# Patient Record
Sex: FEMALE | Race: ASIAN | NOT HISPANIC OR LATINO | Employment: UNEMPLOYED | URBAN - METROPOLITAN AREA
[De-identification: names, ages, dates, MRNs, and addresses within clinical notes are randomized per-mention and may not be internally consistent; named-entity substitution may affect disease eponyms.]

---

## 2017-01-26 DIAGNOSIS — L29.9 ITCH OF SKIN: Primary | ICD-10-CM

## 2017-01-26 RX ORDER — CETIRIZINE HYDROCHLORIDE 10 MG/1
TABLET ORAL
Qty: 60 TABLET | Refills: 3 | Status: ON HOLD | OUTPATIENT
Start: 2017-01-26 | End: 2017-06-20

## 2017-01-26 NOTE — TELEPHONE ENCOUNTER
Prednisone 10mg      Last Written Prescription Date:    Last Fill Quantity: ,   # refills:   [place] [facility] [place] [facility] or [facility] prescribing provider: 1/21/16  Future Office visit:       Routing refill request to provider for review/approval because:  Drug not active on patient's medication list    cetirizine      Last Written Prescription Date: 1/21/16  Last Fill Quantity: 60,  # refills: 3   Last [place] Visit [place] [facility] [place] [facility] or [facility] prescribing provider: 1/21/16

## 2017-02-21 ENCOUNTER — TELEPHONE (OUTPATIENT)
Dept: OBGYN | Facility: CLINIC | Age: 32
End: 2017-02-21

## 2017-02-21 NOTE — TELEPHONE ENCOUNTER
Pt uses several different creams, she is pregnant and wants to double check with OB that their okay to use.  Pt will be seeing Dr. Mendez 4/7/17.  Pt stated 5 weeks along now.    For rectal itching  1.  Proctozone HCL 2.5% cream  This works the best  2.  Triamcinolone 0.1% cream  Works okay  3.  Clobetasol 0.05%  Has not used yet.    For Under her Breast  1.  Fluocinonide 0.005%      Are these okay to use with pregnacy?  Please advise  Timbo HASKINS RN

## 2017-03-06 ENCOUNTER — TELEPHONE (OUTPATIENT)
Dept: OBGYN | Facility: CLINIC | Age: 32
End: 2017-03-06

## 2017-03-06 DIAGNOSIS — Z32.01 PREGNANCY TEST POSITIVE: Primary | ICD-10-CM

## 2017-03-06 NOTE — TELEPHONE ENCOUNTER
Pt calling because she fell this am and landed on her bottom and her tummy. LMP 1/15/17. Pt is approx 7 weeks pregnant.   No bleeding noted.   Pt has been having abd cramping since she fell.     Order placed for pt to have an US. She will have it tomorrow. I did advise the pt to go to the ED if the pain gets worse or she experiences any vag bleeding.       SHAUNA Appiah RN

## 2017-03-07 ENCOUNTER — RADIANT APPOINTMENT (OUTPATIENT)
Dept: ULTRASOUND IMAGING | Facility: CLINIC | Age: 32
End: 2017-03-07
Attending: OBSTETRICS & GYNECOLOGY
Payer: COMMERCIAL

## 2017-03-07 ENCOUNTER — TELEPHONE (OUTPATIENT)
Dept: OBGYN | Facility: CLINIC | Age: 32
End: 2017-03-07

## 2017-03-07 DIAGNOSIS — Z32.01 PREGNANCY TEST POSITIVE: ICD-10-CM

## 2017-03-07 DIAGNOSIS — Z32.01 PREGNANCY TEST POSITIVE: Primary | ICD-10-CM

## 2017-03-07 PROCEDURE — 76801 OB US < 14 WKS SINGLE FETUS: CPT | Performed by: OBSTETRICS & GYNECOLOGY

## 2017-03-07 NOTE — TELEPHONE ENCOUNTER
Pt had an US today and found out that she is pregnant and SHYLA is Oct 22, 2017.  Pt made appts today for a Nurse only visit and her first MD appt.    Pt fell yesterday and that's why US today.  Pt states she had cramping in abd until early this am.  She had not had any vag bleeding or discharge.    Pt needed a letter stating that she is pregnant and SHYLA as above.  Letter written, printed, signed and given to pt.    Pt also had Gestational Diabetes with her last pregnancy and wondering if she can have the Glucose testing ASAP.      Pt recently saw a Derm office at [phone] and they did a bunch skin culture as she was having so much itching at her rectum. They called her a month later to say that she had e-coli on on of the skin culture. But not treatment orders.  She continues with the itch.  Had pt sign a LENARD so we can get records from this office.    Please advise  Timbo HASKINS RN

## 2017-03-07 NOTE — LETTER
2017        RE:  Unique Multani   1985  [address]  [facility] [address]              To whom it may concern,    Ms Multani is a patient of Dr. Michael Mendez an OB /GYN Specialist.  Patient is currently pregnant with a estimated due date of Oct 22, 2017.    Sincerely,    Timbo HASKINS RN  Your [facility] Care Team

## 2017-03-08 NOTE — TELEPHONE ENCOUNTER
Pt calls back, relay MD message. Pt reports she recently picked up augmentin x10 day rx from pharm, but plans not to take until Dr. Mendez makes recommendations. States dermatologist office also instructed her to do a bleach bath.    Pt is wondering if she should take antibiotic or if another sample may need to be tested as the results are from a test taken 1 month ago.    Pt aware waiting on records from dermatology.

## 2017-03-08 NOTE — TELEPHONE ENCOUNTER
Pt calls, left vm stating she's returning a call.     See 03/07 US result note.    Called pt, vm left for pt to call clinic back.

## 2017-03-08 NOTE — TELEPHONE ENCOUNTER
"\"Michael Mendez MD   to [facility] Triage Ob [facility]        10:32 AM    Please advise      Congratulations re: pregnancy with reassuring ultrasound        -recommend report any bleeding      Please advise and facilitate one hour glucose; suggest about 12 weeks     Will treat perirectal symptoms upon review of records (dermatology\"  "

## 2017-03-09 NOTE — TELEPHONE ENCOUNTER
Please advise      -recommendations by dermatology safe in pregnancy      -if persistent symptoms, would try recommended           -therapy        -if does not resolve; would recommend re-evaluation

## 2017-03-16 ENCOUNTER — PRENATAL OFFICE VISIT (OUTPATIENT)
Dept: NURSING | Facility: CLINIC | Age: 32
End: 2017-03-16
Payer: COMMERCIAL

## 2017-03-16 DIAGNOSIS — O34.219 PREVIOUS CESAREAN DELIVERY, ANTEPARTUM CONDITION OR COMPLICATION: ICD-10-CM

## 2017-03-16 DIAGNOSIS — Z32.01 PREGNANCY TEST POSITIVE: ICD-10-CM

## 2017-03-16 DIAGNOSIS — Z34.80 SUPERVISION OF OTHER NORMAL PREGNANCY, ANTEPARTUM: Primary | ICD-10-CM

## 2017-03-16 LAB
ABO + RH BLD: NORMAL
ABO + RH BLD: NORMAL
BLD GP AB SCN SERPL QL: NORMAL
BLOOD BANK CMNT PATIENT-IMP: NORMAL
ERYTHROCYTE [DISTWIDTH] IN BLOOD BY AUTOMATED COUNT: 13.2 % (ref 10–15)
HCT VFR BLD AUTO: 39.6 % (ref 35–47)
HGB BLD-MCNC: 13 G/DL (ref 11.7–15.7)
MCH RBC QN AUTO: 29.3 PG (ref 26.5–33)
MCHC RBC AUTO-ENTMCNC: 32.8 G/DL (ref 31.5–36.5)
MCV RBC AUTO: 89 FL (ref 78–100)
PLATELET # BLD AUTO: 196 10E9/L (ref 150–450)
RBC # BLD AUTO: 4.43 10E12/L (ref 3.8–5.2)
SPECIMEN EXP DATE BLD: NORMAL
WBC # BLD AUTO: 8.7 10E9/L (ref 4–11)

## 2017-03-16 PROCEDURE — 86900 BLOOD TYPING SEROLOGIC ABO: CPT | Performed by: OBSTETRICS & GYNECOLOGY

## 2017-03-16 PROCEDURE — 87389 HIV-1 AG W/HIV-1&-2 AB AG IA: CPT | Performed by: OBSTETRICS & GYNECOLOGY

## 2017-03-16 PROCEDURE — 36415 COLL VENOUS BLD VENIPUNCTURE: CPT | Performed by: OBSTETRICS & GYNECOLOGY

## 2017-03-16 PROCEDURE — 85027 COMPLETE CBC AUTOMATED: CPT | Performed by: OBSTETRICS & GYNECOLOGY

## 2017-03-16 PROCEDURE — 99207 ZZC NO CHARGE NURSE ONLY: CPT

## 2017-03-16 PROCEDURE — 87086 URINE CULTURE/COLONY COUNT: CPT | Performed by: OBSTETRICS & GYNECOLOGY

## 2017-03-16 PROCEDURE — 87340 HEPATITIS B SURFACE AG IA: CPT | Performed by: OBSTETRICS & GYNECOLOGY

## 2017-03-16 PROCEDURE — 86762 RUBELLA ANTIBODY: CPT | Performed by: OBSTETRICS & GYNECOLOGY

## 2017-03-16 PROCEDURE — 86780 TREPONEMA PALLIDUM: CPT | Performed by: OBSTETRICS & GYNECOLOGY

## 2017-03-16 PROCEDURE — 86901 BLOOD TYPING SEROLOGIC RH(D): CPT | Performed by: OBSTETRICS & GYNECOLOGY

## 2017-03-16 PROCEDURE — 86850 RBC ANTIBODY SCREEN: CPT | Performed by: OBSTETRICS & GYNECOLOGY

## 2017-03-16 NOTE — NURSING NOTE
NPN nurse visit. 1st dr visit scheduled for 4/7/17 with Michael Mendez M.D. Pap not due. Last pap 9/2014.  Pt had an US done on 3/7/17.  8w4d.   SHAUNA Appiah RN

## 2017-03-16 NOTE — MR AVS SNAPSHOT
After Visit Summary   3/16/2017    Unique Multani    MRN: [id number]           Patient Information     Date Of Birth          1985        Visit Information        Provider Department      3/16/2017 9:00 AM RI PRENATAL NURSE [facility]        Today's Diagnoses     Supervision of other normal pregnancy, antepartum    -  1    Previous  delivery, antepartum condition or complication        Pregnancy test positive           Follow-ups after your visit        Your next 10 appointments already scheduled     2017  9:45 AM CDT   New Prenatal with Michael Mendez MD   [facility] ([facility])    [address]  [facility] [id number] [phone]            2017  9:00 AM CDT   Nurse Only with RI OB NURSE   [facility] ([facility])    [address]  [facility] [id number] [phone]              Who to contact     If you have questions or need follow up information about today's clinic visit or your schedule please contact [facility] directly at [phone].  Normal or non-critical lab and imaging results will be communicated to you by Wallmobhart, letter or phone within 4 business days after the clinic has received the results. If you do not hear from us within 7 days, please contact the clinic through KVZ Sportst or phone. If you have a critical or abnormal lab result, we will notify you by phone as soon as possible.  Submit refill requests through ClickEquations or call your pharmacy and they will forward the refill request to us. Please allow 3 business days for your refill to be completed.          Additional Information About Your Visit        Wallmobhart Information     ClickEquations gives you secure access to your electronic health record. If you see a primary care provider, you can also send messages to your care team and make appointments. If you have questions,  please call your primary care clinic.  If you do not have a primary care provider, please call [phone] and they will assist you.        Care EveryWhere ID     This is your Care EveryWhere ID. This could be used by other organizations to access your [place] medical records  [id number]        Your Vitals Were     Last Period                   01/15/2017 (Exact Date)            Blood Pressure from Last 3 Encounters:   07/19/16 107/68   02/16/16 96/62   01/21/16 104/72    Weight from Last 3 Encounters:   02/16/16 146 lb 1.6 oz (66.3 kg)   01/21/16 140 lb 6.4 oz (63.7 kg)   01/07/16 141 lb (64 kg)              We Performed the Following     ABO/Rh type and screen     Anti Treponema     CBC with platelets     Hepatitis B surface antigen     HIV Antigen Antibody Combo     Rubella Antibody IgG Quantitative     Urine Culture Aerobic Bacterial        Primary Care Provider Office Phone # Fax #    Michael Mendez [phone]       [facility] [address] [place] [address] [address] [place] [address]        Thank you!     Thank you for choosing [facility]  for your care. Our goal is always to provide you with excellent care. Hearing back from our patients is one way we can continue to improve our services. Please take a few minutes to complete the written survey that you may receive in the mail after your visit with us. Thank you!             Your Updated Medication List - Protect others around you: Learn how to safely use, store and throw away your medicines at www.disposemymeds.org.          This list is accurate as of: 3/16/17 10:07 AM.  Always use your most recent med list.                   Brand Name Dispense Instructions for use    acetaminophen 325 MG tablet    TYLENOL    100 tablet    Take 2 tablets (650 mg) by mouth every 4 hours as needed for mild pain or fever (greater than or equal to 38? C /100.4? F (oral) or 38.5? C/ 101.4? F (core).)       cetirizine 10 MG tablet     zyrTEC    60 tablet    Take 1 tab twice daily until at least 2 weeks after itch of skin is gone and then daily as needed       clobetasol 0.05 % ointment    TEMOVATE    60 g    Apply topically 2 times daily

## 2017-03-17 LAB
BACTERIA SPEC CULT: NORMAL
HBV SURFACE AG SERPL QL IA: NONREACTIVE
HIV 1+2 AB+HIV1 P24 AG SERPL QL IA: NORMAL
MICRO REPORT STATUS: NORMAL
RUBV IGG SERPL IA-ACNC: 48 IU/ML
SPECIMEN SOURCE: NORMAL
T PALLIDUM IGG+IGM SER QL: NEGATIVE

## 2017-04-07 ENCOUNTER — PRENATAL OFFICE VISIT (OUTPATIENT)
Dept: OBGYN | Facility: CLINIC | Age: 32
End: 2017-04-07
Payer: COMMERCIAL

## 2017-04-07 VITALS
SYSTOLIC BLOOD PRESSURE: 104 MMHG | DIASTOLIC BLOOD PRESSURE: 58 MMHG | HEIGHT: 62 IN | WEIGHT: 144.8 LBS | BODY MASS INDEX: 26.65 KG/M2

## 2017-04-07 DIAGNOSIS — O34.219 PREVIOUS CESAREAN DELIVERY, ANTEPARTUM CONDITION OR COMPLICATION: ICD-10-CM

## 2017-04-07 DIAGNOSIS — O09.621 HIGH-RISK PREGNANCY, YOUNG MULTIGRAVIDA, FIRST TRIMESTER: Primary | ICD-10-CM

## 2017-04-07 DIAGNOSIS — K21.9 GASTROESOPHAGEAL REFLUX DISEASE WITHOUT ESOPHAGITIS: ICD-10-CM

## 2017-04-07 DIAGNOSIS — Z86.32 HISTORY OF GESTATIONAL DIABETES: ICD-10-CM

## 2017-04-07 PROBLEM — Z34.80 SUPERVISION OF OTHER NORMAL PREGNANCY, ANTEPARTUM: Status: RESOLVED | Noted: 2017-03-16 | Resolved: 2017-04-07

## 2017-04-07 PROBLEM — O09.629 HIGH-RISK PREGNANCY, YOUNG MULTIGRAVIDA: Status: ACTIVE | Noted: 2017-04-07

## 2017-04-07 PROCEDURE — 87491 CHLMYD TRACH DNA AMP PROBE: CPT | Performed by: OBSTETRICS & GYNECOLOGY

## 2017-04-07 PROCEDURE — 99214 OFFICE O/P EST MOD 30 MIN: CPT | Performed by: OBSTETRICS & GYNECOLOGY

## 2017-04-07 PROCEDURE — 87624 HPV HI-RISK TYP POOLED RSLT: CPT | Performed by: OBSTETRICS & GYNECOLOGY

## 2017-04-07 PROCEDURE — 87591 N.GONORRHOEAE DNA AMP PROB: CPT | Performed by: OBSTETRICS & GYNECOLOGY

## 2017-04-07 PROCEDURE — G0145 SCR C/V CYTO,THINLAYER,RESCR: HCPCS | Performed by: OBSTETRICS & GYNECOLOGY

## 2017-04-07 NOTE — MR AVS SNAPSHOT
After Visit Summary   2017    Unique Multani    MRN: [id number]           Patient Information     Date Of Birth          1985        Visit Information        Provider Department      2017 9:45 AM Michael Mendez MD [facility]        Today's Diagnoses     High-risk pregnancy, young multigravida, first trimester    -  1    History of gestational diabetes        Previous  delivery, antepartum condition or complication        Gastroesophageal reflux disease without esophagitis           Follow-ups after your visit        Follow-up notes from your care team     Return in about 4 weeks (around 2017).      Your next 10 appointments already scheduled     2017  9:00 AM CDT   Nurse Only with RI OB NURSE   [facility]    [address]  [facility] [address] [phone]              Who to contact     If you have questions or need follow up information about today's clinic visit or your schedule please contact [facility] directly at [phone].  Normal or non-critical lab and imaging results will be communicated to you by Frontbackhart, letter or phone within 4 business days after the clinic has received the results. If you do not hear from us within 7 days, please contact the clinic through [email or web address] or phone. If you have a critical or abnormal lab result, we will notify you by phone as soon as possible.  Submit refill requests through [employer] Rev[employer] or call your pharmacy and they will forward the refill request to us. Please allow 3 business days for your refill to be completed.          Additional Information About Your Visit        Frontbackhart Information     [employer] gives you secure access to your electronic health record. If you see a primary care provider, you can also send messages to your care team and make appointments. If you have questions, please call your primary care clinic.  If you  "do not have a primary care provider, please call [phone] and they will assist you.        Care EveryWhere ID     This is your Care EveryWhere ID. This could be used by other organizations to access your [place] medical records  [id number]        Your Vitals Were     Height Last Period Breastfeeding? BMI (Body Mass Index)          5' 1.5\" (1.562 m) 01/15/2017 (Exact Date) No 26.92 kg/m2         Blood Pressure from Last 3 Encounters:   04/07/17 104/58   07/19/16 107/68   02/16/16 96/62    Weight from Last 3 Encounters:   04/07/17 144 lb 12.8 oz (65.7 kg)   02/16/16 146 lb 1.6 oz (66.3 kg)   01/21/16 140 lb 6.4 oz (63.7 kg)              We Performed the Following     CHLAMYDIA TRACHOMATIS PCR     HPV High Risk Types DNA Cervical     NEISSERIA GONORRHOEA PCR     Pap imaged thin layer screen with HPV - recommended age 30 - 65 years (select HPV order below)          Today's Medication Changes          These changes are accurate as of: 4/7/17 11:45 AM.  If you have any questions, ask your nurse or doctor.               Start taking these medicines.        Dose/Directions    ranitidine 150 MG tablet   Commonly known as:  ZANTAC   Used for:  Gastroesophageal reflux disease without esophagitis   Started by:  Michael Mendez MD        Dose:  150 mg   Take 1 tablet (150 mg) by mouth 2 times daily   Quantity:  60 tablet   Refills:  1            Where to get your medicines      Some of these will need a paper prescription and others can be bought over the counter.  Ask your nurse if you have questions.     Bring a paper prescription for each of these medications     ranitidine 150 MG tablet                Primary Care Provider Office Phone # Fax #    Michael Mendez [phone]       [address] [place] [address]        Thank you!     Thank you for choosing [facility]  for your care. Our goal is always to provide you with excellent care. Hearing " back from our patients is one way we can continue to improve our services. Please take a few minutes to complete the written survey that you may receive in the mail after your visit with us. Thank you!             Your Updated Medication List - Protect others around you: Learn how to safely use, store and throw away your medicines at www.disposemymeds.org.          This list is accurate as of: 4/7/17 11:45 AM.  Always use your most recent med list.                   Brand Name Dispense Instructions for use    acetaminophen 325 MG tablet    TYLENOL    100 tablet    Take 2 tablets (650 mg) by mouth every 4 hours as needed for mild pain or fever (greater than or equal to 38? C /100.4? F (oral) or 38.5? C/ 101.4? F (core).)       cetirizine 10 MG tablet    zyrTEC    60 tablet    Take 1 tab twice daily until at least 2 weeks after itch of skin is gone and then daily as needed       clobetasol 0.05 % ointment    TEMOVATE    60 g    Apply topically 2 times daily       PRENATAL VIT-FE BISG-FA-DHA PO          ranitidine 150 MG tablet    ZANTAC    60 tablet    Take 1 tablet (150 mg) by mouth 2 times daily

## 2017-04-07 NOTE — LETTER
April 18, 2017    Unique Multani  [address] [place] [address]    Dear Unique,  We are happy to inform you that your PAP smear result from 04/07/2017 is normal.  We are now able to do a follow up test on PAP smears. The DNA test is for HPV (Human Papilloma Virus). Cervical cancer is closely linked with certain types of HPV. Your result showed no evidence of HPV.  Therefore we recommend you return in 3 years for your next pap smear.  You will still need to return to the clinic every year for an annual exam and other preventive tests.  Please contact the clinic at [phone] with any questions.  Sincerely,    Michael Mendez MD

## 2017-04-07 NOTE — PROGRESS NOTES
"  SUBJECTIVE: Unique Multani is an 31 year old female here for initial OB visit.    Past Medical History of Father of Baby: No significant medical history    Review of Systems:   CONSTITUTIONAL:NEGATIVE for fever, chills, change in weight  INTEGUMENTARY/SKIN: NEGATIVE for worrisome rashes, moles or lesions  EYES: NEGATIVE for vision changes or irritation  ENT/MOUTH: NEGATIVE for ear, mouth and throat problems  RESP:NEGATIVE for significant cough or SOB  BREAST: NEGATIVE for masses, tenderness or discharge  CV: NEGATIVE for chest pain, palpitations or peripheral edema  GI: NEGATIVE for nausea, abdominal pain, heartburn, or change in bowel habits  MUSCULOSKELETAL: NEGATIVE for significant arthralgias or myalgia  NEURO: NEGATIVE for weakness, dizziness or paresthesias  ENDOCRINE: NEGATIVE for temperature intolerance, skin/hair changes  HEME/ALLERGY/IMMUNE: NEGATIVE for bleeding problems  PSYCHIATRIC: NEGATIVE for changes in mood or affect     History Since Last Menstrual Period: No Problems    EXAM: Blood pressure 104/58, height 5' 1.5\" (1.562 m), weight 144 lb 12.8 oz (65.7 kg), last menstrual period 01/15/2017, not currently breastfeeding.  GENERAL APPEARANCE: healthy, alert and no distress  EYES: EOMI,  PERRL  HENT: ear canals and TM's normal and nose and mouth without ulcers or lesions  RESP: lungs clear to auscultation - no rales, rhonchi or wheezes  CV: regular rates and rhythm, normal S1 S2, no S3 or S4 and no murmur, click or rub -  ABDOMEN:  soft, nontender, no HSM or masses and bowel sounds normal    Pelvix exam:  Perineum: Intact;   Vulva: Normal;  Vagina: Normal mucosa  Cervix: Nulliparous, closed, mobile,  no discharge;  Uterus: 12 weeks  Adnexa: Normal;  Rectum: Normal without lesion or mass;   Bony Pelvis: Gynecoid.     ASSESSMENT/ PLAN:  IUP at 11 5/7 weeks      -previous C/S x 2; recommend repeat      -history of gestational DM; recommend early screening      -offered First Trimester Screen/CF " testing  Follow up in 4 weeks.

## 2017-04-07 NOTE — NURSING NOTE
"Chief Complaint   Patient presents with     Prenatal Care     NPN MD visit   11w5d  Discuss   C/o nausea / sick feeling whenever eating  Hx GDM    initial /58  Ht 5' 1.5\" (1.562 m)  Wt 144 lb 12.8 oz (65.7 kg)  LMP 01/15/2017 (Exact Date)  Breastfeeding? No  BMI 26.92 kg/m2 Estimated body mass index is 26.92 kg/(m^2) as calculated from the following:    Height as of this encounter: 5' 1.5\" (1.562 m).    Weight as of this encounter: 144 lb 12.8 oz (65.7 kg).  BP completed using cuff size regular.  Mattie Cuellar CMA    "

## 2017-04-09 LAB
C TRACH DNA SPEC QL NAA+PROBE: NORMAL
N GONORRHOEA DNA SPEC QL NAA+PROBE: NORMAL
SPECIMEN SOURCE: NORMAL
SPECIMEN SOURCE: NORMAL

## 2017-04-11 LAB
COPATH REPORT: NORMAL
PAP: NORMAL

## 2017-04-13 LAB
FINAL DIAGNOSIS: NORMAL
HPV HR 12 DNA CVX QL NAA+PROBE: NEGATIVE
HPV16 DNA SPEC QL NAA+PROBE: NEGATIVE
HPV18 DNA SPEC QL NAA+PROBE: NEGATIVE
SPECIMEN DESCRIPTION: NORMAL

## 2017-04-17 NOTE — PROGRESS NOTES
Pap done on 4/7/17. Please send nl pap and Neg HPV letter, ([id number]) for pap in 3 years and annual physical in 1 year.   Chart reviewed,  alexa.     SHAUNA Appiah RN

## 2017-05-08 ENCOUNTER — PRENATAL OFFICE VISIT (OUTPATIENT)
Dept: OBGYN | Facility: CLINIC | Age: 32
End: 2017-05-08
Payer: MEDICAID

## 2017-05-08 VITALS
SYSTOLIC BLOOD PRESSURE: 100 MMHG | TEMPERATURE: 98.6 F | BODY MASS INDEX: 27.53 KG/M2 | WEIGHT: 148.1 LBS | DIASTOLIC BLOOD PRESSURE: 60 MMHG

## 2017-05-08 DIAGNOSIS — O09.622 HIGH-RISK PREGNANCY, YOUNG MULTIGRAVIDA, SECOND TRIMESTER: Primary | ICD-10-CM

## 2017-05-08 PROCEDURE — 99212 OFFICE O/P EST SF 10 MIN: CPT | Performed by: OBSTETRICS & GYNECOLOGY

## 2017-05-08 PROCEDURE — 36415 COLL VENOUS BLD VENIPUNCTURE: CPT | Performed by: OBSTETRICS & GYNECOLOGY

## 2017-05-08 PROCEDURE — 99000 SPECIMEN HANDLING OFFICE-LAB: CPT | Performed by: OBSTETRICS & GYNECOLOGY

## 2017-05-08 PROCEDURE — 81511 FTL CGEN ABNOR FOUR ANAL: CPT | Mod: 90 | Performed by: OBSTETRICS & GYNECOLOGY

## 2017-05-08 NOTE — NURSING NOTE
"Chief Complaint   Patient presents with     Prenatal Care       Initial /60 (BP Location: Left arm, Patient Position: Chair, Cuff Size: Adult Regular)  Temp 98.6  F (37  C) (Oral)  Wt 148 lb 1.6 oz (67.2 kg)  LMP 01/15/2017 (Exact Date)  BMI 27.53 kg/m2 Estimated body mass index is 27.53 kg/(m^2) as calculated from the following:    Height as of 4/7/17: 5' 1.5\" (1.562 m).    Weight as of this encounter: 148 lb 1.6 oz (67.2 kg).  Medication Reconciliation: complete  16w1d    "

## 2017-05-08 NOTE — MR AVS SNAPSHOT
After Visit Summary   5/8/2017    Unique Multani    MRN: [id number]           Patient Information     Date Of Birth          1985        Visit Information        Provider Department      5/8/2017 9:15 AM Michael Mendez MD [facility]        Today's Diagnoses     High-risk pregnancy, young multigravida, second trimester    -  1       Follow-ups after your visit        Follow-up notes from your care team     Return in about 4 weeks (around 6/5/2017).      Future tests that were ordered for you today     Open Future Orders        Priority Expected Expires Ordered    US OB > 14 Weeks Complete Single Routine  5/8/2018 5/8/2017            Who to contact     If you have questions or need follow up information about today's clinic visit or your schedule please contact [facility] directly at [phone].  Normal or non-critical lab and imaging results will be communicated to you by MyChart, letter or phone within 4 business days after the clinic has received the results. If you do not hear from us within 7 days, please contact the clinic through Nugg Solutionshart or phone. If you have a critical or abnormal lab result, we will notify you by phone as soon as possible.  Submit refill requests through Heartbeat or call your pharmacy and they will forward the refill request to us. Please allow 3 business days for your refill to be completed.          Additional Information About Your Visit        MyChart Information     Heartbeat gives you secure access to your electronic health record. If you see a primary care provider, you can also send messages to your care team and make appointments. If you have questions, please call your primary care clinic.  If you do not have a primary care provider, please call [phone] and they will assist you.        Care EveryWhere ID     This is your Care EveryWhere ID. This could be used by other organizations to access your [facility]  records  [id number]        Your Vitals Were     Temperature Last Period BMI (Body Mass Index)             98.6  F (37  C) (Oral) 01/15/2017 (Exact Date) 27.53 kg/m2          Blood Pressure from Last 3 Encounters:   05/08/17 100/60   04/07/17 104/58   07/19/16 107/68    Weight from Last 3 Encounters:   05/08/17 148 lb 1.6 oz (67.2 kg)   04/07/17 144 lb 12.8 oz (65.7 kg)   02/16/16 146 lb 1.6 oz (66.3 kg)              We Performed the Following     Maternal quad screen        Primary Care Provider Office Phone # Fax #    Michael Mendez [phone]       [address] [place] [address]        Thank you!     Thank you for choosing [facility]  for your care. Our goal is always to provide you with excellent care. Hearing back from our patients is one way we can continue to improve our services. Please take a few minutes to complete the written survey that you may receive in the mail after your visit with us. Thank you!             Your Updated Medication List - Protect others around you: Learn how to safely use, store and throw away your medicines at www.disposemymeds.org.          This list is accurate as of: 5/8/17  9:32 AM.  Always use your most recent med list.                   Brand Name Dispense Instructions for use    acetaminophen 325 MG tablet    TYLENOL    100 tablet    Take 2 tablets (650 mg) by mouth every 4 hours as needed for mild pain or fever (greater than or equal to 38? C /100.4? F (oral) or 38.5? C/ 101.4? F (core).)       cetirizine 10 MG tablet    zyrTEC    60 tablet    Take 1 tab twice daily until at least 2 weeks after itch of skin is gone and then daily as needed       clobetasol 0.05 % ointment    TEMOVATE    60 g    Apply topically 2 times daily       PRENATAL VIT-FE BISG-FA-DHA PO          ranitidine 150 MG tablet    ZANTAC    60 tablet    Take 1 tablet (150 mg) by mouth 2 times daily

## 2017-05-10 LAB
# FETUSES US: NORMAL
AFP ADJ MOM AMN: 2
AFP SERPL-MCNC: 68 NG/ML
AGE - REPORTED: 34.4
DATING METHOD: NORMAL
DIABETIC AT CONCEPTION: NO
FAMILY MEMBER DISEASES HX: NO
FAMILY MEMBER DISEASES HX: NO
GA METHOD: NORMAL
GA: 16.14 WK
HCG MOM SERPL: 1.97
HCG SERPL-ACNC: NORMAL M[IU]/ML
HX OF HEREDITARY DISORDERS: NO
IDDM PATIENT QL: NO
INHIBIN A MOM SERPL: 0.99
INHIBIN A SERPL-MCNC: 167
INTEGRATED SCN PATIENT-IMP: NORMAL
LMP START DATE: NORMAL
PATHOLOGY STUDY: NORMAL
PREV HX CHROMOSOME ABNORMALITY: NO
SPECIMEN DRAWN SERPL: NORMAL
TWINS: NORMAL
U ESTRIOL MOM SERPL: 0.95
U ESTRIOL SERPL-MCNC: 0.87 NG/ML

## 2017-06-05 ENCOUNTER — PRENATAL OFFICE VISIT (OUTPATIENT)
Dept: OBGYN | Facility: CLINIC | Age: 32
End: 2017-06-05
Payer: COMMERCIAL

## 2017-06-05 ENCOUNTER — RADIANT APPOINTMENT (OUTPATIENT)
Dept: ULTRASOUND IMAGING | Facility: CLINIC | Age: 32
End: 2017-06-05
Attending: OBSTETRICS & GYNECOLOGY
Payer: COMMERCIAL

## 2017-06-05 VITALS
BODY MASS INDEX: 28.33 KG/M2 | SYSTOLIC BLOOD PRESSURE: 114 MMHG | WEIGHT: 152.4 LBS | HEART RATE: 76 BPM | DIASTOLIC BLOOD PRESSURE: 66 MMHG

## 2017-06-05 DIAGNOSIS — Z86.32 HISTORY OF GESTATIONAL DIABETES: ICD-10-CM

## 2017-06-05 DIAGNOSIS — O09.621 HIGH-RISK PREGNANCY, YOUNG MULTIGRAVIDA, FIRST TRIMESTER: ICD-10-CM

## 2017-06-05 DIAGNOSIS — O09.622 HIGH-RISK PREGNANCY, YOUNG MULTIGRAVIDA, SECOND TRIMESTER: Primary | ICD-10-CM

## 2017-06-05 DIAGNOSIS — O09.622 HIGH-RISK PREGNANCY, YOUNG MULTIGRAVIDA, SECOND TRIMESTER: ICD-10-CM

## 2017-06-05 PROCEDURE — 76815 OB US LIMITED FETUS(S): CPT | Performed by: FAMILY MEDICINE

## 2017-06-05 PROCEDURE — 36415 COLL VENOUS BLD VENIPUNCTURE: CPT | Performed by: OBSTETRICS & GYNECOLOGY

## 2017-06-05 PROCEDURE — 82950 GLUCOSE TEST: CPT | Performed by: OBSTETRICS & GYNECOLOGY

## 2017-06-05 PROCEDURE — 99207 ZZC PRENATAL VISIT: CPT | Performed by: OBSTETRICS & GYNECOLOGY

## 2017-06-05 NOTE — MR AVS SNAPSHOT
After Visit Summary   6/5/2017    Unique Mlutani    MRN: [id number]           Patient Information     Date Of Birth          1985        Visit Information        Provider Department      6/5/2017 10:15 AM Michael Mendez MD [facility]        Today's Diagnoses     High-risk pregnancy, young multigravida, second trimester    -  1    History of gestational diabetes        High-risk pregnancy, young multigravida, first trimester           Follow-ups after your visit        Follow-up notes from your care team     Return in about 4 weeks (around 7/3/2017).      Your next 10 appointments already scheduled     Jun 30, 2017  9:00 AM CDT   ESTABLISHED PRENATAL with Michael Mendez MD   [facility] ([facility])    [address]  [facility] [id number] [phone]            Jul 28, 2017  9:00 AM CDT   ESTABLISHED PRENATAL with Michael Mendez MD   [facility] ([facility])    [address]  [facility] [address] [phone]            Aug 25, 2017  9:00 AM CDT   ESTABLISHED PRENATAL with Michael Mendez MD   [facility] ([facility])    [address]  [facility] [id number] [phone]              Who to contact     If you have questions or need follow up information about today's clinic visit or your schedule please contact [facility] directly at [phone].  Normal or non-critical lab and imaging results will be communicated to you by MyChart, letter or phone within 4 business days after the clinic has received the results. If you do not hear from us within 7 days, please contact the clinic through [employer] or phone. If you have a critical or abnormal lab result, we will notify you by phone as soon as possible.  Submit refill requests through [email or web address] or call your pharmacy and they will forward the refill request to  us. Please allow 3 business days for your refill to be completed.          Additional Information About Your Visit        MyChart Information     [employer] gives you secure access to your electronic health record. If you see a primary care provider, you can also send messages to your care team and make appointments. If you have questions, please call your primary care clinic.  If you do not have a primary care provider, please call [phone] and they will assist you.        Care EveryWhere ID     This is your Care EveryWhere ID. This could be used by other organizations to access your [place] medical records  [id number]        Your Vitals Were     Pulse Last Period BMI (Body Mass Index)             76 01/15/2017 (Exact Date) 29.54 kg/m2          Blood Pressure from Last 3 Encounters:   06/05/17 114/66   05/08/17 100/60   04/07/17 104/58    Weight from Last 3 Encounters:   06/05/17 158 lb 14.4 oz (72.1 kg)   05/08/17 148 lb 1.6 oz (67.2 kg)   04/07/17 144 lb 12.8 oz (65.7 kg)              We Performed the Following     Glucose tolerance gest screen 1 hour        Primary Care Provider Office Phone # Fax #    Michael Mendez [phone]       [facility] [address] [place] [address] [address] [place] [address]        Thank you!     Thank you for choosing [facility]  for your care. Our goal is always to provide you with excellent care. Hearing back from our patients is one way we can continue to improve our services. Please take a few minutes to complete the written survey that you may receive in the mail after your visit with us. Thank you!             Your Updated Medication List - Protect others around you: Learn how to safely use, store and throw away your medicines at www.disposemymeds.org.          This list is accurate as of: 6/5/17 10:38 AM.  Always use your most recent med list.                   Brand Name Dispense Instructions for use    acetaminophen 325 MG tablet     TYLENOL    100 tablet    Take 2 tablets (650 mg) by mouth every 4 hours as needed for mild pain or fever (greater than or equal to 38? C /100.4? F (oral) or 38.5? C/ 101.4? F (core).)       cetirizine 10 MG tablet    zyrTEC    60 tablet    Take 1 tab twice daily until at least 2 weeks after itch of skin is gone and then daily as needed       clobetasol 0.05 % ointment    TEMOVATE    60 g    Apply topically 2 times daily       PRENATAL VIT-FE [facility]-FA-DHA PO          ranitidine 150 MG tablet    ZANTAC    60 tablet    Take 1 tablet (150 mg) by mouth 2 times daily

## 2017-06-05 NOTE — NURSING NOTE
"Chief Complaint   Patient presents with     Prenatal Care   20w1d  Early GCT today    Initial /66 (BP Location: Right arm, Patient Position: Chair, Cuff Size: Adult Regular)  Pulse 76  Wt 152 lb 6.4 oz (69.1 kg)  LMP 01/15/2017 (Exact Date)  BMI 28.33 kg/m2 Estimated body mass index is 28.33 kg/(m^2) as calculated from the following:    Height as of 4/7/17: 5' 1.5\" (1.562 m).    Weight as of this encounter: 152 lb 6.4 oz (69.1 kg).  Medication Reconciliation: complete    "

## 2017-06-06 ENCOUNTER — TELEPHONE (OUTPATIENT)
Dept: OBGYN | Facility: CLINIC | Age: 32
End: 2017-06-06

## 2017-06-06 DIAGNOSIS — O24.419 GESTATIONAL DIABETES: Primary | ICD-10-CM

## 2017-06-06 PROBLEM — O24.410 DIET CONTROLLED GESTATIONAL DIABETES MELLITUS (GDM), ANTEPARTUM: Status: ACTIVE | Noted: 2017-06-06

## 2017-06-06 PROBLEM — Z86.32 HISTORY OF GESTATIONAL DIABETES: Status: RESOLVED | Noted: 2017-04-07 | Resolved: 2017-06-06

## 2017-06-06 LAB — GLUCOSE 1H P 50 G GLC PO SERPL-MCNC: 153 MG/DL (ref 60–129)

## 2017-06-06 NOTE — TELEPHONE ENCOUNTER
Order added for diabetic educators per Dr Mendez.  Spoke to pt and gave info and phone number to diab ed.  Christiane Mascorro R.N.  [facility]

## 2017-06-19 ENCOUNTER — ALLIED HEALTH/NURSE VISIT (OUTPATIENT)
Dept: EDUCATION SERVICES | Facility: CLINIC | Age: 32
End: 2017-06-19
Payer: COMMERCIAL

## 2017-06-19 DIAGNOSIS — O24.410 DIET CONTROLLED GESTATIONAL DIABETES MELLITUS (GDM), ANTEPARTUM: Primary | ICD-10-CM

## 2017-06-19 PROCEDURE — G0109 DIAB MANAGE TRN IND/GROUP: HCPCS

## 2017-06-19 NOTE — MR AVS SNAPSHOT
After Visit Summary   6/19/2017    Unique Multani    MRN: [id number]           Patient Information     Date Of Birth          1985        Visit Information        Provider Department      6/19/2017 8:30 AM RI GDM [facility]        Today's Diagnoses     Diet controlled gestational diabetes mellitus (GDM), antepartum    -  1       Follow-ups after your visit        Your next 10 appointments already scheduled     Jun 29, 2017  1:00 PM CDT   Diabetic Education with RI DIABETIC ED RESOURCE   [facility] ([facility])    [address]  [facility] [address] [phone]            Jun 30, 2017  9:00 AM CDT   ESTABLISHED PRENATAL with Michael Mendez MD   [facility] ([facility])    [address]  [facility] [id number] [phone]            Jul 28, 2017  9:00 AM CDT   ESTABLISHED PRENATAL with Michael Mendez MD   [facility] ([facility])    [address]  [facility] [id number] [phone]            Aug 25, 2017  9:00 AM CDT   ESTABLISHED PRENATAL with Michael Mendez MD   [facility] ([facility])    [address]  [facility] [id number] [phone]              Who to contact     If you have questions or need follow up information about today's clinic visit or your schedule please contact [facility] directly at [phone].  Normal or non-critical lab and imaging results will be communicated to you by MyChart, letter or phone within 4 business days after the clinic has received the results. If you do not hear from us within 7 days, please contact the clinic through MyChart or phone. If you have a critical or abnormal lab result, we will notify you by phone as soon as possible.  Submit refill requests through [employer] or call your pharmacy and they will forward the  refill request to us. Please allow 3 business days for your refill to be completed.          Additional Information About Your Visit        Simph[employer]art Information     [employer] gives you secure access to your electronic health record. If you see a primary care provider, you can also send messages to your care team and make appointments. If you have questions, please call your primary care clinic.  If you do not have a primary care provider, please call [phone] and they will assist you.        Care EveryWhere ID     This is your Care EveryWhere ID. This could be used by other organizations to access your [place] medical records  [id number]        Your Vitals Were     Last Period                   01/15/2017 (Exact Date)            Blood Pressure from Last 3 Encounters:   06/05/17 114/66   05/08/17 100/60   04/07/17 104/58    Weight from Last 3 Encounters:   06/05/17 69.1 kg (152 lb 6.4 oz)   05/08/17 67.2 kg (148 lb 1.6 oz)   04/07/17 65.7 kg (144 lb 12.8 oz)              We Performed the Following     DIABETES EDUCATION - Group []           Today's Medication Changes          These changes are accurate as of: 6/19/17 11:07 AM.  If you have any questions, ask your nurse or doctor.               Start taking these medicines.        Dose/Directions    acetone (Urine) test Strp   Used for:  Diet controlled gestational diabetes mellitus (GDM), antepartum        Test once daily x1 week, then reduce to once weekly if consistently negative   Quantity:  25 each   Refills:  1       blood glucose monitoring lancets   Used for:  Diet controlled gestational diabetes mellitus (GDM), antepartum        Use to test blood sugar 4 times daily or as directed.  Ok to substitute alternative if insurance prefers.   Quantity:  100 each   Refills:  1       blood glucose monitoring test strip   Commonly known as:  ONE TOUCH VERIO IQ   Used for:  Diet controlled gestational diabetes mellitus (GDM), antepartum        Use to test blood  sugar 4 times daily or as directed.  Ok to substitute alternative if insurance prefers.   Quantity:  100 strip   Refills:  3            Where to get your medicines      These medications were sent to [facility] [address] [place], [place] - [address] [facility] [address] [facility] [address]  [address], [facility] [address]     Phone:  [phone]     acetone (Urine) test Strp    blood glucose monitoring lancets    blood glucose monitoring test strip                Primary Care Provider Office Phone # Fax #    Michael Mendez [phone]       [facility] [address] [place] [address]        Thank you!     Thank you for choosing [facility]  for your care. Our goal is always to provide you with excellent care. Hearing back from our patients is one way we can continue to improve our services. Please take a few minutes to complete the written survey that you may receive in the mail after your visit with us. Thank you!             Your Updated Medication List - Protect others around you: Learn how to safely use, store and throw away your medicines at www.disposemymeds.org.          This list is accurate as of: 6/19/17 11:07 AM.  Always use your most recent med list.                   Brand Name Dispense Instructions for use    acetaminophen 325 MG tablet    TYLENOL    100 tablet    Take 2 tablets (650 mg) by mouth every 4 hours as needed for mild pain or fever (greater than or equal to 38? C /100.4? F (oral) or 38.5? C/ 101.4? F (core).)       acetone (Urine) test Strp     25 each    Test once daily x1 week, then reduce to once weekly if consistently negative       blood glucose monitoring lancets     100 each    Use to test blood sugar 4 times daily or as directed.  Ok to substitute alternative if insurance prefers.       blood glucose monitoring test strip    ONE TOUCH VERIO IQ    100 strip    Use to test blood sugar 4 times daily or as  directed.  Ok to substitute alternative if insurance prefers.       cetirizine 10 MG tablet    zyrTEC    60 tablet    Take 1 tab twice daily until at least 2 weeks after itch of skin is gone and then daily as needed       clobetasol 0.05 % ointment    TEMOVATE    60 g    Apply topically 2 times daily       PRENATAL VIT-FE BISG-FA-DHA PO          ranitidine 150 MG tablet    ZANTAC    60 tablet    Take 1 tablet (150 mg) by mouth 2 times daily

## 2017-06-19 NOTE — PROGRESS NOTES
Diabetes Self-Management Training - Gestational Diabetes    SUBJECTIVE/OBJECTIVE:  Unique Multani presents today for education related to gestational diabetes.  She is accompanied by self    Patient's gestational diabetes management related comments/concerns: none    Patient's emotional response to diabetes: expresses readiness to learn and concern for health and well-being    LMP 01/15/2017 (Exact Date)    Pre pregnancy weight: 142#    Weight gain 10 lbs at 23 weeks gestation.    Estimated Date of Delivery: Oct 22, 2017    Lab Results   Component Value Date    GLC 86 2016       1 hour OGTT:  on 153    History   Smoking Status     Former Smoker     Years: 7.00     Types: Cigarettes   Smokeless Tobacco     Never Used     Comment: only if she drinks alcohol       Lifestyle and Health Behaviors:  Physical Activity: walk a milk 3 days a week    Nutrition:  Patient eats 3 meals and 0-1 snacks per day.    Breakfast:  2 toast with jelly OR 1 toast, 2 eggs  Snack:  no  Lunch:  Brats and diet coke OR roast duck noodle soup  Snack:  no  Dinner:  Yazmin alyse chicken and 1 cup white rice OR 2 tacos  Bedtime Snack:  no    Other time(s) food is eaten? no     Beverages: water and as above    Cultural/Restorationism diet restrictions: No     Biggest challenge to healthy eating is:  knowing what to eat    Pre- Vitamin: Yes    Supplements: No   Experiencing nausea?  No     Socio/Economic considerations:  Support System: spouse/significant other    Health Beliefs and Attitudes:   Stage of Change: PREPARATION (Decided to change - considering how)    ASSESSMENT:  Needs assistance with meal planning and BG testing    INTERVENTION:  Patient was instructed on One Touch Verio Flex meter and was able to provide an accurate return demonstration. Patient's blood glucose reading today was 170 mg/dL.    Educational topics covered today:  GDM diagnosis, pathophysiology, Risks and Complications of GDM, Means of controlling GDM, Using a  Blood Glucose Monitor, Blood Glucose Goals, Logging and Interpreting Glucose Results, Ketone Testing, When to Call a Diabetes Educator or OB Provider, Healthy Eating During Pregnancy, Counting Carbohydrates, Meal Planning for GDM, and Physical Activity    Educational materials provided today:   Elin Understanding Gestational Diabetes  GDM Log Book  Sharps Disposal  Care After Delivery  One Touch Verio Flex meter kit    Pt verbalized understanding of concepts discussed and recommendations provided today.     PLAN:  Check glucose 4 times daily, before breakfast and 1 hour after each meal.     Check Ketones daily for one week, if negative, reduce testing to once a week.     Physical activity recommended: as able.    Meal plan: 2-3 carbs at breakfast, 3-4 carbs at lunch, 3-4 carbs at supper, 1-2 carbs at 3 snacks a day.  Follow consistent CHO meal plan, eat CHO and protein/fat at all meals/snacks.    Call/e-mail/MyChart message diabetes educator if 3 or more blood sugars are above the goal in 1 week or if ketones are positive.     Call/e-mail/MyChart message with questions/concerns.    FOLLOW-UP:  Call or e-mail educator if 3 or more blood sugars are above goal in 1 week.  Call or e-mail with questions or concerns.  Appointment scheduled on 6/29.     TATIANA Charles CDE  Time Spent: 120 minutes  Encounter type: Group class

## 2017-06-20 ENCOUNTER — HOSPITAL ENCOUNTER (OUTPATIENT)
Facility: CLINIC | Age: 32
Discharge: HOME | End: 2017-06-20
Attending: OBSTETRICS & GYNECOLOGY | Admitting: OBSTETRICS & GYNECOLOGY
Payer: COMMERCIAL

## 2017-06-20 ENCOUNTER — TELEPHONE (OUTPATIENT)
Dept: OBGYN | Facility: CLINIC | Age: 32
End: 2017-06-20

## 2017-06-20 VITALS
SYSTOLIC BLOOD PRESSURE: 88 MMHG | WEIGHT: 152 LBS | BODY MASS INDEX: 28.26 KG/M2 | TEMPERATURE: 98.3 F | DIASTOLIC BLOOD PRESSURE: 57 MMHG | RESPIRATION RATE: 16 BRPM

## 2017-06-20 PROCEDURE — 99213 OFFICE O/P EST LOW 20 MIN: CPT

## 2017-06-20 NOTE — PLAN OF CARE
22w2d here for assessment of DFM. Patient states she has not felt her baby move all day and has used her doppler at home, unable to find FHT. Patient denies LOF, vaginal bleeding, cramping, abdominal pain, backache.    Doppler applied for FHT, heart tones 138. MD updated regarding patient arrival and assessment findings. Ok to discharge patient to home.

## 2017-06-20 NOTE — TELEPHONE ENCOUNTER
I spoke with. She tried laying down and fell asleep. She will continue to lie on her left side for another 1/2 hour.     Pt will call back regardless if she feels fetal movement, just to update us and so we can send her to L&D if no fetal movement.  SHAUNA Appiah, RN

## 2017-06-20 NOTE — IP AVS SNAPSHOT
MRN:[id number]                      After Visit Summary   6/20/2017    Unique Multani    MRN: [id number]           Thank you!     Thank you for choosing [facility] for your care. Our goal is always to provide you with excellent care. Hearing back from our patients is one way we can continue to improve our services. Please take a few minutes to complete the written survey that you may receive in the mail after you visit. If you would like to speak to someone directly about your visit please contact Patient Relations at [phone]. Thank you!          Patient Information     Date Of Birth          1985        About your hospital stay     You were admitted on:  June 20, 2017 You last received care in the:  [facility] Labor and Delivery    You were discharged on:  June 20, 2017       Who to Call     For medical emergencies, please call 911.  For non-urgent questions about your medical care, please call your primary care provider or clinic, [phone]          Attending Provider     Provider Specialty    Carrie Hartman MD OB/Gyn       Primary Care Provider Office Phone # Fax #    Michale Mendez [phone]      Your next 10 appointments already scheduled     Jun 29, 2017  1:00 PM CDT   Diabetic Education with RI DIABETIC ED RESOURCE   [facility] ([facility])    [address]  [facility] [address] [phone]            Jun 30, 2017  9:00 AM CDT   ESTABLISHED PRENATAL with Michael Mendez MD   [facility])    [address]  [facility] [address] [phone]            Jul 28, 2017  9:00 AM CDT   ESTABLISHED PRENATAL with Michael Mendez MD   [facility] ([facility])    [address]  [facility] [address] [phone]            Aug 25, 2017  9:00 AM CDT   ESTABLISHED PRENATAL with  Michael Mendez MD   [facility] ([facility])    [address]  [facility] [id number] [phone]              Further instructions from your care team       Discharge Instruction for Undelivered Patients      You were seen for: Fetal Assessment  We Consulted: Dr. Mendez  You had (Test or Medicine):Doppler for heart tones     Diet:   Drink 8 to 12 glasses of liquids (milk, juice, water) every day.  You may eat meals and snacks.  To manager your diabetes, follow the guidelines for eating and drinking given to you by your Clinic Provider or Diabetes Educator.       Activity:  Count fetal kicks everyday (see handout)  Call your doctor or nurse midwife if your baby is moving less than usual.     Call your provider if you notice:  Swelling in your face or increased swelling in your hands or legs.  Headaches that are not relieved by Tylenol (acetaminophen).  Changes in your vision (blurring: seeing spots or stars.)  Nausea (sick to your stomach) and vomiting (throwing up).   Weight gain of 5 pounds or more per week.  Heartburn that doesn't go away.  Signs of bladder infection: pain when you urinate (use the toilet), need to go more often and more urgently.  The bag of tanner (rupture of membranes) breaks, or you notice leaking in your underwear.  Bright red blood in your underwear.  Abdominal (lower belly) or stomach pain.  Second (plus) baby: Contractions (tightening) less than 10 minutes apart and getting stronger.  *If less than 34 weeks: Contractions (tightenings) more than 6 times in one hour.  Increase or change in vaginal discharge (note the color and amount)      Follow-up:  As scheduled in the clinic          Pending Results     No orders found from 6/18/2017 to 6/21/2017.            Admission Information     Date & Time Provider Department Dept. Phone    6/20/2017 Carrie Hartman MD [facility] Labor and Delivery [phone]      Your Vitals Were      Blood Pressure Temperature Respirations Weight Last Period BMI (Body Mass Index)    88/57 98.3  F (36.8  C) (Oral) 16 68.9 kg (152 lb) 01/15/2017 (Exact Date) 28.26 kg/m2      [employer] Information     [employer] gives you secure access to your electronic health record. If you see a primary care provider, you can also send messages to your care team and make appointments. If you have questions, please call your primary care clinic.  If you do not have a primary care provider, please call [phone] and they will assist you.        Care EveryWhere ID     This is your Care EveryWhere ID. This could be used by other organizations to access your [place] medical records  [id number]           Review of your medicines      UNREVIEWED medicines. Ask your doctor about these medicines        Dose / Directions    PRENATAL VIT-FE BISG-FA-DHA PO        Refills:  0                Protect others around you: Learn how to safely use, store and throw away your medicines at www.disposemymeds.org.             Medication List: This is a list of all your medications and when to take them. Check marks below indicate your daily home schedule. Keep this list as a reference.      Medications           Morning Afternoon Evening Bedtime As Needed    PRENATAL VIT-FE BISG-FA-DHA PO                                          More Information        Kick Counts  It s normal to worry about your baby s health. One way you can know your baby s doing well is to record the baby s movements once a day. This is called a kick count. You will usually feel your baby move by the 20th week of pregnancy. Remember to take your kick count records to all your appointments with your healthcare provider.       How to Count Kicks    Choose a time when the baby is active, such as after a meal.     Sit comfortably or lie on your side.     The first time the baby moves, write down the time.     Count each movement until the baby has moved 10 times. This can take from 20  minutes to 2 hours.     If the baby hasn t moved 4 times in 1 hour, pat your stomach to wake the baby up.    Write down the time you feel the baby s 10th movement.    Try to do it at the same time each day.  When to Call Your Healthcare Provider  Call your healthcare provider right away if you notice any of the following:    Your baby moves fewer than 10 times in 2 hours while you re doing kick counts.    Your baby moves much less often than on the days before.    You have not felt your baby move all day.    1669-3555 [facility], [address] [place] [address], [place] [address]. All rights reserved. This information is not intended as a substitute for professional medical care. Always follow your healthcare professional's instructions.

## 2017-06-20 NOTE — TELEPHONE ENCOUNTER
Please advise     -while early in pregnancy to feel consistent fetal movement, if no movement        -within hour, recommend evaluation L/D

## 2017-06-20 NOTE — TELEPHONE ENCOUNTER
22w2d  Estimated Date of Delivery: Oct 22, 2017      Pt calls and states she has not felt her baby move today.  She usually has movement by this time of day.  Advised to drink cold juice and eat snack.  Lie on left side and see if any movement in one hour.  Advised we usually we do this at a little further along but it is a way to try and get some movement.      Has home dop tone and unable to find FHT today.    No vaginal bleeding or watery leakage.  No cramping.    Dr Mendez do you want her to come in if no movement for nurse only dop tone or anything?    Christiane Mascorro R.N.  [facility]

## 2017-06-20 NOTE — IP AVS SNAPSHOT
[facility]    [address]    [facility] [address]    Phone:  [phone]    Fax:  [phone]                                       After Visit Summary   6/20/2017    Unique Multani    MRN: [id number]           After Visit Summary Signature Page     I have received my discharge instructions, and my questions have been answered. I have discussed any challenges I see with this plan with the nurse or doctor.    ..........................................................................................................................................  Patient/Patient Representative Signature      ..........................................................................................................................................  Patient Representative Print Name and Relationship to Patient    ..................................................               ................................................  Date                                            Time    ..........................................................................................................................................  Reviewed by Signature/Title    ...................................................              ..............................................  Date                                                            Time

## 2017-06-20 NOTE — TELEPHONE ENCOUNTER
Pt called back. She has not felt any fetal movement.   She will got to L&D.     [facility] L&D notified.     Dr. Hartman also notified as she is on call.    SHAUNA Appiah RN

## 2017-06-20 NOTE — DISCHARGE INSTRUCTIONS
Discharge Instruction for Undelivered Patients      You were seen for: Fetal Assessment  We Consulted: Dr. Mendez  You had (Test or Medicine):Doppler for heart tones     Diet:   Drink 8 to 12 glasses of liquids (milk, juice, water) every day.  You may eat meals and snacks.  To manager your diabetes, follow the guidelines for eating and drinking given to you by your Clinic Provider or Diabetes Educator.       Activity:  Count fetal kicks everyday (see handout)  Call your doctor or nurse midwife if your baby is moving less than usual.     Call your provider if you notice:  Swelling in your face or increased swelling in your hands or legs.  Headaches that are not relieved by Tylenol (acetaminophen).  Changes in your vision (blurring: seeing spots or stars.)  Nausea (sick to your stomach) and vomiting (throwing up).   Weight gain of 5 pounds or more per week.  Heartburn that doesn't go away.  Signs of bladder infection: pain when you urinate (use the toilet), need to go more often and more urgently.  The bag of tanner (rupture of membranes) breaks, or you notice leaking in your underwear.  Bright red blood in your underwear.  Abdominal (lower belly) or stomach pain.  Second (plus) baby: Contractions (tightening) less than 10 minutes apart and getting stronger.  *If less than 34 weeks: Contractions (tightenings) more than 6 times in one hour.  Increase or change in vaginal discharge (note the color and amount)      Follow-up:  As scheduled in the clinic

## 2017-06-26 ENCOUNTER — TELEPHONE (OUTPATIENT)
Dept: EDUCATION SERVICES | Facility: CLINIC | Age: 32
End: 2017-06-26

## 2017-06-26 DIAGNOSIS — O24.410 DIET CONTROLLED GESTATIONAL DIABETES MELLITUS (GDM), ANTEPARTUM: ICD-10-CM

## 2017-06-26 DIAGNOSIS — O09.622 HIGH-RISK PREGNANCY, YOUNG MULTIGRAVIDA, SECOND TRIMESTER: Primary | ICD-10-CM

## 2017-06-26 DIAGNOSIS — O34.219 PREVIOUS CESAREAN DELIVERY, ANTEPARTUM CONDITION OR COMPLICATION: ICD-10-CM

## 2017-06-26 PROBLEM — O24.414 INSULIN CONTROLLED GESTATIONAL DIABETES MELLITUS (GDM) DURING PREGNANCY: Status: ACTIVE | Noted: 2017-06-26

## 2017-06-26 NOTE — TELEPHONE ENCOUNTER
Pt called regarding high blood sugars. Shared that FBG are all above target though other blood sugars are in target.    FBG-  6/21: 96  6/22: 97  6/23: 105  6/24: 115  6/25: 107  6/26: 105

## 2017-06-26 NOTE — TELEPHONE ENCOUNTER
Diabetes Follow-up    Subjective/Objective:    Unique Multani sent in blood glucose log for review. Last date of communication was: 6/19/2017.  Taking diabetes medications? no    BG/Food Log:   See below     Assessment:  Fasting blood glucose: 0% in target.    Plan/Response:  Patient has an appointment with CDE on 6/29. She would benefit from starting NPH at bedtime as soon as possible. Would recommend starting dose of 13 units (which is 0.2 units/kg.) Uncertain if patient has been taught insulin. Pt should have called when BG were elevated 3 days in a row, as may have been able to start her on mealtime insulin sooner.   Will route NPH insulin recommendations to referring provider as well as Ilene Herzog RD, SREEKANTH as patient is seeing her on 6/29.  Will discuss dietary intervention in the meantime.    Called out to patient-  She has been on insulin in the past and feels comfortable starting prior to her CDE appointment on Thursday. She does not yet have a voucher for 1 free box but CDE will leave for her to  in OBGYN dept in [place] today. Reviewed briefly how to use, pt verbalized understanding, denied further questions. Pended prescription.    Any diabetes medication dose changes were made via the CDE Protocol and Collaborative Practice Agreement with the patient's referring provider. A copy of this encounter was shared with the provider.

## 2017-06-27 ENCOUNTER — TELEPHONE (OUTPATIENT)
Dept: EDUCATION SERVICES | Facility: CLINIC | Age: 32
End: 2017-06-27

## 2017-06-27 NOTE — TELEPHONE ENCOUNTER
Diabetes Follow up;     Patient called in to update that her fasting blood sugar was at 108mg/dL this morning.  She just started NPH insulin last evening.  It may take a couple days to start lowering blood sugars.  No changes to plan at this time.  Patient has an in office visit with diabetes education on 6/29 in two more days and then can adjust insulin after being on it 3 days.       Talia Fischer RD, LD   Diabetes Education

## 2017-06-29 ENCOUNTER — ALLIED HEALTH/NURSE VISIT (OUTPATIENT)
Dept: EDUCATION SERVICES | Facility: CLINIC | Age: 32
End: 2017-06-29
Payer: COMMERCIAL

## 2017-06-29 DIAGNOSIS — O24.414 INSULIN CONTROLLED GESTATIONAL DIABETES MELLITUS (GDM) DURING PREGNANCY: Primary | ICD-10-CM

## 2017-06-29 PROCEDURE — G0108 DIAB MANAGE TRN  PER INDIV: HCPCS

## 2017-06-29 NOTE — PROGRESS NOTES
Gestational Diabetes Follow-up Visit    SUBJECTIVE/OBJECTIVE:  Unique Multani presents today for education and evaluation of glucose control related to gestational diabetes    She is accompanied by self    Patient's gestational diabetes management related comments/concerns: high fasting BGs    LMP 01/15/2017 (Exact Date)      Blood Glucose/Ketone Log:    Date Ketones Fasting Post Breakfast Post Lunch Post Supper   6/29 neg 91 126 99    6/28 neg 89 116 104 110   6/27 neg 108 101 106 118   6/26  *started insulin neg 105  118    6/25 neg 107 135 141 118   6/24 neg 115 125 115 106   6/23 trace 105 117 147  *1.5 cups rice 112     Current gestational diabetes management:    Taking medications for gestational diabetes? Yes    Physical Activity: minimal exercise - walking    Nutrition:  Patient eats 3 meals and 2-3 snacks per day (misses am snack) and is following recommended meal plan.  Breakfast tends to be lower in carb, usually 1-2 carbs.  Rice and pasta portions are sometimes large - will need to monitor this.    ASSESSMENT:  66% fasting BG within goal since starting NPH on 6/26  100% post breakfast BG within goal  85% post lunch BG within goal  100% post supper BG within goal    Health Beliefs and Attitudes:   Stage of Change: ACTION (Actively working towards change)    INTERVENTION:  Educational topics covered today:  What to expect after delivery, Future testing for Type 2 diabetes (2 hour OGTT at 6 week post-partum check-up and annual fasting blood glucose level), Risk of GDM and planning ahead for future pregnancies, Recommended lifestyle interventions for reducing the risk of Type 2 Diabetes, When to Call a Diabetes Educator or OB Provider    Educational Materials provided today:  [facility] Preventing Diabetes    PLAN:  Check glucose 4 times daily.  Check ketones once a week when readings are consistently negative.  Continue with recommended physical activity.  Continue to follow recommended meal plan: 2-3  carbs at breakfast, 3-4 carbs at lunch, 3-4 carbs at supper, 1-2 carbs at snacks.  Follow consistent CHO meal plan, eat CHO and protein/fat at all meals/snacks.    Call/e-mail/MyChart message diabetes educator if 3 or more blood sugars are above the goal in 1 week or if ketones are positive.     FOLLOW-UP:  Follow up with diabetes educator in 1 week.  Call or e-mail educator if 3 or more blood sugars are above goal in 1 week.  Call or e-mail with questions or concerns.    Call/e-mail/MyChart message diabetes educator if 3 or more blood sugars are above the goal in 1 week or if ketones are positive.     TATIANA Kyle CDE    Time spent was 30 minutes  Encounter type: Individual    Any diabetes medication dose changes were made via the CDE Protocol and Collaborative Practice Agreement with the patient's referring provider. A copy of this encounter was shared with the provider.

## 2017-06-29 NOTE — PATIENT INSTRUCTIONS
1. Check blood sugar 4 times a day, before breakfast and 1 hour after the start of each meal.     2. Check urine ketones when you wake up every morning for 7 days. If negative everyday, reduce testing to once a week.    3. Follow the recommended meal plan: eat something every 2-3 hours, include protein/fat and carbohydrate at every meal and snack, have 2-3 carbs at breakfast, 3-4 carbs at lunch, 3-4 carbs at supper, 1-2 carbs at 3 snacks per day.     4. Add activity to every day, try walking or being active after each meal to help control blood sugar levels.    5.Call or e-mail educator if 3 or more blood sugars are above goal in 1 week. Call or e-mail with questions or concerns.    6. Continue 13 units NPH insulin at bedtime.    7. Call or email next Monday/Tuesday to report your numbers.          [place] [facility] Nutrition Services for the [facility]:  For Your Diabetes Education or Nutrition Appointments Call:  [phone]   For Diabetes Education and Nutrition Related Questions:   Phone: [phone]  E-mail: [email or web address]  Fax: [phone]   If you need a medication refill please contact your pharmacy. Please allow 3 business days for your refills to be completed.       Here are some ideas for breakfast or bedtime snack. Pick a carbohydrate from the right and a protein from the left. If you have carbohydrates 30 grams of total carbohydrate and 3-5 grams of fiber that is even better.   Fruits are not listed as they can cause the blood sugar to raise blood sugar quickly and be used up early in the night. Fruits are better at meals, or morning or afternoon snack.     Protein/Fat list (about 14 grams of protein or 2 fat servings) Carbohydrate list (about 30 grams of carbohydrate)   2 slices of cheese English Muffin   2 TBS Peanut butter/[place] butter/Sun butter 10 crackers     cup Cottage cheese 2% 2 pieces of toast   2 oz any cooked meat - less than deck of cards size 1 hamburger  or hot dog bun   1.5 oz of soy nuts 1 whole wheat santy   Avocado or guacamole 6 amos cracker squares     cup tuna - can add mayonnaise 1 cup full fat ice cream - no candy or sauce   2 eggs - boiled, poached, fried, scrambled, omelet 30 chips   1 veggie gordo (might have carbohydrates also) Greek yogurt - 30 grams of carbohydrate   2 TBS Coconut 2 - 6 inch tortillas corn or flour   12 shrimp - not breaded 2 toaster waffles - no syrup   2-1oz meatballs   bagel   2 Tbs cream cheese - plain, veggie, salmon - no fruit or honey. 6 cups popcorn - unsweetened     cup of nuts Granola bar of 3o grams of carbohydrate   10 olives 1 cup of unsweetened lentils or beans.    Tofu or Temph 4-5oz 1 cup potato salad     You can always add vegetables with dip, salad dressing or salsa also.

## 2017-06-29 NOTE — MR AVS SNAPSHOT
After Visit Summary   6/29/2017    Unique Multani    MRN: [id number]           Patient Information     Date Of Birth          1985        Visit Information        Provider Department      6/29/2017 1:00 PM [place] DIABETIC ED RESOURCE [facility] Instructions    1. Check blood sugar 4 times a day, before breakfast and 1 hour after the start of each meal.     2. Check urine ketones when you wake up every morning for 7 days. If negative everyday, reduce testing to once a week.    3. Follow the recommended meal plan: eat something every 2-3 hours, include protein/fat and carbohydrate at every meal and snack, have 2-3 carbs at breakfast, 3-4 carbs at lunch, 3-4 carbs at supper, 1-2 carbs at 3 snacks per day.     4. Add activity to every day, try walking or being active after each meal to help control blood sugar levels.    5.Call or e-mail educator if 3 or more blood sugars are above goal in 1 week. Call or e-mail with questions or concerns.    6. Continue 13 units NPH insulin at bedtime.    7. Call or email next Monday/Tuesday to report your numbers.          [place] Diabetes Education and Nutrition Services for the [facility]:  For Your Diabetes Education or Nutrition Appointments Call:  [phone]   For Diabetes Education and Nutrition Related Questions:   Phone: [phone]  E-mail: [email or web address]  Fax: [phone]   If you need a medication refill please contact your pharmacy. Please allow 3 business days for your refills to be completed.       Here are some ideas for breakfast or bedtime snack. Pick a carbohydrate from the right and a protein from the left. If you have carbohydrates 30 grams of total carbohydrate and 3-5 grams of fiber that is even better.   Fruits are not listed as they can cause the blood sugar to raise blood sugar quickly and be used up early in the night. Fruits are better at meals, or morning or afternoon snack.      Protein/Fat list (about 14 grams of protein or 2 fat servings) Carbohydrate list (about 30 grams of carbohydrate)   2 slices of cheese English Muffin   2 TBS Peanut butter/[place] butter/Sun butter 10 crackers     cup Cottage cheese 2% 2 pieces of toast   2 oz any cooked meat - less than deck of cards size 1 hamburger or hot dog bun   1.5 oz of soy nuts 1 whole wheat santy   Avocado or guacamole 6 amos cracker squares     cup tuna - can add mayonnaise 1 cup full fat ice cream - no candy or sauce   2 eggs - boiled, poached, fried, scrambled, omelet 30 chips   1 veggie gordo (might have carbohydrates also) Greek yogurt - 30 grams of carbohydrate   2 TBS Coconut 2 - 6 inch tortillas corn or flour   12 shrimp - not breaded 2 toaster waffles - no syrup   2-1oz meatballs   bagel   2 Tbs cream cheese - plain, veggie, salmon - no fruit or honey. 6 cups popcorn - unsweetened     cup of nuts Granola bar of 3o grams of carbohydrate   10 olives 1 cup of unsweetened lentils or beans.    Tofu or Temph 4-5oz 1 cup potato salad     You can always add vegetables with dip, salad dressing or salsa also.           Follow-ups after your visit        Your next 10 appointments already scheduled     Jun 30, 2017  9:00 AM CDT   ESTABLISHED PRENATAL with Michael Mendez MD   [facility]    [address]  [facility] [id number] [phone]            Jul 28, 2017  9:00 AM CDT   ESTABLISHED PRENATAL with Michael Mendez MD   [facility]    [address]  [facility] [id number] [phone]            Aug 25, 2017  9:00 AM CDT   ESTABLISHED PRENATAL with Michael Mendez MD   [facility] ([facility]    [address]  [facility] [address]   [phone]              Who to contact     If you have questions or need follow up information about today's clinic visit or your  schedule please contact [facility] directly at [phone].  Normal or non-critical lab and imaging results will be communicated to you by MyChart, letter or phone within 4 business days after the clinic has received the results. If you do not hear from us within 7 days, please contact the clinic through MyChart or phone. If you have a critical or abnormal lab result, we will notify you by phone as soon as possible.  Submit refill requests through [employer] or call your pharmacy and they will forward the refill request to us. Please allow 3 business days for your refill to be completed.          Additional Information About Your Visit        [employer] Information     [employer] gives you secure access to your electronic health record. If you see a primary care provider, you can also send messages to your care team and make appointments. If you have questions, please call your primary care clinic.  If you do not have a primary care provider, please call [phone] and they will assist you.        Care EveryWhere ID     This is your Care EveryWhere ID. This could be used by other organizations to access your [place] medical records  [id number]        Your Vitals Were     Last Period                   01/15/2017 (Exact Date)            Blood Pressure from Last 3 Encounters:   06/20/17 (!) 88/57   06/05/17 114/66   05/08/17 100/60    Weight from Last 3 Encounters:   06/20/17 68.9 kg (152 lb)   06/05/17 69.1 kg (152 lb 6.4 oz)   05/08/17 67.2 kg (148 lb 1.6 oz)              Today, you had the following     No orders found for display       Primary Care Provider Office Phone # Fax #    Michael Mendez [phone]       [facility] [address] [place] [address]        Equal Access to Services     NIGEL BELTRAN : Chelle Reed, saima mullen, nicolás estrada. So [facility] [phone].    ATENCIÓN:  Si habla veronica, tiene a humphries disposición servicios gratuitos de asistencia lingüística. Kamila lewis [phone].    We comply with applicable federal civil rights laws and [place] laws. We do not discriminate on the basis of race, color, national origin, age, disability sex, sexual orientation or gender identity.            Thank you!     Thank you for choosing [facility]  for your care. Our goal is always to provide you with excellent care. Hearing back from our patients is one way we can continue to improve our services. Please take a few minutes to complete the written survey that you may receive in the mail after your visit with us. Thank you!             Your Updated Medication List - Protect others around you: Learn how to safely use, store and throw away your medicines at www.disposemymeds.org.          This list is accurate as of: 17  1:30 PM.  Always use your most recent med list.                   Brand Name Dispense Instructions for use Diagnosis    insulin isophane human 100 UNIT/ML injection    HumuLIN N PEN    15 mL    13 units before bed (to be titrated by MD/CDE)    Diet controlled gestational diabetes mellitus (GDM), antepartum, High-risk pregnancy, young multigravida, second trimester, Previous  delivery, antepartum condition or complication       insulin pen needle 32G X 4 MM    BD DINA U/F    100 each    Use 1 daily or as directed.    High-risk pregnancy, young multigravida, second trimester       PRENATAL VIT-FE BISG-FA-DHA PO

## 2017-06-30 ENCOUNTER — PRENATAL OFFICE VISIT (OUTPATIENT)
Dept: OBGYN | Facility: CLINIC | Age: 32
End: 2017-06-30
Payer: COMMERCIAL

## 2017-06-30 VITALS
HEIGHT: 62 IN | BODY MASS INDEX: 29.28 KG/M2 | DIASTOLIC BLOOD PRESSURE: 60 MMHG | WEIGHT: 159.1 LBS | TEMPERATURE: 98.5 F | SYSTOLIC BLOOD PRESSURE: 92 MMHG

## 2017-06-30 DIAGNOSIS — O09.622 HIGH-RISK PREGNANCY, YOUNG MULTIGRAVIDA, SECOND TRIMESTER: Primary | ICD-10-CM

## 2017-06-30 PROCEDURE — 99207 ZZC COMPLICATED OB VISIT: CPT | Performed by: OBSTETRICS & GYNECOLOGY

## 2017-06-30 NOTE — PROGRESS NOTES
IUP at 23 5/7 weeks;      Insulin (now) dependent gestational DM       -glucose coming under control     Declines follow up ultrasound MFM

## 2017-06-30 NOTE — MR AVS SNAPSHOT
After Visit Summary   6/30/2017    Unique Multani    MRN: [id number]           Patient Information     Date Of Birth          1985        Visit Information        Provider Department      6/30/2017 9:00 AM Michael Mendez MD [facility]        Today's Diagnoses     High-risk pregnancy, young multigravida, second trimester    -  1       Follow-ups after your visit        Follow-up notes from your care team     Return in about 4 weeks (around 7/28/2017).      Your next 10 appointments already scheduled     Jul 28, 2017 10:00 AM CDT   ESTABLISHED PRENATAL with Michael Mendez MD   [facility]    [address]  [facility] [id number] [phone]            Aug 25, 2017  9:00 AM CDT   ESTABLISHED PRENATAL with Michael Mendez MD   [facility]    [address] [place]  [facility] [id number] [phone]              Who to contact     If you have questions or need follow up information about today's clinic visit or your schedule please contact [facility] directly at [phone].  Normal or non-critical lab and imaging results will be communicated to you by MyChart, letter or phone within 4 business days after the clinic has received the results. If you do not hear from us within 7 days, please contact the clinic through [employer] or phone. If you have a critical or abnormal lab result, we will notify you by phone as soon as possible.  Submit refill requests through [employer] or call your pharmacy and they will forward the refill request to us. Please allow 3 business days for your refill to be completed.          Additional Information About Your Visit        MyChart Information     [employer] gives you secure access to your electronic health record. If you see a primary care provider, you can also send messages to your care team and make appointments.  "If you have questions, please call your primary care clinic.  If you do not have a primary care provider, please call [phone] and they will assist you.        Care EveryWhere ID     This is your Care EveryWhere ID. This could be used by other organizations to access your [place] medical records  [id number]        Your Vitals Were     Temperature Height Last Period Breastfeeding? BMI (Body Mass Index)       98.5  F (36.9  C) (Oral) 5' 1.5\" (1.562 m) 01/15/2017 (Exact Date) No 29.57 kg/m2        Blood Pressure from Last 3 Encounters:   06/30/17 92/60   06/20/17 (!) 88/57   06/05/17 114/66    Weight from Last 3 Encounters:   06/30/17 159 lb 1.6 oz (72.2 kg)   06/20/17 152 lb (68.9 kg)   06/05/17 152 lb 6.4 oz (69.1 kg)              Today, you had the following     No orders found for display       Primary Care Provider Office Phone # Fax #    Michael Mendez [phone]       [facility] [address] [place] [address]        Equal Access to Services     [facility]AM[facility] AH: Hadii jose daniel pinzono Soidris, waaxda luqadaha, qaybta kaalmada adeegyada, nicolás hester. So [facility] [phone].    ATENCIÓN: Si habla español, tiene a humphries disposición servicios gratuitos de asistencia lingüística. Llame al [phone].    We comply with applicable federal civil rights laws and [place] laws. We do not discriminate on the basis of race, color, national origin, age, disability sex, sexual orientation or gender identity.            Thank you!     Thank you for choosing [facility]  for your care. Our goal is always to provide you with excellent care. Hearing back from our patients is one way we can continue to improve our services. Please take a few minutes to complete the written survey that you may receive in the mail after your visit with us. Thank you!             Your Updated Medication List - Protect others around you: Learn how to safely " use, store and throw away your medicines at www.disposemymeds.org.          This list is accurate as of: 17  9:20 AM.  Always use your most recent med list.                   Brand Name Dispense Instructions for use Diagnosis    insulin isophane human 100 UNIT/ML injection    HumuLIN N PEN    15 mL    13 units before bed (to be titrated by MD/CDE)    Diet controlled gestational diabetes mellitus (GDM), antepartum, High-risk pregnancy, young multigravida, second trimester, Previous  delivery, antepartum condition or complication       insulin pen needle 32G X 4 MM    BD DINA U/F    100 each    Use 1 daily or as directed.    High-risk pregnancy, young multigravida, second trimester       PRENATAL VIT-FE BISG-FA-DHA PO

## 2017-06-30 NOTE — NURSING NOTE
"Chief Complaint   Patient presents with     Prenatal Care     23weeks 5days       Initial BP 92/60 (BP Location: Left arm, Patient Position: Chair, Cuff Size: Adult Regular)  Temp 98.5  F (36.9  C) (Oral)  Wt 159 lb 1.6 oz (72.2 kg)  LMP 01/15/2017 (Exact Date)  Breastfeeding? No  BMI 29.57 kg/m2 Estimated body mass index is 29.57 kg/(m^2) as calculated from the following:    Height as of 4/7/17: 5' 1.5\" (1.562 m).    Weight as of this encounter: 159 lb 1.6 oz (72.2 kg).  Medication Reconciliation: complete   Roma Cervantes MA    "

## 2017-07-05 ENCOUNTER — TELEPHONE (OUTPATIENT)
Dept: EDUCATION SERVICES | Facility: OTHER | Age: 32
End: 2017-07-05

## 2017-07-05 NOTE — TELEPHONE ENCOUNTER
Gestational Diabetes Follow-up    Subjective/Objective:    Unique EMILEE MeloMultani sent in blood glucose log for review. Last date of communication was: 6/29/17.    Gestational diabetes is being managed with diet and Humulin/Novolin NPH Insulin 13 units at bedtime    Estimated Date of Delivery: Oct 22, 2017    BG/Food Log:       Assessment:    Ketones:n/a.   Fasting blood glucoses: 67% in target.  After breakfast: 50% in target.  After lunch: 100% in target.  After dinner: 100% in target.    Plan/Response:  Geoffrey Calhoun!    I m a diabetes educator that is helping with the messages today.  Thanks for update us with your blood sugars.    It appears that your blood sugars are most challenging to control in the morning.  The past 2 out of 3 fasting blood sugars are above goal.  This may indicate that you need more bedtime insulin (due to the increase in pregnancy hormones).  I recommend you continue your current insulin dose and update us with your blood sugars on Friday, before 12 noon, so we can see if the high fasting blood sugar trend continues.      In addition, your after breakfast blood sugars are above goal about 50% of the time.  Has there been a change in the types of breakfasts you are eating?  Are you keeping food records?  If not, I might recommend you record your breakfast foods in the comment sections of your blood glucose logbook so we can see if there are any diet changes you can make to help out after breakfast blood sugars when you update us with your blood sugars on Friday.  Activity helps lower blood sugars and so being active after breakfast (if you are able to) may also improve the after breakfast blood sugars.    So continue your current insulin dose.  Be sure to consume 2 carb choices at breakfast (preferably in the form of whole grain starch and/or milk).  Be sure to have some protein (egg, cottage cheese, peanut butter, etc) at breakfast as well.  Update us in 2 days and we will put a plan  together for you for the following week.    Thanks,    BANDAR Frankel RD, CDE  [facility]     BANDAR Nair RD, CDE      Any diabetes medication dose changes were made via the CDE Protocol and Collaborative Practice Agreement with the patient's OB/GYN provider. A copy of this encounter was shared with the provider.

## 2017-07-07 ENCOUNTER — VIRTUAL VISIT (OUTPATIENT)
Dept: EDUCATION SERVICES | Facility: CLINIC | Age: 32
End: 2017-07-07

## 2017-07-07 ENCOUNTER — TELEPHONE (OUTPATIENT)
Dept: EDUCATION SERVICES | Facility: CLINIC | Age: 32
End: 2017-07-07

## 2017-07-07 DIAGNOSIS — O24.414 INSULIN CONTROLLED GESTATIONAL DIABETES MELLITUS (GDM) IN THIRD TRIMESTER: Primary | ICD-10-CM

## 2017-07-07 NOTE — TELEPHONE ENCOUNTER
E-mail from patient:   Unique Multani [birth year]          Quick question.. Are you supposed to take the blood sugar 1 hour after you finish your meal or 1 hour after from the start of your meal? I just want to make sure I haven't been doing it wrong  Thank you    E-mail from educator:   Hi Unique,   Thanks for reaching out.  It is 1 hour after the start of the meal.      Thanks!    Talia Fischer RD, LD   Diabetes Education

## 2017-07-07 NOTE — TELEPHONE ENCOUNTER
Diabetes Follow-up    Subjective/Objective:    Unique Multani sent in blood glucose log for review. Last date of communication was: 07/05/2017.    Diabetes is being managed with Lifestyle (diet/activity)  Taking diabetes medications?   yes:     Diabetes Medication(s)     Insulin Sig    insulin isophane human (HUMULIN N PEN) 100 UNIT/ML injection 13 units before bed (to be titrated by MD/CDE)        BG/Food Log:       Assessment:  Fasting blood glucose: 66% in target (8/12)   After breakfast glucose: 54% in target (6/11)  After lunch glucose: 100% in target (11/11)  After dinner glucose: 100% in target (7/7)     Fasting blood sugars improved over the last few days and do not recommend increasing Humulin N at this time.  After breakfast blood sugars are showing a trend higher and may need meal time insulin if this pattern continues.  Recommend Novolog or Humalog at  2 units (0.03units/kg) starting dose if after meal blood sugars reach  50% above target.         Plan/Response:  Josh Calhoun,     Thanks for sending in your blood sugars again and writing down meals.   It looks like your fasting blood sugars have been lower again the past two days so we won't make any changes to the Humulin N today.   For breakfast it looks like the numbers are starting to be elevated after the meal even when you have stuck to 30 grams of carb.  Morning time is often when we see the highest blood sugars due to hormones.   Over the weekend you could try different breakfasts to see if this helps (Try different fats/meats and carbohydrates).  Have you been able to do short walks after meals at all?    If there is nothing with the food we can change or walking we may need to look at adding a short acting meal time insulin that would help those numbers stay in control.  Sometimes it just comes down to hormones and food/exercise changes don't help it as much. You are doing a great job!!    Please keep writing down what you are having for  breakfast over the weekend and update again on Monday after breakfast.      Have a great weekend!   Thanks       Talia Fischer RD, LD   Diabetes Education    Any diabetes medication dose changes were made via the CDE Protocol and Collaborative Practice Agreement with the patient's primary care provider. A copy of this encounter was shared with the provider.

## 2017-07-07 NOTE — PROGRESS NOTES
Please see telephone encounter dated 07/07/2017 for details.    Talia Fischer RD, LD   Diabetes Education

## 2017-07-07 NOTE — MR AVS SNAPSHOT
After Visit Summary   7/7/2017    Unique Multani    MRN: [id number]           Patient Information     Date Of Birth          1985        Visit Information        Provider Department      7/7/2017 9:30 AM [place] DIABET[facility] ED RESOURCE [facility]        Today's Diagnoses     Insulin controlled gestational diabetes mellitus (GDM) in third trimester    -  1       Follow-ups after your visit        Your next 10 appointments already scheduled     Jul 28, 2017 10:00 AM CDT   ESTABLISHED PRENATAL with Michael Mendez MD   [facility]    [address]  [facility] [id number] [phone]            Aug 25, 2017  9:00 AM CDT   ESTABLISHED PRENATAL with Michael Mendez MD   [facility] ([facility]    [address]  [facility] [id number] [phone]              Who to contact     If you have questions or need follow up information about today's clinic visit or your schedule please contact [facility] directly at [phone].  Normal or non-critical lab and imaging results will be communicated to you by Driverdohart, letter or phone within 4 business days after the clinic has received the results. If you do not hear from us within 7 days, please contact the clinic through [email or web address] or phone. If you have a critical or abnormal lab result, we will notify you by phone as soon as possible.  Submit refill requests through Preggers or call your pharmacy and they will forward the refill request to us. Please allow 3 business days for your refill to be completed.          Additional Information About Your Visit        Driverdohart Information     Preggers gives you secure access to your electronic health record. If you see a primary care provider, you can also send messages to your care team and make appointments. If you have questions, please call your primary care clinic.  If you do not have  a primary care provider, please call [phone] and they will assist you.        Care EveryWhere ID     This is your Care EveryWhere ID. This could be used by other organizations to access your [facility] medical records  [id number]        Your Vitals Were     Last Period                   01/15/2017 (Exact Date)            Blood Pressure from Last 3 Encounters:   06/30/17 92/60   06/20/17 (!) 88/57   06/05/17 114/66    Weight from Last 3 Encounters:   06/30/17 72.2 kg (159 lb 1.6 oz)   06/20/17 68.9 kg (152 lb)   06/05/17 69.1 kg (152 lb 6.4 oz)              Today, you had the following     No orders found for display       Primary Care Provider Office Phone # Fax #    Michael Mendez [phone]       [facility] [address] [place] [address] [address] [place] [address]        Equal Access to Services     JAZZ BELTRAN : Hadii aad ku hadasho Soomaali, waaxda luqadaha, qaybta kaalmada adeegyada, nicolás randolphin marquita torre . So [facility] [phone].    ATENCIÓN: Si habla español, tiene a humphries disposición servicios gratuitos de asistencia lingüística. Llame al [phone].    We comply with applicable federal civil rights laws and [place] laws. We do not discriminate on the basis of race, color, national origin, age, disability sex, sexual orientation or gender identity.            Thank you!     Thank you for choosing [facility]  for your care. Our goal is always to provide you with excellent care. Hearing back from our patients is one way we can continue to improve our services. Please take a few minutes to complete the written survey that you may receive in the mail after your visit with us. Thank you!             Your Updated Medication List - Protect others around you: Learn how to safely use, store and throw away your medicines at www.disposemymeds.org.          This list is accurate as of: 7/7/17  1:27 PM.  Always use your most recent med list.                    Brand Name Dispense Instructions for use Diagnosis    insulin isophane human 100 UNIT/ML injection    HumuLIN N PEN    15 mL    13 units before bed (to be titrated by MD/CDE)    Diet controlled gestational diabetes mellitus (GDM), antepartum, High-risk pregnancy, young multigravida, second trimester, Previous  delivery, antepartum condition or complication       insulin pen needle 32G X 4 MM    BD DINA U/F    100 each    Use 1 daily or as directed.    High-risk pregnancy, young multigravida, second trimester       PRENATAL VIT-FE BISG-FA-DHA PO

## 2017-07-07 NOTE — TELEPHONE ENCOUNTER
Unique Dumont Dr.'s after breakfast blood sugars are starting to increase (see below).  FBG actually were better the past two days.   She is going to try different foods at breakfast over the weekend.   She is to update on Monday after breakfast.       Please note if you approve of this plan or indicate an alternate plan.   If 50% of after breakfast blood sugars are above target next week begin Humalog/Novolog at 2 units before breakfast (0.03units/kg)    Thanks!  Katherine Fischer RD, LD   Diabetes Education

## 2017-07-17 ENCOUNTER — TELEPHONE (OUTPATIENT)
Dept: EDUCATION SERVICES | Facility: CLINIC | Age: 32
End: 2017-07-17

## 2017-07-17 NOTE — TELEPHONE ENCOUNTER
Gestational Diabetes Follow-up    Subjective/Objective:    Unique Multani sent in blood glucose log for review. Last date of communication was: 7/7/2017.    Gestational diabetes is being managed with diet and activity, and 13 units of NPH insulin at bedtime.     Estimated Date of Delivery: Oct 22, 2017    BG/Food Log:                Assessment:    Ketones: not available.   Fasting blood glucoses: 90% in target.  After breakfast: 88% in target.  After lunch: 88% in target.  After dinner: 78% in target.    Plan/Response:  Continue NPH at 13 units.  Follow up in 3-4 days.     Good morning Unique,    Thank you for sending in your numbers. Looks like you are doing very well, just some occasional higher numbers here and there. The post breakfast numbers seem to have improved- great job!    Let's hear from you again in 3-4 days.  Let s keep you insulin dose the same for right now.      Have a great day!  Marifer    Any diabetes medication dose changes were made via the CDE Protocol and Collaborative Practice Agreement with the patient's primary care provider. A copy of this encounter was shared with the provider.

## 2017-07-20 ENCOUNTER — TELEPHONE (OUTPATIENT)
Dept: EDUCATION SERVICES | Facility: CLINIC | Age: 32
End: 2017-07-20

## 2017-07-20 ENCOUNTER — VIRTUAL VISIT (OUTPATIENT)
Dept: EDUCATION SERVICES | Facility: CLINIC | Age: 32
End: 2017-07-20

## 2017-07-20 DIAGNOSIS — O24.414 INSULIN CONTROLLED GESTATIONAL DIABETES MELLITUS (GDM) IN THIRD TRIMESTER: Primary | ICD-10-CM

## 2017-07-20 DIAGNOSIS — O24.410 DIET CONTROLLED GESTATIONAL DIABETES MELLITUS (GDM), ANTEPARTUM: ICD-10-CM

## 2017-07-20 DIAGNOSIS — O34.219 PREVIOUS CESAREAN DELIVERY, ANTEPARTUM CONDITION OR COMPLICATION: ICD-10-CM

## 2017-07-20 DIAGNOSIS — O09.622 HIGH-RISK PREGNANCY, YOUNG MULTIGRAVIDA, SECOND TRIMESTER: ICD-10-CM

## 2017-07-20 NOTE — TELEPHONE ENCOUNTER
Gestational Diabetes Follow-up    Subjective/Objective:    Unique Multani sent in blood glucose log for review. Last date of communication was: 7/17/17.    Gestational diabetes is being managed with diet, activity and Humulin/Novolin NPH Insulin 13 units at bedtime    Estimated Date of Delivery: Oct 22, 2017    BG/Food Log:       Assessment:    Ketones: n/a.   Fasting blood glucoses: 25% in target.  After breakfast: 50% in target.  After lunch: 100% in target.  After dinner: 66% in target.    Plan/Response:  Meal Plan Recommendation: 2-3 carbs at breakfast, 3-4 carbs at lunch, 3-4 carbs at supper, 1-2 carbs at each of 3 snacks between meals  Exercise / activity plan: activity as able  Continue to check BG 4 times daily (fasting and one hour(s) after each meal).  Recommend increase to insulin - increase bedtime NPH to 15 units (from 13).  Follow-up in 3-4 days.    CDE response to patient:  Josh Calhoun!    This is Hoa helping out with emails today.  Thanks for sending your blood sugar log.  Your morning and after-breakfast numbers are creeping up, so I m recommending you increase your bedtime NPH dose to 15 units.      Your body didn t handle the pbj toast very well today and yesterday.  One suggestion would be to skip the jelly, or reduce the amount you are using to ~1 tsp.      Keep testing 4x/day and email your numbers again on Monday.    Thanks, Unique!  Let me know if you have any questions!    TATIANA Kyle CDE    TATIANA Kyle CDE      Any diabetes medication dose changes were made via the CDE Protocol and Collaborative Practice Agreement with the patient's referring provider. A copy of this encounter was shared with the provider.

## 2017-07-20 NOTE — MR AVS SNAPSHOT
After Visit Summary   7/20/2017    Unique Multani    MRN: [id number]           Patient Information     Date Of Birth          1985        Visit Information        Provider Department      7/20/2017 10:30 AM [place] DIABETIC ED RESOURCE [facility]        Today's Diagnoses     Insulin controlled gestational diabetes mellitus (GDM) in third trimester    -  1       Follow-ups after your visit        Your next 10 appointments already scheduled     Jul 28, 2017 10:00 AM CDT   EST[facility]AT[facility]    [address]  [facility] [id number] [phone]            Aug 25, 2017  9:00 AM CDT   ESTABLISHED PRENATAL with Michael Mendez MD   [facility]    [address]  [facility] [id number] [phone]              Who to contact     If you have questions or need follow up information about today's clinic visit or your schedule please contact [facility] directly at [phone].  Normal or non-critical lab and imaging results will be communicated to you by MyChart, letter or phone within 4 business days after the clinic has received the results. If you do not hear from us within 7 days, please contact the clinic through Deck [email or web address].cohart or phone. If you have a critical or abnormal lab result, we will notify you by phone as soon as possible.  Submit refill requests through Hiberna or call your pharmacy and they will forward the refill request to us. Please allow 3 business days for your refill to be completed.          Additional Information About Your Visit        Deck Works.cohart Information     Hiberna gives you secure access to your electronic health record. If you see a primary care provider, you can also send messages to your care team and make appointments. If you have questions, please call your primary care clinic.  If you do  not have a primary care provider, please call [phone] and they will assist you.        Care EveryWhere ID     This is your Care EveryWhere ID. This could be used by other organizations to access your [facility] medical records  [id number]        Your Vitals Were     Last Period                   01/15/2017 (Exact Date)            Blood Pressure from Last 3 Encounters:   17 92/60   17 (!) 88/57   17 114/66    Weight from Last 3 Encounters:   17 72.2 kg (159 lb 1.6 oz)   17 68.9 kg (152 lb)   17 69.1 kg (152 lb 6.4 oz)              Today, you had the following     No orders found for display         Today's Medication Changes          These changes are accurate as of: 17 11:13 AM.  If you have any questions, ask your nurse or doctor.               These medicines have changed or have updated prescriptions.        Dose/Directions    insulin isophane human 100 UNIT/ML injection   Commonly known as:  HumuLIN N PEN   This may have changed:  additional instructions   Used for:  Diet controlled gestational diabetes mellitus (GDM), antepartum, High-risk pregnancy, young multigravida, second trimester, Previous  delivery, antepartum condition or complication   Changed by:  Hoa Multani, RD        15 units before bed (to be titrated by MD/CDE)   Quantity:  15 mL   Refills:  0            Where to get your medicines      Some of these will need a paper prescription and others can be bought over the counter.  Ask your nurse if you have questions.     You don't need a prescription for these medications     insulin isophane human 100 UNIT/ML injection                Primary Care Provider Office Phone # Fax #    Michael Mendez [phone]       [facility] [address] [facility] [address] [place] [address]        Equal Access to Services     JAZZ BELTRAN AH: Chelle Reed, saima mullen, nicolás estrada  penelope bhatiasusieryley lopez'aan ah. So [facility] [phone].    ATENCIÓN: Si habla veronica, tiene a humphries disposición servicios gratuitos de asistencia lingüística. Kamila al [phone].    We comply with applicable federal civil rights laws and [place] laws. We do not discriminate on the basis of race, color, national origin, age, disability sex, sexual orientation or gender identity.            Thank you!     Thank you for choosing [facility]  for your care. Our goal is always to provide you with excellent care. Hearing back from our patients is one way we can continue to improve our services. Please take a few minutes to complete the written survey that you may receive in the mail after your visit with us. Thank you!             Your Updated Medication List - Protect others around you: Learn how to safely use, store and throw away your medicines at www.disposemymeds.org.          This list is accurate as of: 17 11:13 AM.  Always use your most recent med list.                   Brand Name Dispense Instructions for use Diagnosis    insulin isophane human 100 UNIT/ML injection    HumuLIN N PEN    15 mL    15 units before bed (to be titrated by MD/CDE)    Diet controlled gestational diabetes mellitus (GDM), antepartum, High-risk pregnancy, young multigravida, second trimester, Previous  delivery, antepartum condition or complication       insulin pen needle 32G X 4 MM    BD DINA U/F    100 each    Use 1 daily or as directed.    High-risk pregnancy, young multigravida, second trimester       PRENATAL VIT-FE BISG-FA-DHA PO

## 2017-07-26 ENCOUNTER — TELEPHONE (OUTPATIENT)
Dept: EDUCATION SERVICES | Facility: CLINIC | Age: 32
End: 2017-07-26

## 2017-07-26 ENCOUNTER — VIRTUAL VISIT (OUTPATIENT)
Dept: NUTRITION | Facility: CLINIC | Age: 32
End: 2017-07-26
Payer: COMMERCIAL

## 2017-07-26 DIAGNOSIS — O24.410 DIET CONTROLLED GESTATIONAL DIABETES MELLITUS (GDM), ANTEPARTUM: ICD-10-CM

## 2017-07-26 DIAGNOSIS — O09.622 HIGH-RISK PREGNANCY, YOUNG MULTIGRAVIDA, SECOND TRIMESTER: ICD-10-CM

## 2017-07-26 DIAGNOSIS — O24.414 INSULIN CONTROLLED GESTATIONAL DIABETES MELLITUS (GDM) IN THIRD TRIMESTER: Primary | ICD-10-CM

## 2017-07-26 DIAGNOSIS — O34.219 PREVIOUS CESAREAN DELIVERY, ANTEPARTUM CONDITION OR COMPLICATION: ICD-10-CM

## 2017-07-26 PROCEDURE — 99207 ZZC NO BILLABLE SERVICE THIS VISIT: CPT

## 2017-07-26 NOTE — MR AVS SNAPSHOT
After Visit Summary   7/26/2017    Unique Multani    MRN: [id number]           Patient Information     Date Of Birth          1985        Visit Information        Provider Department      7/26/2017 8:00 AM  NUTRITION RESOURCE [facility]        Today's Diagnoses     Insulin controlled gestational diabetes mellitus (GDM) in third trimester    -  1       Follow-ups after your visit        Your next 10 appointments already scheduled     Jul 31, 2017  2:45 PM CDT   ESTABLISHED PRENATAL with Michael Mendez MD   [facility]    [address]  [facility] [id number] [phone]            Aug 25, 2017 11:15 AM CDT   ESTABLISHED PRENATAL with Michael Mendez MD   [facility] ([facility])    [address]  [facility] [id number] [phone]              Who to contact     If you have questions or need follow up information about today's clinic visit or your schedule please contact [facility] directly at [phone].  Normal or non-critical lab and imaging results will be communicated to you by Art Sumohart, letter or phone within 4 business days after the clinic has received the results. If you do not hear from us within 7 days, please contact the clinic through Guang Lian Shi Dait or phone. If you have a critical or abnormal lab result, we will notify you by phone as soon as possible.  Submit refill requests through Identia or call your pharmacy and they will forward the refill request to us. Please allow 3 business days for your refill to be completed.          Additional Information About Your Visit        Art Sumohart Information     Identia gives you secure access to your electronic health record. If you see a primary care provider, you can also send messages to your care team and make appointments. If you have questions, please call your primary care clinic.  If you do not have  a primary care provider, please call [phone] and they will assist you.        Care EveryWhere ID     This is your Care EveryWhere ID. This could be used by other organizations to access your [facility] medical records  [id number]        Your Vitals Were     Last Period                   01/15/2017 (Exact Date)            Blood Pressure from Last 3 Encounters:   17 92/60   17 (!) 88/57   17 114/66    Weight from Last 3 Encounters:   17 159 lb 1.6 oz (72.2 kg)   17 152 lb (68.9 kg)   17 152 lb 6.4 oz (69.1 kg)              We Performed the Following     NO CHARGE LOS          Today's Medication Changes          These changes are accurate as of: 17  8:21 AM.  If you have any questions, ask your nurse or doctor.               These medicines have changed or have updated prescriptions.        Dose/Directions    HumuLIN N  UNIT/ML injection   This may have changed:  additional instructions   Used for:  Diet controlled gestational diabetes mellitus (GDM), antepartum, High-risk pregnancy, young multigravida, second trimester, Previous  delivery, antepartum condition or complication   Generic drug:  insulin isophane human   Changed by:  Joslyn Shelton        17 units before bed (to be titrated by MD/CDE)   Quantity:  15 mL   Refills:  0                Primary Care Provider Office Phone # Fax #    Michael Mendez [phone]       [facility] [address] [place] [address]        Equal Access to Services     NIGEL BELTRAN AH: Hadii aad ku hadasho Soomaali, waaxda luqadaha, qaybta kaalmada adeegyada, nicolás hester. So [facility] [phone].    ATENCIÓN: Si habla español, tiene a humphries disposición servicios gratuitos de asistencia lingüística. Llame al [phone].    We comply with applicable federal civil rights laws and [place] laws. We do not discriminate on the basis of race, color, national  origin, age, disability sex, sexual orientation or gender identity.            Thank you!     Thank you for choosing [facility]  for your care. Our goal is always to provide you with excellent care. Hearing back from our patients is one way we can continue to improve our services. Please take a few minutes to complete the written survey that you may receive in the mail after your visit with us. Thank you!             Your Updated Medication List - Protect others around you: Learn how to safely use, store and throw away your medicines at www.disposemymeds.org.          This list is accurate as of: 17  8:21 AM.  Always use your most recent med list.                   Brand Name Dispense Instructions for use Diagnosis    HumuLIN N  UNIT/ML injection   Generic drug:  insulin isophane human     15 mL    17 units before bed (to be titrated by MD/CDE)    Diet controlled gestational diabetes mellitus (GDM), antepartum, High-risk pregnancy, young multigravida, second trimester, Previous  delivery, antepartum condition or complication       insulin pen needle 32G X 4 MM    BD DINA U/F    100 each    Use 1 daily or as directed.    High-risk pregnancy, young multigravida, second trimester       PRENATAL VIT-FE BISG-FA-DHA PO

## 2017-07-26 NOTE — TELEPHONE ENCOUNTER
Will route to referring provider as FYI as post prandial #'s trending above target    Joslyn Shelton RD, LD, CDE

## 2017-07-26 NOTE — TELEPHONE ENCOUNTER
Gestational Diabetes Follow-up    Subjective/Objective:    Unique Johnson sent in blood glucose log for review. Last date of communication was: 7/20/17.    Gestational diabetes is being managed with Humulin/Novolin NPH Insulin 15 units at bedtime    Estimated Date of Delivery: Oct 22, 2017    BG/Food Log:   UNIQUE JOHNSON [birth year]          Unique Johnson [birth year]        I just took my after morning breakfast and it's 154. I sent my numbers this morning before I took my breakfast number    Assessment:  Ketones: not available.   Fasting blood glucoses: 29% in target.  After breakfast: 56% in target.  After lunch: 88% in target.  After dinner: 57% in target.    Plan/Response:  Increase NPH to 17 units starting tonight.  Follow-up in 1 week.  Will inquire about food choices since after meal values are quite variable. May need to consider mealtime insulin     E-mail reply to patient-  Hi Unique,    Thanks for your emails. Great job at keeping food records! I reviewed your numbers and think you would benefit from increasing your NPH insulin dose to 17 units tonight to help bring more of your fasting blood sugars into target. Are you able to provide or keep some food records with what you are having for your meals? I noticed that your after breakfast and dinner values are variable, sometimes well in target and sometimes above target. Do you think the variation is related to what you are eating? Doing some light activity after meals may also help improve your after meal blood sugars. If after meal blood sugars trend above target we may need to consider some additional support in the form of mealtime insulin, this is something we will monitor.    Please send us your numbers again in 1 week or sooner if you have 3 or more blood sugars above target, or if you have positive ketones. Have you been checking your ketones? We recommend checking once/week or more consistently if they are positive.    Have a great  day!  Joslyn Shelton RD, LD, CDE      Any diabetes medication dose changes were made via the CDE Protocol and Collaborative Practice Agreement with the patient's referring provider. A copy of this encounter was shared with the provider.

## 2017-07-26 NOTE — TELEPHONE ENCOUNTER
E-mail from patient-  Im not sure if its the meal im eating. Sometimes i find when i took the jelly out for breakfast and just have toast and peanut butter my number still creeps up sometimes.  I haven't been taking key tones. Oops I will. Now if my number is going up and down like this does this mean in between taking the test my number is high? How bad are my numbers? ---     Educator reply-  Josh Calhoun,    Do you want to give me some examples of what you are eating? Are you aiming between 45-60 grams of carbohydrates at meals and 15 grams at snacks? If you find you are still elevated after meals with following recommended diet and exercise it could just be that your body needs some additional support. Anytime that 50% or more of your blood sugars are above target (above 95 mg/dL in the morning and above 140 mg/dL one hour after eating) it is a good sign your body may need some more help.   Right now more than 50% of your after meal #'s are in target so that is good. Just something we will want to monitor.     Increasing your NPH to 17 units tonight will help bring more of your fasting blood sugars in target. Let me know if you have any further questions!    Take care,  Joslyn Shelton RD, LD, CDE

## 2017-07-31 ENCOUNTER — PRENATAL OFFICE VISIT (OUTPATIENT)
Dept: OBGYN | Facility: CLINIC | Age: 32
End: 2017-07-31
Payer: COMMERCIAL

## 2017-07-31 ENCOUNTER — TELEPHONE (OUTPATIENT)
Dept: EDUCATION SERVICES | Facility: CLINIC | Age: 32
End: 2017-07-31

## 2017-07-31 ENCOUNTER — VIRTUAL VISIT (OUTPATIENT)
Dept: EDUCATION SERVICES | Facility: CLINIC | Age: 32
End: 2017-07-31
Payer: COMMERCIAL

## 2017-07-31 VITALS
BODY MASS INDEX: 30.67 KG/M2 | WEIGHT: 165 LBS | DIASTOLIC BLOOD PRESSURE: 66 MMHG | SYSTOLIC BLOOD PRESSURE: 92 MMHG | HEART RATE: 80 BPM

## 2017-07-31 DIAGNOSIS — O24.414 INSULIN CONTROLLED GESTATIONAL DIABETES MELLITUS (GDM) IN THIRD TRIMESTER: ICD-10-CM

## 2017-07-31 DIAGNOSIS — O09.622 HIGH-RISK PREGNANCY, YOUNG MULTIGRAVIDA, SECOND TRIMESTER: Primary | ICD-10-CM

## 2017-07-31 DIAGNOSIS — O09.623 HIGH-RISK PREGNANCY, YOUNG MULTIGRAVIDA, THIRD TRIMESTER: Primary | ICD-10-CM

## 2017-07-31 PROCEDURE — 99207 ZZC NO BILLABLE SERVICE THIS VISIT: CPT

## 2017-07-31 PROCEDURE — 99207 ZZC PRENATAL VISIT: CPT | Performed by: OBSTETRICS & GYNECOLOGY

## 2017-07-31 NOTE — MR AVS SNAPSHOT
After Visit Summary   7/31/2017    Unique Multani    MRN: [id number]           Patient Information     Date Of Birth          1985        Visit Information        Provider Department      7/31/2017 2:45 PM Michael Mendez MD [facility]        Today's Diagnoses     High-risk pregnancy, young multigravida, third trimester    -  1       Follow-ups after your visit        Follow-up notes from your care team     Return in about 2 weeks (around 8/14/2017).      Your next 10 appointments already scheduled     Aug 03, 2017  8:00 AM CDT   Diabetic Education with RI DIABETIC ED RESOURCE   [facility] ([facility])    [address] [facility] [address]  [facility] [id number] [phone]            Aug 18, 2017  8:15 AM CDT   US OB > 14 WEEKS COMPLETE SINGLE with RIUS1   [facility] ([facility])    [address] [place]  [address]  [facility] [id number] [phone]           Please bring a list of your medicines (including vitamins, minerals and over-the-counter drugs). Also, tell your doctor about any allergies you may have. Wear comfortable clothes and leave your valuables at home.  If you re less than 20 weeks drink four 8-ounce glasses of fluid an hour before your exam. If you need to empty your bladder before your exam, try to release only a little urine. Then, drink another glass of fluid.  You may have up to two family members in the exam room. If you bring a small child, an adult must be there to care for him or her.  Please call the Imaging Department at your exam site with any questions.            Aug 18, 2017  9:15 AM CDT   ESTABLISHED PRENATAL with Michael Mendez MD   [facility] ([facility])    [address] [facility] [id number] [phone]            Aug 25, 2017 11:15 AM CDT   ESTABLISHED PRENATAL with Michael Mendez MD   [facility]  [place] ([facility])    [address]  [facility] [address]   [phone]            Sep 08, 2017 11:00 AM CDT   ESTABLISHED PRENATAL with Michael Mendez MD   [facility] ([facility])    [address]  [facility] [address]   [phone]            Sep 22, 2017  9:00 AM CDT   ESTABLISHED PRENATAL with Michael Mendez MD   [facility] ([facility])    [address]  [facility] [address]   [phone]              Future tests that were ordered for you today     Open Future Orders        Priority Expected Expires Ordered    US OB Ltd One Or More Fetus FU/Repeat Routine  7/31/2018 7/31/2017            Who to contact     If you have questions or need follow up information about today's clinic visit or your schedule please contact [facility] directly at [phone].  Normal or non-critical lab and imaging results will be communicated to you by [employer], letter or phone within 4 business days after the clinic has received the results. If you do not hear from us within 7 days, please contact the clinic through [employer] or phone. If you have a critical or abnormal lab result, we will notify you by phone as soon as possible.  Submit refill requests through Se[employer]Sher[employer] or call your pharmacy and they will forward the refill request to us. Please allow 3 business days for your refill to be completed.          Additional Information About Your Visit        Se[employer] Information     [employer] gives you secure access to your electronic health record. If you see a primary care provider, you can also send messages to your care team and make appointments. If you have questions, please call your primary care clinic.  If you do not have a primary care provider, please call [phone] and they will assist you.        Care EveryWhere ID     This is your Care EveryWhere ID. This could be used by  other organizations to access your [place] medical records  [id number]        Your Vitals Were     Pulse Last Period BMI (Body Mass Index)             80 01/15/2017 (Exact Date) 30.67 kg/m2          Blood Pressure from Last 3 Encounters:   07/31/17 92/66   06/30/17 92/60   06/20/17 (!) 88/57    Weight from Last 3 Encounters:   07/31/17 165 lb (74.8 kg)   06/30/17 159 lb 1.6 oz (72.2 kg)   06/20/17 152 lb (68.9 kg)               Primary Care Provider Office Phone # Fax #    Michael Mendez [phone]       [facility] [address] [place] [address] [address] [place] [address]        Equal Access to Services     JAZZ BELTRAN : Hadii jose daniel pinzono Soidris, waaxda luqadaha, qaybta kaalmada adeegyada, nicolás torre . So [facility] [phone].    ATENCIÓN: Si habla español, tiene a humphries disposición servicios gratuitos de asistencia lingüística. Llame al [phone].    We comply with applicable federal civil rights laws and [place] laws. We do not discriminate on the basis of race, color, national origin, age, disability sex, sexual orientation or gender identity.            Thank you!     Thank you for choosing [facility]  for your care. Our goal is always to provide you with excellent care. Hearing back from our patients is one way we can continue to improve our services. Please take a few minutes to complete the written survey that you may receive in the mail after your visit with us. Thank you!             Your Updated Medication List - Protect others around you: Learn how to safely use, store and throw away your medicines at www.disposemymeds.org.          This list is accurate as of: 7/31/17  3:47 PM.  Always use your most recent med list.                   Brand Name Dispense Instructions for use Diagnosis    HumuLIN N  UNIT/ML injection   Generic drug:  insulin isophane human     15 mL    17 units before bed (to be titrated by MD/CDE)    Diet  controlled gestational diabetes mellitus (GDM), antepartum, High-risk pregnancy, young multigravida, second trimester, Previous  delivery, antepartum condition or complication       insulin pen needle 32G X 4 MM    BD DINA U/F    100 each    Use 1 daily or as directed.    High-risk pregnancy, young multigravida, second trimester       PRENATAL VIT-FE BISG-FA-DHA PO

## 2017-07-31 NOTE — TELEPHONE ENCOUNTER
Gestational Diabetes Follow-up    Subjective/Objective:    Unique Multani sent in blood glucose log for review. Last date of communication was: 7/26/17.    Gestational diabetes is being managed with Humulin/Novolin NPH Insulin 17 units at bedtime    Estimated Date of Delivery: Oct 22, 2017    BG/Food Log:         Assessment:  Ketones: negative.   Fasting blood glucoses: 63% in target.  After breakfast: 63% in target.  After lunch: 86% in target.  After dinner: 71% in target.    Plan/Response:  No changes in the patient's current treatment plan.  Follow-up in 3-4 days.  Patient may benefit from CGM as blood sugars, while 50% or more are in target at given time of day, could be better controlled.  Will route request for referral to MD.   Will discuss diet and ask that patient keep food records to send in 3-4 days with BG values. Will encourage light activity if able.     E-mail reply to patient-  Hi Unique,    Thank you for your email. It looks like > 50% of your blood sugars are in target at the different times you are checking so please continue on the same amount of insulin for now, 17 units of NPH at bedtime.  I am wondering if you would be able to send us some food records along with your blood sugars in 3-4 days? I do see some elevated after meal blood sugars and I m wondering if we can take a look at what you are eating to see if we can make any recommendations? If you feel you are doing all you can with diet it may just be your body needs some additional support with some insulin. We will continue to assess your need for this.  We do have the ability to place a continuous glucose monitor that would read your blood sugars for 2 weeks straight and help us really fine tune your blood sugars. I think this would be highly beneficial! If you are willing to wear this while keeping food records for 2 weeks please call to schedule this at your earliest convenience. The phone # to schedule is [phone]. You need a  referral to get one placed but I will reach out to your doctor and request they place a referral.    Please let me know if you have any questions! If you could send your numbers again on Thursday or Friday or sooner if you have 3 or more blood sugars above target, or if you are seeing positive ketones, that would be great!    Thanks and have a great day,  Joslyn Shelton RD, LD, CDE    Any diabetes medication dose changes were made via the CDE Protocol and Collaborative Practice Agreement with the patient's referring provider. A copy of this encounter was shared with the provider.

## 2017-07-31 NOTE — TELEPHONE ENCOUNTER
E-mail from patient  I just called to make appt to get that placed so if you want to contact my doctor and let him know I will go ahead and wear it for 2 weeks.  They said They don't have any opening till like the end of August so I'm not sure   I do have a doctor appt today at 2:45 pm. Woyld ut be pissible to get it today?      Educator reply-  Josh Calhoun,    What clinic(s) for [facility] do you typically go to? I will reach out to the educators to see if they can help! This appointment doesn't take long, only about 10 minutes or so, so my hope is we can get you in much sooner!  I will have someone reach out to you to help you get in sooner!    Thanks again,  Joslyn

## 2017-07-31 NOTE — MR AVS SNAPSHOT
After Visit Summary   7/31/2017    Unique Multani    MRN: [id number]           Patient Information     Date Of Birth          1985        Visit Information        Provider Department      7/31/2017 11:00 AM FK DIABETIC ED RESOURCE [facility]        Today's Diagnoses     High-risk pregnancy, young multigravida, second trimester    -  1    Insulin controlled gestational diabetes mellitus (GDM) in third trimester           Follow-ups after your visit        Your next 10 appointments already scheduled     Jul 31, 2017  2:45 PM CDT   ESTABLISHED PRENATAL with Michael [facility]endez MD   [facility]    [address] [place]  [facility] [address]   [phone]            Aug 25, 2017 11:15 AM CDT   ESTABLISHED PRENATAL with Michael Mendez MD   [facility]    [address]  [facility] [address]   [phone]              Who to contact     If you have questions or need follow up information about today's clinic visit or your schedule please contact [facility] directly at [phone].  Normal or non-critical lab and imaging results will be communicated to you by MyChart, letter or phone within 4 business days after the clinic has received the results. If you do not hear from us within 7 days, please contact the clinic through [employer] or phone. If you have a critical or abnormal lab result, we will notify you by phone as soon as possible.  Submit refill requests through [employer]Proof or call your pharmacy and they will forward the refill request to us. Please allow 3 business days for your refill to be completed.          Additional Information About Your Visit        MyChart Information     SocialProof gives you secure access to your electronic health record. If you see a primary care provider, you can also send messages to your care team and make appointments. If you have  questions, please call your primary care clinic.  If you do not have a primary care provider, please call [phone] and they will assist you.        Care EveryWhere ID     This is your Care EveryWhere ID. This could be used by other organizations to access your [place] medical records  [id number]        Your Vitals Were     Last Period                   01/15/2017 (Exact Date)            Blood Pressure from Last 3 Encounters:   06/30/17 92/60   06/20/17 (!) 88/57   06/05/17 114/66    Weight from Last 3 Encounters:   06/30/17 159 lb 1.6 oz (72.2 kg)   06/20/17 152 lb (68.9 kg)   06/05/17 152 lb 6.4 oz (69.1 kg)              We Performed the Following     NO CHARGE LOS        Primary Care Provider Office Phone # Fax #    Michael Mendez [phone]       [facility] [address] [facility] [address] [place] [address]        Equal Access to Services     JAZZ BELTRAN : Hadii aad ku hadasho Soomaali, waaxda luqadaha, qaybta kaalmada adeegyada, waxay idiin haydiannn penelope torre . So [facility] [phone].    ATENCIÓN: Si habla español, tiene a humphries disposición servicios gratuitos de asistencia lingüística. Llame al [phone].    We comply with applicable federal civil rights laws and [place] laws. We do not discriminate on the basis of race, color, national origin, age, disability sex, sexual orientation or gender identity.            Thank you!     Thank you for choosing [facility] [place]  for your care. Our goal is always to provide you with excellent care. Hearing back from our patients is one way we can continue to improve our services. Please take a few minutes to complete the written survey that you may receive in the mail after your visit with us. Thank you!             Your Updated Medication List - Protect others around you: Learn how to safely use, store and throw away your medicines at www.disposemymeds.org.          This list is accurate as of: 7/31/17 11:30 AM.   Always use your most recent med list.                   Brand Name Dispense Instructions for use Diagnosis    HumuLIN N  UNIT/ML injection   Generic drug:  insulin isophane human     15 mL    17 units before bed (to be titrated by MD/CDE)    Diet controlled gestational diabetes mellitus (GDM), antepartum, High-risk pregnancy, young multigravida, second trimester, Previous  delivery, antepartum condition or complication       insulin pen needle 32G X 4 MM    BD DINA U/F    100 each    Use 1 daily or as directed.    High-risk pregnancy, young multigravida, second trimester       PRENATAL VIT-FE BISG-FA-DHA PO

## 2017-07-31 NOTE — NURSING NOTE
"Chief Complaint   Patient presents with     Prenatal Care     glucose monitor? - discuss MFM for GDM     28w1d    Initial BP 92/66 (BP Location: Left arm, Patient Position: Chair, Cuff Size: Adult Regular)  Pulse 80  Wt 165 lb (74.8 kg)  LMP 01/15/2017 (Exact Date)  BMI 30.67 kg/m2 Estimated body mass index is 30.67 kg/(m^2) as calculated from the following:    Height as of 6/30/17: 5' 1.5\" (1.562 m).    Weight as of this encounter: 165 lb (74.8 kg).  Medication Reconciliation: complete     Nurse assisted visit.  Aracely Louis MA.      "

## 2017-08-01 NOTE — TELEPHONE ENCOUNTER
E-mail from patient:    Yes I go to [facility]     Reply from BANDAR Durand RD, SREEKANTH to patient:  Does 8 am work for you either tomorrow, Wednesday or Thursday? It looks like there are openings in [place] those days.    Reply from patient:  Thursday would work!    Response from Joslyn Shelton RD, BANDAR, SREEKANTH Calhoun,    Sounds good! I have you down for 8 am in [place]. If you see your doctor today ask them if they can send a referral. I've asked as well :)    Have a great day!  Joslyn    Response from patient:  I saw the message too late I was already done with my appt. I didn't get a chance to ask him to send a referral    Mariela Gonzalez, MPH TATIANA PORTILLO CDE

## 2017-08-03 ENCOUNTER — ALLIED HEALTH/NURSE VISIT (OUTPATIENT)
Dept: EDUCATION SERVICES | Facility: CLINIC | Age: 32
End: 2017-08-03
Payer: COMMERCIAL

## 2017-08-03 DIAGNOSIS — O24.414 INSULIN CONTROLLED GESTATIONAL DIABETES MELLITUS (GDM) IN THIRD TRIMESTER: Primary | ICD-10-CM

## 2017-08-03 PROCEDURE — 95250 CONT GLUC MNTR PHYS/QHP EQP: CPT

## 2017-08-03 NOTE — MR AVS SNAPSHOT
After Visit Summary   8/3/2017    Unique Multani    MRN: [id number]           Patient Information     Date Of Birth          1985        Visit Information        Provider Department      8/3/2017 8:00 AM RI DIABETIC ED RESOURCE [facility] Instructions    1. Plan to wear the LibrePro sensor for 14 days. It is okay to shower, bathe, and swim (up to 3 feet deep for 30 minutes)    2. Continue with your usual diabetes care plan - check blood sugars and take medicines, as prescribed.    3. Keep a log of what you eat and drink, when you take your medications and how much you take, and exercise you do while you are wearing the sensor.    3. Do not cover the sensor with extra adhesive (the small hole in the center of the sensor must remain uncovered)    4. Use a little extra care, especially when getting dressed or exercising, to avoid accidentally loosening or removing the sensor.     5. Remove the sensor if you need to have an MRI or CT scan.     Return the sensor to the [facility] on 8/7.    Follow-up appointment: 8/7    [place] [facility] for the [facility]:  For Your Diabetes Education and Nutrition Appointments Call:  [phone]   For Diabetes Education or Nutrition Related Questions:   Phone: [phone]  E-mail: [email or web address]  Fax: [phone]   If you need a medication refill please contact your pharmacy. Please allow 3 business days for your refills to be completed.    Instructions for emailing the Diabetes Educators    If you need to communicate a non-urgent message to a Diabetes Educator via email, please send to [email or web address].    Please follow the following email guidelines:    Subject line: Secure: your clinic name (example: Secure: [facility])  In the email please include: First name, middle initial, last name and date of birth.    We will be in touch with you within one (1) business  day.            Follow-ups after your visit        Your next 10 appointments already scheduled     Aug 07, 2017 10:30 AM CDT   Diabetic Education with RI DIABETIC ED RESOURCE   [facility]    [address]  [facility] [address] [phone]            Aug 18, 2017  8:15 AM CDT   US OB > 14 WEEKS COMPLETE SINGLE with RIUS1   [facility]    [address]  [address]  [facility] [address] [phone]           Please bring a list of your medicines (including vitamins, minerals and over-the-counter drugs). Also, tell your doctor about any allergies you may have. Wear comfortable clothes and leave your valuables at home.  If you re less than 20 weeks drink four 8-ounce glasses of fluid an hour before your exam. If you need to empty your bladder before your exam, try to release only a little urine. Then, drink another glass of fluid.  You may have up to two family members in the exam room. If you bring a small child, an adult must be there to care for him or her.  Please call the Imaging Department at your exam site with any questions.            Aug 18, 2017  9:15 AM CDT   ESTABLISHED PRENATAL with Michael Mendez MD   [facility]    [address]  [facility] [id number] [phone]            Aug 25, 2017 11:15 AM CDT   ESTABLISHED PRENATAL with Michael Mendez MD   [facility]    [address]  [facility] [address]   [phone]            Sep 08, 2017 11:00 AM CDT   ESTABLISHED PRENATAL with Michael Mendez MD   [facility]    [address]  [facility] [address]   [phone]            Sep 25, 2017 11:00 AM CDT   ESTABLISHED PRENATAL with Michael Mendez MD   [facility] ([facility])    [address]  [facility] [address] [phone]              Who to contact     If you have questions or need follow up information about today's clinic visit or your schedule please contact [facility] directly at [phone].  Normal or non-critical lab and imaging results will be communicated to you by MyChart, letter or phone within 4 business days after the clinic has received the results. If you do not hear from us within 7 days, please contact the clinic through MyChart or phone. If you have a critical or abnormal lab result, we will notify you by phone as soon as possible.  Submit refill requests through Whyd or call your pharmacy and they will forward the refill request to us. Please allow 3 business days for your refill to be completed.          Additional Information About Your Visit        MyChart Information     Whyd gives you secure access to your electronic health record. If you see a primary care provider, you can also send messages to your care team and make appointments. If you have questions, please call your primary care clinic.  If you do not have a primary care provider, please call [phone] and they will assist you.        Care EveryWhere ID     This is your Care EveryWhere ID. This could be used by other organizations to access your [facility] medical records  [id number]        Your Vitals Were     Last Period                   01/15/2017 (Exact Date)            Blood Pressure from Last 3 Encounters:   07/31/17 92/66   06/30/17 92/60   06/20/17 (!) 88/57    Weight from Last 3 Encounters:   07/31/17 74.8 kg (165 lb)   06/30/17 72.2 kg (159 lb 1.6 oz)   06/20/17 68.9 kg (152 lb)              Today, you had the following     No orders found for display       Primary Care Provider Office Phone # Fax #    Michael Mendez [phone]       [facility] [address] [place] [address]         Equal Access to Services     [facility] : Hadii aad ku hadnathaliazeeshan Reed, wayaseminda luqadaha, qaybta karosaloretta mishra, nicolás hester. So [facility] [phone].    ATENCIÓN: Si habla español, tiene a humphries disposición servicios gratuitos de asistencia lingüística. Taraame al [phone].    We comply with applicable federal civil rights laws and [place] laws. We do not discriminate on the basis of race, color, national origin, age, disability sex, sexual orientation or gender identity.            Thank you!     Thank you for choosing [facility]  for your care. Our goal is always to provide you with excellent care. Hearing back from our patients is one way we can continue to improve our services. Please take a few minutes to complete the written survey that you may receive in the mail after your visit with us. Thank you!             Your Updated Medication List - Protect others around you: Learn how to safely use, store and throw away your medicines at www.disposemymeds.org.          This list is accurate as of: 8/3/17  8:21 AM.  Always use your most recent med list.                   Brand Name Dispense Instructions for use Diagnosis    HumuLIN N  UNIT/ML injection   Generic drug:  insulin isophane human     15 mL    17 units before bed (to be titrated by MD/CDE)    Diet controlled gestational diabetes mellitus (GDM), antepartum, High-risk pregnancy, young multigravida, second trimester, Previous  delivery, antepartum condition or complication       insulin pen needle 32G X 4 MM    BD DINA U/F    100 each    Use 1 daily or as directed.    High-risk pregnancy, young multigravida, second trimester       PRENATAL VIT-FE BISG-FA-DHA PO

## 2017-08-03 NOTE — PROGRESS NOTES
Diabetes Self Management Training: Professional Continuous Glucose Monitor Insertion    SUBJECTIVE:   Unique Multani is accompanied by self    Patient concerns: none    OBJECTIVE:    Lab Results:  No results found for: A1C   Lab Results   Component Value Date    GLC 86 01/21/2016     No results found for: HDL    Vitals:  LMP 01/15/2017 (Exact Date)    ASSESSMENT:    LibrePro sensor started today. (Lot # [id number], Serial # [id number], Expiration date 1/31/18) Sensor was inserted with no resistance or bleeding at insertion site.    Pt will plan to wear the sensor through  8/17.    WRITTEN AND VERBAL INFORMATION GIVEN TO SUPPORT UNDERSTANDING OF:  LibrePro CGM: Sensor insertion, intention of monitoring for 14 days. Keep records of BG, food intake, exercise, and medication dosing during wear.       Patient verbalizes understanding of how to remove sensor and all instructions provided.     Educational and other materials:  Food/exercise/medication log sheets  Contact information  Return Check List    PLAN:  Pt was given instructions for tracking BG, medications, food intake and activity.    See Patient Instructions, AVS printed and provided to patient today.    Follow-up:    Patient to return all items associated with professional Continuous Glucose Monitor System to the [facility] by 8/7.     Hoa Multani RD LD CDE    Time Spent: 30 minutes  Encounter Type: Individual

## 2017-08-08 ENCOUNTER — ALLIED HEALTH/NURSE VISIT (OUTPATIENT)
Dept: EDUCATION SERVICES | Facility: CLINIC | Age: 32
End: 2017-08-08
Payer: COMMERCIAL

## 2017-08-08 DIAGNOSIS — O24.414 INSULIN CONTROLLED GESTATIONAL DIABETES MELLITUS (GDM) IN THIRD TRIMESTER: Primary | ICD-10-CM

## 2017-08-08 PROCEDURE — G0108 DIAB MANAGE TRN  PER INDIV: HCPCS

## 2017-08-08 NOTE — MR AVS SNAPSHOT
After Visit Summary   8/8/2017    Unique Multani    MRN: [id number]           Patient Information     Date Of Birth          1985        Visit Information        Provider Department      8/8/2017 8:00 AM RI DIABETIC ED RESOURCE [facility]        Today's Diagnoses     Insulin controlled gestational diabetes mellitus (GDM) in third trimester    -  1       Follow-ups after your visit        Your next 10 appointments already scheduled     Aug 17, 2017  8:00 AM CDT   Diabetic Education with RI DIABETIC ED RESOURCE   [facility] ([facility])    [address]  [facility] [address] [phone]            Aug 18, 2017  8:15 AM CDT   US OB > 14 WEEKS COMPLETE SINGLE with RIUS1   [facility] ([facility])    [address]  [address]  [facility] [address] [phone]           Please bring a list of your medicines (including vitamins, minerals and over-the-counter drugs). Also, tell your doctor about any allergies you may have. Wear comfortable clothes and leave your valuables at home.  If you re less than 20 weeks drink four 8-ounce glasses of fluid an hour before your exam. If you need to empty your bladder before your exam, try to release only a little urine. Then, drink another glass of fluid.  You may have up to two family members in the exam room. If you bring a small child, an adult must be there to care for him or her.  Please call the Imaging Department at your exam site with any questions.            Aug 18, 2017  9:15 AM CDT   ESTABLISHED PRENATAL with Michael Mendez MD   [facility] ([facility])    [address]  [facility] [id number] [phone]            Aug 25, 2017 11:15 AM CDT   ESTABLISHED PRENATAL with Michael Mendez MD   [facility] ([facility])    [address]  [facility] [address]   [phone]            Sep 08, 2017 11:00 AM CDT   ESTABLISHED PRENATAL with Michael Mendez MD   [facility] ([facility])    [address]  [facility] [address] [phone]            Sep 25, 2017 11:00 AM CDT   ESTABLISHED PRENATAL with Michael Mendez MD   [facility] ([facility])    [address]  [facility] [address] [phone]              Who to contact     If you have questions or need follow up information about today's clinic visit or your schedule please contact [facility] directly at [phone].  Normal or non-critical lab and imaging results will be communicated to you by MyChart, letter or phone within 4 business days after the clinic has received the results. If you do not hear from us within 7 days, please contact the clinic through MyChart or phone. If you have a critical or abnormal lab result, we will notify you by phone as soon as possible.  Submit refill requests through We[employer]. or call your pharmacy and they will forward the refill request to us. Please allow 3 business days for your refill to be completed.          Additional Information About Your Visit        [employer] [facility] [employer] Information     Webydo. gives you secure access to your electronic health record. If you see a primary care provider, you can also send messages to your care team and make appointments. If you have questions, please call your primary care clinic.  If you do not have a primary care provider, please call [phone] and they will assist you.        Care EveryWhere ID     This is your Care EveryWhere ID. This could be used by other organizations to access your [facility] medical records  [id number]        Your Vitals Were     Last Period                   01/15/2017 (Exact Date)            Blood Pressure from Last 3 Encounters:   07/31/17 92/66   06/30/17 92/60    17 (!) 88/57    Weight from Last 3 Encounters:   17 74.8 kg (165 lb)   17 72.2 kg (159 lb 1.6 oz)   17 68.9 kg (152 lb)              We Performed the Following     DIABETES EDUCATION - Individual  []        Primary Care Provider Office Phone # Fax #    Michael Mendez [phone]       [facility] [address] [place] [address]        Equal Access to Services     [facility]: Hadii aad ku hadasho Soomaali, waaxda luqadaha, qaybta kaalmada adeegyada, waxay idiin hayaan ademalina torre . So [facility] [phone].    ATENCIÓN: Si habla español, tiene a humphries disposición servicios gratuitos de asistencia lingüística. Llame al [phone].    We comply with applicable federal civil rights laws and [place] laws. We do not discriminate on the basis of race, color, national origin, age, disability sex, sexual orientation or gender identity.            Thank you!     Thank you for choosing [facility]  for your care. Our goal is always to provide you with excellent care. Hearing back from our patients is one way we can continue to improve our services. Please take a few minutes to complete the written survey that you may receive in the mail after your visit with us. Thank you!             Your Updated Medication List - Protect others around you: Learn how to safely use, store and throw away your medicines at www.disposemymeds.org.          This list is accurate as of: 17 12:59 PM.  Always use your most recent med list.                   Brand Name Dispense Instructions for use Diagnosis    HumuLIN N  UNIT/ML injection   Generic drug:  insulin isophane human     15 mL    17 units before bed (to be titrated by MD/CDE)    Diet controlled gestational diabetes mellitus (GDM), antepartum, High-risk pregnancy, young multigravida, second trimester, Previous  delivery, antepartum condition or complication       insulin  pen needle 32G X 4 MM    BD DINA U/F    100 each    Use 1 daily or as directed.    High-risk pregnancy, young multigravida, second trimester       PRENATAL VIT-FE BISG-FA-DHA PO

## 2017-08-08 NOTE — PROGRESS NOTES
Gestational Diabetes Follow-up Visit    SUBJECTIVE/OBJECTIVE:  Unique Multani presents today for review of continuous glucose monitoring related to gestational diabetes    She is accompanied by self    Patient's gestational diabetes management related comments/concerns: I don't get my blood sugars    LMP 01/15/2017 (Exact Date)      Blood Glucose/Ketone Log:    Date Ketones Fasting Post Breakfast Post Lunch Post Supper   8/3 na 98 120 120 132   8/4 na 91 115 130    8/5 na 93 130 138 135   8/6  No data      8/7  No data                        Current gestational diabetes management:    Taking medications for gestational diabetes? Yes - 17 units NPH at bedtime    Physical Activity: no regular exercise program    Nutrition:  Patient admits it was difficult to keep food records, some limited information    Breakfast - 1 slice toast   Snack - fruit  Lunch - orange chicken, rice, blayne alyse chicken OR   Snack - skips sometimes or leftover lunch  Dinner - jambalaya  Snack - 1/2 bagel OR leftover dinner    ASSESSMENT:  Is usually following meal plan, though has been skipping some snacks, and at times knows that she was too high in carbs at lunches/dinners. Overall, post meal blood sugars seem improved from last week on the days she did do a fingerstick.     Health Beliefs and Attitudes:   Stage of Change: ACTION (Actively working towards change)    INTERVENTION:  Fingerstick blood sugars have actually improved since wearing the device (last week more numbers were out of target). She doesn't know why her numbers are better this week. There was one night of low blood sugars noted overnight, though patient states that she didn't feel it, and does not think there is any way that she dosed her insulin incorrectly the previous night. Since then, no other low blood sugars noticed overnight.     Patient does seem to have some days in which she is well managed with just GDM diet and current insulin dose (8/3 and 8/4), though other  days where she seems to have persistent elevated blood sugars during the afternoon (8/5, 8/6, and 8/7). She had limited fingersticks on these last few days due to leaving her meter at work, and her food records do show some increased carbohydrate intakes (fast food - Yancy Chins, etc), so hard to tease out currently if patient truly needs additional insulin during the day, or if her food intakes were what caused these elevations.    For now, no changes to insulin doses today, though patient will continue to wear the device for the next 8 days while continuing her blood sugar checks and trying to follow the meal plan a little more closely. At that point we can see which pattern is emerging, if any, in terms of her glycemic control during the day. Based on last 3 day's data, would consider meal time insulin vs daytime NPH, though want more information before making this decision.       Educational topics covered today:  What to expect after delivery, Future testing for Type 2 diabetes (2 hour OGTT at 6 week post-partum check-up and annual fasting blood glucose level), Risk of GDM and planning ahead for future pregnancies, Recommended lifestyle interventions for reducing the risk of Type 2 Diabetes, When to Call a Diabetes Educator or OB Provider    Educational Materials provided today:  [place] Preventing Diabetes    PLAN:  Check glucose 4 times daily.  Check ketones once a week when readings are consistently negative.  Continue with recommended physical activity.  Continue to follow recommended meal plan: 2-3 carbs at breakfast, 3-4 carbs at lunch, 3-4 carbs at supper, 1-2 carbs at snacks.  Follow consistent CHO meal plan, eat CHO and protein/fat at all meals/snacks.    Call/e-mail/[employer]Quest message diabetes educator if 3 or more blood sugars are above the goal in 1 week or if ketones are positive.     FOLLOW-UP:  Call or e-mail educator if 3 or more blood sugars are above goal in 1 week.  Call or e-mail with  questions or concerns.  Appointment scheduled on 8/17.     Call/e-mail/MyChart message diabetes educator if 3 or more blood sugars are above the goal in 1 week or if ketones are positive.     TATIANA Charles CDE  Time spent was 30 minutes  Encounter type: Individual    Any diabetes medication dose changes were made via the CDE Protocol and Collaborative Practice Agreement with the patient's OB/GYN provider. A copy of this encounter was shared with the provider.

## 2017-08-11 ENCOUNTER — TELEPHONE (OUTPATIENT)
Dept: EDUCATION SERVICES | Facility: CLINIC | Age: 32
End: 2017-08-11

## 2017-08-11 ENCOUNTER — ALLIED HEALTH/NURSE VISIT (OUTPATIENT)
Dept: EDUCATION SERVICES | Facility: CLINIC | Age: 32
End: 2017-08-11
Payer: COMMERCIAL

## 2017-08-11 DIAGNOSIS — O24.414 INSULIN CONTROLLED GESTATIONAL DIABETES MELLITUS (GDM) IN THIRD TRIMESTER: Primary | ICD-10-CM

## 2017-08-11 PROCEDURE — 99207 ZZC NO BILLABLE SERVICE THIS VISIT: CPT

## 2017-08-11 NOTE — TELEPHONE ENCOUNTER
E-mail from patient-  No I don't have any of those symtoms at night when im sleeping so i will increase the bedtime unit as recommended and see    Educator reply-  Thanks Unique! If you could send us your numbers again on Monday or Tuesday that would be great! Please do reach out if you do have symptoms of low over the weekend. If so, feel free to decrease your NPH back to 17.    Have a great weekend!  Joslyn Shelton RD, LD, CDE

## 2017-08-11 NOTE — MR AVS SNAPSHOT
After Visit Summary   8/11/2017    Unique Multani    MRN: [id number]           Patient Information     Date Of Birth          1985        Visit Information        Provider Department      8/11/2017 12:30 PM BE DIABETIC ED RESOURCE [facility]        Today's Diagnoses     Insulin controlled gestational diabetes mellitus (GDM) in third trimester    -  1       Follow-ups after your visit        Your next 10 appointments already scheduled     Aug 17, 2017  8:00 AM CDT   Diabetic Education with RI DIABETIC ED RESOURCE   [facility]    [address]  [facility] [address] [phone]            Aug 18, 2017  8:15 AM CDT   US OB > 14 WEEKS COMPLETE SINGLE with RIUS1   [facility]    [address]  [address]  [facility] [address] [phone]           Please bring a list of your medicines (including vitamins, minerals and over-the-counter drugs). Also, tell your doctor about any allergies you may have. Wear comfortable clothes and leave your valuables at home.  If you re less than 20 weeks drink four 8-ounce glasses of fluid an hour before your exam. If you need to empty your bladder before your exam, try to release only a little urine. Then, drink another glass of fluid.  You may have up to two family members in the exam room. If you bring a small child, an adult must be there to care for him or her.  Please call the Imaging Department at your exam site with any questions.            Aug 18, 2017  9:15 AM CDT   ESTABLISHED PRENATAL with Michael Mendez MD   [facility]    [address]  [facility] [id number] [phone]            Aug 25, 2017 11:15 AM CDT   ESTABLISHED PRENATAL with Michael Mendez MD   [facility]    [address]  [place]  [facility] [address]   [phone]            Sep 08, 2017 11:00 AM CDT   ESTABLISHED PRENATAL with [facility]    [address]  [facility] [address]   [phone]            Sep 25, 2017 11:00 AM CDT   ESTABLISHED PRENATAL with [facility]endez MD   [facility]    [address]  [facility] [id number] [phone]              Who to contact     If you have questions or need follow up information about today's clinic visit or your schedule please contact [facility] directly at [phone].  Normal or non-critical lab and imaging results will be communicated to you by MyChart, letter or phone within 4 business days after the clinic has received the results. If you do not hear from us within 7 days, please contact the clinic through MyChart or phone. If you have a critical or abnormal lab result, we will notify you by phone as soon as possible.  Submit refill requests through [employer] or call your pharmacy and they will forward the refill request to us. Please allow 3 business days for your refill to be completed.          Additional Information About Your Visit        [facility] Information     [employer] gives you secure access to your electronic health record. If you see a primary care provider, you can also send messages to your care team and make appointments. If you have questions, please call your primary care clinic.  If you do not have a primary care provider, please call [phone] and they will assist you.        Care EveryWhere ID     This is your Care EveryWhere ID. This could be used by other organizations to access your [facility] medical records  [id number]        Your Vitals Were     Last Period                   01/15/2017 (Exact Date)            Blood Pressure from Last 3 Encounters:   07/31/17 92/66   06/30/17 92/60   06/20/17  (!) 88/57    Weight from Last 3 Encounters:   17 165 lb (74.8 kg)   17 159 lb 1.6 oz (72.2 kg)   17 152 lb (68.9 kg)              We Performed the Following     NO CHARGE LOS        Primary Care Provider Office Phone # Fax #    Michael Mendez [phone] [address] [place] [address]        Equal Access to Services     [facility]: Hadii aad ku hadasho Soomaali, waaxda luqadaha, qaybta kaalmada adeegyada, waxay idiin hayaan adeeg kharash la'aan . So [facility] [phone].    ATENCIÓN: Si habla español, tiene a humphries disposición servicios gratuitos de asistencia lingüística. [facility] [phone].    We comply with applicable federal civil rights laws and [place] laws. We do not discriminate on the basis of race, color, national origin, age, disability sex, sexual orientation or gender identity.            Thank you!     Thank you for choosing [facility]  for your care. Our goal is always to provide you with excellent care. Hearing back from our patients is one way we can continue to improve our services. Please take a few minutes to complete the written survey that you may receive in the mail after your visit with us. Thank you!             Your Updated Medication List - Protect others around you: Learn how to safely use, store and throw away your medicines at www.disposemymeds.org.          This list is accurate as of: 17 12:59 PM.  Always use your most recent med list.                   Brand Name Dispense Instructions for use Diagnosis    HumuLIN N  UNIT/ML injection   Generic drug:  insulin isophane human     15 mL    17 units before bed (to be titrated by MD/CDE)    Diet controlled gestational diabetes mellitus (GDM), antepartum, High-risk pregnancy, young multigravida, second trimester, Previous  delivery, antepartum condition or complication       insulin pen needle 32G X 4 MM    BD DINA U/F    100 each    Use 1 daily  or as directed.    High-risk pregnancy, young multigravida, second trimester       PRENATAL VIT-FE BISG-FA-DHA PO

## 2017-08-11 NOTE — TELEPHONE ENCOUNTER
Gestational Diabetes Follow-up    Subjective/Objective:    Unique Multani sent in blood glucose log for review. Last date of communication was: 8/8/17.    Taking diabetes medications?   yes:     Diabetes Medication(s)     Insulin Sig    insulin isophane human (HUMULIN N PEN) 100 UNIT/ML injection 17 units before bed (to be titrated by MD/CDE)          Estimated Date of Delivery: Oct 22, 2017    BG/Food Log:           Assessment:  Assessment from 8/6 on as previous BG data has already been assessed   Ketones:negative.   Fasting blood glucoses: 20% in target.  After breakfast: 50% in target.  After lunch: 80% in target.  After dinner: 60% in target.    Plan/Response:  Pt would benefit from 1 unit increase to NPH (increase to 18 units) to help improve FBG. However, in future may consider AM NPH and mealtime insulin (pending CGM results)  Hesitant to increase NPH much further overnight as she has some overnight hypoglycemia.   If feeling any overnight hypoglycemia, would advise to remain on current NPH dose and follow-up with dose changes after CGM downloaded     E-mail reply to patient-  Josh Calhoun,    Thanks for sending in your numbers. It looks like some of your fasting blood sugars have started trending higher than 95 mg/dL again. Are you feeling any symptoms of low blood sugar overnight? (Shaky, sweaty, dizzy?) If so please remain on 17 units of NPH insulin before bed and do not increase further. If you aren t having any symptoms of low blood sugar overnight (feeling fine, not waking sweating or weak in the middle of the night) please increase your NPH insulin to 18 units before bed, starting tonight. If you do start to feel symptoms of low blood sugar, please be sure to consume 15 grams of carbohydrate to bring your blood sugar up and reduce your NPH insulin dose back to 17 units. I see you had lower blood sugars overnight one night with your sensor on but this isn t a trend so that is good. Be sure to keep a  sugar source handy just in case!    We may need to consider some mealtime insulin in the future but will get more data from your sensor when it is download. Keep doing your best with the meal plan and light activity after meals if able.     It looks like you are coming to get your sensor downloaded next Thursday. We can make a further plan at that time but please don t hesitate to reach out in the meantime if you have questions or concerns!    Have a great weekend,  Joslyn Shelton RD, BANDAR, CDE  [phone]  [email or web address]  Joslyn Shelton RD, BANDAR, CDE    Any diabetes medication dose changes were made via the CDE Protocol and Collaborative Practice Agreement with the patient's referring provider. A copy of this encounter was shared with the provider.

## 2017-08-15 ENCOUNTER — TELEPHONE (OUTPATIENT)
Dept: EDUCATION SERVICES | Facility: CLINIC | Age: 32
End: 2017-08-15

## 2017-08-15 DIAGNOSIS — O09.622 HIGH-RISK PREGNANCY, YOUNG MULTIGRAVIDA, SECOND TRIMESTER: ICD-10-CM

## 2017-08-15 DIAGNOSIS — O34.219 PREVIOUS CESAREAN DELIVERY, ANTEPARTUM CONDITION OR COMPLICATION: ICD-10-CM

## 2017-08-15 DIAGNOSIS — O24.410 DIET CONTROLLED GESTATIONAL DIABETES MELLITUS (GDM), ANTEPARTUM: ICD-10-CM

## 2017-08-15 NOTE — TELEPHONE ENCOUNTER
Josh Calhoun,    That s ok! Just bring it in with you on Thursday morning and we can still download the data up until it fell off - see you Thursday! TOVA Herzog RD LD CDE  Diabetes   [facility]  Phone: [phone]  E-mail: [email or web address]    From: [email or web address]   Sent: Tuesday, August 15, 2017 1:44 PM  To: DEPT-CLINICS-DIABETIC-EDUCATION  Subject: [facility]       Unique Multani [birth year]        My glucose meter fell off my arm 20 min ago while I was switching my laundry. I have an appt with an educator on Thursday @ 8:00 am. What should I do? I had the meter on almost 3 weeks now

## 2017-08-16 ENCOUNTER — HOSPITAL ENCOUNTER (OUTPATIENT)
Facility: CLINIC | Age: 32
Discharge: HOME | End: 2017-08-17
Attending: FAMILY MEDICINE | Admitting: FAMILY MEDICINE
Payer: COMMERCIAL

## 2017-08-16 ENCOUNTER — NURSE TRIAGE (OUTPATIENT)
Dept: NURSING | Facility: CLINIC | Age: 32
End: 2017-08-16

## 2017-08-16 VITALS
RESPIRATION RATE: 16 BRPM | SYSTOLIC BLOOD PRESSURE: 99 MMHG | TEMPERATURE: 98.6 F | DIASTOLIC BLOOD PRESSURE: 54 MMHG | HEART RATE: 96 BPM

## 2017-08-16 PROBLEM — Z36.89 ENCOUNTER FOR TRIAGE IN PREGNANT PATIENT: Status: ACTIVE | Noted: 2017-08-16

## 2017-08-16 LAB
ALBUMIN UR-MCNC: NEGATIVE MG/DL
APPEARANCE UR: CLEAR
BACTERIA #/AREA URNS HPF: ABNORMAL /HPF
BILIRUB UR QL STRIP: NEGATIVE
COLOR UR AUTO: ABNORMAL
GLUCOSE UR STRIP-MCNC: NEGATIVE MG/DL
HGB UR QL STRIP: NEGATIVE
KETONES UR STRIP-MCNC: NEGATIVE MG/DL
LEUKOCYTE ESTERASE UR QL STRIP: NEGATIVE
MUCOUS THREADS #/AREA URNS LPF: PRESENT /LPF
NITRATE UR QL: NEGATIVE
PH UR STRIP: 7 PH (ref 5–7)
RBC #/AREA URNS AUTO: <1 /HPF (ref 0–2)
SOURCE: ABNORMAL
SP GR UR STRIP: 1 (ref 1–1.03)
SQUAMOUS #/AREA URNS AUTO: 5 /HPF (ref 0–1)
UROBILINOGEN UR STRIP-MCNC: 0 MG/DL (ref 0–2)
WBC #/AREA URNS AUTO: 1 /HPF (ref 0–2)

## 2017-08-16 PROCEDURE — 25000128 H RX IP 250 OP 636: Performed by: FAMILY MEDICINE

## 2017-08-16 PROCEDURE — 25000132 ZZH RX MED GY IP 250 OP 250 PS 637: Performed by: FAMILY MEDICINE

## 2017-08-16 PROCEDURE — 81001 URINALYSIS AUTO W/SCOPE: CPT | Performed by: FAMILY MEDICINE

## 2017-08-16 PROCEDURE — 99214 OFFICE O/P EST MOD 30 MIN: CPT | Mod: 25

## 2017-08-16 RX ORDER — ONDANSETRON 2 MG/ML
4 INJECTION INTRAMUSCULAR; INTRAVENOUS EVERY 6 HOURS PRN
Status: DISCONTINUED | OUTPATIENT
Start: 2017-08-16 | End: 2017-08-17 | Stop reason: HOSPADM

## 2017-08-16 RX ORDER — ACETAMINOPHEN 325 MG/1
650 TABLET ORAL EVERY 6 HOURS PRN
Status: DISCONTINUED | OUTPATIENT
Start: 2017-08-16 | End: 2017-08-17 | Stop reason: HOSPADM

## 2017-08-16 RX ORDER — SODIUM CHLORIDE, SODIUM LACTATE, POTASSIUM CHLORIDE, CALCIUM CHLORIDE 600; 310; 30; 20 MG/100ML; MG/100ML; MG/100ML; MG/100ML
INJECTION, SOLUTION INTRAVENOUS CONTINUOUS
Status: DISCONTINUED | OUTPATIENT
Start: 2017-08-16 | End: 2017-08-17 | Stop reason: HOSPADM

## 2017-08-16 RX ORDER — ACETAMINOPHEN 325 MG/1
650 TABLET ORAL EVERY 4 HOURS PRN
Status: DISCONTINUED | OUTPATIENT
Start: 2017-08-16 | End: 2017-08-17 | Stop reason: HOSPADM

## 2017-08-16 RX ADMIN — SODIUM CHLORIDE, POTASSIUM CHLORIDE, SODIUM LACTATE AND CALCIUM CHLORIDE 1000 ML: 600; 310; 30; 20 INJECTION, SOLUTION INTRAVENOUS at 21:10

## 2017-08-16 RX ADMIN — ACETAMINOPHEN 650 MG: 325 TABLET, FILM COATED ORAL at 21:53

## 2017-08-16 NOTE — IP AVS SNAPSHOT
[facility]    [address]    [facility] [address]    Phone:  [phone]    Fax:  [phone]                                       After Visit Summary   8/16/2017    Unique Multani    MRN: [id number]           After Visit Summary Signature Page     I have received my discharge instructions, and my questions have been answered. I have discussed any challenges I see with this plan with the nurse or doctor.    ..........................................................................................................................................  Patient/Patient Representative Signature      ..........................................................................................................................................  Patient Representative Print Name and Relationship to Patient    ..................................................               ................................................  Date                                            Time    ..........................................................................................................................................  Reviewed by Signature/Title    ...................................................              ..............................................  Date                                                            Time

## 2017-08-16 NOTE — IP AVS SNAPSHOT
MRN:[id number]                      After Visit Summary   8/16/2017    Unique Multani    MRN: [id number]           Thank you!     Thank you for choosing [facility] for your care. Our goal is always to provide you with excellent care. Hearing back from our patients is one way we can continue to improve our services. Please take a few minutes to complete the written survey that you may receive in the mail after you visit. If you would like to speak to someone directly about your visit please contact Patient Relations at [phone]. Thank you!          Patient Information     Date Of Birth          1985        About your hospital stay     You were admitted on:  August 16, 2017 You last received care in the:  [facility]    You were discharged on:  August 16, 2017       Who to Call     For medical emergencies, please call 911.  For non-urgent questions about your medical care, please call your primary care provider or clinic, [phone]          Attending Provider     Provider Specialty    Ness Kelsey DO OB/Gyn       Primary Care Provider Office Phone # Fax #    Michael Mendez [phone]      Your next 10 appointments already scheduled     Aug 17, 2017  8:00 AM CDT   Diabetic Education with RI DIABETIC ED RESOURCE   [facility]    [address] [facility] [address]  [facility] [address] [phone]            Aug 18, 2017  8:15 AM CDT   US OB > 14 WEEKS COMPLETE SINGLE with RIUS1   [facility]    [address]  [address]  [facility] [address] [phone]           Please bring a list of your medicines (including vitamins, minerals and over-the-counter drugs). Also, tell your doctor about any allergies you may have. Wear comfortable clothes and leave your valuables at home.  If you re less than 20 weeks drink  four 8-ounce glasses of fluid an hour before your exam. If you need to empty your bladder before your exam, try to release only a little urine. Then, drink another glass of fluid.  You may have up to two family members in the exam room. If you bring a small child, an adult must be there to care for him or her.  Please call the Imaging Department at your exam site with any questions.            Aug 18, 2017  9:15 AM CDT   ESTABLISHED PRENATAL with Michael Mendez MD   [facility]    [address]  [facility] [address]   [phone]            Aug 25, 2017 11:15 AM CDT   ESTABLISHED PRENATAL with Michael Mendez MD   [facility]    [address]  [facility] [address]   [phone]            Sep 08, 2017 11:00 AM CDT   ESTABLISHED PRENATAL with Michael Mendez MD   [facility]    [address]  [facility] [address]   [phone]            Sep 25, 2017 11:00 AM CDT   ESTABLISHED PRENATAL with Michael Mendez MD   [facility]    [address]  [facility] [address]   [phone]              Further instructions from your care team       Discharge Instruction for Undelivered Patients      You were seen for: Nausea/Vomiting  We Consulted: Dr. Kelsey  You had (Test or Medicine): Urinalysis, IV Fluids, Cervical check (closed)     Diet:   Drink 8 to 12 glasses of liquids (milk, juice, water) every day.  You may eat meals and snacks.     Activity:  Call your doctor or nurse midwife if your baby is moving less than usual.     Call your provider if you notice:  Swelling in your face or increased swelling in your hands or legs.  Headaches that are not relieved by Tylenol (acetaminophen).  Changes in your vision (blurring: seeing spots or stars.)  Nausea (sick to your stomach)  and vomiting (throwing up).   Weight gain of 5 pounds or more per week.  Heartburn that doesn't go away.  Signs of bladder infection: pain when you urinate (use the toilet), need to go more often and more urgently.  The bag of tanner (rupture of membranes) breaks, or you notice leaking in your underwear.  Bright red blood in your underwear.  Abdominal (lower belly) or stomach pain.  Second (plus) baby: Contractions (tightening) less than 10 minutes apart and getting stronger.  *If less than 34 weeks: Contractions (tightenings) more than 6 times in one hour.  Increase or change in vaginal discharge (note the color and amount)  Other: Drink plenty of fluids.  Call clinic if symptoms return, contractions become closer together or stronger, or any other concerning symptoms present.    Follow-up:  As scheduled in the clinic          Pending Results     No orders found from 8/14/2017 to 8/17/2017.            Admission Information     Date & Time Provider Department Dept. Phone    8/16/2017 Ness Kelsey, [facility] Delivery [phone]      Your Vitals Were     Blood Pressure Pulse Temperature Respirations Last Period       99/54 96 98.6  F (37  C) (Oral) 16 01/15/2017 (Exact Date)       MyChart Information     [employer] gives you secure access to your electronic health record. If you see a primary care provider, you can also send messages to your care team and make appointments. If you have questions, please call your primary care clinic.  If you do not have a primary care provider, please call [phone] and they will assist you.        Care EveryWhere ID     This is your Care EveryWhere ID. This could be used by other organizations to access your [facility] medical records  [id number]        Equal Access to Services     [facility] : Chelle Reed, saima mullen, nicolás estrada. So [facility] [phone].    ATENCIÓN: Dm simental  español, tiene a humphries disposición servicios gratuitos de asistencia lingüística. Kamila lewis [phone].    We comply with applicable federal civil rights laws and [place] laws. We do not discriminate on the basis of race, color, national origin, age, disability sex, sexual orientation or gender identity.               Review of your medicines      UNREVIEWED medicines. Ask your doctor about these medicines        Dose / Directions    insulin isophane human 100 UNIT/ML injection   Commonly known as:  HumuLIN N PEN   Used for:  Diet controlled gestational diabetes mellitus (GDM), antepartum, High-risk pregnancy, young multigravida, second trimester, Previous  delivery, antepartum condition or complication        17 units before bed (to be titrated by MD/CDE)   Quantity:  15 mL   Refills:  1       PRENATAL VIT-FE BISG-FA-DHA PO        Refills:  0         CONTINUE these medicines which have NOT CHANGED        Dose / Directions    insulin pen needle 32G X 4 MM   Commonly known as:  BD DINA U/F   Used for:  High-risk pregnancy, young multigravida, second trimester        Use 1 daily or as directed.   Quantity:  100 each   Refills:  1                Protect others around you: Learn how to safely use, store and throw away your medicines at www.disposemymeds.org.             Medication List: This is a list of all your medications and when to take them. Check marks below indicate your daily home schedule. Keep this list as a reference.      Medications           Morning Afternoon Evening Bedtime As Needed    insulin isophane human 100 UNIT/ML injection   Commonly known as:  HumuLIN N PEN   17 units before bed (to be titrated by MD/CDE)                                insulin pen needle 32G X 4 MM   Commonly known as:  BD DINA U/F   Use 1 daily or as directed.                                PRENATAL VIT-FE BISG-FA-DHA PO

## 2017-08-17 ENCOUNTER — ALLIED HEALTH/NURSE VISIT (OUTPATIENT)
Dept: EDUCATION SERVICES | Facility: CLINIC | Age: 32
End: 2017-08-17
Payer: COMMERCIAL

## 2017-08-17 ENCOUNTER — TELEPHONE (OUTPATIENT)
Dept: EDUCATION SERVICES | Facility: CLINIC | Age: 32
End: 2017-08-17

## 2017-08-17 DIAGNOSIS — O24.414 INSULIN CONTROLLED GESTATIONAL DIABETES MELLITUS (GDM) IN THIRD TRIMESTER: Primary | ICD-10-CM

## 2017-08-17 DIAGNOSIS — O09.622 HIGH-RISK PREGNANCY, YOUNG MULTIGRAVIDA, SECOND TRIMESTER: ICD-10-CM

## 2017-08-17 PROCEDURE — G0108 DIAB MANAGE TRN  PER INDIV: HCPCS

## 2017-08-17 PROCEDURE — 96360 HYDRATION IV INFUSION INIT: CPT

## 2017-08-17 PROCEDURE — 99214 OFFICE O/P EST MOD 30 MIN: CPT | Mod: 25

## 2017-08-17 PROCEDURE — 96361 HYDRATE IV INFUSION ADD-ON: CPT

## 2017-08-17 NOTE — TELEPHONE ENCOUNTER
Patient's post meal blood sugars have been elevated (see below, as well as continuous glucose monitor interpretation in notes from date 8/17), would recommend 2 units Humalog/Novolog taken before breakfast, and 3 units taken before dinner (no lunch time insulin at this time).  Please order if you are in agreement and send to [facility] in [place].     Thanks!      Ketones:neg.   Fasting blood glucoses: 25% in target.  After breakfast: 62% in target.  After lunch: 87% in target.  After dinner: 25% in target.       TATINAA Charles CDE    Any diabetes medication dose changes were made via the CDE Protocol and Collaborative Practice Agreement with the patient's OB/GYN provider. A copy of this encounter was shared with the provider.

## 2017-08-17 NOTE — MR AVS SNAPSHOT
After Visit Summary   8/17/2017    Unique Multani    MRN: [id number]           Patient Information     Date Of Birth          1985        Visit Information        Provider Department      8/17/2017 8:00 AM [facility] DIABETIC ED [facility]        Today's Diagnoses     Insulin controlled gestational diabetes mellitus (GDM) in third trimester    -  1       Follow-ups after your visit        Your next 10 appointments already scheduled     Aug 18, 2017  8:15 AM CDT   US OB > 14 WEEKS COMPLETE SINGLE with RIUS[facility]   [facility]    [address]  [facility] [address]   [phone]           Please bring a list of your medicines (including vitamins, minerals and over-the-counter drugs). Also, tell your doctor about any allergies you may have. Wear comfortable clothes and leave your valuables at home.  If you re less than 20 weeks drink four 8-ounce glasses of fluid an hour before your exam. If you need to empty your bladder before your exam, try to release only a little urine. Then, drink another glass of fluid.  You may have up to two family members in the exam room. If you bring a small child, an adult must be there to care for him or her.  Please call the Imaging Department at your exam site with any questions.            Aug 18, 2017  9:15 AM CDT   ESTABL[facility] PRENATAL with [facility] Mendez MD   [facility]    [facility] [address] [facility] [address]   [phone]            Aug 25, 2017 11:15 AM CDT   ESTABLISHED PRENATAL with [facility] Mendez MD   [facility]    [address]  [facility] [address]   [phone]            Sep 08, 2017 11:00 AM CDT   ESTABLISHED PRENATAL with Michael Mendez MD   [facility]    [address]  [place]  [facility] [id number] [phone]            Sep 25, 2017 11:00 AM CDT   ESTABLISHED PRENATAL with Michael Mendez MD   [facility]    [address]  [facility] [id number] [phone]              Who to contact     If you have questions or need follow up information about today's clinic visit or your schedule please contact [facility] directly at [phone].  Normal or non-critical lab and imaging results will be communicated to you by [employer], letter or phone within 4 business days after the clinic has received the results. If you do not hear from us within 7 days, please contact the clinic through [employer] or phone. If you have a critical or abnormal lab result, we will notify you by phone as soon as possible.  Submit refill requests through [employer] or call your pharmacy and they will forward the refill request to us. Please allow 3 business days for your refill to be completed.          Additional Information About Your Visit        [employer] at [employer] Information     [employer] gives you secure access to your electronic health record. If you see a primary care provider, you can also send messages to your care team and make appointments. If you have questions, please call your primary care clinic.  If you do not have a primary care provider, please call [phone] and they will assist you.        Care EveryWhere ID     This is your Care EveryWhere ID. This could be used by other organizations to access your [place] medical records  [id number]        Your Vitals Were     Last Period                   01/15/2017 (Exact Date)            Blood Pressure from Last 3 Encounters:   08/16/17 99/54   07/31/17 92/66   06/30/17 92/60    Weight from Last 3 Encounters:   07/31/17 74.8 kg (165 lb)   06/30/17 72.2 kg (159 lb 1.6 oz)   06/20/17 68.9 kg (152 lb)              We Performed the Following     DIABETES EDUCATION - Individual   []        Primary Care Provider Office Phone # Fax #    Michael Mendez [phone] [address] [place] [address]        Equal Access to Services     JAZZ BELTRAN : Hadii jose daniel ku albertao Soomaali, waaxda luqadaha, qaybta kaalmada adekelvin, nicolás poncedonta kacie. So [facility] [phone].    ATENCIÓN: Si habla español, tiene a humphries disposición servicios gratuitos de asistencia lingüística. Llame al [phone].    We comply with applicable federal civil rights laws and [place] laws. We do not discriminate on the basis of race, color, national origin, age, disability sex, sexual orientation or gender identity.            Thank you!     Thank you for choosing [facility]  for your care. Our goal is always to provide you with excellent care. Hearing back from our patients is one way we can continue to improve our services. Please take a few minutes to complete the written survey that you may receive in the mail after your visit with us. Thank you!             Your Updated Medication List - Protect others around you: Learn how to safely use, store and throw away your medicines at www.disposemymeds.org.          This list is accurate as of: 17 10:45 AM.  Always use your most recent med list.                   Brand Name Dispense Instructions for use Diagnosis    insulin isophane human 100 UNIT/ML injection    HumuLIN N PEN    15 mL    17 units before bed (to be titrated by MD/CDE)    Diet controlled gestational diabetes mellitus (GDM), antepartum, High-risk pregnancy, young multigravida, second trimester, Previous  delivery, antepartum condition or complication       insulin pen needle 32G X 4 MM    BD DINA U/F    100 each    Use 1 daily or as directed.    High-risk pregnancy, young multigravida, second trimester       PRENATAL VIT-FE BISG-FA-DHA PO

## 2017-08-17 NOTE — Clinical Note
Please see scanned continuous glucose monitoring (CGM) reports, interpretation and recommendations. As a provider, you can bill for a non face-to-face interpretation of the sensor report. If you feel it is appropriate, please create a 'Documentation Only' encounter noting the interpretation and your recommended plan and bill for this glucose sensor interpretation using code 83191. Let me know if you have any questions on this. TATIANA Charles CDE

## 2017-08-17 NOTE — PROVIDER NOTIFICATION
"   17   Provider Notification   Provider Name/Title    Method of Notification Electronic Page;Phone   Request Evaluate - Remote   Notification Reason Patient Arrived;Uterine Activity;Status Update    presents through the ER c/o upper abdominal pain, feeling \"icky\" through the day, nausea with five episodes of diarrhea.   Reviewed pt's history and vitals.   Orders received for IV bolus, zofran, and UA. HOLD insulin tonight start tomorrow if eating. Monitor blood sugars. If pt feels better ok to discharge home with routine follow up.  "

## 2017-08-17 NOTE — PROVIDER NOTIFICATION
08/16/17 9300   Provider Notification   Provider Name/Title Dr. Kelsey   Method of Notification Electronic Page;Phone   Request Evaluate - Remote   Dr. Kelsey called re: tejinder every 1-5 mins, palpate mild, pt states feeling tightening but no pain with contractions.  Pt states nausea better after fluid bolus.  Orders received for SVE; if closed can sent pt home.

## 2017-08-17 NOTE — TELEPHONE ENCOUNTER
"  Reason for Disposition    Lightheadedness or dizziness (e.g., feels like passing out)    Additional Information    Negative: Passed out (i.e., lost consciousness, collapsed and was not responding)    Negative: Shock suspected (e.g., cold/pale/clammy skin, too weak to stand, low BP, rapid pulse)    Negative: Difficult to awaken or acting confused  (e.g., disoriented, slurred speech)    Negative: Sounds like a life-threatening emergency to the triager    Negative: Followed an abdomen (stomach) injury    Negative: [1] Abdominal pain AND [2] pregnant > 20 weeks    Negative: MODERATE-SEVERE abdominal pain (e.g., interferes with normal activities, awakens from sleep)    Negative: [1] SEVERE vaginal bleeding (i.e., soaking 2 pads / hour, large blood clots) AND [2] present 2 or more hours    Negative: [1] MODERATE vaginal bleeding (i.e., soaking 1 pad / hour; clots) AND [2] present > 6 hours    Negative: [1] MODERATE vaginal bleeding (i.e., soaking 1 pad / hour; clots) AND [2] pregnant > 12 weeks    Negative: Passed tissue (e.g., gray-white)    Negative: [1] Vomiting AND [2] contains red blood or black (\"coffee ground\") material  (Exception: few red streaks in vomit that only happened once)    Negative: Shoulder pain    Protocols used: PREGNANCY - ABDOMINAL PAIN LESS THAN 20 WEEKS [facility]-    Kimmy Brantley RN, BSN  [facility]    "

## 2017-08-17 NOTE — PROGRESS NOTES
Diabetes Self Management Training: Follow-up Visit and Continuous Glucose Monitor Download    Unique Multani presents today for evaluation of glucose control related to Gestational diabetes.    She is accompanied by self    Patient's diabetes management related comments/concerns: My dinner numbers have been really high      Current Diabetes Management per Patient:  Taking diabetes medications? 18 units NPH now  yes:     Diabetes Medication(s)     Insulin Sig    insulin isophane human (HUMULIN N PEN) 100 UNIT/ML injection 17 units before bed (to be titrated by MD/CDE)          *Abbreviated insulin dose documentation key: Insulin Trade Name (wokysaqfs-ujver-rxpnpa-bedtime) - i.e. Humalog 5-5-5-0 (Humalog 5 units at breakfast, 5 units at lunch, and 5 units at dinner).    LibrePro Continuous Glucose Monitor Interpretation   Sensor fell off on Tuesday, however results do show post meal blood sugar elevations    Most Recent A1c Result:  No results found for: A1C  Indication/s for LibrePro study: Unexplained fluctuations in glucose values.    Statistics:   1. Sensor worn for 12 days.  2. Glucose excursions:   Percent in target is: 94%  Percent above target is: 5%  Percent below target is: 1%  3. Estimated A1c: 5.5%                        Data evaluation:   1. Sensor modal day evaluation shows the followin. Consistent day-to-day patterns noted: pattern of daytime hyperglycemia.  2. Average glucose: 110 mg/dL.  3. Low glucose events: 1 events with and average duration of 105 minutes       Additional comments: Unsure what caused this low blood sugar, however it has not happened any other night    Patient's Logbook shows the following:   Carbohydrate counting is: accurate  Medication and/or insulin dosing is: accurate              Ketones:neg.   Fasting blood glucoses: 25% in target.  After breakfast: 62% in target.  After lunch: 87% in target.  After dinner: 25% in target.     Assessment:  Patient does appear to  return to normal blood sugars pre-meal, however is spiking >160 post meal. Likely needs pre-meal insulin. Patient does remember being on pre meal insulin with her last pregnancy as well - instructed her on how to use short acting insulin before meals, and to start checking 2 hours post meal once on her new insulin.    INTERVENTION:    Education provided today on:  AADE Self-Care Behaviors:  Taking Medication: action of prescribed medication, side effects of prescribed medications and when to take medications    Opportunities for ongoing education and support in diabetes-self management were discussed.    Pt verbalized understanding of concepts discussed and recommendations provided today.       Education Materials Provided:  No new materials provided today    PLAN:  Will send telephone encounter to MD requesting 2 units Humalog/Novolog taken before breakfast, and 3 units taken before dinner (no lunch time insulin at this time).      Increase to 20 units of NPH at bedtime.    FOLLOW-UP:  Follow-up planned for BG review by phone/e-mail/[employer].   Chart routed to referring provider.    Ongoing plan for education and support: Follow-up visit with diabetes educator in 4-5 days    TATIANA Charles CDE  Time Spent: 30 minutes  Encounter Type: Individual    Any diabetes medication dose changes were made via the CDE Protocol and Collaborative Practice Agreement with the patient's OB/GYN provider. A copy of this encounter was shared with the provider.

## 2017-08-17 NOTE — PLAN OF CARE
SVE - closed.  Pt states contractions are not getting any stronger - palpate very mild and pt states they are not painful. Okay with plan to go home, pt and  are aware to call clinic if contractions get any stronger or closer together, if N/V symptoms return and pt starts to feel worse, or any other concerning symptoms present.  Pt understands plan to hold insulin until she starts eating.

## 2017-08-17 NOTE — DISCHARGE INSTRUCTIONS
Discharge Instruction for Undelivered Patients      You were seen for: Nausea/Vomiting  We Consulted: Dr. Kelsey  You had (Test or Medicine): Urinalysis, IV Fluids, Cervical check (closed)     Diet:   Drink 8 to 12 glasses of liquids (milk, juice, water) every day.  You may eat meals and snacks.     Activity:  Call your doctor or nurse midwife if your baby is moving less than usual.     Call your provider if you notice:  Swelling in your face or increased swelling in your hands or legs.  Headaches that are not relieved by Tylenol (acetaminophen).  Changes in your vision (blurring: seeing spots or stars.)  Nausea (sick to your stomach) and vomiting (throwing up).   Weight gain of 5 pounds or more per week.  Heartburn that doesn't go away.  Signs of bladder infection: pain when you urinate (use the toilet), need to go more often and more urgently.  The bag of tanner (rupture of membranes) breaks, or you notice leaking in your underwear.  Bright red blood in your underwear.  Abdominal (lower belly) or stomach pain.  Second (plus) baby: Contractions (tightening) less than 10 minutes apart and getting stronger.  *If less than 34 weeks: Contractions (tightenings) more than 6 times in one hour.  Increase or change in vaginal discharge (note the color and amount)  Other: Drink plenty of fluids.  Call clinic if symptoms return, contractions become closer together or stronger, or any other concerning symptoms present.    Follow-up:  As scheduled in the clinic

## 2017-08-17 NOTE — PLAN OF CARE
Data: Patient [facility] at 2250.   Reason for maternal/fetal assessment per patient is Abdominal Cramping and Diarrhea  . Patient is a . Prenatal record reviewed.      Obstetric History       T2      L2     SAB0   TAB0   Ectopic1   Multiple0   Live Births2       # Outcome Date GA Lbr Mane/2nd Weight Sex Delivery Anes PTL Lv   4 Current            3 Term 04/04/15 38w2d  3.941 kg (8 lb 11 oz) M CS-LTranv Spinal N RADHA      Name: [employer]       Apgar1:  9                Apgar5: 9   2 Term 01   3.118 kg (6 lb 14 oz) F CS-LTranv EPI N RADHA      Name: Vicente      Complications: Failure to Progress in Second Stage   1 Ectopic                  Medical History:   Past Medical History:   Diagnosis Date     Diabetes (H)     GDM insulin     GERD (gastroesophageal reflux disease)     w/u otherwise neg      History of colposcopy with cervical biopsy 3/23/07    WNL     Papanicolaou smear of cervix with low grade squamous intraepithelial lesion (LGSIL) 07   . Gestational Age 30w3d. VSS. Cervix: closed.  Fetal movement present. Patient denies cramping, backache, vaginal discharge, pelvic pressure, UTI symptoms, GI problems, bloody show, vaginal bleeding, edema, headache, visual disturbances, epigastric or URQ pain, abdominal pain, rupture of membranes. Support persons  present.  Action: Verbal consent for EFM. Triage assessment completed. EFM applied for fetal surveillence. Uterine assessment completed. Fetal assessment: Presumed adequate fetal oxygenation documented (see flow record). Patient instructed to report change in fetal movement, vaginal leaking of fluid or bleeding, abdominal pain, or any concerns related to the pregnancy to her nurse/physician.   Response: Dr. Kelsey informed, see note. Plan per provider is to discharge to home. Patient verbalized understanding of education and verbalized agreement with plan. Discharged ambulatory at 0005.    IV hydration  for 2.5 hours  IV start time: 2110  IV stop time: 8893

## 2017-08-18 ENCOUNTER — PRENATAL OFFICE VISIT (OUTPATIENT)
Dept: OBGYN | Facility: CLINIC | Age: 32
End: 2017-08-18
Payer: COMMERCIAL

## 2017-08-18 ENCOUNTER — RADIANT APPOINTMENT (OUTPATIENT)
Dept: ULTRASOUND IMAGING | Facility: CLINIC | Age: 32
End: 2017-08-18
Payer: COMMERCIAL

## 2017-08-18 VITALS
HEART RATE: 84 BPM | DIASTOLIC BLOOD PRESSURE: 68 MMHG | BODY MASS INDEX: 31.23 KG/M2 | WEIGHT: 168 LBS | SYSTOLIC BLOOD PRESSURE: 100 MMHG

## 2017-08-18 DIAGNOSIS — O09.623 HIGH-RISK PREGNANCY, YOUNG MULTIGRAVIDA, THIRD TRIMESTER: ICD-10-CM

## 2017-08-18 DIAGNOSIS — O09.623 HIGH-RISK PREGNANCY, YOUNG MULTIGRAVIDA, THIRD TRIMESTER: Primary | ICD-10-CM

## 2017-08-18 DIAGNOSIS — O24.414 INSULIN CONTROLLED GESTATIONAL DIABETES MELLITUS (GDM) IN THIRD TRIMESTER: ICD-10-CM

## 2017-08-18 PROBLEM — Z36.89 ENCOUNTER FOR TRIAGE IN PREGNANT PATIENT: Status: RESOLVED | Noted: 2017-08-16 | Resolved: 2017-08-18

## 2017-08-18 PROCEDURE — 99207 ZZC COMPLICATED OB VISIT: CPT | Performed by: OBSTETRICS & GYNECOLOGY

## 2017-08-18 PROCEDURE — 76815 OB US LIMITED FETUS(S): CPT | Performed by: OBSTETRICS & GYNECOLOGY

## 2017-08-18 NOTE — MR AVS SNAPSHOT
After Visit Summary   8/18/2017    Unique Multani    MRN: [id number]           Patient Information     Date Of Birth          1985        Visit Information        Provider Department      8/18/2017 9:15 AM Michael Mendez MD [facility]        Today's Diagnoses     High-risk pregnancy, young multigravida, third trimester    -  1    Insulin controlled gestational diabetes mellitus (GDM) in third trimester           Follow-ups after your visit        Additional Services     MAT FETAL MED CTR REFERRAL-PREGNANCY       >> Patient may proceed with recommendations for further testing as directed by the Maternal Fetal Medicine Specialist >>    >> If requesting Fetal Echo: MFM will determine appropriate location for exam due to indication.    >> If requesting Lung Maturity Amnio:  If results indicate fetal lung maturity, induction or C/S is recommended within 36 hours.  Please schedule accordingly.     Dear Patient:   Please be aware that coverage of these services is subject to the terms and limitations of your health insurance plan.  Call member services at your health plan with any benefit or coverage questions.      Please bring the following to your appointment:    >>  Any x-rays, CTs or MRIs which have been performed.  Contact the facility where they were done to arrange for  prior to your scheduled appointment.  Any new CT, MRI or other procedures ordered by your specialist must be performed at a [place] facility or coordinated by your clinic's referral office.  >>  List of current medications   >>  This referral request   >>  Any documents/labs given to you for this referral                  Follow-up notes from your care team     Return in about 2 weeks (around 9/1/2017).      Your next 10 appointments already scheduled     Aug 25, 2017 11:15 AM CDT   [facility]    [address]  [facility] [address]   [phone]            Sep 08, 2017 11:00 AM CDT   ESTABLISHED PRENATAL with Michael Mendez MD   [facility]    [address]  [facility] [address]   [phone]            Sep 25, 2017 11:00 AM CDT   ESTABLISHED PRENATAL with Michael Mendez MD   [facility]    [address]  [facility] [id number] [phone]              Who to contact     If you have questions or need follow up information about today's clinic visit or your schedule please contact [facility] directly at [phone].  Normal or non-critical lab and imaging results will be communicated to you by MyChart, letter or phone within 4 business days after the clinic has received the results. If you do not hear from us within 7 days, please contact the clinic through [employer] or phone. If you have a critical or abnormal lab result, we will notify you by phone as soon as possible.  Submit refill requests through [employer] or call your pharmacy and they will forward the refill request to us. Please allow 3 business days for your refill to be completed.          Additional Information About Your Visit        [employer] Information     [employer] gives you secure access to your electronic health record. If you see a primary care provider, you can also send messages to your care team and make appointments. If you have questions, please call your primary care clinic.  If you do not have a primary care provider, please call [phone] and they will assist you.        Care EveryWhere ID     This is your Care EveryWhere ID. This could be used by other organizations to access your [place] medical records  [id number]        Your Vitals Were     Pulse Last Period BMI (Body Mass Index)             84 01/15/2017 (Exact Date) 31.23 kg/m2          Blood Pressure from Last 3 Encounters:    17 100/68   17 99/54   17 92/66    Weight from Last 3 Encounters:   17 168 lb (76.2 kg)   17 165 lb (74.8 kg)   17 159 lb 1.6 oz (72.2 kg)              We Performed the Following     MAT FETAL MED CTR REFERRAL-PREGNANCY        Primary Care Provider Office Phone # Fax #    Michael Mendez [phone] [address] [place] [address]        Equal Access to Services     [facility]: Hadii aad ku hadasho Soomaali, waaxda luqadaha, qaybta kaalmada adeegyaloretta, waxreyes torre . So [place] [phone].    ATENCIÓN: Si habla español, tiene a humphries disposición servicios gratuitos de asistencia lingüística. [facility] [phone].    We comply with applicable federal civil rights laws and [place] laws. We do not discriminate on the basis of race, color, national origin, age, disability sex, sexual orientation or gender identity.            Thank you!     Thank you for choosing [facility]  for your care. Our goal is always to provide you with excellent care. Hearing back from our patients is one way we can continue to improve our services. Please take a few minutes to complete the written survey that you may receive in the mail after your visit with us. Thank you!             Your Updated Medication List - Protect others around you: Learn how to safely use, store and throw away your medicines at www.disposemymeds.org.          This list is accurate as of: 17  1:48 PM.  Always use your most recent med list.                   Brand Name Dispense Instructions for use Diagnosis    insulin isophane human 100 UNIT/ML injection    HumuLIN N PEN    15 mL    17 units before bed (to be titrated by MD/CDE)    Diet controlled gestational diabetes mellitus (GDM), antepartum, High-risk pregnancy, young multigravida, second trimester, Previous  delivery, antepartum condition or complication       insulin pen needle  32G X 4 MM    BD DINA U/F    100 each    Use 1 daily or as directed.    High-risk pregnancy, young multigravida, second trimester       PRENATAL VIT-FE BISG-FA-DHA PO

## 2017-08-18 NOTE — NURSING NOTE
"Chief Complaint   Patient presents with     Prenatal Care     ER 08/16/17 - some abdominal tightening and upper abdominal pain - US this am - baby active     30w5d    Initial /68 (BP Location: Left arm, Patient Position: Chair, Cuff Size: Adult Regular)  Pulse 84  Wt 168 lb (76.2 kg)  LMP 01/15/2017 (Exact Date)  BMI 31.23 kg/m2 Estimated body mass index is 31.23 kg/(m^2) as calculated from the following:    Height as of 6/30/17: 5' 1.5\" (1.562 m).    Weight as of this encounter: 168 lb (76.2 kg).  Medication Reconciliation: complete     Nurse assisted visit.  Aracely Louis MA.      "

## 2017-08-18 NOTE — TELEPHONE ENCOUNTER
"I\Mealtime insulin has been ordered by provider.   Email contact with patient:   I had My doctor appointment today he told me that i was going to start meal time insulin im just wondering if thats happening today or Monday? I havent heard anything from you guys so just checking    Hi Unique, We are just waiting for the doctor to sign the prescription, so hopefully today!  I'll let you know :)  Teresa    Oh ok when I talked to him earlier it sounded like he got the message and he said I was starting but I guess he probably didn't get the prescription yet. Ok I'll just wait to  my other insulin at the same time. Thank you !    Josh Calhoun,     I can see the the prescription was sent to the pharmacy, so it should be ready for you to  :)   You might want to call the pharmacy first just to make sure they have it all ready.     You will start taking 2 units of Novolog insulin before breakfast, and 3 units of Novolog before dinner.   This means you will need to change to checking your blood sugar 2 hours after breakfast and dinner (instead of 1 hour). Please put a little * next to these \"2 hours checks\".    But, for now you will keep checking 1 hour after lunch since you are not taking lunch time insulin.     You can start tonight with dinner, or tomorrow with breakfast, then please send in your numbers again on Tuesday and we'll see how things go over the weekend :)  Thanks for your patience!    Have a nice weekend,   Teresa Mcdonnell RD, LD, CDE  "

## 2017-08-22 ENCOUNTER — TELEPHONE (OUTPATIENT)
Dept: EDUCATION SERVICES | Facility: CLINIC | Age: 32
End: 2017-08-22

## 2017-08-22 DIAGNOSIS — O24.414 INSULIN CONTROLLED GESTATIONAL DIABETES MELLITUS (GDM) IN THIRD TRIMESTER: ICD-10-CM

## 2017-08-22 NOTE — TELEPHONE ENCOUNTER
Reviewed pt's log book and plan to increase meal time insulin.  Agree with below plan to increase by 1 u at breakfast and dinner meals r/t elevated post meal blood sugars.     With after meal readings remaining elevated recommend increasing breakfast & dinner Novolog by 1 unit each.  From 2 to 3 units at breakfast and from 3 to 4 units at dinner.    Pt to follow up in 3 days with blood sugar readings.     Minerva Mercer, TATIANA LD CDE

## 2017-08-22 NOTE — TELEPHONE ENCOUNTER
Diabetes Follow-up    Subjective/Objective:    Unique Multani sent in blood glucose log for review. Last date of communication was: 08/18/2017.    Diabetes is being managed with Lifestyle (diet/activity)  Taking diabetes medications?   yes:     Diabetes Medication(s)     Insulin Sig    insulin aspart (NOVOLOG FLEXPEN) 100 UNIT/ML injection Take 2 units before breakfast and 3 units before dinner daily.    insulin isophane human (HUMULIN N PEN) 100 UNIT/ML injection 17 units before bed (to be titrated by MD/CDE)      Novolog 2-0-3-0  Humulin N 0-0-0-18  (although noted patient was supposed to increase to 20 units of NPH on the 8/17 per educator note and unsure if she did as log says 18).     BG/Food Log:       Assessment:  Fasting blood glucose: 60% in target.  After breakfast glucose: 40% in target.  After lunch glucose: 100% in target.  After dinner glucose: 25% in target.    Plan/Response:  Continue with 18 units of NPH at night if this is what patient is taking as 60% of FBG are in target.   With after meal readings remaining elevated recommend increasing breakfast & dinner Novolog by 1 unit each.  From 2 to 3 units at breakfast and from 3 to 4 units at dinner.  Will route to CDE for approval or alternate plan.       E-mail to patient:      Josh Calhoun,     My name is Talia and I am helping answer e-mails today.  Thank you for sending in your blood sugars.  It looks like starting the meal time insulin was helpful in bringing the blood sugars down.   To help bring them down a little bit more please increase your Novolog by 1 unit before breakfast and dinner.  So now you will take 3 units of Novolog before breakfast and 4 units of Novolog before dinner.    Just to clarify you are still taking 18 units of Humulin N at night, correct?      Please update again on Friday.   Keep up the great work!     Thanks and have a great week!     Talia Fischer RD, LD   Diabetes Education      Any diabetes medication dose  changes were made via the CDE Protocol and Collaborative Practice Agreement with the patient's referring provider. A copy of this encounter was shared with the provider.

## 2017-08-23 ENCOUNTER — PRE VISIT (OUTPATIENT)
Dept: MATERNAL FETAL MEDICINE | Facility: CLINIC | Age: 32
End: 2017-08-23

## 2017-08-23 NOTE — TELEPHONE ENCOUNTER
Reply and confirmation from patient:     Yes I'm still taking the 18 unit before bed time and I will increase the breakfast and dinner time by 1

## 2017-08-25 ENCOUNTER — PRENATAL OFFICE VISIT (OUTPATIENT)
Dept: OBGYN | Facility: CLINIC | Age: 32
End: 2017-08-25
Payer: COMMERCIAL

## 2017-08-25 ENCOUNTER — VIRTUAL VISIT (OUTPATIENT)
Dept: EDUCATION SERVICES | Facility: CLINIC | Age: 32
End: 2017-08-25

## 2017-08-25 ENCOUNTER — TELEPHONE (OUTPATIENT)
Dept: EDUCATION SERVICES | Facility: CLINIC | Age: 32
End: 2017-08-25

## 2017-08-25 VITALS
WEIGHT: 169 LBS | BODY MASS INDEX: 31.42 KG/M2 | DIASTOLIC BLOOD PRESSURE: 64 MMHG | HEART RATE: 76 BPM | SYSTOLIC BLOOD PRESSURE: 100 MMHG

## 2017-08-25 DIAGNOSIS — O24.414 INSULIN CONTROLLED GESTATIONAL DIABETES MELLITUS (GDM) DURING PREGNANCY: Primary | ICD-10-CM

## 2017-08-25 DIAGNOSIS — O09.623 HIGH-RISK PREGNANCY, YOUNG MULTIGRAVIDA, THIRD TRIMESTER: Primary | ICD-10-CM

## 2017-08-25 PROCEDURE — 99207 ZZC COMPLICATED OB VISIT: CPT | Performed by: OBSTETRICS & GYNECOLOGY

## 2017-08-25 NOTE — PROGRESS NOTES
IUP at 31 5/7 weeks     Insulin dependent gestational DM; continues to need increasing insulin     Will start  testing

## 2017-08-25 NOTE — TELEPHONE ENCOUNTER
Gestational Diabetes Follow-up    Subjective/Objective:    Unique Jiménez sent in blood glucose log for review. Last date of communication was: 8/22/17.    Gestational diabetes is being managed with Humulin/Novolin NPH Insulin 18 units at bedtime and NovoLog Insulin 3-0-4-0 units    Estimated Date of Delivery: Oct 22, 2017    BG/Food Log:   Unique jiménez [birth year]      Assessment:    Ketones:not available.   Fasting blood glucoses: 100% in target.  After breakfast: 50% in target.  After lunch: 33-67% in target.  Patient didn't patricia with * but on 8/23 by lunch says after 2 hours - will try to clarify.   After dinner: 67% in target.    Plan/Response:  Increase Novolog to 4 units at breakfast if blood glucose high tomorrow.  If high tonight after dinner, increase dinner to 5 units otherwise, stay at 4 units.   Will check if patient checking 2 hours after lunch due to note by it.  May need insulin soon before lunch if starting to rise above target.  No change to NPH - continue 18 units at bedtime.  Follow-up on Monday.    Email to patient:    Josh Calhoun,    Thanks for emailing your blood sugars to us today!    I wanted to check on lunch: yesterday you have a note that says  after 2 hours .  Was it just at that lunch or are you checking 2 hours after each lunch as well?  I just want to make sure I am reading everything correctly.    If your after-dinner blood sugar is high tonight (above 120 mg/dL after 2 hours), please increase Novolog to 5 units (1 unit increase) before dinner tomorrow.    If your after-breakfast blood sugar is high tomorrow, please increase Novolog to 4 units (1 unit increase) before breakfast on Sunday.    No change to NPH- continue 18 units at bedtime.    If you have another high after-lunch blood sugar, please follow up on Monday (8/28), otherwise, Tuesday would be great (8/29)!    Thanks,    Any diabetes medication dose changes were made via the CDE Protocol and Collaborative Practice Agreement  with the patient's referring provider. A copy of this encounter was shared with the provider.

## 2017-08-25 NOTE — PROGRESS NOTES
Please see telephone encounter from 8/25/17 for recommendations.    Rhianna De Jesus RD, LD, CDE '

## 2017-08-25 NOTE — NURSING NOTE
"Chief Complaint   Patient presents with     Prenatal Care     baby active and moving     31w5d    Initial /64 (BP Location: Left arm, Patient Position: Chair, Cuff Size: Adult Regular)  Pulse 76  Wt 169 lb (76.7 kg)  LMP 01/15/2017 (Exact Date)  BMI 31.42 kg/m2 Estimated body mass index is 31.42 kg/(m^2) as calculated from the following:    Height as of 6/30/17: 5' 1.5\" (1.562 m).    Weight as of this encounter: 169 lb (76.7 kg).  Medication Reconciliation: complete     Nurse assisted visit.  Aracely Louis MA.      "

## 2017-08-25 NOTE — MR AVS SNAPSHOT
After Visit Summary   8/25/2017    Unique Multani    MRN: [id number]           Patient Information     Date Of Birth          1985        Visit Information        Provider Department      8/25/2017 1:00 PM NE DIABETIC ED RESOURCE [facility]        Today's Diagnoses     Insulin controlled gestational diabetes mellitus (GDM) during pregnancy    -  1       Follow-ups after your visit        Your next 10 appointments already scheduled     Aug 29, 2017 10:15 AM CDT   MFM BPP SINGLE with [id number]   [facility] Maternal Fetal Medicine Ultrasound - [facility])    [address]  [facility] [address]   [phone]            Aug 29, 2017 10:45 AM CDT   Radiology MD with RH GUZMAN LY   [facility] Maternal Fetal Medicine - [facility])    [address] [facility] [address]  [facility] [address]   [phone]           Please arrive at the time given for your first appointment. This visit is used internally to schedule the physician's time during your ultrasound.            Sep 08, 2017 11:00 AM CDT   ESTABLISHED PRENATAL with [facility] Mendez MD   [facility]    [address] [facility] [id number] [phone]            Sep 25, 2017 11:00 AM CDT   ESTABLISHED PRENATAL with Michael Mendez MD   [facility])    [address] [facility] [address]   [phone]              Who to contact     If you have questions or need follow up information about today's clinic visit or your schedule please contact [facility] directly at [phone].  Normal or non-critical lab and imaging results will be communicated to you by MyChart, letter or phone within 4 business days after the clinic has received the results. If you do not hear from us within 7 days, please contact the clinic through wavecatcht or  phone. If you have a critical or abnormal lab result, we will notify you by phone as soon as possible.  Submit refill requests through [employer] or call your pharmacy and they will forward the refill request to us. Please allow 3 business days for your refill to be completed.          Additional Information About Your Visit        ReplySendhart Information     [employer] gives you secure access to your electronic health record. If you see a primary care provider, you can also send messages to your care team and make appointments. If you have questions, please call your primary care clinic.  If you do not have a primary care provider, please call [phone] and they will assist you.        Care EveryWhere ID     This is your Care EveryWhere ID. This could be used by other organizations to access your [place] medical records  [id number]        Your Vitals Were     Last Period                   01/15/2017 (Exact Date)            Blood Pressure from Last 3 Encounters:   08/25/17 100/64   08/18/17 100/68   08/16/17 99/54    Weight from Last 3 Encounters:   08/25/17 76.7 kg (169 lb)   08/18/17 76.2 kg (168 lb)   07/31/17 74.8 kg (165 lb)              Today, you had the following     No orders found for display       Primary Care Provider Office Phone # Fax #    Michael Mendez [phone] [address] [place] [address]        Equal Access to Services     [facility]: Hadii jose daniel ku hadasho Soomaali, waaxda luqadaha, qaybta kaalmada tad, nicolás torre . So [facility] [phone].    ATENCIÓN: Si habla español, tiene a humphries disposición servicios gratuitos de asistencia lingüística. Kamila al [phone].    We comply with applicable federal civil rights laws and [place] laws. We do not discriminate on the basis of race, color, national origin, age, disability sex, sexual orientation or gender identity.            Thank you!     Thank you for choosing [employer]  [facility]  for your care. Our goal is always to provide you with excellent care. Hearing back from our patients is one way we can continue to improve our services. Please take a few minutes to complete the written survey that you may receive in the mail after your visit with us. Thank you!             Your Updated Medication List - Protect others around you: Learn how to safely use, store and throw away your medicines at www.disposemymeds.org.          This list is accurate as of: 17  3:34 PM.  Always use your most recent med list.                   Brand Name Dispense Instructions for use Diagnosis    insulin aspart 100 UNIT/ML injection    NovoLOG FLEXPEN    1500 mL    Take 3 units before breakfast and 4 units before dinner daily.    Insulin controlled gestational diabetes mellitus (GDM) in third trimester       insulin isophane human 100 UNIT/ML injection    HumuLIN N PEN    15 mL    17 units before bed (to be titrated by MD/CDE)    Diet controlled gestational diabetes mellitus (GDM), antepartum, High-risk pregnancy, young multigravida, second trimester, Previous  delivery, antepartum condition or complication       insulin pen needle 32G X 4 MM    BD DINA U/F    100 each    Use 3 daily or as directed.    High-risk pregnancy, young multigravida, second trimester       PRENATAL VIT-FE BISG-FA-DHA PO

## 2017-08-25 NOTE — MR AVS SNAPSHOT
After Visit Summary   8/25/2017    Unique Multani    MRN: [id number]           Patient Information     Date Of Birth          1985        Visit Information        Provider Department      8/25/2017 11:15 AM Michael Mendez MD [facility]        Today's Diagnoses     High-risk pregnancy, young multigravida, third trimester    -  1       Follow-ups after your visit        Follow-up notes from your care team     Return in about 1 week (around 9/1/2017).      Your next 10 appointments already scheduled     Aug 29, 2017 10:15 AM CDT   MFPRASANNA LOZANOP SINGLE with [id number]   [facility] Maternal Fetal Medicine Ultrasound - [facility])    [address]  [facility] [address] [phone]            Aug 29, 2017 10:45 AM CDT   Radiology MD with  GUZMAN LY   [facility] Maternal Fetal Medicine [facility])    [address]  [facility] [address] [phone]           Please arrive at the time given for your first appointment. This visit is used internally to schedule the physician's time during your ultrasound.            Sep 08, 2017 11:00 AM CDT   ESTABLISHED PRENATAL with Michael Mendez MD   [facility] ([facility])    [address]  [facility] [address] [phone]            Sep 25, 2017 11:00 AM CDT   ESTABLISHED PRENATAL with Michael Mendez MD   [facility] ([facility]    [address]  [facility] [id number] [phone]              Who to contact     If you have questions or need follow up information about today's clinic visit or your schedule please contact [facility] directly at [phone].  Normal or non-critical lab and imaging results will be communicated to you by MyChart, letter or phone within 4 business days after the clinic has received the results. If you do not hear  from us within 7 days, please contact the clinic through [employer] or phone. If you have a critical or abnormal lab result, we will notify you by phone as soon as possible.  Submit refill requests through [employer] or call your pharmacy and they will forward the refill request to us. Please allow 3 business days for your refill to be completed.          Additional Information About Your Visit        AdStackharDNsolution Information     [employer] gives you secure access to your electronic health record. If you see a primary care provider, you can also send messages to your care team and make appointments. If you have questions, please call your primary care clinic.  If you do not have a primary care provider, please call [phone] and they will assist you.        Care EveryWhere ID     This is your Care EveryWhere ID. This could be used by other organizations to access your [place] medical records  [id number]        Your Vitals Were     Pulse Last Period BMI (Body Mass Index)             76 01/15/2017 (Exact Date) 31.42 kg/m2          Blood Pressure from Last 3 Encounters:   08/25/17 100/64   08/18/17 100/68   08/16/17 99/54    Weight from Last 3 Encounters:   08/25/17 169 lb (76.7 kg)   08/18/17 168 lb (76.2 kg)   07/31/17 165 lb (74.8 kg)              Today, you had the following     No orders found for display       Primary Care Provider Office Phone # Fax #    Michael Mendez [phone] [address] [place] [address]        Equal Access to Services     [facility]: Hadii aad ku hadasho Soomaali, waaxda luqadaha, qaybta kaalmada adeegyada, waxay silvino torre . So [facility] [phone].    ATENCIÓN: Si habla español, tiene a humphries disposición servicios gratuitos de asistencia lingüística. Llame al [phone].    We comply with applicable federal civil rights laws and [place] laws. We do not discriminate on the basis of race, color, national origin, age, disability  sex, sexual orientation or gender identity.            Thank you!     Thank you for choosing [facility]  for your care. Our goal is always to provide you with excellent care. Hearing back from our patients is one way we can continue to improve our services. Please take a few minutes to complete the written survey that you may receive in the mail after your visit with us. Thank you!             Your Updated Medication List - Protect others around you: Learn how to safely use, store and throw away your medicines at www.disposemymeds.org.          This list is accurate as of: 17  3:38 PM.  Always use your most recent med list.                   Brand Name Dispense Instructions for use Diagnosis    insulin aspart 100 UNIT/ML injection    NovoLOG FLEXPEN    1500 mL    Take 3 units before breakfast and 4 units before dinner daily.    Insulin controlled gestational diabetes mellitus (GDM) in third trimester       insulin isophane human 100 UNIT/ML injection    HumuLIN N PEN    15 mL    17 units before bed (to be titrated by MD/CDE)    Diet controlled gestational diabetes mellitus (GDM), antepartum, High-risk pregnancy, young multigravida, second trimester, Previous  delivery, antepartum condition or complication       insulin pen needle 32G X 4 MM    BD DINA U/F    100 each    Use 3 daily or as directed.    High-risk pregnancy, young multigravida, second trimester       PRENATAL VIT-FE BISG-FA-DHA PO

## 2017-08-28 ENCOUNTER — TELEPHONE (OUTPATIENT)
Dept: EDUCATION SERVICES | Facility: CLINIC | Age: 32
End: 2017-08-28

## 2017-08-28 NOTE — TELEPHONE ENCOUNTER
Reply e-mail from patient:     I was checking before meals and randomly. But I just had breakfast after i checked my fasting number it was 97 thats fasting number this morning its been in the hundreds last 2 days but today it came down to 97. I will test before lunch and after lunch today. Maybe i've been missing snacks thats why its high in between ?---     Reply to patient:  Josh Calhoun,    Thanks for your response - my name is Mariela and I m helping with emails this afternoon.     Thank you for checking before lunch and after lunch today. Please also check before and 2 hours after dinner tonight.     Missing snacks can cause blood sugars to be higher, as your liver will send out the sugar it stores if you aren't getting enough to eat. Please be sure to follow the recommended meal plan with 3 meals and 3 snacks - a snack between each meal and at bedtime.     We'll look forward to seeing your numbers tomorrow and can make insulin adjustments based on the results you've had to help get your numbers into goal.    Thanks!    Mariela Gonzalez, MPH, RD, LD, CDE

## 2017-08-28 NOTE — TELEPHONE ENCOUNTER
Patient replied to an earlier email from diabetes education that was sent 8/25. Reply was:      I forgot to check after an hour so i did at 2 hours and it was over 120 so i made a note of it ---         Patient sent an additional email on 8/27:    From: [email or web address] [[email or web address]   Sent: Sunday, August 27, 2017 8:58 PM  To: DEPT-CLINICS-DIABETIC-EDUCATION  Subject: Secure [place] [facility]            [birth year] Unique Multani           So I've been testing my blood sugar before I eat and just randomly and my number seems to be at 120 -135 before I eat. I'm just wondering shouldn't it be 95 before I eat? And when the morning insulin wore off it pat make my lunch number spike. Does that make sense ? I will send blood sugar on Tuesday. Thanks     Reply from educators:    CORDELL Calhoun,    Good question! Now when we had the continuous glucose monitor on you, it did seem that you were returning to ~95 or less before your meals at that time, which is why we started meal time insulin instead of daytime NPH. Are you checking right before you eat, or randomly in the day? Depending on your last snack, your blood sugars will vary throughout the day, but yes, before meals they should be 95 or less.     Since you are sending in numbers on Tuesday, please check (if you can) before lunch and before dinner blood sugars in addition to your other blood sugar testing today. Keep in mind that your between meal snacks should be evenly spaced throughout the day, so if you have a snack within an hour of your next meal, your blood sugars may potentially be higher from that snack still being digested.     We ll see what those numbers look like tomorrow!    Ilene Herzog, TATIANA LD CDE

## 2017-08-29 ENCOUNTER — OFFICE VISIT (OUTPATIENT)
Dept: MATERNAL FETAL MEDICINE | Facility: CLINIC | Age: 32
End: 2017-08-29
Attending: OBSTETRICS & GYNECOLOGY
Payer: COMMERCIAL

## 2017-08-29 ENCOUNTER — HOSPITAL ENCOUNTER (OUTPATIENT)
Dept: ULTRASOUND IMAGING | Facility: CLINIC | Age: 32
Discharge: HOME | End: 2017-08-29
Attending: OBSTETRICS & GYNECOLOGY | Admitting: OBSTETRICS & GYNECOLOGY
Payer: COMMERCIAL

## 2017-08-29 ENCOUNTER — VIRTUAL VISIT (OUTPATIENT)
Dept: EDUCATION SERVICES | Facility: CLINIC | Age: 32
End: 2017-08-29
Payer: COMMERCIAL

## 2017-08-29 DIAGNOSIS — O24.414 INSULIN CONTROLLED GESTATIONAL DIABETES MELLITUS (GDM) IN THIRD TRIMESTER: Primary | ICD-10-CM

## 2017-08-29 DIAGNOSIS — O24.414 INSULIN CONTROLLED GESTATIONAL DIABETES MELLITUS (GDM) IN THIRD TRIMESTER: ICD-10-CM

## 2017-08-29 DIAGNOSIS — O26.90 PREGNANCY RELATED CONDITION, UNSPECIFIED TRIMESTER: ICD-10-CM

## 2017-08-29 PROCEDURE — 99207 ZZC NO CHARGE LOS: CPT

## 2017-08-29 PROCEDURE — 76819 FETAL BIOPHYS PROFIL W/O NST: CPT

## 2017-08-29 NOTE — MR AVS SNAPSHOT
After Visit Summary   8/29/2017    Unique Multani    MRN: [id number]           Patient Information     Date Of Birth          1985        Visit Information        Provider Department      8/29/2017 10:45 AM Evin Simpson MD [facility] Maternal Fetal Medicine [facility]        Today's Diagnoses     Insulin controlled gestational diabetes mellitus (GDM) in third trimester    -  1       Follow-ups after your visit        Your next 10 appointments already scheduled     Sep 08, 2017 11:00 AM CDT   ESTABLISHED PRENATAL with Michael Mendez MD   [facility] ([facility])    [address] [place]  [facility] [address]   [phone]            Sep 25, 2017 11:00 AM CDT   ESTABLISHED PRENATAL with Michael Mendez MD   [facility] ([facility])    [address] [place]  [facility] [address]   [phone]              Future tests that were ordered for you today     Open Future Orders        Priority Expected Expires Ordered    [facility] US Comprehensive Single F/U Routine 9/15/2017 8/29/2018 8/29/2017    MFM BPP Single Routine 9/1/2017 6/29/2018 8/29/2017    MFM BPP Single Routine 9/12/2017 6/29/2018 8/29/2017    MFM BPP Single Routine 9/8/2017 6/29/2018 8/29/2017    MFM BPP Single Routine 9/5/2017 6/29/2018 8/29/2017            Who to contact     If you have questions or need follow up information about today's clinic visit or your schedule please contact [facility] FETAL MEDIC[facility] directly at [phone].  Normal or non-critical lab and imaging results will be communicated to you by MyChart, letter or phone within 4 business days after the clinic has received the results. If you do not hear from us within 7 days, please contact the clinic through MyChart or phone. If you have a critical or abnormal lab result, we will notify you by phone as soon as possible.  Submit refill requests through [employer] or call your  pharmacy and they will forward the refill request to us. Please allow 3 business days for your refill to be completed.          Additional Information About Your Visit        SundaySkyhart Information     Sunday[employer] gives you secure access to your electronic health record. If you see a primary care provider, you can also send messages to your care team and make appointments. If you have questions, please call your primary care clinic.  If you do not have a primary care provider, please call [phone] and they will assist you.        Care EveryWhere ID     This is your Care EveryWhere ID. This could be used by other organizations to access your [place] medical records  [id number]        Your Vitals Were     Last Period                   01/15/2017 (Exact Date)            Blood Pressure from Last 3 Encounters:   08/25/17 100/64   08/18/17 100/68   08/16/17 99/54    Weight from Last 3 Encounters:   08/25/17 76.7 kg (169 lb)   08/18/17 76.2 kg (168 lb)   07/31/17 74.8 kg (165 lb)               Primary Care Provider Office Phone # Fax #    Michael Mendez [phone] [address] [place] [address]        Equal Access to Services     [facility]: Hadii aad milton hadasho Somonikaali, waaxda luqadaha, qaybta kaalmada adeegyada, nicolás hester. So [facility] [phone].    ATENCIÓN: Si habla español, tiene a humphries disposición servicios gratuitos de asistencia lingüística. [facility] [phone].    We comply with applicable federal civil rights laws and [place] laws. We do not discriminate on the basis of race, color, national origin, age, disability sex, sexual orientation or gender identity.            Thank you!     Thank you for choosing [facility]  for your care. Our goal is always to provide you with excellent care. Hearing back from our patients is one way we can continue to improve our services. Please take a few minutes to complete  the written survey that you may receive in the mail after your visit with us. Thank you!             Your Updated Medication List - Protect others around you: Learn how to safely use, store and throw away your medicines at www.disposemymeds.org.          This list is accurate as of: 17 11:01 AM.  Always use your most recent med list.                   Brand Name Dispense Instructions for use Diagnosis    insulin aspart 100 UNIT/ML injection    NovoLOG FLEXPEN    1500 mL    Take 3 units before breakfast and 4 units before dinner daily.    Insulin controlled gestational diabetes mellitus (GDM) in third trimester       insulin isophane human 100 UNIT/ML injection    HumuLIN N PEN    15 mL    17 units before bed (to be titrated by MD/CDE)    Diet controlled gestational diabetes mellitus (GDM), antepartum, High-risk pregnancy, young multigravida, second trimester, Previous  delivery, antepartum condition or complication       insulin pen needle 32G X 4 MM    BD DINA U/F    100 each    Use 3 daily or as directed.    High-risk pregnancy, young multigravida, second trimester       PRENATAL VIT-FE BISG-FA-DHA PO

## 2017-08-29 NOTE — MR AVS SNAPSHOT
After Visit Summary   8/29/2017    Unique Multani    MRN: [id number]           Patient Information     Date Of Birth          1985        Visit Information        Provider Department      8/29/2017 9:45 AM [facility]ETIC ED RESOURCE [facility]        Today's Diagnoses     Insulin controlled gestational diabetes mellitus (GDM) in third trimester           Follow-ups after your visit        Your next 10 appointments already scheduled     Sep 01, 2017  3:30 PM CDT   MFM BPP SINGLE with [id number]   [facility])    [address]  [facility] [address]   [phone]            Sep 01, 2017  4:00 PM CDT   Radiology MD with RAE HINDS MD   [facility] Maternal Fetal Medicine [facility])    [address]  [facility] [address]   [phone]           Please arrive at the time given for your first appointment. This visit is used internally to schedule the physician's time during your ultrasound.            Sep 05, 2017 10:15 AM CDT   MFM BPP SINGLE with [id number]   [facility] ([facility])    [address]  [facility] [address]   [phone]            Sep 05, 2017 10:45 AM CDT   Radiology MD with RAE HINDS MD   [facility] Maternal Fetal Medicine - [facility])    [address]  [facility] [address]   [phone]           Please arrive at the time given for your first appointment. This visit is used internally to schedule the physician's time during your ultrasound.            Sep 08, 2017 10:15 AM CDT   MFM BPP SINGLE with [id number]   [facility] ([facility])    [address]  [facility] [address]   [phone]            Sep 08, 2017 10:45 AM CDT   Radiology MD with RAE HINDS MD   [facility]  Maternal Fetal Medicine - [facility] ([facility])    [address]  [facility] [address]   [phone]           Please arrive at the time given for your first appointment. This visit is used internally to schedule the physician's time during your ultrasound.            Sep 08, 2017 11:00 AM CDT   ESTABLISHED PRENATAL with Michael Mendez MD   [facility] ([facility])    [address] [place]  [facility] [address]   [phone]            Sep 12, 2017  3:30 PM CDT   MFM BPP SINGLE with [id number]   [facility] Maternal Fetal Medicine Ultrasound [facility])    [address]  [facility] [address]   [phone]            Sep 12, 2017  4:00 PM CDT   Radiology MD with RH MFM MD   [facility] Maternal Fetal Medicine [facility])    [address]  [facility] [address]   [phone]           Please arrive at the time given for your first appointment. This visit is used internally to schedule the physician's time during your ultrasound.            Sep 15, 2017  9:30 AM CDT   MFM US COMPRE SINGLE F/U with [id number]   [facility] Maternal Fetal Medicine Ultrasound [facility])    [address]  [facility] [address]   [phone]           Wear comfortable clothes and leave your valuables at home.              Future tests that were ordered for you today     Open Future Orders        Priority Expected Expires Ordered    MFM US Comprehensive Single F/U Routine 9/15/2017 8/29/2018 8/29/2017    MFM BPP Single Routine 9/1/2017 6/29/2018 8/29/2017    MFM BPP Single Routine 9/12/2017 6/29/2018 8/29/2017    MFM BPP Single Routine 9/8/2017 6/29/2018 8/29/2017    MFM BPP Single Routine 9/5/2017 6/29/2018 8/29/2017            Who to contact     If you have questions or need follow up information about today's clinic visit or your schedule  please contact [facility] directly at [phone].  Normal or non-critical lab and imaging results will be communicated to you by MyChart, letter or phone within 4 business days after the clinic has received the results. If you do not hear from us within 7 days, please contact the clinic through SmartSky Networkshart or phone. If you have a critical or abnormal lab result, we will notify you by phone as soon as possible.  Submit refill requests through [employer] or call your pharmacy and they will forward the refill request to us. Please allow 3 business days for your refill to be completed.          Additional Information About Your Visit        SmartSky NetworksharManomasa Information     [employer] gives you secure access to your electronic health record. If you see a primary care provider, you can also send messages to your care team and make appointments. If you have questions, please call your primary care clinic.  If you do not have a primary care provider, please call [phone] and they will assist you.        Care EveryWhere ID     This is your Care EveryWhere ID. This could be used by other organizations to access your [place] medical records  [id number]        Your Vitals Were     Last Period                   01/15/2017 (Exact Date)            Blood Pressure from Last 3 Encounters:   08/25/17 100/64   08/18/17 100/68   08/16/17 99/54    Weight from Last 3 Encounters:   08/25/17 169 lb (76.7 kg)   08/18/17 168 lb (76.2 kg)   07/31/17 165 lb (74.8 kg)              Today, you had the following     No orders found for display         Today's Medication Changes          These changes are accurate as of: 8/29/17 11:56 AM.  If you have any questions, ask your nurse or doctor.               These medicines have changed or have updated prescriptions.        Dose/Directions    insulin aspart 100 UNIT/ML injection   Commonly known as:  NovoLOG FLEXPEN   This may have changed:  additional instructions   Used for:  Insulin controlled  gestational diabetes mellitus (GDM) in third trimester        Take 4 units before breakfast, 2 units before lunch, and 6 units before dinner daily.   Quantity:  1500 mL   Refills:  3            Where to get your medicines      Some of these will need a paper prescription and others can be bought over the counter.  Ask your nurse if you have questions.     You don't need a prescription for these medications     insulin aspart 100 UNIT/ML injection                Primary Care Provider Office Phone # Fax #    Michael Mendez [phone] [address] [place] [address]        Equal Access to Services     [facility]: Hadii aad ku hadasho Soomaali, waaxda luqadaha, qaybta kaalmada adeegyada, waxreyes torre . So [facility] [phone].    ATENCIÓN: Si habla español, tiene a humphries disposición servicios gratuitos de asistencia lingüística. [facility] [phone].    We comply with applicable federal civil rights laws and [place] laws. We do not discriminate on the basis of race, color, national origin, age, disability sex, sexual orientation or gender identity.            Thank you!     [facility]. Our goal is always to provide you with excellent care. Hearing back from our patients is one way we can continue to improve our services. Please take a few minutes to complete the written survey that you may receive in the mail after your visit with us. Thank you!             Your Updated Medication List - Protect others around you: Learn how to safely use, store and throw away your medicines at www.disposemymeds.org.          This list is accurate as of: 8/29/17 11:56 AM.  Always use your most recent med list.                   Brand Name Dispense Instructions for use Diagnosis    insulin aspart 100 UNIT/ML injection    NovoLOG FLEXPEN    1500 mL    Take 4 units before breakfast, 2 units before lunch, and 6 units before  dinner daily.    Insulin controlled gestational diabetes mellitus (GDM) in third trimester       insulin isophane human 100 UNIT/ML injection    HumuLIN N PEN    15 mL    17 units before bed (to be titrated by MD/CDE)    Diet controlled gestational diabetes mellitus (GDM), antepartum, High-risk pregnancy, young multigravida, second trimester, Previous  delivery, antepartum condition or complication       insulin pen needle 32G X 4 MM    BD DINA U/F    100 each    Use 3 daily or as directed.    High-risk pregnancy, young multigravida, second trimester       PRENATAL VIT-FE BISG-FA-DHA PO

## 2017-08-29 NOTE — PROGRESS NOTES
Gestational Diabetes Follow-up    Subjective/Objective:    Unique Multani sent in blood glucose log for review. Last date of communication was: 8/29/17, last insulin adjustment 8/25/17.    Gestational diabetes is being managed with: NPH 0-0-0-18, Novolog 4-0-5-0      Estimated Date of Delivery: Oct 22, 2017    BG/Food Log:       Assessment:  Starting 8/25/17    Ketones: checks weekly.   Fasting blood glucoses: 40% in target.  After breakfast: 75% in target.  After lunch: 25% in target.  After dinner: 0% in target.    Plan/Response:  Recommend add 2 units novolog with lunch, and increase dinner novolog to 6 units.  Follow-up in 3-4 days.    Email to patient:  Josh Calhoun,    Thank you for sending in your records with the additional notes regarding times you checked your blood sugars.  It was also helpful to have the 2 pre meal checks for lunch and dinner.  Please continue checking before lunch and dinner as well as 2 hours after.    Your after lunch and dinner readings are above goal.  I would like you to start taking 2 units of novolog with lunch and increase your dinner novolog to 6 units.    Please send in your readings again on Friday so we can see how the additional meal time insulin is working.       Thank you!    Violette العلي MS, RD, LD, CDE      Violette العلي MS, RD, LD, CDE      Any diabetes medication dose changes were made via the CDE Protocol and Collaborative Practice Agreement with the patient's referring provider. A copy of this encounter was shared with the provider.

## 2017-08-29 NOTE — PROGRESS NOTES
"Please see \"Imaging\" tab under \"Chart Review\" for details of today's US at the [facility].    Evin Simpson MD  Maternal-Fetal Medicine    "

## 2017-09-01 ENCOUNTER — HOSPITAL ENCOUNTER (OUTPATIENT)
Dept: ULTRASOUND IMAGING | Facility: CLINIC | Age: 32
Discharge: HOME | End: 2017-09-01
Attending: OBSTETRICS & GYNECOLOGY | Admitting: OBSTETRICS & GYNECOLOGY
Payer: COMMERCIAL

## 2017-09-01 ENCOUNTER — OFFICE VISIT (OUTPATIENT)
Dept: MATERNAL FETAL MEDICINE | Facility: CLINIC | Age: 32
End: 2017-09-01
Attending: OBSTETRICS & GYNECOLOGY
Payer: COMMERCIAL

## 2017-09-01 DIAGNOSIS — O24.414 INSULIN CONTROLLED GESTATIONAL DIABETES MELLITUS (GDM) IN THIRD TRIMESTER: Primary | ICD-10-CM

## 2017-09-01 DIAGNOSIS — O24.414 INSULIN CONTROLLED GESTATIONAL DIABETES MELLITUS (GDM) IN THIRD TRIMESTER: ICD-10-CM

## 2017-09-01 PROCEDURE — 76819 FETAL BIOPHYS PROFIL W/O NST: CPT

## 2017-09-01 NOTE — PROGRESS NOTES
"Please see \"Imaging\" tab under \"Chart Review\" for details of today's US.      Alyssa De La Cruz, DO  Maternal-Fetal Medicine        "

## 2017-09-01 NOTE — MR AVS SNAPSHOT
After Visit Summary   9/1/2017    Unique Multani    MRN: [id number]           Patient Information     Date Of Birth          1985        Visit Information        Provider Department      9/1/2017 4:00 PM Alyssa De La Cruz,  [facility] Maternal Fetal Medicine [facility]        Today's Diagnoses     Insulin controlled gestational diabetes mellitus (GDM) in third trimester    -  1       Follow-ups after your visit        Your next 10 appointments already scheduled     Sep 05, 2017 10:15 AM CDT   MFM BPP SINGLE with [id number]   [facility])    [address]  [facility] [address]   [phone]            Sep 05, 2017 10:45 AM CDT   Radiology MD with RH GUZMAN LY   [facility] Maternal Fetal Medicine [facility] ([facility])    [address]  [facility] [address]   [phone]           Please arrive at the time given for your first appointment. This visit is used internally to schedule the physician's time during your ultrasound.            Sep 08, 2017 10:15 AM CDT   MFM BPP SINGLE with [id number]   [facility] Maternal Fetal Medicine Ultrasound - [facility] ([facility])    [address]  [facility] [address]   [phone]            Sep 08, 2017 10:45 AM CDT   Radiology MD with RH GUZMAN LY   [facility] Maternal Fetal Medicine [facility] ([facility])    [address]  [facility] [address]   [phone]           Please arrive at the time given for your first appointment. This visit is used internally to schedule the physician's time during your ultrasound.            Sep 08, 2017 11:00 AM CDT   ESTABLISHED PRENATAL with Michael Mendez MD   [facility]    [address]  [facility] [address]   [phone]            Sep 12, 2017  3:30 PM CDT   MFM BPP SINGLE with [id number]    [facility] Maternal Fetal Medicine Ultrasound [facility] ([facility])    [address] [facility] [address]  [facility] [address]   [phone]            Sep 12, 2017  4:00 PM CDT   Radiology MD with  GUZMAN LY   [facility] Maternal Fetal [facility] ([facility])    [address] [facility] [address]  [facility] [address]   [phone]           Please arrive at the time given for your first appointment. This visit is used internally to schedule the physician's time during your ultrasound.            Sep 15, 2017  9:30 AM CDT   MFM US COMPRE SINGLE F/U with [id number]   [facility] Maternal Fetal Medicine Ultrasound [facility] ([facility])    [address]  [facility] [address]   [phone]           Wear comfortable clothes and leave your valuables at home.            Sep 15, 2017 10:00 AM CDT   Radiology MD with  GUZMAN LY   [facility] ([facility])    [address]  [facility] [address] [phone]           Please arrive at the time given for your first appointment. This visit is used internally to schedule the physician's time during your ultrasound.            Sep 25, 2017 11:00 AM CDT   ESTABLISHED PRENATAL with Michael Mendez MD   [facility])    [address] [facility] [address]   [phone]              Who to contact     If you have questions or need follow up information about today's clinic visit or your schedule please contact [facility]ERNAL FETAL [facility] directly at [phone].  Normal or non-critical lab and imaging results will be communicated to you by MyChart, letter or phone within 4 business days after the clinic has received the results. If you do not hear from us within 7 days, please contact the clinic through MyChart or phone. If you have a critical or abnormal lab result, we will notify  you by phone as soon as possible.  Submit refill requests through [employer] or call your pharmacy and they will forward the refill request to us. Please allow 3 business days for your refill to be completed.          Additional Information About Your Visit        Affaredelgiornohart Information     [employer] gives you secure access to your electronic health record. If you see a primary care provider, you can also send messages to your care team and make appointments. If you have questions, please call your primary care clinic.  If you do not have a primary care provider, please call [phone] and they will assist you.        Care EveryWhere ID     This is your Care EveryWhere ID. This could be used by other organizations to access your [place] medical records  [id number]        Your Vitals Were     Last Period                   01/15/2017 (Exact Date)            Blood Pressure from Last 3 Encounters:   08/25/17 100/64   08/18/17 100/68   08/16/17 99/54    Weight from Last 3 Encounters:   08/25/17 76.7 kg (169 lb)   08/18/17 76.2 kg (168 lb)   07/31/17 74.8 kg (165 lb)              Today, you had the following     No orders found for display       Primary Care Provider Office Phone # Fax #    Michael Mendez [phone] [address] [place] [address]        Equal Access to Services     [facility]: Hadii aad ku hadasho Soomaali, waaxda luqadaha, qaybta kaalmada adeegyada, nicolás dubois hayhernandez torre . So [facility] [phone].    ATENCIÓN: Si habla español, tiene a humphries disposición servicios gratuitos de asistencia lingüística. Llame al [phone].    We comply with applicable federal civil rights laws and [place] laws. We do not discriminate on the basis of race, color, national origin, age, disability sex, sexual orientation or gender identity.            Thank you!     Thank you for choosing [facility]  for your care. Our goal is always to  provide you with excellent care. Hearing back from our patients is one way we can continue to improve our services. Please take a few minutes to complete the written survey that you may receive in the mail after your visit with us. Thank you!             Your Updated Medication List - Protect others around you: Learn how to safely use, store and throw away your medicines at www.disposemymeds.org.          This list is accurate as of: 17  4:13 PM.  Always use your most recent med list.                   Brand Name Dispense Instructions for use Diagnosis    insulin aspart 100 UNIT/ML injection    NovoLOG FLEXPEN    1500 mL    Take 4 units before breakfast, 2 units before lunch, and 6 units before dinner daily.    Insulin controlled gestational diabetes mellitus (GDM) in third trimester       insulin isophane human 100 UNIT/ML injection    HumuLIN N PEN    15 mL    17 units before bed (to be titrated by MD/CDE)    Diet controlled gestational diabetes mellitus (GDM), antepartum, High-risk pregnancy, young multigravida, second trimester, Previous  delivery, antepartum condition or complication       insulin pen needle 32G X 4 MM    BD DINA U/F    100 each    Use 3 daily or as directed.    High-risk pregnancy, young multigravida, second trimester       PRENATAL VIT-FE BISG-FA-DHA PO

## 2017-09-05 ENCOUNTER — VIRTUAL VISIT (OUTPATIENT)
Dept: EDUCATION SERVICES | Facility: CLINIC | Age: 32
End: 2017-09-05

## 2017-09-05 ENCOUNTER — HOSPITAL ENCOUNTER (OUTPATIENT)
Dept: ULTRASOUND IMAGING | Facility: CLINIC | Age: 32
Discharge: HOME | End: 2017-09-05
Attending: OBSTETRICS & GYNECOLOGY | Admitting: OBSTETRICS & GYNECOLOGY
Payer: COMMERCIAL

## 2017-09-05 ENCOUNTER — TELEPHONE (OUTPATIENT)
Dept: EDUCATION SERVICES | Facility: CLINIC | Age: 32
End: 2017-09-05

## 2017-09-05 ENCOUNTER — OFFICE VISIT (OUTPATIENT)
Dept: MATERNAL FETAL MEDICINE | Facility: CLINIC | Age: 32
End: 2017-09-05
Attending: OBSTETRICS & GYNECOLOGY
Payer: COMMERCIAL

## 2017-09-05 DIAGNOSIS — O24.414 INSULIN CONTROLLED GESTATIONAL DIABETES MELLITUS (GDM) IN THIRD TRIMESTER: Primary | ICD-10-CM

## 2017-09-05 DIAGNOSIS — O24.414 INSULIN CONTROLLED GESTATIONAL DIABETES MELLITUS (GDM) IN THIRD TRIMESTER: ICD-10-CM

## 2017-09-05 PROCEDURE — 76819 FETAL BIOPHYS PROFIL W/O NST: CPT

## 2017-09-05 NOTE — MR AVS SNAPSHOT
After Visit Summary   9/5/2017    Unique Multani    MRN: [id number]           Patient Information     Date Of Birth          1985        Visit Information        Provider Department      9/5/2017 10:45 AM Puma Ibarra MD [facility] Maternal Fetal Medicine [facility]        Today's Diagnoses     Insulin controlled gestational diabetes mellitus (GDM) in third trimester    -  1       Follow-ups after your visit        Your next 10 appointments already scheduled     Sep 05, 2017 10:45 AM CDT   Radiology MD with Puma Ibarra MD   [facility] Fetal [facility])    [address] [place] [address]   [phone]           Please arrive at the time given for your first appointment. This visit is used internally to schedule the physician's time during your ultrasound.            Sep 08, 2017 10:15 AM CDT   MFM BPP SINGLE with [id number]   [facility] Maternal Fetal Medicine Ultrasound - [facility])    [address] [place] [address]   [phone]            Sep 08, 2017 10:45 AM CDT   Radiology MD with  MFPRASANNA LY   [facility] Maternal Fetal Medicine [facility] ([facility])    [address] [place] [address]   [phone]           Please arrive at the time given for your first appointment. This visit is used internally to schedule the physician's time during your ultrasound.            Sep 08, 2017 11:00 AM CDT   ESTABLISHED PRENATAL with Michael Mendez MD   [facility]    [address] [place]  [facility] [address]   [phone]            Sep 12, 2017  3:30 PM CDT   MFM BPP SINGLE with [id number]   [facility] Maternal Fetal Medicine Ultrasound - [facility])    [address] [place] [address]   [phone]            Sep 12, 2017  4:00 PM CDT   Radiology MD with  RAE HINDS MD   [facility] Maternal Fetal Medicine [facility] ([facility])    [address]  [facility] [id number] [phone]           Please arrive at the time given for your first appointment. This visit is used internally to schedule the physician's time during your ultrasound.            Sep 15, 2017  9:30 AM CDT   MFM US COMPRE SINGLE F/U with [id number]   [facility] Fetal [facility] Ultrasound - [facility] ([facility])    [address]  [facility] [id number] [phone]           Wear comfortable clothes and leave your valuables at home.            Sep 15, 2017 10:00 AM CDT   Radiology MD with  GUZMAN LY   [facility] ([facility])    [address]  [facility] [id number] [phone]           Please arrive at the time given for your first appointment. This visit is used internally to schedule the physician's time during your ultrasound.            Sep 25, 2017 11:00 AM CDT   ESTABLISHED PRENATAL with [facility] Mendez MD   [facility]    [address] [facility] [address] [phone]              Who to contact     If you have questions or need follow up information about today's clinic visit or your schedule please contact [facility]ERNAL [facility]ETAL [facility] directly at [phone].  Normal or non-critical lab and imaging results will be communicated to you by MyChart, letter or phone within 4 business days after the clinic has received the results. If you do not hear from us within 7 days, please contact the clinic through MyChart or phone. If you have a critical or abnormal lab result, we will notify you by phone as soon as possible.  Submit refill requests through [employer] or call your pharmacy and they will forward the refill request to us. Please allow 3 business days for your refill to be completed.           Additional Information About Your Visit        [employer] Information     [employer] gives you secure access to your electronic health record. If you see a primary care provider, you can also send messages to your care team and make appointments. If you have questions, please call your primary care clinic.  If you do not have a primary care provider, please call [phone] and they will assist you.        Care EveryWhere ID     This is your Care EveryWhere ID. This could be used by other organizations to access your [place] medical records  [id number]        Your Vitals Were     Last Period                   01/15/2017 (Exact Date)            Blood Pressure from Last 3 Encounters:   08/25/17 100/64   08/18/17 100/68   08/16/17 99/54    Weight from Last 3 Encounters:   08/25/17 76.7 kg (169 lb)   08/18/17 76.2 kg (168 lb)   07/31/17 74.8 kg (165 lb)              Today, you had the following     No orders found for display         Today's Medication Changes          These changes are accurate as of: 9/5/17 10:35 AM.  If you have any questions, ask your nurse or doctor.               These medicines have changed or have updated prescriptions.        Dose/Directions    insulin aspart 100 UNIT/ML injection   Commonly known as:  NovoLOG FLEXPEN   This may have changed:  additional instructions   Used for:  Insulin controlled gestational diabetes mellitus (GDM) in third trimester   Changed by:  Violette العلي RD        Take 4 units before breakfast, 3 units before lunch, and 8 units before dinner daily.   Quantity:  1500 mL   Refills:  3            Where to get your medicines      Some of these will need a paper prescription and others can be bought over the counter.  Ask your nurse if you have questions.     You don't need a prescription for these medications     insulin aspart 100 UNIT/ML injection                Primary Care Provider Office Phone # Fax #    Michael Mendez [phone] [place] [address]        Equal Access to Services     NIGEL BELTRAN : Hadii aad ku hadnathaliazeeshan Reed, waaxda sebas, qaybta jaren mishra, nicolás hester. So [facility] [phone].    ATENCIÓN: Si habla español, tiene a humphries disposición servicios gratuitos de asistencia lingüística. Llame al [phone].    We comply with applicable federal civil rights laws and [place] laws. We do not discriminate on the basis of race, color, national origin, age, disability sex, sexual orientation or gender identity.            Thank you!     Thank you for choosing [facility]  for your care. Our goal is always to provide you with excellent care. Hearing back from our patients is one way we can continue to improve our services. Please take a few minutes to complete the written survey that you may receive in the mail after your visit with us. Thank you!             Your Updated Medication List - Protect others around you: Learn how to safely use, store and throw away your medicines at www.disposemymeds.org.          This list is accurate as of: 17 10:35 AM.  Always use your most recent med list.                   Brand Name Dispense Instructions for use Diagnosis    insulin aspart 100 UNIT/ML injection    NovoLOG FLEXPEN    1500 mL    Take 4 units before breakfast, 3 units before lunch, and 8 units before dinner daily.    Insulin controlled gestational diabetes mellitus (GDM) in third trimester       insulin isophane human 100 UNIT/ML injection    HumuLIN N PEN    15 mL    17 units before bed (to be titrated by MD/CDE)    Diet controlled gestational diabetes mellitus (GDM), antepartum, High-risk pregnancy, young multigravida, second trimester, Previous  delivery, antepartum condition or complication       insulin pen needle 32G X 4 MM    BD DINA U/F    100 each    Use 3 daily or as directed.    High-risk pregnancy, young multigravida, second  trimester       PRENATAL VIT-FE BISG-FA-DHA PO

## 2017-09-05 NOTE — TELEPHONE ENCOUNTER
Gestational Diabetes Follow-up    Subjective/Objective:    Unique Multani sent in blood glucose log for review. Last date of communication was: 8/29/17.    Gestational diabetes is being managed with Humulin/Novolin NPH Insulin 0-0-0-18 units and NovoLog Insulin 4-2-6-0 units.    Estimated Date of Delivery: Oct 22, 2017    BG/Food Log:       Assessment:  Patient checked 2 premeal readings, both were within target    Ketones:not checking.   Fasting blood glucoses: 100% in target.  After breakfast: 50% in target.  After lunch: 32% in target.  After dinner: 16% in target.    Plan/Response:  Recommend increase to insulin - Novolog 4-2-6-0--> 4-3-8-0.    Check pre lunch and dinner readings.  Send in 3 days of readings by 9/8/17.    Josh Calhoun,    Thank you for sending in your readings.  It looks like adding the lunchtime novolog helped!  You are still high after your lunch and dinner meals, so I would like you to increase your Novolog insulin at lunch to 3 units and dinner to 8 units.  Please send in your readings Thursday night/Friday morning so we have 3 full days of readings.  Also would you be able to start checking before your lunch and dinner meals too?  I want to make sure you are coming down to 95 or less before meals too.    You also made some great notes about your days with food and checking times, but unfortunately the picture is cut off.  When you send in the pictures could you try to get the notes section in the picture?  You can send 2 pictures if it is easier.    Thanks and hope you had a great holiday weekend!    Violette العلي MS, RD, LD, CDE    Violette العلي MS, RD, LD, CDE    Any diabetes medication dose changes were made via the CDE Protocol and Collaborative Practice Agreement with the patient's referring provider. A copy of this encounter was shared with the provider.

## 2017-09-05 NOTE — PROGRESS NOTES
Please refer to ultrasound report under 'Imaging' Studies of 'Chart Review' tabs.    Puma Ibarra M.D.

## 2017-09-05 NOTE — MR AVS SNAPSHOT
After Visit Summary   9/5/2017    Unique Multani    MRN: [id number]           Patient Information     Date Of Birth          1985        Visit Information        Provider Department      9/5/2017 11:00 AM [facility] DIABETIC ED RESOURCE [facility]        Today's Diagnoses     Insulin controlled gestational diabetes mellitus (GDM) in third trimester    -  1       Follow-ups after your visit        Your next 10 appointments already scheduled     Sep 08, 2017 10:15 AM CDT   MFM BPP SINGLE with [id number]   [facility] Maternal Fetal Medicine Ultrasound - [facility])    [address]  [facility] [address]   [phone]            Sep 08, 2017 10:45 AM CDT   Radiology MD with RAE HINDS MD   [facility] Maternal Fetal Medicine [facility])    [address]  [facility] [address]   [phone]           Please arrive at the time given for your first appointment. This visit is used internally to schedule the physician's time during your ultrasound.            Sep 08, 2017 11:00 AM CDT   ESTABLISHED PRENATAL with Michael Mendez MD   [facility]    [address] [place]  [facility] [address]   [phone]            Sep 12, 2017  3:30 PM CDT   MFM BPP SINGLE with [id number]   [facility] Maternal Fetal Medicine Ultrasound - [facility])    [address]  [facility] [address]   [phone]            Sep 12, 2017  4:00 PM CDT   Radiology MD with RAE HINDS MD   [facility] Maternal Fetal Medicine [facility] ([facility])    [address]  [facility] [address]   [phone]           Please arrive at the time given for your first appointment. This visit is used internally to schedule the physician's time during your ultrasound.            Sep 15, 2017  9:30 AM CDT   MFM US COMPRE SINGLE F/U with  RHMFMUSR3   [facility] Maternal Fetal Medicine Ultrasound - [facility] ([facility])    [address]  [facility] [address] [phone]           Wear comfortable clothes and leave your valuables at home.            Sep 15, 2017 10:00 AM CDT   Radiology MD with  GUZMAN LY   [facility] Maternal Fetal Medicine [facility])    [address]  [facility] [address] [phone]           Please arrive at the time given for your first appointment. This visit is used internally to schedule the physician's time during your ultrasound.            Sep 25, 2017 11:00 AM CDT   ESTABLISHED PRENATAL with Michael Mendez MD   [facility] ([facility])    [address]  [facility] [id number] [phone]              Who to contact     If you have questions or need follow up information about today's clinic visit or your schedule please contact [facility] directly at [phone].  Normal or non-critical lab and imaging results will be communicated to you by [employer], letter or phone within 4 business days after the clinic has received the results. If you do not hear from us within 7 days, please contact the clinic through [employer] or phone. If you have a critical or abnormal lab result, we will notify you by phone as soon as possible.  Submit refill requests through [employer] or call your pharmacy and they will forward the refill request to us. Please allow 3 business days for your refill to be completed.          Additional Information About Your Visit        [employer] Information     [employer] gives you secure access to your electronic health record. If you see a primary care provider, you can also send messages to your care team and make appointments. If you have questions, please call your primary care clinic.  If you do not have a primary care provider, please call [phone] and they will  assist you.        Care EveryWhere ID     This is your Care EveryWhere ID. This could be used by other organizations to access your [place] medical records  [id number]        Your Vitals Were     Last Period                   01/15/2017 (Exact Date)            Blood Pressure from Last 3 Encounters:   08/25/17 100/64   08/18/17 100/68   08/16/17 99/54    Weight from Last 3 Encounters:   08/25/17 169 lb (76.7 kg)   08/18/17 168 lb (76.2 kg)   07/31/17 165 lb (74.8 kg)              Today, you had the following     No orders found for display         Today's Medication Changes          These changes are accurate as of: 9/5/17  3:18 PM.  If you have any questions, ask your nurse or doctor.               These medicines have changed or have updated prescriptions.        Dose/Directions    insulin aspart 100 UNIT/ML injection   Commonly known as:  NovoLOG FLEXPEN   This may have changed:  additional instructions   Used for:  Insulin controlled gestational diabetes mellitus (GDM) in third trimester   Changed by:  Violette العلي RD        Take 4 units before breakfast, 3 units before lunch, and 8 units before dinner daily.   Quantity:  1500 mL   Refills:  3            Where to get your medicines      Some of these will need a paper prescription and others can be bought over the counter.  Ask your nurse if you have questions.     You don't need a prescription for these medications     insulin aspart 100 UNIT/ML injection                Primary Care Provider Office Phone # Fax #    Michael Mendez [phone]       [facility] [place] [address]        Equal Access to Services     [facility] AH: Hadii jose daniel pinzono Soidris, waaxda luqadaha, qaybta kaalmada tad, nicolás hester. So [facility] [phone].    ATENCIÓN: Si habla español, tiene a humphries disposición servicios gratuitos de asistencia lingüística. Llame al [phone].    We comply with applicable  federal civil rights laws and [place] laws. We do not discriminate on the basis of race, color, national origin, age, disability sex, sexual orientation or gender identity.            Thank you!     Thank you for choosing [facility]  for your care. Our goal is always to provide you with excellent care. Hearing back from our patients is one way we can continue to improve our services. Please take a few minutes to complete the written survey that you may receive in the mail after your visit with us. Thank you!             Your Updated Medication List - Protect others around you: Learn how to safely use, store and throw away your medicines at www.disposemymeds.org.          This list is accurate as of: 17  3:18 PM.  Always use your most recent med list.                   Brand Name Dispense Instructions for use Diagnosis    insulin aspart 100 UNIT/ML injection    NovoLOG FLEXPEN    1500 mL    Take 4 units before breakfast, 3 units before lunch, and 8 units before dinner daily.    Insulin controlled gestational diabetes mellitus (GDM) in third trimester       insulin isophane human 100 UNIT/ML injection    HumuLIN N PEN    15 mL    17 units before bed (to be titrated by MD/CDE)    Diet controlled gestational diabetes mellitus (GDM), antepartum, High-risk pregnancy, young multigravida, second trimester, Previous  delivery, antepartum condition or complication       insulin pen needle 32G X 4 MM    BD DINA U/F    100 each    Use 3 daily or as directed.    High-risk pregnancy, young multigravida, second trimester       PRENATAL VIT-FE BISG-FA-DHA PO

## 2017-09-06 ENCOUNTER — TELEPHONE (OUTPATIENT)
Dept: EDUCATION SERVICES | Facility: CLINIC | Age: 32
End: 2017-09-06

## 2017-09-06 NOTE — TELEPHONE ENCOUNTER
Unique has emailed a question, last CDE contact was 9/5 for insulin adjustment.     Her mealtime insulin is currently Novolog 4-3-8-0 & NPH 0-0-0-18      Incoming email:   Unique Multani [birth year]      My numbers was high all day yesterday and today after breakfast. I was also told to increase lunch and dinner from the numbers from days before but after increasing yesterday it was all night 140s after testing 2 hours. The only time my number would go low is when I didn't habe any snacks in between which was before lunch. Should I wait till Friday to see and just take the same dose for right now and tomorrow ? My number yesterday was 101 for breakfast. 92 before-146 after lunch and 112 before-154 after dinner. This morning fasting number was 98 then 145 after breakfast. All these numbers are testing 2 hours after eating    Reply:   Josh Calhoun,     Thank you so much for letting us know what is going on. I m sorry to hear numbers are not coming down yet as hoped!   I know it can be frustrating. Especially when you are doing so well with taking insulin, meal plan, & sending in numbers!     Since the insulin increase was just made yesterday, we really need to give it a little longer to just see how your body adjusts at each meal.   It normally takes 3 days to fully see what difference it makes. I know it s hard to wait.   Please keep taking the same dose for now.   Just to confirm, I think you are taking Novolog 4 at breakfast, 3 at lunch, 8 at dinner and NPH 18 units at bedtime.   If that s not right, please let me know.    If you can send in your log book picture again Friday morning after breakfast, I will take a look and make any changes you need for the weekend!   That will show part of Tuesday, all day Wednesday and Thursday so we can see best what your body is asking for.     You are doing a great job! I will be in touch Friday when I see your log book come in.     Thank you, Unique!  Teresa

## 2017-09-08 ENCOUNTER — TELEPHONE (OUTPATIENT)
Dept: OBGYN | Facility: CLINIC | Age: 32
End: 2017-09-08

## 2017-09-08 ENCOUNTER — OFFICE VISIT (OUTPATIENT)
Dept: MATERNAL FETAL MEDICINE | Facility: CLINIC | Age: 32
End: 2017-09-08
Attending: OBSTETRICS & GYNECOLOGY
Payer: COMMERCIAL

## 2017-09-08 ENCOUNTER — PRENATAL OFFICE VISIT (OUTPATIENT)
Dept: OBGYN | Facility: CLINIC | Age: 32
End: 2017-09-08
Payer: COMMERCIAL

## 2017-09-08 ENCOUNTER — HOSPITAL ENCOUNTER (OUTPATIENT)
Dept: ULTRASOUND IMAGING | Facility: CLINIC | Age: 32
Discharge: HOME | End: 2017-09-08
Attending: OBSTETRICS & GYNECOLOGY | Admitting: OBSTETRICS & GYNECOLOGY
Payer: COMMERCIAL

## 2017-09-08 VITALS
SYSTOLIC BLOOD PRESSURE: 100 MMHG | WEIGHT: 170 LBS | BODY MASS INDEX: 31.6 KG/M2 | DIASTOLIC BLOOD PRESSURE: 66 MMHG | HEART RATE: 76 BPM

## 2017-09-08 DIAGNOSIS — O34.219 PREVIOUS CESAREAN DELIVERY, ANTEPARTUM CONDITION OR COMPLICATION: ICD-10-CM

## 2017-09-08 DIAGNOSIS — O24.414 INSULIN CONTROLLED GESTATIONAL DIABETES MELLITUS (GDM) IN THIRD TRIMESTER: ICD-10-CM

## 2017-09-08 DIAGNOSIS — O09.623 HIGH-RISK PREGNANCY, YOUNG MULTIGRAVIDA, THIRD TRIMESTER: Primary | ICD-10-CM

## 2017-09-08 DIAGNOSIS — O24.414 INSULIN CONTROLLED GESTATIONAL DIABETES MELLITUS (GDM) IN THIRD TRIMESTER: Primary | ICD-10-CM

## 2017-09-08 PROCEDURE — 99207 ZZC COMPLICATED OB VISIT: CPT | Performed by: OBSTETRICS & GYNECOLOGY

## 2017-09-08 PROCEDURE — 76819 FETAL BIOPHYS PROFIL W/O NST: CPT

## 2017-09-08 NOTE — PROGRESS NOTES
"Please see \"Imaging\" tab under \"Chart Review\" for details of today's US at the [facility].    Evin Simpson MD  Maternal-Fetal Medicine    "

## 2017-09-08 NOTE — MR AVS SNAPSHOT
After Visit Summary   2017    Unique Multani    MRN: [id number]           Patient Information     Date Of Birth          1985        Visit Information        Provider Department      2017 11:00 AM Michael Mendez MD [facility]        Today's Diagnoses     High-risk pregnancy, young multigravida, third trimester    -  1    Previous  delivery, antepartum condition or complication        Insulin controlled gestational diabetes mellitus (GDM) in third trimester           Follow-ups after your visit        Follow-up notes from your care team     Return in about 2 weeks (around 2017).      Your next 10 appointments already scheduled     Sep 12, 2017  3:30 PM CDT   M BPP SINGLE with RHMFMUSR2   [facility] - [facility] ([facility])    [address] [place] [id number] [phone]            Sep 12, 2017  4:00 PM CDT   Radiology MD with  MFPRASANNA LY   [facility] Maternal Fetal Medicine [facility])    [address] [place] [address] [phone]           Please arrive at the time given for your first appointment. This visit is used internally to schedule the physician's time during your ultrasound.            Sep 15, 2017  9:30 AM CDT   [facility] US COMPRE SINGLE F/U with RHMFMUSR3   [facility] - [facility] ([facility])    [address]  [facility] [id number] [phone]           Wear comfortable clothes and leave your valuables at home.            Sep 15, 2017 10:00 AM CDT   Radiology MD with [facility] MD   [facility] Maternal Fetal Medicine [facility] ([facility])    [address]  [facility] [address] [phone]           Please arrive at the time given for your first appointment. This visit is used internally to schedule the physician's time during your  ultrasound.            Sep 25, 2017 11:00 AM CDT   ESTABLISHED PRENATAL with Michael Mendez MD   [facility] ([facility])    [address]  [facility] [id number] [phone]            Oct 17, 2017   Procedure with Michael Mendez MD   [facility] Labor and Delivery (--)    [address]  [facility] [id number] [phone]              Who to contact     If you have questions or need follow up information about today's clinic visit or your schedule please contact [facility] directly at [phone].  Normal or non-critical lab and imaging results will be communicated to you by MyChart, letter or phone within 4 business days after the clinic has received the results. If you do not hear from us within 7 days, please contact the clinic through Billowbyhart or phone. If you have a critical or abnormal lab result, we will notify you by phone as soon as possible.  Submit refill requests through Actions or call your pharmacy and they will forward the refill request to us. Please allow 3 business days for your refill to be completed.          Additional Information About Your Visit        Billowbyhart Information     Actions gives you secure access to your electronic health record. If you see a primary care provider, you can also send messages to your care team and make appointments. If you have questions, please call your primary care clinic.  If you do not have a primary care provider, please call [phone] and they will assist you.        Care EveryWhere ID     This is your Care EveryWhere ID. This could be used by other organizations to access your [facility] medical records  [id number]        Your Vitals Were     Pulse Last Period BMI (Body Mass Index)             76 01/15/2017 (Exact Date) 31.6 kg/m2          Blood Pressure from Last 3 Encounters:   09/08/17 100/66   08/25/17 100/64   08/18/17 100/68    Weight from Last 3 Encounters:    09/08/17 170 lb (77.1 kg)   08/25/17 169 lb (76.7 kg)   08/18/17 168 lb (76.2 kg)              Today, you had the following     No orders found for display       Primary Care Provider Office Phone # Fax #    Michael Mendez [phone] [address] [place] [address]        Equal Access to Services     [facility]: Hadii aad ku hadasho Soomaali, waaxda luqadaha, qaybta kaalmada adeegyada, waxay idiin hayaan adeeg kharash la'aan ah. So [facility] [phone].    ATENCIÓN: Si habla veronica, tiene a humphries disposición servicios gratuitos de asistencia lingüística. Llame al [phone].    We comply with applicable federal civil rights laws and [place] laws. We do not discriminate on the basis of race, color, national origin, age, disability sex, sexual orientation or gender identity.            Thank you!     Thank you for choosing [facility]  for your care. Our goal is always to provide you with excellent care. Hearing back from our patients is one way we can continue to improve our services. Please take a few minutes to complete the written survey that you may receive in the mail after your visit with us. Thank you!             Your Updated Medication List - Protect others around you: Learn how to safely use, store and throw away your medicines at www.disposemymeds.org.          This list is accurate as of: 9/8/17 11:34 AM.  Always use your most recent med list.                   Brand Name Dispense Instructions for use Diagnosis    insulin aspart 100 UNIT/ML injection    NovoLOG FLEXPEN    1500 mL    Take 4 units before breakfast, 3 units before lunch, and 8 units before dinner daily.    Insulin controlled gestational diabetes mellitus (GDM) in third trimester       insulin isophane human 100 UNIT/ML injection    HumuLIN N PEN    15 mL    17 units before bed (to be titrated by MD/CDE)    Diet controlled gestational diabetes mellitus (GDM), antepartum, High-risk  pregnancy, young multigravida, second trimester, Previous  delivery, antepartum condition or complication       insulin pen needle 32G X 4 MM    BD DINA U/F    100 each    Use 3 daily or as directed.    High-risk pregnancy, young multigravida, second trimester       PRENATAL VIT-FE BISG-FA-DHA PO

## 2017-09-08 NOTE — TELEPHONE ENCOUNTER
Surgery:  REPEAT  SECTION AND TUBAL LIGATION  Date:  10/17/17  Time:  7:00 AM  Hospital:  [facility]    Patient advised of the following:  The hospital will contact you 24-48 hours prior to surgery to discuss any pre op instructions.  Contact your insurance company to see if a pre authorization is needed.  A pre op physical will be done by your OB/GYN physician at one of your prenatal appointments.  Make arrangements to have someone drive you home from the hospital.    Surgery specific and hospital information given to patient.    Information was placed on the surgery calendar in [place].

## 2017-09-08 NOTE — NURSING NOTE
"Chief Complaint   Patient presents with     Prenatal Care     baby active and moving - MFM this am - back pain disucss  date and tubal     33w5d    Initial /66 (BP Location: Left arm, Patient Position: Chair, Cuff Size: Adult Regular)  Pulse 76  Wt 170 lb (77.1 kg)  LMP 01/15/2017 (Exact Date)  BMI 31.6 kg/m2 Estimated body mass index is 31.6 kg/(m^2) as calculated from the following:    Height as of 17: 5' 1.5\" (1.562 m).    Weight as of this encounter: 170 lb (77.1 kg).  Medication Reconciliation: complete     Nurse assisted visit.  Aracely Louis MA.      "

## 2017-09-08 NOTE — MR AVS SNAPSHOT
After Visit Summary   9/8/2017    Unique Multani    MRN: [id number]           Patient Information     Date Of Birth          1985        Visit Information        Provider Department      9/8/2017 10:45 AM Evin Simpson MD [facility] Maternal Fetal Medicine [facility]        Today's Diagnoses     Insulin controlled gestational diabetes mellitus (GDM) in third trimester    -  1       Follow-ups after your visit        Your next 10 appointments already scheduled     Sep 08, 2017 10:45 AM CDT   Radiology MD with Evin Simpson MD   [facility] Fetal [facility])    [address]  [facility] [address]   [phone]           Please arrive at the time given for your first appointment. This visit is used internally to schedule the physician's time during your ultrasound.            Sep 08, 2017 11:00 AM CDT   ESTABLISHED PRENATAL with Michael Mendez MD   [facility] ([facility])    [address]  [facility] [address]   [phone]            Sep 12, 2017  3:30 PM CDT   MFM BPP SINGLE with [id number]   [facility] - [facility])    [address]  [facility] [address]   [phone]            Sep 12, 2017  4:00 PM CDT   Radiology MD with  MFPRASANNA LY   [facility]etal [facility] ([facility])    [address]  [facility] [address]   [phone]           Please arrive at the time given for your first appointment. This visit is used internally to schedule the physician's time during your ultrasound.            Sep 15, 2017  9:30 AM CDT   MFM US COMPRE SINGLE F/U with [id number]   [facility] Ultrasound [facility])    [address] [facility] [address]  [facility] [address]   [phone]           Wear comfortable clothes and leave  your valuables at home.            Sep 15, 2017 10:00 AM CDT   Radiology MD with  GUZMAN LY   [facility] Maternal [facility] ([facility])    [address]  [facility] [address] [phone]           Please arrive at the time given for your first appointment. This visit is used internally to schedule the physician's time during your ultrasound.            Sep 25, 2017 11:00 AM CDT   ESTABLISHED PRENATAL with Michael Mendez MD   [facility]    [address] [place]  [facility] [id number] [phone]              Who to contact     If you have questions or need follow up information about today's clinic visit or your schedule please contact [facility]ERNAL FETAL [facility] directly at [phone].  Normal or non-critical lab and imaging results will be communicated to you by MyChart, letter or phone within 4 business days after the clinic has received the results. If you do not hear from us within 7 days, please contact the clinic through VersionEyehart or phone. If you have a critical or abnormal lab result, we will notify you by phone as soon as possible.  Submit refill requests through NewsBreak or call your pharmacy and they will forward the refill request to us. Please allow 3 business days for your refill to be completed.          Additional Information About Your Visit        MyChart Information     NewsBreak gives you secure access to your electronic health record. If you see a primary care provider, you can also send messages to your care team and make appointments. If you have questions, please call your primary care clinic.  If you do not have a primary care provider, please call [phone] and they will assist you.        Care EveryWhere ID     This is your Care EveryWhere ID. This could be used by other organizations to access your [facility] medical records  [id number]        Your Vitals Were     Last  Period                   01/15/2017 (Exact Date)            Blood Pressure from Last 3 Encounters:   08/25/17 100/64   08/18/17 100/68   08/16/17 99/54    Weight from Last 3 Encounters:   08/25/17 76.7 kg (169 lb)   08/18/17 76.2 kg (168 lb)   07/31/17 74.8 kg (165 lb)              Today, you had the following     No orders found for display       Primary Care Provider Office Phone # Fax #    Michael Mendez [phone] [address] [place] [address]        Equal Access to Services     [facility]: Hadii aad ku hadasho Soomaali, waaxda luqadaha, qaybta kaalmada ademlainayada, nicolás torre . So [facility] [phone].    ATENCIÓN: Si habla español, tiene a humphries disposición servicios gratuitos de asistencia lingüística. Llame al [phone].    We comply with applicable federal civil rights laws and [place] laws. We do not discriminate on the basis of race, color, national origin, age, disability sex, sexual orientation or gender identity.            Thank you!     Thank you for choosing [facility]  for your care. Our goal is always to provide you with excellent care. Hearing back from our patients is one way we can continue to improve our services. Please take a few minutes to complete the written survey that you may receive in the mail after your visit with us. Thank you!             Your Updated Medication List - Protect others around you: Learn how to safely use, store and throw away your medicines at www.disposemymeds.org.          This list is accurate as of: 9/8/17 10:44 AM.  Always use your most recent med list.                   Brand Name Dispense Instructions for use Diagnosis    insulin aspart 100 UNIT/ML injection    NovoLOG FLEXPEN    1500 mL    Take 4 units before breakfast, 3 units before lunch, and 8 units before dinner daily.    Insulin controlled gestational diabetes mellitus (GDM) in third trimester        insulin isophane human 100 UNIT/ML injection    HumuLIN N PEN    15 mL    17 units before bed (to be titrated by MD/CDE)    Diet controlled gestational diabetes mellitus (GDM), antepartum, High-risk pregnancy, young multigravida, second trimester, Previous  delivery, antepartum condition or complication       insulin pen needle 32G X 4 MM    BD DINA U/F    100 each    Use 3 daily or as directed.    High-risk pregnancy, young multigravida, second trimester       PRENATAL VIT-FE BISG-FA-DHA PO

## 2017-09-08 NOTE — PROGRESS NOTES
IUP at 33 5/7 weeks;      Twice weekly BPP at MFM     Glucose; fasting and PP elevated, insulin being adjusted     Planned repeat C/S with salpingectomy Oct 17  MA formed signed today

## 2017-09-11 ENCOUNTER — TELEPHONE (OUTPATIENT)
Dept: EDUCATION SERVICES | Facility: CLINIC | Age: 32
End: 2017-09-11

## 2017-09-11 ENCOUNTER — VIRTUAL VISIT (OUTPATIENT)
Dept: EDUCATION SERVICES | Facility: CLINIC | Age: 32
End: 2017-09-11

## 2017-09-11 DIAGNOSIS — O34.219 PREVIOUS CESAREAN DELIVERY, ANTEPARTUM CONDITION OR COMPLICATION: ICD-10-CM

## 2017-09-11 DIAGNOSIS — O24.410 DIET CONTROLLED GESTATIONAL DIABETES MELLITUS (GDM), ANTEPARTUM: ICD-10-CM

## 2017-09-11 DIAGNOSIS — O09.622 HIGH-RISK PREGNANCY, YOUNG MULTIGRAVIDA, SECOND TRIMESTER: ICD-10-CM

## 2017-09-11 DIAGNOSIS — O24.414 INSULIN CONTROLLED GESTATIONAL DIABETES MELLITUS (GDM) IN THIRD TRIMESTER: ICD-10-CM

## 2017-09-11 NOTE — Clinical Note
SRINII added NPH insulin to breakfast to better cover during day, and increased mealtime insulin.  Violette العلي MS, RD, LD, CDE

## 2017-09-11 NOTE — MR AVS SNAPSHOT
After Visit Summary   2017    Unique Multani    MRN: [id number]           Patient Information     Date Of Birth          1985        Visit Information        Provider Department      2017 9:15 AM  DIABETIC ED RESOURCE [facility]        Today's Diagnoses     Insulin controlled gestational diabetes mellitus (GDM) in third trimester        Diet controlled gestational diabetes mellitus (GDM), antepartum        High-risk pregnancy, young multigravida, second trimester        Previous  delivery, antepartum condition or complication           Follow-ups after your visit        Your next 10 appointments already scheduled     Sep 12, 2017  3:30 PM CDT   MFM BPP SINGLE with [id number]   [facility] - [facility])    [address]  [facility] [address] [phone]            Sep 12, 2017  4:00 PM CDT   Radiology MD with  GUZMAN LY   [facility])    [address]  [facility] [address] [phone]           Please arrive at the time given for your first appointment. This visit is used internally to schedule the physician's time during your ultrasound.            Sep 15, 2017  9:30 AM CDT   [facility] US COMPRE SINGLE F/U with [id number]   [facility] ([facility])    [address]  [facility] [address] [phone]           Wear comfortable clothes and leave your valuables at home.            Sep 15, 2017 10:00 AM CDT   [facility]iology [facility]   e[facility] M[facility] Fetal Medicine [facility] ([facility])    [address]  [facility] [address]   [phone]           Please arrive at the time given for your first appointment. This visit is used internally to schedule the physician's time during your ultrasound.             Sep 25, 2017 11:00 AM CDT   ESTABLISHED PRENATAL with Michael Mendez MD   [facility] ([facility])    [address]  [facility] [id number] [phone]            Oct 17, 2017   Procedure with Michael Mendez MD   [facility] Labor and Delivery (--)    [address]  [facility] [id number] [phone]              Who to contact     If you have questions or need follow up information about today's clinic visit or your schedule please contact [facility] directly at [phone].  Normal or non-critical lab and imaging results will be communicated to you by MyChart, letter or phone within 4 business days after the clinic has received the results. If you do not hear from us within 7 days, please contact the clinic through LookIthart or phone. If you have a critical or abnormal lab result, we will notify you by phone as soon as possible.  Submit refill requests through Espressi or call your pharmacy and they will forward the refill request to us. Please allow 3 business days for your refill to be completed.          Additional Information About Your Visit        LookIthart Information     Espressi gives you secure access to your electronic health record. If you see a primary care provider, you can also send messages to your care team and make appointments. If you have questions, please call your primary care clinic.  If you do not have a primary care provider, please call [phone] and they will assist you.        Care EveryWhere ID     This is your Care EveryWhere ID. This could be used by other organizations to access your [place] medical records  [id number]        Your Vitals Were     Last Period                   01/15/2017 (Exact Date)            Blood Pressure from Last 3 Encounters:   09/08/17 100/66   08/25/17 100/64   08/18/17 100/68    Weight from Last 3 Encounters:   09/08/17 170 lb (77.1 kg)   08/25/17 169 lb (76.7 kg)    17 168 lb (76.2 kg)              Today, you had the following     No orders found for display         Today's Medication Changes          These changes are accurate as of: 17 10:49 AM.  If you have any questions, ask your nurse or doctor.               These medicines have changed or have updated prescriptions.        Dose/Directions    insulin aspart 100 UNIT/ML injection   Commonly known as:  NovoLOG FLEXPEN   This may have changed:  additional instructions   Used for:  Insulin controlled gestational diabetes mellitus (GDM) in third trimester        Take 6 units before breakfast, 4 units before lunch, and 8 units before dinner daily.   Quantity:  1500 mL   Refills:  3       insulin isophane human 100 UNIT/ML injection   Commonly known as:  HumuLIN N PEN   This may have changed:  additional instructions   Used for:  Diet controlled gestational diabetes mellitus (GDM), antepartum, High-risk pregnancy, young multigravida, second trimester, Previous  delivery, antepartum condition or complication        4 units with breakfast, 18 units before bed (to be titrated by MD/CDE)   Quantity:  15 mL   Refills:  1            Where to get your medicines      These medications were sent to [facility] [address] [place] [address] 42  AT [address] [facility] [address]     Phone:  [phone]     insulin aspart 100 UNIT/ML injection    insulin isophane human 100 UNIT/ML injection                Primary Care Provider Office Phone # Fax #    Michael Mendez [phone] [address] [place] [address]        Equal Access to Services     [facility] : Hadii aad ku hadasho Somonikaali, waaxda luqadaha, qaybta kaalmada tad, nicolás hester. So [facility] [phone].    ATENCIÓN: Si habla español, tiene a humphries disposición servicios gratuitos de asistencia lingüística. Llame al  [phone].    We comply with applicable federal civil rights laws and [place] laws. We do not discriminate on the basis of race, color, national origin, age, disability sex, sexual orientation or gender identity.            Thank you!     Thank you for choosing [facility]  for your care. Our goal is always to provide you with excellent care. Hearing back from our patients is one way we can continue to improve our services. Please take a few minutes to complete the written survey that you may receive in the mail after your visit with us. Thank you!             Your Updated Medication List - Protect others around you: Learn how to safely use, store and throw away your medicines at www.disposemymeds.org.          This list is accurate as of: 17 10:49 AM.  Always use your most recent med list.                   Brand Name Dispense Instructions for use Diagnosis    insulin aspart 100 UNIT/ML injection    NovoLOG FLEXPEN    1500 mL    Take 6 units before breakfast, 4 units before lunch, and 8 units before dinner daily.    Insulin controlled gestational diabetes mellitus (GDM) in third trimester       insulin isophane human 100 UNIT/ML injection    HumuLIN N PEN    15 mL    4 units with breakfast, 18 units before bed (to be titrated by MD/CDE)    Diet controlled gestational diabetes mellitus (GDM), antepartum, High-risk pregnancy, young multigravida, second trimester, Previous  delivery, antepartum condition or complication       insulin pen needle 32G X 4 MM    BD DINA U/F    100 each    Use 3 daily or as directed.    High-risk pregnancy, young multigravida, second trimester       PRENATAL VIT-FE BISG-FA-DHA PO

## 2017-09-11 NOTE — TELEPHONE ENCOUNTER
Email from patient:   So I'm suppose to take 6 Novolog and 4 NPH FOR BREAKFAST? Just trying to make  sure I read that right?           CDE Reply:  Josh Calhoun,    Yes that's correct.  You will take 2 kinds of insulin with breakfast.  The Novolog 6 units will work right away to cover your breakfast meal, and the NPH 4 units will start working later in the day (about 4 hours after you take it) to help with your lunch meal.      Please let us know sooner than Thursday 9/14/17 if you have any feelings of low blood sugar with this insulin plan.      Thanks!      Violette العلي MS, RD, LD, CDE  [facility]  Diabetes & Nutrition Care  [email or web address]  Ph: [phone]

## 2017-09-11 NOTE — PROGRESS NOTES
Gestational Diabetes Follow-up    Subjective/Objective:    Unique HEBERT Multani sent in blood glucose log for review. Last date of communication was: 17.    Gestational diabetes is being managed with Humulin/Novolin NPH Insulin 0-0-0-18 units at bedtime and NovoLog Insulin 4-3-8-0 units at breakfast, lunch and supper    Estimated Date of Delivery: Oct 22, 2017    BG/Food Log:           Assessment:    Ketones:Not checking.   Fasting blood glucoses: 50% in target.  After breakfast: 0% in target.  Before lunch: 25% in target.  After lunch: 25% in target.  Before dinner: 0% in target.  After dinner: 66% in target. (if checking 1 hour after eating)    Plan/Response:  Recommend that patient add NPH insulin with breakfast.  Recommend increase to insulin - NPH 0-0-0-18 --> 4-0-0-8, Novolog 4-3-8-0 --> 6-4-8-0.  Follow-up in 3-4 days.    Email to patient:    Josh Calhoun,    Thank you for sending in your readings, and sorry for the delayed response!  It looks like you are still running high so I d like to make more of an insulin adjustment to better cover you during the day.  Please send us your readings again either Thursday or Friday morning.    Novolo units at breakfast  4 units with lunch  8 units with dinner (no change)    NPH:  4 units with breakfast  18 units at bedtime    Violette العلي MS, RD, LD, CDE    Any diabetes medication dose changes were made via the CDE Protocol and Collaborative Practice Agreement with the patient's referring provider. A copy of this encounter was shared with the provider.

## 2017-09-12 ENCOUNTER — HOSPITAL ENCOUNTER (OUTPATIENT)
Dept: ULTRASOUND IMAGING | Facility: CLINIC | Age: 32
Discharge: HOME | End: 2017-09-12
Attending: OBSTETRICS & GYNECOLOGY | Admitting: OBSTETRICS & GYNECOLOGY
Payer: COMMERCIAL

## 2017-09-12 ENCOUNTER — OFFICE VISIT (OUTPATIENT)
Dept: MATERNAL FETAL MEDICINE | Facility: CLINIC | Age: 32
End: 2017-09-12
Attending: OBSTETRICS & GYNECOLOGY
Payer: COMMERCIAL

## 2017-09-12 DIAGNOSIS — O24.414 INSULIN CONTROLLED GESTATIONAL DIABETES MELLITUS (GDM) DURING PREGNANCY, ANTEPARTUM: Primary | ICD-10-CM

## 2017-09-12 DIAGNOSIS — O24.414 INSULIN CONTROLLED GESTATIONAL DIABETES MELLITUS (GDM) IN THIRD TRIMESTER: ICD-10-CM

## 2017-09-12 PROCEDURE — 76819 FETAL BIOPHYS PROFIL W/O NST: CPT

## 2017-09-12 NOTE — PROGRESS NOTES
Please see full imaging report from ViewPoint program under imaging tab.        Cathy Mckeon MD  Maternal Fetal Medicine

## 2017-09-12 NOTE — MR AVS SNAPSHOT
After Visit Summary   9/12/2017    Unique Multani    MRN: [id number]           Patient Information     Date Of Birth          1985        Visit Information        Provider Department      9/12/2017 4:00 PM Cathy Mckeon MD [facility] Maternal Fetal Medicine [facility]        Today's Diagnoses     Insulin controlled gestational diabetes mellitus (GDM) during pregnancy, antepartum    -  1       Follow-ups after your visit        Your next 10 appointments already scheduled     Sep 15, 2017  9:30 AM CDT   MFM US COMPRE SINGLE F/U with [id number]   [facility] Maternal Fetal Medicine Ultrasound - [facility] ([facility])    [address] [facility] [address]  [facility] [address]   [phone]           Wear comfortable clothes and leave your valuables at home.            Sep 15, 2017 10:00 AM CDT   Radiology [facility]   [facility] Maternal Fetal Medicine [facility] ([facility])    [address] [facility] [address]  [facility] [address]   [phone]           Please arrive at the time given for your first appointment. This visit is used internally to schedule the physician's time during your ultrasound.            Sep 25, 2017 11:00 AM CDT   ESTABLISHED PRENATAL with [facility]endez MD   [facility]    [address]  [facility] [address]   [phone]            Oct 17, 2017   Procedure with Michael Mendez MD   [facility] Labor and Delivery (--)    [address]  [facility] [address]   [phone]              Future tests that were ordered for you today     Open Future Orders        Priority Expected Expires Ordered    MFM BPP Single Routine  7/12/2018 9/12/2017    MFM BPP Single Routine  7/12/2018 9/12/2017    MFM BPP Single Routine  7/12/2018 9/12/2017    MFM BPP Single Routine  7/12/2018 9/12/2017            Who to contact     If you have questions or need follow up information about  today's clinic visit or your schedule please contact [facility] MATERNAL FETAL MEDICINE [facility] directly at [phone].  Normal or non-critical lab and imaging results will be communicated to you by MyChart, letter or phone within 4 business days after the clinic has received the results. If you do not hear from us within 7 days, please contact the clinic through [employer] or phone. If you have a critical or abnormal lab result, we will notify you by phone as soon as possible.  Submit refill requests through [employer] or call your pharmacy and they will forward the refill request to us. Please allow 3 business days for your refill to be completed.          Additional Information About Your Visit        [employer] Information     [employer] gives you secure access to your electronic health record. If you see a primary care provider, you can also send messages to your care team and make appointments. If you have questions, please call your primary care clinic.  If you do not have a primary care provider, please call [phone] and they will assist you.        Care EveryWhere ID     This is your Care EveryWhere ID. This could be used by other organizations to access your [place] medical records  [id number]        Your Vitals Were     Last Period                   01/15/2017 (Exact Date)            Blood Pressure from Last 3 Encounters:   09/08/17 100/66   08/25/17 100/64   08/18/17 100/68    Weight from Last 3 Encounters:   09/08/17 77.1 kg (170 lb)   08/25/17 76.7 kg (169 lb)   08/18/17 76.2 kg (168 lb)               Primary Care Provider Office Phone # Fax #    Michael Mendez [phone] [address] [place] [address]        Equal Access to Services     [facility]: Hadii jose daniel Reed, waaxda luqadaha, qaybta kaalmada nicolás mishra. So [facility] [phone].    ATENCIÓN: Si habla español, tiene a humphries disposición servicios gratuitos de  asistencia lingüística. Kamila al [phone].    We comply with applicable federal civil rights laws and [place] laws. We do not discriminate on the basis of race, color, national origin, age, disability sex, sexual orientation or gender identity.            Thank you!     Thank you for choosing [facility]  for your care. Our goal is always to provide you with excellent care. Hearing back from our patients is one way we can continue to improve our services. Please take a few minutes to complete the written survey that you may receive in the mail after your visit with us. Thank you!             Your Updated Medication List - Protect others around you: Learn how to safely use, store and throw away your medicines at www.disposemymeds.org.          This list is accurate as of: 17  4:35 PM.  Always use your most recent med list.                   Brand Name Dispense Instructions for use Diagnosis    insulin aspart 100 UNIT/ML injection    NovoLOG FLEXPEN    1500 mL    Take 6 units before breakfast, 4 units before lunch, and 8 units before dinner daily.    Insulin controlled gestational diabetes mellitus (GDM) in third trimester       insulin isophane human 100 UNIT/ML injection    HumuLIN N PEN    15 mL    4 units with breakfast, 18 units before bed (to be titrated by MD/CDE)    Diet controlled gestational diabetes mellitus (GDM), antepartum, High-risk pregnancy, young multigravida, second trimester, Previous  delivery, antepartum condition or complication       insulin pen needle 32G X 4 MM    BD DINA U/F    100 each    Use 3 daily or as directed.    High-risk pregnancy, young multigravida, second trimester       PRENATAL VIT-FE BISG-FA-DHA PO

## 2017-09-15 ENCOUNTER — VIRTUAL VISIT (OUTPATIENT)
Dept: EDUCATION SERVICES | Facility: CLINIC | Age: 32
End: 2017-09-15

## 2017-09-15 ENCOUNTER — HOSPITAL ENCOUNTER (OUTPATIENT)
Dept: ULTRASOUND IMAGING | Facility: CLINIC | Age: 32
Discharge: HOME | End: 2017-09-15
Attending: OBSTETRICS & GYNECOLOGY | Admitting: OBSTETRICS & GYNECOLOGY
Payer: COMMERCIAL

## 2017-09-15 ENCOUNTER — OFFICE VISIT (OUTPATIENT)
Dept: MATERNAL FETAL MEDICINE | Facility: CLINIC | Age: 32
End: 2017-09-15
Attending: OBSTETRICS & GYNECOLOGY
Payer: COMMERCIAL

## 2017-09-15 DIAGNOSIS — O24.414 INSULIN CONTROLLED GESTATIONAL DIABETES MELLITUS (GDM) IN THIRD TRIMESTER: Primary | ICD-10-CM

## 2017-09-15 DIAGNOSIS — O24.414 INSULIN CONTROLLED GESTATIONAL DIABETES MELLITUS (GDM) IN THIRD TRIMESTER: ICD-10-CM

## 2017-09-15 PROCEDURE — 76816 OB US FOLLOW-UP PER FETUS: CPT

## 2017-09-15 PROCEDURE — 76819 FETAL BIOPHYS PROFIL W/O NST: CPT | Performed by: OBSTETRICS & GYNECOLOGY

## 2017-09-15 NOTE — MR AVS SNAPSHOT
After Visit Summary   9/15/2017    Unique Multani    MRN: [id number]           Patient Information     Date Of Birth          1985        Visit Information        Provider Department      9/15/2017 10:00 AM Alyssa De La Cruz,  [facility] M[facility]etal [facility]        Today's Diagnoses     Insulin controlled gestational diabetes mellitus (GDM) in third trimester    -  1       Follow-ups after your visit        Your next 10 appointments already scheduled     Sep 19, 2017  3:30 PM CDT   MFM BPP SINGLE with [id number]   [facility] - [facility])    [address] [facility] [address]  [facility] [address]   [phone]            Sep 19, 2017  4:00 PM CDT   Radiology MD with  GUZMAN LY   [facility] Maternal Fetal Medicine [facility])    [address] [facility] [address]  [facility] [address]   [phone]           Please arrive at the time given for your first appointment. This visit is used internally to schedule the physician's time during your ultrasound.            Sep 21, 2017 11:00 AM CDT   MFM BPP SINGLE with [id number]   [facility] - [facility] ([facility])    [address]  [facility] [address]   [phone]            Sep 21, 2017 11:30 AM CDT   Radiology MD with  GUZMAN LY   [facility] Maternal Fetal Medicine [facility] ([facility])    [address]  [facility] [address]   [phone]           Please arrive at the time given for your first appointment. This visit is used internally to schedule the physician's time during your ultrasound.            Sep 25, 2017 11:00 AM CDT   ESTABLISHED PRENATAL with Michael Mendez MD   [facility]    [address] [facility] [address]   [phone]            Sep 26, 2017  3:30 PM CDT   MFM BPP SINGLE  with [id number]   [facility] Maternal Fetal Medicine Ultrasound - [facility] ([facility])    [address]  [facility] [address]   [phone]            Sep 26, 2017  4:00 PM CDT   Radiology MD with [facility]   [facility] Maternal Fetal [facility] ([facility])    [address]  [facility] [address]   [phone]           Please arrive at the time given for your first appointment. This visit is used internally to schedule the physician's time during your ultrasound.            Sep 29, 2017 10:15 AM CDT   MFM BPP SINGLE with [id number]   [facility] Maternal Fetal Medicine Ultrasound - [facility])    [address]  [facility] [address]   [phone]            Sep 29, 2017 10:45 AM CDT   Radiology MD with [facility]   [facility] Maternal [facility])    [address] [facility] [address]  [facility] [address] [phone]           Please arrive at the time given for your first appointment. This visit is used internally to schedule the physician's time during your ultrasound.            Oct 17, 2017   Procedure with Michael Mendez MD   [facility] Labor and Delivery (--)    [address] [facility] [address]   [phone]              Who to contact     If you have questions or need follow up information about today's clinic visit or your schedule please contact [facility] MATERNAL FETAL [facility] directly at [phone].  Normal or non-critical lab and imaging results will be communicated to you by MyChart, letter or phone within 4 business days after the clinic has received the results. If you do not hear from us within 7 days, please contact the clinic through MyChart or phone. If you have a critical or abnormal lab result, we will notify you by phone as soon as possible.  Submit refill requests through [employer] or call your pharmacy and they will  forward the refill request to us. Please allow 3 business days for your refill to be completed.          Additional Information About Your Visit        [employer] Information     [employer] gives you secure access to your electronic health record. If you see a primary care provider, you can also send messages to your care team and make appointments. If you have questions, please call your primary care clinic.  If you do not have a primary care provider, please call [phone] and they will assist you.        Care EveryWhere ID     This is your Care EveryWhere ID. This could be used by other organizations to access your [place] medical records  [id number]        Your Vitals Were     Last Period                   01/15/2017 (Exact Date)            Blood Pressure from Last 3 Encounters:   09/08/17 100/66   08/25/17 100/64   08/18/17 100/68    Weight from Last 3 Encounters:   09/08/17 77.1 kg (170 lb)   08/25/17 76.7 kg (169 lb)   08/18/17 76.2 kg (168 lb)              Today, you had the following     No orders found for display       Primary Care Provider Office Phone # Fax #    Michael Mendez [phone] [address] [place] [address]        Equal Access to Services     NIGEL BELTRAN : Hadii jose daniel pinzono Soidris, waaxda luqadaha, qaybta kaalmada ademalinayada, nicolás hester. So [facility] [phone].    ATENCIÓN: Si habla español, tiene a humphries disposición servicios gratuitos de asistencia lingüística. Llame al [phone].    We comply with applicable federal civil rights laws and [place] laws. We do not discriminate on the basis of race, color, national origin, age, disability sex, sexual orientation or gender identity.            Thank you!     Thank you for choosing [facility]  for your care. Our goal is always to provide you with excellent care. Hearing back from our patients is one way we can continue to improve our  services. Please take a few minutes to complete the written survey that you may receive in the mail after your visit with us. Thank you!             Your Updated Medication List - Protect others around you: Learn how to safely use, store and throw away your medicines at www.disposemymeds.org.          This list is accurate as of: 9/15/17 10:13 AM.  Always use your most recent med list.                   Brand Name Dispense Instructions for use Diagnosis    insulin aspart 100 UNIT/ML injection    NovoLOG FLEXPEN    1500 mL    Take 6 units before breakfast, 4 units before lunch, and 8 units before dinner daily.    Insulin controlled gestational diabetes mellitus (GDM) in third trimester       insulin isophane human 100 UNIT/ML injection    HumuLIN N PEN    15 mL    4 units with breakfast, 18 units before bed (to be titrated by MD/CDE)    Diet controlled gestational diabetes mellitus (GDM), antepartum, High-risk pregnancy, young multigravida, second trimester, Previous  delivery, antepartum condition or complication       insulin pen needle 32G X 4 MM    BD DINA U/F    100 each    Use 3 daily or as directed.    High-risk pregnancy, young multigravida, second trimester       PRENATAL VIT-FE BISG-FA-DHA PO

## 2017-09-15 NOTE — MR AVS SNAPSHOT
After Visit Summary   9/15/2017    Unique Multani    MRN: [id number]           Patient Information     Date Of Birth          1985        Visit Information        Provider Department      9/15/2017 2:30 PM  DIABETIC ED RESOURCE [facility]        Today's Diagnoses     Insulin controlled gestational diabetes mellitus (GDM) in third trimester    -  1       Follow-ups after your visit        Your next 10 appointments already scheduled     Sep 19, 2017  3:30 PM CDT   MFM BPP SINGLE with [id number]   [facility] - [facility])    [address] [facility] [address]  [facility] [address]   [phone]            Sep 19, 2017  4:00 PM CDT   Radiology MD with  GUZMAN LY   [facility] Maternal Fetal Medicine - [facility])    [address] [facility] [address]  [facility] [address]   [phone]           Please arrive at the time given for your first appointment. This visit is used internally to schedule the physician's time during your ultrasound.            Sep 21, 2017 11:00 AM CDT   MFM BPP SINGLE with [id number]   [facility] Maternal Fetal Medicine Ultrasound - [facility] ([facility])    [address] [place] [address] [address]  [facility] [address]   [phone]            Sep 21, 2017 11:30 AM CDT   Radiology MD with  GUZMAN LY   [facility] Maternal Fetal Medicine [facility] ([facility])    [address]  [facility] [address] [phone]           Please arrive at the time given for your first appointment. This visit is used internally to schedule the physician's time during your ultrasound.            Sep 25, 2017 11:00 AM CDT   ESTABLISHED PRENATAL with Michael Mendez MD   [facility])    [address] [place]  [facility] [address]   [phone]            Sep 26, 2017  3:30 PM CDT   MFM BPP SINGLE with  [id number]   [facility] Maternal Fetal Medicine Ultrasound - [facility] ([facility])    [address]  [facility] [address]   [phone]            Sep 26, 2017  4:00 PM CDT   Radiology MD with RH GUZMAN LY   [facility] Maternal Fetal Medicine [facility])    [address]  [facility] [address]   [phone]           Please arrive at the time given for your first appointment. This visit is used internally to schedule the physician's time during your ultrasound.            Sep 29, 2017 10:15 AM CDT   MFM BPP SINGLE with [id number]   [facility] Maternal Fetal Medicine Ultrasound - [facility] ([facility])    [address]  [facility] [address]   [phone]            Sep 29, 2017 10:45 AM CDT   Radiology MD with  GUZMAN LY   [facility] Maternal Fetal [facility])    [address]  [facility] [address]   [phone]           Please arrive at the time given for your first appointment. This visit is used internally to schedule the physician's time during your ultrasound.            Oct 17, 2017   Procedure with Michael Mendez MD   [facility] Labor and Delivery (--)    [address] [facility] [address]   [phone]              Who to contact     If you have questions or need follow up information about today's clinic visit or your schedule please contact [facility] directly at [phone].  Normal or non-critical lab and imaging results will be communicated to you by MyChart, letter or phone within 4 business days after the clinic has received the results. If you do not hear from us within 7 days, please contact the clinic through MyChart or phone. If you have a critical or abnormal lab result, we will notify you by phone as soon as possible.  Submit refill requests through [employer] or call your pharmacy and they will forward  the refill request to us. Please allow 3 business days for your refill to be completed.          Additional Information About Your Visit        [facility] Information     [employer] gives you secure access to your electronic health record. If you see a primary care provider, you can also send messages to your care team and make appointments. If you have questions, please call your primary care clinic.  If you do not have a primary care provider, please call [phone] and they will assist you.        Care EveryWhere ID     This is your Care EveryWhere ID. This could be used by other organizations to access your [place] medical records  [id number]        Your Vitals Were     Last Period                   01/15/2017 (Exact Date)            Blood Pressure from Last 3 Encounters:   09/08/17 100/66   08/25/17 100/64   08/18/17 100/68    Weight from Last 3 Encounters:   09/08/17 77.1 kg (170 lb)   08/25/17 76.7 kg (169 lb)   08/18/17 76.2 kg (168 lb)              Today, you had the following     No orders found for display       Primary Care Provider Office Phone # Fax #    Michael Mendez [phone] [address] [place] [address]        Equal Access to Services     NIGEL BELTRAN : Hadii jose daniel ku hadasho Soomaali, waaxda luqadaha, qaybta kaalmada adeegyada, nicolás hester. So [facility] [phone].    ATENCIÓN: Si habla español, tiene a humphries disposición servicios gratuitos de asistencia lingüística. Llame al [phone].    We comply with applicable federal civil rights laws and [place] laws. We do not discriminate on the basis of race, color, national origin, age, disability sex, sexual orientation or gender identity.            Thank you!     Thank you for choosing [facility]  for your care. Our goal is always to provide you with excellent care. Hearing back from our patients is one way we can continue to improve our services. Please  take a few minutes to complete the written survey that you may receive in the mail after your visit with us. Thank you!             Your Updated Medication List - Protect others around you: Learn how to safely use, store and throw away your medicines at www.disposemymeds.org.          This list is accurate as of: 9/15/17  3:21 PM.  Always use your most recent med list.                   Brand Name Dispense Instructions for use Diagnosis    insulin aspart 100 UNIT/ML injection    NovoLOG FLEXPEN    1500 mL    Take 6 units before breakfast, 4 units before lunch, and 8 units before dinner daily.    Insulin controlled gestational diabetes mellitus (GDM) in third trimester       insulin isophane human 100 UNIT/ML injection    HumuLIN N PEN    15 mL    4 units with breakfast, 18 units before bed (to be titrated by MD/CDE)    Diet controlled gestational diabetes mellitus (GDM), antepartum, High-risk pregnancy, young multigravida, second trimester, Previous  delivery, antepartum condition or complication       insulin pen needle 32G X 4 MM    BD DINA U/F    100 each    Use 3 daily or as directed.    High-risk pregnancy, young multigravida, second trimester       PRENATAL VIT-FE BISG-FA-DHA PO

## 2017-09-15 NOTE — PROGRESS NOTES
Gestational Diabetes Follow-up    Subjective/Objective:    Unique HEBERT Multani sent in blood glucose log for review. Last date of communication was: 9/11/17.    Gestational diabetes is being managed with Humulin/Novolin NPH Insulin 4-0-0-18 and NovoLog Insulin 6-4-8-0    Estimated Date of Delivery: Oct 22, 2017    BG/Food Log:       Assessment:    Ketones:not available.   Fasting blood glucoses: 20% in target.  After breakfast: 60% in target.  Before lunch: 66% in target.  After lunch: 25% in target.  Before dinner: 25% in target.  After dinner: 50% in target.    Plan/Response:  Recommend increase to insulin: NPH - increase morning dose to 5 units, increase bedtime dose to 22 units; NovoLog continue at 6 units breakfast, increase to 5 units lunch, increase to 9 units at dinner.    Josh Calhoun,    Thanks for sending in your numbers! Still doing a great job of checking numbers and taking your insulin - keep up the great work!     It looks like you ve still got some that are above goal, so let s do a bit more adjustment to your insulin doses to help get them down.    For the NPH, please increase the morning dose to 5 units and increase bedtime dose to 22 units    For the NovoLog, continue at 6 units breakfast, increase to 5 units at lunch, and increase to 9 units at dinner.    It looks like you are in need of a new log book, you re on the last yellow page! I ll put one in the mail for you.    Please send in numbers again on Tuesday for us to review.    Thanks and take care!    Any diabetes medication dose changes were made via the CDE Protocol and Collaborative Practice Agreement with the patient's OB/GYN provider. A copy of this encounter was shared with the provider.

## 2017-09-15 NOTE — Clinical Note
Hi Unique Patel sent in blood glucose results for review today. Recommended further insulin increases to help improve glucose control. See note for details.  Thanks! Mariela Gonzalez, MPH, RD, LD, CDE  [facility]

## 2017-09-15 NOTE — PROGRESS NOTES
"Please see \"Imaging\" tab under \"Chart Review\" for details of today's US.      Aylssa De La Cruz, DO  Maternal-Fetal Medicine        "

## 2017-09-19 ENCOUNTER — OFFICE VISIT (OUTPATIENT)
Dept: MATERNAL FETAL MEDICINE | Facility: CLINIC | Age: 32
End: 2017-09-19
Attending: OBSTETRICS & GYNECOLOGY
Payer: COMMERCIAL

## 2017-09-19 ENCOUNTER — HOSPITAL ENCOUNTER (OUTPATIENT)
Dept: ULTRASOUND IMAGING | Facility: CLINIC | Age: 32
Discharge: HOME | End: 2017-09-19
Attending: OBSTETRICS & GYNECOLOGY | Admitting: OBSTETRICS & GYNECOLOGY
Payer: COMMERCIAL

## 2017-09-19 DIAGNOSIS — O24.414 INSULIN CONTROLLED GESTATIONAL DIABETES MELLITUS (GDM) DURING PREGNANCY, ANTEPARTUM: ICD-10-CM

## 2017-09-19 DIAGNOSIS — O24.414 INSULIN CONTROLLED GESTATIONAL DIABETES MELLITUS (GDM) IN THIRD TRIMESTER: Primary | ICD-10-CM

## 2017-09-19 PROCEDURE — 76819 FETAL BIOPHYS PROFIL W/O NST: CPT

## 2017-09-19 NOTE — PROGRESS NOTES
"Please see \"Imaging\" tab under \"Chart Review\" for details of today's visit.    Reena Larson    "

## 2017-09-19 NOTE — MR AVS SNAPSHOT
After Visit Summary   9/19/2017    Unique Multani    MRN: [id number]           Patient Information     Date Of Birth          1985        Visit Information        Provider Department      9/19/2017 4:00 PM Reena Larson MD [facility] Maternal Fetal [facility]        Today's Diagnoses     Insulin controlled gestational diabetes mellitus (GDM) in third trimester    -  1       Follow-ups after your visit        Your next 10 appointments already scheduled     Sep 21, 2017 11:00 AM CDT   MFM BPP SINGLE with [id number]   [facility]aternal [facility]etal [facility] Ultrasound - [facility] ([facility])    [address]  [facility] [address]   [phone]            Sep 21, 2017 11:30 AM CDT   Radiology MD with  GUZMAN LY   [facility] Maternal Fetal [facility])    [address] [place] [address] [address]  [facility] [address]   [phone]           Please arrive at the time given for your first appointment. This visit is used internally to schedule the physician's time during your ultrasound.            Sep 25, 2017 11:00 AM CDT   ESTABLISHED PRENATAL with Michael Mendez MD   [facility] ([facility])    [address] [place]  [facility] [address]   [phone]            Sep 26, 2017  3:30 PM CDT   MFM BPP SINGLE with [id number]   [facility]aternal [facility]etal Medicine Ultrasound - [facility])    [address]  [facility] [address]   [phone]            Sep 26, 2017  4:00 PM CDT   Radiology MD with  GUZMAN LY   [facility] ([facility])    [address]  [facility] [address]   [phone]           Please arrive at the time given for your first appointment. This visit is used internally to schedule the physician's time during your ultrasound.            Sep 29, 2017 10:15 AM CDT   MFM BPP SINGLE  with [id number]   [facility] Maternal Fetal Medicine Ultrasound - [facility] ([facility])    [address]  [facility] [address] [phone]            Sep 29, 2017 10:45 AM CDT   Radiology MD with  MFPRASANNA LY   [facility] Maternal Fetal Medicine [facility] ([facility])    [address]  [facility] [address] [phone]           Please arrive at the time given for your first appointment. This visit is used internally to schedule the physician's time during your ultrasound.            Oct 17, 2017   Procedure with Michael Mendez MD   [facility] Labor and Delivery (--)    [address]  [facility] [address] [phone]              Who to contact     If you have questions or need follow up information about today's clinic visit or your schedule please contact [facility] MATERNAL FETAL MEDICINE [facility] directly at [phone].  Normal or non-critical lab and imaging results will be communicated to you by [employer] Medical[employer], letter or phone within 4 business days after the clinic has received the results. If you do not hear from us within 7 days, please contact the clinic through FXT[employer] or phone. If you have a critical or abnormal lab result, we will notify you by phone as soon as possible.  Submit refill requests through [employer] or call your pharmacy and they will forward the refill request to us. Please allow 3 business days for your refill to be completed.          Additional Information About Your Visit        [employer]Exos Information     [employer] gives you secure access to your electronic health record. If you see a primary care provider, you can also send messages to your care team and make appointments. If you have questions, please call your primary care clinic.  If you do not have a primary care provider, please call [phone] and they will assist you.        Care EveryWhere ID     This is your Care EveryWhere ID. This could be used by other  organizations to access your [place] medical records  [id number]        Your Vitals Were     Last Period                   01/15/2017 (Exact Date)            Blood Pressure from Last 3 Encounters:   09/08/17 100/66   08/25/17 100/64   08/18/17 100/68    Weight from Last 3 Encounters:   09/08/17 77.1 kg (170 lb)   08/25/17 76.7 kg (169 lb)   08/18/17 76.2 kg (168 lb)              Today, you had the following     No orders found for display       Primary Care Provider Office Phone # Fax #    Michael Mendez [phone] [address] [place] [address]        Equal Access to Services     [facility]: Hadii jose daniel Reed, waaxda sebas, qaybta kaalmada tad, nicolás torre . So [facility] [phone].    ATENCIÓN: Si habla español, tiene a humphries disposición servicios gratuitos de asistencia lingüística. Llame al [phone].    We comply with applicable federal civil rights laws and [place] laws. We do not discriminate on the basis of race, color, national origin, age, disability sex, sexual orientation or gender identity.            Thank you!     Thank you for choosing [facility]  for your care. Our goal is always to provide you with excellent care. Hearing back from our patients is one way we can continue to improve our services. Please take a few minutes to complete the written survey that you may receive in the mail after your visit with us. Thank you!             Your Updated Medication List - Protect others around you: Learn how to safely use, store and throw away your medicines at www.disposemymeds.org.          This list is accurate as of: 9/19/17  4:21 PM.  Always use your most recent med list.                   Brand Name Dispense Instructions for use Diagnosis    insulin aspart 100 UNIT/ML injection    NovoLOG FLEXPEN    1500 mL    Take 6 units before breakfast, 4 units before lunch, and 8 units  before dinner daily.    Insulin controlled gestational diabetes mellitus (GDM) in third trimester       insulin isophane human 100 UNIT/ML injection    HumuLIN N PEN    15 mL    4 units with breakfast, 18 units before bed (to be titrated by MD/CDE)    Diet controlled gestational diabetes mellitus (GDM), antepartum, High-risk pregnancy, young multigravida, second trimester, Previous  delivery, antepartum condition or complication       insulin pen needle 32G X 4 MM    BD DINA U/F    100 each    Use 3 daily or as directed.    High-risk pregnancy, young multigravida, second trimester       PRENATAL VIT-FE BISG-FA-DHA PO

## 2017-09-20 ENCOUNTER — VIRTUAL VISIT (OUTPATIENT)
Dept: EDUCATION SERVICES | Facility: CLINIC | Age: 32
End: 2017-09-20

## 2017-09-20 DIAGNOSIS — O24.414 INSULIN CONTROLLED GESTATIONAL DIABETES MELLITUS (GDM) IN THIRD TRIMESTER: Primary | ICD-10-CM

## 2017-09-20 DIAGNOSIS — O24.410 DIET CONTROLLED GESTATIONAL DIABETES MELLITUS (GDM), ANTEPARTUM: ICD-10-CM

## 2017-09-20 NOTE — TELEPHONE ENCOUNTER
Medication is being filled for 1 time refill only due to:  Patient needs to be seen because it has been more than one year since last visit.     SHAUNA Appiah, RN

## 2017-09-20 NOTE — MR AVS SNAPSHOT
After Visit Summary   9/20/2017    Unique Multani    MRN: [id number]           Patient Information     Date Of Birth          1985        Visit Information        Provider Department      9/20/2017 10:30 AM  DIABETIC ED RESOURCE [facility]        Today's Diagnoses     Insulin controlled gestational diabetes mellitus (GDM) in third trimester    -  1       Follow-ups after your visit        Your next 10 appointments already scheduled     Sep 21, 2017 11:00 AM CDT   MFM BPP SINGLE with [id number]   [facility] - [facility])    [address]  [facility] [address]   [phone]            Sep 21, 2017 11:30 AM CDT   Radiology MD with MATHEUS HINDS MD   [facility])    [address]  [facility] [address]   [phone]           Please arrive at the time given for your first appointment. This visit is used internally to schedule the physician's time during your ultrasound.            Sep 25, 2017 11:00 AM CDT   ESTABLISHED PRENATAL with Michael Mendez MD   [facility] ([facility])    [address] [place]  [facility] [address]   [phone]            Sep 26, 2017  3:30 PM CDT   MFM BPP SINGLE with [id number]   [facility] - [facility])    [address]  [facility] [address]   [phone]            Sep 26, 2017  4:00 PM CDT   Radiology MD with RAE HINDS MD   [facility] - [facility] ([facility])    [address]  [facility] [address]   [phone]           Please arrive at the time given for your first appointment. This visit is used internally to schedule the physician's time during your ultrasound.            Sep 29, 2017 10:15 AM CDT   MFM BPP SINGLE  with [id number]   [facility] Maternal Fetal Medicine Ultrasound - [facility] ([facility])    [address]  [facility] [address] [phone]            Sep 29, 2017 10:45 AM CDT   Radiology MD with  MFPRASANNA LY   [facility] Maternal Fetal Medicine - [facility])    [address]  [facility] [address] [phone]           Please arrive at the time given for your first appointment. This visit is used internally to schedule the physician's time during your ultrasound.            Oct 17, 2017   Procedure with Michael Mendez MD   [facility] Labor and Delivery (--)    [address]  [facility] [address] [phone]              Who to contact     If you have questions or need follow up information about today's clinic visit or your schedule please contact [facility] directly at [phone].  Normal or non-critical lab and imaging results will be communicated to you by Vidmindhart, letter or phone within 4 business days after the clinic has received the results. If you do not hear from us within 7 days, please contact the clinic through [employer] or phone. If you have a critical or abnormal lab result, we will notify you by phone as soon as possible.  Submit refill requests through [employer] or call your pharmacy and they will forward the refill request to us. Please allow 3 business days for your refill to be completed.          Additional Information About Your Visit        [employer] Information     [employer] gives you secure access to your electronic health record. If you see a primary care provider, you can also send messages to your care team and make appointments. If you have questions, please call your primary care clinic.  If you do not have a primary care provider, please call [phone] and they will assist you.        Care EveryWhere ID     This is your Care EveryWhere ID. This could be used by other  organizations to access your [place] medical records  [id number]        Your Vitals Were     Last Period                   01/15/2017 (Exact Date)            Blood Pressure from Last 3 Encounters:   09/08/17 100/66   08/25/17 100/64   08/18/17 100/68    Weight from Last 3 Encounters:   09/08/17 77.1 kg (170 lb)   08/25/17 76.7 kg (169 lb)   08/18/17 76.2 kg (168 lb)              Today, you had the following     No orders found for display       Primary Care Provider Office Phone # Fax #    Michael Mendez [phone] [address] [place] [address]        Equal Access to Services     [facility]: Hadii jose daniel pinzono Soidris, waaxda luqadaha, qaybta kaalmada adeegyada, nicolás torre . So [facility] [phone].    ATENCIÓN: Si habla español, tiene a humphries disposición servicios gratuitos de asistencia lingüística. Llame al [phone].    We comply with applicable federal civil rights laws and [place] laws. We do not discriminate on the basis of race, color, national origin, age, disability sex, sexual orientation or gender identity.            Thank you!     Thank you for choosing [facility]  for your care. Our goal is always to provide you with excellent care. Hearing back from our patients is one way we can continue to improve our services. Please take a few minutes to complete the written survey that you may receive in the mail after your visit with us. Thank you!             Your Updated Medication List - Protect others around you: Learn how to safely use, store and throw away your medicines at www.disposemymeds.org.          This list is accurate as of: 9/20/17 10:56 AM.  Always use your most recent med list.                   Brand Name Dispense Instructions for use Diagnosis    insulin aspart 100 UNIT/ML injection    NovoLOG FLEXPEN    1500 mL    Take 6 units before breakfast, 4 units before lunch, and 8 units before  dinner daily.    Insulin controlled gestational diabetes mellitus (GDM) in third trimester       insulin isophane human 100 UNIT/ML injection    HumuLIN N PEN    15 mL    4 units with breakfast, 18 units before bed (to be titrated by MD/CDE)    Diet controlled gestational diabetes mellitus (GDM), antepartum, High-risk pregnancy, young multigravida, second trimester, Previous  delivery, antepartum condition or complication       insulin pen needle 32G X 4 MM    BD DINA U/F    100 each    Use 3 daily or as directed.    High-risk pregnancy, young multigravida, second trimester       PRENATAL VIT-FE BISG-FA-DHA PO

## 2017-09-20 NOTE — PROGRESS NOTES
Gestational Diabetes Follow-up    Subjective/Objective:    Unique Multani sent in blood glucose log for review. Last date of communication was: 9/15.    Gestational diabetes is being managed with Humulin/Novolin NPH Insulin 5-0-0-22 units and NovoLog Insulin 6-5-9-0 units    Estimated Date of Delivery: Oct 22, 2017    BG/Food Log:       Assessment since changes on 9/15:    Ketones:not checked.   Fasting blood glucoses: 66 % in target.  After breakfast: 100% in target.  Before lunch: 50% in target (with 2 readings)  After lunch: 0% in target.  Before dinner: 0% in target  After dinner: 0% in target.    Plan/Response:  Recommend increase to insulin - increase NPH in AM to 7 units and increase lunchtime Novolog to 7 units. <10% TDD increase.  With both pre- and post lunch elevations, attempt to get lunch to goal to improve dinner as well.     Email:    Josh Calhoun,   You are doing so well with your insulin and record keeping! Thank you for sending info in again J    It looks like the changes from the 15th are helping, but not quite to goal at lunch and dinner yet   It is normal to need to increase the insulin as the baby is growing.  Just about one month until baby arrives- so exciting!    Let s make a couple of changes today, if you are able to start the new dose with lunch today, that would be great!  If you can send in numbers again after noon on Friday, I can take a look at see if you need more fine tuning before the weekend.     Let s have you change to this:  NPH insulin that you take in the morning, please increase to 7 units (from 5).  Novolog that you take at lunchtime, please increase to 7 units (from 5).    If we can help keep numbers at goal before and after lunch by giving you more support there, it should help the dinner numbers get closer to goal too.  Thanks Unique!    Again, if you can send Friday after your after-lunch check, I can make any changes needed before the weekend.     Rosamaria Mcdonnell RD,  SREEKANTH PORTILLO      Any diabetes medication dose changes were made via the CDE Protocol and Collaborative Practice Agreement with the patient's OB/GYN provider. A copy of this encounter was shared with the provider.

## 2017-09-21 ENCOUNTER — HOSPITAL ENCOUNTER (OUTPATIENT)
Dept: ULTRASOUND IMAGING | Facility: CLINIC | Age: 32
Discharge: HOME | End: 2017-09-21
Attending: OBSTETRICS & GYNECOLOGY | Admitting: OBSTETRICS & GYNECOLOGY
Payer: COMMERCIAL

## 2017-09-21 ENCOUNTER — OFFICE VISIT (OUTPATIENT)
Dept: MATERNAL FETAL MEDICINE | Facility: CLINIC | Age: 32
End: 2017-09-21
Attending: OBSTETRICS & GYNECOLOGY
Payer: COMMERCIAL

## 2017-09-21 DIAGNOSIS — O24.414 INSULIN CONTROLLED GESTATIONAL DIABETES MELLITUS (GDM) DURING PREGNANCY, ANTEPARTUM: ICD-10-CM

## 2017-09-21 DIAGNOSIS — O24.414 INSULIN CONTROLLED GESTATIONAL DIABETES MELLITUS (GDM) IN THIRD TRIMESTER: Primary | ICD-10-CM

## 2017-09-21 PROCEDURE — 76819 FETAL BIOPHYS PROFIL W/O NST: CPT

## 2017-09-21 NOTE — PROGRESS NOTES
"Please see \"Imaging\" tab under \"Chart Review\" for details of today's US.    Jannie Avila, DO    "

## 2017-09-21 NOTE — MR AVS SNAPSHOT
After Visit Summary   9/21/2017    Unique Multani    MRN: [id number]           Patient Information     Date Of Birth          1985        Visit Information        Provider Department      9/21/2017 11:30 AM Jannie Avila DO [facility] Maternal Fetal Medicine [facility]        Today's Diagnoses     Insulin controlled gestational diabetes mellitus (GDM) in third trimester    -  1       Follow-ups after your visit        Your next 10 appointments already scheduled     Sep 25, 2017 11:00 AM CDT   ESTABLISHED PRENATAL with Michael Mendez MD   [facility] ([facility])    [address] [place]  [facility] [address]   [phone]            Sep 26, 2017  3:30 PM CDT   MFM BPP SINGLE with [id number]   [facility] Maternal Fetal Medicine Ultrasound - [facility])    [address]  [facility] [address]   [phone]            Sep 26, 2017  4:00 PM CDT   Radiology MD with  GUZMAN LY   [facility] Maternal Fetal Medicine [facility])    [address]  [facility] [address]   [phone]           Please arrive at the time given for your first appointment. This visit is used internally to schedule the physician's time during your ultrasound.            Sep 29, 2017 10:15 AM CDT   MFM BPP SINGLE with [id number]   [facility] Maternal Fetal Medicine Ultrasound - [facility])    [address]  [facility] [address]   [phone]            Sep 29, 2017 10:45 AM CDT   Radiology MD with RH MFPRASANNA LY   [facility] Maternal Fetal Medicine [facility] ([facility])    [address]  [facility] [address]   [phone]           Please arrive at the time given for your first appointment. This visit is used internally to schedule the physician's time during your ultrasound.            Oct 17, 2017   Procedure with Michael BRUNO  MD Andrea   [facility] Labor and Delivery (--)    [address]  [facility] [id number] [phone]              Who to contact     If you have questions or need follow up information about today's clinic visit or your schedule please contact [facility] MATERNAL FETAL MEDICINE - [place] directly at [phone].  Normal or non-critical lab and imaging results will be communicated to you by S B Ehart, letter or phone within 4 business days after the clinic has received the results. If you do not hear from us within 7 days, please contact the clinic through S B Ehart or phone. If you have a critical or abnormal lab result, we will notify you by phone as soon as possible.  Submit refill requests through Comsenz or call your pharmacy and they will forward the refill request to us. Please allow 3 business days for your refill to be completed.          Additional Information About Your Visit        S B EharYour Office Agent Information     Comsenz gives you secure access to your electronic health record. If you see a primary care provider, you can also send messages to your care team and make appointments. If you have questions, please call your primary care clinic.  If you do not have a primary care provider, please call [phone] and they will assist you.        Care EveryWhere ID     This is your Care EveryWhere ID. This could be used by other organizations to access your [place] medical records  [id number]        Your Vitals Were     Last Period                   01/15/2017 (Exact Date)            Blood Pressure from Last 3 Encounters:   09/08/17 100/66   08/25/17 100/64   08/18/17 100/68    Weight from Last 3 Encounters:   09/08/17 77.1 kg (170 lb)   08/25/17 76.7 kg (169 lb)   08/18/17 76.2 kg (168 lb)              Today, you had the following     No orders found for display       Primary Care Provider Office Phone # Fax #    Michael Mendez [phone] [address] [facility]  [place] [address]        Equal Access to Services     [facility]: Hadii jose daniel rose albertazeeshan Isabelleali, wayaseminda joyestefaniha, qaulivaleria grimesrosaloretta mishra, nicolás hester. So [facility] [phone].    ATENCIÓN: Si habla español, tiene a humphries disposición servicios gratuitos de asistencia lingüística. Llame al [phone].    We comply with applicable federal civil rights laws and [place] laws. We do not discriminate on the basis of race, color, national origin, age, disability sex, sexual orientation or gender identity.            Thank you!     Thank you for choosing [facility]  for your care. Our goal is always to provide you with excellent care. Hearing back from our patients is one way we can continue to improve our services. Please take a few minutes to complete the written survey that you may receive in the mail after your visit with us. Thank you!             Your Updated Medication List - Protect others around you: Learn how to safely use, store and throw away your medicines at www.disposemymeds.org.          This list is accurate as of: 9/21/17 12:25 PM.  Always use your most recent med list.                   Brand Name Dispense Instructions for use Diagnosis    blood glucose monitoring test strip    ONE TOUCH VERIO IQ    100 strip    Use to test blood sugar 4 times daily or as directed.  Ok to substitute alternative if insurance prefers.    Diet controlled gestational diabetes mellitus (GDM), antepartum       insulin aspart 100 UNIT/ML injection    NovoLOG FLEXPEN    1500 mL    Take 6 units before breakfast, 4 units before lunch, and 8 units before dinner daily.    Insulin controlled gestational diabetes mellitus (GDM) in third trimester       insulin isophane human 100 UNIT/ML injection    HumuLIN N PEN    15 mL    4 units with breakfast, 18 units before bed (to be titrated by MD/CDE)    Diet controlled gestational diabetes mellitus (GDM), antepartum,  High-risk pregnancy, young multigravida, second trimester, Previous  delivery, antepartum condition or complication       insulin pen needle 32G X 4 MM    BD DINA U/F    100 each    Use 3 daily or as directed.    High-risk pregnancy, young multigravida, second trimester       PRENATAL VIT-FE BISG-FA-DHA PO

## 2017-09-22 ENCOUNTER — VIRTUAL VISIT (OUTPATIENT)
Dept: EDUCATION SERVICES | Facility: CLINIC | Age: 32
End: 2017-09-22

## 2017-09-22 DIAGNOSIS — O09.622 HIGH-RISK PREGNANCY, YOUNG MULTIGRAVIDA, SECOND TRIMESTER: ICD-10-CM

## 2017-09-22 DIAGNOSIS — O24.410 DIET CONTROLLED GESTATIONAL DIABETES MELLITUS (GDM), ANTEPARTUM: ICD-10-CM

## 2017-09-22 DIAGNOSIS — O34.219 PREVIOUS CESAREAN DELIVERY, ANTEPARTUM CONDITION OR COMPLICATION: ICD-10-CM

## 2017-09-22 DIAGNOSIS — O24.414 INSULIN CONTROLLED GESTATIONAL DIABETES MELLITUS (GDM) IN THIRD TRIMESTER: Primary | ICD-10-CM

## 2017-09-22 NOTE — MR AVS SNAPSHOT
After Visit Summary   9/22/2017    Unique Multani    MRN: [id number]           Patient Information     Date Of Birth          1985        Visit Information        Provider Department      9/22/2017 8:30 AM  [place] [place] [facility] [place]        Today's Diagnoses     Insulin controlled gestational diabetes mellitus (GDM) in third trimester    -  1       Follow-ups after your visit        Your next 10 appointments already scheduled     Sep 25, 2017 11:00 AM CDT   ESTABLISHED PRENATAL [facility]    [address]  [facility] [address]   [phone]            Sep 26, 2017  3:30 PM CDT   MFM BPP SINGLE with [id number]   [facility] - [facility] [place])    [address]  [facility] [address]   [phone]            Sep 26, 2017  4:00 PM CDT   Radiology MD with RAE HINDS MD   [facility] Maternal Fetal Medicine [facility])    [address]  [facility] [address]   [phone]           Please arrive at the time given for your first appointment. This visit is used internally to schedule the physician's time during your ultrasound.            Sep 29, 2017 10:15 AM CDT   MFM BPP SINGLE with [id number]   [facility] - [facility] [place])    [address]  [facility] [address]   [phone]            Sep 29, 2017 10:45 AM CDT   Radiology MD with RAE HINDS MD   [facility] Maternal Fetal Medicine [facility])    [address]  [facility] [address]   [phone]           Please arrive at the time given for your first appointment. This visit is used internally to schedule the physician's time during your ultrasound.            Oct 17, 2017   Procedure with Michael Mendez MD    [facility] Labor and Delivery (--)    [address] [facility] [id number] [phone]              Who to contact     If you have questions or need follow up information about today's clinic visit or your schedule please contact [facility] directly at [phone].  Normal or non-critical lab and imaging results will be communicated to you by MyChart, letter or phone within 4 business days after the clinic has received the results. If you do not hear from us within 7 days, please contact the clinic through WatchFroghart or phone. If you have a critical or abnormal lab result, we will notify you by phone as soon as possible.  Submit refill requests through [employer] or call your pharmacy and they will forward the refill request to us. Please allow 3 business days for your refill to be completed.          Additional Information About Your Visit        WatchFrogharCadec Global Information     [employer] gives you secure access to your electronic health record. If you see a primary care provider, you can also send messages to your care team and make appointments. If you have questions, please call your primary care clinic.  If you do not have a primary care provider, please call [phone] and they will assist you.        Care EveryWhere ID     This is your Care EveryWhere ID. This could be used by other organizations to access your [place] medical records  [id number]        Your Vitals Were     Last Period                   01/15/2017 (Exact Date)            Blood Pressure from Last 3 Encounters:   09/08/17 100/66   08/25/17 100/64   08/18/17 100/68    Weight from Last 3 Encounters:   09/08/17 77.1 kg (170 lb)   08/25/17 76.7 kg (169 lb)   08/18/17 76.2 kg (168 lb)              Today, you had the following     No orders found for display       Primary Care Provider Office Phone # Fax #    Michael Mendez [phone] [address] [facility] [address] [place] [address]         Equal Access to Services     [facility] DEVIN : Hadii aad ku hadafua Waltonali, wayaseminda luqadaha, qaybta corneliorosaloretta mishra, nicolás hester. So [facility] [phone].    ATENCIÓN: Si habla español, tiene a humphries disposición servicios gratuitos de asistencia lingüística. Llame al [phone].    We comply with applicable federal civil rights laws and [place] laws. We do not discriminate on the basis of race, color, national origin, age, disability sex, sexual orientation or gender identity.            Thank you!     Thank you for choosing [facility]  for your care. Our goal is always to provide you with excellent care. Hearing back from our patients is one way we can continue to improve our services. Please take a few minutes to complete the written survey that you may receive in the mail after your visit with us. Thank you!             Your Updated Medication List - Protect others around you: Learn how to safely use, store and throw away your medicines at www.disposemymeds.org.          This list is accurate as of: 9/22/17 10:11 AM.  Always use your most recent med list.                   Brand Name Dispense Instructions for use Diagnosis    blood glucose monitoring test strip    ONE TOUCH VERIO IQ    100 strip    Use to test blood sugar 4 times daily or as directed.  Ok to substitute alternative if insurance prefers.    Diet controlled gestational diabetes mellitus (GDM), antepartum       insulin aspart 100 UNIT/ML injection    NovoLOG FLEXPEN    1500 mL    Take 6 units before breakfast, 4 units before lunch, and 8 units before dinner daily.    Insulin controlled gestational diabetes mellitus (GDM) in third trimester       insulin isophane human 100 UNIT/ML injection    HumuLIN N PEN    15 mL    4 units with breakfast, 18 units before bed (to be titrated by MD/CDE)    Diet controlled gestational diabetes mellitus (GDM), antepartum, High-risk pregnancy, young multigravida,  second trimester, Previous  delivery, antepartum condition or complication       insulin pen needle 32G X 4 MM    BD DINA U/F    100 each    Use 3 daily or as directed.    High-risk pregnancy, young multigravida, second trimester       PRENATAL VIT-FE BISG-FA-DHA PO

## 2017-09-22 NOTE — PROGRESS NOTES
Gestational Diabetes Follow-up    Subjective/Objective:    Unique Trinity Nerissa sent in blood glucose log for review. Last date of communication was: 9/20.    Gestational diabetes is being managed with Humulin/Novolin NPH Insulin 7-0-0-22 units and NovoLog Insulin 6-7-9-0 units     Estimated Date of Delivery: Oct 22, 2017    BG/Food Log:         Assessment:    Ketones: not noted   Fasting blood glucoses: 75% in target.  After breakfast: 66% in target.  Before lunch: 66% in target  After lunch: 33 % in target.  Before dinner: 66% in target.  After dinner: 33% in target.    Plan/Response:  Recommend increase to insulin before lunch and dinner with high postmeal readings.  1) Continue current NPH at 7-0-0-22  2) Change mealtime Novolog to 6-8-11-0    Reply to Unique:  Jazmine Calhoun!    Wow, lots of readings are in goal! It s great to see as you coast in to the final weeks before delivery J    Please keep the NPH insulin the same with 7 units before breakfast and 22 units at bedtime.     Please change the Novolog insulin so you are taking 6 at breakfast, 8 at lunch, 11 at dinner.  Let s get those after-meal numbers down.     If you are able to start these changes with taking 8units  at  lunch today and 11 units at dinner tonight,  that would be super! Then you can see on Friday, Saturday, & Sunday how much it helps.     If you can send in numbers again Monday, Violette can take a look at continue with fine tuning if needed.  You are doing just great!    Have a good weekend!  [place],   Teresa Mcdonnell RD, LD, CDE      Any diabetes medication dose changes were made via the CDE Protocol and Collaborative Practice Agreement with the patient's OB/GYN provider. A copy of this encounter was shared with the provider.

## 2017-09-25 ENCOUNTER — OFFICE VISIT (OUTPATIENT)
Dept: MATERNAL FETAL MEDICINE | Facility: CLINIC | Age: 32
End: 2017-09-25
Attending: OBSTETRICS & GYNECOLOGY
Payer: COMMERCIAL

## 2017-09-25 ENCOUNTER — HOSPITAL ENCOUNTER (OUTPATIENT)
Dept: ULTRASOUND IMAGING | Facility: CLINIC | Age: 32
Discharge: HOME | End: 2017-09-25
Attending: OBSTETRICS & GYNECOLOGY | Admitting: OBSTETRICS & GYNECOLOGY
Payer: COMMERCIAL

## 2017-09-25 ENCOUNTER — PRENATAL OFFICE VISIT (OUTPATIENT)
Dept: OBGYN | Facility: CLINIC | Age: 32
End: 2017-09-25
Payer: COMMERCIAL

## 2017-09-25 VITALS
WEIGHT: 172 LBS | BODY MASS INDEX: 31.97 KG/M2 | DIASTOLIC BLOOD PRESSURE: 64 MMHG | SYSTOLIC BLOOD PRESSURE: 100 MMHG | HEART RATE: 72 BPM

## 2017-09-25 DIAGNOSIS — O24.414 INSULIN CONTROLLED GESTATIONAL DIABETES MELLITUS (GDM) DURING PREGNANCY, ANTEPARTUM: ICD-10-CM

## 2017-09-25 DIAGNOSIS — Z23 NEED FOR PROPHYLACTIC VACCINATION AND INOCULATION AGAINST INFLUENZA: ICD-10-CM

## 2017-09-25 DIAGNOSIS — O09.623 HIGH-RISK PREGNANCY, YOUNG MULTIGRAVIDA, THIRD TRIMESTER: Primary | ICD-10-CM

## 2017-09-25 DIAGNOSIS — O24.414 INSULIN CONTROLLED GESTATIONAL DIABETES MELLITUS (GDM) IN THIRD TRIMESTER: Primary | ICD-10-CM

## 2017-09-25 PROCEDURE — 90686 IIV4 VACC NO PRSV 0.5 ML IM: CPT | Performed by: OBSTETRICS & GYNECOLOGY

## 2017-09-25 PROCEDURE — 90471 IMMUNIZATION ADMIN: CPT | Performed by: OBSTETRICS & GYNECOLOGY

## 2017-09-25 PROCEDURE — 87653 STREP B DNA AMP PROBE: CPT | Performed by: OBSTETRICS & GYNECOLOGY

## 2017-09-25 PROCEDURE — 99207 ZZC COMPLICATED OB VISIT: CPT | Performed by: OBSTETRICS & GYNECOLOGY

## 2017-09-25 PROCEDURE — 76819 FETAL BIOPHYS PROFIL W/O NST: CPT

## 2017-09-25 NOTE — PROGRESS NOTES
Injectable Influenza Immunization Documentation    1.  Is the person to be vaccinated sick today?   No    2. Does the person to be vaccinated have an allergy to a component   of the vaccine?   No    3. Has the person to be vaccinated ever had a serious reaction   to influenza vaccine in the past?   No    4. Has the person to be vaccinated ever had Guillain-Barré syndrome?   No    Form completed by Aracely Louis ma

## 2017-09-25 NOTE — NURSING NOTE
"Chief Complaint   Patient presents with     Prenatal Care     baby active and moving - more contractions but not painful - GBS     Flu Shot       Initial /64 (BP Location: Left arm, Patient Position: Chair, Cuff Size: Adult Regular)  Pulse 72  Wt 172 lb (78 kg)  LMP 01/15/2017 (Exact Date)  BMI 31.97 kg/m2 Estimated body mass index is 31.97 kg/(m^2) as calculated from the following:    Height as of 6/30/17: 5' 1.5\" (1.562 m).    Weight as of this encounter: 172 lb (78 kg).  Medication Reconciliation: complete    Nurse assisted visit.  Aracely Louis MA.    "

## 2017-09-25 NOTE — PROGRESS NOTES
IUP at 36 1/7 weeks;      -fetal heart tones at 140     -continues twice weekly BPP at [facility]  Glucose; fasting elevated, but improving

## 2017-09-25 NOTE — MR AVS SNAPSHOT
After Visit Summary   9/25/2017    Unique Multani    MRN: [id number]           Patient Information     Date Of Birth          1985        Visit Information        Provider Department      9/25/2017 3:30 PM Lyudmila Samano MD [facility] Maternal Fetal Medicine  [place]        Today's Diagnoses     Insulin controlled gestational diabetes mellitus (GDM) in third trimester    -  1       Follow-ups after your visit        Your next 10 appointments already scheduled     Sep 29, 2017 10:15 AM CDT   MFM BPP SINGLE with [id number]   [facility] Maternal Fetal Medicine Ultrasound - [facility])    [address]  [facility] [address]   [phone]            Sep 29, 2017 10:45 AM CDT   Radiology MD with  GUZMAN LY   [facility] Maternal Fetal Medicine [facility])    [address]  [facility] [address] [phone]           Please arrive at the time given for your first appointment. This visit is used internally to schedule the physician's time during your ultrasound.            Oct 02, 2017  5:30 PM CDT   ESTABL[facility] PRENATAL [facility] MD   [facility]    [address] [facility] [address]   [phone]            Oct 09, 2017  6:45 PM CDT   ESTABLISHED PRENATAL with Tremaine Gaona MD   [facility]    [address] [facility] [address]   [phone]            Oct 17, 2017   Procedure with Michael Mendez MD   [facility] Labor and Delivery (--)    [address] [facility] [address]   [phone]              Who to contact     If you have questions or need follow up information about today's clinic visit or your schedule please contact [facility] MATERNAL FETAL MEDICINE Michelle FLANAGAN directly at [phone].  Normal or non-critical lab and imaging results will be  communicated to you by Procurahart, letter or phone within 4 business days after the clinic has received the results. If you do not hear from us within 7 days, please contact the clinic through EduKoala or phone. If you have a critical or abnormal lab result, we will notify you by phone as soon as possible.  Submit refill requests through EduKoala or call your pharmacy and they will forward the refill request to us. Please allow 3 business days for your refill to be completed.          Additional Information About Your Visit        ProcuraharReactX Information     EduKoala gives you secure access to your electronic health record. If you see a primary care provider, you can also send messages to your care team and make appointments. If you have questions, please call your primary care clinic.  If you do not have a primary care provider, please call [phone] and they will assist you.        Care EveryWhere ID     This is your Care EveryWhere ID. This could be used by other organizations to access your [place] medical records  [id number]        Your Vitals Were     Last Period                   01/15/2017 (Exact Date)            Blood Pressure from Last 3 Encounters:   09/25/17 100/64   09/08/17 100/66   08/25/17 100/64    Weight from Last 3 Encounters:   09/25/17 78 kg (172 lb)   09/08/17 77.1 kg (170 lb)   08/25/17 76.7 kg (169 lb)              Today, you had the following     No orders found for display       Primary Care Provider Office Phone # Fax #    Michael Mendez [phone] [address] [facility] [address] [place] [address]        Equal Access to Services     [facility]: Hadii aad ku hadasho Soomaali, waaxda luqadaha, qaybta kaalmada adeegyada, nicolás torre . So [facility] [phone].    ATENCIÓN: Si habla español, tiene a humphries disposición servicios gratuitos de asistencia lingüística. Llame al [phone].    We comply with applicable federal civil rights laws and  [place] laws. We do not discriminate on the basis of race, color, national origin, age, disability sex, sexual orientation or gender identity.            Thank you!     Thank you for choosing [facility]  for your care. Our goal is always to provide you with excellent care. Hearing back from our patients is one way we can continue to improve our services. Please take a few minutes to complete the written survey that you may receive in the mail after your visit with us. Thank you!             Your Updated Medication List - Protect others around you: Learn how to safely use, store and throw away your medicines at www.disposemymeds.org.          This list is accurate as of: 17  3:39 PM.  Always use your most recent med list.                   Brand Name Dispense Instructions for use Diagnosis    blood glucose monitoring test strip    ONE TOUCH VERIO IQ    100 strip    Use to test blood sugar 4 times daily or as directed.  Ok to substitute alternative if insurance prefers.    Diet controlled gestational diabetes mellitus (GDM), antepartum       insulin aspart 100 UNIT/ML injection    NovoLOG FLEXPEN    1500 mL    Take 6 units before breakfast, 8 units before lunch, and 11 units before dinner daily.    Insulin controlled gestational diabetes mellitus (GDM) in third trimester       insulin isophane human 100 UNIT/ML injection    HumuLIN N PEN    15 mL    7 units with breakfast, 22 units before bed (to be titrated by MD/CDE)    Diet controlled gestational diabetes mellitus (GDM), antepartum, High-risk pregnancy, young multigravida, second trimester, Previous  delivery, antepartum condition or complication       insulin pen needle 32G X 4 MM    BD DINA U/F    100 each    Use 3 daily or as directed.    High-risk pregnancy, young multigravida, second trimester       PRENATAL VIT-FE BISG-FA-DHA PO

## 2017-09-25 NOTE — PROGRESS NOTES
Please see ultrasound report under imaging tab for details on ultrasound performed today.    Lyudmila Samano MD  , OB/GYN  Maternal-Fetal Medicine  [email or web address]  [phone] (Academic office)  [phone] (Pager)

## 2017-09-25 NOTE — NURSING NOTE
"Chief Complaint   Patient presents with     Prenatal Care     baby active and moving - more contractions but not painful - GBS     Flu Shot     36w1d    Initial /64 (BP Location: Left arm, Patient Position: Chair, Cuff Size: Adult Regular)  Pulse 72  Wt 172 lb (78 kg)  LMP 01/15/2017 (Exact Date)  BMI 31.97 kg/m2 Estimated body mass index is 31.97 kg/(m^2) as calculated from the following:    Height as of 6/30/17: 5' 1.5\" (1.562 m).    Weight as of this encounter: 172 lb (78 kg).  Medication Reconciliation: complete     Nurse assisted visit.  Aracely Louis MA.      "

## 2017-09-25 NOTE — MR AVS SNAPSHOT
After Visit Summary   9/25/2017    Unique Multani    MRN: [id number]           Patient Information     Date Of Birth          1985        Visit Information        Provider Department      9/25/2017 11:00 AM Michael Mendez MD [facility]        Today's Diagnoses     High-risk pregnancy, young multigravida, third trimester    -  1    Need for prophylactic vaccination and inoculation against influenza           Follow-ups after your visit        Follow-up notes from your care team     Return in about 1 week (around 10/2/2017).      Your next 10 appointments already scheduled     Sep 29, 2017 10:15 AM CDT   GUZMAN LOZANOP SINGLE with [id number]   [facility] Maternal Fetal Medicine Ultrasound - [facility])    [address]  [facility] [id number] [phone]            Sep 29, 2017 10:45 AM CDT   Radiology MD with  GUZMAN LY   [facility] Maternal Fetal Medicine - [facility])    [address]  [facility] [address] [phone]           Please arrive at the time given for your first appointment. This visit is used internally to schedule the physician's time during your ultrasound.            Oct 02, 2017  5:30 PM CDT   ESTABLISHED PRENATAL with Michael Mendez MD   [facility] ([facility])    [address]  [facility] [address]   [phone]            Oct 09, 2017  6:45 PM CDT   ESTABLISHED PRENATAL with Tremaine Gaona MD   [facility] ([facility])    [address]  [facility] [address]   [phone]            Oct 17, 2017   Procedure with Michael Mendez MD   [facility] Delivery (--)    [address]  [facility] [address]   [phone]              Who to contact     If you have questions or need follow up information about today's clinic visit or your schedule please  contact [facility] directly at [phone].  Normal or non-critical lab and imaging results will be communicated to you by MyChart, letter or phone within 4 business days after the clinic has received the results. If you do not hear from us within 7 days, please contact the clinic through MyChart or phone. If you have a critical or abnormal lab result, we will notify you by phone as soon as possible.  Submit refill requests through [employer] or call your pharmacy and they will forward the refill request to us. Please allow 3 business days for your refill to be completed.          Additional Information About Your Visit        [employer]ell[employer]g Information     [employer] gives you secure access to your electronic health record. If you see a primary care provider, you can also send messages to your care team and make appointments. If you have questions, please call your primary care clinic.  If you do not have a primary care provider, please call [phone] and they will assist you.        Care EveryWhere ID     This is your Care EveryWhere ID. This could be used by other organizations to access your [place] medical records  [id number]        Your Vitals Were     Pulse Last Period BMI (Body Mass Index)             72 01/15/2017 (Exact Date) 31.97 kg/m2          Blood Pressure from Last 3 Encounters:   09/25/17 100/64   09/08/17 100/66   08/25/17 100/64    Weight from Last 3 Encounters:   09/25/17 172 lb (78 kg)   09/08/17 170 lb (77.1 kg)   08/25/17 169 lb (76.7 kg)              We Performed the Following     FLU VAC, SPLIT VIRUS IM > 3 YO (QUADRIVALENT) [41837]     Strep, Group B by PCR     Vaccine Administration, Initial [05855]        Primary Care Provider Office Phone # Fax #    Michael Mendez [phone] [address] [place] [address]        Equal Access to Services     [facility] DVEIN : Chelle Reed, saima mullen, nicolás estrada  silvino bhatiasusieryley lopez'aan ah. So [facility] [phone].    ATENCIÓN: Si habla veronica, tiene a humphries disposición servicios gratuitos de asistencia lingüística. Kamila al [phone].    We comply with applicable federal civil rights laws and [place] laws. We do not discriminate on the basis of race, color, national origin, age, disability sex, sexual orientation or gender identity.            Thank you!     Thank you for choosing [facility]  for your care. Our goal is always to provide you with excellent care. Hearing back from our patients is one way we can continue to improve our services. Please take a few minutes to complete the written survey that you may receive in the mail after your visit with us. Thank you!             Your Updated Medication List - Protect others around you: Learn how to safely use, store and throw away your medicines at www.disposemymeds.org.          This list is accurate as of: 9/25/17 11:59 PM.  Always use your most recent med list.                   Brand Name Dispense Instructions for use Diagnosis    blood glucose monitoring test strip    ONE TOUCH VERIO IQ    100 strip    Use to test blood sugar 4 times daily or as directed.  Ok to substitute alternative if insurance prefers.    Diet controlled gestational diabetes mellitus (GDM), antepartum       insulin aspart 100 UNIT/ML injection    NovoLOG FLEXPEN    1500 mL    Take 6 units before breakfast, 8 units before lunch, and 11 units before dinner daily.    Insulin controlled gestational diabetes mellitus (GDM) in third trimester       insulin pen needle 32G X 4 MM    BD DINA U/F    100 each    Use 3 daily or as directed.    High-risk pregnancy, young multigravida, second trimester       PRENATAL VIT-FE BISG-FA-DHA PO

## 2017-09-26 ENCOUNTER — VIRTUAL VISIT (OUTPATIENT)
Dept: EDUCATION SERVICES | Facility: CLINIC | Age: 32
End: 2017-09-26

## 2017-09-26 DIAGNOSIS — O24.410 DIET CONTROLLED GESTATIONAL DIABETES MELLITUS (GDM), ANTEPARTUM: ICD-10-CM

## 2017-09-26 DIAGNOSIS — O34.219 PREVIOUS CESAREAN DELIVERY, ANTEPARTUM CONDITION OR COMPLICATION: ICD-10-CM

## 2017-09-26 DIAGNOSIS — O09.622 HIGH-RISK PREGNANCY, YOUNG MULTIGRAVIDA, SECOND TRIMESTER: ICD-10-CM

## 2017-09-26 LAB
GP B STREP DNA SPEC QL NAA+PROBE: NEGATIVE
SPECIMEN SOURCE: NORMAL

## 2017-09-26 NOTE — PROGRESS NOTES
Gestational Diabetes Follow-up    Subjective/Objective:    Unique Multani sent in blood glucose log for review. Last date of communication was: 9/22/17.    Gestational diabetes is being managed with NPH 7-0-0-22, Novolog 6-7-9-0    Estimated Date of Delivery: Oct 22, 2017    BG/Food Log:       Assessment:  Post meal readings have improved since 9/22/17, but pre-lunch and dinner readings are above goal.    Ketones:not noted.   Fasting blood glucoses: 88% in target.  After breakfast: 100% in target.  Before lunch: 38%  After lunch: 88% in target.  Before dinner: 38% in target.  After dinner: 75% in target.    Plan/Response:  Recommend increase to insulin - NPH 7-0-0-22 --> 10-0-0-22.  No change to Novolog insulin.  Follow up in 3 days.    Email response to patient:  Josh Calhoun,    Thank you for sending in your logbook.  The additional mealtime insulin really helped bring down your after meal readings.  Now the before lunch and dinner are where we are seeing the highest numbers.    Please increase your morning NPH insulin to 10 units, continue with 22 units at bedtime.    No changes to your Novolog.    Could you send in your readings again Friday afternoon to see how this adjustment worked?    Thanks!      Violette العلي MS, RD, BANDAR, FABIANAE  [facility]  Diabetes & Nutrition Care  [email or web address]  Ph: [phone]      Violette العلي MS, RD, BANDAR, CDE    Any diabetes medication dose changes were made via the CDE Protocol and Collaborative Practice Agreement with the patient's referring provider.

## 2017-09-26 NOTE — MR AVS SNAPSHOT
After Visit Summary   2017    Unique Multani    MRN: [id number]           Patient Information     Date Of Birth          1985        Visit Information        Provider Department      2017 10:45 AM [facility] DIABETIC ED RESOURCE [facility]        Today's Diagnoses     Diet controlled gestational diabetes mellitus (GDM), antepartum        High-risk pregnancy, young multigravida, second trimester        Previous  delivery, antepartum condition or complication           Follow-ups after your visit        Your next 10 appointments already scheduled     Sep 29, 2017 10:15 AM CDT   GUZMAN LOZANOP SINGLE with [id number]   [facility] Maternal Fetal Medicine Ultrasound - [facility])    [address]  [facility] [id number] [phone]            Sep 29, 2017 10:45 AM CDT   Radiology MD with  GUZMAN LY   [facility] Maternal Fetal Medicine - [facility])    [address]  [facility] [address] [phone]           Please arrive at the time given for your first appointment. This visit is used internally to schedule the physician's time during your ultrasound.            Oct 02, 2017  5:30 PM CDT   ESTABLISHED PRENATAL with Michael Mendez MD   [facility] ([facility])    [address]  [facility] [address] [phone]            Oct 09, 2017  6:45 PM CDT   ESTABLISHED PRENATAL with Tremaine Gaona MD   [facility] ([facility])    [address]  [facility] [address]   [phone]            Oct 17, 2017   Procedure with Michael Mendez MD   [facility] Labor and Delivery (--)    [address] [address]   [phone]              Who to contact     If you have questions or need follow up information about today's clinic visit or your schedule please contact [facility]  SHASHANK FINK directly at [phone].  Normal or non-critical lab and imaging results will be communicated to you by LiveRamphart, letter or phone within 4 business days after the clinic has received the results. If you do not hear from us within 7 days, please contact the clinic through LiveRamphart or phone. If you have a critical or abnormal lab result, we will notify you by phone as soon as possible.  Submit refill requests through M[employer]Left or call your pharmacy and they will forward the refill request to us. Please allow 3 business days for your refill to be completed.          Additional Information About Your Visit        [employer] Information     M[employer]Left gives you secure access to your electronic health record. If you see a primary care provider, you can also send messages to your care team and make appointments. If you have questions, please call your primary care clinic.  If you do not have a primary care provider, please call [phone] and they will assist you.        Care EveryWhere ID     This is your Care EveryWhere ID. This could be used by other organizations to access your [place] medical records  [id number]        Your Vitals Were     Last Period                   01/15/2017 (Exact Date)            Blood Pressure from Last 3 Encounters:   09/25/17 100/64   09/08/17 100/66   08/25/17 100/64    Weight from Last 3 Encounters:   09/25/17 172 lb (78 kg)   09/08/17 170 lb (77.1 kg)   08/25/17 169 lb (76.7 kg)              Today, you had the following     No orders found for display         Today's Medication Changes          These changes are accurate as of: 9/26/17 11:35 AM.  If you have any questions, ask your nurse or doctor.               These medicines have changed or have updated prescriptions.        Dose/Directions    insulin isophane human 100 UNIT/ML injection   Commonly known as:  HumuLIN N PEN   This may have changed:  additional instructions   Used for:  Diet controlled gestational diabetes  mellitus (GDM), antepartum, High-risk pregnancy, young multigravida, second trimester, Previous  delivery, antepartum condition or complication        10 units with breakfast, 22 units before bed (to be titrated by MD/CDE)   Quantity:  15 mL   Refills:  1            Where to get your medicines      Some of these will need a paper prescription and others can be bought over the counter.  Ask your nurse if you have questions.     You don't need a prescription for these medications     insulin isophane human 100 UNIT/ML injection                Primary Care Provider Office Phone # Fax #    Michael Mendez [phone] [address] [place] [address]        Equal Access to Services     [facility]: Hadii aad milton hadasho Somonikaali, waaxda luqadaha, qaybta kaalmada ademalinayada, nicolás torre . So [facility] [phone].    ATENCIÓN: Si habla español, tiene a humphries disposición servicios gratuitos de asistencia lingüística. Llame al [phone].    We comply with applicable federal civil rights laws and [place] laws. We do not discriminate on the basis of race, color, national origin, age, disability sex, sexual orientation or gender identity.            Thank you!     Thank you for choosing [facility]  for your care. Our goal is always to provide you with excellent care. Hearing back from our patients is one way we can continue to improve our services. Please take a few minutes to complete the written survey that you may receive in the mail after your visit with us. Thank you!             Your Updated Medication List - Protect others around you: Learn how to safely use, store and throw away your medicines at www.disposemymeds.org.          This list is accurate as of: 17 11:35 AM.  Always use your most recent med list.                   Brand Name Dispense Instructions for use Diagnosis    blood glucose monitoring test strip    ONE  TOUCH VERIO IQ    100 strip    Use to test blood sugar 4 times daily or as directed.  Ok to substitute alternative if insurance prefers.    Diet controlled gestational diabetes mellitus (GDM), antepartum       insulin aspart 100 UNIT/ML injection    NovoLOG FLEXPEN    1500 mL    Take 6 units before breakfast, 8 units before lunch, and 11 units before dinner daily.    Insulin controlled gestational diabetes mellitus (GDM) in third trimester       insulin isophane human 100 UNIT/ML injection    HumuLIN N PEN    15 mL    10 units with breakfast, 22 units before bed (to be titrated by MD/CDE)    Diet controlled gestational diabetes mellitus (GDM), antepartum, High-risk pregnancy, young multigravida, second trimester, Previous  delivery, antepartum condition or complication       insulin pen needle 32G X 4 MM    BD DINA U/F    100 each    Use 3 daily or as directed.    High-risk pregnancy, young multigravida, second trimester       PRENATAL VIT-FE BISG-FA-DHA PO

## 2017-09-29 ENCOUNTER — VIRTUAL VISIT (OUTPATIENT)
Dept: EDUCATION SERVICES | Facility: CLINIC | Age: 32
End: 2017-09-29

## 2017-09-29 ENCOUNTER — HOSPITAL ENCOUNTER (OUTPATIENT)
Dept: ULTRASOUND IMAGING | Facility: CLINIC | Age: 32
Discharge: HOME | End: 2017-09-29
Attending: OBSTETRICS & GYNECOLOGY | Admitting: OBSTETRICS & GYNECOLOGY
Payer: COMMERCIAL

## 2017-09-29 ENCOUNTER — OFFICE VISIT (OUTPATIENT)
Dept: MATERNAL FETAL MEDICINE | Facility: CLINIC | Age: 32
End: 2017-09-29
Attending: OBSTETRICS & GYNECOLOGY
Payer: COMMERCIAL

## 2017-09-29 DIAGNOSIS — O24.414 INSULIN CONTROLLED GESTATIONAL DIABETES MELLITUS (GDM) DURING PREGNANCY, ANTEPARTUM: Primary | ICD-10-CM

## 2017-09-29 DIAGNOSIS — O24.414 INSULIN CONTROLLED GESTATIONAL DIABETES MELLITUS (GDM) DURING PREGNANCY, ANTEPARTUM: ICD-10-CM

## 2017-09-29 DIAGNOSIS — O24.414 INSULIN CONTROLLED GESTATIONAL DIABETES MELLITUS (GDM) IN THIRD TRIMESTER: Primary | ICD-10-CM

## 2017-09-29 PROCEDURE — 76819 FETAL BIOPHYS PROFIL W/O NST: CPT

## 2017-09-29 NOTE — MR AVS SNAPSHOT
After Visit Summary   9/29/2017    Unique Multani    MRN: [id number]           Patient Information     Date Of Birth          1985        Visit Information        Provider Department      9/29/2017 10:45 AM Reena Larson MD [facility] Maternal Fetal Medicine [facility]        Today's Diagnoses     Insulin controlled gestational diabetes mellitus (GDM) during pregnancy, antepartum    -  1       Follow-ups after your visit        Your next 10 appointments already scheduled     Oct 02, 2017  5:30 PM CDT   ESTABLISHED PRENATAL with Michael Mendez MD   [facility] ([facility])    [address]  [facility] [address]   [phone]            Oct 03, 2017  8:45 AM CDT   [facility] BPP SINGLE with [id number]   [facility] Maternal Fetal Medicine Ultrasound [facility])    [address]  [facility] [address]   [phone]            Oct 03, 2017  9:15 AM CDT   Radiology MD with  GUZMAN LY   [facility] Fetal Medicine [facility] ([facility])    [address]  [facility] [address]   [phone]           Please arrive at the time given for your first appointment. This visit is used internally to schedule the physician's time during your ultrasound.            Oct 06, 2017  3:30 PM CDT   M BPP SINGLE with [id number]   [facility] Maternal Fetal Medicine Ultrasound - [facility])    [address]  [facility] [address]   [phone]            Oct 06, 2017  4:00 PM CDT   Radiology MD with  GUZMAN LY   [facility] Maternal Fetal Medicine [facility] ([facility])    [address]  [facility] [address]   [phone]           Please arrive at the time given for your first appointment. This visit is used internally to schedule the physician's time during your ultrasound.            Oct 09, 2017  6:45 PM CDT   ESTABLISHED  PRENATAL with Tremaine Gaona MD   [facility] ([facility])    [address]  [facility] [address]   [phone]            Oct 10, 2017  3:30 PM CDT   MFM BPP SINGLE with [id number]   [facility] Maternal Fetal Medicine Ultrasound [facility] ([facility])    [address]  [facility] [address]   [phone]            Oct 10, 2017  4:00 PM CDT   Radiology MD with RH MFPRASANNA LY   [facility] Maternal Fetal Medicine [facility] ([facility])    [address]  [facility] [address]   [phone]           Please arrive at the time given for your first appointment. This visit is used internally to schedule the physician's time during your ultrasound.            Oct 13, 2017  9:30 AM CDT   MFM US COMPRE SINGLE F/U with [id number]   [facility] Fetal Medicine Ultrasound [facility] ([facility])    [address]  [facility] [address]   [phone]           Wear comfortable clothes and leave your valuables at home.            Oct 13, 2017 10:00 AM CDT   Radiology MD with  GUZMAN LY   [facility] Maternal Fetal Medicine [facility] ([facility])    [address]  [facility] [address]   [phone]           Please arrive at the time given for your first appointment. This visit is used internally to schedule the physician's time during your ultrasound.              Future tests that were ordered for you today     Open Future Orders        Priority Expected Expires Ordered    MFM US Comprehensive Single F/U Routine 10/13/2017 9/29/2018 9/29/2017    MFM BPP Single Routine  7/29/2018 9/29/2017    MFM BPP Single Routine  7/29/2018 9/29/2017    MFM BPP Single Routine  7/29/2018 9/29/2017            Who to contact     If you have questions or need follow up information about today's clinic visit or your schedule please contact [facility] FETAL [facility]  directly at [phone].  Normal or non-critical lab and imaging results will be communicated to you by [employer]hart, letter or phone within 4 business days after the clinic has received the results. If you do not hear from us within 7 days, please contact the clinic through [employer]hart or phone. If you have a critical or abnormal lab result, we will notify you by phone as soon as possible.  Submit refill requests through i[employer] Reminder or call your pharmacy and they will forward the refill request to us. Please allow 3 business days for your refill to be completed.          Additional Information About Your Visit        [employer] Information     i[employer] Reminder gives you secure access to your electronic health record. If you see a primary care provider, you can also send messages to your care team and make appointments. If you have questions, please call your primary care clinic.  If you do not have a primary care provider, please call [phone] and they will assist you.        Care EveryWhere ID     This is your Care EveryWhere ID. This could be used by other organizations to access your [place] medical records  [id number]        Your Vitals Were     Last Period                   01/15/2017 (Exact Date)            Blood Pressure from Last 3 Encounters:   09/25/17 100/64   09/08/17 100/66   08/25/17 100/64    Weight from Last 3 Encounters:   09/25/17 78 kg (172 lb)   09/08/17 77.1 kg (170 lb)   08/25/17 76.7 kg (169 lb)                 Today's Medication Changes          These changes are accurate as of: 9/29/17  1:39 PM.  If you have any questions, ask your nurse or doctor.               These medicines have changed or have updated prescriptions.        Dose/Directions    insulin aspart 100 UNIT/ML injection   Commonly known as:  NovoLOG FLEXPEN   This may have changed:  additional instructions   Used for:  Insulin controlled gestational diabetes mellitus (GDM) in third trimester        Take 6 units before breakfast, 8 units before  lunch, and 14 units before dinner daily.   Quantity:  1500 mL   Refills:  3            Where to get your medicines      These medications were sent to [facility] [address] [place], [place] - [address] [place] [address]  [address], [facility] [address]     Phone:  [phone]     insulin aspart 100 UNIT/ML injection                Primary Care Provider Office Phone # Fax #    Michael Mendez [phone] [address] [place] [address]        Equal Access to Services     [facility]: Hadii aad ku hadasho Soomaali, waaxda luqadaha, qaybta kaalmada adeegyada, waxay idiin hayaan ademalina torre . So [facility] [phone].    ATENCIÓN: Si habla español, tiene a humphries disposición servicios gratuitos de asistencia lingüística. [facility] [phone].    We comply with applicable federal civil rights laws and [place] laws. We do not discriminate on the basis of race, color, national origin, age, disability, sex, sexual orientation, or gender identity.            Thank you!     Thank you for choosing [facility]  for your care. Our goal is always to provide you with excellent care. Hearing back from our patients is one way we can continue to improve our services. Please take a few minutes to complete the written survey that you may receive in the mail after your visit with us. Thank you!             Your Updated Medication List - Protect others around you: Learn how to safely use, store and throw away your medicines at www.disposemymeds.org.          This list is accurate as of: 9/29/17  1:39 PM.  Always use your most recent med list.                   Brand Name Dispense Instructions for use Diagnosis    blood glucose monitoring test strip    ONE TOUCH VERIO IQ    100 strip    Use to test blood sugar 4 times daily or as directed.  Ok to substitute alternative if insurance prefers.    Diet controlled gestational  diabetes mellitus (GDM), antepartum       insulin aspart 100 UNIT/ML injection    NovoLOG FLEXPEN    1500 mL    Take 6 units before breakfast, 8 units before lunch, and 14 units before dinner daily.    Insulin controlled gestational diabetes mellitus (GDM) in third trimester       insulin isophane human 100 UNIT/ML injection    HumuLIN N PEN    15 mL    10 units with breakfast, 22 units before bed (to be titrated by MD/CDE)    Diet controlled gestational diabetes mellitus (GDM), antepartum, High-risk pregnancy, young multigravida, second trimester, Previous  delivery, antepartum condition or complication       insulin pen needle 32G X 4 MM    BD DINA U/F    100 each    Use 3 daily or as directed.    High-risk pregnancy, young multigravida, second trimester       PRENATAL VIT-FE BISG-FA-DHA PO

## 2017-09-29 NOTE — PROGRESS NOTES
Diabetes Follow-up    Subjective/Objective:    Due date has changed to 10/17/17 (scheduled )    Unique Multani sent in blood glucose log for review. Last date of communication was: .    Diabetes is being managed with Injectable Medications: Humulin/Novolin NPH 10-0-0-22 and NovoLog Insulin 6-7-9-0    BG/Food Log:         Assessment:    Fasting blood glucose: 100% in target.  After breakfast glucose: 100% in target.  After lunch glucose: 66% in target.  After dinner glucose: 0% in target.    Plan/Response:  Recommend increase to insulin - increase dinnertime Novolog to 12 units.    Reply:   Janetyusuf Calhoun!    Wow, numbers are looking so healthy! Way to go J  Just a few more weeks until baby is here and you will be right in range going into delivery.     As baby is growing, it looks like your body needs a little more help with dinner.   Please increase dinner time insulin to 12 units (up from 9 units).    If the after dinner numbers are under 120, and others are in range, you could send in your log again next Friday, the .  If you see dinner, or other numbers, are still elevated, please send in on Tuesday.    Have a great weekend!    Rosamaria Mcdonnell RD, LD, CDE      Any diabetes medication dose changes were made via the CDE Protocol and Collaborative Practice Agreement with the patient's OB/GYN provider. A copy of this encounter was shared with the provider.

## 2017-09-29 NOTE — MR AVS SNAPSHOT
After Visit Summary   9/29/2017    Unique Multani    MRN: [id number]           Patient Information     Date Of Birth          1985        Visit Information        Provider Department      9/29/2017 12:30 PM OX DIABETIC ED RESOURCE [facility]        Today's Diagnoses     Insulin controlled gestational diabetes mellitus (GDM) in third trimester    -  1       Follow-ups after your visit        Your next 10 appointments already scheduled     Sep 29, 2017 12:30 PM CDT   Telephone Visit with  DIABETIC ED RESOURCE   [facility] ([facility])    [address] [id number] [phone]           Note: this is not an onsite visit; there is no need to come to the facility.            Oct 02, 2017  5:30 PM CDT   ESTABLISHED PRENATAL with Michael Mendez MD   [facility] ([facility])    [address]  [facility] [address]   [phone]            Oct 03, 2017  8:45 AM CDT   MFPRASANNA BPP SINGLE with [id number]   [facility] Maternal Fetal Medicine Ultrasound - [facility])    [address]  [facility] [address]   [phone]            Oct 03, 2017  9:15 AM CDT   Radiology MD with RAE HINDS MD   [facility] Maternal Fetal Medicine [facility])    [address]  [facility] [address]   [phone]           Please arrive at the time given for your first appointment. This visit is used internally to schedule the physician's time during your ultrasound.            Oct 06, 2017  3:30 PM CDT   GUZMAN BPP SINGLE with [email or web address]   [facility]etal [facility] Ultrasound - [facility])    [address]  [facility] [address]   [phone]            Oct 06, 2017  4:00 PM CDT   Radiology MD with RAE HINDS MD   [facility] Maternal Fetal Medicine [facility]  [facility])    [address]  [facility] [address]   [phone]           Please arrive at the time given for your first appointment. This visit is used internally to schedule the physician's time during your ultrasound.            Oct 09, 2017  6:45 PM CDT   ESTABLISHED PRENATAL with Tremaine Gaona MD   [facility])    [address]  [facility] [address]   [phone]            Oct 10, 2017  3:30 PM CDT   MFM BPP SINGLE with [id number]   [facility] Maternal Fetal Medicine Ultrasound - [facility] ([facility])    [address]  [facility] [address]   [phone]            Oct 10, 2017  4:00 PM CDT   Radiology MD with RH MFM MD   [facility] Maternal Fetal Medicine [facility] ([facility])    [address]  [facility] [address]   [phone]           Please arrive at the time given for your first appointment. This visit is used internally to schedule the physician's time during your ultrasound.            Oct 13, 2017  9:30 AM CDT   MFM US COMPRE SINGLE F/U with [email or web address]   [facility] Maternal Fetal Medicine Ultrasound [facility] ([facility])    [address] [facility] [facility] [address]  [facility] [address]   [phone]           Wear comfortable clothes and leave your valuables at home.              Future tests that were ordered for you today     Open Future Orders        Priority Expected Expires Ordered    MFM US Comprehensive Single F/U Routine 10/13/2017 9/29/2018 9/29/2017    MFM BPP Single Routine  7/29/2018 9/29/2017    MFM BPP Single Routine  7/29/2018 9/29/2017    MFM BPP Single Routine  7/29/2018 9/29/2017            Who to contact     If you have questions or need follow up information about today's clinic visit or your schedule please contact [facility] directly at [phone].  Normal or non-critical lab and imaging results  will be communicated to you by MyChart, letter or phone within 4 business days after the clinic has received the results. If you do not hear from us within 7 days, please contact the clinic through [employer] or phone. If you have a critical or abnormal lab result, we will notify you by phone as soon as possible.  Submit refill requests through [employer] or call your pharmacy and they will forward the refill request to us. Please allow 3 business days for your refill to be completed.          Additional Information About Your Visit        Vizibility Information     [employer] gives you secure access to your electronic health record. If you see a primary care provider, you can also send messages to your care team and make appointments. If you have questions, please call your primary care clinic.  If you do not have a primary care provider, please call [phone] and they will assist you.        Care EveryWhere ID     This is your Care EveryWhere ID. This could be used by other organizations to access your [place] medical records  [id number]        Your Vitals Were     Last Period                   01/15/2017 (Exact Date)            Blood Pressure from Last 3 Encounters:   09/25/17 100/64   09/08/17 100/66   08/25/17 100/64    Weight from Last 3 Encounters:   09/25/17 78 kg (172 lb)   09/08/17 77.1 kg (170 lb)   08/25/17 76.7 kg (169 lb)              Today, you had the following     No orders found for display       Primary Care Provider Office Phone # Fax #    Michael Mendez [phone] [address] [place] [address]        Equal Access to Services     [facility]: Hadii jose daniel rose hadnathaliao Soidris, waaxda luqadaha, qaybta kaalmada tad, nicolás hester. So [facility] [phone].    ATENCIÓN: Si habla español, tiene a humphries disposición servicios gratuitos de asistencia lingüística. Llame al [phone].    We comply with applicable federal civil rights laws  and [place] laws. We do not discriminate on the basis of race, color, national origin, age, disability sex, sexual orientation or gender identity.            Thank you!     Thank you for choosing [facility]  for your care. Our goal is always to provide you with excellent care. Hearing back from our patients is one way we can continue to improve our services. Please take a few minutes to complete the written survey that you may receive in the mail after your visit with us. Thank you!             Your Updated Medication List - Protect others around you: Learn how to safely use, store and throw away your medicines at www.disposemymeds.org.          This list is accurate as of: 17 11:04 AM.  Always use your most recent med list.                   Brand Name Dispense Instructions for use Diagnosis    blood glucose monitoring test strip    ONE TOUCH VERIO IQ    100 strip    Use to test blood sugar 4 times daily or as directed.  Ok to substitute alternative if insurance prefers.    Diet controlled gestational diabetes mellitus (GDM), antepartum       insulin aspart 100 UNIT/ML injection    NovoLOG FLEXPEN    1500 mL    Take 6 units before breakfast, 8 units before lunch, and 11 units before dinner daily.    Insulin controlled gestational diabetes mellitus (GDM) in third trimester       insulin isophane human 100 UNIT/ML injection    HumuLIN N PEN    15 mL    10 units with breakfast, 22 units before bed (to be titrated by MD/CDE)    Diet controlled gestational diabetes mellitus (GDM), antepartum, High-risk pregnancy, young multigravida, second trimester, Previous  delivery, antepartum condition or complication       insulin pen needle 32G X 4 MM    BD DINA U/F    100 each    Use 3 daily or as directed.    High-risk pregnancy, young multigravida, second trimester       PRENATAL VIT-FE BISG-FA-DHA PO

## 2017-09-29 NOTE — PROGRESS NOTES
Unique send in a correction;     I'm actually at 11 unit at dinner not 9. Do you want me to increase by 1 unit or 3 and go up to 14 not 12?     Reply:    Oh! Thanks for the clarification- I saw 2 different numbers, I will correct that!     Yes, please go up 3 units to 14 before dinner.     * correct Novolog dose before today = 6-8-11-0    * Now 6-8-14-0    Rosamaria Mcdonnell RD, LD, CDE

## 2017-10-02 ENCOUNTER — PRENATAL OFFICE VISIT (OUTPATIENT)
Dept: OBGYN | Facility: CLINIC | Age: 32
End: 2017-10-02
Payer: COMMERCIAL

## 2017-10-02 VITALS
SYSTOLIC BLOOD PRESSURE: 110 MMHG | BODY MASS INDEX: 32.72 KG/M2 | DIASTOLIC BLOOD PRESSURE: 68 MMHG | HEART RATE: 84 BPM | WEIGHT: 176 LBS

## 2017-10-02 DIAGNOSIS — O09.623 HIGH-RISK PREGNANCY, YOUNG MULTIGRAVIDA IN THIRD TRIMESTER: Primary | ICD-10-CM

## 2017-10-02 DIAGNOSIS — Z01.818 PREOP GENERAL PHYSICAL EXAM: ICD-10-CM

## 2017-10-02 PROCEDURE — 99207 ZZC COMPLICATED OB VISIT: CPT | Performed by: OBSTETRICS & GYNECOLOGY

## 2017-10-02 NOTE — PROGRESS NOTES
[place] [address]  [facility] [address]  [phone]  Dept: [phone]    PRE-OP EVALUATION:  Today's date: 10/2/2017    Unique Multani (: 1985) presents for pre-operative evaluation assessment as requested by Dr. Mendez.  She requires evaluation and anesthesia risk assessment prior to undergoing surgery/procedure for treatment of repeat   .  Proposed procedure: /bilateral salpingectomy    Date of Surgery/ Procedure: 10/17/17  Time of Surgery/ Procedure: 7:00am  Hospital/Surgical Facility: [facility]    Primary Physician: Mcihael Mendez  Type of Anesthesia Anticipated: Spinal    Patient has a Health Care Directive or Living Will:  NO    1. NO - Do you have a history of heart attack, stroke, stent, bypass or surgery on an artery in the head, neck, heart or legs?  2. NO - Do you ever have any pain or discomfort in your chest?  3. NO - Do you have a history of  Heart Failure?  4. NO - Are you troubled by shortness of breath when: walking on the level, up a slight hill or at night?  5. NO - Do you currently have a cold, bronchitis or other respiratory infection?  6. NO - Do you have a cough, shortness of breath or wheezing?  7. NO - Do you sometimes get pains in the calves of your legs when you walk?  8. NO - Do you or anyone in your family have previous history of blood clots?  9. NO - Do you or does anyone in your family have a serious bleeding problem such as prolonged bleeding following surgeries or cuts?  10. NO - Have you ever had problems with anemia or been told to take iron pills?  11. NO - Have you had any abnormal blood loss such as black, tarry or bloody stools, or abnormal vaginal bleeding?  12. NO - Have you ever had a blood transfusion?  13. NO - Have you or any of your relatives ever had problems with anesthesia?  14. NO - Do you have sleep apnea, excessive snoring or daytime drowsiness?  15. NO - Do you have any  prosthetic heart valves?  16. NO - Do you have prosthetic joints?  17. YES - IS THERE ANY CHANCE THAT YOU MAY BE PREGNANT? Pt is pregnant  Aracely Louis ma          HPI:                                                      Brief HPI related to upcoming procedure: repeat C/S with surgical contraception      See problem list for active medical problems.  Problems all longstanding and stable, except as noted/documented.  See ROS for pertinent symptoms related to these conditions.                                                                                                  .    MEDICAL HISTORY:                                                    Patient Active Problem List    Diagnosis Date Noted     Contraception 2017     Priority: Medium     With repeat C/S       Insulin controlled gestational diabetes mellitus (GDM) during pregnancy 2017     Priority: Medium     High-risk pregnancy, young multigravida 2017     Priority: Medium     Previous  delivery, antepartum condition or complication 2017     Priority: Medium     Previous C/S x 2       Irritable bowel syndrome 07/15/2015     Priority: Medium     CARDIOVASCULAR SCREENING; LDL GOAL LESS THAN 160 10/31/2010     Priority: Medium     Esophageal reflux 2006     Priority: Medium      Past Medical History:   Diagnosis Date     Diabetes (H)     GDM insulin     GERD (gastroesophageal reflux disease)     w/u otherwise neg      History of colposcopy with cervical biopsy 3/23/07    WNL     Papanicolaou smear of cervix with low grade squamous intraepithelial lesion (LGSIL) 07     Past Surgical History:   Procedure Laterality Date     BREAST SURGERY      augmentation      C NONSPECIFIC PROCEDURE  01    Primary LTCS      SECTION N/A 2015    Procedure:  SECTION;  Surgeon: Tremaine Gaona MD;  Location: [facility] OR     COLONOSCOPY N/A 2016    Procedure: COLONOSCOPY;  Surgeon: Lyudmila Pastor MD;   Location:  GI     DILATION AND CURETTAGE SUCTION  2010    elective termination     ESOPHAGOSCOPY, GASTROSCOPY, DUODENOSCOPY (EGD), COMBINED  4/8/2014    Procedure: COMBINED ESOPHAGOSCOPY, GASTROSCOPY, DUODENOSCOPY (EGD);   ESOPHAGOSCOPY, GASTROSCOPY, DUODENOSCOPY (EGD) ;  Surgeon: Vishnu Lanier MD;  Location:  GI     GYN SURGERY  2001     c section      Current Outpatient Prescriptions   Medication Sig Dispense Refill     insulin aspart (NOVOLOG FLEXPEN) 100 UNIT/ML injection Take 6 units before breakfast, 8 units before lunch, and 14 units before dinner daily. 1500 mL 3     insulin isophane human (HUMULIN N PEN) 100 UNIT/ML injection 10 units with breakfast, 22 units before bed (to be titrated by MD/CDE) 15 mL 1     PRENATAL VIT-FE BISG-FA-DHA PO        blood glucose monitoring (ONE TOUCH VERIO IQ) test strip Use to test blood sugar 4 times daily or as directed.  Ok to substitute alternative if insurance prefers. 100 strip 3     insulin pen needle (BD DINA U/F) 32G X 4 MM Use 3 daily or as directed. 100 each 1     OTC products: None, except as noted above    Allergies   Allergen Reactions     No Known Drug Allergies       Latex Allergy: NO    Social History   Substance Use Topics     Smoking status: Former Smoker     Years: 7.00     Types: Cigarettes     Smokeless tobacco: Never Used      Comment: only if she drinks alcohol     Alcohol use No      Comment: occasional     History   Drug Use No       REVIEW OF SYSTEMS:                                                    Constitutional, neuro, ENT, endocrine, pulmonary, cardiac, gastrointestinal, genitourinary, musculoskeletal, integument and psychiatric systems are negative, except as otherwise noted.      EXAM:                                                    /68 (BP Location: Right arm, Patient Position: Chair, Cuff Size: Adult Regular)  Pulse 84  Wt 176 lb (79.8 kg)  LMP 01/15/2017 (Exact Date)  BMI 32.72 kg/m2    GENERAL APPEARANCE: healthy,  alert and no distress     EYES: EOMI, PERRL     HENT: ear canals and TM's normal and nose and mouth without ulcers or lesions     NECK: no adenopathy, no asymmetry, masses, or scars and thyroid normal to palpation     RESP: lungs clear to auscultation - no rales, rhonchi or wheezes     CV: regular rates and rhythm, normal S1 S2, no S3 or S4 and no murmur, click or rub     ABDOMEN:  Gravid uterus at term     MS: extremities normal- no gross deformities noted, no evidence of inflammation in joints, FROM in all extremities.     SKIN: no suspicious lesions or rashes     NEURO: Normal strength and tone, sensory exam grossly normal, mentation intact and speech normal     PSYCH: mentation appears normal. and affect normal/bright     LYMPHATICS: No axillary, cervical, or supraclavicular nodes    DIAGNOSTICS:                                                        Recent Labs   Lab Test  03/16/17   0955  01/21/16   1152   12/22/14   1039   HGB  13.0  14.1   < >   --    PLT  196  209   --    --    NA   --   136   --   138   POTASSIUM   --   4.2   --   3.7   CR   --   0.60   --   0.48*    < > = values in this interval not displayed.        IMPRESSION:                                                    IUP at 39 weeks (at time of scheduled C/S)     Previous C/S; desires repeat     Insulin dependent gestational DM  Desires surgical contraception          ICD-10-CM    1. High-risk pregnancy, young multigravida in third trimester O09.623        RECOMMENDATIONS:                                                    Repeat LTCS with bilateral salpingectomy      -Risks, benefits, alternatives discussed; including but not limited to risk of infection, bleeding, damage to bowel/bladder and any other intra-abdominal viscera.  Risks also include risks of anesthesia and blood transfusion. Patient understands and agrees to planned procedure.

## 2017-10-02 NOTE — NURSING NOTE
"Chief Complaint   Patient presents with     Prenatal Care     Pre-Op Exam     37w1d    Initial /68 (BP Location: Right arm, Patient Position: Chair, Cuff Size: Adult Regular)  Pulse 84  Wt 176 lb (79.8 kg)  LMP 01/15/2017 (Exact Date)  BMI 32.72 kg/m2 Estimated body mass index is 32.72 kg/(m^2) as calculated from the following:    Height as of 6/30/17: 5' 1.5\" (1.562 m).    Weight as of this encounter: 176 lb (79.8 kg).  Medication Reconciliation: complete     Nurse assisted visit.  Aracely Louis MA.      "

## 2017-10-02 NOTE — MR AVS SNAPSHOT
After Visit Summary   10/2/2017    Unique Multani    MRN: [id number]           Patient Information     Date Of Birth          1985        Visit Information        Provider Department      10/2/2017 5:30 PM Michael Mendez MD [facility]        Today's Diagnoses     High-risk pregnancy, young multigravida in third trimester    -  1    Preop general physical exam          Care Instructions      Before Your Surgery      Call your surgeon if there is any change in your health. This includes signs of a cold or flu (such as a sore throat, runny nose, cough, rash or fever).    Do not smoke, drink alcohol or take over the counter medicine (unless your surgeon or primary care doctor tells you to) for the 24 hours before and after surgery.    If you take prescribed drugs: Follow your doctor s orders about which medicines to take and which to stop until after surgery.    Eating and drinking prior to surgery: follow the instructions from your surgeon    Take a shower or bath the night before surgery. Use the soap your surgeon gave you to gently clean your skin. If you do not have soap from your surgeon, use your regular soap. Do not shave or scrub the surgery site.  Wear clean pajamas and have clean sheets on your bed.           Follow-ups after your visit        Follow-up notes from your care team     Return in about 1 week (around 10/9/2017).      Your next 10 appointments already scheduled     Oct 03, 2017  8:45 AM CDT   GUZMAN LOZANOP SINGLE with [id number]   eal Maternal Fetal Medicine Ultrasound - [facility])    [address]  [facility] [address]   [phone]            Oct 03, 2017  9:15 AM CDT   Radiology MD with  GUZMAN LY   ealth Maternal Fetal Medicine - [facility])    [address]  [facility] [address]   [phone]           Please arrive at the time given for your first appointment.  This visit is used internally to schedule the physician's time during your ultrasound.            Oct 06, 2017  3:30 PM CDT   MFM BPP SINGLE with [id number]   [facility] Maternal Fetal Medicine Ultrasound - [facility] ([facility])    [address]  [facility] [address]   [phone]            Oct 06, 2017  4:00 PM CDT   Radiology MD with RH GUZMAN LY   [facility] Maternal Fetal Medicine [facility] ([facility])    [address]  [facility] [address]   [phone]           Please arrive at the time given for your first appointment. This visit is used internally to schedule the physician's time during your ultrasound.            Oct 09, 2017  6:45 PM CDT   ESTABLISHED PRENATAL with Tremaine Gaona MD   [facility] ([facility])    [address]  [facility] [address]   [phone]            Oct 10, 2017  3:30 PM CDT   MFM BPP SINGLE with [id number]   [facility] Maternal Fetal Medicine Ultrasound - [facility])    [address]  [facility] [address]   [phone]            Oct 10, 2017  4:00 PM CDT   Radiology MD with RH GUZMAN LY   [facility] ([facility])    [address]  [facility] [address]   [phone]           Please arrive at the time given for your first appointment. This visit is used internally to schedule the physician's time during your ultrasound.            Oct 13, 2017  9:30 AM CDT   MFM US COMPRE SINGLE F/U with [id number]   [facility] Maternal Fetal Medicine Ultrasound - [facility] ([facility])    [address]  [facility] [address]   [phone]           Wear comfortable clothes and leave your valuables at home.            Oct 13, 2017 10:00 AM CDT   Radiology MD with RH GUZMAN LY   [facility] Fetal Medicine [facility] ([facility])    [address]  [facility] [address]  [facility] [id number] [phone]           Please arrive at the time given for your first appointment. This visit is used internally to schedule the physician's time during your ultrasound.            Oct 17, 2017   Procedure with Michael Mendez MD   [facility] Labor and Delivery (--)    [address] [facility] [id number] [phone]              Who to contact     If you have questions or need follow up information about today's clinic visit or your schedule please contact [facility] directly at [phone].  Normal or non-critical lab and imaging results will be communicated to you by MyChart, letter or phone within 4 business days after the clinic has received the results. If you do not hear from us within 7 days, please contact the clinic through ideelit or phone. If you have a critical or abnormal lab result, we will notify you by phone as soon as possible.  Submit refill requests through [employer] or call your pharmacy and they will forward the refill request to us. Please allow 3 business days for your refill to be completed.          Additional Information About Your Visit        [employer] Information     [employer] gives you secure access to your electronic health record. If you see a primary care provider, you can also send messages to your care team and make appointments. If you have questions, please call your primary care clinic.  If you do not have a primary care provider, please call [phone] and they will assist you.        Care EveryWhere ID     This is your Care EveryWhere ID. This could be used by other organizations to access your [place] medical records  [id number]        Your Vitals Were     Pulse Last Period BMI (Body Mass Index)             84 01/15/2017 (Exact Date) 32.72 kg/m2          Blood Pressure from Last 3 Encounters:   10/02/17 110/68   09/25/17 100/64   09/08/17 100/66    Weight from Last 3 Encounters:   10/02/17 176 lb  (79.8 kg)   09/25/17 172 lb (78 kg)   09/08/17 170 lb (77.1 kg)              Today, you had the following     No orders found for display       Primary Care Provider Office Phone # Fax #    Michael Mendez [phone] [address] [place] [address]        Equal Access to Services     [facility]: Hadii aad ku hadasho Soomaali, waaxda luqadaha, qaybta kaalmada adeegyada, waxay idiin hayaan adeeg kharash la'aan . So [facility] [phone].    ATENCIÓN: Si habla español, tiene a humphries disposición servicios gratuitos de asistencia lingüística. Llame al [phone].    We comply with applicable federal civil rights laws and [place] laws. We do not discriminate on the basis of race, color, national origin, age, disability, sex, sexual orientation, or gender identity.            Thank you!     Thank you for choosing [facility]  for your care. Our goal is always to provide you with excellent care. Hearing back from our patients is one way we can continue to improve our services. Please take a few minutes to complete the written survey that you may receive in the mail after your visit with us. Thank you!             Your Updated Medication List - Protect others around you: Learn how to safely use, store and throw away your medicines at www.disposemymeds.org.          This list is accurate as of: 10/2/17  5:50 PM.  Always use your most recent med list.                   Brand Name Dispense Instructions for use Diagnosis    blood glucose monitoring test strip    ONE TOUCH VERIO IQ    100 strip    Use to test blood sugar 4 times daily or as directed.  Ok to substitute alternative if insurance prefers.    Diet controlled gestational diabetes mellitus (GDM), antepartum       insulin aspart 100 UNIT/ML injection    NovoLOG FLEXPEN    1500 mL    Take 6 units before breakfast, 8 units before lunch, and 14 units before dinner daily.    Insulin controlled gestational diabetes  mellitus (GDM) in third trimester       insulin isophane human 100 UNIT/ML injection    HumuLIN N PEN    15 mL    10 units with breakfast, 22 units before bed (to be titrated by MD/CDE)    Diet controlled gestational diabetes mellitus (GDM), antepartum, High-risk pregnancy, young multigravida, second trimester, Previous  delivery, antepartum condition or complication       insulin pen needle 32G X 4 MM    BD DINA U/F    100 each    Use 3 daily or as directed.    High-risk pregnancy, young multigravida, second trimester       PRENATAL VIT-FE BISG-FA-DHA PO

## 2017-10-03 ENCOUNTER — HOSPITAL ENCOUNTER (OUTPATIENT)
Dept: ULTRASOUND IMAGING | Facility: CLINIC | Age: 32
Discharge: HOME | End: 2017-10-03
Attending: OBSTETRICS & GYNECOLOGY | Admitting: OBSTETRICS & GYNECOLOGY
Payer: COMMERCIAL

## 2017-10-03 ENCOUNTER — VIRTUAL VISIT (OUTPATIENT)
Dept: EDUCATION SERVICES | Facility: CLINIC | Age: 32
End: 2017-10-03

## 2017-10-03 ENCOUNTER — OFFICE VISIT (OUTPATIENT)
Dept: MATERNAL FETAL MEDICINE | Facility: CLINIC | Age: 32
End: 2017-10-03
Attending: OBSTETRICS & GYNECOLOGY
Payer: COMMERCIAL

## 2017-10-03 DIAGNOSIS — O24.414 INSULIN CONTROLLED GESTATIONAL DIABETES MELLITUS (GDM) DURING PREGNANCY, ANTEPARTUM: ICD-10-CM

## 2017-10-03 DIAGNOSIS — O24.410 DIET CONTROLLED GESTATIONAL DIABETES MELLITUS (GDM), ANTEPARTUM: ICD-10-CM

## 2017-10-03 DIAGNOSIS — O24.414 INSULIN CONTROLLED GESTATIONAL DIABETES MELLITUS (GDM) IN THIRD TRIMESTER: ICD-10-CM

## 2017-10-03 DIAGNOSIS — O09.622 HIGH-RISK PREGNANCY, YOUNG MULTIGRAVIDA, SECOND TRIMESTER: ICD-10-CM

## 2017-10-03 DIAGNOSIS — O34.219 PREVIOUS CESAREAN DELIVERY, ANTEPARTUM CONDITION OR COMPLICATION: ICD-10-CM

## 2017-10-03 DIAGNOSIS — O24.414 INSULIN CONTROLLED GESTATIONAL DIABETES MELLITUS (GDM) DURING PREGNANCY, ANTEPARTUM: Primary | ICD-10-CM

## 2017-10-03 PROCEDURE — 76819 FETAL BIOPHYS PROFIL W/O NST: CPT

## 2017-10-03 NOTE — MR AVS SNAPSHOT
After Visit Summary   10/3/2017    Unique Multani    MRN: [id number]           Patient Information     Date Of Birth          1985        Visit Information        Provider Department      10/3/2017 9:15 AM Reena Larson MD [facility] Maternal Fetal Medicine [facility]        Today's Diagnoses     Insulin controlled gestational diabetes mellitus (GDM) during pregnancy, antepartum    -  1       Follow-ups after your visit        Your next 10 appointments already scheduled     Oct 06, 2017  3:30 PM CDT   MFM BPP SINGLE with [id number]   [facility] Maternal Fetal Medicine Ultrasound - [facility] ([facility])    [address]  [facility] [address]   [phone]            Oct 06, 2017  4:00 PM CDT   Radiology MD with RH GUZMAN LY   [facility] Maternal Fetal [facility] ([facility])    [address]  [facility] [address]   [phone]           Please arrive at the time given for your first appointment. This visit is used internally to schedule the physician's time during your ultrasound.            Oct 09, 2017  6:45 PM CDT   ESTABLISHED PRENATAL with Tremaine Gaona MD   [facility])    [address] [place]  [facility] [address]   [phone]            Oct 10, 2017  3:30 PM CDT   M BPP SINGLE with [id number]   [facility] Maternal Fetal Medicine Ultrasound - [facility] ([facility])    [address]  [facility] [address]   [phone]            Oct 10, 2017  4:00 PM CDT   Radiology MD with  GUZMAN LY   [facility] Maternal Fetal Medicine [facility] ([facility])    [address]  [facility] [address]   [phone]           Please arrive at the time given for your first appointment. This visit is used internally to schedule the physician's time during your ultrasound.            Oct 13, 2017  9:30 AM CDT   MFM US COMPRE  SINGLE F/U with [id number]   eal Maternal Fetal Medicine Ultrasound - [facility] ([facility])    [address] [facility] [address]  [facility] [address] [phone]           Wear comfortable clothes and leave your valuables at home.            Oct 13, 2017 10:00 AM CDT   Radiology MD with  GUZMAN LY   [facility] Maternal Fetal Medicine - [facility] ([facility])    [address] [facility] [address]  [facility] [address] [phone]           Please arrive at the time given for your first appointment. This visit is used internally to schedule the physician's time during your ultrasound.            Oct 17, 2017   Procedure with Michael Mendez MD   [facility] Labor and Delivery (--)    [address]  [facility] [address] [phone]              Who to contact     If you have questions or need follow up information about today's clinic visit or your schedule please contact [facility] MATERNAL FETAL MEDICINE [facility] directly at [phone].  Normal or non-critical lab and imaging results will be communicated to you by [employer], letter or phone within 4 business days after the clinic has received the results. If you do not hear from us within 7 days, please contact the clinic through [email or web address] or phone. If you have a critical or abnormal lab result, we will notify you by phone as soon as possible.  Submit refill requests through [employer] or call your pharmacy and they will forward the refill request to us. Please allow 3 business days for your refill to be completed.          Additional Information About Your Visit        [employer] Information     [employer] gives you secure access to your electronic health record. If you see a primary care provider, you can also send messages to your care team and make appointments. If you have questions, please call your primary care clinic.  If you do not have a primary care provider, please call [phone] and they will assist you.        Care  EveryWhere ID     This is your Care EveryWhere ID. This could be used by other organizations to access your [place] medical records  [id number]        Your Vitals Were     Last Period                   01/15/2017 (Exact Date)            Blood Pressure from Last 3 Encounters:   10/02/17 110/68   17 100/64   17 100/66    Weight from Last 3 Encounters:   10/02/17 79.8 kg (176 lb)   17 78 kg (172 lb)   17 77.1 kg (170 lb)              Today, you had the following     No orders found for display         Today's Medication Changes          These changes are accurate as of: 10/3/17 11:00 PM.  If you have any questions, ask your nurse or doctor.               These medicines have changed or have updated prescriptions.        Dose/Directions    insulin aspart 100 UNIT/ML injection   Commonly known as:  NovoLOG FLEXPEN   This may have changed:  additional instructions   Used for:  Insulin controlled gestational diabetes mellitus (GDM) in third trimester        Take 6 units before breakfast, 10 units before lunch, and 16 units before dinner daily.   Quantity:  1500 mL   Refills:  3       insulin isophane human 100 UNIT/ML injection   Commonly known as:  HumuLIN N PEN   This may have changed:  additional instructions   Used for:  Diet controlled gestational diabetes mellitus (GDM), antepartum, High-risk pregnancy, young multigravida, second trimester, Previous  delivery, antepartum condition or complication        12 units with breakfast, 22 units before bed (to be titrated by MD/CDE)   Quantity:  15 mL   Refills:  1            Where to get your medicines      These medications were sent to [facility] [address] [place] [address] [address] [facility] [address]     Phone:  [phone]     insulin aspart 100 UNIT/ML injection    insulin isophane human 100 UNIT/ML injection                Primary Care Provider Office Phone  # Fax #    Michael Mendez [phone] [address] [place] [address]        Equal Access to Services     JAZZ BELTRAN : Hadtiffany jose daniel rose jonathan Waltonali, wayaseminda patyqdallas, qaulita karosada tad, nicolás poncedonta kacie. So [facility] [phone].    ATENCIÓN: Si habla español, tiene a humhpries disposición servicios gratuitos de asistencia lingüística. Llame al [phone].    We comply with applicable federal civil rights laws and [place] laws. We do not discriminate on the basis of race, color, national origin, age, disability, sex, sexual orientation, or gender identity.            Thank you!     Thank you for choosing [facility]AL [facility]  for your care. Our goal is always to provide you with excellent care. Hearing back from our patients is one way we can continue to improve our services. Please take a few minutes to complete the written survey that you may receive in the mail after your visit with us. Thank you!             Your Updated Medication List - Protect others around you: Learn how to safely use, store and throw away your medicines at www.disposemymeds.org.          This list is accurate as of: 10/3/17 11:00 PM.  Always use your most recent med list.                   Brand Name Dispense Instructions for use Diagnosis    blood glucose monitoring test strip    ONE TOUCH VERIO IQ    100 strip    Use to test blood sugar 4 times daily or as directed.  Ok to substitute alternative if insurance prefers.    Diet controlled gestational diabetes mellitus (GDM), antepartum       insulin aspart 100 UNIT/ML injection    NovoLOG FLEXPEN    1500 mL    Take 6 units before breakfast, 10 units before lunch, and 16 units before dinner daily.    Insulin controlled gestational diabetes mellitus (GDM) in third trimester       insulin isophane human 100 UNIT/ML injection    HumuLIN N PEN    15 mL    12 units with breakfast, 22 units before bed (to be  titrated by MD/CDE)    Diet controlled gestational diabetes mellitus (GDM), antepartum, High-risk pregnancy, young multigravida, second trimester, Previous  delivery, antepartum condition or complication       insulin pen needle 32G X 4 MM    BD DINA U/F    100 each    Use 3 daily or as directed.    High-risk pregnancy, young multigravida, second trimester       PRENATAL VIT-FE BISG-FA-DHA PO

## 2017-10-03 NOTE — PROGRESS NOTES
Gestational Diabetes Follow-up    Subjective/Objective:    Unique Multani sent in blood glucose log for review. Last date of communication was: 9/29/17.    Gestational diabetes is being managed with Humulin/Novolin NPH Insulin 10-0-0-22 units, and NovoLog Insulin 6-8-14-0 units.    Estimated Date of Delivery: Oct 22, 2017    BG/Food Log:       Assessment:  Patients blood sugars above goal after lunch and dinner.  NPH=32 units/day Novolog=28  Ketones:none noted.   Fasting blood glucoses: 80% in target.  After breakfast: 100% in target.  After lunch: 50% in target.  Before dinner: 0% in target (only one reading).  After dinner: 50% in target.    Plan/Response:  Recommend increase to insulin (10% TDD increase) - NPH 10-0-0-22 -->12-0-0-22. Novolog 6-8-14 --> 6-10-16-0    Email response to patient:  Josh Calhoun,    Thank you for sending in your readings.  It looks like you are running just a bit high after lunch and dinner.  I d like to increase both your insulins and have you send in your readings again Friday afternoon.    Humulin- Take 12 units with breakfast 22 with dinner  Novolog- Take 6 with breakfast, 10 with lunch, and 16 with dinner.    Thanks!      Violette العلي MS, RD, LD, CDE  [facility]  Diabetes & Nutrition Care  [email or web address]  Ph: [phone]    Violette العلي MS, TATIANA, BANDAR, CDE    Any diabetes medication dose changes were made via the CDE Protocol and Collaborative Practice Agreement with the patient's referring provider.

## 2017-10-03 NOTE — MR AVS SNAPSHOT
After Visit Summary   10/3/2017    Unique Multani    MRN: [id number]           Patient Information     Date Of Birth          1985        Visit Information        Provider Department      10/3/2017 10:30 AM BK DIABETIC ED RESOURCE [facility]        Today's Diagnoses     Insulin controlled gestational diabetes mellitus (GDM) in third trimester        Diet controlled gestational diabetes mellitus (GDM), antepartum        High-risk pregnancy, young multigravida, second trimester        Previous  delivery, antepartum condition or complication           Follow-ups after your visit        Your next 10 appointments already scheduled     Oct 06, 2017  3:30 PM CDT   MFM BPP SINGLE with [id number]   [facility])    [address]  [facility] [address]   [phone]            Oct 06, 2017  4:00 PM CDT   Radiology MD with RH GUZMAN LY   [facility])    [address]  [facility] [address]   [phone]           Please arrive at the time given for your first appointment. This visit is used internally to schedule the physician's time during your ultrasound.            Oct 09, 2017  6:45 PM CDT   ESTABLISHED PRENATAL with Tremaine Gaona MD   [facility]    [address]  [facility] [address]   [phone]            Oct 10, 2017  3:30 PM CDT   MFM BPP SINGLE with [id number]   [facility])    [address]  [facility] [address]   [phone]            Oct 10, 2017  4:00 PM CDT   Radiology MD with RH GUZMAN LY   [facility] Maternal Fetal Medicine [facility])    [address]  [facility] [address]   [phone]           Please arrive  at the time given for your first appointment. This visit is used internally to schedule the physician's time during your ultrasound.            Oct 13, 2017  9:30 AM CDT   MFM US COMPRE SINGLE F/U with [id number]   [facility] Maternal Fetal Medicine Ultrasound - [facility] ([facility])    [address]  [facility] [id number] [phone]           Wear comfortable clothes and leave your valuables at home.            Oct 13, 2017 10:00 AM CDT   Radiology MD with  MFPRASANNA LY   [facility]etal [facility] - [facility] ([facility])    [address]  [facility] [id number] [phone]           Please arrive at the time given for your first appointment. This visit is used internally to schedule the physician's time during your ultrasound.            Oct 17, 2017   Procedure with Michael Mendez MD   [facility] Labor and Delivery (--)    [address] [facility] [address] [phone]              Who to contact     If you have questions or need follow up information about today's clinic visit or your schedule please contact [facility] directly at [phone].  Normal or non-critical lab and imaging results will be communicated to you by Jolancerhart, letter or phone within 4 business days after the clinic has received the results. If you do not hear from us within 7 days, please contact the clinic through Jolancerhart or phone. If you have a critical or abnormal lab result, we will notify you by phone as soon as possible.  Submit refill requests through adaffix or call your pharmacy and they will forward the refill request to us. Please allow 3 business days for your refill to be completed.          Additional Information About Your Visit        adaffix Information     adaffix gives you secure access to your electronic health record. If you see a primary care provider, you can also send messages to your care team and make  appointments. If you have questions, please call your primary care clinic.  If you do not have a primary care provider, please call [phone] and they will assist you.        Care EveryWhere ID     This is your Care EveryWhere ID. This could be used by other organizations to access your [place] medical records  [id number]        Your Vitals Were     Last Period                   01/15/2017 (Exact Date)            Blood Pressure from Last 3 Encounters:   10/02/17 110/68   17 100/64   17 100/66    Weight from Last 3 Encounters:   10/02/17 176 lb (79.8 kg)   17 172 lb (78 kg)   17 170 lb (77.1 kg)              Today, you had the following     No orders found for display         Today's Medication Changes          These changes are accurate as of: 10/3/17  2:10 PM.  If you have any questions, ask your nurse or doctor.               These medicines have changed or have updated prescriptions.        Dose/Directions    insulin aspart 100 UNIT/ML injection   Commonly known as:  NovoLOG FLEXPEN   This may have changed:  additional instructions   Used for:  Insulin controlled gestational diabetes mellitus (GDM) in third trimester        Take 6 units before breakfast, 10 units before lunch, and 16 units before dinner daily.   Quantity:  1500 mL   Refills:  3       insulin isophane human 100 UNIT/ML injection   Commonly known as:  HumuLIN N PEN   This may have changed:  additional instructions   Used for:  Diet controlled gestational diabetes mellitus (GDM), antepartum, High-risk pregnancy, young multigravida, second trimester, Previous  delivery, antepartum condition or complication        12 units with breakfast, 22 units before bed (to be titrated by MD/CDE)   Quantity:  15 mL   Refills:  1            Where to get your medicines      These medications were sent to [facility] [address] [place], [place] - [address] [facility] [address]  [address],  [facility] [address]     Phone:  [phone]     insulin aspart 100 UNIT/ML injection    insulin isophane human 100 UNIT/ML injection                Primary Care Provider Office Phone # Fax #    Michael Mendez [phone]       [facility] [place] [address]        Equal Access to Services     [facility]: Hadii aad ku hadasho Soomaali, waaxda luqadaha, qaybta kaalmada adeegyada, waxay idiin hayaan adeeg kharash la'aan . So [facility] [phone].    ATENCIÓN: Si habla español, tiene a humphries disposición servicios gratuitos de asistencia lingüística. Llame al [phone].    We comply with applicable federal civil rights laws and [place] laws. We do not discriminate on the basis of race, color, national origin, age, disability, sex, sexual orientation, or gender identity.            Thank you!     Thank you for choosing [facility]  for your care. Our goal is always to provide you with excellent care. Hearing back from our patients is one way we can continue to improve our services. Please take a few minutes to complete the written survey that you may receive in the mail after your visit with us. Thank you!             Your Updated Medication List - Protect others around you: Learn how to safely use, store and throw away your medicines at www.disposemymeds.org.          This list is accurate as of: 10/3/17  2:10 PM.  Always use your most recent med list.                   Brand Name Dispense Instructions for use Diagnosis    blood glucose monitoring test strip    ONE TOUCH VERIO IQ    100 strip    Use to test blood sugar 4 times daily or as directed.  Ok to substitute alternative if insurance prefers.    Diet controlled gestational diabetes mellitus (GDM), antepartum       insulin aspart 100 UNIT/ML injection    NovoLOG FLEXPEN    1500 mL    Take 6 units before breakfast, 10 units before lunch, and 16 units before dinner daily.    Insulin controlled gestational  diabetes mellitus (GDM) in third trimester       insulin isophane human 100 UNIT/ML injection    HumuLIN N PEN    15 mL    12 units with breakfast, 22 units before bed (to be titrated by MD/CDE)    Diet controlled gestational diabetes mellitus (GDM), antepartum, High-risk pregnancy, young multigravida, second trimester, Previous  delivery, antepartum condition or complication       insulin pen needle 32G X 4 MM    BD DINA U/F    100 each    Use 3 daily or as directed.    High-risk pregnancy, young multigravida, second trimester       PRENATAL VIT-FE BISG-FA-DHA PO

## 2017-10-06 ENCOUNTER — HOSPITAL ENCOUNTER (OUTPATIENT)
Dept: ULTRASOUND IMAGING | Facility: CLINIC | Age: 32
Discharge: HOME | End: 2017-10-06
Attending: OBSTETRICS & GYNECOLOGY | Admitting: OBSTETRICS & GYNECOLOGY
Payer: COMMERCIAL

## 2017-10-06 ENCOUNTER — VIRTUAL VISIT (OUTPATIENT)
Dept: EDUCATION SERVICES | Facility: CLINIC | Age: 32
End: 2017-10-06

## 2017-10-06 ENCOUNTER — OFFICE VISIT (OUTPATIENT)
Dept: MATERNAL FETAL MEDICINE | Facility: CLINIC | Age: 32
End: 2017-10-06
Attending: OBSTETRICS & GYNECOLOGY
Payer: COMMERCIAL

## 2017-10-06 DIAGNOSIS — O24.414 INSULIN CONTROLLED GESTATIONAL DIABETES MELLITUS (GDM) DURING PREGNANCY, ANTEPARTUM: ICD-10-CM

## 2017-10-06 DIAGNOSIS — O24.414 INSULIN CONTROLLED GESTATIONAL DIABETES MELLITUS (GDM) DURING PREGNANCY: ICD-10-CM

## 2017-10-06 DIAGNOSIS — O09.629: Primary | ICD-10-CM

## 2017-10-06 DIAGNOSIS — O24.414 INSULIN CONTROLLED GESTATIONAL DIABETES MELLITUS (GDM) IN THIRD TRIMESTER: Primary | ICD-10-CM

## 2017-10-06 DIAGNOSIS — O24.414 INSULIN CONTROLLED GESTATIONAL DIABETES MELLITUS (GDM) DURING PREGNANCY, ANTEPARTUM: Primary | ICD-10-CM

## 2017-10-06 PROCEDURE — 76819 FETAL BIOPHYS PROFIL W/O NST: CPT

## 2017-10-06 NOTE — MR AVS SNAPSHOT
After Visit Summary   10/6/2017    Unique Multani    MRN: [id number]           Patient Information     Date Of Birth          1985        Visit Information        Provider Department      10/6/2017 4:00 PM Alyssa De La Cruz DO [facility]        Today's Diagnoses     Insulin controlled gestational diabetes mellitus (GDM) during pregnancy, antepartum    -  1       Follow-ups after your visit        Your next 10 appointments already scheduled     Oct 06, 2017  4:00 PM CDT   Radiology MD with Alyssa De La Cruz DO   [facility] ([facility])    [address]  [facility] [address]   [phone]           Please arrive at the time given for your first appointment. This visit is used internally to schedule the physician's time during your ultrasound.            Oct 09, 2017  6:45 PM CDT   ESTABLISHED PRENATAL with Tremaine Gaona MD   [facility] ([facility])    [address] [place]  [facility] [address]   [phone]            Oct 10, 2017  3:30 PM CDT   MFM BPP SINGLE with [id number]   [facility] - [facility] ([facility])    [address]  [facility] [address]   [phone]            Oct 10, 2017  4:00 PM CDT   Radiology MD with  MFM MD   [facility] ([facility])    [address]  [facility] [address]   [phone]           Please arrive at the time given for your first appointment. This visit is used internally to schedule the physician's time during your ultrasound.            Oct 13, 2017  9:30 AM CDT   MFM US COMPRE SINGLE F/U with [id number]   [facility] Maternal Fetal Medicine Ultrasound - [facility])    [address] [facility] [address]  [facility] [address]   [phone]           Wear comfortable clothes  and leave your valuables at home.            Oct 13, 2017 10:00 AM CDT   Radiology MD with RH GUZMAN LY   [facility] Maternal Fetal Medicine [facility] ([facility])    [address]  [facility] [address] [phone]           Please arrive at the time given for your first appointment. This visit is used internally to schedule the physician's time during your ultrasound.            Oct 17, 2017   Procedure with Michael Mendez MD   [facility] Labor and Delivery (--)    [address]  [facility] [address] [phone]              Who to contact     If you have questions or need follow up information about today's clinic visit or your schedule please contact [facility] directly at [phone].  Normal or non-critical lab and imaging results will be communicated to you by MyChart, letter or phone within 4 business days after the clinic has received the results. If you do not hear from us within 7 days, please contact the clinic through [employer] or phone. If you have a critical or abnormal lab result, we will notify you by phone as soon as possible.  Submit refill requests through [employer]Chapters or call your pharmacy and they will forward the refill request to us. Please allow 3 business days for your refill to be completed.          Additional Information About Your Visit        MyChart Information     SchoolChapters gives you secure access to your electronic health record. If you see a primary care provider, you can also send messages to your care team and make appointments. If you have questions, please call your primary care clinic.  If you do not have a primary care provider, please call [phone] and they will assist you.        Care EveryWhere ID     This is your Care EveryWhere ID. This could be used by other organizations to access your [place] medical records  [id number]        Your Vitals Were     Last Period                   01/15/2017  (Exact Date)            Blood Pressure from Last 3 Encounters:   10/02/17 110/68   09/25/17 100/64   09/08/17 100/66    Weight from Last 3 Encounters:   10/02/17 79.8 kg (176 lb)   09/25/17 78 kg (172 lb)   09/08/17 77.1 kg (170 lb)              Today, you had the following     No orders found for display         Today's Medication Changes          These changes are accurate as of: 10/6/17  3:44 PM.  If you have any questions, ask your nurse or doctor.               Start taking these medicines.        Dose/Directions    acetone (Urine) test Strp   Used for:  High-risk pregnancy, young multigravida, Insulin controlled gestational diabetes mellitus (GDM) during pregnancy   Started by:  Michael Mendez MD        Dose:  1 strip   1 strip by In Vitro route once a week   Quantity:  50 each   Refills:  1            Where to get your medicines      These medications were sent to [facility] [address] [place] [address]  AT [address] [address]     Phone:  [phone]     acetone (Urine) test Strp                Primary Care Provider Office Phone # Fax #    Michael Mendez [phone]       [facility] [address] [place] [address]        Equal Access to Services     [facility]: Hadii aad ku hadasho Soomaali, waaxda luqadaha, qaybta kaalmada adeegyada, waxreyes dubois hayhernandez hester. So [facility] [phone].    ATENCIÓN: Si habla español, tiene a humphries disposición servicios gratuitos de asistencia lingüística. Llame al [phone].    We comply with applicable federal civil rights laws and [place] laws. We do not discriminate on the basis of race, color, national origin, age, disability, sex, sexual orientation, or gender identity.            Thank you!     Thank you for choosing [facility]  for your care. Our goal is always to provide you with excellent care. Hearing back from  our patients is one way we can continue to improve our services. Please take a few minutes to complete the written survey that you may receive in the mail after your visit with us. Thank you!             Your Updated Medication List - Protect others around you: Learn how to safely use, store and throw away your medicines at www.disposemymeds.org.          This list is accurate as of: 10/6/17  3:44 PM.  Always use your most recent med list.                   Brand Name Dispense Instructions for use Diagnosis    acetone (Urine) test Strp     50 each    1 strip by In Vitro route once a week    High-risk pregnancy, young multigravida, Insulin controlled gestational diabetes mellitus (GDM) during pregnancy       blood glucose monitoring test strip    ONE TOUCH VERIO IQ    100 strip    Use to test blood sugar 4 times daily or as directed.  Ok to substitute alternative if insurance prefers.    Diet controlled gestational diabetes mellitus (GDM), antepartum       insulin aspart 100 UNIT/ML injection    NovoLOG FLEXPEN    1500 mL    Take 6 units before breakfast, 10 units before lunch, and 16 units before dinner daily.    Insulin controlled gestational diabetes mellitus (GDM) in third trimester       insulin isophane human 100 UNIT/ML injection    HumuLIN N PEN    15 mL    12 units with breakfast, 22 units before bed (to be titrated by MD/CDE)    Diet controlled gestational diabetes mellitus (GDM), antepartum, High-risk pregnancy, young multigravida, second trimester, Previous  delivery, antepartum condition or complication       insulin pen needle 32G X 4 MM    BD DINA U/F    100 each    Use 3 daily or as directed.    High-risk pregnancy, young multigravida, second trimester       PRENATAL VIT-FE BISG-FA-DHA PO

## 2017-10-06 NOTE — PROGRESS NOTES
Gestational Diabetes Follow-up    Subjective/Objective:    Unique Multain sent in blood glucose log for review. Last date of communication was: 9/29.    Gestational diabetes is being managed with Humulin/Novolin NPH Insulin 12-0-0-22 units and NovoLog Insulin 6-10-16-0 units     Estimated Date of Delivery: Oct 22, 2017    BG/Food Log:         Assessment:    Ketones:not checked.   Fasting blood glucoses: 100% in target.  After breakfast: 100% in target.  After lunch: 33% in target.  After dinner: 66 % in target.    Plan/Response:  Recommend increase to insulin - increase lunch Novolog dose from 7 units to 10 units --> 6-13-16-0  No change to NPH.  Goal is to achieve >10 mg/dl decrease after lunch.    Reply:  Thanks Unique!  As we mentioned on the phone, please go ahead and increase the Novolog to 13 units before lunch.  Everything else stays the same J You will need to  more pens before the baby comes.    If you can send in numbers again next Thursday or Friday, we ll see if there is any more fine tuning to do before baby arrives on the 17th!    Have a great weekend!  Teresa Mcdonnell RD, LD, CDE      Any diabetes medication dose changes were made via the CDE Protocol and Collaborative Practice Agreement with the patient's OB/GYN provider. A copy of this encounter was shared with the provider.

## 2017-10-06 NOTE — MR AVS SNAPSHOT
After Visit Summary   10/6/2017    Unique Multani    MRN: [id number]           Patient Information     Date Of Birth          1985        Visit Information        Provider Department      10/6/2017 9:30 AM  DIABETIC ED RESOURCE [facility]        Today's Diagnoses     Insulin controlled gestational diabetes mellitus (GDM) in third trimester    -  1       Follow-ups after your visit        Your next 10 appointments already scheduled     Oct 06, 2017  3:30 PM CDT   MFM BPP SINGLE with [id number]   [facility] Maternal Fetal Medicine Ultrasound - [facility])    [address]  [facility] [address]   [phone]            Oct 06, 2017  4:00 PM CDT   Radiology MD with RAE HINDS MD   [facility] Maternal Fetal Medicine [facility])    [address]  [facility] [address]   [phone]           Please arrive at the time given for your first appointment. This visit is used internally to schedule the physician's time during your ultrasound.            Oct 09, 2017  6:45 PM CDT   ESTABLISHED PRENATAL with Tremaine Gaona MD   [facility] ([facility])    [address] [place]  [facility] [address]   [phone]            Oct 10, 2017  3:30 PM CDT   MFM BPP SINGLE with [id number]   [facility] Maternal Fetal Medicine Ultrasound - [facility])    [address]  [facility] [address]   [phone]            Oct 10, 2017  4:00 PM CDT   Radiology MD with RAE HINDS MD   [facility] Maternal Fetal Medicine [facility] ([facility])    [address]  [facility] [address]   [phone]           Please arrive at the time given for your first appointment. This visit is used internally to schedule the physician's time during your ultrasound.            Oct 13, 2017  9:30 AM CDT   MFM US COMPRE SINGLE F/U with  RHMFMUSR3   [facility] M[facility]etal Medicine Ultrasound - [facility] ([facility])    [address] [facility] [address]  [facility] [address] [phone]           Wear comfortable clothes and leave your valuables at home.            Oct 13, 2017 10:00 AM CDT   Radiology MD with  GUZMAN LY   [facility] Maternal Fetal Medicine - [facility] ([facility])    [address] [facility] [address]  [facility] [address] [phone]           Please arrive at the time given for your first appointment. This visit is used internally to schedule the physician's time during your ultrasound.            Oct 17, 2017   Procedure with Michael Mendez MD   [facility] Labor and Delivery (--)    [address] [facility] [address] [phone]              Who to contact     If you have questions or need follow up information about today's clinic visit or your schedule please contact [facility] directly at [phone].  Normal or non-critical lab and imaging results will be communicated to you by "[employer]hart, letter or phone within 4 business days after the clinic has received the results. If you do not hear from us within 7 days, please contact the clinic through Bitlyt or phone. If you have a critical or abnormal lab result, we will notify you by phone as soon as possible.  Submit refill requests through ZenHub or call your pharmacy and they will forward the refill request to us. Please allow 3 business days for your refill to be completed.          Additional Information About Your Visit        "[employer]harChalkboard Information     [employer]Hub gives you secure access to your electronic health record. If you see a primary care provider, you can also send messages to your care team and make appointments. If you have questions, please call your primary care clinic.  If you do not have a primary care provider, please call [phone] and they will assist you.        Care EveryWhere ID      This is your Care EveryWhere ID. This could be used by other organizations to access your [facility] medical records  [id number]        Your Vitals Were     Last Period                   01/15/2017 (Exact Date)            Blood Pressure from Last 3 Encounters:   10/02/17 110/68   09/25/17 100/64   09/08/17 100/66    Weight from Last 3 Encounters:   10/02/17 79.8 kg (176 lb)   09/25/17 78 kg (172 lb)   09/08/17 77.1 kg (170 lb)              Today, you had the following     No orders found for display       Primary Care Provider Office Phone # Fax #    Michael Mendez [phone] [address] [place] [address]        Equal Access to Services     [facility] CenterDAMION : Hadii jose daniel rose hadasho Soomaali, waaxda luqadaha, qaybta kaalmada adeegyada, waxreyes torre . So [facility] [phone].    ATENCIÓN: Si habla español, tiene a humphries disposición servicios gratuitos de asistencia lingüística. Ll[facility] [phone].    We comply with applicable federal civil rights laws and [place] laws. We do not discriminate on the basis of race, color, national origin, age, disability, sex, sexual orientation, or gender identity.            Thank you!     Thank you for choosing [facility]  for your care. Our goal is always to provide you with excellent care. Hearing back from our patients is one way we can continue to improve our services. Please take a few minutes to complete the written survey that you may receive in the mail after your visit with us. Thank you!             Your Updated Medication List - Protect others around you: Learn how to safely use, store and throw away your medicines at www.disposemymeds.org.          This list is accurate as of: 10/6/17 11:02 AM.  Always use your most recent med list.                   Brand Name Dispense Instructions for use Diagnosis    blood glucose monitoring test strip    ONE TOUCH VERIO IQ    100 strip    Use to  test blood sugar 4 times daily or as directed.  Ok to substitute alternative if insurance prefers.    Diet controlled gestational diabetes mellitus (GDM), antepartum       insulin aspart 100 UNIT/ML injection    NovoLOG FLEXPEN    1500 mL    Take 6 units before breakfast, 10 units before lunch, and 16 units before dinner daily.    Insulin controlled gestational diabetes mellitus (GDM) in third trimester       insulin isophane human 100 UNIT/ML injection    HumuLIN N PEN    15 mL    12 units with breakfast, 22 units before bed (to be titrated by MD/CDE)    Diet controlled gestational diabetes mellitus (GDM), antepartum, High-risk pregnancy, young multigravida, second trimester, Previous  delivery, antepartum condition or complication       insulin pen needle 32G X 4 MM    BD DINA U/F    100 each    Use 3 daily or as directed.    High-risk pregnancy, young multigravida, second trimester       PRENATAL VIT-FE BISG-FA-DHA PO

## 2017-10-09 ENCOUNTER — PRENATAL OFFICE VISIT (OUTPATIENT)
Dept: OBGYN | Facility: CLINIC | Age: 32
End: 2017-10-09
Payer: COMMERCIAL

## 2017-10-09 VITALS — DIASTOLIC BLOOD PRESSURE: 68 MMHG | WEIGHT: 177.7 LBS | BODY MASS INDEX: 33.03 KG/M2 | SYSTOLIC BLOOD PRESSURE: 100 MMHG

## 2017-10-09 DIAGNOSIS — O41.00X0 OLIGOHYDRAMNIOS, ANTEPARTUM, SINGLE OR UNSPECIFIED FETUS: Primary | ICD-10-CM

## 2017-10-09 PROCEDURE — 59426 ANTEPARTUM CARE ONLY: CPT | Performed by: OBSTETRICS & GYNECOLOGY

## 2017-10-09 PROCEDURE — 99207 ZZC COMPLICATED OB VISIT: CPT | Performed by: OBSTETRICS & GYNECOLOGY

## 2017-10-09 RX ORDER — CITRIC ACID/SODIUM CITRATE 334-500MG
30 SOLUTION, ORAL ORAL
Status: CANCELLED | OUTPATIENT
Start: 2017-10-09

## 2017-10-09 RX ORDER — SODIUM CHLORIDE, SODIUM LACTATE, POTASSIUM CHLORIDE, CALCIUM CHLORIDE 600; 310; 30; 20 MG/100ML; MG/100ML; MG/100ML; MG/100ML
INJECTION, SOLUTION INTRAVENOUS CONTINUOUS
Status: CANCELLED | OUTPATIENT
Start: 2017-10-09

## 2017-10-10 ENCOUNTER — HOSPITAL ENCOUNTER (OUTPATIENT)
Dept: ULTRASOUND IMAGING | Facility: CLINIC | Age: 32
Discharge: HOME | End: 2017-10-10
Attending: OBSTETRICS & GYNECOLOGY | Admitting: OBSTETRICS & GYNECOLOGY
Payer: COMMERCIAL

## 2017-10-10 ENCOUNTER — VIRTUAL VISIT (OUTPATIENT)
Dept: EDUCATION SERVICES | Facility: CLINIC | Age: 32
End: 2017-10-10

## 2017-10-10 ENCOUNTER — OFFICE VISIT (OUTPATIENT)
Dept: MATERNAL FETAL MEDICINE | Facility: CLINIC | Age: 32
End: 2017-10-10
Attending: OBSTETRICS & GYNECOLOGY
Payer: COMMERCIAL

## 2017-10-10 DIAGNOSIS — O24.414 INSULIN CONTROLLED GESTATIONAL DIABETES MELLITUS (GDM) IN THIRD TRIMESTER: Primary | ICD-10-CM

## 2017-10-10 DIAGNOSIS — O24.414 INSULIN CONTROLLED GESTATIONAL DIABETES MELLITUS (GDM) IN THIRD TRIMESTER: ICD-10-CM

## 2017-10-10 DIAGNOSIS — O24.414 INSULIN CONTROLLED GESTATIONAL DIABETES MELLITUS (GDM) DURING PREGNANCY, ANTEPARTUM: ICD-10-CM

## 2017-10-10 PROCEDURE — 76819 FETAL BIOPHYS PROFIL W/O NST: CPT

## 2017-10-10 NOTE — MR AVS SNAPSHOT
After Visit Summary   10/10/2017    Unique Multani    MRN: [id number]           Patient Information     Date Of Birth          1985        Visit Information        Provider Department      10/10/2017 10:00 AM [facility]K DIABETIC ED RESOURCE [facility]        Today's Diagnoses     Insulin controlled gestational diabetes mellitus (GDM) in third trimester           Follow-ups after your visit        Your next 10 appointments already scheduled     Oct 13, 2017  9:30 AM CDT   MFM US COMPRE SINGLE F/U with [id number]   [facility] Maternal Fetal Medicine Ultrasound - [facility])    [address] [facility] [address]  [facility] [address] [phone]           Wear comfortable clothes and leave your valuables at home.            Oct 13, 2017 10:00 AM CDT   Radiology MD with  MFM MD   [facility] Maternal Fetal Medicine - [facility])    [address] [facility] [address]  [facility] [address] [phone]           Please arrive at the time given for your first appointment. This visit is used internally to schedule the physician's time during your ultrasound.            Oct 17, 2017   Procedure with Michael Mendez MD   [facility] Labor and Delivery (--)    [address] [facility] [address] [phone]              Who to contact     If you have questions or need follow up information about today's clinic visit or your schedule please contact [facility] directly at [phone].  Normal or non-critical lab and imaging results will be communicated to you by MyChart, letter or phone within 4 business days after the clinic has received the results. If you do not hear from us within 7 days, please contact the clinic through MyChart or phone. If you have a critical or abnormal lab result, we will notify you by phone as soon as possible.  Submit refill requests through Abazab or call your pharmacy and they  will forward the refill request to us. Please allow 3 business days for your refill to be completed.          Additional Information About Your Visit        GetMyRxhart Information     [employer] gives you secure access to your electronic health record. If you see a primary care provider, you can also send messages to your care team and make appointments. If you have questions, please call your primary care clinic.  If you do not have a primary care provider, please call [phone] and they will assist you.        Care EveryWhere ID     This is your Care EveryWhere ID. This could be used by other organizations to access your [facility] medical records  [id number]        Your Vitals Were     Last Period                   01/15/2017 (Exact Date)            Blood Pressure from Last 3 Encounters:   10/09/17 100/68   10/02/17 110/68   09/25/17 100/64    Weight from Last 3 Encounters:   10/09/17 177 lb 11.2 oz (80.6 kg)   10/02/17 176 lb (79.8 kg)   09/25/17 172 lb (78 kg)              Today, you had the following     No orders found for display         Today's Medication Changes          These changes are accurate as of: 10/10/17  2:19 PM.  If you have any questions, ask your nurse or doctor.               These medicines have changed or have updated prescriptions.        Dose/Directions    insulin aspart 100 UNIT/ML injection   Commonly known as:  NovoLOG FLEXPEN   This may have changed:  additional instructions   Used for:  Insulin controlled gestational diabetes mellitus (GDM) in third trimester        Take 6 units before breakfast, 15 units before lunch, and 16 units before dinner daily.   Quantity:  1500 mL   Refills:  3            Where to get your medicines      These medications were sent to [facility] [address] [place] [address] [address] [facility] [address]     Phone:  [phone]     insulin aspart 100 UNIT/ML injection                 Primary Care Provider Office Phone # Fax #    Michael Mendez [phone] [address] [place] [address]        Equal Access to Services     SARITANIGEL POSTDAMION : Chelle jose daniel rose jonathan Soidris, waaxda luqadaha, qaybta kaalmada tad, nicolás poncedonta kacie. So [facility] [phone].    ATENCIÓN: Si habla español, tiene a humphries disposición servicios gratuitos de asistencia lingüística. Llame al [phone].    We comply with applicable federal civil rights laws and [place] laws. We do not discriminate on the basis of race, color, national origin, age, disability, sex, sexual orientation, or gender identity.            Thank you!     Thank you for choosing [facility]  for your care. Our goal is always to provide you with excellent care. Hearing back from our patients is one way we can continue to improve our services. Please take a few minutes to complete the written survey that you may receive in the mail after your visit with us. Thank you!             Your Updated Medication List - Protect others around you: Learn how to safely use, store and throw away your medicines at www.disposemymeds.org.          This list is accurate as of: 10/10/17  2:19 PM.  Always use your most recent med list.                   Brand Name Dispense Instructions for use Diagnosis    acetone (Urine) test Strp     50 each    1 strip by In Vitro route once a week    High-risk pregnancy, young multigravida, Insulin controlled gestational diabetes mellitus (GDM) during pregnancy       blood glucose monitoring test strip    ONE TOUCH VERIO IQ    100 strip    Use to test blood sugar 4 times daily or as directed.  Ok to substitute alternative if insurance prefers.    Diet controlled gestational diabetes mellitus (GDM), antepartum       insulin aspart 100 UNIT/ML injection    NovoLOG FLEXPEN    1500 mL    Take 6 units before breakfast, 15 units before lunch, and 16 units before  dinner daily.    Insulin controlled gestational diabetes mellitus (GDM) in third trimester       insulin isophane human 100 UNIT/ML injection    HumuLIN N PEN    15 mL    12 units with breakfast, 22 units before bed (to be titrated by MD/CDE)    Diet controlled gestational diabetes mellitus (GDM), antepartum, High-risk pregnancy, young multigravida, second trimester, Previous  delivery, antepartum condition or complication       insulin pen needle 32G X 4 MM    BD DINA U/F    100 each    Use 3 daily or as directed.    High-risk pregnancy, young multigravida, second trimester       PRENATAL VIT-FE BISG-FA-DHA PO

## 2017-10-10 NOTE — MR AVS SNAPSHOT
After Visit Summary   10/10/2017    Unique Multani    MRN: [id number]           Patient Information     Date Of Birth          1985        Visit Information        Provider Department      10/10/2017 8:30 AM Puma Ibarra MD [facility] Maternal Fetal Medicine [facility]        Today's Diagnoses     Insulin controlled gestational diabetes mellitus (GDM) in third trimester    -  1       Follow-ups after your visit        Your next 10 appointments already scheduled     Oct 13, 2017  9:30 AM CDT   MFM US COMPRE SINGLE F/U with [id number]   [facility] Maternal Fetal Medicine Ultrasound - [facility] ([facility])    [address] [facility] [address]  [facility] [address] [phone]           Wear comfortable clothes and leave your valuables at home.            Oct 13, 2017 10:00 AM CDT   Radiology MD with [facility] L[facility]   [facility] Maternal Fetal Medicine [facility] ([facility])    [address]  [facility] [address] [phone]           Please arrive at the time given for your first appointment. This visit is used internally to schedule the physician's time during your ultrasound.            Oct 17, 2017   Procedure with Michael Mendez MD   [facility] Labor and Delivery (--)    [address] [facility] [address] [phone]              Who to contact     If you have questions or need follow up information about today's clinic visit or your schedule please contact [facility] MATERNAL FETAL MEDICINE [facility] directly at [phone].  Normal or non-critical lab and imaging results will be communicated to you by MyChart, letter or phone within 4 business days after the clinic has received the results. If you do not hear from us within 7 days, please contact the clinic through Splashscorehart or phone. If you have a critical or abnormal lab result, we will notify you by phone as soon as possible.  Submit refill requests through TextRecruitt or call your  pharmacy and they will forward the refill request to us. Please allow 3 business days for your refill to be completed.          Additional Information About Your Visit        [employer] Wolinakhart Information     [employer] gives you secure access to your electronic health record. If you see a primary care provider, you can also send messages to your care team and make appointments. If you have questions, please call your primary care clinic.  If you do not have a primary care provider, please call [phone] and they will assist you.        Care EveryWhere ID     This is your Care EveryWhere ID. This could be used by other organizations to access your [facility] medical records  [id number]        Your Vitals Were     Last Period                   01/15/2017 (Exact Date)            Blood Pressure from Last 3 Encounters:   10/09/17 100/68   10/02/17 110/68   09/25/17 100/64    Weight from Last 3 Encounters:   10/09/17 80.6 kg (177 lb 11.2 oz)   10/02/17 79.8 kg (176 lb)   09/25/17 78 kg (172 lb)              Today, you had the following     No orders found for display       Primary Care Provider Office Phone # Fax #    Michael Mendez [phone] [address] [place] [address]        Equal Access to Services     NIGEL BELTRAN AH: Hadii jose daniel ku hadasho Soomaali, waaxda luqadaha, qaybta kaalmada adeegyada, nicolás hester. So [facility] [phone].    ATENCIÓN: Si habla español, tiene a humphries disposición servicios gratuitos de asistencia lingüística. Llame al [phone].    We comply with applicable federal civil rights laws and [place] laws. We do not discriminate on the basis of race, color, national origin, age, disability, sex, sexual orientation, or gender identity.            Thank you!     Thank you for choosing [facility]  for your care. Our goal is always to provide you with excellent care. Hearing back from our patients is one way we  can continue to improve our services. Please take a few minutes to complete the written survey that you may receive in the mail after your visit with us. Thank you!             Your Updated Medication List - Protect others around you: Learn how to safely use, store and throw away your medicines at www.disposemymeds.org.          This list is accurate as of: 10/10/17 10:14 AM.  Always use your most recent med list.                   Brand Name Dispense Instructions for use Diagnosis    acetone (Urine) test Strp     50 each    1 strip by In Vitro route once a week    High-risk pregnancy, young multigravida, Insulin controlled gestational diabetes mellitus (GDM) during pregnancy       blood glucose monitoring test strip    ONE TOUCH VERIO IQ    100 strip    Use to test blood sugar 4 times daily or as directed.  Ok to substitute alternative if insurance prefers.    Diet controlled gestational diabetes mellitus (GDM), antepartum       insulin aspart 100 UNIT/ML injection    NovoLOG FLEXPEN    1500 mL    Take 6 units before breakfast, 10 units before lunch, and 16 units before dinner daily.    Insulin controlled gestational diabetes mellitus (GDM) in third trimester       insulin isophane human 100 UNIT/ML injection    HumuLIN N PEN    15 mL    12 units with breakfast, 22 units before bed (to be titrated by MD/CDE)    Diet controlled gestational diabetes mellitus (GDM), antepartum, High-risk pregnancy, young multigravida, second trimester, Previous  delivery, antepartum condition or complication       insulin pen needle 32G X 4 MM    BD DINA U/F    100 each    Use 3 daily or as directed.    High-risk pregnancy, young multigravida, second trimester       PRENATAL VIT-FE BISG-FA-DHA PO

## 2017-10-10 NOTE — PROGRESS NOTES
Gestational Diabetes Follow-up    Subjective/Objective:    Unique Multani sent in blood glucose log for review. Last date of communication was: 10/6/17.    Gestational diabetes is being managed with NPH 12-0-0-22 and Novolog 6-13-16-0      Estimated Date of Delivery: 10/17/17  at 7am.  BG/Food Log:     Questions from educator:  Josh Calhoun,    Thank you for sending in your readings.  Do you remember if you took the new lunch time dose on 10/6?  Also Im sorry if you already said this, but are you after  meal readings 1 or 2 hours after.    Thanks,    Violette العلي MS, RD, LD, CDE      Email response from patient:   Yes I've been doing the new lunch dose 13 units and it's 2 hours after eating for testing. Im not sure if you got my question about the day before my schedule surgery. The doctor wanted me to ask what i should do with the insulin the night before becuase i would have to stop eating at midnight the night before my .  And won't be eating the day of.  --- Originally sent by [email or web address] book on Oct 10, 2017 12:43 PM ---      Assessment:  Fasting blood glucoses: 75% in target.  After breakfast: 100% in target.  After lunch: 50% in target.  After dinner: 75% in target.    Plan/Response:  Novolog 6-13-16-0--> 6-15-16-0  NPH no change.  Follow up if 3 or more readings above goal.  10/16/17 (night before ) take 22 units NPH (no change from usual dose), day of surgery take 8 units. No Novolog day of surgery.    Josh Calhoun,    Please increase you lunch novolog to 15 units.    Great question on the .  10/16/17 (night before ) take 22 units NPH (no change from usual dose), day of surgery take 8 units in the morning. No Novolog day of surgery.    Send in your readings again Friday after breakfast.    Thanks,    Violette العلي MS, RD, LD, CDE      Any diabetes medication dose changes were made via the CDE Protocol and Collaborative Practice Agreement with the patient's  referring provider. A copy of this encounter was shared with the provider.

## 2017-10-10 NOTE — PROGRESS NOTES
Pre Op Exam: 2017    Unique Multani is an 32 year old year old female  here for preop physical.     /68 (BP Location: Right arm, Patient Position: Chair, Cuff Size: Adult Regular)  Wt 177 lb 11.2 oz (80.6 kg)  LMP 01/15/2017 (Exact Date)  BMI 33.03 kg/m2    Smoker:Non-smoker  Alcohol/wk:  none  Living will: Unknown    PRESENT HISTORY:    Reason for admission:Repeat  section and bilateral tubal ligation  Onset of Illness: Now  Type of Surgery Anticipated: See above  Type of Anesthesia Anticipated:  Spinal      Medications:   Current Outpatient Prescriptions   Medication     acetone, Urine, test STRP     insulin aspart (NOVOLOG FLEXPEN) 100 UNIT/ML injection     insulin isophane human (HUMULIN N PEN) 100 UNIT/ML injection     blood glucose monitoring (ONE TOUCH VERIO IQ) test strip     insulin pen needle (BD DINA U/F) 32G X 4 MM     PRENATAL VIT-FE BISG-FA-DHA PO     No current facility-administered medications for this visit.      Facility-Administered Medications Ordered in Other Visits   Medication     fentaNYL Citrate (PF) (SUBLIMAZE) injection         Any Aspirin within 10 days? No      Family History: Review of patient's family history indicates:    Family History Negative        No family hx of             No family history of malignant hyperthermia.  No family history of bleeding disorder.  No history of Sleep Apnea    Past Medical History:   Diagnosis Date     Diabetes (H)     GDM insulin     GERD (gastroesophageal reflux disease)     w/u otherwise neg      History of colposcopy with cervical biopsy 3/23/07    WNL     Papanicolaou smear of cervix with low grade squamous intraepithelial lesion (LGSIL) 07       Past Surgical History:   Procedure Laterality Date     BREAST SURGERY      augmentation      C NONSPECIFIC PROCEDURE  01    Primary LTCS      SECTION N/A 2015    Procedure:  SECTION;  Surgeon: Tremaine Gaona MD;  Location:  OR      COLONOSCOPY N/A 7/19/2016    Procedure: COLONOSCOPY;  Surgeon: Lyudmila Pastor MD;  Location:  GI     DILATION AND CURETTAGE SUCTION  2010    elective termination     ESOPHAGOSCOPY, GASTROSCOPY, DUODENOSCOPY (EGD), COMBINED  4/8/2014    Procedure: COMBINED ESOPHAGOSCOPY, GASTROSCOPY, DUODENOSCOPY (EGD);   ESOPHAGOSCOPY, GASTROSCOPY, DUODENOSCOPY (EGD) ;  Surgeon: Vishnu Lanier MD;  Location:  GI     GYN SURGERY  2001     c section        Allergies   Allergen Reactions     No Known Drug Allergies        Transfusion reactions: No prior transfusions    Bleeding tendencies:No bleeding problems noted      REVIEW OF SYSTEMS:    Cardiovascular: NORMAL  Respiratory: NORMAL  Gastrointestinal: NORMAL  Genitourinary: NORMAL    Patient's last menstrual period was 01/15/2017 (exact date)..      PHYSICAL EXAM:  General Appearance: healthy,alert,no distress  Head: Normocephalic. No masses, lesions, tenderness or abnormalities  Eyes: normal  Nose: Nares normal  Mouth: Oropharynx normal  Neck: Neck supple. No adenopathy. Thyroid symmetric, normal size,  Chest: Clear to auscultation  Breast: Not done.  Heart:regular rate and rhythm and regular rate and rhythm,no murmurs, clicks, or gallops  Abdomen: Abdomen soft, non-tender. BS normal. No masses. Gravid  Genitals: Deferred  Extremities: Extremities normal. No deformities, edema, or skin discoloration.  Skin: Skin color, texture, turgor normal. No rashes or lesions.  Neurological: Gait normal. Reflexes normal and symmetric. Sensation grossly WNL.    Diabetic Instructions Given for Pre-Op Insulin: Yes.    Comments: Discussed indication, and risks of surgery, such as infection , bleeding, damage to bowel or bladder, uterus , fallopian tubes and ovaries. Pt. desires sterilization. At patient request I discussed in depth, the failure rate, permanency of the procedure, alternative forms of contraception (vasectomy, oral contraceptive pill, injectables, contraceptive  patch, IUD, barrier methods). I also discussed Essure procedure. I also discussed complications of surgery such as infection, bleeding , damage to bowel or bladder, perforation of a major blood vessel.     Impression: Repeat cesrean section and bilateral tubal ligation      MD Signature: ________________________________________________  Tremaine Gaona MD

## 2017-10-11 DIAGNOSIS — Z01.812 PRE-OPERATIVE LABORATORY EXAMINATION: Primary | ICD-10-CM

## 2017-10-13 ENCOUNTER — OFFICE VISIT (OUTPATIENT)
Dept: MATERNAL FETAL MEDICINE | Facility: CLINIC | Age: 32
End: 2017-10-13
Attending: OBSTETRICS & GYNECOLOGY
Payer: COMMERCIAL

## 2017-10-13 ENCOUNTER — ANESTHESIA EVENT (OUTPATIENT)
Dept: SURGERY | Facility: CLINIC | Age: 32
End: 2017-10-13
Payer: COMMERCIAL

## 2017-10-13 ENCOUNTER — HOSPITAL ENCOUNTER (INPATIENT)
Facility: CLINIC | Age: 32
LOS: 2 days | Discharge: HOME HEALTH | End: 2017-10-15
Attending: OBSTETRICS & GYNECOLOGY | Admitting: OBSTETRICS & GYNECOLOGY
Payer: COMMERCIAL

## 2017-10-13 ENCOUNTER — HOSPITAL ENCOUNTER (OUTPATIENT)
Dept: ULTRASOUND IMAGING | Facility: CLINIC | Age: 32
End: 2017-10-13
Attending: OBSTETRICS & GYNECOLOGY
Payer: COMMERCIAL

## 2017-10-13 ENCOUNTER — ANESTHESIA (OUTPATIENT)
Dept: SURGERY | Facility: CLINIC | Age: 32
End: 2017-10-13
Payer: COMMERCIAL

## 2017-10-13 DIAGNOSIS — Z98.891 S/P CESAREAN SECTION: Primary | ICD-10-CM

## 2017-10-13 DIAGNOSIS — O24.414 INSULIN CONTROLLED GESTATIONAL DIABETES MELLITUS (GDM) DURING PREGNANCY, ANTEPARTUM: ICD-10-CM

## 2017-10-13 DIAGNOSIS — O24.414 INSULIN CONTROLLED GESTATIONAL DIABETES MELLITUS (GDM) IN THIRD TRIMESTER: Primary | ICD-10-CM

## 2017-10-13 PROBLEM — O41.00X0 OLIGOHYDRAMNIOS: Status: ACTIVE | Noted: 2017-10-13

## 2017-10-13 LAB
ABO + RH BLD: NORMAL
ABO + RH BLD: NORMAL
BLD GP AB SCN SERPL QL: NORMAL
BLOOD BANK CMNT PATIENT-IMP: NORMAL
GLUCOSE BLDC GLUCOMTR-MCNC: 146 MG/DL (ref 70–99)
GLUCOSE BLDC GLUCOMTR-MCNC: 60 MG/DL (ref 70–99)
GLUCOSE BLDC GLUCOMTR-MCNC: 63 MG/DL (ref 70–99)
GLUCOSE BLDC GLUCOMTR-MCNC: 65 MG/DL (ref 70–99)
GLUCOSE BLDC GLUCOMTR-MCNC: 84 MG/DL (ref 70–99)
GLUCOSE BLDC GLUCOMTR-MCNC: 92 MG/DL (ref 70–99)
HGB BLD-MCNC: 13 G/DL (ref 11.7–15.7)
SPECIMEN EXP DATE BLD: NORMAL

## 2017-10-13 PROCEDURE — 86780 TREPONEMA PALLIDUM: CPT | Performed by: OBSTETRICS & GYNECOLOGY

## 2017-10-13 PROCEDURE — 00000146 ZZHCL STATISTIC GLUCOSE BY METER IP

## 2017-10-13 PROCEDURE — 25000125 ZZHC RX 250: Performed by: NURSE ANESTHETIST, CERTIFIED REGISTERED

## 2017-10-13 PROCEDURE — 37000009 ZZH ANESTHESIA TECHNICAL FEE, EACH ADDTL 15 MIN: Performed by: OBSTETRICS & GYNECOLOGY

## 2017-10-13 PROCEDURE — 25000128 H RX IP 250 OP 636: Performed by: NURSE ANESTHETIST, CERTIFIED REGISTERED

## 2017-10-13 PROCEDURE — 25000128 H RX IP 250 OP 636: Performed by: OBSTETRICS & GYNECOLOGY

## 2017-10-13 PROCEDURE — 27210794 ZZH OR GENERAL SUPPLY STERILE: Performed by: OBSTETRICS & GYNECOLOGY

## 2017-10-13 PROCEDURE — 71000012 ZZH RECOVERY PHASE 1 LEVEL 1 FIRST HR: Performed by: OBSTETRICS & GYNECOLOGY

## 2017-10-13 PROCEDURE — 86901 BLOOD TYPING SEROLOGIC RH(D): CPT | Performed by: OBSTETRICS & GYNECOLOGY

## 2017-10-13 PROCEDURE — 36000058 ZZH SURGERY LEVEL 3 EA 15 ADDTL MIN: Performed by: OBSTETRICS & GYNECOLOGY

## 2017-10-13 PROCEDURE — 59515 CESAREAN DELIVERY: CPT | Performed by: OBSTETRICS & GYNECOLOGY

## 2017-10-13 PROCEDURE — 86900 BLOOD TYPING SEROLOGIC ABO: CPT | Performed by: OBSTETRICS & GYNECOLOGY

## 2017-10-13 PROCEDURE — 37000008 ZZH ANESTHESIA TECHNICAL FEE, 1ST 30 MIN: Performed by: OBSTETRICS & GYNECOLOGY

## 2017-10-13 PROCEDURE — 86850 RBC ANTIBODY SCREEN: CPT | Performed by: OBSTETRICS & GYNECOLOGY

## 2017-10-13 PROCEDURE — 58611 LIGATE OVIDUCT(S) ADD-ON: CPT | Performed by: OBSTETRICS & GYNECOLOGY

## 2017-10-13 PROCEDURE — 76816 OB US FOLLOW-UP PER FETUS: CPT

## 2017-10-13 PROCEDURE — 88302 TISSUE EXAM BY PATHOLOGIST: CPT | Performed by: OBSTETRICS & GYNECOLOGY

## 2017-10-13 PROCEDURE — 76819 FETAL BIOPHYS PROFIL W/O NST: CPT | Performed by: OBSTETRICS & GYNECOLOGY

## 2017-10-13 PROCEDURE — 88302 TISSUE EXAM BY PATHOLOGIST: CPT | Mod: 26 | Performed by: OBSTETRICS & GYNECOLOGY

## 2017-10-13 PROCEDURE — 27210995 ZZH RX 272: Performed by: OBSTETRICS & GYNECOLOGY

## 2017-10-13 PROCEDURE — 36000056 ZZH SURGERY LEVEL 3 1ST 30 MIN: Performed by: OBSTETRICS & GYNECOLOGY

## 2017-10-13 PROCEDURE — 85018 HEMOGLOBIN: CPT | Performed by: OBSTETRICS & GYNECOLOGY

## 2017-10-13 PROCEDURE — 25000128 H RX IP 250 OP 636: Performed by: ANESTHESIOLOGY

## 2017-10-13 PROCEDURE — 25000132 ZZH RX MED GY IP 250 OP 250 PS 637: Performed by: OBSTETRICS & GYNECOLOGY

## 2017-10-13 PROCEDURE — 25000125 ZZHC RX 250: Performed by: ANESTHESIOLOGY

## 2017-10-13 PROCEDURE — 25000125 ZZHC RX 250: Performed by: OBSTETRICS & GYNECOLOGY

## 2017-10-13 PROCEDURE — 0UB70ZZ EXCISION OF BILATERAL FALLOPIAN TUBES, OPEN APPROACH: ICD-10-PCS | Performed by: OBSTETRICS & GYNECOLOGY

## 2017-10-13 PROCEDURE — 12000029 ZZH R&B OB INTERMEDIATE

## 2017-10-13 RX ORDER — NALOXONE HYDROCHLORIDE 0.4 MG/ML
.1-.4 INJECTION, SOLUTION INTRAMUSCULAR; INTRAVENOUS; SUBCUTANEOUS
Status: DISCONTINUED | OUTPATIENT
Start: 2017-10-13 | End: 2017-10-15 | Stop reason: HOSPADM

## 2017-10-13 RX ORDER — CEFAZOLIN SODIUM 2 G/100ML
2 INJECTION, SOLUTION INTRAVENOUS
Status: DISCONTINUED | OUTPATIENT
Start: 2017-10-13 | End: 2017-10-13 | Stop reason: HOSPADM

## 2017-10-13 RX ORDER — ONDANSETRON 2 MG/ML
4 INJECTION INTRAMUSCULAR; INTRAVENOUS EVERY 6 HOURS PRN
Status: DISCONTINUED | OUTPATIENT
Start: 2017-10-13 | End: 2017-10-15 | Stop reason: HOSPADM

## 2017-10-13 RX ORDER — OXYTOCIN/0.9 % SODIUM CHLORIDE 30/500 ML
PLASTIC BAG, INJECTION (ML) INTRAVENOUS PRN
Status: DISCONTINUED | OUTPATIENT
Start: 2017-10-13 | End: 2017-10-13

## 2017-10-13 RX ORDER — OXYCODONE HYDROCHLORIDE 5 MG/1
5-10 TABLET ORAL
Status: DISCONTINUED | OUTPATIENT
Start: 2017-10-13 | End: 2017-10-15 | Stop reason: HOSPADM

## 2017-10-13 RX ORDER — MISOPROSTOL 200 UG/1
400 TABLET ORAL
Status: DISCONTINUED | OUTPATIENT
Start: 2017-10-13 | End: 2017-10-15 | Stop reason: HOSPADM

## 2017-10-13 RX ORDER — EPHEDRINE SULFATE 50 MG/ML
INJECTION, SOLUTION INTRAVENOUS PRN
Status: DISCONTINUED | OUTPATIENT
Start: 2017-10-13 | End: 2017-10-13

## 2017-10-13 RX ORDER — AMOXICILLIN 250 MG
1-2 CAPSULE ORAL 2 TIMES DAILY
Status: DISCONTINUED | OUTPATIENT
Start: 2017-10-13 | End: 2017-10-15 | Stop reason: HOSPADM

## 2017-10-13 RX ORDER — NICOTINE POLACRILEX 4 MG
15-30 LOZENGE BUCCAL
Status: DISCONTINUED | OUTPATIENT
Start: 2017-10-13 | End: 2017-10-15 | Stop reason: HOSPADM

## 2017-10-13 RX ORDER — OXYTOCIN 10 [USP'U]/ML
10 INJECTION, SOLUTION INTRAMUSCULAR; INTRAVENOUS
Status: DISCONTINUED | OUTPATIENT
Start: 2017-10-13 | End: 2017-10-15 | Stop reason: HOSPADM

## 2017-10-13 RX ORDER — CEFAZOLIN SODIUM 1 G/3ML
INJECTION, POWDER, FOR SOLUTION INTRAMUSCULAR; INTRAVENOUS PRN
Status: DISCONTINUED | OUTPATIENT
Start: 2017-10-13 | End: 2017-10-13

## 2017-10-13 RX ORDER — CITRIC ACID/SODIUM CITRATE 334-500MG
30 SOLUTION, ORAL ORAL
Status: DISCONTINUED | OUTPATIENT
Start: 2017-10-13 | End: 2017-10-13 | Stop reason: HOSPADM

## 2017-10-13 RX ORDER — KETOROLAC TROMETHAMINE 30 MG/ML
30 INJECTION, SOLUTION INTRAMUSCULAR; INTRAVENOUS
Status: DISCONTINUED | OUTPATIENT
Start: 2017-10-13 | End: 2017-10-13 | Stop reason: HOSPADM

## 2017-10-13 RX ORDER — CEFAZOLIN SODIUM 1 G/3ML
1 INJECTION, POWDER, FOR SOLUTION INTRAMUSCULAR; INTRAVENOUS SEE ADMIN INSTRUCTIONS
Status: DISCONTINUED | OUTPATIENT
Start: 2017-10-13 | End: 2017-10-13 | Stop reason: HOSPADM

## 2017-10-13 RX ORDER — HYDRALAZINE HYDROCHLORIDE 20 MG/ML
2.5-5 INJECTION INTRAMUSCULAR; INTRAVENOUS EVERY 10 MIN PRN
Status: DISCONTINUED | OUTPATIENT
Start: 2017-10-13 | End: 2017-10-13 | Stop reason: HOSPADM

## 2017-10-13 RX ORDER — DROPERIDOL 2.5 MG/ML
0.62 INJECTION, SOLUTION INTRAMUSCULAR; INTRAVENOUS
Status: DISCONTINUED | OUTPATIENT
Start: 2017-10-13 | End: 2017-10-13 | Stop reason: HOSPADM

## 2017-10-13 RX ORDER — MORPHINE SULFATE 1 MG/ML
INJECTION, SOLUTION EPIDURAL; INTRATHECAL; INTRAVENOUS PRN
Status: DISCONTINUED | OUTPATIENT
Start: 2017-10-13 | End: 2017-10-13

## 2017-10-13 RX ORDER — LIDOCAINE 40 MG/G
CREAM TOPICAL
Status: DISCONTINUED | OUTPATIENT
Start: 2017-10-13 | End: 2017-10-15 | Stop reason: HOSPADM

## 2017-10-13 RX ORDER — HYDROCORTISONE 2.5 %
CREAM (GRAM) TOPICAL 3 TIMES DAILY PRN
Status: DISCONTINUED | OUTPATIENT
Start: 2017-10-13 | End: 2017-10-15 | Stop reason: HOSPADM

## 2017-10-13 RX ORDER — SCOLOPAMINE TRANSDERMAL SYSTEM 1 MG/1
1 PATCH, EXTENDED RELEASE TRANSDERMAL
Status: DISCONTINUED | OUTPATIENT
Start: 2017-10-13 | End: 2017-10-13

## 2017-10-13 RX ORDER — DEXTROSE MONOHYDRATE 25 G/50ML
25-50 INJECTION, SOLUTION INTRAVENOUS
Status: DISCONTINUED | OUTPATIENT
Start: 2017-10-13 | End: 2017-10-15 | Stop reason: HOSPADM

## 2017-10-13 RX ORDER — ONDANSETRON 4 MG/1
4 TABLET, ORALLY DISINTEGRATING ORAL EVERY 30 MIN PRN
Status: DISCONTINUED | OUTPATIENT
Start: 2017-10-13 | End: 2017-10-13 | Stop reason: HOSPADM

## 2017-10-13 RX ORDER — SODIUM CHLORIDE, SODIUM LACTATE, POTASSIUM CHLORIDE, CALCIUM CHLORIDE 600; 310; 30; 20 MG/100ML; MG/100ML; MG/100ML; MG/100ML
INJECTION, SOLUTION INTRAVENOUS CONTINUOUS
Status: DISCONTINUED | OUTPATIENT
Start: 2017-10-13 | End: 2017-10-13 | Stop reason: HOSPADM

## 2017-10-13 RX ORDER — OXYTOCIN/0.9 % SODIUM CHLORIDE 30/500 ML
340 PLASTIC BAG, INJECTION (ML) INTRAVENOUS CONTINUOUS PRN
Status: DISCONTINUED | OUTPATIENT
Start: 2017-10-13 | End: 2017-10-15 | Stop reason: HOSPADM

## 2017-10-13 RX ORDER — LABETALOL HYDROCHLORIDE 5 MG/ML
10 INJECTION, SOLUTION INTRAVENOUS
Status: DISCONTINUED | OUTPATIENT
Start: 2017-10-13 | End: 2017-10-13 | Stop reason: HOSPADM

## 2017-10-13 RX ORDER — KETOROLAC TROMETHAMINE 30 MG/ML
30 INJECTION, SOLUTION INTRAMUSCULAR; INTRAVENOUS EVERY 6 HOURS
Status: COMPLETED | OUTPATIENT
Start: 2017-10-13 | End: 2017-10-14

## 2017-10-13 RX ORDER — BUPIVACAINE HYDROCHLORIDE 7.5 MG/ML
INJECTION, SOLUTION INTRASPINAL PRN
Status: DISCONTINUED | OUTPATIENT
Start: 2017-10-13 | End: 2017-10-13

## 2017-10-13 RX ORDER — DEXAMETHASONE SODIUM PHOSPHATE 4 MG/ML
4 INJECTION, SOLUTION INTRA-ARTICULAR; INTRALESIONAL; INTRAMUSCULAR; INTRAVENOUS; SOFT TISSUE
Status: DISCONTINUED | OUTPATIENT
Start: 2017-10-13 | End: 2017-10-13 | Stop reason: HOSPADM

## 2017-10-13 RX ORDER — DEXAMETHASONE SODIUM PHOSPHATE 4 MG/ML
INJECTION, SOLUTION INTRA-ARTICULAR; INTRALESIONAL; INTRAMUSCULAR; INTRAVENOUS; SOFT TISSUE PRN
Status: DISCONTINUED | OUTPATIENT
Start: 2017-10-13 | End: 2017-10-13

## 2017-10-13 RX ORDER — DEXTROSE, SODIUM CHLORIDE, SODIUM LACTATE, POTASSIUM CHLORIDE, AND CALCIUM CHLORIDE 5; .6; .31; .03; .02 G/100ML; G/100ML; G/100ML; G/100ML; G/100ML
INJECTION, SOLUTION INTRAVENOUS CONTINUOUS
Status: DISCONTINUED | OUTPATIENT
Start: 2017-10-13 | End: 2017-10-15 | Stop reason: HOSPADM

## 2017-10-13 RX ORDER — SODIUM CHLORIDE, SODIUM LACTATE, POTASSIUM CHLORIDE, CALCIUM CHLORIDE 600; 310; 30; 20 MG/100ML; MG/100ML; MG/100ML; MG/100ML
INJECTION, SOLUTION INTRAVENOUS CONTINUOUS PRN
Status: DISCONTINUED | OUTPATIENT
Start: 2017-10-13 | End: 2017-10-13

## 2017-10-13 RX ORDER — ONDANSETRON 2 MG/ML
4 INJECTION INTRAMUSCULAR; INTRAVENOUS EVERY 30 MIN PRN
Status: DISCONTINUED | OUTPATIENT
Start: 2017-10-13 | End: 2017-10-13 | Stop reason: HOSPADM

## 2017-10-13 RX ORDER — ACETAMINOPHEN 325 MG/1
650 TABLET ORAL EVERY 4 HOURS PRN
Status: DISCONTINUED | OUTPATIENT
Start: 2017-10-16 | End: 2017-10-15 | Stop reason: HOSPADM

## 2017-10-13 RX ORDER — LANOLIN 100 %
OINTMENT (GRAM) TOPICAL
Status: DISCONTINUED | OUTPATIENT
Start: 2017-10-13 | End: 2017-10-15 | Stop reason: HOSPADM

## 2017-10-13 RX ORDER — CITRIC ACID/SODIUM CITRATE 334-500MG
30 SOLUTION, ORAL ORAL
Status: COMPLETED | OUTPATIENT
Start: 2017-10-13 | End: 2017-10-13

## 2017-10-13 RX ORDER — ACETAMINOPHEN 325 MG/1
975 TABLET ORAL EVERY 8 HOURS
Status: DISCONTINUED | OUTPATIENT
Start: 2017-10-13 | End: 2017-10-15 | Stop reason: HOSPADM

## 2017-10-13 RX ORDER — DIMENHYDRINATE 50 MG/ML
25 INJECTION, SOLUTION INTRAMUSCULAR; INTRAVENOUS
Status: DISCONTINUED | OUTPATIENT
Start: 2017-10-13 | End: 2017-10-13 | Stop reason: HOSPADM

## 2017-10-13 RX ORDER — DEXTROSE, SODIUM CHLORIDE, SODIUM LACTATE, POTASSIUM CHLORIDE, AND CALCIUM CHLORIDE 5; .6; .31; .03; .02 G/100ML; G/100ML; G/100ML; G/100ML; G/100ML
INJECTION, SOLUTION INTRAVENOUS CONTINUOUS
Status: DISCONTINUED | OUTPATIENT
Start: 2017-10-13 | End: 2017-10-13

## 2017-10-13 RX ORDER — MAGNESIUM HYDROXIDE 1200 MG/15ML
LIQUID ORAL PRN
Status: DISCONTINUED | OUTPATIENT
Start: 2017-10-13 | End: 2017-10-13

## 2017-10-13 RX ORDER — SIMETHICONE 80 MG
80 TABLET,CHEWABLE ORAL 4 TIMES DAILY PRN
Status: DISCONTINUED | OUTPATIENT
Start: 2017-10-13 | End: 2017-10-15 | Stop reason: HOSPADM

## 2017-10-13 RX ORDER — METOCLOPRAMIDE 10 MG/1
10 TABLET ORAL EVERY 6 HOURS PRN
Status: DISCONTINUED | OUTPATIENT
Start: 2017-10-13 | End: 2017-10-13 | Stop reason: HOSPADM

## 2017-10-13 RX ORDER — ONDANSETRON 2 MG/ML
INJECTION INTRAMUSCULAR; INTRAVENOUS PRN
Status: DISCONTINUED | OUTPATIENT
Start: 2017-10-13 | End: 2017-10-13

## 2017-10-13 RX ORDER — GLYCOPYRROLATE 0.2 MG/ML
INJECTION, SOLUTION INTRAMUSCULAR; INTRAVENOUS PRN
Status: DISCONTINUED | OUTPATIENT
Start: 2017-10-13 | End: 2017-10-13

## 2017-10-13 RX ORDER — BISACODYL 10 MG
10 SUPPOSITORY, RECTAL RECTAL DAILY PRN
Status: DISCONTINUED | OUTPATIENT
Start: 2017-10-15 | End: 2017-10-15 | Stop reason: HOSPADM

## 2017-10-13 RX ORDER — MEPERIDINE HYDROCHLORIDE 25 MG/ML
12.5 INJECTION INTRAMUSCULAR; INTRAVENOUS; SUBCUTANEOUS EVERY 5 MIN PRN
Status: DISCONTINUED | OUTPATIENT
Start: 2017-10-13 | End: 2017-10-13 | Stop reason: HOSPADM

## 2017-10-13 RX ORDER — DIPHENHYDRAMINE HYDROCHLORIDE 50 MG/ML
25 INJECTION INTRAMUSCULAR; INTRAVENOUS EVERY 6 HOURS PRN
Status: DISCONTINUED | OUTPATIENT
Start: 2017-10-13 | End: 2017-10-15 | Stop reason: HOSPADM

## 2017-10-13 RX ORDER — FENTANYL CITRATE 50 UG/ML
INJECTION, SOLUTION INTRAMUSCULAR; INTRAVENOUS PRN
Status: DISCONTINUED | OUTPATIENT
Start: 2017-10-13 | End: 2017-10-13

## 2017-10-13 RX ORDER — METOCLOPRAMIDE HYDROCHLORIDE 5 MG/ML
10 INJECTION INTRAMUSCULAR; INTRAVENOUS EVERY 6 HOURS PRN
Status: DISCONTINUED | OUTPATIENT
Start: 2017-10-13 | End: 2017-10-13 | Stop reason: HOSPADM

## 2017-10-13 RX ORDER — DIPHENHYDRAMINE HCL 25 MG
25 CAPSULE ORAL EVERY 6 HOURS PRN
Status: DISCONTINUED | OUTPATIENT
Start: 2017-10-13 | End: 2017-10-15 | Stop reason: HOSPADM

## 2017-10-13 RX ORDER — OXYTOCIN/0.9 % SODIUM CHLORIDE 30/500 ML
100 PLASTIC BAG, INJECTION (ML) INTRAVENOUS CONTINUOUS
Status: DISCONTINUED | OUTPATIENT
Start: 2017-10-13 | End: 2017-10-15 | Stop reason: HOSPADM

## 2017-10-13 RX ORDER — FENTANYL CITRATE 50 UG/ML
25-50 INJECTION, SOLUTION INTRAMUSCULAR; INTRAVENOUS
Status: DISCONTINUED | OUTPATIENT
Start: 2017-10-13 | End: 2017-10-13 | Stop reason: HOSPADM

## 2017-10-13 RX ADMIN — DEXAMETHASONE SODIUM PHOSPHATE 4 MG: 4 INJECTION, SOLUTION INTRA-ARTICULAR; INTRALESIONAL; INTRAMUSCULAR; INTRAVENOUS; SOFT TISSUE at 18:34

## 2017-10-13 RX ADMIN — FENTANYL CITRATE 20 MCG: 50 INJECTION, SOLUTION INTRAMUSCULAR; INTRAVENOUS at 18:26

## 2017-10-13 RX ADMIN — OXYTOCIN-SODIUM CHLORIDE 0.9% IV SOLN 30 UNIT/500ML 500 ML: 30-0.9/5 SOLUTION at 18:51

## 2017-10-13 RX ADMIN — SODIUM CHLORIDE, POTASSIUM CHLORIDE, SODIUM LACTATE AND CALCIUM CHLORIDE 1000 ML: 600; 310; 30; 20 INJECTION, SOLUTION INTRAVENOUS at 14:28

## 2017-10-13 RX ADMIN — SODIUM CHLORIDE, POTASSIUM CHLORIDE, SODIUM LACTATE AND CALCIUM CHLORIDE: 600; 310; 30; 20 INJECTION, SOLUTION INTRAVENOUS at 19:12

## 2017-10-13 RX ADMIN — BUPIVACAINE HYDROCHLORIDE IN DEXTROSE 13.5 MG: 7.5 INJECTION, SOLUTION SUBARACHNOID at 18:26

## 2017-10-13 RX ADMIN — EPHEDRINE SULFATE 5 MG: 50 INJECTION, SOLUTION INTRAVENOUS at 18:42

## 2017-10-13 RX ADMIN — SODIUM CHLORIDE, POTASSIUM CHLORIDE, SODIUM LACTATE AND CALCIUM CHLORIDE: 600; 310; 30; 20 INJECTION, SOLUTION INTRAVENOUS at 20:28

## 2017-10-13 RX ADMIN — OXYTOCIN-SODIUM CHLORIDE 0.9% IV SOLN 30 UNIT/500ML 100 ML/HR: 30-0.9/5 SOLUTION at 23:57

## 2017-10-13 RX ADMIN — PHENYLEPHRINE HYDROCHLORIDE 100 MCG: 10 INJECTION, SOLUTION INTRAMUSCULAR; INTRAVENOUS; SUBCUTANEOUS at 18:34

## 2017-10-13 RX ADMIN — SODIUM CHLORIDE, POTASSIUM CHLORIDE, SODIUM LACTATE AND CALCIUM CHLORIDE: 600; 310; 30; 20 INJECTION, SOLUTION INTRAVENOUS at 15:23

## 2017-10-13 RX ADMIN — PHENYLEPHRINE HYDROCHLORIDE 100 MCG: 10 INJECTION, SOLUTION INTRAMUSCULAR; INTRAVENOUS; SUBCUTANEOUS at 18:40

## 2017-10-13 RX ADMIN — SODIUM CHLORIDE, SODIUM LACTATE, POTASSIUM CHLORIDE, CALCIUM CHLORIDE, AND DEXTROSE MONOHYDRATE: 600; 310; 30; 20; 5 INJECTION, SOLUTION INTRAVENOUS at 17:56

## 2017-10-13 RX ADMIN — PHENYLEPHRINE HYDROCHLORIDE 100 MCG: 10 INJECTION, SOLUTION INTRAMUSCULAR; INTRAVENOUS; SUBCUTANEOUS at 18:42

## 2017-10-13 RX ADMIN — OXYTOCIN-SODIUM CHLORIDE 0.9% IV SOLN 30 UNIT/500ML 340 ML/HR: 30-0.9/5 SOLUTION at 20:28

## 2017-10-13 RX ADMIN — ONDANSETRON 4 MG: 2 INJECTION INTRAMUSCULAR; INTRAVENOUS at 18:34

## 2017-10-13 RX ADMIN — SODIUM CITRATE AND CITRIC ACID MONOHYDRATE 30 ML: 500; 334 SOLUTION ORAL at 17:06

## 2017-10-13 RX ADMIN — EPHEDRINE SULFATE 2.5 MG: 50 INJECTION, SOLUTION INTRAVENOUS at 18:44

## 2017-10-13 RX ADMIN — SODIUM CHLORIDE, POTASSIUM CHLORIDE, SODIUM LACTATE AND CALCIUM CHLORIDE: 600; 310; 30; 20 INJECTION, SOLUTION INTRAVENOUS at 18:42

## 2017-10-13 RX ADMIN — EPHEDRINE SULFATE 2.5 MG: 50 INJECTION, SOLUTION INTRAVENOUS at 18:40

## 2017-10-13 RX ADMIN — CEFAZOLIN 2 G: 1 INJECTION, POWDER, FOR SOLUTION INTRAMUSCULAR; INTRAVENOUS at 18:22

## 2017-10-13 RX ADMIN — GLYCOPYRROLATE 0.2 MG: 0.2 INJECTION, SOLUTION INTRAMUSCULAR; INTRAVENOUS at 18:42

## 2017-10-13 RX ADMIN — SCOPOLAMINE 1 PATCH: 1 PATCH, EXTENDED RELEASE TRANSDERMAL at 17:06

## 2017-10-13 RX ADMIN — KETOROLAC TROMETHAMINE 30 MG: 30 INJECTION, SOLUTION INTRAMUSCULAR at 20:58

## 2017-10-13 RX ADMIN — MORPHINE SULFATE 0.4 MG: 1 INJECTION EPIDURAL; INTRATHECAL; INTRAVENOUS at 18:26

## 2017-10-13 NOTE — PROVIDER NOTIFICATION
10/13/17 1521   Provider Notification   Provider Name/Title Dr Clemens   Method of Notification Phone   Request Evaluate - Remote   Notification Reason Status Update   Updated re: pt feeling like her blood sugar was low, POCT blood sugar 63. Orders to give one apple juice now and then reevaluate.

## 2017-10-13 NOTE — MR AVS SNAPSHOT
After Visit Summary   10/13/2017    Unique Multani    MRN: [id number]           Patient Information     Date Of Birth          1985        Visit Information        Provider Department      10/13/2017 10:00 AM Evin Simpson MD [facility] Maternal Fetal Medicine [facility]        Today's Diagnoses     Insulin controlled gestational diabetes mellitus (GDM) in third trimester    -  1       Follow-ups after your visit        Your next 10 appointments already scheduled     Oct 17, 2017   Procedure with Michael Mendez MD   [facility] Labor and Delivery (--)    [address] [facility] [id number] [phone]              Who to contact     If you have questions or need follow up information about today's clinic visit or your schedule please contact [facility]ERNAL FETAL MEDIC[facility] directly at [phone].  Normal or non-critical lab and imaging results will be communicated to you by Oz Sonotekhart, letter or phone within 4 business days after the clinic has received the results. If you do not hear from us within 7 days, please contact the clinic through Oz Sonotekhart or phone. If you have a critical or abnormal lab result, we will notify you by phone as soon as possible.  Submit refill requests through COGEON or call your pharmacy and they will forward the refill request to us. Please allow 3 business days for your refill to be completed.          Additional Information About Your Visit        MyChart Information     [employer] gives you secure access to your electronic health record. If you see a primary care provider, you can also send messages to your care team and make appointments. If you have questions, please call your primary care clinic.  If you do not have a primary care provider, please call [phone] and they will assist you.        Care EveryWhere ID     This is your Care EveryWhere ID. This could be used by other organizations to access your [facility]  records  [id number]        Your Vitals Were     Last Period                   01/15/2017 (Exact Date)            Blood Pressure from Last 3 Encounters:   10/09/17 100/68   10/02/17 110/68   09/25/17 100/64    Weight from Last 3 Encounters:   10/09/17 80.6 kg (177 lb 11.2 oz)   10/02/17 79.8 kg (176 lb)   09/25/17 78 kg (172 lb)              Today, you had the following     No orders found for display       Primary Care Provider Office Phone # Fax #    Michael Mendez [phone] [address] [place] [address]        Equal Access to Services     [facility]: Hadii jose daniel pinzono Soidris, wayaseminda sebas, qaulita kaalmada tad, nicolás torre . So [facility] [phone].    ATENCIÓN: Si habla español, tiene a humphries disposición servicios gratuitos de asistencia lingüística. Llame al [phone].    We comply with applicable federal civil rights laws and [place] laws. We do not discriminate on the basis of race, color, national origin, age, disability, sex, sexual orientation, or gender identity.            Thank you!     Thank you for choosing [facility]  for your care. Our goal is always to provide you with excellent care. Hearing back from our patients is one way we can continue to improve our services. Please take a few minutes to complete the written survey that you may receive in the mail after your visit with us. Thank you!             Your Updated Medication List - Protect others around you: Learn how to safely use, store and throw away your medicines at www.disposemymeds.org.          This list is accurate as of: 10/13/17 10:38 AM.  Always use your most recent med list.                   Brand Name Dispense Instructions for use Diagnosis    acetone (Urine) test Strp     50 each    1 strip by In Vitro route once a week    High-risk pregnancy, young multigravida, Insulin controlled gestational diabetes mellitus  (GDM) during pregnancy       blood glucose monitoring test strip    ONE TOUCH VERIO IQ    100 strip    Use to test blood sugar 4 times daily or as directed.  Ok to substitute alternative if insurance prefers.    Diet controlled gestational diabetes mellitus (GDM), antepartum       insulin aspart 100 UNIT/ML injection    NovoLOG FLEXPEN    1500 mL    Take 6 units before breakfast, 15 units before lunch, and 16 units before dinner daily.    Insulin controlled gestational diabetes mellitus (GDM) in third trimester       insulin isophane human 100 UNIT/ML injection    HumuLIN N PEN    15 mL    12 units with breakfast, 22 units before bed (to be titrated by MD/CDE)    Diet controlled gestational diabetes mellitus (GDM), antepartum, High-risk pregnancy, young multigravida, second trimester, Previous  delivery, antepartum condition or complication       insulin pen needle 32G X 4 MM    BD DINA U/F    100 each    Use 3 daily or as directed.    High-risk pregnancy, young multigravida, second trimester       PRENATAL VIT-FE BISG-FA-DHA PO           ZANTAC PO      Take 150 mg by mouth At Bedtime

## 2017-10-13 NOTE — IP AVS SNAPSHOT
[facility] Postpartum    [address]    [facility] [address]    Phone:  [phone]    Fax:  [phone]                                       After Visit Summary   10/13/2017    Unique Multani    MRN: [id number]           After Visit Summary Signature Page     I have received my discharge instructions, and my questions have been answered. I have discussed any challenges I see with this plan with the nurse or doctor.    ..........................................................................................................................................  Patient/Patient Representative Signature      ..........................................................................................................................................  Patient Representative Print Name and Relationship to Patient    ..................................................               ................................................  Date                                            Time    ..........................................................................................................................................  Reviewed by Signature/Title    ...................................................              ..............................................  Date                                                            Time

## 2017-10-13 NOTE — IP AVS SNAPSHOT
MRN:[id number]                      After Visit Summary   10/13/2017    Unique Multani    MRN: [id number]           Thank you!     Thank you for choosing [facility] for your care. Our goal is always to provide you with excellent care. Hearing back from our patients is one way we can continue to improve our services. Please take a few minutes to complete the written survey that you may receive in the mail after you visit. If you would like to speak to someone directly about your visit please contact Patient Relations at [phone]. Thank you!          Patient Information     Date Of Birth          1985        About your hospital stay     You were admitted on:  2017 You last received care in the:  [facility]    You were discharged on:  October 15, 2017       Who to Call     For medical emergencies, please call 911.  For non-urgent questions about your medical care, please call your primary care provider or clinic, [phone]  For questions related to your surgery, please call your surgery clinic        Attending Provider     Provider Sidra Luna,  OB/Gyn    Michael Mendez MD OB/Gyn       Primary Care Provider Office Phone # Fax #    Michael Mendez [phone]      Further instructions from your care team       Postop  Birth Instructions    Lactation #[phone]  [facility] #[phone]    Follow up in 1-2 weeks   Call [phone] or [phone] for appointment     Call and ask to be seen or talk to a doctor for the following: (You can go to the ob triage button   On answering service line) .     Increased bleeding, fever, general unwell feeling or increased pain.     Dr. Ness Kelsey, DO    OB/GYN   [facility]      Activity       Do not lift more than 10 pounds for 6 weeks after surgery.  Ask family and friends for help when you need  it.    No driving until you have stopped taking your pain medications (usually two weeks after surgery).    No heavy exercise or activity for 6 weeks.  Don't do anything that will put a strain on your surgery site.    Don't strain when using the toilet.  Your care team may prescribe a stool softener if you have problems with your bowel movements.     To care for your incision:       Keep the incision clean and dry.    Do not soak your incision in water. No swimming or hot tubs until it has fully healed. You may soak in the bathtub if the water level is below your incision.    Do not use peroxide, gel, cream, lotion, or ointment on your incision.    Adjust your clothes to avoid pressure on your surgery site (check the elastic in your underwear for example).     You may see a small amount of clear or pink drainage and this is normal.  Check with your health care provider:       If the drainage increases or has an odor.    If the incision reddens, you have swelling, or develop a rash.    If you have increased pain and the medicine we prescribed doesn't help.    If you have a fever above 100.4 F (38 C) with or without chills when placing thermometer under your tongue.   The area around your incision (surgery wound), will feel numb.  This is normal. The numbness should go away in less than a year.     Keep your hands clean:  Always wash your hands before touching your incision (surgery wound). This helps reduce your risk of infection. If your hands aren't dirty, you may use an alcohol hand-rub to clean your hands. Keep your nails clean and short.    Call your healthcare provider if you have any of these symptoms:       You soak a sanitary pad with blood within 1 hour, or you see blood clots larger than a golf ball.    Bleeding that lasts more than 6 weeks.    Vaginal discharge that smells bad.    Severe pain, cramping or tenderness in your lower belly area.    A need to urinate more frequently (use the toilet more  often), more urgently (use the toilet very quickly), or it burns when you urinate.    Nausea and vomiting.    Redness, swelling or pain around a vein in your leg.    Problems breastfeeding or a red or painful area on your breast.    Chest pain and cough or are gasping for air.    Problems with coping with sadness, anxiety or depression. If you have concerns about hurting yourself or the baby, call your provider immediately.      You have questions or concerns after you return home.       Pending Results     Date and Time Order Name Status Description    10/13/2017 1921 Surgical Path Exam In process     10/13/2017 1912 Surgical pathology exam In process             Statement of Approval     Ordered          10/15/17 1118  I have reviewed and agree with all the recommendations and orders detailed in this document.  EFFECTIVE NOW     Approved and electronically signed by:  Ness Kelsey,              Admission Information     Date & Time Provider Department Dept. Phone    10/13/2017 Michael Mendez MD [facility] Postpartum [phone]      Your Vitals Were     Blood Pressure Temperature Respirations Last Period Pulse Oximetry       96/61 97.9  F (36.6  C) (Oral) 16 01/15/2017 (Exact Date) 99%       MyChart Information     [employer] gives you secure access to your electronic health record. If you see a primary care provider, you can also send messages to your care team and make appointments. If you have questions, please call your primary care clinic.  If you do not have a primary care provider, please call [phone] and they will assist you.        Care EveryWhere ID     This is your Care EveryWhere ID. This could be used by other organizations to access your [place] medical records  [id number]        Equal Access to Services     [facility] : Chelle Reed, saima mullen, qanicolás nova. So [facility] [phone].    ATENCIÓN:  Si habla veronica, tiene a humphries disposición servicios gratuitos de asistencia lingüística. Kamila lewis [phone].    We comply with applicable federal civil rights laws and [place] laws. We do not discriminate on the basis of race, color, national origin, age, disability, sex, sexual orientation, or gender identity.               Review of your medicines      START taking        Dose / Directions    docusate sodium 100 MG tablet   Commonly known as:  COLACE   Used for:  S/P  section        Dose:  100 mg   Take 100 mg by mouth daily   Quantity:  60 tablet   Refills:  1       ibuprofen 800 MG tablet   Commonly known as:  ADVIL/MOTRIN   Used for:  S/P  section        Dose:  800 mg   Take 1 tablet (800 mg) by mouth every 8 hours as needed for moderate pain   Quantity:  90 tablet   Refills:  1       oxyCODONE 5 MG IR tablet   Commonly known as:  ROXICODONE   Used for:  S/P  section        Dose:  5 mg   Take 1 tablet (5 mg) by mouth every 4 hours as needed for pain maximum 6 tablet(s) per day   Quantity:  18 tablet   Refills:  0         CONTINUE these medicines which have NOT CHANGED        Dose / Directions    acetone (Urine) test Strp   Used for:  High-risk pregnancy, young multigravida, Insulin controlled gestational diabetes mellitus (GDM) during pregnancy        Dose:  1 strip   1 strip by In Vitro route once a week   Quantity:  50 each   Refills:  1       blood glucose monitoring test strip   Commonly known as:  ONE TOUCH VERIO IQ   Used for:  Diet controlled gestational diabetes mellitus (GDM), antepartum        Use to test blood sugar 4 times daily or as directed.  Ok to substitute alternative if insurance prefers.   Quantity:  100 strip   Refills:  3       insulin aspart 100 UNIT/ML injection   Commonly known as:  NovoLOG FLEXPEN   Used for:  Insulin controlled gestational diabetes mellitus (GDM) in third trimester        Take 6 units before breakfast, 15 units before lunch, and 16 units  before dinner daily.   Quantity:  1500 mL   Refills:  3       insulin isophane human 100 UNIT/ML injection   Commonly known as:  HumuLIN N PEN   Used for:  Diet controlled gestational diabetes mellitus (GDM), antepartum, High-risk pregnancy, young multigravida, second trimester, Previous  delivery, antepartum condition or complication        12 units with breakfast, 22 units before bed (to be titrated by MD/CDE)   Quantity:  15 mL   Refills:  1       insulin pen needle 32G X 4 MM   Commonly known as:  BD DINA U/F   Used for:  High-risk pregnancy, young multigravida, second trimester        Use 3 daily or as directed.   Quantity:  100 each   Refills:  1       PRENATAL VIT-FE BISG-FA-DHA PO        Refills:  0       ZANTAC PO        Dose:  150 mg   Take 150 mg by mouth At Bedtime   Refills:  0            Where to get your medicines      These medications were sent to [facility] [place], [place] - [address] [address] [facility]  [address] [address] [facility] [address]     Phone:  [phone]     docusate sodium 100 MG tablet    ibuprofen 800 MG tablet         Some of these will need a paper prescription and others can be bought over the counter. Ask your nurse if you have questions.     Bring a paper prescription for each of these medications     oxyCODONE 5 MG IR tablet                Protect others around you: Learn how to safely use, store and throw away your medicines at www.disposemymeds.org.             Medication List: This is a list of all your medications and when to take them. Check marks below indicate your daily home schedule. Keep this list as a reference.      Medications           Morning Afternoon Evening Bedtime As Needed    acetone (Urine) test Strp   1 strip by In Vitro route once a week                                blood glucose monitoring test strip   Commonly known as:  ONE TOUCH VERIO IQ   Use to test blood sugar 4 times daily or as directed.  Ok to substitute alternative  if insurance prefers.                                docusate sodium 100 MG tablet   Commonly known as:  COLACE   Take 100 mg by mouth daily                                ibuprofen 800 MG tablet   Commonly known as:  ADVIL/MOTRIN   Take 1 tablet (800 mg) by mouth every 8 hours as needed for moderate pain   Last time this was given:  800 mg on 10/15/2017 10:12 AM                                insulin aspart 100 UNIT/ML injection   Commonly known as:  NovoLOG FLEXPEN   Take 6 units before breakfast, 15 units before lunch, and 16 units before dinner daily.                                insulin isophane human 100 UNIT/ML injection   Commonly known as:  HumuLIN N PEN   12 units with breakfast, 22 units before bed (to be titrated by MD/CDE)                                insulin pen needle 32G X 4 MM   Commonly known as:  BD DINA U/F   Use 3 daily or as directed.                                oxyCODONE 5 MG IR tablet   Commonly known as:  ROXICODONE   Take 1 tablet (5 mg) by mouth every 4 hours as needed for pain maximum 6 tablet(s) per day   Last time this was given:  5 mg on 10/15/2017 10:34 AM                                PRENATAL VIT-FE BISG-FA-DHA PO                                ZANTAC PO   Take 150 mg by mouth At Bedtime

## 2017-10-13 NOTE — PROGRESS NOTES
"[facility] Ultrasound    Growth parameters and estimated fetal weight were consistent with appropriate for gestational age pattern of growth. Fetal anatomy appeared normal for gestational age. Oligohydramnios. BPP is non-reassuring.    We discussed the findings on today's ultrasound with the patient.    Delivery is recommended for oligohydramnios and BPP of 4/8.  Return to primary provider for continued prenatal care.     Please see \"Imaging\" tab under \"Chart Review\" for details of today's US at the [facility].    Evin Simpson MD  Maternal-Fetal Medicine      "

## 2017-10-13 NOTE — PROVIDER NOTIFICATION
10/13/17 1656   Provider Notification   Provider Name/Title Dr Perez   Method of Notification Phone   Request Evaluate - Remote   Notification Reason Status Update   Pt has history of N/V with prior 2 c/s. Orders received to place scopolomine patch now.

## 2017-10-13 NOTE — PLAN OF CARE
here at 38.5 weeks from [facility]. Weekly appointments for IDGM. BPP at [facility]  with UMM of 4. Other points off for movement. [facility] recommending delivery today. Pt will be a repeat . Breakfast at 0900 this am, per anesthesia  will be at 1700. Dr Salamanca updated and plan confirmed. VS stable, lung sounds clear. Meghana occasionally but pt describes as mild. FHT mod variability. Denies any LOF or bleeding. Feels like baby has been moving less in the last 24 hours. NST reactive. Pt advised NPO. Will admit and prep for surgery. Report given to ROSEANN Coelho.

## 2017-10-13 NOTE — NURSING NOTE
Patient here today for BPP due to GDM on insulin. BPP today 4/8  UMM 4cm &  no gross body movement. DR Simpson spoke to Dr Salamanca  And will send pt to [facility] for repeat C/sec.  Patient left amb in stable condition.

## 2017-10-13 NOTE — PROVIDER NOTIFICATION
10/13/17 1755   Provider Notification   Provider Name/Title Dr Clemens   Method of Notification Phone   Request Evaluate - Remote   Notification Reason Status Update        10/13/17 1755   Provider Notification   Provider Name/Title Dr Clemens   Method of Notification Phone   Request Evaluate - Remote   Notification Reason Status Update   Updated re: pt blood sugar 65. Orders to switch fluids to D5LR. C/S has been delayed d/t physician in delivery.

## 2017-10-14 LAB
GLUCOSE BLDC GLUCOMTR-MCNC: 103 MG/DL (ref 70–99)
GLUCOSE BLDC GLUCOMTR-MCNC: 115 MG/DL (ref 70–99)
GLUCOSE BLDC GLUCOMTR-MCNC: 116 MG/DL (ref 70–99)
GLUCOSE BLDC GLUCOMTR-MCNC: 124 MG/DL (ref 70–99)
GLUCOSE BLDC GLUCOMTR-MCNC: 130 MG/DL (ref 70–99)
HGB BLD-MCNC: 10.9 G/DL (ref 11.7–15.7)
T PALLIDUM IGG+IGM SER QL: NEGATIVE

## 2017-10-14 PROCEDURE — 25000128 H RX IP 250 OP 636: Performed by: OBSTETRICS & GYNECOLOGY

## 2017-10-14 PROCEDURE — 25000132 ZZH RX MED GY IP 250 OP 250 PS 637: Performed by: OBSTETRICS & GYNECOLOGY

## 2017-10-14 PROCEDURE — 99222 1ST HOSP IP/OBS MODERATE 55: CPT | Performed by: HOSPITALIST

## 2017-10-14 PROCEDURE — 12000029 ZZH R&B OB INTERMEDIATE

## 2017-10-14 PROCEDURE — 36415 COLL VENOUS BLD VENIPUNCTURE: CPT | Performed by: OBSTETRICS & GYNECOLOGY

## 2017-10-14 PROCEDURE — 99207 ZZC CONSULT E&M CHANGED TO INITIAL LEVEL: CPT | Performed by: HOSPITALIST

## 2017-10-14 PROCEDURE — 00000146 ZZHCL STATISTIC GLUCOSE BY METER IP

## 2017-10-14 PROCEDURE — 85018 HEMOGLOBIN: CPT | Performed by: OBSTETRICS & GYNECOLOGY

## 2017-10-14 RX ADMIN — DIPHENHYDRAMINE HYDROCHLORIDE 25 MG: 25 CAPSULE ORAL at 04:14

## 2017-10-14 RX ADMIN — SENNOSIDES AND DOCUSATE SODIUM 1 TABLET: 8.6; 5 TABLET ORAL at 09:07

## 2017-10-14 RX ADMIN — OXYCODONE HYDROCHLORIDE 10 MG: 5 TABLET ORAL at 21:29

## 2017-10-14 RX ADMIN — ACETAMINOPHEN 975 MG: 325 TABLET, FILM COATED ORAL at 04:09

## 2017-10-14 RX ADMIN — SENNOSIDES AND DOCUSATE SODIUM 1 TABLET: 8.6; 5 TABLET ORAL at 21:29

## 2017-10-14 RX ADMIN — KETOROLAC TROMETHAMINE 30 MG: 30 INJECTION, SOLUTION INTRAMUSCULAR at 03:06

## 2017-10-14 RX ADMIN — SODIUM CHLORIDE, SODIUM LACTATE, POTASSIUM CHLORIDE, CALCIUM CHLORIDE, AND DEXTROSE MONOHYDRATE: 600; 310; 30; 20; 5 INJECTION, SOLUTION INTRAVENOUS at 04:14

## 2017-10-14 RX ADMIN — ACETAMINOPHEN 975 MG: 325 TABLET, FILM COATED ORAL at 22:42

## 2017-10-14 RX ADMIN — KETOROLAC TROMETHAMINE 30 MG: 30 INJECTION, SOLUTION INTRAMUSCULAR at 09:07

## 2017-10-14 RX ADMIN — ONDANSETRON 4 MG: 2 INJECTION INTRAMUSCULAR; INTRAVENOUS at 02:06

## 2017-10-14 RX ADMIN — ACETAMINOPHEN 975 MG: 325 TABLET, FILM COATED ORAL at 14:59

## 2017-10-14 RX ADMIN — KETOROLAC TROMETHAMINE 30 MG: 30 INJECTION, SOLUTION INTRAMUSCULAR at 15:51

## 2017-10-14 NOTE — PLAN OF CARE
Problem: Patient Care Overview  Goal: Plan of Care/Patient Progress Review  Outcome: Improving  VSS. Scheduled toradol and tylenol given for pain. D5LR infusing. UO adequate. Regular diet ordered. N/V resolving- scop patch in place and zofran given x1 overnight. . Benadryl given x1. Incision WNL, liquid bandage intact. Patient wants to pump some colostrum for infant this morning but ultimately plans to formula feed. Stood at bedside and pad changed this morning, no N/V. Daughter helping in the room overnight. FOB will return this morning.

## 2017-10-14 NOTE — PROVIDER NOTIFICATION
10/13/17 0706   Provider Notification   Provider Name/Title Dr. Clemens   Method of Notification In Department     OB updated OT 60. Patient drinking apple juice and eating snack. Order to repeat in an hour.

## 2017-10-14 NOTE — PROGRESS NOTES
Patient up and to bathroom, tolerated well, campbell removed. Saline locked. Currently sitting in chair. Dr. Montiel in room and discussed patient's complaint of loss of sense of taste and smell. Examined by Dr. Montiel, nothing abnormal noted. Neurology consult ordered. Primary nurse Elicia liu.

## 2017-10-14 NOTE — ANESTHESIA POSTPROCEDURE EVALUATION
Patient: Unique Trinity Multani    Procedure(s):  Repeat  SECTION, TUBAL LIGATION  - Wound Class: II-Clean Contaminated    Diagnosis:Previous and Elective  Diagnosis Additional Information:  Previous  and Elective sterilization  Delivered infant    Anesthesia Type:  Spinal    Note:  Anesthesia Post Evaluation    Patient location during evaluation: PACU  Patient participation: Able to fully participate in evaluation  Level of consciousness: awake  Pain management: adequate  Airway patency: patent  Cardiovascular status: acceptable  Respiratory status: acceptable  Hydration status: acceptable  PONV: controlled     Anesthetic complications: None    Comments: .Anticipate full return of neurologic function          Last vitals:  Vitals:    10/13/17 1100 10/13/17 1430   BP: 129/77 103/59   Resp: 16 16   Temp: 98.4  F (36.9  C)          Electronically Signed By: Pancho Toth DO  2017  8:47 PM

## 2017-10-14 NOTE — PLAN OF CARE
Data: Unique Multani transferred to 442 via wheelchair at 2213. Baby transferred via crib.  Action: Receiving unit notified of transfer: Yes. Patient and family notified of room change. Report given to Tanisha WHITFIELD at 2230. Belongings sent to receiving unit. Accompanied by Registered Nurse. Oriented patient to surroundings. Call light within reach. ID bands double-checked with receiving RN.  Response: Patient tolerated transfer and is stable.

## 2017-10-14 NOTE — PLAN OF CARE
Problem: Patient Care Overview  Goal: Plan of Care/Patient Progress Review  Outcome: Improving  Patient stable. Asif removed this am. Patient has voided. IV SL'd. Patient with complaint of not being able to taste or smell. MD in to assess patient and placed a neurology consult. MD would like patient to be seen prior to discharge if possible. Continue to monitor.

## 2017-10-14 NOTE — CONSULTS
Full note dictated. Anosmia this am after uncomplicated C section on 10-13 pm. No prior history of anosmia, no recent URI/sinus infection but pt relates feeling congested. No fever. No HA. No recent CHI. She has gestational DM and spinal anesthesia so ? Med /metabolic induced etiology. ? Meningioma/sinus lesion with edema/bleeding from delivery causing sx. Given sudden onset doubt chronic inflammatory etiology.   Will order MRI head /sinuses and f/u after this is done.

## 2017-10-14 NOTE — ANESTHESIA PROCEDURE NOTES
Peripheral nerve/Neuraxial procedure note : intrathecal  Pre-Procedure  Performed by GENE TOTH  Location:     Pre-Anesthestic Checklist: patient identified, IV checked, risks and benefits discussed, informed consent, monitors and equipment checked, pre-op evaluation and at physician/surgeon's request    Timeout  Correct Patient: Yes   Correct Procedure: Yes   Correct Site: Yes   Correct Laterality: N/A   Correct Position: Yes   Site Marked: N/A   .   Procedure Documentation    .    Procedure:    Intrathecal.  Insertion Site:L2-3  (midline approach)      Patient Prep;mask, sterile gloves, povidone-iodine 7.5% surgical scrub.  .  Needle: Sprotte Spinal/LP Needle Length (inches): 3.5 # of attempts: 1 and # of redirects: Introducer used .       Assessment/Narrative  .  .  clear CSF fluid removed . Comments:  .Bupivicaine 13.5mg + Fentanyl 20mcg + Morphine 0.4mg    DAMION Toth

## 2017-10-14 NOTE — CONSULTS
NEUROLOGIC CONSULTATION       HISTORY OF PRESENT ILLNESS:  Unique Multani is a 32-year-old female who underwent a delivery on 10/13 in the evening via  with spinal anesthesia and the procedure went well, baby was healthy and then this morning when she got up she realized her sense of smell has seemed to disappear and she could taste salt and sweet but not other spices and this has persisted through the day.  She has never had symptoms like this in the past, she has had some nasal congestion which she states she gets with deliveries typically but she denies any purulent drainage, fevers, any recent colds.  She denies any head trauma, she does not have a history of headaches in the past or presently, she denies any other focal symptoms of visual change, facial or extremity numbness, incoordination or balance difficulties.  She does not think the symptoms have changed much over the course of the day.  She has had problems with gestational diabetes throughout her pregnancy had been on insulin.  Blood sugars this morning were 103; she did have a hemoglobin done this morning as well which was 10.9.  Her medical history with that she is otherwise healthy with just the gestational diabetes developing during her pregnancies.      ALLERGIES:  None.      PAST SURGICAL HISTORY:  Significant for breast augmentation in , prior  in  with no symptoms like she is having presently; she also had a  in .      FAMILY HISTORY:  Not remarkable for any chronic neurologic conditions.      REVIEW OF SYSTEMS:  Noncontributory other than some fatigue post having her baby, other general system review is noncontributory.      PHYSICAL EXAMINATION:  Today,   VITAL SIGNS:  She has been afebrile; blood pressure is 101/52, pulse of 63 that is regular, respiratory rate is 16.   GENERAL:  She is alert and bright in affect, speech is fluent, she is up walking around the room, talking with her  and  children and using the bathroom on her own.   HEART:  Regular rate and rhythm with no murmurs, there are no carotid bruits heard.   LUNGS:  Clear to auscultation with no abnormalities.   EXTREMITIES:  She has no peripheral edema.   NECK:  Supple with no rigidity.   NEUROLOGIC:    CN: Cranial nerves III-XII are intact with no nystagmus.  She has decreased sense of smell. States she can't smell alcholol wipe.   She has intact visual fields and denies diplopia, pupils are 3 mm and reactive, facial sensation and strength is equal and symmetric, she has no tenderness with palpation over the temporal arteries.    MOTOR: Extremities show no drift and normal tone in the upper extremities, she has no tremor.  Strength testing is 5/5 proximally and distally in the arms and legs.   COORDINATION:  finger-nose-finger is accurate.  Her gait is narrow-based and steady and she can toe, heel and tandem walk.    REFLEXES: Reflexes are 2+ symmetrically with downgoing toes.    SENSORY: Sensory exam shows intact face, arm and leg sensation bilaterally.      IMPRESSION:  Ms. Multani is suffering from anosmia of unclear etiology, most common causes are sinus related, medication related,  frontal lobe mass such as meningioma would be another possibility. The later seems less likely as she has no other neurologic symptoms with this or headache but is something in the differential. Occasionally medications can alter cranial nerve functioning and I do not see any obvious reports of spinal anesthesia doing this, but it would be another consideration.      PLAN: Given the sudden onset of symptoms I did recommend she have a head MRI with sinus cuts to see if there is anything in the cribriform plate that is triggering her symptoms.  If this is medication related event or due to sinus congestion, symptoms should slowly improve over a few days to a few weeks.  I will follow up once the MRI is available.         GAYATHRI MCDANIEL MD              D: 10/14/2017 14:56   T: 10/14/2017 16:16   MT: [id number]      Name:     LIN JOHNSON   MRN:      [id number]        Account:       [id number]   :      1985           Consult Date:  10/14/2017      Document: [id number]

## 2017-10-14 NOTE — OP NOTE
[facility]    OB Brief Operative Note    Pre-operative diagnosis: Term gestation, Prior uterine scar, Elective sterilization, Pl; Oligohydramnios, BPP 4/8, Insulin Dependant Gestational Diabetic   Post-operative diagnosis Same     Procedure: Procedure(s):  Repeat  SECTION, TUBAL LIGATION  - Wound Class: II-Clean Contaminated   Surgeon: Sidra Salamanca DO   Assistants(s): None   Anesthesia: Spinal    Estimated blood loss: 700mL    Total IV fluids: (See anesthesia record)   Blood transfusion: No transfusion was given during surgery   Total urine output: (See anesthesia record)   Drains: None           Complications: None   Condition: Infant stable, transferred to general care nursery         Specimens:   ID Type Source Tests Collected by Time Destination   1 :  Cord blood Blood OR DOCUMENTATION ONLY Sidra Salamanca DO 10/13/2017  6:52 PM    A : for sterilization Tissue Fallopian Tube, Right SURGICAL PATHOLOGY EXAM Sidra Salamanca,  10/13/2017  7:12 PM    B : left tube for sterilization Tissue Fallopian Tube, Left SURGICAL PATHOLOGY EXAM Sidra Salamanca,  10/13/2017  7:09 PM        Findings: A viable   weighing 7lbs 3oz and with APGARs of 8 and 9 at 1 and 5 minutes respectively. A nuchal cord was not noted at the time of delivery. Amniotic fluid was noted to be meconium stained - light. Normal fallopian tubes and ovaries were noted. The uterus was pink, enlarged and smooth in contour. Clear urine output was noted in the catheter bag at the conclusion of the procedure.    Complications:  None           Condition: Stable       DESCRIPTION OF OPERATIVE PROCEDURE: After insuring informed consent, the patient was taken to the operating room and spinal anesthesia was initiated.  The patient was then transferred to the dorsal supine position with left lateral tilt. The vagina and perineum were prepped.  A campbell catheter was placed and set to bedside drainage. The abdomen was prepped  and draped in a standard sterile fashion.  Anesthesia was determined to be adequate with an chuyita clamp.    A Pfannenstiel skin incision was made using a scalpel and carried through to the level of the fascia.  The fascia was then incised using a scalpel and then extended bilaterally with curved Duncan scissors.  The fascia was then grasped with 2 Kocher clamps, elevated, and sharply and bluntly dissected first superiorly and then inferiorly from the rectus muscles below.    The rectus muscles were then  in the midline and the peritoneum was identified, entered bluntly and then extended manually with good visualization of the bladder.     A Calloway retractor was then inserted and the vesicouterine peritoneum was identified, grasped with the pickups, and entered sharply with Metzenbaum scissors.  This incision was then extended laterally, and a bladder flap was created.  The bladder was retracted using a retractor.  The lower uterine segment was incised in a tranverse fashion with the scalpel, then extended bilaterally manually.  The retractor was then removed and 's head was delivered atraumatically, followed by the 's body which was also delivered atraumatically.  The nose and mouth of the  were suctioned and delayed cord clamping was performed. The umbilical cord was clamped x 2 and cut.  The  was taken to the radiant warmer to the awaiting nursery team.  Cord blood was obtained.     The placenta was then removed by manual extraction with a 3 vessel cord. The uterus was exteriorized and cleared of all clots and debris.  The uterine incision was repaired using 0 vicryl in a running locking fashion. Several figure of eights were placed for hemostasis.      A yuli was placed on the left tube. The bovie device was used to create a window in the mesosalpinx. Two hemostats were then placed across the tube and the Metzenbaum scissors used excise the center portion of tube. This  was sent to pathology. The bovie device was then used to cautery the tube ends. 2-0 Monocryl was used to tie off the two ends of the fallopian tube. This same procedure was performed on the right.    The posterior cul-de-sac was then cleared of clots and the uterus was returned to the abdomen. The uterine incision was inspected once again and was noted to be hemostatic. The abdomen was irrigated with warm sterile water. The peritoneum was reapproximated using 0-vicryl in a simple running fashion.  The fascia and muscle were inspected for any areas of bleeding. These were made hemostatic with the bovie device.  The fascia was reapproximated using 0-vicryl in a simple running fashion. The subcutaneous tissue was irrigated with warm water. A few small bleeding sites were cauterized using electrocautery. Hemostasis assured.  The skin was reapproximated using 4-0 monocryl  in a running subcuticular fashion and Dermabond was placed.  A standard sterile dressing was applied over the incision.  Lower uterine segment and vagina were cleared of clots and the fundus was noted to be firm.    The patient tolerated this procedure well.  All needle, sponge and instrument counts were correct times two.  The patient was taken to the recovery room in an awake and stable condition.     ------------------------------------------------------------------------------

## 2017-10-14 NOTE — PROGRESS NOTES
[facility]   Obstetrics Post-Op / Progress Note         Assessment and Plan:    Assessment:   Post-operative day #1  Low transverse repeat  section  Post-partum tubal ligation  L&D complications: BPP 4/10, oligohydramnios; GDM A2      Clean wound without signs of infection.  No immediate surgical complications identified.  No excessive bleeding  Reports loss of sensation of taste, smell this AM.   Denies visual changes, no other loss of function.         Plan:   Ambulation encouraged  Monitor wound for signs of infection  Pain control measures as needed  Loss of sensation of taste, smell, otherwise normal cranial nerves on exam.   Neurology consult ordered  Anticipate discharge in 2 days            Interval History:   Doing well.  Pain is well-controlled.  No fevers.  No history of wound drainage, warmth or significant erythema.  Good appetite.  Denies chest pain, shortness of breath, nausea or vomiting.  Ambulatory.  Breastfeeding well.           Significant Problems:      Patient Active Problem List   Diagnosis     Esophageal reflux     CARDIOVASCULAR SCREENING; LDL GOAL LESS THAN 160     Irritable bowel syndrome     Previous  delivery, antepartum condition or complication     High-risk pregnancy, young multigravida     Insulin controlled gestational diabetes mellitus (GDM) during pregnancy     Contraception     Oligohydramnios             Review of Systems:    The Review of Systems is negative other than noted in the HPI          Medications:   All medications related to the patient's surgery have been reviewed  Current Facility-Administered Medications   Medication     lidocaine 1 % 1 mL     lidocaine (LMX4) kit     sodium chloride (PF) 0.9% PF flush 3 mL     sodium chloride (PF) 0.9% PF flush 3 mL     oxytocin (PITOCIN) 30 units in 500 mL 0.9% NaCl infusion     dextrose 5% in lactated ringers infusion     naloxone (NARCAN) injection 0.1-0.4 mg     senna-docusate  "(SENOKOT-S;PERICOLACE) 8.6-50 MG per tablet 1-2 tablet     [START ON 10/15/2017] bisacodyl (DULCOLAX) Suppository 10 mg     [START ON 10/15/2017] sodium phosphate (FLEET ENEMA) 1 enema     ondansetron (ZOFRAN) injection 4 mg     hydrocortisone 2.5 % cream     diphenhydrAMINE (BENADRYL) capsule 25 mg    Or     diphenhydrAMINE (BENADRYL) injection 25 mg     lanolin ointment     simethicone (MYLICON) chewable tablet 80 mg     lactated ringers BOLUS 1,000 mL     oxytocin (PITOCIN) 30 units in 500 mL 0.9% NaCl infusion     oxytocin (PITOCIN) injection 10 Units     misoprostol (CYTOTEC) tablet 400 mcg     NO Rho (D) immune globulin (RhoGam) needed - mother Rh POSITIVE     acetaminophen (TYLENOL) tablet 975 mg     [START ON 10/16/2017] acetaminophen (TYLENOL) tablet 650 mg     oxyCODONE (ROXICODONE) IR tablet 5-10 mg     ketorolac (TORADOL) injection 30 mg     measles, mumps and rubella vaccine (MMR) injection 0.5 mL     Tdap (tetanus-diphtheria-acell pertussis) (ADACEL) injection 0.5 mL     glucose 40 % gel 15-30 g    Or     dextrose 50 % injection 25-50 mL    Or     glucagon injection 1 mg     insulin aspart (NovoLOG) inj (RAPID ACTING)     insulin aspart (NovoLOG) inj (RAPID ACTING)     Facility-Administered Medications Ordered in Other Encounters   Medication     fentaNYL Citrate (PF) (SUBLIMAZE) injection             Physical Exam:   All vitals stable  Vital signs:  Temp: 98.6  F (37  C) Temp src: Oral BP: 101/52   Heart Rate: 63 Resp: 16 SpO2: 99 %          Estimated body mass index is 33.03 kg/(m^2) as calculated from the following:    Height as of 6/30/17: 1.562 m (5' 1.5\").    Weight as of 10/9/17: 80.6 kg (177 lb 11.2 oz).  Physical exam:  GENERAL APPEARANCE: healthy, alert and no distress  NEURO: cranial nerves 2-12 intact (patient with colored contacts, unable to check accomodation of pupils)  LUNGS: clear to auscultation bilaterally  HEART: normal S1, S2; RRR, no murmurs, rubs, gallops.  ABDOMEN: +BS; " bandage/incision clean, intact, dry  Wound clean and dry with minimal or no drainage.  Surrounding skin with minimal erythema.  Extremities: no edema bilaterally          Data:     All laboratory data related to this surgery reviewed  Hemoglobin   Date Value Ref Range Status   10/14/2017 10.9 (L) 11.7 - 15.7 g/dL Final   10/13/2017 13.0 11.7 - 15.7 g/dL Final   03/16/2017 13.0 11.7 - 15.7 g/dL Final   01/21/2016 14.1 11.7 - 15.7 g/dL Final   04/05/2015 11.6 (L) 11.7 - 15.7 g/dL Final     No imaging studies have been ordered    Jasmina Montiel MD

## 2017-10-14 NOTE — PROVIDER NOTIFICATION
10/14/17 1054   Provider Notification   Provider Name/Title Dr. Montiel   Method of Notification In Department   Request Evaluate-Remote   Notification Reason Status Update   MD on unit and updated that patient is complaining that she cant smell or taste anything. Patient denies having a cold. No new orders received.

## 2017-10-14 NOTE — CONSULTS
[facility]  Hospitalist Consult Note  Name: Unique Multani    MRN: [id number]  YOB: 1985    Age: 32 year old  Date of admission: 10/13/2017  Primary care provider: Michael Mendez     Requesting Physician:  Dr Salamanca  Reason for consult:  Gestational Diabetes          Assessment and Plan:   Unique Multani is a 32 year old female who is s/p elective C -section on 10/13/2017     1. S/p Elective C section on 10/13: The patient is doing well, currently has well controlled pain and is hemodynamically stable.   --Will defer diet, activity, and pain control to the Ob/gyn.     2. Gestational diabetes: pt has h/o gestational DM with previous pregnancy (2 yrs ago) and with this pregnancy as well. She was on insulin Humulin 12 U with breakfast, 22 U at bedtime and Aspart 6U ith breakfast , 15 U with lunch and 16 U with supper. Currently blood sugars are stable and I do not suspect she will need any insulin on dc.  --cont SSI while inpatient  --no need for insulin on dc  --she is high risk for developing DM2 in the future and I have recommended that she establish primary care physician going forward for regular screenings along with maintaining a healthy weight     Anosmia, ageusia: Pt did not mention anything about this symptom to me but later notified the Ob/gyn. I had not appreciated any neurological deficit otherwise. Neurology consult noted. Further w/u per Neurology     Thank you for the consultation, we will sign off-neurology to address anosmia/ageusia issues. Please page with any questions or concerns.         History of Present Illness:   Unique Multani is a 32 year old female who was hospitalized for elective C section. Pre-operative note was fully reviewed and recommendations acknowledged. Op note and anesthesia notes and flow sheets reviewed.     The patient had no complications related to the procedure and has had an unremarkable post-operative course to this point.  Currently pain is adequately controlled. No nausea, vomiting, diarrhea or constipation. No fevers, chills, diaphoresis. No chest pain, palpitations, dyspnea. Asif catheter still in place. Tolerating oral intake. No excessive somnolence and patient is fully alert and oriented. The patient has no other complaints at this time.                  Past Medical History:     Past Medical History:   Diagnosis Date     Diabetes (H)     GDM insulin     GERD (gastroesophageal reflux disease)     w/u otherwise neg      History of colposcopy with cervical biopsy 3/23/07    WNL     Papanicolaou smear of cervix with low grade squamous intraepithelial lesion (LGSIL) 07             Past Surgical History:     Past Surgical History:   Procedure Laterality Date     BREAST SURGERY      augmentation      C NONSPECIFIC PROCEDURE  01    Primary LTCS      SECTION N/A 2015    Procedure:  SECTION;  Surgeon: Tremaine Gaona MD;  Location:  OR     COLONOSCOPY N/A 2016    Procedure: COLONOSCOPY;  Surgeon: Lyudmila Pastor MD;  Location:  GI     DILATION AND CURETTAGE SUCTION      elective termination     ESOPHAGOSCOPY, GASTROSCOPY, DUODENOSCOPY (EGD), COMBINED  2014    Procedure: COMBINED ESOPHAGOSCOPY, GASTROSCOPY, DUODENOSCOPY (EGD);   ESOPHAGOSCOPY, GASTROSCOPY, DUODENOSCOPY (EGD) ;  Surgeon: Vishnu Lanier MD;  Location:  GI     GYN SURGERY       c section                Social History:     Social History   Substance Use Topics     Smoking status: Former Smoker     Years: 7.00     Types: Cigarettes     Smokeless tobacco: Never Used      Comment: only if she drinks alcohol     Alcohol use No      Comment: occasional             Family History:   Family history was fully reviewed and non-contributory in this case.          Allergies:     Allergies   Allergen Reactions     No Known Drug Allergies              Medications:     Prior to Admission medications    Medication  Sig Last Dose Taking? Auth Provider   RaNITidine HCl (ZANTAC PO) Take 150 mg by mouth At Bedtime 10/12/2017 at Unknown time Yes Reported, Patient   insulin aspart (NOVOLOG FLEXPEN) 100 UNIT/ML injection Take 6 units before breakfast, 15 units before lunch, and 16 units before dinner daily. 10/13/2017 at Unknown time Yes Michael Mendez MD   insulin isophane human (HUMULIN N PEN) 100 UNIT/ML injection 12 units with breakfast, 22 units before bed (to be titrated by MD/SREEKANTH) 10/13/2017 at Unknown time Yes Michael Mendez MD   PRENATAL VIT-FE BISG-FA-DHA PO  10/12/2017 at Unknown time Yes Reported, Patient   acetone, Urine, test STRP 1 strip by In Vitro route once a week   Michael Mendez MD   blood glucose monitoring (ONE TOUCH VERIO IQ) test strip Use to test blood sugar 4 times daily or as directed.  Ok to substitute alternative if insurance prefers.   Michael Mendez MD   insulin pen needle (BD DINA U/F) 32G X 4 MM Use 3 daily or as directed.   Michael Mendez MD       [facility] hospital administered medication list (MAR) also reviewed.          Review of Systems:   A comprehensive greater than 10 system review of systems was carried out.  Pertinent positives and negatives are noted above.  Otherwise negative for contributory info.            Physical Exam:   Blood pressure 106/60, temperature 98.6  F (37  C), temperature source Oral, resp. rate 16, last menstrual period 01/15/2017, SpO2 98 %, not currently breastfeeding.  Exam:  GENERAL: No apparent distress. Awake, alert, and fully oriented.  HEENT: Normocephalic, atraumatic. Extraocular movements intact.  CARDIOVASCULAR: Regular rate and rhythm without murmurs or rubs. No S3.  PULMONARY: Clear bilaterally.  ABDOMINAL: Soft, non-tender, non-distended. Bowel sounds normoactive. No hepatosplenomegaly.  EXTREMITIES: No cyanosis or clubbing. No edema.  NEUROLOGICAL: CN 2-12 grossly intact, no focal neurological deficits.  DERMATOLOGICAL: No rash,  ulcer, ecchymoses, jaundice.         Data:   Imaging:  Reviewed.    EKG/Telemetry:  Reviewed.    Labs: Reviewed.     Recent Labs  Lab 10/14/17  0612 10/13/17  1430   HGB 10.9* 13.0     No results for input(s): NA, POTASSIUM, CHLORIDE, CO2, ANIONGAP, GLC, BUN, CR, GFRESTIMATED, GFRESTBLACK, PREETI in the last 168 hours.       October 14, 2017

## 2017-10-14 NOTE — ANESTHESIA CARE TRANSFER NOTE
Patient: Unique Trinity Multani    Procedure(s):  Repeat  SECTION, TUBAL LIGATION  - Wound Class: II-Clean Contaminated    Diagnosis: Previous and Elective  Diagnosis Additional Information: No value filed.    Anesthesia Type:   Spinal     Note:  Airway :Room Air  Patient transferred to:PACU  Handoff Report: Identifed the Patient, Identified the Reponsible Provider, Reviewed the pertinent medical history, Discussed the surgical course, Reviewed Intra-OP anesthesia mangement and issues during anesthesia, Set expectations for post-procedure period and Allowed opportunity for questions and acknowledgement of understanding      Vitals: (Last set prior to Anesthesia Care Transfer)    CRNA VITALS  10/13/2017 1919 - 10/13/2017 1959      10/13/2017             NIBP: 98/61    Pulse: 74    NIBP Mean: 76    SpO2: 99 %                Electronically Signed By: CARRIE Freeman CRNA  2017  7:59 PM

## 2017-10-15 VITALS
DIASTOLIC BLOOD PRESSURE: 61 MMHG | SYSTOLIC BLOOD PRESSURE: 96 MMHG | RESPIRATION RATE: 16 BRPM | OXYGEN SATURATION: 99 % | TEMPERATURE: 97.9 F

## 2017-10-15 LAB — GLUCOSE BLDC GLUCOMTR-MCNC: 91 MG/DL (ref 70–99)

## 2017-10-15 PROCEDURE — 25000128 H RX IP 250 OP 636: Performed by: OBSTETRICS & GYNECOLOGY

## 2017-10-15 PROCEDURE — 00000146 ZZHCL STATISTIC GLUCOSE BY METER IP

## 2017-10-15 PROCEDURE — 90715 TDAP VACCINE 7 YRS/> IM: CPT | Performed by: OBSTETRICS & GYNECOLOGY

## 2017-10-15 PROCEDURE — 25000132 ZZH RX MED GY IP 250 OP 250 PS 637: Performed by: OBSTETRICS & GYNECOLOGY

## 2017-10-15 RX ORDER — IBUPROFEN 800 MG/1
800 TABLET, FILM COATED ORAL 3 TIMES DAILY PRN
Status: DISCONTINUED | OUTPATIENT
Start: 2017-10-15 | End: 2017-10-15 | Stop reason: HOSPADM

## 2017-10-15 RX ORDER — IBUPROFEN 800 MG/1
800 TABLET, FILM COATED ORAL EVERY 8 HOURS PRN
Qty: 90 TABLET | Refills: 1 | Status: SHIPPED | OUTPATIENT
Start: 2017-10-15 | End: 2021-08-10

## 2017-10-15 RX ORDER — ASPIRIN 81 MG
100 TABLET, DELAYED RELEASE (ENTERIC COATED) ORAL DAILY
Qty: 60 TABLET | Refills: 1 | Status: SHIPPED | OUTPATIENT
Start: 2017-10-15 | End: 2017-10-24

## 2017-10-15 RX ORDER — OXYCODONE HYDROCHLORIDE 5 MG/1
5 TABLET ORAL EVERY 4 HOURS PRN
Qty: 18 TABLET | Refills: 0 | Status: SHIPPED | OUTPATIENT
Start: 2017-10-15 | End: 2017-10-24

## 2017-10-15 RX ADMIN — IBUPROFEN 800 MG: 800 TABLET, FILM COATED ORAL at 16:17

## 2017-10-15 RX ADMIN — IBUPROFEN 800 MG: 800 TABLET, FILM COATED ORAL at 03:35

## 2017-10-15 RX ADMIN — CLOSTRIDIUM TETANI TOXOID ANTIGEN (FORMALDEHYDE INACTIVATED), CORYNEBACTERIUM DIPHTHERIAE TOXOID ANTIGEN (FORMALDEHYDE INACTIVATED), BORDETELLA PERTUSSIS TOXOID ANTIGEN (GLUTARALDEHYDE INACTIVATED), BORDETELLA PERTUSSIS FILAMENTOUS HEMAGGLUTININ ANTIGEN (FORMALDEHYDE INACTIVATED), BORDETELLA PERTUSSIS PERTACTIN ANTIGEN, AND BORDETELLA PERTUSSIS FIMBRIAE 2/3 ANTIGEN 0.5 ML: 5; 2; 2.5; 5; 3; 5 INJECTION, SUSPENSION INTRAMUSCULAR at 11:12

## 2017-10-15 RX ADMIN — OXYCODONE HYDROCHLORIDE 10 MG: 5 TABLET ORAL at 00:14

## 2017-10-15 RX ADMIN — ACETAMINOPHEN 975 MG: 325 TABLET, FILM COATED ORAL at 07:15

## 2017-10-15 RX ADMIN — IBUPROFEN 800 MG: 800 TABLET, FILM COATED ORAL at 10:12

## 2017-10-15 RX ADMIN — ACETAMINOPHEN 975 MG: 325 TABLET, FILM COATED ORAL at 15:15

## 2017-10-15 RX ADMIN — OXYCODONE HYDROCHLORIDE 5 MG: 5 TABLET ORAL at 04:58

## 2017-10-15 RX ADMIN — OXYCODONE HYDROCHLORIDE 5 MG: 5 TABLET ORAL at 10:34

## 2017-10-15 RX ADMIN — SENNOSIDES AND DOCUSATE SODIUM 1 TABLET: 8.6; 5 TABLET ORAL at 10:12

## 2017-10-15 NOTE — DISCHARGE SUMMARY
33 y/o   s/p ERCS POD # 3 with TL  Hemoglobin   Date Value Ref Range Status   10/14/2017 10.9 (L) 11.7 - 15.7 g/dL Final   ]   d/c home   On colace, breast pump and ibuprofen ferrous sulfate oxycodone   Follow-up in 1 week for post op check   Follow-up in 6 weeks for post partum examination    [facility]   Obstetrics Post-Op / Progress Note         Assessment and Plan:    Assessment:   Post-operative day #2  Low transverse repeat  section  Post-partum tubal ligation  L&D complications: Previous and Elective      Doing well.  Pain well-controlled.      Plan:   Ambulation encouraged  Discharge later today            Interval History:   Doing well.  Pain is well-controlled.  No fevers.  No history of wound drainage, warmth or significant erythema.  Good appetite.  Denies chest pain, shortness of breath, nausea or vomiting.  Ambulatory.  Breastfeeding well.            Significant Problems:    None          Review of Systems:    C: NEGATIVE for fever, chills, change in weight  I: NEGATIVE for worrisome rashes, moles or lesions  E: NEGATIVE for vision changes or irritation  E/M: NEGATIVE for ear, mouth and throat problems  R: NEGATIVE for significant cough or SOB  B: NEGATIVE for masses, tenderness or discharge  CV: NEGATIVE for chest pain, palpitations or peripheral edema  GI: NEGATIVE for nausea, abdominal pain, heartburn, or change in bowel habits  : NEGATIVE for frequency, dysuria, or hematuria  M: NEGATIVE for significant arthralgias or myalgia  N: NEGATIVE for weakness, dizziness or paresthesias  E: NEGATIVE for temperature intolerance, skin/hair changes  H: NEGATIVE for bleeding problems  P: NEGATIVE for changes in mood or affect          Medications:   All medications related to the patient's surgery have been reviewed  -          Physical Exam:     All vitals stable  Patient Vitals for the past 8 hrs:   BP Temp Temp src Heart Rate Resp   10/15/17 0721 96/61 97.9  F (36.6  C) Oral  58 16     Wound clean and dry with minimal or no drainage.  Surrounding skin with minimal erythema.          Data:     All laboratory data related to this surgery reviewed  Hemoglobin   Date Value Ref Range Status   10/14/2017 10.9 (L) 11.7 - 15.7 g/dL Final   10/13/2017 13.0 11.7 - 15.7 g/dL Final   03/16/2017 13.0 11.7 - 15.7 g/dL Final   01/21/2016 14.1 11.7 - 15.7 g/dL Final   04/05/2015 11.6 (L) 11.7 - 15.7 g/dL Final     All imaging studies related to this surgery reviewed  DO Dr. Ness Thomas, DO    OB/GYN   [facility]

## 2017-10-15 NOTE — PLAN OF CARE
Problem: Patient Care Overview  Goal: Plan of Care/Patient Progress Review  Pt POD #1. Discomfort controlled with scheduled Tyleonl. Up ambulating and voiding without difficulty since Asif d/c. At onset of shift patient reports that her sense of taste and small have returned so would like the MRI cancelled.  Neurologist updated (see note) and MRI cancelled per MD order. Saline lock patent and left in for now.  Incision with liquid bandage FRENCH, no drainage. Formula feeding infant. Positive bonding observed. Pt showered this evening. Family at bedside, supportive and involved. Meeting all other expected goals at this time.

## 2017-10-15 NOTE — PROVIDER NOTIFICATION
Notified MD at 4:30 PM regarding order clarification and change in condition.      Spoke with: Dr. Higgins    Orders were obtained.    Comments: Updated MD that patient reports her sense of smell and taste have returned and she does not want to proceed with the MRI.  MD gave authorization to cancel MRI and to continue to observe patient and update her with any changes or return in symptoms.

## 2017-10-15 NOTE — PROGRESS NOTES
All education and discharge teaching completed by LPN prior to discharge, and all questions answered. [facility] home care referral faxed over, patient updated.  Patient is stable, is bottle feeding infant and states she may begin pumping and giving EBM to infant. Patient denies pain. Patient was given prescription for breast pump and meds given to patient at discharge. Patient left unit with all belongings and infant at 1622.

## 2017-10-15 NOTE — PLAN OF CARE
Problem: Patient Care Overview  Goal: Plan of Care/Patient Progress Review  Outcome: Improving  VSS. Pain managed with tylenol, oxycodone, and ibuprofen. Incision CDI. Resting well overnight. [place] in nursery for several hours while patient rests.

## 2017-10-15 NOTE — PLAN OF CARE
Problem: Patient Care Overview  Goal: Plan of Care/Patient Progress Review  Outcome: Improving  Pt stable, vitals WNL. Pain controlled with tylenol, ibuprofen, and oxycodone. Pt voiding and ambulating ind.

## 2017-10-15 NOTE — DISCHARGE INSTRUCTIONS
Postop  Birth Instructions    Lactation #[phone]  [facility] #[phone]    Follow up in 1-2 weeks   Call [phone] or [phone] for appointment     Call and ask to be seen or talk to a doctor for the following: (You can go to the ob triage button   On answering service line) .     Increased bleeding, fever, general unwell feeling or increased pain.     Dr. Ness Kelsey, DO    OB/GYN   [facility]      Activity       Do not lift more than 10 pounds for 6 weeks after surgery.  Ask family and friends for help when you need it.    No driving until you have stopped taking your pain medications (usually two weeks after surgery).    No heavy exercise or activity for 6 weeks.  Don't do anything that will put a strain on your surgery site.    Don't strain when using the toilet.  Your care team may prescribe a stool softener if you have problems with your bowel movements.     To care for your incision:       Keep the incision clean and dry.    Do not soak your incision in water. No swimming or hot tubs until it has fully healed. You may soak in the bathtub if the water level is below your incision.    Do not use peroxide, gel, cream, lotion, or ointment on your incision.    Adjust your clothes to avoid pressure on your surgery site (check the elastic in your underwear for example).     You may see a small amount of clear or pink drainage and this is normal.  Check with your health care provider:       If the drainage increases or has an odor.    If the incision reddens, you have swelling, or develop a rash.    If you have increased pain and the medicine we prescribed doesn't help.    If you have a fever above 100.4 F (38 C) with or without chills when placing thermometer under your tongue.   The area around your incision (surgery wound), will feel numb.  This is normal. The numbness should go away in less than a year.     Keep your hands clean:  Always  wash your hands before touching your incision (surgery wound). This helps reduce your risk of infection. If your hands aren't dirty, you may use an alcohol hand-rub to clean your hands. Keep your nails clean and short.    Call your healthcare provider if you have any of these symptoms:       You soak a sanitary pad with blood within 1 hour, or you see blood clots larger than a golf ball.    Bleeding that lasts more than 6 weeks.    Vaginal discharge that smells bad.    Severe pain, cramping or tenderness in your lower belly area.    A need to urinate more frequently (use the toilet more often), more urgently (use the toilet very quickly), or it burns when you urinate.    Nausea and vomiting.    Redness, swelling or pain around a vein in your leg.    Problems breastfeeding or a red or painful area on your breast.    Chest pain and cough or are gasping for air.    Problems with coping with sadness, anxiety or depression. If you have concerns about hurting yourself or the baby, call your provider immediately.      You have questions or concerns after you return home.

## 2017-10-15 NOTE — PROVIDER NOTIFICATION
10/15/17 1040   Provider Notification   Provider Name/Title Dr. Kelsey    Method of Notification In Department   Request Evaluate-Remote   Notification Reason Lab Results   MD ok to discontinue BS checks and ok to D/C insulin orders

## 2017-10-16 LAB — COPATH REPORT: NORMAL

## 2017-10-24 ENCOUNTER — OFFICE VISIT (OUTPATIENT)
Dept: OBGYN | Facility: CLINIC | Age: 32
End: 2017-10-24
Payer: COMMERCIAL

## 2017-10-24 VITALS
SYSTOLIC BLOOD PRESSURE: 98 MMHG | WEIGHT: 157 LBS | TEMPERATURE: 98.3 F | HEART RATE: 68 BPM | DIASTOLIC BLOOD PRESSURE: 60 MMHG | BODY MASS INDEX: 28.89 KG/M2 | HEIGHT: 62 IN

## 2017-10-24 DIAGNOSIS — Z48.89 ENCOUNTER FOR POSTOPERATIVE WOUND CHECK: Primary | ICD-10-CM

## 2017-10-24 PROCEDURE — 99024 POSTOP FOLLOW-UP VISIT: CPT | Performed by: ADVANCED PRACTICE MIDWIFE

## 2017-10-24 NOTE — MR AVS SNAPSHOT
After Visit Summary   10/24/2017    Unique Multani    MRN: [id number]           Patient Information     Date Of Birth          1985        Visit Information        Provider Department      10/24/2017 9:00 AM Manuela Vázquez APRN CNM [facility]        Today's Diagnoses     Encounter for postoperative wound check    -  1       Follow-ups after your visit        Follow-up notes from your care team     Return in about 4 weeks (around 11/21/2017).      Your next 10 appointments already scheduled     Nov 20, 2017 10:00 AM CST   Post Partum with Michael Mendez MD   [facility] ([facility])    [address]  [facility] [address] [phone]              Who to contact     If you have questions or need follow up information about today's clinic visit or your schedule please contact [facility] directly at [phone].  Normal or non-critical lab and imaging results will be communicated to you by MyChart, letter or phone within 4 business days after the clinic has received the results. If you do not hear from us within 7 days, please contact the clinic through MyChart or phone. If you have a critical or abnormal lab result, we will notify you by phone as soon as possible.  Submit refill requests through [employer] or call your pharmacy and they will forward the refill request to us. Please allow 3 business days for your refill to be completed.          Additional Information About Your Visit        MyChart Information     [employer] gives you secure access to your electronic health record. If you see a primary care provider, you can also send messages to your care team and make appointments. If you have questions, please call your primary care clinic.  If you do not have a primary care provider, please call [phone] and they will assist you.        Care EveryWhere ID     This is your Care EveryWhere ID. This could  "be used by other organizations to access your [place] medical records  [id number]        Your Vitals Were     Pulse Temperature Height Last Period Breastfeeding? BMI (Body Mass Index)    68 98.3  F (36.8  C) (Oral) 5' 2\" (1.575 m) 01/15/2017 (Exact Date) No 28.72 kg/m2       Blood Pressure from Last 3 Encounters:   10/24/17 98/60   10/15/17 96/61   10/09/17 100/68    Weight from Last 3 Encounters:   10/24/17 157 lb (71.2 kg)   10/09/17 177 lb 11.2 oz (80.6 kg)   10/02/17 176 lb (79.8 kg)              Today, you had the following     No orders found for display       Primary Care Provider Office Phone # Fax #    Michael Mendez [phone] [address] [place] [address]        Equal Access to Services     [facility]: Hadii jose daniel ku albertao Soidris, waaxda luqadaha, qaybta kaalmada adeegyada, nicolás torre . So [facility] [phone].    ATENCIÓN: Si habla español, tiene a humphries disposición servicios gratuitos de asistencia lingüística. Llame al [phone].    We comply with applicable federal civil rights laws and [place] laws. We do not discriminate on the basis of race, color, national origin, age, disability, sex, sexual orientation, or gender identity.            Thank you!     Thank you for choosing [facility]  for your care. Our goal is always to provide you with excellent care. Hearing back from our patients is one way we can continue to improve our services. Please take a few minutes to complete the written survey that you may receive in the mail after your visit with us. Thank you!             Your Updated Medication List - Protect others around you: Learn how to safely use, store and throw away your medicines at www.disposemymeds.org.          This list is accurate as of: 10/24/17 10:20 AM.  Always use your most recent med list.                   Brand Name Dispense Instructions for use Diagnosis    blood glucose monitoring " test strip    ONE TOUCH VERIO IQ    100 strip    Use to test blood sugar 4 times daily or as directed.  Ok to substitute alternative if insurance prefers.    Diet controlled gestational diabetes mellitus (GDM), antepartum       ibuprofen 800 MG tablet    ADVIL/MOTRIN    90 tablet    Take 1 tablet (800 mg) by mouth every 8 hours as needed for moderate pain    S/P  section       PRENATAL VIT-FE BISG-FA-DHA PO

## 2017-10-24 NOTE — PROGRESS NOTES
"Assessment:     S/P Repeat  section, incision check  Normal healing of incision     Plan:     -Discussed 2 hour GTT at 6 weeks postpartum due to insulin dependant GDM  -Discussed use of hydrocortisone cream or benadryl cream for rash  -Ibuprofen for pain as needed  -Patient to schedule appointment in 4 weeks for 6 week PP visit  -Call with worsening symptoms of rash      TT with patient 20 min >50% time spent in counseling or coordination of care.     Subjective:      Unique Multani is a 32 year old female who presents for evaluation of abdominal incision following a repeat  seciton and PPTL on 10-13-17. States she has been feeling well except for a rash on the right side of her abdomen and thighs.  States the itching started in the hospital and the rash appeared on Day 2 PP when she went home.  States she believes that the rash was related to the oxycodone that she was taking.  States she stopped this medication 3 days ago but the rash remains.  Patient also states that she was having an increase in baby blues but they have improved in the last few days.  Denies any drainage from incision or increase in pain.  States she is no longer breastfeeding and is getting minimal sleep at night.  States  is supportive and helping with infant care.     The following portions of the patient's history were reviewed and updated as appropriate: allergies, current medications and problem list.      Review of Systems  A comprehensive review of systems was negative.   ROS: 10 point ROS neg other than the symptoms noted above in the HPI.     Objective:     BP 98/60 (BP Location: Left arm, Patient Position: Sitting, Cuff Size: Adult Regular)  Pulse 68  Temp 98.3  F (36.8  C) (Oral)  Ht 5' 2\" (1.575 m)  Wt 157 lb (71.2 kg)  LMP 01/15/2017 (Exact Date)  Breastfeeding? No  BMI 28.72 kg/m2     Physical Exam:  General: Pleasant, articulate, well-groomed, well-nourished female.  Not in any apparent " distress.  Appears tired.   Skin: no lesions or rashes.  Abdomen: Soft, nontender, no masses, negative CVAT.  Abdominal incision healing well, no erythema, edema or drainage noted. Incision intact.   Lower extremities: +1 reflexes, no significant edema.

## 2017-11-27 ENCOUNTER — PRENATAL OFFICE VISIT (OUTPATIENT)
Dept: OBGYN | Facility: CLINIC | Age: 32
End: 2017-11-27
Payer: COMMERCIAL

## 2017-11-27 VITALS
SYSTOLIC BLOOD PRESSURE: 110 MMHG | DIASTOLIC BLOOD PRESSURE: 68 MMHG | HEART RATE: 65 BPM | HEIGHT: 62 IN | BODY MASS INDEX: 29.06 KG/M2 | WEIGHT: 157.9 LBS

## 2017-11-27 DIAGNOSIS — Z86.32 HISTORY OF GESTATIONAL DIABETES: ICD-10-CM

## 2017-11-27 PROCEDURE — 99207 ZZC POST PARTUM EXAM: CPT | Performed by: ADVANCED PRACTICE MIDWIFE

## 2017-11-27 NOTE — MR AVS SNAPSHOT
After Visit Summary   11/27/2017    Unique Multani    MRN: [id number]           Patient Information     Date Of Birth          1985        Visit Information        Provider Department      11/27/2017 9:30 AM Marifer Ramírez CNM [facility]        Today's Diagnoses     Routine postpartum follow-up    -  1    History of gestational diabetes          Care Instructions    Preventive Health Recommendations  Female Ages 21 - 39  Yearly exam:   See your health care provider every year in order to  1. Review health changes.  2. Discuss preventive care.    3. Review your medicines if you your provider has prescribed any.      You do not need a Pap test if your uterus was removed (hysterectomy) and you have not had cancer.    You should be tested each year for STIs (sexually transmitted diseases) if you're at risk.     Talk to your provider about how often to have your cholesterol checked, about every 5 years if normal.    If you are at risk for diabetes, you should have a diabetes test (fasting glucose).    Vitamin D deficiency screening and thyroid disease screening is also recommended every 3-5 years depending on risk factors or more often if symptomatic  PAP Smear:   Until age 30: Get a Pap test every three years (more often if you have had an abnormal result)    After age 30: Talk to your provider about whether you should have a Pap test every 3 years or have a Pap test with HPV screening every 5 years.   Shots: Get a flu shot each year. Get a tetanus shot every 10 years.   Nutrition:     Eat at least 5 servings of fruits and vegetables each day    Eat REAL food, stay away from processed foods.    Eat whole-grain bread, whole-wheat pasta and brown rice instead of white grains and rice.    For bone health:  Eat calcium-rich foods or take calcium pills (500 to 600 mg) twice a day with food (1200 mg per day). Also take vitamin D (2000 IUs) each day.     Lifestyle    Exercise regularly  (30 minutes a day, 5 days of the week). This will help you control your weight and prevent disease.    Weight bearing exercise such as weight lifting, walking, running and yoga will be beneficial later in life preventing osteoporosis     Limit alcohol to one drink per day.    No smoking.     Wear sunscreen to prevent skin cancer.    See your dentist every six months to one year for an exam and cleaning depending on their recommendation    Get an eye exam every two years or more often if you wear glasses or contacts.                Follow-ups after your visit        Future tests that were ordered for you today     Open Future Orders        Priority Expected Expires Ordered    Glucose tolerance gest alt 75 gm 2 hr Routine  11/27/2018 11/27/2017            Who to contact     If you have questions or need follow up information about today's clinic visit or your schedule please contact [facility] directly at [phone].  Normal or non-critical lab and imaging results will be communicated to you by [employer], letter or phone within 4 business days after the clinic has received the results. If you do not hear from us within 7 days, please contact the clinic through [employer] or phone. If you have a critical or abnormal lab result, we will notify you by phone as soon as possible.  Submit refill requests through [employer]) or call your pharmacy and they will forward the refill request to us. Please allow 3 business days for your refill to be completed.          Additional Information About Your Visit        [employer] Information     [employer] gives you secure access to your electronic health record. If you see a primary care provider, you can also send messages to your care team and make appointments. If you have questions, please call your primary care clinic.  If you do not have a primary care provider, please call [phone] and they will assist you.        Care EveryWhere ID     This is your Care EveryWhere ID.  "This could be used by other organizations to access your [place] medical records  [id number]        Your Vitals Were     Pulse Height Breastfeeding? BMI (Body Mass Index)          65 5' 2\" (1.575 m) No 28.88 kg/m2         Blood Pressure from Last 3 Encounters:   11/27/17 110/68   10/24/17 98/60   10/15/17 96/61    Weight from Last 3 Encounters:   11/27/17 157 lb 14.4 oz (71.6 kg)   10/24/17 157 lb (71.2 kg)   10/09/17 177 lb 11.2 oz (80.6 kg)               Primary Care Provider Office Phone # Fax #    Michael Mendez [phone] [address] [place] [address]        Equal Access to Services     [facility]: Hadii aad ku hadasho Soomaali, waaxda luqadaha, qaybta kaalmada adeegyada, waxay agustínin hayhernandez torre . So [facility] [phone].    ATENCIÓN: Si habla español, tiene a humphries disposición servicios gratuitos de asistencia lingüística. Llame al [phone].    We comply with applicable federal civil rights laws and [place] laws. We do not discriminate on the basis of race, color, national origin, age, disability, sex, sexual orientation, or gender identity.            Thank you!     Thank you for choosing [facility]  for your care. Our goal is always to provide you with excellent care. Hearing back from our patients is one way we can continue to improve our services. Please take a few minutes to complete the written survey that you may receive in the mail after your visit with us. Thank you!             Your Updated Medication List - Protect others around you: Learn how to safely use, store and throw away your medicines at www.disposemymeds.org.          This list is accurate as of: 11/27/17 10:07 AM.  Always use your most recent med list.                   Brand Name Dispense Instructions for use Diagnosis    blood glucose monitoring test strip    ONETOUCH VERIO IQ    100 strip    Use to test blood sugar 4 times daily or as directed.  Ok to " substitute alternative if insurance prefers.    Diet controlled gestational diabetes mellitus (GDM), antepartum       ibuprofen 800 MG tablet    ADVIL/MOTRIN    90 tablet    Take 1 tablet (800 mg) by mouth every 8 hours as needed for moderate pain    S/P  section       PRENATAL VIT-FE BISG-FA-DHA PO

## 2017-11-27 NOTE — PROGRESS NOTES
Midwife Postpartum 6 Week Visit    SUBJECTIVE: Unique is here for a 6-week postpartum checkup.    Delivery date was 10/13/2017. She had a  and repeat c/s of a viable boy, weight 7 pounds 4 oz., with none complications.  Since delivery, she has not been breast feeding.  She has no signs of infection, bleeding or other complications.  She is not pregnant.  We discussed contraceptions and she has chosen tubal ligation.  She  has not had intercourse since delivery and complains of no discomfort. Patient screened for postpartum depression and complaints are NEGATIVE. Screening has also been completed for intimate partner violence.          She is voiding and having bowel movements without difficulty.    Mood is Stable  Patient screened for postpartum depression.   Depression Rating was:   Last PHQ-9 score on record =   PHQ-9 SCORE 2015   Total Score 2     Last GAD7 score on record = No flowsheet data found.      ROS:  C: NEGATIVE for fever, chills, change in weight  I: NEGATIVE for worrisome rashes, moles or lesions  E: NEGATIVE for vision changes or irritation  ENT: NEGATIVE for ear, mouth and throat problems  R: NEGATIVE for significant cough or SOB  B: NEGATIVE for masses, tenderness or discharge  CV: NEGATIVE for chest pain, palpitations or peripheral edema  GI: NEGATIVE for nausea, abdominal pain, heartburn, or change in bowel habits  : NEGATIVE for unusual urinary or vaginal symptoms. Periods are absent since beginning of pregnancy  M: NEGATIVE for significant arthralgias or myalgia  N: NEGATIVE for weakness, dizziness or paresthesias  E: NEGATIVE for temperature intolerance, skin/hair changes  H: NEGATIVE for bleeding problems  P: NEGATIVE for changes in mood or affect      Obstetric History       T3      L3     SAB0   TAB0   Ectopic1   Multiple0   Live Births3       # Outcome Date GA Lbr Mane/2nd Weight Sex Delivery Anes PTL Lv   4 Term 10/13/17 38w5d  7 lb 3.7 oz (3.28 kg) M CS-LTranv  "Spinal N RADHA      Name: RASHIDA JOHNSON      Apgar1:  8                Apgar5: 9   3 Term 04/04/15 38w2d  8 lb 11 oz (3.941 kg) M CS-LTranv Spinal N RADHA      Name: [employer]       Apgar1:  9                Apgar5: 9   2 Term 09/27/01   6 lb 14 oz (3.118 kg) F CS-LTranv EPI N RADHA      Name: Vicente      Complications: Failure to Progress in Second Stage   1 Ectopic                 Last pap:    Lab Results   Component Value Date    PAP NIL 04/07/2017       EXAM:  /68 (BP Location: Right arm, Patient Position: Sitting, Cuff Size: Adult Regular)  Pulse 65  Ht 5' 2\" (1.575 m)  Wt 157 lb 14.4 oz (71.6 kg)  Breastfeeding? No  BMI 28.88 kg/m2  BMI: Body mass index is 28.88 kg/(m^2).  Exam:  Constitutional: healthy, alert and no distress  Head: Normocephalic. No masses, lesions, tenderness or abnormalities  Neck: Neck supple. No adenopathy. Thyroid symmetric, normal size,, Carotids without bruits.  ENT: ENT exam normal, no neck nodes or sinus tenderness  Cardiovascular: negative, PMI normal. No lifts, heaves, or thrills. RRR. No murmurs, clicks gallops or rub  Respiratory: negative, Percussion normal. Good diaphragmatic excursion. Lungs clear  Gastrointestinal: Abdomen soft, non-tender. BS normal. No masses, organomegaly  Musculoskeletal: extremities normal- no gross deformities noted, gait normal and normal muscle tone  Skin: no suspicious lesions or rashes  Neurologic: Gait normal. Reflexes normal and symmetric. Sensation grossly WNL.  Psychiatric: mentation appears normal and affect normal/bright  Hematologic/Lymphatic/Immunologic: Normal cervical lymph nodes    PELVIC EXAM:  Vulva: No lesions, well healed, BUS WNL, no tenderness  Vagina: Moist, pink, discharge normal  well rugated, no lesions  Cervix:smooth, , no  lesions  Uterus: Involuted to normal size, non-tender, no masses palpated  Ovaries: No masses palpated  Pelvic tone: appropriate  Rectal exam: deferred      ASSESSMENT:   Normal postpartum " exam after repeat c/s.    ICD-10-CM    1. Routine postpartum follow-up Z39.2    2. History of gestational diabetes Z86.32 Glucose tolerance gest alt 75 gm 2 hr         PLAN:    Return as needed or at time of next expected pap, pelvic, or breast exam.  Teaching: need for follow up GTT  Family Planning:tubal ligation  Encourage Kegels and abdominal exercise.  Continue a multivitamin/prenatal supplement, especially if breastfeeding.  Pap smear was not obtained today.  Postpartum Hgb was not done today.    GDM:  Fasting and 2hr GCT needed:  Yes  If yes, remind need for annual fasting BG and nutrition/exercise recommendations.    CARRIE Knight, CNM

## 2018-02-02 ENCOUNTER — OFFICE VISIT (OUTPATIENT)
Dept: OBGYN | Facility: CLINIC | Age: 33
End: 2018-02-02
Payer: COMMERCIAL

## 2018-02-02 VITALS — WEIGHT: 148 LBS | SYSTOLIC BLOOD PRESSURE: 114 MMHG | BODY MASS INDEX: 27.07 KG/M2 | DIASTOLIC BLOOD PRESSURE: 70 MMHG

## 2018-02-02 DIAGNOSIS — L50.9 HIVES: Primary | ICD-10-CM

## 2018-02-02 PROBLEM — O41.00X0 OLIGOHYDRAMNIOS: Status: RESOLVED | Noted: 2017-10-13 | Resolved: 2018-02-02

## 2018-02-02 PROBLEM — O34.219 PREVIOUS CESAREAN DELIVERY, ANTEPARTUM CONDITION OR COMPLICATION: Status: RESOLVED | Noted: 2017-03-16 | Resolved: 2018-02-02

## 2018-02-02 PROBLEM — O09.629 HIGH-RISK PREGNANCY, YOUNG MULTIGRAVIDA: Status: RESOLVED | Noted: 2017-04-07 | Resolved: 2018-02-02

## 2018-02-02 PROBLEM — O24.414 INSULIN CONTROLLED GESTATIONAL DIABETES MELLITUS (GDM) DURING PREGNANCY: Status: RESOLVED | Noted: 2017-06-26 | Resolved: 2018-02-02

## 2018-02-02 PROCEDURE — 99213 OFFICE O/P EST LOW 20 MIN: CPT | Performed by: OBSTETRICS & GYNECOLOGY

## 2018-02-02 RX ORDER — DROSPIRENONE AND ETHINYL ESTRADIOL 0.03MG-3MG
KIT ORAL
Qty: 84 TABLET | Refills: 3 | Status: SHIPPED | OUTPATIENT
Start: 2018-02-02 | End: 2019-02-04

## 2018-02-02 NOTE — NURSING NOTE
"Chief Complaint   Patient presents with     Derm Problem     Hives on and off, dermatology says could be hormones       Initial /70 (BP Location: Right arm, Patient Position: Chair, Cuff Size: Adult Regular)  Wt 148 lb (67.1 kg)  LMP 2018 (Approximate)  BMI 27.07 kg/m2 Estimated body mass index is 27.07 kg/(m^2) as calculated from the following:    Height as of 17: 5' 2\" (1.575 m).    Weight as of this encounter: 148 lb (67.1 kg).  BP completed using cuff size: regular        The following HM Due: NONE      The following patient reported/Care Every where data was sent to:  P ABSTRACT QUALITY INITIATIVES [[id number]]                  "

## 2018-02-02 NOTE — MR AVS SNAPSHOT
After Visit Summary   2/2/2018    Unique Multani    MRN: [id number]           Patient Information     Date Of Birth          1985        Visit Information        Provider Department      2/2/2018 2:30 PM Michael Mendez MD [facility]        Today's Diagnoses     Hives    -  1       Follow-ups after your visit        Who to contact     If you have questions or need follow up information about today's clinic visit or your schedule please contact [facility] directly at [phone].  Normal or non-critical lab and imaging results will be communicated to you by MyChart, letter or phone within 4 business days after the clinic has received the results. If you do not hear from us within 7 days, please contact the clinic through [employer] or phone. If you have a critical or abnormal lab result, we will notify you by phone as soon as possible.  Submit refill requests through [employer] or call your pharmacy and they will forward the refill request to us. Please allow 3 business days for your refill to be completed.          Additional Information About Your Visit        MyChart Information     [employer] gives you secure access to your electronic health record. If you see a primary care provider, you can also send messages to your care team and make appointments. If you have questions, please call your primary care clinic.  If you do not have a primary care provider, please call [phone] and they will assist you.        Care EveryWhere ID     This is your Care EveryWhere ID. This could be used by other organizations to access your [place] medical records  [id number]        Your Vitals Were     Last Period BMI (Body Mass Index)                01/29/2018 (Approximate) 27.07 kg/m2           Blood Pressure from Last 3 Encounters:   02/02/18 114/70   11/27/17 110/68   10/24/17 98/60    Weight from Last 3 Encounters:   02/02/18 148 lb (67.1 kg)   11/27/17 157 lb 14.4 oz  (71.6 kg)   10/24/17 157 lb (71.2 kg)              Today, you had the following     No orders found for display         Today's Medication Changes          These changes are accurate as of 2/2/18 11:59 PM.  If you have any questions, ask your nurse or doctor.               Start taking these medicines.        Dose/Directions    drospirenone-ethinyl estradiol 3-0.03 MG per tablet   Commonly known as:  DIPTI   Used for:  Hives   Started by:  Michael Mendez MD        Take pills continuous for 3 months; without taking placebo   Quantity:  84 tablet   Refills:  3         Stop taking these medicines if you haven't already. Please contact your care team if you have questions.     blood glucose monitoring test strip   Commonly known as:  ONETOUCH VERIO IQ   Stopped by:  Michael Mendez MD           PRENATAL VIT-FE BISG-FA-DHA PO   Stopped by:  Michael Mendez MD                Where to get your medicines      These medications were sent to [facility] [address] [place] [address] [address] AT [place] [address] & [facility] [address]  [address] [facility] [facility] [place] [address]     Phone:  [phone]     drospirenone-ethinyl estradiol 3-0.03 MG per tablet                Primary Care Provider Office Phone # Fax #    Michael Mendez [phone] [address] [place] [address]        Equal Access to Services     [facility]: Hadii aad ku hadasho Soomaali, waaxda luqadaha, qaybta kaalmada adeegyada, nicolás dubois hayhernandez torre . So [facility] [phone].    ATENCIÓN: Si habla español, tiene a humphries disposición servicios gratuitos de asistencia lingüística. Taraame al [phone].    We comply with applicable federal civil rights laws and [place] laws. We do not discriminate on the basis of race, color, national origin, age, disability, sex, sexual orientation, or gender identity.            Thank you!     Thank you for choosing [facility]   for your care. Our goal is always to provide you with excellent care. Hearing back from our patients is one way we can continue to improve our services. Please take a few minutes to complete the written survey that you may receive in the mail after your visit with us. Thank you!             Your Updated Medication List - Protect others around you: Learn how to safely use, store and throw away your medicines at www.disposemymeds.org.          This list is accurate as of 18 11:59 PM.  Always use your most recent med list.                   Brand Name Dispense Instructions for use Diagnosis    drospirenone-ethinyl estradiol 3-0.03 MG per tablet    DIPTI    84 tablet    Take pills continuous for 3 months; without taking placebo    Hives       HYDROXYZINE HCL PO       Hives       ibuprofen 800 MG tablet    ADVIL/MOTRIN    90 tablet    Take 1 tablet (800 mg) by mouth every 8 hours as needed for moderate pain    S/P  section

## 2018-02-05 NOTE — PROGRESS NOTES
SUBJECTIVE:  Unique Multani is an 32 year old  P3 woman who presents for discussion of cyclic occurrence of hives      -she has been seen by dermatology; who notes that hives are cyclic, beginning with menses and continuing through           -menses into first week      -is currently using tubal ligation for contraception        Past Medical History:   Diagnosis Date     Diabetes (H)     GDM insulin     GERD (gastroesophageal reflux disease)     w/u otherwise neg      History of colposcopy with cervical biopsy 3/23/07    WNL     Papanicolaou smear of cervix with low grade squamous intraepithelial lesion (LGSIL) 07     S/P  10/13/2017     Past Surgical History:   Procedure Laterality Date     BREAST SURGERY      augmentation      C NONSPECIFIC PROCEDURE  01    Primary LTCS      SECTION N/A 2015    Procedure:  SECTION;  Surgeon: Tremaine Gaona MD;  Location: [facility] OR      SECTION, TUBAL LIGATION, COMBINED N/A 10/13/2017    Procedure: COMBINED  SECTION, TUBAL LIGATION;  Repeat  SECTION, TUBAL LIGATION ;  Surgeon: Sidra Salamanca DO;  Location: [facility] OR     COLONOSCOPY N/A 2016    Procedure: COLONOSCOPY;  Surgeon: Lyudmila Pastor MD;  Location:  GI     DILATION AND CURETTAGE SUCTION      elective termination     ESOPHAGOSCOPY, GASTROSCOPY, DUODENOSCOPY (EGD), COMBINED  2014    Procedure: COMBINED ESOPHAGOSCOPY, GASTROSCOPY, DUODENOSCOPY (EGD);   ESOPHAGOSCOPY, GASTROSCOPY, DUODENOSCOPY (EGD) ;  Surgeon: Vishnu Lanier MD;  Location:  GI     GYN SURGERY       c section      Current Outpatient Prescriptions   Medication     HYDROXYZINE HCL PO     drospirenone-ethinyl estradiol (DIPTI) 3-0.03 MG per tablet     ibuprofen (ADVIL/MOTRIN) 800 MG tablet     No current facility-administered medications for this visit.      Facility-Administered Medications Ordered in Other Visits   Medication     fentaNYL Citrate  (PF) (SUBLIMAZE) injection     Allergies   Allergen Reactions     No Known Drug Allergies      Social History   Substance Use Topics     Smoking status: Former Smoker     Years: 7.00     Types: Cigarettes     Smokeless tobacco: Never Used      Comment: only if she drinks alcohol     Alcohol use No      Comment: occasional       Review of Systems  CONSTITUTIONAL:NEGATIVE  EYES: NEGATIVE  ENT/MOUTH: NEGATIVE  RESP: NEGATIVE  CV: NEGATIVE  GI: NEGATIVE  : NEGATIVE  MUSCULOSKELATAL: NEGATIVE  INTEGUMENTARY/SKIN: see above  BREAST: NEGATIVE  NEURO: NEGATIVE  ENDOCRINE: NEGATIVE  HEME/ALLERGY/IMMUNE: NEGATIVE  PSYCHIATRIC: NEGATIVE    OBJECTIVE:  /70 (BP Location: Right arm, Patient Position: Chair, Cuff Size: Adult Regular)  Wt 148 lb (67.1 kg)  LMP 01/29/2018 (Approximate)  BMI 27.07 kg/m2   EXAM:  GENERAL APPEARANCE: healthy, alert and no distress  ABDOMEN: soft, nontender, without hepatosplenomegaly or masses        ASSESSMENT:  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

## 2018-04-05 NOTE — NURSING NOTE
"Chief Complaint   Patient presents with     Post Partum Exam      burning, steri strips still on     Gastrointestinal Problem     Diarrhea       Initial BP 98/60 (BP Location: Left arm, Patient Position: Sitting, Cuff Size: Adult Regular)  Pulse 68  Temp 98.3  F (36.8  C) (Oral)  Ht 5' 2\" (1.575 m)  Wt 157 lb (71.2 kg)  LMP 01/15/2017 (Exact Date)  Breastfeeding? No  BMI 28.72 kg/m2 Estimated body mass index is 28.72 kg/(m^2) as calculated from the following:    Height as of this encounter: 5' 2\" (1.575 m).    Weight as of this encounter: 157 lb (71.2 kg).  Medication Reconciliation: geraldine Ramírez CMA       " Patient informed of below.    Patient would like to have PICC removed tomorrow in Radiology    Appt is scheduled for tomorrow in Radiology    IV therapy appt cancelled for this afternoon    FYI to PCP

## 2019-02-04 ENCOUNTER — OFFICE VISIT (OUTPATIENT)
Dept: INTERNAL MEDICINE | Facility: CLINIC | Age: 34
End: 2019-02-04
Payer: COMMERCIAL

## 2019-02-04 VITALS
OXYGEN SATURATION: 100 % | TEMPERATURE: 98.4 F | DIASTOLIC BLOOD PRESSURE: 70 MMHG | BODY MASS INDEX: 26.5 KG/M2 | WEIGHT: 144 LBS | SYSTOLIC BLOOD PRESSURE: 112 MMHG | RESPIRATION RATE: 16 BRPM | HEIGHT: 62 IN | HEART RATE: 99 BPM

## 2019-02-04 DIAGNOSIS — K58.0 IRRITABLE BOWEL SYNDROME WITH DIARRHEA: ICD-10-CM

## 2019-02-04 DIAGNOSIS — R19.7 DIARRHEA, UNSPECIFIED TYPE: Primary | ICD-10-CM

## 2019-02-04 DIAGNOSIS — F90.2 ATTENTION DEFICIT HYPERACTIVITY DISORDER (ADHD), COMBINED TYPE: ICD-10-CM

## 2019-02-04 LAB
ERYTHROCYTE [DISTWIDTH] IN BLOOD BY AUTOMATED COUNT: 13.4 % (ref 10–15)
HCT VFR BLD AUTO: 41.2 % (ref 35–47)
HGB BLD-MCNC: 13.4 G/DL (ref 11.7–15.7)
MCH RBC QN AUTO: 28.6 PG (ref 26.5–33)
MCHC RBC AUTO-ENTMCNC: 32.5 G/DL (ref 31.5–36.5)
MCV RBC AUTO: 88 FL (ref 78–100)
PLATELET # BLD AUTO: 179 10E9/L (ref 150–450)
RBC # BLD AUTO: 4.69 10E12/L (ref 3.8–5.2)
WBC # BLD AUTO: 8.3 10E9/L (ref 4–11)

## 2019-02-04 PROCEDURE — 83516 IMMUNOASSAY NONANTIBODY: CPT | Mod: 59 | Performed by: INTERNAL MEDICINE

## 2019-02-04 PROCEDURE — 83516 IMMUNOASSAY NONANTIBODY: CPT | Performed by: INTERNAL MEDICINE

## 2019-02-04 PROCEDURE — 84443 ASSAY THYROID STIM HORMONE: CPT | Performed by: INTERNAL MEDICINE

## 2019-02-04 PROCEDURE — 99214 OFFICE O/P EST MOD 30 MIN: CPT | Performed by: INTERNAL MEDICINE

## 2019-02-04 PROCEDURE — 80053 COMPREHEN METABOLIC PANEL: CPT | Performed by: INTERNAL MEDICINE

## 2019-02-04 PROCEDURE — 85027 COMPLETE CBC AUTOMATED: CPT | Performed by: INTERNAL MEDICINE

## 2019-02-04 PROCEDURE — 36415 COLL VENOUS BLD VENIPUNCTURE: CPT | Performed by: INTERNAL MEDICINE

## 2019-02-04 ASSESSMENT — MIFFLIN-ST. JEOR: SCORE: 1301.43

## 2019-02-04 NOTE — PROGRESS NOTES
SUBJECTIVE:   Uinque Multani is a 33 year old female who presents to clinic today for the following health issues:      Ongoing loose stools:    Presents with concern for chronic diarrhea, having loose stools after every meal. For about a year. No abdominal pain, no GI bleeding, weight loss. Has had a colonoscopy, negative. No relation to diet. May have 3-5 BMs daily according to her oral intake.   Symptoms started after delivering her baby a year ago. Has not improved.     Concerns for poor attention, unable to finish her tasks, getting easily distracted, doing many things at one time, disorganized. Has had symptoms for years, including school years.         Problem list and histories reviewed & adjusted, as indicated.  Additional history: as documented    Patient Active Problem List   Diagnosis     Esophageal reflux     CARDIOVASCULAR SCREENING; LDL GOAL LESS THAN 160     Irritable bowel syndrome     Past Surgical History:   Procedure Laterality Date     BREAST SURGERY      augmentation      C NONSPECIFIC PROCEDURE  01    Primary LTCS      SECTION N/A 2015    Procedure:  SECTION;  Surgeon: Tremaine Gaona MD;  Location:  OR      SECTION, TUBAL LIGATION, COMBINED N/A 10/13/2017    Procedure: COMBINED  SECTION, TUBAL LIGATION;  Repeat  SECTION, TUBAL LIGATION ;  Surgeon: Sidra Salamanca DO;  Location:  OR     COLONOSCOPY N/A 2016    Procedure: COLONOSCOPY;  Surgeon: Lyudmila Pastor MD;  Location:  GI     DILATION AND CURETTAGE SUCTION      elective termination     ESOPHAGOSCOPY, GASTROSCOPY, DUODENOSCOPY (EGD), COMBINED  2014    Procedure: COMBINED ESOPHAGOSCOPY, GASTROSCOPY, DUODENOSCOPY (EGD);   ESOPHAGOSCOPY, GASTROSCOPY, DUODENOSCOPY (EGD) ;  Surgeon: Vishnu Lanier MD;  Location:  GI     GYN SURGERY       c section        Social History     Tobacco Use     Smoking status: Former Smoker     Years: 7.00     Types:  "Cigarettes     Smokeless tobacco: Never Used     Tobacco comment: only if she drinks alcohol   Substance Use Topics     Alcohol use: No     Alcohol/week: 5.0 oz     Comment: occasional     Family History   Problem Relation Age of Onset     Cancer Sister         stomach     C.A.D. No family hx of      Diabetes No family hx of      Breast Cancer No family hx of      Cancer - colorectal No family hx of          Current Outpatient Medications   Medication Sig Dispense Refill     ibuprofen (ADVIL/MOTRIN) 800 MG tablet Take 1 tablet (800 mg) by mouth every 8 hours as needed for moderate pain 90 tablet 1       Reviewed and updated as needed this visit by clinical staff       Reviewed and updated as needed this visit by Provider         ROS:  Constitutional, HEENT, cardiovascular, pulmonary, GI, , musculoskeletal, neuro, skin, endocrine and psych systems are negative, except as otherwise noted.    OBJECTIVE:     /70   Pulse 99   Temp 98.4  F (36.9  C) (Oral)   Resp 16   Ht 1.575 m (5' 2\")   Wt 65.3 kg (144 lb)   SpO2 100%   BMI 26.34 kg/m    Body mass index is 26.34 kg/m .   GENERAL: healthy, alert and no distress  EYES: Eyes grossly normal to inspection, PERRL and conjunctivae and sclerae normal  HENT: ear canals and TM's normal, nose and mouth without ulcers or lesions  NECK: no adenopathy, no asymmetry, masses, or scars and thyroid normal to palpation  RESP: lungs clear to auscultation - no rales, rhonchi or wheezes  CV: regular rate and rhythm, normal S1 S2, no S3 or S4, no murmur, click or rub, no peripheral edema and peripheral pulses strong  ABDOMEN: soft, nontender, no hepatosplenomegaly, no masses and bowel sounds normal  MS: no gross musculoskeletal defects noted, no edema  SKIN: no suspicious lesions or rashes  NEURO: Normal strength and tone, mentation intact and speech normal, repeated movements of her legs during interview     Diagnostic Test Results:  none     ASSESSMENT/PLAN:     Problem List " Items Addressed This Visit     Irritable bowel syndrome      Other Visit Diagnoses     Diarrhea, unspecified type    -  Primary    Relevant Orders    CBC with platelets (Completed)    Comprehensive metabolic panel (Completed)    TSH with free T4 reflex (Completed)    H Pylori antigen stool    Fecal Lactoferrin    Tissue transglutaminase darryl IgA and IgG (Completed)    Attention deficit hyperactivity disorder (ADHD), combined type               Assess lab work for possible thyroid , liver , kidney disease, gluten intolerance   Possible IBS  Likely ADHD  Consider trial of Wellbutrin if results normal     Follow-Up:with results     Neri Barnard MD  [facility]

## 2019-02-04 NOTE — NURSING NOTE
"Vital signs:  Temp: 98.4  F (36.9  C) Temp src: Oral BP: 112/70 Pulse: 99   Resp: 16 SpO2: 100 %     Height: 157.5 cm (5' 2\") Weight: 65.3 kg (144 lb)  Estimated body mass index is 26.34 kg/m  as calculated from the following:    Height as of this encounter: 1.575 m (5' 2\").    Weight as of this encounter: 65.3 kg (144 lb).          "

## 2019-02-05 DIAGNOSIS — R19.7 DIARRHEA, UNSPECIFIED TYPE: ICD-10-CM

## 2019-02-05 LAB
ALBUMIN SERPL-MCNC: 4 G/DL (ref 3.4–5)
ALP SERPL-CCNC: 67 U/L (ref 40–150)
ALT SERPL W P-5'-P-CCNC: 27 U/L (ref 0–50)
ANION GAP SERPL CALCULATED.3IONS-SCNC: 5 MMOL/L (ref 3–14)
AST SERPL W P-5'-P-CCNC: 14 U/L (ref 0–45)
BILIRUB SERPL-MCNC: 0.2 MG/DL (ref 0.2–1.3)
BUN SERPL-MCNC: 17 MG/DL (ref 7–30)
CALCIUM SERPL-MCNC: 9.2 MG/DL (ref 8.5–10.1)
CHLORIDE SERPL-SCNC: 108 MMOL/L (ref 94–109)
CO2 SERPL-SCNC: 25 MMOL/L (ref 20–32)
CREAT SERPL-MCNC: 0.62 MG/DL (ref 0.52–1.04)
GFR SERPL CREATININE-BSD FRML MDRD: >90 ML/MIN/{1.73_M2}
GLUCOSE SERPL-MCNC: 98 MG/DL (ref 70–99)
LACTOFERRIN STL QL IA: NEGATIVE
POTASSIUM SERPL-SCNC: 3.9 MMOL/L (ref 3.4–5.3)
PROT SERPL-MCNC: 7.8 G/DL (ref 6.8–8.8)
SODIUM SERPL-SCNC: 138 MMOL/L (ref 133–144)
TSH SERPL DL<=0.005 MIU/L-ACNC: 1.68 MU/L (ref 0.4–4)

## 2019-02-05 PROCEDURE — 87338 HPYLORI STOOL AG IA: CPT | Performed by: INTERNAL MEDICINE

## 2019-02-05 PROCEDURE — 83630 LACTOFERRIN FECAL (QUAL): CPT | Performed by: INTERNAL MEDICINE

## 2019-02-06 LAB
H PYLORI AG STL QL IA: NORMAL
SPECIMEN SOURCE: NORMAL
TTG IGA SER-ACNC: 2 U/ML
TTG IGG SER-ACNC: 1 U/ML

## 2019-02-06 RX ORDER — LOPERAMIDE HYDROCHLORIDE 2 MG/1
2 TABLET ORAL
Qty: 90 TABLET | Refills: 3 | Status: SHIPPED | OUTPATIENT
Start: 2019-02-06 | End: 2019-05-10

## 2019-02-07 ENCOUNTER — TELEPHONE (OUTPATIENT)
Dept: INTERNAL MEDICINE | Facility: CLINIC | Age: 34
End: 2019-02-07

## 2019-02-07 DIAGNOSIS — F90.2 ADHD (ATTENTION DEFICIT HYPERACTIVITY DISORDER), COMBINED TYPE: Primary | ICD-10-CM

## 2019-02-07 NOTE — TELEPHONE ENCOUNTER
Detailed message left on voice mail with Dr. Barnard's result note message.  Unsure about the ADD prescription.  Recommended patient call back if has further questions.

## 2019-02-07 NOTE — TELEPHONE ENCOUNTER
Please call the pt back and leave a detailed message regarding her labs, (Attention deficet) I told her there was an rx sent for her diarreah, but she mentioned the attention deficet . [phone]

## 2019-02-07 NOTE — TELEPHONE ENCOUNTER
Patient calling.  Was advised of lab results.  She is asking what is the next step re: ADHD?    If given a prescription for this, would like to use the [facility] pharmacy.    Please advise, thanks.

## 2019-02-08 RX ORDER — BUPROPION HYDROCHLORIDE 150 MG/1
150 TABLET ORAL EVERY MORNING
Qty: 90 TABLET | Refills: 3 | Status: SHIPPED | OUTPATIENT
Start: 2019-02-08 | End: 2019-06-19

## 2019-05-10 ENCOUNTER — OFFICE VISIT (OUTPATIENT)
Dept: INTERNAL MEDICINE | Facility: CLINIC | Age: 34
End: 2019-05-10
Payer: COMMERCIAL

## 2019-05-10 VITALS
WEIGHT: 145.7 LBS | SYSTOLIC BLOOD PRESSURE: 112 MMHG | HEIGHT: 62 IN | OXYGEN SATURATION: 97 % | BODY MASS INDEX: 26.81 KG/M2 | HEART RATE: 69 BPM | TEMPERATURE: 98.3 F | DIASTOLIC BLOOD PRESSURE: 66 MMHG | RESPIRATION RATE: 19 BRPM

## 2019-05-10 DIAGNOSIS — R20.9 SKIN SENSATION DISTURBANCE: ICD-10-CM

## 2019-05-10 DIAGNOSIS — R12 HEART BURN: Primary | ICD-10-CM

## 2019-05-10 DIAGNOSIS — Z13.9 SCREENING FOR CONDITION: ICD-10-CM

## 2019-05-10 PROCEDURE — 99214 OFFICE O/P EST MOD 30 MIN: CPT | Performed by: INTERNAL MEDICINE

## 2019-05-10 ASSESSMENT — MIFFLIN-ST. JEOR: SCORE: 1309.14

## 2019-05-10 NOTE — PROGRESS NOTES
SUBJECTIVE:   Unique Multani is a 33 year old female who presents to clinic today for the following   health issues:    Pt is a 33 year old female who is seen here to day with with c/o heartburn since few months , but more since 1 week. Has belching, burping, no nausea or vomiting.   Pt was started on wellbutrin in  for possible Attention deficit symptoms and IBS and pt stopped taking it since 1 month due to side effects, had headache with it..   Pt says she has hard time focusing and organizing and completing tasks. currently not working. Pt says she had these symptoms since she was in school but did not seek medical attention for this.  Pt also c/o lt hand/fingers numbness on and off since 1 wk, mainly 4,5th fingers, no weakness, not dropping things. Symptoms mainly at night.      Reviewed  and updated as needed this visit by clinical staff  Tobacco  Allergies  Meds  Med Hx  Surg Hx  Fam Hx  Soc Hx        Reviewed and updated as needed this visit by Provider         Patient Active Problem List   Diagnosis     Esophageal reflux     CARDIOVASCULAR SCREENING; LDL GOAL LESS THAN 160     Irritable bowel syndrome     Past Surgical History:   Procedure Laterality Date     BREAST SURGERY      augmentation      C NONSPECIFIC PROCEDURE  01    Primary LTCS      SECTION N/A 2015    Procedure:  SECTION;  Surgeon: Tremaine Gaona MD;  Location: [facility] OR      SECTION, TUBAL LIGATION, COMBINED N/A 10/13/2017    Procedure: COMBINED  SECTION, TUBAL LIGATION;  Repeat  SECTION, TUBAL LIGATION ;  Surgeon: Sidra Salamanca DO;  Location: [facility] OR     COLONOSCOPY N/A 2016    Procedure: COLONOSCOPY;  Surgeon: Lyudmila Pastor MD;  Location:  GI     DILATION AND CURETTAGE SUCTION      elective termination     ESOPHAGOSCOPY, GASTROSCOPY, DUODENOSCOPY (EGD), COMBINED  2014    Procedure: COMBINED ESOPHAGOSCOPY, GASTROSCOPY, DUODENOSCOPY (EGD);    "ESOPHAGOSCOPY, GASTROSCOPY, DUODENOSCOPY (EGD) ;  Surgeon: Vishnu Lanier MD;  Location:  GI     GYN SURGERY  2001     c section        Social History     Tobacco Use     Smoking status: Former Smoker     Years: 7.00     Types: Cigarettes     Smokeless tobacco: Never Used     Tobacco comment: only if she drinks alcohol   Substance Use Topics     Alcohol use: No     Alcohol/week: 5.0 oz     Comment: occasional     Family History   Problem Relation Age of Onset     Cancer Sister         stomach     C.A.D. No family hx of      Diabetes No family hx of      Breast Cancer No family hx of      Cancer - colorectal No family hx of          Current Outpatient Medications   Medication Sig Dispense Refill     omeprazole (PRILOSEC) 20 MG DR capsule Take 1 capsule (20 mg) by mouth daily 30 capsule 0     buPROPion (WELLBUTRIN XL) 150 MG 24 hr tablet Take 1 tablet (150 mg) by mouth every morning 90 tablet 3     ibuprofen (ADVIL/MOTRIN) 800 MG tablet Take 1 tablet (800 mg) by mouth every 8 hours as needed for moderate pain 90 tablet 1       ROS:  CONSTITUTIONAL: NEGATIVE for fever, chills, change in weight  EYES: NEGATIVE for vision changes or irritation  ENT/MOUTH: NEGATIVE for ear, mouth and throat problems  RESP: NEGATIVE for significant cough or SOB  CV: NEGATIVE for chest pain, palpitations or peripheral edema  GI: heartburn or reflux  NEURO:  Lt finger numbness  PSYCHIATRIC: difficulty to focus,concentration difficulty    OBJECTIVE:                                                    /66   Pulse 69   Temp 98.3  F (36.8  C) (Oral)   Resp 19   Ht 1.575 m (5' 2\")   Wt 66.1 kg (145 lb 11.2 oz)   LMP 05/01/2019   SpO2 97%   BMI 26.65 kg/m    Body mass index is 26.65 kg/m .   GENERAL: healthy, alert, well nourished, well hydrated, no distress  EYES: Eyes grossly normal to inspection, extraocular movements - intact, and PERRL  RESP: lungs clear to auscultation - no rales, no rhonchi, no wheezes  CV: regular rates " and rhythm,    ABDOMEN: soft, mild epigastric tenderness,   normal bowel sounds  MS: extremities- no gross deformities noted, no edema  NEURO: strength and tone- normal, sensory exam- grossly normal, mentation- intact, speech- normal, reflexes- symmetric       ASSESSMENT/PLAN:                                                         (R12) Heart burn  (primary encounter diagnosis)  Plan:Discussed the etiology of GERD with patient.  Discussed raising the head of the bed 4 to 6 inches; avoiding chocolate, coffee, peppermint, fruit juices, tomatoes,NSAID's, greasy and spicy foods. Started on  omeprazole (PRILOSEC) 20 MG DR capsule as directed.explained clearly about the medication,insructions and side effects. Call or return to clinic prn if these symtoms worsen, fail to improve as anticipated, or if new symptoms develop.            (R20.9) Skin sensation disturbance  Plan:  probable ulnar neuropathy- pt was advised not to lean on the elbow, avoid flexion at elbow, try wrapping with towel at night to avoid flexion.Call or return to clinic prn if these symtoms worsen, fail to improve as anticipated, or if new symptoms develop.will get EMG if symptoms persists or worsens.       (Z13.9) Screening for condition  Plan: screening for ADD/ADHD, referral given for ADD/ADHD testing .,       Son Brian MD  [facility]

## 2019-05-10 NOTE — NURSING NOTE
"/66   Pulse 69   Temp 98.3  F (36.8  C) (Oral)   Resp 19   Ht 1.575 m (5' 2\")   Wt 66.1 kg (145 lb 11.2 oz)   LMP 05/01/2019   SpO2 97%   BMI 26.65 kg/m    Patient in for possible med side effects: Intermittent chest pain, Lt hand numbness, HA since starting Wellbutrin.  Kathi Taylor, ROGELIO    "

## 2019-05-13 NOTE — NURSING NOTE
"Chief Complaint   Patient presents with     Prenatal Care   38w1d  Angeles Thomason MA      Initial Wt 177 lb 11.2 oz (80.6 kg)  LMP 01/15/2017 (Exact Date)  BMI 33.03 kg/m2 Estimated body mass index is 33.03 kg/(m^2) as calculated from the following:    Height as of 6/30/17: 5' 1.5\" (1.562 m).    Weight as of this encounter: 177 lb 11.2 oz (80.6 kg).  Medication Reconciliation: complete    " 





#YORDAN 


    


# NELSON


ha... 











SHAHRIAR JACKSON MD              May 13, 2019 11:07

## 2019-05-14 ENCOUNTER — TRANSFERRED RECORDS (OUTPATIENT)
Dept: HEALTH INFORMATION MANAGEMENT | Facility: CLINIC | Age: 34
End: 2019-05-14

## 2019-06-06 DIAGNOSIS — R12 HEART BURN: ICD-10-CM

## 2019-06-06 NOTE — TELEPHONE ENCOUNTER
"Requested Prescriptions   Pending Prescriptions Disp Refills     omeprazole (PRILOSEC) 20 MG DR capsule [Pharmacy Med Name: OMEPRAZOLE 20MG CAPSULES] 30 capsule 0     Sig: TAKE 1 CAPSULE(20 MG) BY MOUTH DAILY   Last Written Prescription Date:  05/10/2019  Last Fill Quantity: 30,  # refills: 0   Last office visit: 5/10/2019 with prescribing provider:     Future Office Visit:      PPI Protocol Passed - 6/6/2019  1:44 PM        Passed - Not on Clopidogrel (unless Pantoprazole ordered)        Passed - No diagnosis of osteoporosis on record        Passed - Recent (12 mo) or future (30 days) visit within the authorizing provider's specialty     Patient had office visit in the last 12 months or has a visit in the next 30 days with authorizing provider or within the authorizing provider's specialty.  See \"Patient Info\" tab in inbasket, or \"Choose Columns\" in Meds & Orders section of the refill encounter.              Passed - Medication is active on med list        Passed - Patient is age 18 or older        Passed - No active pregnacy on record        Passed - No positive pregnancy test in past 12 months        "

## 2019-06-06 NOTE — TELEPHONE ENCOUNTER
Last OV 5/10/19.  Prescription approved per [facility] Refill Protocol.      Instructions    Return in about 6 months (around 11/10/2019).

## 2019-06-19 ENCOUNTER — OFFICE VISIT (OUTPATIENT)
Dept: INTERNAL MEDICINE | Facility: CLINIC | Age: 34
End: 2019-06-19
Payer: COMMERCIAL

## 2019-06-19 VITALS
RESPIRATION RATE: 18 BRPM | DIASTOLIC BLOOD PRESSURE: 68 MMHG | WEIGHT: 148.5 LBS | HEIGHT: 62 IN | TEMPERATURE: 100.4 F | HEART RATE: 70 BPM | OXYGEN SATURATION: 99 % | SYSTOLIC BLOOD PRESSURE: 102 MMHG | BODY MASS INDEX: 27.33 KG/M2

## 2019-06-19 DIAGNOSIS — F98.8 ATTENTION DEFICIT DISORDER (ADD) WITHOUT HYPERACTIVITY: Primary | ICD-10-CM

## 2019-06-19 DIAGNOSIS — Z83.438 FAMILY HISTORY OF HYPERLIPIDEMIA: ICD-10-CM

## 2019-06-19 PROCEDURE — 36415 COLL VENOUS BLD VENIPUNCTURE: CPT | Performed by: FAMILY MEDICINE

## 2019-06-19 PROCEDURE — 99214 OFFICE O/P EST MOD 30 MIN: CPT | Performed by: FAMILY MEDICINE

## 2019-06-19 PROCEDURE — 80061 LIPID PANEL: CPT | Performed by: FAMILY MEDICINE

## 2019-06-19 RX ORDER — DEXTROAMPHETAMINE SACCHARATE, AMPHETAMINE ASPARTATE MONOHYDRATE, DEXTROAMPHETAMINE SULFATE AND AMPHETAMINE SULFATE 3.75; 3.75; 3.75; 3.75 MG/1; MG/1; MG/1; MG/1
15 CAPSULE, EXTENDED RELEASE ORAL DAILY
Qty: 30 CAPSULE | Refills: 0 | Status: SHIPPED | OUTPATIENT
Start: 2019-06-19 | End: 2019-07-10

## 2019-06-19 ASSESSMENT — MIFFLIN-ST. JEOR: SCORE: 1316.84

## 2019-06-19 NOTE — PATIENT INSTRUCTIONS
Take prescribed medication as directed. Take with breakfast.    Return for re check in 3-4 weeks.

## 2019-06-19 NOTE — PROGRESS NOTES
"  CHIEF COMPLAINT    ADHD  Question of elevated [id number].      HISTORY    This patient has concerns about ADD.  She complains of inability to complete tasks.  She has been attempting to go to school but has difficulty passing exams in the form of computerized tests.    She presents today with an evaluation from [facility] which indicates attention problems along with functioning at a lower level of performance.  A copy of this evaluation will be placed in the chart.    Patient also gives a history of elevated lipids found in her sister and mother and she would like to be tested for this.  No previous lab of this form is available to compare.      Patient Active Problem List   Diagnosis     Esophageal reflux     CARDIOVASCULAR SCREENING; LDL GOAL LESS THAN 160     Irritable bowel syndrome       REVIEW OF SYSTEMS    Weight stable.  No breathing problems.  No chest pains or palpitations.  No abdominal pain.  No headache, numbness, weakness.      Past Medical History:   Diagnosis Date     Diabetes (H)     GDM insulin     GERD (gastroesophageal reflux disease)     w/u otherwise neg      History of colposcopy with cervical biopsy 3/23/07    WNL     Papanicolaou smear of cervix with low grade squamous intraepithelial lesion (LGSIL) 07     S/P  10/13/2017       EXAM  /68 (BP Location: Right arm, Patient Position: Sitting, Cuff Size: Adult Regular)   Pulse 70   Temp 100.4  F (38  C) (Oral)   Resp 18   Ht 1.575 m (5' 2\")   Wt 67.4 kg (148 lb 8 oz)   LMP 2019 (Exact Date)   SpO2 99%   BMI 27.16 kg/m      Pleasant woman.  Affect bright.  Does not appear overly anxious or depressed.      (F98.8) Attention deficit disorder (ADD) without hyperactivity  (primary encounter diagnosis)  Comment:     Return in 1 month is planned for reevaluation.  Medication discussed.    Plan: amphetamine-dextroamphetamine (ADDERALL XR) 15         MG 24 hr capsule            (Z83.438) Family history of " hyperlipidemia  Comment:     Will report back to patient following return of labs.    Plan: Lipid Profile (Chol, Trig, HDL, LDL calc)

## 2019-06-19 NOTE — LETTER
[facility]          [address], [address]  [place], [place] [address]  [phone]  Fax [phone]    06/19/19    Patient: Unique Multani  YOB: 1985  Medical Record Number: [id number]                                                Controlled Substance Agreement  I understand that my care provider has prescribed controlled substances (narcotics, tranquilizers, and/or stimulants) to help manage my condition(s).  I am taking this medicine to help me function or work.  I know that this is strong medicine.  It could have serious side effects and even cause a dependency on the drug.  If I stop these medicines suddenly, I could have severe withdrawal symptoms.    The risks, benefits, and side effects of these medication(s) were explained to me.  I agree that:  1. I will take part in other treatments as advised by my provider.  This may be psychiatry or counseling, physical therapy, behavioral therapy, group treatment, or a referral to a pain clinic.  I will reduce or stop my medicine when my provider tells me to do so.   2. I will take my medicines as prescribed.  I will not change the dose or schedule unless my provider tells me to.  There will be no refills if I  run out early.   I may be contacted at any time without warning and asked to complete a drug test or pill count.   3. I will keep all my appointments at the clinic.  If I miss appointments or fail to follow instructions, my provider may stop my medicine.  4. I will not ask other providers to prescribe controlled substances. And I will not accept controlled substances from other people. If I need another prescribed controlled substance for a new reason, I will notify my provider within one business day.  5. If I enroll in the [place] Medical Marijuana program, I will tell my provider.  I will also sign an agreement to share my medical records with my provider.  6. I will use one pharmacy to fill all of my controlled  substance prescriptions.  If my prescription is mailed to my pharmacy, it may take 5 to 7 days for my medicine to be ready.  7. I understand that my provider, clinic care team, and pharmacy can track controlled substance prescriptions from other providers through a central database (prescription monitoring program).  8. I will bring in my list of medications (or my medicine bottles) each time I come to the clinic.  REV-  04/2016                                                                                                               Page  1 of 2    [facility]      06/19/19    Patient: Unique Multani  YOB: 1985  Medical Record Number: [id number]    9. Refills of controlled substances will be made only during office hours.  It is up to me to make sure that I do not run out of my medicines on weekends or holidays.    10. I am responsible for my prescriptions.  If the medicine is lost or stolen, it will not be replaced.   I also agree not to share these medicines with anyone.  11. I agree to not use ANY illegal or recreational drugs.  This includes marijuana, cocaine, bath salts or other drugs.  I agree not to use alcohol unless my provider says I may.  I agree to give urine samples whenever asked.  If I fail to give a urine sample, the provider may stop my medicine.     12. I will tell my nurse or provider right away if I become pregnant or have a new medical problem treated outside of [facility].  13. I understand that this medicine can affect my thinking and judgment.  It may be unsafe for me to drive, use machinery and do dangerous tasks.  I will not do any of these things until I know how the medicine affects me.  If my dose changes, I will wait to see how it affects me.  I will contact my provider if I have concerns about medicine side effects.  I understand that if I do not follow any of the conditions above, my prescriptions or treatment may be stopped.    I agree  that my provider, clinic care team, and pharmacy may work with any city, state or federal law enforcement agency that investigates the misuse, sale, or other diversion of my controlled medicine. I will allow my provider to discuss my care with or share a copy of this agreement with any other treating provider, pharmacy or emergency room where I receive care.  I agree to give up (waive) any right of privacy or confidentiality with respect to these authorizations.   I have read this agreement and have asked questions about anything I did not understand.   ___________________________________    ___________________________  Patient Signature                                                           Date and Time  ___________________________________     ____________________________  Witness                                                                            Date and Time  ___________________________________  Kevyn Story MD  REV-  04/2016                                                                                                                                                                 Page 2 of 2

## 2019-06-20 ENCOUNTER — MYC MEDICAL ADVICE (OUTPATIENT)
Dept: INTERNAL MEDICINE | Facility: CLINIC | Age: 34
End: 2019-06-20

## 2019-06-20 ENCOUNTER — TELEPHONE (OUTPATIENT)
Dept: FAMILY MEDICINE | Facility: CLINIC | Age: 34
End: 2019-06-20

## 2019-06-20 LAB
CHOLEST SERPL-MCNC: 215 MG/DL
HDLC SERPL-MCNC: 44 MG/DL
LDLC SERPL CALC-MCNC: 138 MG/DL
NONHDLC SERPL-MCNC: 171 MG/DL
TRIGL SERPL-MCNC: 164 MG/DL

## 2019-06-21 NOTE — TELEPHONE ENCOUNTER
Please call patient:    It is too early to tell about this medication with just 1 or 2 doses.    I advised that she take it daily until her follow-up appointment and then reassess.    No problem taking the medication with those supplements she mentions.    Dr. Story

## 2019-07-10 ENCOUNTER — OFFICE VISIT (OUTPATIENT)
Dept: INTERNAL MEDICINE | Facility: CLINIC | Age: 34
End: 2019-07-10
Payer: COMMERCIAL

## 2019-07-10 VITALS
HEIGHT: 62 IN | TEMPERATURE: 98.1 F | SYSTOLIC BLOOD PRESSURE: 113 MMHG | DIASTOLIC BLOOD PRESSURE: 75 MMHG | HEART RATE: 65 BPM | OXYGEN SATURATION: 97 % | BODY MASS INDEX: 26.5 KG/M2 | RESPIRATION RATE: 18 BRPM | WEIGHT: 144 LBS

## 2019-07-10 DIAGNOSIS — F41.9 ANXIETY: ICD-10-CM

## 2019-07-10 DIAGNOSIS — F98.8 ADD (ATTENTION DEFICIT DISORDER) WITHOUT HYPERACTIVITY: Primary | ICD-10-CM

## 2019-07-10 PROCEDURE — 99213 OFFICE O/P EST LOW 20 MIN: CPT | Performed by: NURSE PRACTITIONER

## 2019-07-10 RX ORDER — DEXTROAMPHETAMINE SACCHARATE, AMPHETAMINE ASPARTATE MONOHYDRATE, DEXTROAMPHETAMINE SULFATE AND AMPHETAMINE SULFATE 5; 5; 5; 5 MG/1; MG/1; MG/1; MG/1
20 CAPSULE, EXTENDED RELEASE ORAL DAILY
Qty: 30 CAPSULE | Refills: 0 | Status: SHIPPED | OUTPATIENT
Start: 2019-07-10 | End: 2019-08-09

## 2019-07-10 ASSESSMENT — MIFFLIN-ST. JEOR: SCORE: 1296.43

## 2019-07-10 NOTE — PROGRESS NOTES
"Subjective     Unique Trinity Multani is a 34 year old female who presents to clinic today for the following health issues:    HPI     She is here for a follow up on her medication.    Started on Adderall with Dr Story   She signed controlled substance agreement with him last visit     Feels it may be helpful,  but not enough   Wants to go up a little on dose   Last from morning to 5 pm or so     She is distracted easily to other things - better on medication and was able to complete projects   Legs are not shaking when she is on adderall  - her daughter notes that she does not have leg shaking when taking medication   Feels she is less anxious on medication               Reviewed and updated as needed this visit by Provider  Tobacco  Allergies  Meds  Problems  Med Hx  Surg Hx  Fam Hx         Review of Systems   ROS COMP: Constitutional, HEENT, cardiovascular, pulmonary, gi and gu systems are negative, except as otherwise noted.      Objective    /75 (BP Location: Right arm, Patient Position: Sitting, Cuff Size: Adult Regular)   Pulse 65   Temp 98.1  F (36.7  C) (Oral)   Resp 18   Ht 1.575 m (5' 2\")   Wt 65.3 kg (144 lb)   LMP 06/28/2019 (Exact Date)   SpO2 97%   BMI 26.34 kg/m    Body mass index is 26.34 kg/m .  Physical Exam   GENERAL: alert and sitting with leg constantly shaking- she did not take adderall today   RESP: lungs clear  CV: regular rate and rhythm  PSYCH: mentation appears normal, affect normal/bright    Diagnostic Test Results:  Labs reviewed in Epic        Assessment & Plan     1. ADD (attention deficit disorder) without hyperactivity  Wants to try a increased dose - will increase to 20 mg   Follow up in month   - amphetamine-dextroamphetamine (ADDERALL XR) 20 MG 24 hr capsule; Take 1 capsule (20 mg) by mouth daily  Dispense: 30 capsule; Refill: 0    2. Anxiety  In her psych eval anxiety was also found- she feels it is better on adderall   Not interested in taking a medication for " "anxiety        BMI:   Estimated body mass index is 26.34 kg/m  as calculated from the following:    Height as of this encounter: 1.575 m (5' 2\").    Weight as of this encounter: 65.3 kg (144 lb).           Patient Instructions   Adderall XR 20 mg daily     Follow up in a month        Return in about 4 weeks (around 8/7/2019) for med check .    CARRIE Barahona [facility]          "

## 2019-08-09 ENCOUNTER — TELEPHONE (OUTPATIENT)
Dept: OBGYN | Facility: CLINIC | Age: 34
End: 2019-08-09

## 2019-08-09 ENCOUNTER — OFFICE VISIT (OUTPATIENT)
Dept: INTERNAL MEDICINE | Facility: CLINIC | Age: 34
End: 2019-08-09
Payer: COMMERCIAL

## 2019-08-09 VITALS
HEART RATE: 88 BPM | BODY MASS INDEX: 25.98 KG/M2 | DIASTOLIC BLOOD PRESSURE: 74 MMHG | HEIGHT: 62 IN | WEIGHT: 141.2 LBS | TEMPERATURE: 98.5 F | SYSTOLIC BLOOD PRESSURE: 104 MMHG | RESPIRATION RATE: 20 BRPM | OXYGEN SATURATION: 100 %

## 2019-08-09 DIAGNOSIS — F98.8 ADD (ATTENTION DEFICIT DISORDER) WITHOUT HYPERACTIVITY: ICD-10-CM

## 2019-08-09 DIAGNOSIS — Z79.899 CONTROLLED SUBSTANCE AGREEMENT SIGNED: ICD-10-CM

## 2019-08-09 DIAGNOSIS — R21 RASH: Primary | ICD-10-CM

## 2019-08-09 PROCEDURE — 99214 OFFICE O/P EST MOD 30 MIN: CPT | Performed by: NURSE PRACTITIONER

## 2019-08-09 RX ORDER — FLUOCINONIDE CREAM (EMULSIFIED BASE) 0.5 MG/G
CREAM TOPICAL
Qty: 60 G | Refills: 1 | Status: ON HOLD | OUTPATIENT
Start: 2019-08-09 | End: 2023-10-16

## 2019-08-09 RX ORDER — DEXTROAMPHETAMINE SACCHARATE, AMPHETAMINE ASPARTATE MONOHYDRATE, DEXTROAMPHETAMINE SULFATE AND AMPHETAMINE SULFATE 5; 5; 5; 5 MG/1; MG/1; MG/1; MG/1
20 CAPSULE, EXTENDED RELEASE ORAL DAILY
Qty: 30 CAPSULE | Refills: 0 | Status: SHIPPED | OUTPATIENT
Start: 2019-08-09 | End: 2019-09-17

## 2019-08-09 ASSESSMENT — MIFFLIN-ST. JEOR: SCORE: 1283.73

## 2019-08-09 NOTE — PATIENT INSTRUCTIONS
FHN: [facility] [phone]   [email or web address]  Call for an appointment

## 2019-08-09 NOTE — LETTER
[facility]  08/09/19    Patient: Unique Multani  YOB: 1985  Medical Record Number: [id number]  CSN: [id number]                                                                              Non-opioid Controlled Substance Agreement    I understand that my care provider has prescribed a controlled substance to help manage my condition(s). I am taking this medicine to help me function or work. I know this is strong medicine, and that it can cause serious side effects. Controlled substances can be sedating, addicting and may cause a dependency on the drug. They can affect my ability to drive or think, and cause depression. They need to be taken exactly as prescribed. Combining controlled substances with certain medicines or chemicals (such as cocaine, sedatives and tranquilizers, sleeping pills, meth) can be dangerous or even fatal. Also, if I stop controlled substances suddenly, I may have severe withdrawal symptoms.  If not helpful, I may be asked to stop them.    The risks, benefits, and side effects of these medicine(s) were explained to me. I agree that:    1. I will take part in other treatments as advised by my care team. This may be psychiatry or counseling, physical therapy, behavioral therapy, group treatment or a referral to a pain clinic. I will reduce or stop my medicine when my care team tells me to do so.  2. I will take my medicines as prescribed. I will not change the dose or schedule unless my care team tells me to. There will be no refills if I  run out early.   I may be contactedwithout warning and asked to complete a urine drug test or pill count at any time.   3. I will keep all my appointments, and understand this is part of the monitoring of controlled substances. My care team may require an office visit for EVERY controlled substance refill. If I miss appointments or don t follow instructions, my care team may stop my medicine.  4. I will not ask other providers  to prescribe controlled substances, and I will not accept controlled substances from other people. If I need another prescribed controlled substance for a new reason, I will tell my care team within 1 business day.  5. I will use one pharmacy to fill all of my controlled substance prescriptions, and it is up to me to make sure that I do not run out of my medicines on weekends or holidays. If my care team is willing to refill my controlled substance prescription without a visit, I must request refills only during office hours, refills may take up to 3 days to process, and it may take up to 5 to 7 days for my medicine to be mailed and ready at my pharmacy. Prescriptions will not be mailed anywhere except my pharmacy.    6. I am responsible for my prescriptions. If the medicine/prescription is lost or stolen, it will not be replaced. I also agree not to share controlled substance medicines with anyone.              [facility]  08/09/19  Patient:  Unique Multani  YOB: 1985  Medical Record Number: [id number]  CSN: [id number]    7. I agree to not use ANY illegal or recreational drugs. This includes marijuana, cocaine, bath salts or other drugs. I agree not to use alcohol unless my care team says I may. I agree to give urine samples whenever asked. If I don t give a urine sample, the care team may stop my medicine.    8. If I enroll in the [place] Medical Marijuana program, I will tell my care team. I will also sign an agreement to share my medical records with my care team.    9. I will bring in my list of medicines (or my medicine bottles) each time I come to the clinic.   10. I will tell my care team right away if I become pregnant or have a new medical problem treated outside of my regular clinic.  11. I understand that this medicine can affect my thinking and judgment. It may be unsafe for me to drive, use machinery and do dangerous tasks. I will not do any of these things until I  know how the medicine affects me. If my dose changes, I will wait to see how it affects me. I will contact my care team if I have concerns about medicine side effects.    I understand that if I do not follow any of the conditions above, my prescriptions or treatment may be stopped.      I agree that my provider, clinic care team, and pharmacy may work with any city, state or federal law enforcement agency that investigates the misuse, sale, or other diversion of my controlled medicine. I will allow my provider to discuss my care with or share a copy of this agreement with any other treating provider, pharmacy or emergency room where I receive care. I agree to give up (waive) any right of privacy or confidentiality with respect to these consents.   I have read this agreement and have asked questions about anything I did not understand.    ____________________________________________________    ____________  ________  Patient signature                                                         Date      Time    ____________________________________________________     ____________  ________  Witness                                                          Date      Time    ____________________________________________________  Provider signature

## 2019-08-09 NOTE — PROGRESS NOTES
"Subjective     Unique Trinity Multani is a 34 year old female who presents to clinic today for the following health issues:    HPI   Medication Followup of Adderall XR    Taking Medication as prescribed: yes    Side Effects:  Rash - this is something that is recurrent     Medication Helping Symptoms:  yes     Has had rash since she gave birth   Adderall made it worse   Happens week prior to period and then is better about a week after period   On abdomen and back     She has seen allergist and dermatology and still has rash   Tried medication and steroid cream helps but does not take away           Reviewed and updated as needed this visit by Provider  Tobacco  Allergies  Meds  Problems  Med Hx  Surg Hx  Fam Hx         Review of Systems   ROS COMP: Constitutional, HEENT, cardiovascular, pulmonary, gi and gu systems are negative, except as otherwise noted.      Objective    /74 (BP Location: Left arm, Patient Position: Chair, Cuff Size: Adult Regular)   Pulse 88   Temp 98.5  F (36.9  C) (Oral)   Resp 20   Ht 1.575 m (5' 2\")   Wt 64 kg (141 lb 3.2 oz)   LMP 07/19/2019   SpO2 100%   Breastfeeding? No   BMI 25.83 kg/m    Body mass index is 25.83 kg/m .  Physical Exam   GENERAL: alert and no distress  RESP: lungs clear to auscultation - no rales, rhonchi or wheezes  CV: regular rate and rhythm  ABDOMEN: soft, nontender, and bowel sounds normal  SKIN: has raised red bumps on lower abdomen and lower to mid back area - itchy    PSYCH: mentation appears normal, affect normal/bright    Diagnostic Test Results:  Labs reviewed in Epic        Assessment & Plan     1. ADD (attention deficit disorder) without hyperactivity  In good dose for her   Working well   - amphetamine-dextroamphetamine (ADDERALL XR) 20 MG 24 hr capsule; Take 1 capsule (20 mg) by mouth daily  Dispense: 30 capsule; Refill: 0    2. Rash  Needs to see specialist as not resolving with medication that have been tried   - IMMUNOLOGY REFERRAL  - " "fluocinonide emulsified base 0.05 % external cream; apply to rash as needed - sparingly  Dispense: 60 g; Refill: 1     BMI:   Estimated body mass index is 25.83 kg/m  as calculated from the following:    Height as of this encounter: 1.575 m (5' 2\").    Weight as of this encounter: 64 kg (141 lb 3.2 oz).           Patient Instructions   N: [facility] [phone]   [email or web address]  Call for an appointment       Return in about 6 months (around 2/9/2020).    CARRIE Barahona [facility]        "

## 2019-08-12 ENCOUNTER — RX ONLY (RX ONLY)
Age: 34
End: 2019-08-12

## 2019-08-12 RX ORDER — FLUOCINONIDE 0.5 MG/G
THIN LAYER CREAM TOPICAL BID
Qty: 1 | Refills: 0 | Status: ERX

## 2019-08-14 ENCOUNTER — TELEPHONE (OUTPATIENT)
Dept: INTERNAL MEDICINE | Facility: CLINIC | Age: 34
End: 2019-08-14

## 2019-08-14 DIAGNOSIS — R21 RASH: Primary | ICD-10-CM

## 2019-08-14 NOTE — TELEPHONE ENCOUNTER
Reason for Call:  Other call back    Detailed comments: Patient spoke with Immunology office regarding referral from [facility] and they state she should be seen by endocrinology instead. Please call her back to advise.    Phone Number Patient can be reached at: Cell number on file:    Telephone Information:   Mobile [phone]       Best Time: any    Can we leave a detailed message on this number? YES    Call taken on 8/14/2019 at 12:29 PM by Belem Zarate

## 2019-08-15 NOTE — TELEPHONE ENCOUNTER
"Immunology office did not see patient, they reviewed her chart and called her saying they would not be able to help her and that she should have her \"hormone levels checked\".  Advised patient that primary care provider is out of clinic and will address this on Mon. 8/18/19.  ANGÉLICA Gunter R.N.    "

## 2019-09-04 ENCOUNTER — NURSE TRIAGE (OUTPATIENT)
Dept: NURSING | Facility: CLINIC | Age: 34
End: 2019-09-04

## 2019-09-04 NOTE — TELEPHONE ENCOUNTER
"Pt calls in with question regarding where she can go to get her \" hormone blood level \" checked    Pt states she DID ask her PMD - and he was not sure   Also tried the NP she seen - in endocrinology and they were not able to help     Pt advised to maybe try the number on back of her insurance card and see if they could help direct    Protocol and care advice reviewed  Caller states understanding of the recommended disposition  Advised to call back if further questions or concerns    Kevyn Waller RN / [facility]    Reason for Disposition    [1] Caller requesting NON-URGENT health information AND [2] PCP's office is the best resource    Protocols used: INFORMATION ONLY CALL-A-AH      "

## 2019-09-17 DIAGNOSIS — F98.8 ADD (ATTENTION DEFICIT DISORDER) WITHOUT HYPERACTIVITY: ICD-10-CM

## 2019-09-17 NOTE — TELEPHONE ENCOUNTER
Requested Prescriptions   Pending Prescriptions Disp Refills     amphetamine-dextroamphetamine (ADDERALL XR) 20 MG 24 hr capsule   [Pharmacy Med Name: AMPHETAMINE SALTS *ER* 20MG CAP]    Last Written Prescription Date:  8/9/19  Last Fill Quantity: 30,  # refills: 0   Last office visit: 8/9/2019 with prescribing provider:  Staci Castorena Office Visit:     30 capsule 0     Sig: TAKE ONE CAPSULE BY MOUTH ONCE DAILY       There is no refill protocol information for this order

## 2019-09-18 RX ORDER — DEXTROAMPHETAMINE SACCHARATE, AMPHETAMINE ASPARTATE MONOHYDRATE, DEXTROAMPHETAMINE SULFATE AND AMPHETAMINE SULFATE 5; 5; 5; 5 MG/1; MG/1; MG/1; MG/1
CAPSULE, EXTENDED RELEASE ORAL
Qty: 30 CAPSULE | Refills: 0 | Status: SHIPPED | OUTPATIENT
Start: 2019-09-18 | End: 2019-11-11

## 2019-09-18 NOTE — TELEPHONE ENCOUNTER
Controlled Substance Refill Request for adderall  Problem List Complete:  Yes    Last Written Prescription Date:  8/9/2019  Last Fill Quantity: 30,   # refills: 0    THE MOST RECENT OFFICE VISIT MUST BE WITHIN THE PAST 3 MONTHS. AT LEAST ONE FACE TO FACE VISIT MUST OCCUR EVERY 6 MONTHS. ADDITIONAL VISITS CAN BE VIRTUAL.  (THIS STATEMENT SHOULD BE DELETED.)    Last Office Visit with [facility] primary care provider: 8/9/19    Future Office visit:     Controlled substance agreement:   Encounter-Level CSA:    There are no encounter-level csa.     Patient-Level CSA:    Controlled Substance Agreement - Non - Opioid - Scan on 8/19/2019 12:05 PM: NON-OPIOID CONTROLLED SUBSTANCE AGREEMENT (below)    Controlled Substance Agreement - Opioid - Scan on 6/19/2019  2:54 PM (below)           Last Urine Drug Screen: No results found for: CDAUT, No results found for: COMDAT, No results found for: THC13, PCP13, COC13, MAMP13, OPI13, AMP13, BZO13, TCA13, MTD13, BAR13, OXY13, PPX13, BUP13     Processing:  Staff will hand deliver Rx to on-site pharmacy    [email or web address]   checked in past 3 months?  Yes checked 9/18/19-no concerns.

## 2019-10-01 PROBLEM — Z78.9 USES BIRTH CONTROL: Status: RESOLVED | Noted: 2017-09-08 | Resolved: 2018-02-05

## 2019-11-04 ENCOUNTER — HEALTH MAINTENANCE LETTER (OUTPATIENT)
Age: 34
End: 2019-11-04

## 2019-11-11 DIAGNOSIS — F98.8 ADD (ATTENTION DEFICIT DISORDER) WITHOUT HYPERACTIVITY: ICD-10-CM

## 2019-11-11 NOTE — TELEPHONE ENCOUNTER
Requested Prescriptions   Pending Prescriptions Disp Refills     amphetamine-dextroamphetamine (ADDERALL XR) 20 MG 24 hr capsule  Last Written Prescription Date:  9/8/2019  Last Fill Quantity: 30,  # refills: 0   Last office visit: 8/9/2019 with prescribing provider:     Future Office Visit:   [Pharmacy Med Name: AMPHETAMINE SALTS *ER* 20MG CAP] 30 capsule 0     Sig: TAKE ONE CAPSULE BY MOUTH ONCE DAILY       There is no refill protocol information for this order

## 2019-11-13 RX ORDER — DEXTROAMPHETAMINE SACCHARATE, AMPHETAMINE ASPARTATE MONOHYDRATE, DEXTROAMPHETAMINE SULFATE AND AMPHETAMINE SULFATE 5; 5; 5; 5 MG/1; MG/1; MG/1; MG/1
CAPSULE, EXTENDED RELEASE ORAL
Qty: 30 CAPSULE | Refills: 0 | Status: SHIPPED | OUTPATIENT
Start: 2019-11-13 | End: 2020-01-16

## 2019-11-13 NOTE — TELEPHONE ENCOUNTER
Controlled Substance Refill Request for amphetamine-dextroamphetamine (ADDERALL XR) 20 MG 24 hr capsule    Problem List Complete:  Yes    Last Written Prescription Date:  9/18/19  Last Fill Quantity: 30,   # refills: 0    Last Office Visit with [facility] primary care provider: 8/9/19    Future Office visit:     Controlled substance agreement:   Encounter-Level CSA:    There are no encounter-level csa.     Patient-Level CSA:    Controlled Substance Agreement - Non - Opioid - Scan on 8/19/2019 12:05 PM: NON-OPIOID CONTROLLED SUBSTANCE AGREEMENT  Controlled Substance Agreement - Opioid - Scan on 6/19/2019  2:54 PM         Last Urine Drug Screen: No results found for: CDAUT, No results found for: COMDAT, No results found for: THC13, PCP13, COC13, MAMP13, OPI13, AMP13, BZO13, TCA13, MTD13, BAR13, OXY13, PPX13, BUP13     Processing:  Rx to be electronically transmitted to pharmacy by provider     [email or web address]   checked in past 3 months?  Yes done 9/18/19    Routing refill request to provider for review/approval because:  Drug not on the [facility] refill protocol

## 2020-01-13 DIAGNOSIS — F98.8 ADD (ATTENTION DEFICIT DISORDER) WITHOUT HYPERACTIVITY: ICD-10-CM

## 2020-01-14 NOTE — TELEPHONE ENCOUNTER
Requested Prescriptions   Pending Prescriptions Disp Refills     amphetamine-dextroamphetamine (ADDERALL XR) 20 MG 24 hr capsule   [Pharmacy Med Name: AMPHETAMINE SALTS *ER* 20MG CAP]    Last Written Prescription Date:  11/13/19  Last Fill Quantity: 30,  # refills: 0   Last office visit: 8/9/2019 with prescribing provider:  Staci Castorena Office Visit:     30 capsule 0     Sig: TAKE ONE CAPSULE BY MOUTH ONCE DAILY       There is no refill protocol information for this order

## 2020-01-16 RX ORDER — DEXTROAMPHETAMINE SACCHARATE, AMPHETAMINE ASPARTATE MONOHYDRATE, DEXTROAMPHETAMINE SULFATE AND AMPHETAMINE SULFATE 5; 5; 5; 5 MG/1; MG/1; MG/1; MG/1
CAPSULE, EXTENDED RELEASE ORAL
Qty: 30 CAPSULE | Refills: 0 | Status: SHIPPED | OUTPATIENT
Start: 2020-01-16 | End: 2020-03-12

## 2020-01-16 NOTE — TELEPHONE ENCOUNTER
Controlled Substance Refill Request for Adderall  Problem List Complete:  No     PROVIDER TO CONSIDER COMPLETION OF PROBLEM LIST AND OVERVIEW/CONTROLLED SUBSTANCE AGREEMENT    Last Written Prescription Date:  11/13/19  Last Fill Quantity: 30,   # refills: 0    THE MOST RECENT OFFICE VISIT MUST BE WITHIN THE PAST 3 MONTHS. AT LEAST ONE FACE TO FACE VISIT MUST OCCUR EVERY 6 MONTHS. ADDITIONAL VISITS CAN BE VIRTUAL.  (THIS STATEMENT SHOULD BE DELETED.)    Last Office Visit with [facility] [place] care provider: 8/9/19    Future Office visit:     Controlled substance agreement:   Encounter-Level CSA:    There are no encounter-level csa.     Patient-Level CSA:    Controlled Substance Agreement - Non - Opioid - Scan on 8/19/2019 12:05 PM: NON-OPIOID CONTROLLED SUBSTANCE AGREEMENT  Controlled Substance Agreement - Opioid - Scan on 6/19/2019  2:54 PM         Last Urine Drug Screen: No results found for: CDAUT, No results found for: COMDAT, No results found for: THC13, PCP13, COC13, MAMP13, OPI13, AMP13, BZO13, TCA13, MTD13, BAR13, OXY13, PPX13, BUP13    [place] reviewed:  reviewed- no concerns  [place]:  [email or web address]

## 2020-03-10 DIAGNOSIS — F98.8 ADD (ATTENTION DEFICIT DISORDER) WITHOUT HYPERACTIVITY: ICD-10-CM

## 2020-03-10 NOTE — TELEPHONE ENCOUNTER
Controlled Substance Refill Request for amphetamine-dextroamphetamine (ADDERALL XR) 20 MG 24 hr capsule  Problem List Complete:  Yes    Last Written Prescription Date:  1/16/20  Last Fill Quantity: 30,   # refills: 0    THE MOST RECENT OFFICE VISIT MUST BE WITHIN THE PAST 3 MONTHS. AT LEAST ONE FACE TO FACE VISIT MUST OCCUR EVERY 6 MONTHS. ADDITIONAL VISITS CAN BE VIRTUAL.  (THIS STATEMENT SHOULD BE DELETED.)    [facility] Visit with [facility] provider: 8/9/2019     Future Office visit:     Controlled substance agreement:   Encounter-Level CSA:    There are no encounter-level csa.     Patient-Level CSA:    Controlled Substance Agreement - Non - Opioid - Scan on 8/19/2019 12:05 PM: NON-OPIOID CONTROLLED SUBSTANCE AGREEMENT  Controlled Substance Agreement - Opioid - Scan on 6/19/2019  2:54 PM         Last Urine Drug Screen: No results found for: CDAUT, No results found for: COMDAT, No results found for: THC13, PCP13, COC13, MAMP13, OPI13, AMP13, BZO13, TCA13, MTD13, BAR13, OXY13, PPX13, BUP13     Processing:  Rx to be electronically transmitted to pharmacy by provider     [email or web address]   checked in past 3 months?  No, route to RN     --------------------------------------------------------------------------------------------------------------------------------------------------------------------  Overview Addendum 8/9/2019  4:33 PM by No Small APRN CNP    Patient is followed by NO SMALL for ongoing prescription of stimulants.  All refills should be approved by this provider, or covering partner.     Medication(s): adderall.   Maximum quantity per month: 30  Clinic visit frequency required: Q 6  months      Controlled substance agreement on file: Yes       Date(s): 8/9/2019  Neuropsych evaluation for ADD completed:  Yes, completed Rosalba , on file and diagnosis confirmed  Done 5/14/2019     Last [facility] website verification:  none     Simeon Caro  Coordinator, [place]  03/10/20 4:54 PM

## 2020-03-12 RX ORDER — DEXTROAMPHETAMINE SACCHARATE, AMPHETAMINE ASPARTATE MONOHYDRATE, DEXTROAMPHETAMINE SULFATE AND AMPHETAMINE SULFATE 5; 5; 5; 5 MG/1; MG/1; MG/1; MG/1
CAPSULE, EXTENDED RELEASE ORAL
Qty: 30 CAPSULE | Refills: 0 | Status: SHIPPED | OUTPATIENT
Start: 2020-03-12 | End: 2020-08-14

## 2020-03-12 NOTE — TELEPHONE ENCOUNTER
[place] reviewed:  not reviewed/not due - last done on 1/16/20  [place] profile:  [email or web address]

## 2020-08-11 DIAGNOSIS — F98.8 ADD (ATTENTION DEFICIT DISORDER) WITHOUT HYPERACTIVITY: ICD-10-CM

## 2020-08-12 NOTE — TELEPHONE ENCOUNTER
Patient due for an appointment. Left voice message for patient to call back.     Controlled Substance Refill Request for Adderall XR   Problem List Complete:  Yes    Last Written Prescription Date:  3/12/2020  Last Fill Quantity: 30,   # refills: 0    Last Office Visit with [facility] primary care provider: 8/9/19    Future Office visit:     Controlled substance agreement:   Encounter-Level CSA:    There are no encounter-level csa.     Patient-Level CSA:    Controlled Substance Agreement - Non - Opioid - Scan on 8/19/2019 12:05 PM: NON-OPIOID CONTROLLED SUBSTANCE AGREEMENT  Controlled Substance Agreement - Opioid - Scan on 6/19/2019  2:54 PM         Last Urine Drug Screen: No results found for: CDAUT, No results found for: COMDAT, No results found for: THC13, PCP13, COC13, MAMP13, OPI13, AMP13, BZO13, TCA13, MTD13, BAR13, OXY13, PPX13, BUP13     Processing:  Rx to be electronically transmitted to pharmacy by provider     [email or web address]    [place] reviewed:  reviewed- no concerns

## 2020-08-14 RX ORDER — DEXTROAMPHETAMINE SACCHARATE, AMPHETAMINE ASPARTATE MONOHYDRATE, DEXTROAMPHETAMINE SULFATE AND AMPHETAMINE SULFATE 5; 5; 5; 5 MG/1; MG/1; MG/1; MG/1
CAPSULE, EXTENDED RELEASE ORAL
Qty: 30 CAPSULE | Refills: 0 | Status: SHIPPED | OUTPATIENT
Start: 2020-08-14 | End: 2020-08-18

## 2020-08-18 DIAGNOSIS — F98.8 ADD (ATTENTION DEFICIT DISORDER) WITHOUT HYPERACTIVITY: ICD-10-CM

## 2020-08-18 RX ORDER — DEXTROAMPHETAMINE SACCHARATE, AMPHETAMINE ASPARTATE MONOHYDRATE, DEXTROAMPHETAMINE SULFATE AND AMPHETAMINE SULFATE 5; 5; 5; 5 MG/1; MG/1; MG/1; MG/1
CAPSULE, EXTENDED RELEASE ORAL
Qty: 30 CAPSULE | Refills: 0 | Status: SHIPPED | OUTPATIENT
Start: 2020-08-18 | End: 2020-12-18

## 2020-08-18 NOTE — TELEPHONE ENCOUNTER
Spoke to [facility] and they insist that the Adderall on 08/14/20 was denied and that they would have to have a new RX to fill.  Patient is waiting.  Please refill as soon as possible.

## 2020-09-08 ENCOUNTER — VIRTUAL VISIT (OUTPATIENT)
Dept: INTERNAL MEDICINE | Facility: CLINIC | Age: 35
End: 2020-09-08
Payer: COMMERCIAL

## 2020-09-08 DIAGNOSIS — Z80.0 FAMILY HISTORY- STOMACH CANCER: ICD-10-CM

## 2020-09-08 DIAGNOSIS — F98.8 ADD (ATTENTION DEFICIT DISORDER) WITHOUT HYPERACTIVITY: ICD-10-CM

## 2020-09-08 DIAGNOSIS — K21.00 GASTROESOPHAGEAL REFLUX DISEASE WITH ESOPHAGITIS: Primary | ICD-10-CM

## 2020-09-08 PROCEDURE — 99214 OFFICE O/P EST MOD 30 MIN: CPT | Mod: 95 | Performed by: NURSE PRACTITIONER

## 2020-09-08 RX ORDER — DEXTROAMPHETAMINE SACCHARATE, AMPHETAMINE ASPARTATE MONOHYDRATE, DEXTROAMPHETAMINE SULFATE AND AMPHETAMINE SULFATE 5; 5; 5; 5 MG/1; MG/1; MG/1; MG/1
CAPSULE, EXTENDED RELEASE ORAL
Qty: 30 CAPSULE | Refills: 0 | Status: CANCELLED | OUTPATIENT
Start: 2020-09-08

## 2020-09-08 RX ORDER — FAMOTIDINE 20 MG/1
20 TABLET, FILM COATED ORAL 2 TIMES DAILY PRN
Qty: 180 TABLET | Refills: 3 | Status: SHIPPED | OUTPATIENT
Start: 2020-09-08 | End: 2021-05-11 | Stop reason: ALTCHOICE

## 2020-09-08 NOTE — LETTER
[facility]  09/08/20    Patient: Unique Multani  YOB: 1985  Medical Record Number: [id number]  CSN: [id number]                                                                              Non-opioid Controlled Substance Agreement    I understand that my care provider has prescribed a controlled substance to help manage my condition(s). I am taking this medicine to help me function or work. I know this is strong medicine, and that it can cause serious side effects. Controlled substances can be sedating, addicting and may cause a dependency on the drug. They can affect my ability to drive or think, and cause depression. They need to be taken exactly as prescribed. Combining controlled substances with certain medicines or chemicals (such as cocaine, sedatives and tranquilizers, sleeping pills, meth) can be dangerous or even fatal. Also, if I stop controlled substances suddenly, I may have severe withdrawal symptoms.  If not helpful, I may be asked to stop them.    The risks, benefits, and side effects of these medicine(s) were explained to me. I agree that:    1. I will take part in other treatments as advised by my care team. This may be psychiatry or counseling, physical therapy, behavioral therapy, group treatment or a referral to a pain clinic. I will reduce or stop my medicine when my care team tells me to do so.  2. I will take my medicines as prescribed. I will not change the dose or schedule unless my care team tells me to. There will be no refills if I  run out early.   I may be contactedwithout warning and asked to complete a urine drug test or pill count at any time.   3. I will keep all my appointments, and understand this is part of the monitoring of controlled substances. My care team may require an office visit for EVERY controlled substance refill. If I miss appointments or don t follow instructions, my care team may stop my medicine.  4. I will not ask other providers  to prescribe controlled substances, and I will not accept controlled substances from other people. If I need another prescribed controlled substance for a new reason, I will tell my care team within 1 business day.  5. I will use one pharmacy to fill all of my controlled substance prescriptions, and it is up to me to make sure that I do not run out of my medicines on weekends or holidays. If my care team is willing to refill my controlled substance prescription without a visit, I must request refills only during office hours, refills may take up to 3 days to process, and it may take up to 5 to 7 days for my medicine to be mailed and ready at my pharmacy. Prescriptions will not be mailed anywhere except my pharmacy.    6. I am responsible for my prescriptions. If the medicine/prescription is lost or stolen, it will not be replaced. I also agree not to share controlled substance medicines with anyone.              [facility]  09/08/20  Patient:  Unique Multani  YOB: 1985  Medical Record Number: [id number]  [facility]: [id number]    7. I agree to not use ANY illegal or recreational drugs. This includes marijuana, cocaine, bath salts or other drugs. I agree not to use alcohol unless my care team says I may. I agree to give urine samples whenever asked. If I don t give a urine sample, the care team may stop my medicine.    8. If I enroll in the [place] Medical Marijuana program, I will tell my care team. I will also sign an agreement to share my medical records with my care team.    9. I will bring in my list of medicines (or my medicine bottles) each time I come to the clinic.   10. I will tell my care team right away if I become pregnant or have a new medical problem treated outside of my regular clinic.  11. I understand that this medicine can affect my thinking and judgment. It may be unsafe for me to drive, use machinery and do dangerous tasks. I will not do any of these things until I  know how the medicine affects me. If my dose changes, I will wait to see how it affects me. I will contact my care team if I have concerns about medicine side effects.    I understand that if I do not follow any of the conditions above, my prescriptions or treatment may be stopped.      I agree that my provider, clinic care team, and pharmacy may work with any city, state or federal law enforcement agency that investigates the misuse, sale, or other diversion of my controlled medicine. I will allow my provider to discuss my care with or share a copy of this agreement with any other treating provider, pharmacy or emergency room where I receive care. I agree to give up (waive) any right of privacy or confidentiality with respect to these consents.   I have read this agreement and have asked questions about anything I did not understand.    ____________________________________________________    ____________  ________  Patient signature                                                         Date      Time    ____________________________________________________     ____________  ________  Witness                                                          Date      Time    ____________________________________________________  Provider signature

## 2020-09-08 NOTE — PROGRESS NOTES
"Unique Multani is a 35 year old female who is being evaluated via a billable video visit.      The patient has been notified of following:     \"This video visit will be conducted via a call between you and your physician/provider. We have found that certain health care needs can be provided without the need for an in-person physical exam.  This service lets us provide the care you need with a video conversation.  If a prescription is necessary we can send it directly to your pharmacy.  If lab work is needed we can place an order for that and you can then stop by our lab to have the test done at a later time.    Video visits are billed at different rates depending on your insurance coverage.  Please reach out to your insurance provider with any questions.    If during the course of the call the physician/provider feels a video visit is not appropriate, you will not be charged for this service.\"    Patient has given verbal consent for Video visit? Yes  How would you like to obtain your AVS? Mail a copy  If you are dropped from the video visit, the video invite should be resent to: Text to cell phone: [phone]  Will anyone else be joining your video visit? No  Subjective     Unique Multani is a 35 year old female who presents today via video visit for the following health issues:  Patient is being seen for an medication check and would like to discuss getting and endoscopy, she suffers from acid reflux.  HPI  On adderal and dose is working well for her   No dose change desired     Has more acid reflux   Gassy and burps often and pinching feeling with stomach   Takes OTC antacid from [facility] acid reflux medication - ranitidine tablets     Sister  stomach cancer and she was to have EGD every 5 years - last         Video Start Time: 217pm        Review of Systems   Constitutional, HEENT, cardiovascular, pulmonary, GI, , musculoskeletal, neuro, skin, endocrine and psych systems are negative, except " as otherwise noted.      Objective           Vitals:  No vitals were obtained today due to virtual visit.    Physical Exam     GENERAL: Healthy, alert and no distress  EYES: Eyes grossly normal to inspection.  No discharge or erythema, or obvious scleral/conjunctival abnormalities.  RESP: No audible wheeze, cough, or visible cyanosis.  No visible retractions or increased work of breathing.    SKIN: Visible skin clear. No significant rash, abnormal pigmentation or lesions.  NEURO: Cranial nerves grossly intact.  Mentation and speech appropriate for age.  PSYCH: Mentation appears normal, affect normal/bright, judgement and insight intact, normal speech and appearance well-groomed.              Assessment & Plan     ADD (attention deficit disorder) without hyperactivity  Tolerating medication dose and no changes desired     Gastroesophageal reflux disease with esophagitis  Needs improvement reflux is worse   Will do omeprazole daily for a month and then pepcid twice daily as needed   She is due for EGD as sister  stomach cancer and she was told to do EGD every 5 years - last    - GASTROENTEROLOGY ADULT REF PROCEDURE ONLY; Future  - omeprazole (PRILOSEC) 20 MG DR capsule; Take 1 capsule (20 mg) by mouth daily  - famotidine (PEPCID) 20 MG tablet; Take 1 tablet (20 mg) by mouth 2 times daily as needed (reflux)    Family history- stomach cancer    - GASTROENTEROLOGY ADULT REF PROCEDURE ONLY; Future       Patient Instructions   Omeprazole 20 mg daily for a month     Then   Pepcid twice daily after this as needed     May consider avoiding chocolate, coffee, peppermint, fruit juices, tomatoes,NSAID's, greasy and spicy foods. Encouraged moderation of alcoholic beverages    Endoscopy - they will call you to set up       No follow-ups on file.    CARRIE Barahona [facility]      Video-Visit Details    Type of service:  Video Visit    Video End Time:2:40 PM    Originating Location (pt.  Location): Home    Distant Location (provider location):  [facility]     Platform used for Video Visit: Misty

## 2020-09-08 NOTE — PATIENT INSTRUCTIONS
Omeprazole 20 mg daily for a month     Then   Pepcid twice daily after this as needed     May consider avoiding chocolate, coffee, peppermint, fruit juices, tomatoes,NSAID's, greasy and spicy foods. Encouraged moderation of alcoholic beverages    Endoscopy - they will call you to set up

## 2020-09-11 DIAGNOSIS — Z11.59 ENCOUNTER FOR SCREENING FOR OTHER VIRAL DISEASES: Primary | ICD-10-CM

## 2020-09-13 DIAGNOSIS — Z11.59 ENCOUNTER FOR SCREENING FOR OTHER VIRAL DISEASES: ICD-10-CM

## 2020-09-13 PROCEDURE — U0003 INFECTIOUS AGENT DETECTION BY NUCLEIC ACID (DNA OR RNA); SEVERE ACUTE RESPIRATORY SYNDROME CORONAVIRUS 2 (SARS-COV-2) (CORONAVIRUS DISEASE [COVID-19]), AMPLIFIED PROBE TECHNIQUE, MAKING USE OF HIGH THROUGHPUT TECHNOLOGIES AS DESCRIBED BY CMS-2020-01-R: HCPCS | Performed by: INTERNAL MEDICINE

## 2020-09-14 LAB
SARS-COV-2 RNA SPEC QL NAA+PROBE: NOT DETECTED
SPECIMEN SOURCE: NORMAL

## 2020-09-16 ENCOUNTER — HOSPITAL ENCOUNTER (OUTPATIENT)
Facility: CLINIC | Age: 35
Discharge: HOME | End: 2020-09-16
Attending: INTERNAL MEDICINE | Admitting: INTERNAL MEDICINE
Payer: COMMERCIAL

## 2020-09-16 VITALS
SYSTOLIC BLOOD PRESSURE: 111 MMHG | HEIGHT: 62 IN | TEMPERATURE: 98 F | WEIGHT: 140 LBS | BODY MASS INDEX: 25.76 KG/M2 | RESPIRATION RATE: 16 BRPM | HEART RATE: 74 BPM | OXYGEN SATURATION: 100 % | DIASTOLIC BLOOD PRESSURE: 78 MMHG

## 2020-09-16 LAB
GLUCOSE BLDC GLUCOMTR-MCNC: 98 MG/DL (ref 70–99)
UPPER GI ENDOSCOPY: NORMAL

## 2020-09-16 PROCEDURE — 88305 TISSUE EXAM BY PATHOLOGIST: CPT | Performed by: INTERNAL MEDICINE

## 2020-09-16 PROCEDURE — 25000128 H RX IP 250 OP 636: Performed by: INTERNAL MEDICINE

## 2020-09-16 PROCEDURE — 25000125 ZZHC RX 250: Performed by: INTERNAL MEDICINE

## 2020-09-16 PROCEDURE — 43239 EGD BIOPSY SINGLE/MULTIPLE: CPT | Performed by: INTERNAL MEDICINE

## 2020-09-16 PROCEDURE — G0500 MOD SEDAT ENDO SERVICE >5YRS: HCPCS | Performed by: INTERNAL MEDICINE

## 2020-09-16 PROCEDURE — 88305 TISSUE EXAM BY PATHOLOGIST: CPT | Mod: 26 | Performed by: INTERNAL MEDICINE

## 2020-09-16 PROCEDURE — 82962 GLUCOSE BLOOD TEST: CPT

## 2020-09-16 PROCEDURE — 25800030 ZZH RX IP 258 OP 636: Performed by: INTERNAL MEDICINE

## 2020-09-16 PROCEDURE — 40000104 ZZH STATISTIC MODERATE SEDATION < 10 MIN: Performed by: INTERNAL MEDICINE

## 2020-09-16 RX ORDER — ONDANSETRON 2 MG/ML
INJECTION INTRAMUSCULAR; INTRAVENOUS PRN
Status: DISCONTINUED | OUTPATIENT
Start: 2020-09-16 | End: 2020-09-16 | Stop reason: HOSPADM

## 2020-09-16 RX ORDER — ONDANSETRON 2 MG/ML
4 INJECTION INTRAMUSCULAR; INTRAVENOUS EVERY 6 HOURS PRN
Status: DISCONTINUED | OUTPATIENT
Start: 2020-09-16 | End: 2020-09-16 | Stop reason: HOSPADM

## 2020-09-16 RX ORDER — NALOXONE HYDROCHLORIDE 0.4 MG/ML
.1-.4 INJECTION, SOLUTION INTRAMUSCULAR; INTRAVENOUS; SUBCUTANEOUS
Status: DISCONTINUED | OUTPATIENT
Start: 2020-09-16 | End: 2020-09-16 | Stop reason: HOSPADM

## 2020-09-16 RX ORDER — LIDOCAINE 40 MG/G
CREAM TOPICAL
Status: DISCONTINUED | OUTPATIENT
Start: 2020-09-16 | End: 2020-09-16 | Stop reason: HOSPADM

## 2020-09-16 RX ORDER — ONDANSETRON 2 MG/ML
4 INJECTION INTRAMUSCULAR; INTRAVENOUS
Status: COMPLETED | OUTPATIENT
Start: 2020-09-16 | End: 2020-09-16

## 2020-09-16 RX ORDER — FLUMAZENIL 0.1 MG/ML
0.2 INJECTION, SOLUTION INTRAVENOUS
Status: DISCONTINUED | OUTPATIENT
Start: 2020-09-16 | End: 2020-09-16 | Stop reason: HOSPADM

## 2020-09-16 RX ORDER — FENTANYL CITRATE 50 UG/ML
INJECTION, SOLUTION INTRAMUSCULAR; INTRAVENOUS PRN
Status: DISCONTINUED | OUTPATIENT
Start: 2020-09-16 | End: 2020-09-16 | Stop reason: HOSPADM

## 2020-09-16 RX ORDER — ONDANSETRON 4 MG/1
4 TABLET, ORALLY DISINTEGRATING ORAL EVERY 6 HOURS PRN
Status: DISCONTINUED | OUTPATIENT
Start: 2020-09-16 | End: 2020-09-16 | Stop reason: HOSPADM

## 2020-09-16 RX ADMIN — PROCHLORPERAZINE EDISYLATE 10 MG: 5 INJECTION INTRAMUSCULAR; INTRAVENOUS at 11:47

## 2020-09-16 ASSESSMENT — MIFFLIN-ST. JEOR: SCORE: 1273.29

## 2020-09-16 NOTE — DISCHARGE INSTRUCTIONS
The patient has received a copy of the Provation  report the doctor has written and discharge instructions have been discussed with the patient and responsible adult.  All questions were addressed and answered prior to patient discharge.      Zofran and fluids provided for nausea. No relief, compazine given with relief.

## 2020-09-16 NOTE — PRE-PROCEDURE
Pre-Endoscopy History and Physical     Unique Multani MRN# [id number]   YOB: 1985 Age: 35 year old     Date of Procedure: 2020  Primary care provider: Elin Egan  Type of Endoscopy: esophagogastroduodenoscopy (upper GI endoscopy)  Reason for Procedure: dyspepsia, heartburn, fam hx stomach ca  Type of Anesthesia Anticipated: Moderate (conscious) sedation    HPI:    Unique is a 35 year old female who will be undergoing the above procedure.      A history and physical has been performed. The patient's medications and allergies have been reviewed. The risks and benefits of the procedure and the sedation options and risks were discussed with the patient.  All questions were answered and informed consent was obtained.      Allergies   Allergen Reactions     No Known Drug Allergies         Current Facility-Administered Medications   Medication     0.9% sodium chloride BOLUS     0.9% sodium chloride BOLUS     lidocaine (LMX4) kit     lidocaine 1 % 0.1-1 mL     sodium chloride (PF) 0.9% PF flush 3 mL     sodium chloride (PF) 0.9% PF flush 3 mL     sodium chloride (PF) 0.9% PF flush 3 mL     Facility-Administered Medications Ordered in Other Encounters   Medication     fentaNYL Citrate (PF) (SUBLIMAZE) injection       Patient Active Problem List   Diagnosis     Esophageal reflux     CARDIOVASCULAR SCREENING; LDL GOAL LESS THAN 160     Irritable bowel syndrome     Controlled substance agreement signed- checked 2020 - no concerns        Past Medical History:   Diagnosis Date     Diabetes (H)     GDM insulin     GERD (gastroesophageal reflux disease)     w/u otherwise neg      History of colposcopy with cervical biopsy 3/23/07    WNL     Papanicolaou smear of cervix with low grade squamous intraepithelial lesion (LGSIL) 07     PONV (postoperative nausea and vomiting)      S/P  10/13/2017        Past Surgical History:   Procedure Laterality Date     BREAST SURGERY       augmentation      C NONSPECIFIC PROCEDURE  01    Primary LTCS      SECTION N/A 2015    Procedure:  SECTION;  Surgeon: Tremaine Gaona MD;  Location: [facility] OR      SECTION, TUBAL LIGATION, COMBINED N/A 10/13/2017    Procedure: COMBINED  SECTION, TUBAL LIGATION;  Repeat  SECTION, TUBAL LIGATION ;  Surgeon: Sidra Salamanca DO;  Location: [facility] OR     COLONOSCOPY N/A 2016    Procedure: COLONOSCOPY;  Surgeon: Lyudmila Pastor MD;  Location:  GI     DILATION AND CURETTAGE SUCTION      elective termination     ESOPHAGOSCOPY, GASTROSCOPY, DUODENOSCOPY (EGD), COMBINED  2014    Procedure: COMBINED ESOPHAGOSCOPY, GASTROSCOPY, DUODENOSCOPY (EGD);   ESOPHAGOSCOPY, GASTROSCOPY, DUODENOSCOPY (EGD) ;  Surgeon: Vishnu Lanier MD;  Location:  GI     GYN SURGERY       c section        Social History     Tobacco Use     Smoking status: Former Smoker     Years: 7.00     Types: Cigarettes     Smokeless tobacco: Never Used     Tobacco comment: only if she drinks alcohol   Substance Use Topics     Alcohol use: Not Currently     Alcohol/week: 8.3 standard drinks       Family History   Problem Relation Age of Onset     Cancer Sister         stomach     C.A.D. No family hx of      Diabetes No family hx of      Breast Cancer No family hx of      Cancer - colorectal No family hx of             Medications:     Medications Prior to Admission   Medication Sig Dispense Refill Last Dose     amphetamine-dextroamphetamine (ADDERALL XR) 20 MG 24 hr capsule TAKE ONE CAPSULE BY MOUTH ONCE DAILY 30 capsule 0 9/15/2020 at Unknown time     famotidine (PEPCID) 20 MG tablet Take 1 tablet (20 mg) by mouth 2 times daily as needed (reflux) 180 tablet 3 Past Month at Unknown time     omeprazole (PRILOSEC) 20 MG DR capsule Take 1 capsule (20 mg) by mouth daily 30 capsule 1 9/15/2020 at Unknown time     fluocinonide emulsified base 0.05 % external cream apply to rash as needed  "- sparingly 60 g 1      ibuprofen (ADVIL/MOTRIN) 800 MG tablet Take 1 tablet (800 mg) by mouth every 8 hours as needed for moderate pain 90 tablet 1        Scheduled Medications:    sodium chloride 0.9%  500 mL Intravenous Once     sodium chloride (PF)  3 mL Intracatheter Q8H       PRN:  sodium chloride 0.9%, lidocaine 4%, lidocaine (buffered or not buffered), sodium chloride (PF), sodium chloride (PF)    PHYSICAL EXAM:   /82   Pulse 63   Temp 98  F (36.7  C) (Temporal)   Resp 14   Ht 1.575 m (5' 2\")   Wt 63.5 kg (140 lb)   SpO2 100%   BMI 25.61 kg/m   Estimated body mass index is 25.61 kg/m  as calculated from the following:    Height as of this encounter: 1.575 m (5' 2\").    Weight as of this encounter: 63.5 kg (140 lb).   RESP: lungs clear to auscultation - no rales, rhonchi or wheezes  CV: regular rates and rhythm    IMPRESSION   ASA Class 1 - Healthy patient, no medical problems      Signed Electronically by: Vishnu Hopkins MD  September 16, 2020    .            "

## 2020-09-16 NOTE — LETTER
September 11, 2020      Unique Multani  [address] [facility] [address]        Dear Unique,     Thank you for choosing [facility]. You are scheduled for the following service(s).   Please be aware that coverage of these services is subject to the terms and limitations of your health insurance plan.  Call member services at your health plan with any benefit or coverage questions.    Date:   9/16/2020 Wednesday      Procedure: UPPER ENDOSCOPY-EGD  Doctor: Dr. Hopkins           Arrival Time:  9:30 am   *Enter and check in at the Main Hospital Entrance  Procedure Time: 10:00 am       Location:   [facility]        Endoscopy Department, First Floor *         [address] [place], [place] [address] [phone] or [phone] () to reschedule              PRE-PROCEDURE CHECKLIST    If you have diabetes, ask your regular doctor for diet and medication restrictions.  If you take any antiplatelet or anticoagulant medications (such as Coumadin, Lovenox, Plavix, etc.) and have not already discussed this, please call your primary physician for advice on holding this medication.  If you take Aspirin, you may continue to do so.  If you are or may be pregnant, please discuss the risks and benefits of this procedure with your doctor.  You must arrange for a ride for the day of your exam. If you fail to arrange transportation with a responsible adult, your procedure will need to be cancelled and rescheduled. Taxi, bus and medical transport are not acceptable unless you have a responsible adult that you know & trust with you. Please arrange for this  to be able to pick you up in our department, approximately one hour after your scheduled procedure, if they are not able to stay with you.      Canceling or rescheduling   If you must cancel or reschedule your appointment, please call [phone] as soon as possible.      Upper Endoscopy or  Esophagogastroduodenoscopy (EGD) is a test performed to evaluate symptoms of persistent abdominal pain, nausea, vomiting and difficulty swallowing. It may also be used to treat various conditions of the upper gastrointestinal tract, such as bleeding, narrowing or abnormal growths.     What happens during an upper endoscopy?  On the day of your procedure, plan to spend up to one and a half hour after your arrival at the endoscopy center. The exam itself takes about 5 to 10 minutes.    Before the exam:  - You will change into a gown.   - Your medical history and medication list will be reviewed with you, unless it has already been done over the phone.   - A nurse will insert an intravenous (IV) line into your hand or arm.  - The doctor will talk to you and give you a consent form to sign.    During the exam:  - Medicine will be given through the IV line to help you relax and feel comfortable.   - Your heart rate and oxygen levels will be monitored. If your blood pressure is low, you may be given fluids through the IV line.   - The doctor will insert a flexible, hollow tube, called an endoscope, into your mouth and will advance it slowly through the esophagus, stomach and duodenum (the first part of your small intestine).   - You may have a feeling of pressure or fullness.   - If you have difficulty swallowing, and the doctor finds a narrowing in your esophagus, it may be possible for the area to be expanded-dilated during the exam.   - If abnormal tissue is found, the doctor may remove it through the endoscope (biopsy it) for closer examination. The tissue removal is painless.    After the exam:  - Any tissue samples removed during the exam will be sent to a lab for evaluation. It may take 5 to 7 working days for you to be notified of the results  - The doctor will prepare a full report for the physician who referred you for the upper endoscopy.   - The doctor will talk with you about the initial results of your exam.    - You may feel bloated after the procedure. That is normal and should not last long.   - Your throat may feel sore for a short time.   - Following the exam, you may resume your normal diet. Avoid alcohol until the next day.   - You may resume your regular activities the day after the procedure.   - Medication given during the exam will prohibit you from driving for the rest of the day.  - A nurse will provide you with complete discharge instructions before you leave the endoscopy center. Be sure to ask the nurse for specific instructions if you take blood thinners such as Aspirin , Coumadin , Lovenox , Plavix , etc.           PREPARATION    To ensure a successful exam, please follow all instructions carefully.      The night before your exam:    STOP eating solid foods at 11:45 pm.     Clear liquids are okay to drink (examples: Gatorade , apple juice, clear broth,coffee or tea without milk or cream, etc.).     DO NOT drink red liquids or alcoholic beverages.    The day of your exam:    STOP drinking clear liquids 4 hours before your exam.     You may take your usual medications with 4 oz. of water, but it needs to be at least 4 hours prior to your procedure.    When you leave for the procedure:    Bring a list of all of your current medications, including any allergy or over-the-counter medications, unless you have already reviewed that with an Endoscopy RN over the phone.     Bring a photo ID as well as up-to-date insurance information, such as your insurance card and any referral forms that might be required by your payer.       DIRECTIONS TO THE ENDOSCOPY DEPARTMENT    From the north ([facility])  Take [address] South, exit on [address]. Get into the left hand brianna, turn left (east), go one-half mile to [address] and turn left. Go north to the second stoplight, take a right on [address] and follow it to the Main Hospital entrance.  From the south ([facility])  Take 35N to the [address]E split and exit on [address] . On [address] , turn left (west) to [address]. Turn right (north) on [address]. Go north to the second stoplight, take a right on [address] and follow it to the Main Hospital entrance.  From the east via 35E ([facility])  Take [address]E south to [address]  exit. Turn right on [address] . Go west to [address]. Turn right (north) on [address]. Go to the second stoplight, take a right on [address] to the Main Hospital entrance.  From the east via [address] ([facility])  Take [address] to [address]. Turn left (south) on [address] to [address], turn left (east) on [address] and follow it to the Main Hospital entrance.    From the west via [address] ([place])  Take [address] east to [address]. Turn right (south) on [address] to [address], turn left (east) on [address] and follow it to the Main Hospital entrance.

## 2020-09-17 LAB — COPATH REPORT: NORMAL

## 2020-10-23 ENCOUNTER — TELEPHONE (OUTPATIENT)
Dept: GASTROENTEROLOGY | Facility: CLINIC | Age: 35
End: 2020-10-23

## 2020-10-23 NOTE — TELEPHONE ENCOUNTER
Returned the patient call and  Informed her that she will need to call [facility] for her testing results.  Left this information along with [employer] phone number on her voicemail.

## 2020-10-23 NOTE — TELEPHONE ENCOUNTER
Health Call Center    Phone Message    May a detailed message be left on voicemail: yes     Reason for Call: Requesting Results   Name/type of test: Pathology from EGD  Date of test: 9/16/20  Was test done at a location other than [facility] (Please fill in the location if not [facility])?: No    NOTE:  EGD was done by Vishnu Hopkins and patient has a [employer] message but, cannot get into her [employer] and finds it confusing.  Patient would just like to know results of her pathology and doesn't know who to contact.      Action Taken: Message routed to:  [facility]    Travel Screening: Not Applicable

## 2020-11-16 ENCOUNTER — HEALTH MAINTENANCE LETTER (OUTPATIENT)
Age: 35
End: 2020-11-16

## 2020-11-16 ENCOUNTER — TELEPHONE (OUTPATIENT)
Dept: INTERNAL MEDICINE | Facility: CLINIC | Age: 35
End: 2020-11-16

## 2020-11-16 NOTE — TELEPHONE ENCOUNTER
As covering provider, reviewed EGD results from 9/16/2020 essentially normal with normal biopsy results.

## 2020-11-16 NOTE — TELEPHONE ENCOUNTER
Patient is calling because she has never gotten her endoscopy results from 9/26/20. She said Dr Mar office told her to call her PCP because she ordered the procedure.

## 2020-12-16 DIAGNOSIS — F98.8 ADD (ATTENTION DEFICIT DISORDER) WITHOUT HYPERACTIVITY: ICD-10-CM

## 2020-12-18 RX ORDER — DEXTROAMPHETAMINE SACCHARATE, AMPHETAMINE ASPARTATE MONOHYDRATE, DEXTROAMPHETAMINE SULFATE AND AMPHETAMINE SULFATE 5; 5; 5; 5 MG/1; MG/1; MG/1; MG/1
CAPSULE, EXTENDED RELEASE ORAL
Qty: 30 CAPSULE | Refills: 0 | Status: SHIPPED | OUTPATIENT
Start: 2020-12-18 | End: 2021-02-03

## 2020-12-18 NOTE — TELEPHONE ENCOUNTER
Controlled Substance Refill Request for Adderall XR 20mg  Problem List Complete:  Yes    Last Written Prescription Date:  8-18-20  Last Fill Quantity: 30,   # refills: 0    THE MOST RECENT OFFICE VISIT MUST BE WITHIN THE PAST 3 MONTHS. AT LEAST ONE FACE TO FACE VISIT MUST OCCUR EVERY 6 MONTHS. ADDITIONAL VISITS CAN BE VIRTUAL.  (THIS STATEMENT SHOULD BE DELETED.)    Last Office Visit with [facility] primary care provider: 9-8-20 virtual    Future Office visit:     Controlled substance agreement:   Encounter-Level CSA:    There are no encounter-level csa.     Patient-Level CSA:    Controlled Substance Agreement - Non - Opioid - Scan on 8/19/2019 12:05 PM: NON-OPIOID CONTROLLED SUBSTANCE AGREEMENT  Controlled Substance Agreement - Opioid - Scan on 6/19/2019  2:54 PM         Last Urine Drug Screen: No results found for: CDAUT, No results found for: COMDAT, No results found for: THC13, PCP13, COC13, MAMP13, OPI13, AMP13, BZO13, TCA13, MTD13, BAR13, OXY13, PPX13, BUP13     Processing:  Rx to be electronically transmitted to pharmacy by provider     [email or web address]   checked in past 3 months?  Yes 12-18-20 No concerns.  Medication last sold to patient on 10-27-20 #30 tabs.    Please advise, thanks.

## 2021-02-03 ENCOUNTER — OFFICE VISIT (OUTPATIENT)
Dept: INTERNAL MEDICINE | Facility: CLINIC | Age: 36
End: 2021-02-03
Payer: COMMERCIAL

## 2021-02-03 VITALS
OXYGEN SATURATION: 99 % | WEIGHT: 149 LBS | BODY MASS INDEX: 27.42 KG/M2 | RESPIRATION RATE: 18 BRPM | DIASTOLIC BLOOD PRESSURE: 70 MMHG | SYSTOLIC BLOOD PRESSURE: 104 MMHG | HEART RATE: 76 BPM | HEIGHT: 62 IN | TEMPERATURE: 98.6 F

## 2021-02-03 DIAGNOSIS — Z13.6 CARDIOVASCULAR SCREENING; LDL GOAL LESS THAN 160: ICD-10-CM

## 2021-02-03 DIAGNOSIS — G47.19 EXCESSIVE DAYTIME SLEEPINESS: ICD-10-CM

## 2021-02-03 DIAGNOSIS — R06.83 SNORING: ICD-10-CM

## 2021-02-03 DIAGNOSIS — L65.9 HAIR LOSS: ICD-10-CM

## 2021-02-03 DIAGNOSIS — N64.4 BREAST PAIN: ICD-10-CM

## 2021-02-03 DIAGNOSIS — F98.8 ADD (ATTENTION DEFICIT DISORDER) WITHOUT HYPERACTIVITY: ICD-10-CM

## 2021-02-03 DIAGNOSIS — Z00.00 ROUTINE GENERAL MEDICAL EXAMINATION AT A HEALTH CARE FACILITY: Primary | ICD-10-CM

## 2021-02-03 DIAGNOSIS — Z23 NEED FOR PROPHYLACTIC VACCINATION AND INOCULATION AGAINST INFLUENZA: ICD-10-CM

## 2021-02-03 DIAGNOSIS — Z11.51 SPECIAL SCREENING EXAMINATION FOR HUMAN PAPILLOMAVIRUS (HPV): ICD-10-CM

## 2021-02-03 LAB
ERYTHROCYTE [DISTWIDTH] IN BLOOD BY AUTOMATED COUNT: 13.1 % (ref 10–15)
HBA1C MFR BLD: 5.5 % (ref 0–5.6)
HCT VFR BLD AUTO: 36.4 % (ref 35–47)
HGB BLD-MCNC: 11.9 G/DL (ref 11.7–15.7)
IRON STUDY JIC TUBE: NORMAL
MCH RBC QN AUTO: 28.7 PG (ref 26.5–33)
MCHC RBC AUTO-ENTMCNC: 32.7 G/DL (ref 31.5–36.5)
MCV RBC AUTO: 88 FL (ref 78–100)
PLATELET # BLD AUTO: 181 10E9/L (ref 150–450)
RBC # BLD AUTO: 4.14 10E12/L (ref 3.8–5.2)
WBC # BLD AUTO: 7.3 10E9/L (ref 4–11)

## 2021-02-03 PROCEDURE — 83550 IRON BINDING TEST: CPT | Performed by: NURSE PRACTITIONER

## 2021-02-03 PROCEDURE — 80053 COMPREHEN METABOLIC PANEL: CPT | Performed by: NURSE PRACTITIONER

## 2021-02-03 PROCEDURE — 90686 IIV4 VACC NO PRSV 0.5 ML IM: CPT | Performed by: NURSE PRACTITIONER

## 2021-02-03 PROCEDURE — G0145 SCR C/V CYTO,THINLAYER,RESCR: HCPCS | Performed by: NURSE PRACTITIONER

## 2021-02-03 PROCEDURE — 85027 COMPLETE CBC AUTOMATED: CPT | Performed by: NURSE PRACTITIONER

## 2021-02-03 PROCEDURE — 90471 IMMUNIZATION ADMIN: CPT | Performed by: NURSE PRACTITIONER

## 2021-02-03 PROCEDURE — 99395 PREV VISIT EST AGE 18-39: CPT | Mod: 25 | Performed by: NURSE PRACTITIONER

## 2021-02-03 PROCEDURE — 82306 VITAMIN D 25 HYDROXY: CPT | Performed by: NURSE PRACTITIONER

## 2021-02-03 PROCEDURE — 82728 ASSAY OF FERRITIN: CPT | Performed by: NURSE PRACTITIONER

## 2021-02-03 PROCEDURE — 83540 ASSAY OF IRON: CPT | Performed by: NURSE PRACTITIONER

## 2021-02-03 PROCEDURE — 87624 HPV HI-RISK TYP POOLED RSLT: CPT | Performed by: NURSE PRACTITIONER

## 2021-02-03 PROCEDURE — 36415 COLL VENOUS BLD VENIPUNCTURE: CPT | Performed by: NURSE PRACTITIONER

## 2021-02-03 PROCEDURE — 84443 ASSAY THYROID STIM HORMONE: CPT | Performed by: NURSE PRACTITIONER

## 2021-02-03 PROCEDURE — 83036 HEMOGLOBIN GLYCOSYLATED A1C: CPT | Performed by: NURSE PRACTITIONER

## 2021-02-03 ASSESSMENT — ENCOUNTER SYMPTOMS
CHILLS: 0
NAUSEA: 0
DIZZINESS: 0
FREQUENCY: 0
ARTHRALGIAS: 0
JOINT SWELLING: 0
HEARTBURN: 1
FEVER: 0
NERVOUS/ANXIOUS: 0
COUGH: 0
SHORTNESS OF BREATH: 0
DIARRHEA: 0
HEADACHES: 1
SORE THROAT: 0
EYE PAIN: 0
ABDOMINAL PAIN: 0
WEAKNESS: 0
HEMATOCHEZIA: 0
CONSTIPATION: 0
PALPITATIONS: 0
PARESTHESIAS: 0
BREAST MASS: 0
HEMATURIA: 0
DYSURIA: 0
MYALGIAS: 0

## 2021-02-03 ASSESSMENT — MIFFLIN-ST. JEOR: SCORE: 1314.11

## 2021-02-03 NOTE — PROGRESS NOTES
SUBJECTIVE:   CC: Unique Baker is an 35 year old woman who presents for preventive health visit.       Patient has been advised of split billing requirements and indicates understanding:   Healthy Habits:     Getting at least 3 servings of Calcium per day:  NO    Bi-annual eye exam:  Yes    Dental care twice a year:  Yes    Sleep apnea or symptoms of sleep apnea:  Excessive snoring    Diet:  Regular (no restrictions)    Frequency of exercise:  None    Taking medications regularly:  Yes    Medication side effects:  None    PHQ-2 Total Score: 0    Additional concerns today:  No          Today's PHQ-2 Score:   PHQ-2 ( 1999 Pfizer) 2/3/2021   Q1: Little interest or pleasure in doing things 0   Q2: Feeling down, depressed or hopeless 0   PHQ-2 Score 0   Q1: Little interest or pleasure in doing things Not at all   Q2: Feeling down, depressed or hopeless Not at all   PHQ-2 Score 0       Abuse: Current or Past (Physical, Sexual or Emotional) - No  Do you feel safe in your environment? Yes        Social History     Tobacco Use     Smoking status: Former Smoker     Years: 7.00     Types: Cigarettes     Smokeless tobacco: Never Used     Tobacco comment: only if she drinks alcohol   Substance Use Topics     Alcohol use: Not Currently     Alcohol/week: 8.3 standard drinks         Alcohol Use 2/3/2021   Prescreen: >3 drinks/day or >7 drinks/week? No       Any new diagnosis of family breast, ovarian, or bowel cancer? No     Reviewed orders with patient.  Reviewed health maintenance and updated orders accordingly - Yes      Breast CA Risk Screening:  No flowsheet data found.      Pertinent mammograms are reviewed under the imaging tab.    History of abnormal Pap smear:   PAP / HPV Latest Ref Rng & Units 4/7/2017 9/29/2014 5/7/2012   PAP - NIL NIL NIL   HPV 16 DNA NEG Negative - -   HPV 18 DNA NEG Negative - -   OTHER HR HPV NEG Negative - -     Reviewed and updated as needed this visit by clinical staff  Tobacco  Allergies   "Meds  Problems  Med Hx  Surg Hx  Fam Hx          Reviewed and updated as needed this visit by Provider  Tobacco  Allergies  Meds  Problems  Med Hx  Surg Hx  Fam Hx             Review of Systems   Constitutional: Negative for chills and fever.   HENT: Negative for congestion, ear pain, hearing loss and sore throat.    Eyes: Negative for pain and visual disturbance.   Respiratory: Negative for cough and shortness of breath.    Cardiovascular: Negative for chest pain, palpitations and peripheral edema.   Gastrointestinal: Positive for heartburn. Negative for abdominal pain, constipation, diarrhea, hematochezia and nausea.   Breasts:  Positive for tenderness. Negative for breast mass and discharge.   Genitourinary: Negative for dysuria, frequency, genital sores, hematuria, pelvic pain, urgency, vaginal bleeding and vaginal discharge.   Musculoskeletal: Negative for arthralgias, joint swelling and myalgias.   Skin: Negative for rash.   Neurological: Positive for headaches. Negative for dizziness, weakness and paresthesias.   Psychiatric/Behavioral: Negative for mood changes. The patient is not nervous/anxious.      Breast discomfort in right  breast area - silicone implant   Has had them in for several years - has seen  Surgeon and he prescribed anti inflammatory     Heartburn - takes pepcid     adderall - not covered by insurance- wants to continue    She had a letter saying not covered     Vit d gummies she thinks are 5000 units and she takes 2 of them a day   Started taking a week ago        OBJECTIVE:   /70   Pulse 76   Temp 98.6  F (37  C) (Oral)   Resp 18   Ht 1.575 m (5' 2\")   Wt 67.6 kg (149 lb)   LMP 01/28/2021 (Exact Date)   SpO2 99%   Breastfeeding No   BMI 27.25 kg/m    Physical Exam  GENERAL: alert and no distress  EYES: Eyes grossly normal to inspection,  and conjunctivae and sclerae normal  HENT: ear canals and TM's normal, nose and mouth without ulcers or lesions  NECK: no " adenopathy, no asymmetry, masses, or scars and thyroid normal to palpation  RESP: lungs clear to auscultation - no rales, rhonchi or wheezes  BREAST: normal with implants   Complains of pain in right breast under implant- no abnormal finding with exam ,right breast  tenderness no nipple discharge and no palpable axillary masses or adenopathy  CV: regular rate and rhythm, normal S1 S2, no S3 or S4, no murmur, click or rub, no peripheral edema and peripheral pulses strong  ABDOMEN: soft, nontender, no hepatosplenomegaly, no masses and bowel sounds normal   (female): normal female external genitalia, normal urethral meatus, vaginal mucosa pink, moist, well rugated, and normal cervix/adnexa/uterus without masses with end of period dark blood in cervix   MS: no gross musculoskeletal defects noted, no edema  SKIN: no suspicious lesions or rashes  NEURO: Normal strength and tone, mentation intact and speech normal  PSYCH: mentation appears normal, affect normal/bright    Diagnostic Test Results:  Labs reviewed in Epic  Lab   Mammogram     ASSESSMENT/PLAN:   1. Routine general medical examination at a health care facility    - Comprehensive metabolic panel (BMP + Alb, Alk Phos, ALT, AST, Total. Bili, TP)  - Hemoglobin A1c  - Lipid panel reflex to direct LDL Fasting; Future  - TSH with free T4 reflex  - CBC with Platelets and Reflex to Iron Studies    2. Snoring  Want to do sleep study   - SLEEP EVALUATION & MANAGEMENT REFERRAL - [facility]  [phone] (Age 18 and up); Future  - CBC with Platelets and Reflex to Iron Studies    3. Excessive daytime sleepiness    - Comprehensive metabolic panel (BMP + Alb, Alk Phos, ALT, AST, Total. Bili, TP)  - TSH with free T4 reflex  - SLEEP EVALUATION & MANAGEMENT REFERRAL - [facility]  [phone] (Age 18 and up); Future  - CBC with Platelets and Reflex to Iron Studies    4. CARDIOVASCULAR SCREENING; LDL GOAL LESS THAN  "160    - Comprehensive metabolic panel (BMP + Alb, Alk Phos, ALT, AST, Total. Bili, TP)  - Lipid panel reflex to direct LDL Fasting; Future    5. Hair loss  Noted lately   - TSH with free T4 reflex  - Vitamin D Deficiency  - CBC with Platelets and Reflex to Iron Studies    6. Breast pain  Needs exam to check for leak   - MA Screen with Implants Bilateral w/Jesus; Future  - US Breast Right Complete 4 Quadrants; Future    7. ADD (attention deficit disorder) without hyperactivity  She will check with insurance re what stimulant she can use     8. Special screening examination for human papillomavirus (HPV)    - Pap imaged thin layer screen with HPV - recommended age 30 - 65 years (select HPV order below)  - HPV High Risk Types DNA Cervical    Patient has been advised of split billing requirements and indicates understanding:   COUNSELING:  Reviewed preventive health counseling, as reflected in patient instructions       Regular exercise       Healthy diet/nutrition       Immunizations    Vaccinated for: Influenza             Osteoporosis prevention/bone health    Estimated body mass index is 27.25 kg/m  as calculated from the following:    Height as of this encounter: 1.575 m (5' 2\").    Weight as of this encounter: 67.6 kg (149 lb).        She reports that she has quit smoking. Her smoking use included cigarettes. She quit after 7.00 years of use. She has never used smokeless tobacco.      Counseling Resources:  ATP IV Guidelines  Pooled Cohorts Equation Calculator  Breast Cancer Risk Calculator  BRCA-Related Cancer Risk Assessment: FHS-7 Tool  FRAX Risk Assessment  ICSI Preventive Guidelines  Dietary Guidelines for Americans, 2010  USDA's MyPlate  ASA Prophylaxis  Lung CA Screening    CARRIE Barahona CNP  [facility]  "

## 2021-02-03 NOTE — NURSING NOTE
"Chief Complaint   Patient presents with     Physical     initial /70   Pulse 76   Temp 98.6  F (37  C) (Oral)   Resp 18   Ht 1.575 m (5' 2\")   Wt 67.6 kg (149 lb)   LMP 01/28/2021 (Exact Date)   SpO2 99%   Breastfeeding No   BMI 27.25 kg/m   Estimated body mass index is 27.25 kg/m  as calculated from the following:    Height as of this encounter: 1.575 m (5' 2\").    Weight as of this encounter: 67.6 kg (149 lb)..  bp completed using cuff size regular  LIZZIE GONSALEZ LPN  "

## 2021-02-03 NOTE — PATIENT INSTRUCTIONS
Lab in [address]    Fasting lab for cholesterol when you can   You need to be fasting for 12hrs. You can have water and any meds you are on. But, no food, coffee, tea or diet pop.    Sleep study   They will meenakshi you     To schedule your mammogram, you may call the breast center at [phone].

## 2021-02-03 NOTE — LETTER
[facility]  02/03/21    Patient: Unique Baker  YOB: 1985  Medical Record Number: [id number]  CSN: [id number]                                                                              Non-opioid Controlled Substance Agreement    I understand that my care provider has prescribed a controlled substance to help manage my condition(s). I am taking this medicine to help me function or work. I know this is strong medicine, and that it can cause serious side effects. Controlled substances can be sedating, addicting and may cause a dependency on the drug. They can affect my ability to drive or think, and cause depression. They need to be taken exactly as prescribed. Combining controlled substances with certain medicines or chemicals (such as cocaine, sedatives and tranquilizers, sleeping pills, meth) can be dangerous or even fatal. Also, if I stop controlled substances suddenly, I may have severe withdrawal symptoms.  If not helpful, I may be asked to stop them.    The risks, benefits, and side effects of these medicine(s) were explained to me. I agree that:    1. I will take part in other treatments as advised by my care team. This may be psychiatry or counseling, physical therapy, behavioral therapy, group treatment or a referral to a pain clinic. I will reduce or stop my medicine when my care team tells me to do so.  2. I will take my medicines as prescribed. I will not change the dose or schedule unless my care team tells me to. There will be no refills if I  run out early.   I may be contactedwithout warning and asked to complete a urine drug test or pill count at any time.   3. I will keep all my appointments, and understand this is part of the monitoring of controlled substances. My care team may require an office visit for EVERY controlled substance refill. If I miss appointments or don t follow instructions, my care team may stop my medicine.  4. I will not ask other  providers to prescribe controlled substances, and I will not accept controlled substances from other people. If I need another prescribed controlled substance for a new reason, I will tell my care team within 1 business day.  5. I will use one pharmacy to fill all of my controlled substance prescriptions, and it is up to me to make sure that I do not run out of my medicines on weekends or holidays. If my care team is willing to refill my controlled substance prescription without a visit, I must request refills only during office hours, refills may take up to 3 days to process, and it may take up to 5 to 7 days for my medicine to be mailed and ready at my pharmacy. Prescriptions will not be mailed anywhere except my pharmacy.    6. I am responsible for my prescriptions. If the medicine/prescription is lost or stolen, it will not be replaced. I also agree not to share controlled substance medicines with anyone.              [facility]  02/03/21  Patient:  Unique Baker  YOB: 1985  Medical Record Number: [id number]  [facility]: [id number]    7. I agree to not use ANY illegal or recreational drugs. This includes marijuana, cocaine, bath salts or other drugs. I agree not to use alcohol unless my care team says I may. I agree to give urine samples whenever asked. If I don t give a urine sample, the care team may stop my medicine.    8. If I enroll in the [place] Medical Marijuana program, I will tell my care team. I will also sign an agreement to share my medical records with my care team.    9. I will bring in my list of medicines (or my medicine bottles) each time I come to the clinic.   10. I will tell my care team right away if I become pregnant or have a new medical problem treated outside of my regular clinic.  11. I understand that this medicine can affect my thinking and judgment. It may be unsafe for me to drive, use machinery and do dangerous tasks. I will not do any of these  things until I know how the medicine affects me. If my dose changes, I will wait to see how it affects me. I will contact my care team if I have concerns about medicine side effects.    I understand that if I do not follow any of the conditions above, my prescriptions or treatment may be stopped.      I agree that my provider, clinic care team, and pharmacy may work with any city, state or federal law enforcement agency that investigates the misuse, sale, or other diversion of my controlled medicine. I will allow my provider to discuss my care with or share a copy of this agreement with any other treating provider, pharmacy or emergency room where I receive care. I agree to give up (waive) any right of privacy or confidentiality with respect to these consents.   I have read this agreement and have asked questions about anything I did not understand.    ____________________________________________________    ____________  ________  Patient signature                                                         Date      Time    ____________________________________________________     ____________  ________  Witness                                                          Date      Time    ____________________________________________________  Provider signature

## 2021-02-04 LAB
ALBUMIN SERPL-MCNC: 3.6 G/DL (ref 3.4–5)
ALP SERPL-CCNC: 61 U/L (ref 40–150)
ALT SERPL W P-5'-P-CCNC: 18 U/L (ref 0–50)
ANION GAP SERPL CALCULATED.3IONS-SCNC: 1 MMOL/L (ref 3–14)
AST SERPL W P-5'-P-CCNC: 10 U/L (ref 0–45)
BILIRUB SERPL-MCNC: 0.2 MG/DL (ref 0.2–1.3)
BUN SERPL-MCNC: 12 MG/DL (ref 7–30)
CALCIUM SERPL-MCNC: 9 MG/DL (ref 8.5–10.1)
CHLORIDE SERPL-SCNC: 107 MMOL/L (ref 94–109)
CO2 SERPL-SCNC: 30 MMOL/L (ref 20–32)
CREAT SERPL-MCNC: 0.58 MG/DL (ref 0.52–1.04)
DEPRECATED CALCIDIOL+CALCIFEROL SERPL-MC: 18 UG/L (ref 20–75)
FERRITIN SERPL-MCNC: 15 NG/ML (ref 12–150)
GFR SERPL CREATININE-BSD FRML MDRD: >90 ML/MIN/{1.73_M2}
GLUCOSE SERPL-MCNC: 102 MG/DL (ref 70–99)
IRON SATN MFR SERPL: 23 % (ref 15–46)
IRON SERPL-MCNC: 81 UG/DL (ref 35–180)
POTASSIUM SERPL-SCNC: 4.3 MMOL/L (ref 3.4–5.3)
PROT SERPL-MCNC: 7.5 G/DL (ref 6.8–8.8)
SODIUM SERPL-SCNC: 138 MMOL/L (ref 133–144)
TIBC SERPL-MCNC: 345 UG/DL (ref 240–430)
TSH SERPL DL<=0.005 MIU/L-ACNC: 2.28 MU/L (ref 0.4–4)

## 2021-02-08 LAB
COPATH REPORT: NORMAL
PAP: NORMAL

## 2021-02-09 DIAGNOSIS — E55.9 VITAMIN D DEFICIENCY: Primary | ICD-10-CM

## 2021-02-09 RX ORDER — CHOLECALCIFEROL (VITAMIN D3) 50 MCG
1 TABLET ORAL DAILY
Qty: 100 TABLET | Refills: 3 | Status: SHIPPED | OUTPATIENT
Start: 2021-02-09 | End: 2022-03-29

## 2021-02-10 ENCOUNTER — HOSPITAL ENCOUNTER (OUTPATIENT)
Dept: ULTRASOUND IMAGING | Facility: CLINIC | Age: 36
End: 2021-02-10
Attending: NURSE PRACTITIONER
Payer: COMMERCIAL

## 2021-02-10 ENCOUNTER — HOSPITAL ENCOUNTER (OUTPATIENT)
Dept: MAMMOGRAPHY | Facility: CLINIC | Age: 36
End: 2021-02-10
Attending: NURSE PRACTITIONER
Payer: COMMERCIAL

## 2021-02-10 DIAGNOSIS — N64.4 BREAST PAIN: ICD-10-CM

## 2021-02-10 LAB
FINAL DIAGNOSIS: NORMAL
HPV HR 12 DNA CVX QL NAA+PROBE: NEGATIVE
HPV16 DNA SPEC QL NAA+PROBE: NEGATIVE
HPV18 DNA SPEC QL NAA+PROBE: NEGATIVE
SPECIMEN DESCRIPTION: NORMAL
SPECIMEN SOURCE CVX/VAG CYTO: NORMAL

## 2021-02-10 PROCEDURE — G0279 TOMOSYNTHESIS, MAMMO: HCPCS

## 2021-02-10 PROCEDURE — 76642 ULTRASOUND BREAST LIMITED: CPT | Mod: RT

## 2021-02-26 DIAGNOSIS — F98.8 ADD (ATTENTION DEFICIT DISORDER) WITHOUT HYPERACTIVITY: ICD-10-CM

## 2021-02-26 NOTE — TELEPHONE ENCOUNTER
Medication is not active on med list    Controlled Substance Refill Request for Adderall  Problem List Complete:  No     PROVIDER TO CONSIDER COMPLETION OF PROBLEM LIST AND OVERVIEW/CONTROLLED SUBSTANCE AGREEMENT    Last Written Prescription Date:  12/18/20  Last Fill Quantity: 30,   # refills: 0    [place] [facility] [place]3/21    Future Office visit:     Controlled substance agreement:   Encounter-Level CSA:    There are no encounter-level csa.     Patient-Level CSA:    Controlled Substance Agreement - Non - Opioid - Scan on 8/19/2019 12:05 PM: NON-OPIOID CONTROLLED SUBSTANCE AGREEMENT  Controlled Substance Agreement - Opioid - Scan on 6/19/2019  2:54 PM         Last Urine Drug Screen: No results found for: CDAUT, No results found for: COMDAT, No results found for: THC13, PCP13, COC13, MAMP13, OPI13, AMP13, BZO13, TCA13, MTD13, BAR13, OXY13, PPX13, BUP13     [place] reviewed:  not reviewed/not due - last done on 12/18/20  [place]:  [email or web address]

## 2021-02-28 RX ORDER — DEXTROAMPHETAMINE SACCHARATE, AMPHETAMINE ASPARTATE MONOHYDRATE, DEXTROAMPHETAMINE SULFATE AND AMPHETAMINE SULFATE 5; 5; 5; 5 MG/1; MG/1; MG/1; MG/1
CAPSULE, EXTENDED RELEASE ORAL
Qty: 30 CAPSULE | Refills: 0 | Status: SHIPPED | OUTPATIENT
Start: 2021-02-28 | End: 2021-04-21

## 2021-02-28 NOTE — TELEPHONE ENCOUNTER
She was having insurance issues and was to let us know what stimulant was covered   I think she signed a CSA, and is at office, but I did not put in at the time as we were unsure the medication that would be given her

## 2021-03-02 PROBLEM — Z79.899 CONTROLLED SUBSTANCE AGREEMENT SIGNED: Status: ACTIVE | Noted: 2019-08-09

## 2021-03-05 ENCOUNTER — NURSE TRIAGE (OUTPATIENT)
Dept: NURSING | Facility: CLINIC | Age: 36
End: 2021-03-05

## 2021-03-06 DIAGNOSIS — F98.8 ADD (ATTENTION DEFICIT DISORDER) WITHOUT HYPERACTIVITY: ICD-10-CM

## 2021-03-10 RX ORDER — DEXTROAMPHETAMINE SACCHARATE, AMPHETAMINE ASPARTATE MONOHYDRATE, DEXTROAMPHETAMINE SULFATE AND AMPHETAMINE SULFATE 5; 5; 5; 5 MG/1; MG/1; MG/1; MG/1
CAPSULE, EXTENDED RELEASE ORAL
Qty: 30 CAPSULE | Refills: 0 | OUTPATIENT
Start: 2021-03-10

## 2021-04-13 ENCOUNTER — APPOINTMENT (OUTPATIENT)
Dept: URBAN - METROPOLITAN AREA CLINIC 253 | Age: 36
Setting detail: DERMATOLOGY
End: 2021-04-13

## 2021-04-13 VITALS — RESPIRATION RATE: 14 BRPM | HEIGHT: 62 IN | WEIGHT: 144 LBS

## 2021-04-13 DIAGNOSIS — L21.8 OTHER SEBORRHEIC DERMATITIS: ICD-10-CM

## 2021-04-13 DIAGNOSIS — L28.1 PRURIGO NODULARIS: ICD-10-CM

## 2021-04-13 PROCEDURE — OTHER COUNSELING: OTHER

## 2021-04-13 PROCEDURE — 99203 OFFICE O/P NEW LOW 30 MIN: CPT | Mod: 25

## 2021-04-13 PROCEDURE — OTHER PRESCRIPTION: OTHER

## 2021-04-13 PROCEDURE — 17110 DESTRUCT B9 LESION 1-14: CPT

## 2021-04-13 PROCEDURE — OTHER LIQUID NITROGEN: OTHER

## 2021-04-13 PROCEDURE — OTHER ADDITIONAL NOTES: OTHER

## 2021-04-13 RX ORDER — FLUOCINONIDE 0.5 MG/ML
0.05% SOLUTION TOPICAL BID
Qty: 1 | Refills: 1 | Status: ERX | COMMUNITY
Start: 2021-04-13

## 2021-04-13 ASSESSMENT — LOCATION SIMPLE DESCRIPTION DERM
LOCATION SIMPLE: POSTERIOR SCALP
LOCATION SIMPLE: POSTERIOR NECK

## 2021-04-13 ASSESSMENT — LOCATION DETAILED DESCRIPTION DERM
LOCATION DETAILED: LEFT SUPERIOR POSTERIOR NECK
LOCATION DETAILED: LEFT INFERIOR OCCIPITAL SCALP
LOCATION DETAILED: MID-OCCIPITAL SCALP

## 2021-04-13 ASSESSMENT — LOCATION ZONE DERM
LOCATION ZONE: NECK
LOCATION ZONE: SCALP

## 2021-04-13 NOTE — PROCEDURE: COUNSELING
Detail Level: Detailed
Detail Level: Zone
Patient Specific Counseling (Will Not Stick From Patient To Patient): Recommended a topical steroid.

## 2021-04-13 NOTE — HPI: EVALUATION OF SKIN LESION(S)
What Type Of Note Output Would You Prefer (Optional)?: Standard Output
How Severe Are Your Spot(S)?: moderate
Have Your Spot(S) Been Treated In The Past?: has not been treated
Hpi Title: Evaluation of Skin Lesions
Additional History: Thought she had a couple pimples that itch and they never go away. Itchy, non tender

## 2021-04-13 NOTE — PROCEDURE: LIQUID NITROGEN
Medical Necessity Information: It is in your best interest to select a reason for this procedure from the list below. All of these items fulfill various CMS LCD requirements except the new and changing color options.
Medical Necessity Clause: This procedure was medically necessary because the lesions that were treated were:
Include Z78.9 (Other Specified Conditions Influencing Health Status) As An Associated Diagnosis?: No
Consent: The patient's consent was obtained including but not limited to risks of crusting, scabbing, blistering, scarring, darker or lighter pigmentary change, recurrence, incomplete removal and infection.
Post-Care Instructions: I reviewed with the patient in detail post-care instructions. Patient is to wear sunprotection, and avoid picking at any of the treated lesions. Pt may apply Vaseline to crusted or scabbing areas.
Detail Level: Detailed
Render Post-Care Instructions In Note?: yes
Duration Of Freeze Thaw-Cycle (Seconds): 3
Number Of Freeze-Thaw Cycles: 2 freeze-thaw cycles

## 2021-04-13 NOTE — PROCEDURE: ADDITIONAL NOTES
Render Risk Assessment In Note?: no
Additional Notes: Discussed trimming nails and stopping scratch cycle \\n\\nA clinical assistant was present for the exam. Care instructions of treated site were explained to the patient in detail. Told patient to call with any concerns or questions. The patient verbalized understanding and agreement of the education provided and the treatment plan. Encouraged patient to schedule a follow up appointment right after visit. At the end of the visit, all questions had been answered and the patient was satisfied with the visit.
Detail Level: Simple

## 2021-04-20 ENCOUNTER — NURSE TRIAGE (OUTPATIENT)
Dept: NURSING | Facility: CLINIC | Age: 36
End: 2021-04-20

## 2021-04-20 VITALS
BODY MASS INDEX: 26.34 KG/M2 | SYSTOLIC BLOOD PRESSURE: 140 MMHG | HEART RATE: 74 BPM | TEMPERATURE: 98.8 F | RESPIRATION RATE: 20 BRPM | OXYGEN SATURATION: 100 % | DIASTOLIC BLOOD PRESSURE: 94 MMHG | WEIGHT: 144 LBS

## 2021-04-20 PROCEDURE — 84484 ASSAY OF TROPONIN QUANT: CPT | Performed by: EMERGENCY MEDICINE

## 2021-04-20 PROCEDURE — 80048 BASIC METABOLIC PNL TOTAL CA: CPT | Performed by: EMERGENCY MEDICINE

## 2021-04-20 PROCEDURE — 93005 ELECTROCARDIOGRAM TRACING: CPT

## 2021-04-20 PROCEDURE — 85025 COMPLETE CBC W/AUTO DIFF WBC: CPT | Performed by: EMERGENCY MEDICINE

## 2021-04-20 PROCEDURE — 999N000104 HC STATISTIC NO CHARGE

## 2021-04-21 ENCOUNTER — OFFICE VISIT (OUTPATIENT)
Dept: INTERNAL MEDICINE | Facility: CLINIC | Age: 36
End: 2021-04-21
Payer: COMMERCIAL

## 2021-04-21 ENCOUNTER — NURSE TRIAGE (OUTPATIENT)
Dept: NURSING | Facility: CLINIC | Age: 36
End: 2021-04-21

## 2021-04-21 ENCOUNTER — HOSPITAL ENCOUNTER (EMERGENCY)
Facility: CLINIC | Age: 36
Discharge: HOME | End: 2021-04-21
Admitting: EMERGENCY MEDICINE
Payer: COMMERCIAL

## 2021-04-21 VITALS
DIASTOLIC BLOOD PRESSURE: 85 MMHG | TEMPERATURE: 98.1 F | OXYGEN SATURATION: 98 % | SYSTOLIC BLOOD PRESSURE: 114 MMHG | BODY MASS INDEX: 26.94 KG/M2 | HEART RATE: 89 BPM | WEIGHT: 147.3 LBS

## 2021-04-21 DIAGNOSIS — K21.00 GASTROESOPHAGEAL REFLUX DISEASE WITH ESOPHAGITIS WITHOUT HEMORRHAGE: ICD-10-CM

## 2021-04-21 DIAGNOSIS — F41.1 GENERALIZED ANXIETY DISORDER: Primary | ICD-10-CM

## 2021-04-21 LAB
ANION GAP SERPL CALCULATED.3IONS-SCNC: 7 MMOL/L (ref 3–14)
BASOPHILS # BLD AUTO: 0 10E9/L (ref 0–0.2)
BASOPHILS NFR BLD AUTO: 0.4 %
BUN SERPL-MCNC: 12 MG/DL (ref 7–30)
CALCIUM SERPL-MCNC: 9.5 MG/DL (ref 8.5–10.1)
CHLORIDE SERPL-SCNC: 105 MMOL/L (ref 94–109)
CO2 SERPL-SCNC: 28 MMOL/L (ref 20–32)
CREAT SERPL-MCNC: 0.74 MG/DL (ref 0.52–1.04)
DIFFERENTIAL METHOD BLD: ABNORMAL
EOSINOPHIL # BLD AUTO: 0.1 10E9/L (ref 0–0.7)
EOSINOPHIL NFR BLD AUTO: 1.1 %
ERYTHROCYTE [DISTWIDTH] IN BLOOD BY AUTOMATED COUNT: 13.2 % (ref 10–15)
GFR SERPL CREATININE-BSD FRML MDRD: >90 ML/MIN/{1.73_M2}
GLUCOSE SERPL-MCNC: 111 MG/DL (ref 70–99)
HCT VFR BLD AUTO: 39.7 % (ref 35–47)
HGB BLD-MCNC: 13 G/DL (ref 11.7–15.7)
IMM GRANULOCYTES # BLD: 0 10E9/L (ref 0–0.4)
IMM GRANULOCYTES NFR BLD: 0.4 %
INTERPRETATION ECG - MUSE: NORMAL
LYMPHOCYTES # BLD AUTO: 1.5 10E9/L (ref 0.8–5.3)
LYMPHOCYTES NFR BLD AUTO: 14.4 %
MCH RBC QN AUTO: 29 PG (ref 26.5–33)
MCHC RBC AUTO-ENTMCNC: 32.7 G/DL (ref 31.5–36.5)
MCV RBC AUTO: 89 FL (ref 78–100)
MONOCYTES # BLD AUTO: 0.5 10E9/L (ref 0–1.3)
MONOCYTES NFR BLD AUTO: 4.8 %
NEUTROPHILS # BLD AUTO: 8.4 10E9/L (ref 1.6–8.3)
NEUTROPHILS NFR BLD AUTO: 78.9 %
NRBC # BLD AUTO: 0 10*3/UL
NRBC BLD AUTO-RTO: 0 /100
PLATELET # BLD AUTO: 214 10E9/L (ref 150–450)
POTASSIUM SERPL-SCNC: 3.9 MMOL/L (ref 3.4–5.3)
RBC # BLD AUTO: 4.48 10E12/L (ref 3.8–5.2)
SODIUM SERPL-SCNC: 140 MMOL/L (ref 133–144)
TROPONIN I SERPL-MCNC: <0.015 UG/L (ref 0–0.04)
WBC # BLD AUTO: 10.6 10E9/L (ref 4–11)

## 2021-04-21 PROCEDURE — 99214 OFFICE O/P EST MOD 30 MIN: CPT | Performed by: NURSE PRACTITIONER

## 2021-04-21 RX ORDER — HYDROXYZINE HYDROCHLORIDE 25 MG/1
25 TABLET, FILM COATED ORAL EVERY 8 HOURS PRN
Qty: 30 TABLET | Refills: 0 | Status: SHIPPED | OUTPATIENT
Start: 2021-04-21 | End: 2022-10-21

## 2021-04-21 RX ORDER — CITALOPRAM HYDROBROMIDE 10 MG/1
10 TABLET ORAL DAILY
Qty: 30 TABLET | Refills: 1 | Status: SHIPPED | OUTPATIENT
Start: 2021-04-21 | End: 2021-05-03

## 2021-04-21 NOTE — TELEPHONE ENCOUNTER
Pt called in states she had covid -19 shots on 4/16/21.  The Pt got Pfizer shot.  She has chest pain.  The Pt states the pain is on her middle chest.  Pt also states she feel tightness.  The pain started yesterday.  The pain is come and go.  Pt states she have the pain at this time.  The pain last more than 10 min.   The pain is 5/10 on the scale.  No shortness breathing.  No fever.  Temp 97.3 axillary.  No immunization reaction in the past.  No other symptoms.  The disposition is to be seen at the ED.  Care advice given per protocol.  Patient agrees with care advice given.   Agreed to call back if he has additional symptoms or questions.    Caden Arreola Walden Nurse Advisor 4/20/2021 10:08 PM      Reason for Disposition    Chest pain lasts > 5 minutes (Exceptions: chest pain occurring > 3 days ago and now asymptomatic; same as previously diagnosed heartburn and has accompanying sour taste in mouth)    Additional Information    Negative: [1] Difficulty breathing or swallowing AND [2] starts within 2 hours after injection    Negative: Sounds like a life-threatening emergency to the triager    Negative: [1] COVID-19 exposure AND [2] no symptoms    Negative: [1] Typical COVID-19 symptoms AND [2] symptoms that are NOT expected from vaccine (e.g., cough, difficulty breathing, loss of taste or smell, runny nose, sore throat)    Negative: [1] Typical COVID-19 symptoms AND [2] started > 3 days after getting vaccine    Negative: Fever > 104 F (40 C)    Negative: Sounds like a severe, unusual reaction to the triager    Negative: [1] Redness or red streak around the injection site AND [2] started > 48 hours after getting vaccine AND [3] fever    Negative: [1] Fever > 101 F (38.3 C) AND [2] age > 60 AND [3] started > 48 hours after getting vaccine    Negative: [1] Fever > 100.0 F (37.8 C) AND [2] bedridden (e.g., nursing home patient, CVA, chronic illness, recovering from surgery) AND [3] started > 48 hours after getting  vaccine    Negative: [1] Fever > 100.0 F (37.8 C) AND [2] diabetes mellitus or weak immune system (e.g., HIV positive, cancer chemo, splenectomy, organ transplant, chronic steroids) AND [3] started > 48 hours after getting vaccine    Negative: [1] Redness or red streak around the injection site AND [2] started > 48 hours after getting vaccine AND [3] no fever  (Exception: red area < 1 inch or 2.5 cm wide)    Negative: [1] Pain, tenderness, or swelling at the injection site AND [2] over 3 days (72 hours) since vaccine AND [3] getting worse    Negative: Fever > 100.0 F (37.8 C) present > 3 days (72 hours)    Negative: [1] Fever > 100.0 F (37.8 C) AND [2] healthcare worker    Negative: [1] Pain, tenderness, or swelling at the injection site AND [2] lasts > 7 days    Negative: [1] Requesting COVID-19 vaccine AND [2] healthcare worker (e.g., EMS first responders, doctors, nurses)    Negative: [1] Requesting COVID-19 vaccine AND [2] resident of a long-term care facility (e.g., nursing home)    Negative: [1] Requesting COVID-19 vaccine AND [2] vaccine available in the community for this patient group    Negative: COVID-19 vaccine, injection site reaction (e.g., pain, redness, swelling), question about    Negative: COVID-19 vaccine, systemic reactions (e.g., fatigue, fever, muscle aches), questions about    Negative: COVID-19 vaccine, Frequently Asked Questions (FAQs)    Negative: COVID-19 Prevention and Healthy Living, questions about    Negative: Severe difficulty breathing (e.g., struggling for each breath, speaks in single words)    Negative: Difficult to awaken or acting confused (e.g., disoriented, slurred speech)    Negative: Shock suspected (e.g., cold/pale/clammy skin, too weak to stand, low BP, rapid pulse)    Negative: [1] Chest pain lasts > 5 minutes AND [2] history of heart disease  (i.e., heart attack, bypass surgery, angina, angioplasty, CHF; not just a heart murmur)    Negative: [1] Chest pain lasts > 5  "minutes AND [2] described as crushing, pressure-like, or heavy    Negative: [1] Chest pain lasts > 5 minutes AND [2] age > 50    Negative: [1] Chest pain lasts > 5 minutes AND [2] age > 30 AND [3] at least one cardiac risk factor (i.e., hypertension, diabetes, obesity, smoker or strong family history of heart disease)    Negative: [1] Chest pain lasts > 5 minutes AND [2] not relieved with nitroglycerin    Negative: Passed out (i.e., lost consciousness, collapsed and was not responding)    Negative: Heart beating < 50 beats per minute OR > 140 beats per minute    Negative: Visible sweat on face or sweat dripping down face    Negative: Sounds like a life-threatening emergency to the triager    Negative: Followed a chest injury    Negative: SEVERE chest pain    Negative: [1] Intermittent  chest pain or \"angina\" AND [2] increasing in severity or frequency  (Exception: pains lasting a few seconds)    Negative: Pain also present in shoulder(s) or arm(s) or jaw  (Exception: pain is clearly made worse by movement)    Negative: Difficulty breathing    Negative: Dizziness or lightheadedness    Negative: Coughing up blood    Negative: Cocaine use within last 3 days    Negative: History of prior \"blood clot\" in leg or lungs (i.e., deep vein thrombosis, pulmonary embolism)    Negative: Recent illness requiring prolonged bedrest (i.e., immobilization)    Negative: Hip or leg fracture in past 2 months (e.g., had cast on leg or ankle)    Negative: Major surgery in the past month    Negative: Recent long-distance travel with prolonged time in car, bus, plane, or train (i.e., within past 2 weeks; 6 or  more hours duration)    Protocols used: CHEST PAIN-A-AH, CORONAVIRUS (COVID-19) VACCINE QUESTIONS AND [id number] 1.3      "

## 2021-04-21 NOTE — PATIENT INSTRUCTIONS
For increased indigestion, will start Omeprazole 20 mg to take in the evening.  Ok to use the Famotidine 20 mg for break through issues in the morning.    For anxiety, will trial Celexa (Citalopram) 10 mg daily in the morning with food and added Hydroxyzine 25 mg 1/2 to 1 tablet every 8 hours for panic type issues; recommend taking tonight at bedtime for some sleep and anxiety.    Follow up in 4-6 weeks to see how doing.    If insurance covers, let me know and I will make referral to Therapist

## 2021-04-21 NOTE — PROGRESS NOTES
"    Assessment & Plan     Generalized anxiety disorder  - went to ER late last night for chest pain complaints after receiving a Covid immunization with all imaging and labs ok so suspect anxiety so will treat with:  - citalopram (CELEXA) 10 MG tablet; Take 1 tablet (10 mg) by mouth daily  - hydrOXYzine (ATARAX) 25 MG tablet; Take 1 tablet (25 mg) by mouth every 8 hours as needed for anxiety    Gastroesophageal reflux disease with esophagitis without hemorrhage  - chronic with flare up so will trial:  - omeprazole (PRILOSEC) 20 MG DR capsule; Take 1 capsule (20 mg) by mouth daily    [id number]}     BMI:   Estimated body mass index is 26.94 kg/m  as calculated from the following:    Height as of 2/3/21: 1.575 m (5' 2\").    Weight as of this encounter: 66.8 kg (147 lb 4.8 oz).       Patient Instructions   For increased indigestion, will start Omeprazole 20 mg to take in the evening.  Ok to use the Famotidine 20 mg for break through issues in the morning.    For anxiety, will trial Celexa (Citalopram) 10 mg daily in the morning with food and added Hydroxyzine 25 mg 1/2 to 1 tablet every 8 hours for panic type issues; recommend taking tonight at bedtime for some sleep and anxiety.    Follow up in 4-6 weeks to see how doing.    If insurance covers, let me know and I will make referral to Therapist      Return in about 4 weeks (around 5/19/2021) for Follow up, in person.    AGUSTÍN [facility] VALORIE Calhoun is a 35 year old who presents for the following health issues  accompanied by herself:    HPI     ED/UC Followup:    Facility:  [facility]  Date of visit: 4/20/21  Reason for visit: Chest pain  Current Status: Patient does not feel good. She has anxiety, feeling nausea, feeling tight around upper chest area right below her neck.     She said she went to the ER yesterday, had labs and an EKG done. She did not get to see a provider because the wait was too long and the " computer system was also down.    See above.  Went to ER lat 4/20/2020 for complaints of chest pain and anxiety. Reviewed tests.    Imaging:    EKG: sinus rhythm with rate 75    Labs:    CBC ok    BMP glucose 111 H otherwise ok    Trop ok    Had Covid 2 months ago then had 1st Covid vaccine 4/16/2021 which is when the chest tightness started (points to neck area). Also my indigestion/reflux and acid has gotten worse; taking Pepcid daily and not seeming to help.  No history of ulcers.  Hx of IBS.  Gets an EGD every 5 years with last one 9/16/2020 ok.    Also have anxiety; no longer on Adderall because it causes me to be anxious.    Review of Systems   Constitutional, HEENT, cardiovascular, pulmonary, gi and gu systems are negative, except as otherwise noted.      Objective    /85 (BP Location: Left arm, Patient Position: Sitting, Cuff Size: Adult Regular)   Pulse 89   Temp 98.1  F (36.7  C) (Oral)   Wt 66.8 kg (147 lb 4.8 oz)   LMP 04/20/2021 (Approximate)   SpO2 98%   BMI 26.94 kg/m    Body mass index is 26.94 kg/m .     Physical Exam   GENERAL: alert and no distress  NECK: no adenopathy, no asymmetry, masses, or scars and thyroid normal to palpation  RESP: lungs clear to auscultation - no rales, rhonchi or wheezes  CV: regular rate and rhythm, normal S1 S2, no S3 or S4, no murmur, click or rub, no peripheral edema and peripheral pulses strong  ABDOMEN: soft, nontender, no hepatosplenomegaly, no masses and bowel sounds normal  MS: no gross musculoskeletal defects noted, no edema  SKIN: no suspicious lesions or rashes  PSYCH: mentation appears normal, affect normal/bright; very anxious and worried about what the vaccine could do to me

## 2021-04-21 NOTE — TELEPHONE ENCOUNTER
Triage Call:    She has been prescribed omeprazole for her acid reflux today.  After she came home at 0300 in the am, she took Pepcid and again at 10:00.  She took an omeprazole at 1300 on direction of her provider after her visit with her today and is wondering what else she can take now, as she is having reflux again.  She did drink a glass of milk.     Advised that she could utilize Tums and also encouraged her to eat, as it has been many hours since she last ate.   Also encouraged her to start keeping a food journal to track when she is having more reflux and if there is a correlation with a certain type of food that she should avoid.             Natividad Farrar RN on 4/21/2021 at 6:55 PM        COVID 19 Nurse Triage Plan/Patient Instructions    Please be aware that novel coronavirus (COVID-19) may be circulating in the community. If you develop symptoms such as fever, cough, or SOB or if you have concerns about the presence of another infection including coronavirus (COVID-19), please contact your health care provider or visit [email or web address].     Disposition/Instructions    Home care recommended. Follow home care protocol based instructions.    Thank you for taking steps to prevent the spread of this virus.  o Limit your contact with others.  o Wear a simple mask to cover your cough.  o Wash your hands well and often.    Resources    [facility]: About COVID-19: [email or web address]    CDC: What to Do If You're Sick: www.cdc.gov/coronavirus/2019-ncov/about/steps-when-sick.html    CDC: Ending Home Isolation: www.cdc.gov/coronavirus/2019-ncov/hcp/disposition-in-home-patients.html     CDC: Caring for Someone: www.cdc.gov/coronavirus/2019-ncov/if-you-are-sick/care-for-someone.html     [facility]: Interim Guidance for Hospital Discharge to Home: www.health.[facility].[place]/diseases/coronavirus/hcp/hospdischarge.pdf    [facility] clinical trials (COVID-19 research studies):  "clinicalaffairs.[facility]-clinical-trials     Below are the COVID-19 hotlines at the [place] Department of Health ([facility]). Interpreters are available.   o For health questions: Call 604-414-5025 or 1-559.237.9320 (7 a.m. to 7 p.m.)  o For questions about schools and childcare: Call 351-272-6020 or 1-118.148.9561 (7 a.m. to 7 p.m.)                   Additional Information    Negative: MORE THAN A DOUBLE DOSE of a prescription or over-the-counter (OTC) drug    Negative: [1] DOUBLE DOSE (an extra dose or lesser amount) of over-the-counter (OTC) drug AND [2] any symptoms (e.g., dizziness, nausea, pain, sleepiness)    Negative: [1] DOUBLE DOSE (an extra dose or lesser amount) of prescription drug AND [2] any symptoms (e.g., dizziness, nausea, pain, sleepiness)    Negative: Took another person's prescription drug    Negative: Drug overdose and nurse unable to answer question    Negative: Caller requesting information not related to medicine    Negative: Caller requesting a prescription for Strep throat and has a positive culture result    Negative: Rash while taking a medication or within 3 days of stopping it    Negative: Immunization reaction suspected    Negative: [1] Asthma AND [2] having symptoms of asthma (cough, wheezing, etc)    Negative: [1] DOUBLE DOSE (an extra dose or lesser amount) of prescription drug AND [2] NO symptoms (Exception: a double dose of antibiotics)    Negative: Diabetes drug error or overdose (e.g., insulin or extra dose)    Negative: [1] Request for URGENT new prescription or refill of \"essential\" medication (i.e., likelihood of harm to patient if not taken) AND [2] triager unable to fill per unit policy    Negative: [1] Prescription not at pharmacy AND [2] was prescribed today by PCP    Negative: Pharmacy calling with prescription questions and triager unable to answer question    Negative: Caller has urgent medication question about med that PCP prescribed and triager unable to answer " question    Negative: Caller has NON-URGENT medication question about med that PCP prescribed and triager unable to answer question    Negative: Caller requesting a NON-URGENT new prescription or refill and triager unable to refill per unit policy    Negative: Caller has medication question about med not prescribed by PCP and triager unable to answer question (e.g., compatibility with other med, storage)    Negative: [1] DOUBLE DOSE (an extra dose or lesser amount) of over-the-counter (OTC) drug AND [2] NO symptoms    Negative: [1] DOUBLE DOSE (an extra dose or lesser amount) of antibiotic drug AND [2] NO symptoms    Caller has medication question only, adult not sick, and triager answers question    Protocols used: MEDICATION QUESTION CALL-A-

## 2021-04-21 NOTE — ED TRIAGE NOTES
Pt arrives to the ED due to pain across chest that has been intermittent since Sunday. States she received her first phizer vaccination on Friday. States some associated SOB and dizziness with the tightness in chest.

## 2021-04-26 ENCOUNTER — TELEPHONE (OUTPATIENT)
Dept: INTERNAL MEDICINE | Facility: CLINIC | Age: 36
End: 2021-04-26

## 2021-04-26 DIAGNOSIS — K21.9 GASTROESOPHAGEAL REFLUX DISEASE WITHOUT ESOPHAGITIS: Primary | ICD-10-CM

## 2021-04-26 NOTE — TELEPHONE ENCOUNTER
I ordered H. Pylori test to rule out ulcer.  Call lab to schedule this lab visit.  If negative, then will make referral to GI specialist.

## 2021-04-26 NOTE — TELEPHONE ENCOUNTER
Patient calling.  omeprazole (PRILOSEC) 20 MG DR capsule not helping. She would like to know what the next step is. Ok to call and dev [phone]

## 2021-04-26 NOTE — TELEPHONE ENCOUNTER
"Per 4/1/21 office visit note:  \"For increased indigestion, will start Omeprazole 20 mg to take in the evening.  Ok to use the Famotidine 20 mg for break through issues in the morning\"    Patient reports her symptoms have not improved much. Patient is taking Omeprazole 20 mg in the morning, and famotidine 20 mg in the evening. Patient reports her symptoms will improve for an hour or so after taking medication, then come back. Patient reports she feels like she has \"a lot of acid\" in her stomach. Patient notes she is drinking plenty of water, and following a bland diet. Please advise on next steps.     Call patient back at [phone] ok to leave a detailed message   "

## 2021-04-27 ENCOUNTER — NURSE TRIAGE (OUTPATIENT)
Dept: INTERNAL MEDICINE | Facility: CLINIC | Age: 36
End: 2021-04-27

## 2021-04-27 NOTE — TELEPHONE ENCOUNTER
"Unique reports that she has has left arm tingling, left shoulder down (x 2 days) on and off. She feels lightheaded this was about 20 minutes ago - after she took a shower she was better.  If she gets up and does things she will get lightheadedness.  This has been going on for a couple of days. Her entire left arm felt like it was cold.  Had an episode of diarrhea after her shower.  She currently her arm \"feels tired\".  Denies currently: Nausea, vomiting, chest pain, SOB, trouble breathing, lightheadedness, the left arm tingling, blurred vision.  She had vaccine (in left arm) a week ago has had - blurred vision since. She has not blurred vision for the last 2 days. She does not see any redness, swelling or a line from the left arm where she received the vaccine. She was alert to self and .  She can complete full sentences and speaks clearly.     Nursing advice: The only symptom the patient is experiencing currently is a tired left arm. She is to seek care immediately in an urgent care close to her.  She reports that she goes to [facility] urgent care and they are open now.  Patient was given signs and symptoms to go to the E.R. or call 911.  Patient verbalizes good understanding, agrees with plan and states she needs no further support. Celia Carr R.N.        Additional Information    Negative: [1] Difficulty breathing or swallowing AND [2] starts within 2 hours after injection    Negative: Sounds like a life-threatening emergency to the triager    Negative: Fever > 104 F (40 C)    Negative: Sounds like a severe, unusual reaction to the triager    Negative: [1] Redness or red streak around the injection site AND [2] started > 48 hours after getting vaccine AND [3] fever    Negative: [1] Fever > 101 F (38.3 C) AND [2] age > 60 AND [3] started > 48 hours after getting vaccine    Negative: [1] Fever > 100.0 F (37.8 C) AND [2] bedridden (e.g., nursing home patient, CVA, chronic illness, recovering from surgery) AND " [3] started > 48 hours after getting vaccine    Negative: [1] Fever > 100.0 F (37.8 C) AND [2] diabetes mellitus or weak immune system (e.g., HIV positive, cancer chemo, splenectomy, organ transplant, chronic steroids) AND [3] started > 48 hours after getting vaccine    Negative: [1] Redness or red streak around the injection site AND [2] started > 48 hours after getting vaccine AND [3] no fever  (Exception: red area < 1 inch or 2.5 cm wide)    Negative: [1] Pain, tenderness, or swelling at the injection site AND [2] over 3 days (72 hours) since vaccine AND [3] getting worse    Negative: Fever > 100.0 F (37.8 C) present > 3 days (72 hours)    Negative: [1] Fever > 100.0 F (37.8 C) AND [2] healthcare worker    Negative: [1] Pain, tenderness, or swelling at the injection site AND [2] lasts > 7 days    Negative: [1] Requesting COVID-19 vaccine AND [2] healthcare worker (e.g., EMS first responders, doctors, nurses)    Negative: [1] Requesting COVID-19 vaccine AND [2] resident of a long-term care facility (e.g., nursing home)    Negative: [1] Requesting COVID-19 vaccine AND [2] vaccine available in the community for this patient group    Protocols used: CORONAVIRUS (COVID-19) VACCINE QUESTIONS AND [id number]-FP 1.3

## 2021-04-30 ENCOUNTER — TELEPHONE (OUTPATIENT)
Dept: INTERNAL MEDICINE | Facility: CLINIC | Age: 36
End: 2021-04-30

## 2021-04-30 ENCOUNTER — TRANSFERRED RECORDS (OUTPATIENT)
Dept: HEALTH INFORMATION MANAGEMENT | Facility: CLINIC | Age: 36
End: 2021-04-30

## 2021-04-30 ENCOUNTER — MEDICAL CORRESPONDENCE (OUTPATIENT)
Dept: HEALTH INFORMATION MANAGEMENT | Facility: CLINIC | Age: 36
End: 2021-04-30

## 2021-04-30 DIAGNOSIS — R14.2 BELCHING: ICD-10-CM

## 2021-04-30 DIAGNOSIS — R41.89 BRAIN FOG: ICD-10-CM

## 2021-04-30 DIAGNOSIS — R53.83 FATIGUE: ICD-10-CM

## 2021-04-30 DIAGNOSIS — R20.2 PARESTHESIA: ICD-10-CM

## 2021-04-30 DIAGNOSIS — K21.9 GERD (GASTROESOPHAGEAL REFLUX DISEASE): Primary | ICD-10-CM

## 2021-04-30 DIAGNOSIS — T50.B95A: ICD-10-CM

## 2021-04-30 DIAGNOSIS — R07.89 ATYPICAL CHEST PAIN: ICD-10-CM

## 2021-04-30 NOTE — TELEPHONE ENCOUNTER
Dr Gann from [facility] called   Cell [phone]    Patient had covid last year    Within 24 hour of covid vaccine - neck tightness tingling hand back to feet   Reflux and bloating     Vaccination reaction related to - all symptoms related to post vaccination      Brain fog   No fever   Elevated blood pressure    Lab and chest ct planned     Protonix twice daily   Changed  diet recommendations    He will send all resutls our way     Wanted us to know so she is seen sooner here as well for follow up      Wondered  if there is a covid clinic in [place] - not that I know of   Will have nursing call to set up follow up       PLEASE SET UP FOLLOW UP WITH INTERNAL MEDICINE WHEN SHE IS ABLE

## 2021-04-30 NOTE — TELEPHONE ENCOUNTER
Pt called to see if [facility] got in contact with her PCP. Appointment scheduled Monday, 5/3/21 with Dr. Riggs.

## 2021-05-01 DIAGNOSIS — R14.2 BELCHING: ICD-10-CM

## 2021-05-01 DIAGNOSIS — K21.9 GERD (GASTROESOPHAGEAL REFLUX DISEASE): ICD-10-CM

## 2021-05-01 DIAGNOSIS — R41.89 BRAIN FOG: ICD-10-CM

## 2021-05-01 DIAGNOSIS — R53.83 FATIGUE: ICD-10-CM

## 2021-05-01 DIAGNOSIS — R20.2 PARESTHESIA: ICD-10-CM

## 2021-05-01 DIAGNOSIS — T50.B95A: ICD-10-CM

## 2021-05-01 DIAGNOSIS — R07.89 ATYPICAL CHEST PAIN: ICD-10-CM

## 2021-05-01 LAB
BASOPHILS # BLD AUTO: 0 10E9/L (ref 0–0.2)
BASOPHILS NFR BLD AUTO: 0.3 %
DIFFERENTIAL METHOD BLD: NORMAL
EOSINOPHIL # BLD AUTO: 0 10E9/L (ref 0–0.7)
EOSINOPHIL NFR BLD AUTO: 0.1 %
ERYTHROCYTE [DISTWIDTH] IN BLOOD BY AUTOMATED COUNT: 13.2 % (ref 10–15)
HCT VFR BLD AUTO: 38.4 % (ref 35–47)
HGB BLD-MCNC: 12.7 G/DL (ref 11.7–15.7)
LYMPHOCYTES # BLD AUTO: 1.7 10E9/L (ref 0.8–5.3)
LYMPHOCYTES NFR BLD AUTO: 18.2 %
MCH RBC QN AUTO: 28.3 PG (ref 26.5–33)
MCHC RBC AUTO-ENTMCNC: 33.1 G/DL (ref 31.5–36.5)
MCV RBC AUTO: 86 FL (ref 78–100)
MONOCYTES # BLD AUTO: 0.7 10E9/L (ref 0–1.3)
MONOCYTES NFR BLD AUTO: 7.8 %
NEUTROPHILS # BLD AUTO: 6.8 10E9/L (ref 1.6–8.3)
NEUTROPHILS NFR BLD AUTO: 73.6 %
PLATELET # BLD AUTO: 258 10E9/L (ref 150–450)
RBC # BLD AUTO: 4.49 10E12/L (ref 3.8–5.2)
WBC # BLD AUTO: 9.2 10E9/L (ref 4–11)

## 2021-05-01 PROCEDURE — 84481 FREE ASSAY (FT-3): CPT | Performed by: INTERNAL MEDICINE

## 2021-05-01 PROCEDURE — 84439 ASSAY OF FREE THYROXINE: CPT | Performed by: INTERNAL MEDICINE

## 2021-05-01 PROCEDURE — 83690 ASSAY OF LIPASE: CPT | Performed by: INTERNAL MEDICINE

## 2021-05-01 PROCEDURE — 80076 HEPATIC FUNCTION PANEL: CPT | Performed by: INTERNAL MEDICINE

## 2021-05-01 PROCEDURE — 84443 ASSAY THYROID STIM HORMONE: CPT | Performed by: INTERNAL MEDICINE

## 2021-05-01 PROCEDURE — 86140 C-REACTIVE PROTEIN: CPT | Performed by: INTERNAL MEDICINE

## 2021-05-01 PROCEDURE — 36415 COLL VENOUS BLD VENIPUNCTURE: CPT | Performed by: INTERNAL MEDICINE

## 2021-05-01 PROCEDURE — 86376 MICROSOMAL ANTIBODY EACH: CPT | Performed by: INTERNAL MEDICINE

## 2021-05-01 PROCEDURE — 80048 BASIC METABOLIC PNL TOTAL CA: CPT | Performed by: INTERNAL MEDICINE

## 2021-05-01 PROCEDURE — 85025 COMPLETE CBC W/AUTO DIFF WBC: CPT | Performed by: INTERNAL MEDICINE

## 2021-05-02 ENCOUNTER — HOSPITAL ENCOUNTER (OUTPATIENT)
Dept: CT IMAGING | Facility: CLINIC | Age: 36
Discharge: HOME | End: 2021-05-02
Attending: INTERNAL MEDICINE | Admitting: INTERNAL MEDICINE
Payer: COMMERCIAL

## 2021-05-02 DIAGNOSIS — K21.9 CHRONIC GERD: ICD-10-CM

## 2021-05-02 DIAGNOSIS — R41.89 BRAIN FOG: ICD-10-CM

## 2021-05-02 DIAGNOSIS — R53.83 FATIGUE: ICD-10-CM

## 2021-05-02 DIAGNOSIS — T50.B95A ADVERSE EFFECT OF OTHER VIRAL VACCINES, INITIAL ENCOUNTER: ICD-10-CM

## 2021-05-02 DIAGNOSIS — R20.2 PARESTHESIAS: ICD-10-CM

## 2021-05-02 DIAGNOSIS — R07.89 ATYPICAL CHEST PAIN: ICD-10-CM

## 2021-05-02 DIAGNOSIS — R14.2 BELCHING: ICD-10-CM

## 2021-05-02 LAB
ALBUMIN SERPL-MCNC: 4.1 G/DL (ref 3.4–5)
ALP SERPL-CCNC: 61 U/L (ref 40–150)
ALT SERPL W P-5'-P-CCNC: 18 U/L (ref 0–50)
ANION GAP SERPL CALCULATED.3IONS-SCNC: 5 MMOL/L (ref 3–14)
AST SERPL W P-5'-P-CCNC: 11 U/L (ref 0–45)
BILIRUB DIRECT SERPL-MCNC: <0.1 MG/DL (ref 0–0.2)
BILIRUB SERPL-MCNC: 0.3 MG/DL (ref 0.2–1.3)
BUN SERPL-MCNC: 13 MG/DL (ref 7–30)
CALCIUM SERPL-MCNC: 8.6 MG/DL (ref 8.5–10.1)
CHLORIDE SERPL-SCNC: 103 MMOL/L (ref 94–109)
CO2 SERPL-SCNC: 25 MMOL/L (ref 20–32)
CREAT SERPL-MCNC: 0.78 MG/DL (ref 0.52–1.04)
CRP SERPL-MCNC: <2.9 MG/L (ref 0–8)
GFR SERPL CREATININE-BSD FRML MDRD: >90 ML/MIN/{1.73_M2}
GLUCOSE SERPL-MCNC: 87 MG/DL (ref 70–99)
LIPASE SERPL-CCNC: 174 U/L (ref 73–393)
POTASSIUM SERPL-SCNC: 4 MMOL/L (ref 3.4–5.3)
PROT SERPL-MCNC: 7.8 G/DL (ref 6.8–8.8)
SODIUM SERPL-SCNC: 133 MMOL/L (ref 133–144)
T3FREE SERPL-MCNC: 2.1 PG/ML (ref 2.3–4.2)
T4 FREE SERPL-MCNC: 1 NG/DL (ref 0.76–1.46)
TSH SERPL DL<=0.005 MIU/L-ACNC: 1.18 MU/L (ref 0.4–4)

## 2021-05-02 PROCEDURE — 71260 CT THORAX DX C+: CPT

## 2021-05-02 PROCEDURE — 250N000009 HC RX 250

## 2021-05-02 PROCEDURE — 250N000011 HC RX IP 250 OP 636

## 2021-05-02 RX ORDER — IOPAMIDOL 755 MG/ML
80 INJECTION, SOLUTION INTRAVASCULAR ONCE
Status: COMPLETED | OUTPATIENT
Start: 2021-05-02 | End: 2021-05-02

## 2021-05-02 RX ADMIN — SODIUM CHLORIDE 80 ML: 9 INJECTION, SOLUTION INTRAVENOUS at 09:05

## 2021-05-02 RX ADMIN — IOPAMIDOL 80 ML: 755 INJECTION, SOLUTION INTRAVENOUS at 09:05

## 2021-05-03 ENCOUNTER — OFFICE VISIT (OUTPATIENT)
Dept: INTERNAL MEDICINE | Facility: CLINIC | Age: 36
End: 2021-05-03
Payer: COMMERCIAL

## 2021-05-03 ENCOUNTER — TRANSFERRED RECORDS (OUTPATIENT)
Dept: HEALTH INFORMATION MANAGEMENT | Facility: CLINIC | Age: 36
End: 2021-05-03

## 2021-05-03 VITALS
HEIGHT: 62 IN | HEART RATE: 72 BPM | OXYGEN SATURATION: 99 % | SYSTOLIC BLOOD PRESSURE: 121 MMHG | WEIGHT: 143 LBS | TEMPERATURE: 98.6 F | BODY MASS INDEX: 26.31 KG/M2 | DIASTOLIC BLOOD PRESSURE: 80 MMHG | RESPIRATION RATE: 20 BRPM

## 2021-05-03 DIAGNOSIS — F41.9 ANXIETY: ICD-10-CM

## 2021-05-03 DIAGNOSIS — R10.9 FLANK PAIN: ICD-10-CM

## 2021-05-03 DIAGNOSIS — K21.00 GASTROESOPHAGEAL REFLUX DISEASE WITH ESOPHAGITIS WITHOUT HEMORRHAGE: Primary | ICD-10-CM

## 2021-05-03 DIAGNOSIS — Z13.220 SCREENING CHOLESTEROL LEVEL: ICD-10-CM

## 2021-05-03 DIAGNOSIS — M62.838 MUSCLE SPASM: ICD-10-CM

## 2021-05-03 LAB
ALBUMIN UR-MCNC: NEGATIVE MG/DL
APPEARANCE UR: CLEAR
BACTERIA #/AREA URNS HPF: ABNORMAL /HPF
BILIRUB UR QL STRIP: NEGATIVE
CHOLEST SERPL-MCNC: 203 MG/DL
COLOR UR AUTO: YELLOW
GLUCOSE UR STRIP-MCNC: NEGATIVE MG/DL
HDLC SERPL-MCNC: 47 MG/DL
HGB UR QL STRIP: ABNORMAL
KETONES UR STRIP-MCNC: NEGATIVE MG/DL
LDLC SERPL CALC-MCNC: 126 MG/DL
LEUKOCYTE ESTERASE UR QL STRIP: NEGATIVE
NITRATE UR QL: NEGATIVE
NON-SQ EPI CELLS #/AREA URNS LPF: ABNORMAL /LPF
NONHDLC SERPL-MCNC: 156 MG/DL
PH UR STRIP: 7 PH (ref 5–7)
RBC #/AREA URNS AUTO: ABNORMAL /HPF
SOURCE: ABNORMAL
SP GR UR STRIP: 1.01 (ref 1–1.03)
THYROPEROXIDASE AB SERPL-ACNC: <10 IU/ML
TRIGL SERPL-MCNC: 150 MG/DL
UROBILINOGEN UR STRIP-ACNC: 0.2 EU/DL (ref 0.2–1)
WBC #/AREA URNS AUTO: ABNORMAL /HPF

## 2021-05-03 PROCEDURE — 99214 OFFICE O/P EST MOD 30 MIN: CPT | Performed by: INTERNAL MEDICINE

## 2021-05-03 PROCEDURE — 36415 COLL VENOUS BLD VENIPUNCTURE: CPT | Performed by: INTERNAL MEDICINE

## 2021-05-03 PROCEDURE — 80061 LIPID PANEL: CPT | Performed by: INTERNAL MEDICINE

## 2021-05-03 PROCEDURE — 81001 URINALYSIS AUTO W/SCOPE: CPT | Performed by: INTERNAL MEDICINE

## 2021-05-03 RX ORDER — PANTOPRAZOLE SODIUM 40 MG/1
80 TABLET, DELAYED RELEASE ORAL DAILY
Qty: 60 TABLET | Refills: 1 | Status: SHIPPED | OUTPATIENT
Start: 2021-05-03 | End: 2021-07-13

## 2021-05-03 RX ORDER — SUCRALFATE 1 G/1
1 TABLET ORAL 4 TIMES DAILY
COMMUNITY
Start: 2021-05-03 | End: 2022-03-29

## 2021-05-03 RX ORDER — PREDNISONE 10 MG/1
TABLET ORAL
COMMUNITY
Start: 2021-05-03 | End: 2021-05-11 | Stop reason: ALTCHOICE

## 2021-05-03 RX ORDER — CYCLOBENZAPRINE HCL 10 MG
10 TABLET ORAL 3 TIMES DAILY PRN
Qty: 90 TABLET | Refills: 0 | Status: SHIPPED | OUTPATIENT
Start: 2021-05-03 | End: 2021-08-10

## 2021-05-03 ASSESSMENT — MIFFLIN-ST. JEOR: SCORE: 1286.89

## 2021-05-03 NOTE — PROGRESS NOTES
"    Assessment & Plan     Gastroesophageal reflux disease with esophagitis without hemorrhage  Will have her stop the steroid, avoid all caffiene, carbonation, ETOH and NSAIDs. Follow up in 2 weeks   - pantoprazole (PROTONIX) 40 MG EC tablet; Take 2 tablets (80 mg) by mouth daily    Anxiety  I think is playing a big role in her other symptoms    Muscle spasm  Upper back. Will try   - cyclobenzaprine (FLEXERIL) 10 MG tablet; Take 1 tablet (10 mg) by mouth 3 times daily as needed for muscle spasms    Flank pain  Will check   - UA with Microscopic reflex to Culture    Screening cholesterol level  Due for  - Lipid Profile (Chol, Trig, HDL, LDL calc)        {Provider  Link to [facility] Help Grid :183432}     BMI:   Estimated body mass index is 26.16 kg/m  as calculated from the following:    Height as of this encounter: 1.575 m (5' 2\").    Weight as of this encounter: 64.9 kg (143 lb).         No follow-ups on file.    Celina Riggs MD  [facility]    Svetlana Calhoun is a 35 year old who presents for the following health issues     HPI     Follow up Covid & reflux.    She has a long history of GERD that has been mild to moderate problem. A few days after getting her Covid vaccination (first shot) April 16 she developed severe epigastric burning that goes up her chest to her throat with burning behind her shoulders that starts 30 minutes after eating. She was seen in ER and work up was negative. She was started on Prilosec and then she started to get various pains in her left leg. intermittent left headache and right flank aching. Was seen in UC and again work up negative she was given protonix 40 mg bid, and a Medrol dose pack.     Now she is very anxious when the pain comes her vision gets blurred, she gets light headed and her hands tingle.     She does not drink ETOH. Has 1 carbonated beverage most days. Uses NSAIDs occasionally. No caffeine. She is a non-smoker. Energy is good. No nausea or " "vomiting. No stool changes. Denies any fever chills or night sweats.     She is worried about stomach cancer. She had a normal upper endoscopy 1 year ago.       Review of Systems   Constitutional, HEENT, cardiovascular, pulmonary, GI, , musculoskeletal, neuro, skin, endocrine and psych systems are negative, except as otherwise noted.      Objective    /80 (BP Location: Right arm, Patient Position: Chair, Cuff Size: Adult Large)   Pulse 72   Temp 98.6  F (37  C) (Oral)   Resp 20   Ht 1.575 m (5' 2\")   Wt 64.9 kg (143 lb)   LMP 04/20/2021 (Approximate)   SpO2 99%   BMI 26.16 kg/m    Body mass index is 26.16 kg/m .  Physical Exam   GENERAL: healthy, alert and no distress  NECK: no adenopathy, no asymmetry, masses, or scars and thyroid normal to palpation  RESP: lungs clear to auscultation - no rales, rhonchi or wheezes  CV: regular rate and rhythm, normal S1 S2, no S3 or S4, no murmur, click or rub, no peripheral edema and peripheral pulses strong  ABDOMEN: bowel sounds normal and soft with epigastric tenderness  MS: no gross musculoskeletal defects noted, no edema  PSYCH: mentation appears normal and anxious          "

## 2021-05-11 ENCOUNTER — OFFICE VISIT (OUTPATIENT)
Dept: INTERNAL MEDICINE | Facility: CLINIC | Age: 36
End: 2021-05-11
Payer: COMMERCIAL

## 2021-05-11 VITALS
HEART RATE: 86 BPM | WEIGHT: 147 LBS | DIASTOLIC BLOOD PRESSURE: 74 MMHG | TEMPERATURE: 99.1 F | HEIGHT: 62 IN | BODY MASS INDEX: 27.05 KG/M2 | OXYGEN SATURATION: 100 % | SYSTOLIC BLOOD PRESSURE: 108 MMHG | RESPIRATION RATE: 20 BRPM

## 2021-05-11 VITALS — BODY MASS INDEX: 27.05 KG/M2 | HEIGHT: 62 IN | WEIGHT: 147 LBS

## 2021-05-11 DIAGNOSIS — K21.00 GASTROESOPHAGEAL REFLUX DISEASE WITH ESOPHAGITIS WITHOUT HEMORRHAGE: ICD-10-CM

## 2021-05-11 DIAGNOSIS — R10.13 DYSPEPSIA: ICD-10-CM

## 2021-05-11 DIAGNOSIS — R10.13 DYSPEPSIA: Primary | ICD-10-CM

## 2021-05-11 DIAGNOSIS — R20.0 NUMBNESS: ICD-10-CM

## 2021-05-11 PROCEDURE — 87338 HPYLORI STOOL AG IA: CPT | Performed by: INTERNAL MEDICINE

## 2021-05-11 PROCEDURE — 99214 OFFICE O/P EST MOD 30 MIN: CPT | Performed by: INTERNAL MEDICINE

## 2021-05-11 RX ORDER — MULTIVITAMIN
1 TABLET ORAL DAILY
COMMUNITY
Start: 2021-05-11 | End: 2023-11-30

## 2021-05-11 ASSESSMENT — MIFFLIN-ST. JEOR
SCORE: 1305.04
SCORE: 1305.04
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
## 2021-05-11 NOTE — PATIENT INSTRUCTIONS
Your BMI is Body mass index is 26.89 kg/m .  Weight management is a personal decision.  If you are interested in exploring weight loss strategies, the following discussion covers the approaches that may be successful. Body mass index (BMI) is one way to tell whether you are at a healthy weight, overweight, or obese. It measures your weight in relation to your height.  A BMI of 18.5 to 24.9 is in the healthy range. A person with a BMI of 25 to 29.9 is considered overweight, and someone with a BMI of 30 or greater is considered obese. More than two-thirds of American adults are considered overweight or obese.  Being overweight or obese increases the risk for further weight gain. Excess weight may lead to heart disease and diabetes.  Creating and following plans for healthy eating and physical activity may help you improve your health.  Weight control is part of healthy lifestyle and includes exercise, emotional health, and healthy eating habits. Careful eating habits lifelong are the mainstay of weight control. Though there are significant health benefits from weight loss, long-term weight loss with diet alone may be very difficult to achieve- studies show long-term success with dietary management in less than 10% of people. Attaining a healthy weight may be especially difficult to achieve in those with severe obesity. In some cases, medications, devices and surgical management might be considered.  What can you do?  If you are overweight or obese and are interested in methods for weight loss, you should discuss this with your provider.     Consider reducing daily calorie intake by 500 calories.     Keep a food journal.     Avoiding skipping meals, consider cutting portions instead.    Diet combined with exercise helps maintain muscle while optimizing fat loss. Strength training is particularly important for building and maintaining muscle mass. Exercise helps reduce stress, increase energy, and improves fitness.  "Increasing exercise without diet control, however, may not burn enough calories to loose weight.       Start walking three days a week 10-20 minutes at a time    Work towards walking thirty minutes five days a week     Eventually, increase the speed of your walking for 1-2 minutes at time    In addition, we recommend that you review healthy lifestyles and methods for weight loss available through the National Institutes of Health patient information sites:  http://win.niddk.nih.gov/publications/index.htm    And look into health and wellness programs that may be available through your health insurance provider, employer, local community center, or isaías club.    Weight management plan: Patient was referred to their PCP to discuss a diet and exercise plan.    MY TREATMENT INFORMATION FOR SLEEP APNEA-  [facility]    DOCTOR : Karen Mullins MD    Am I having a sleep study at a sleep center?  --->Due to insurance clearance delays, you will be contacted within 2-4 weeks to schedule    Am I having a home sleep study?  --->Watch the video for the device you are using:    -/drop off device-   https://www.[employer].com/watch?v=yGGFBdELGhk  -Disposable device sent out require phone/computer application-   https://www.[employer].com/watch?v=BCce_vbiwxE      Frequently asked questions:  1. What is Obstructive Sleep Apnea (LAMAR)? LAMAR is the most common type of sleep apnea. Apnea means, \"without breath.\"  Apnea is most often caused by narrowing or collapse of the upper airway as muscles relax during sleep.   Almost everyone has occasional apneas. Most people with sleep apnea have had brief interruptions at night frequently for many years.  The severity of sleep apnea is related to how frequent and severe the events are.   2. What are the consequences of LAMAR? Symptoms include: feeling sleepy during the day, snoring loudly, gasping or stopping of breathing, trouble sleeping, and occasionally morning " headaches or heartburn at night.  Sleepiness can be serious and even increase the risk of falling asleep while driving. Other health consequences may include development of high blood pressure and other cardiovascular disease in persons who are susceptible. Untreated LAMAR  can contribute to heart disease, stroke and diabetes.   3. What are the treatment options? In most situations, sleep apnea is a lifelong disease that must be managed with daily therapy. Medications are not effective for sleep apnea and surgery is generally not considered until other therapies have been tried. Your treatment is your choice . Continuous Positive Airway (CPAP) works right away and is the therapy that is effective in nearly everyone. An oral device to hold your jaw forward is usually the next most reliable option. Other options include postioning devices (to keep you off your back), weight loss, and surgery including a tongue pacing device. There is more detail about some of these options below.  4. Are my sleep studies covered by insurance? Although we will request verification of coverage, we advise you also check in advance of the study to ensure there is coverage.    Important tips for those choosing CPAP and similar devices   Know your equipment:  CPAP is continuous positive airway pressure that prevents obstructive sleep apnea by keeping the throat from collapsing while you are sleeping. In most cases, the device is  smart  and can slowly self-adjusts if your throat collapses and keeps a record every day of how well you are treated-this information is available to you and your care team.  BPAP is bilevel positive airway pressure that keeps your throat open and also assists each breath with a pressure boost to maintain adequate breathing.  Special kinds of BPAP are used in patients who have inadequate breathing from lung or heart disease. In most cases, the device is  smart  and can slowly self-adjusts to assist breathing. Like  CPAP, the device keeps a record of how well you are treated.  Your mask is your connection to the device. You get to choose what feels most comfortable and the staff will help to make sure if fits. Here: are some examples of the different masks that are available:       Key points to remember on your journey with sleep apnea:  1. Sleep study.  PAP devices often need to be adjusted during a sleep study to show that they are effective and adjusted right.  2. Good tips to remember: Try wearing just the mask during a quiet time during the day so your body adapts to wearing it. A humidifier is recommended for comfort in most cases to prevent drying of your nose and throat. Allergy medication from your provider may help you if you are having nasal congestion.  3. Getting settled-in. It takes more than one night for most of us to get used to wearing a mask. Try wearing just the mask during a quiet time during the day so your body adapts to wearing it. A humidifier is recommended for comfort in most cases. Our team will work with you carefully on the first day and will be in contact within 4 days and again at 2 and 4 weeks for advice and remote device adjustments. Your therapy is evaluated by the device each day.   4. Use it every night. The more you are able to sleep naturally for 7-8 hours, the more likely you will have good sleep and to prevent health risks or symptoms from sleep apnea. Even if you use it 4 hours it helps. Occasionally all of us are unable to use a medical therapy, in sleep apnea, it is not dangerous to miss one night.   5. Communicate. Call our skilled team on the number provided on the first day if your visit for problems that make it difficult to wear the device. Over 2 out of 3 patients can learn to wear the device long-term with help from our team. Remember to call our team or your sleep providers if you are unable to wear the device as we may have other solutions for those who cannot adapt to mask  CPAP therapy. It is recommended that you sleep your sleep provider within the first 3 months and yearly after that if you are not having problems.   6. Use it for your health. We encourage use of CPAP masks during daytime quiet periods to allow your face and brain to adapt to the sensation of CPAP so that it will be a more natural sensation to awaken to at night or during naps. This can be very useful during the first few weeks or months of adapting to CPAP though it does not help medically to wear CPAP during wakefulness and  should not be used as a strategy just to meet guidelines.  7. Take care of your equipment. Make sure you clean your mask and tubing using directions every day and that your filter and mask are replaced as recommended or if they are not working.     BESIDES CPAP, WHAT OTHER THERAPIES ARE THERE?    Positioning Device  Positioning devices are generally used when sleep apnea is mild and only occurs on your back.This example shows a pillow that straps around the waist. It may be appropriate for those whose sleep study shows milder sleep apnea that occurs primarily when lying flat on one's back. Preliminary studies have shown benefit but effectiveness at home may need to be verified by a home sleep test. These devices are generally not covered by medical insurance.  Examples of devices that maintain sleeping on the back to prevent snoring and mild sleep apnea.    Belt type body positioner  http://[employer]/    Electronic reminder  http://nightshifttherapy.com/  http://www.[employer].au/      Oral Appliance  What is oral appliance therapy?  An oral appliance device fits on your teeth at night like a retainer used after having braces. The device is made by a specialized dentist and requires several visits over 1-2 months before a manufactured device is made to fit your teeth and is adjusted to prevent your sleep apnea. Once an oral device is working properly, snoring should be improved. A home  sleep test may be recommended at that time if to determine whether the sleep apnea is adequately treated.       Some things to remember:  -Oral devices are often, but not always, covered by your medical insurance. Be sure to check with your insurance provider.   -If you are referred for oral therapy, you will be given a list of specialized dentists to consider or you may choose to visit the Web site of the American Academy of Dental Sleep Medicine  -Oral devices are less likely to work if you have severe sleep apnea or are extremely overweight.     More detailed information  An oral appliance is a small acrylic device that fits over the upper and lower teeth  (similar to a retainer or a mouth guard). This device slightly moves jaw forward, which moves the base of the tongue forward, opens the airway, improves breathing for effective treat snoring and obstructive sleep apnea in perhaps 7 out of 10 people .  The best working devices are custom-made by a dental device  after a mold is made of the teeth 1, 2, 3.  When is an oral appliance indicated?  Oral appliance therapy is recommended as a first-line treatment for patients with primary snoring, mild sleep apnea, and for patients with moderate sleep apnea who prefer appliance therapy to use of CPAP4, 5. Severity of sleep apnea is determined by sleep testing and is based on the number of respiratory events per hour of sleep.   How successful is oral appliance therapy?  The success rate of oral appliance therapy in patients with mild sleep apnea is 75-80% while in patients with moderate sleep apnea it is 50-70%. The chance of success in patients with severe sleep apnea is 40-50%. The research also shows that oral appliances have a beneficial effect on the cardiovascular health of LAMAR patients at the same magnitude as CPAP therapy7.  Oral appliances should be a second-line treatment in cases of severe sleep apnea, but if not completely successful then a  combination therapy utilizing CPAP plus oral appliance therapy may be effective. Oral appliances tend to be effective in a broad range of patients although studies show that the patients who have the highest success are females, younger patients, those with milder disease, and less severe obesity. 3, 6.   Finding a dentist that practices dental sleep medicine  Specific training is available through the American Academy of Dental Sleep Medicine for dentists interested in working in the field of sleep. To find a dentist who is educated in the field of sleep and the use of oral appliances, near you, visit the Web site of the American Academy of Dental Sleep Medicine.    References  1. Tess et al. Objectively measured vs self-reported compliance during oral appliance therapy for sleep-disordered breathing. Chest 2013; 144(5): 5520-7914.  2. Maren et al. Objective measurement of compliance during oral appliance therapy for sleep-disordered breathing. Thorax 2013; 68(1): 91-96.  3. Joseph, et al. Mandibular advancement devices in 620 men and women with LAMAR and snoring: tolerability and predictors of treatment success. Chest 2004; 125: 1319-5055.  4. Maximiliano, et al. Oral appliances for snoring and LAMAR: a review. Sleep 2006; 29: 244-262.  5. Moisés, et al. Oral appliance treatment for LAMAR: an update. J Clin Sleep Med 2014; 10(2): 215-227.  6. Angelique et al. Predictors of OSAH treatment outcome. J Dent Res 2007; 86: 8051-9682.      Weight Loss:    Weight loss is a long-term strategy that may improve sleep apnea in some patients.    Weight management is a personal decision and the decision should be based on your interest and the potential benefits.  If you are interested in exploring weight loss strategies, the following discussion covers the impact on weight loss on sleep apnea and the approaches that may be successful.    Being overweight does not necessarily mean you will have health consequences.   Those who have BMI over 35 or over 27 with existing medical conditions carries greater risk.   Weight loss decreases severity of sleep apnea in most people with obesity. For those with mild obesity who have developed snoring with weight gain, even 15-30 pound weight loss can improve and occasionally eliminate sleep apnea.  Structured and life-long dietary and health habits are necessary to lose weight and keep healthier weight levels.     Though there may be significant health benefits from weight loss, long-term weight loss is very difficult to achieve- studies show success with dietary management in less than 10% of people. In addition, substantial weight loss may require years of dietary control and may be difficult if patients have severe obesity. In these cases, surgical management may be considered.  Finally, older individuals who have tolerated obesity without health complications may be less likely to benefit from weight loss strategies.        Your BMI is Body mass index is 26.89 kg/m .  Weight management is a personal decision.  If you are interested in exploring weight loss strategies, the following discussion covers the approaches that may be successful. Body mass index (BMI) is one way to tell whether you are at a healthy weight, overweight, or obese. It measures your weight in relation to your height.  A BMI of 18.5 to 24.9 is in the healthy range. A person with a BMI of 25 to 29.9 is considered overweight, and someone with a BMI of 30 or greater is considered obese. More than two-thirds of American adults are considered overweight or obese.  Being overweight or obese increases the risk for further weight gain. Excess weight may lead to heart disease and diabetes.  Creating and following plans for healthy eating and physical activity may help you improve your health.  Weight control is part of healthy lifestyle and includes exercise, emotional health, and healthy eating habits. Careful eating habits  lifelong are the mainstay of weight control. Though there are significant health benefits from weight loss, long-term weight loss with diet alone may be very difficult to achieve- studies show long-term success with dietary management in less than 10% of people. Attaining a healthy weight may be especially difficult to achieve in those with severe obesity. In some cases, medications, devices and surgical management might be considered.  What can you do?  If you are overweight or obese and are interested in methods for weight loss, you should discuss this with your provider.     Consider reducing daily calorie intake by 500 calories.     Keep a food journal.     Avoiding skipping meals, consider cutting portions instead.    Diet combined with exercise helps maintain muscle while optimizing fat loss. Strength training is particularly important for building and maintaining muscle mass. Exercise helps reduce stress, increase energy, and improves fitness. Increasing exercise without diet control, however, may not burn enough calories to loose weight.       Start walking three days a week 10-20 minutes at a time    Work towards walking thirty minutes five days a week     Eventually, increase the speed of your walking for 1-2 minutes at time    In addition, we recommend that you review healthy lifestyles and methods for weight loss available through the National Institutes of Health patient information sites:  http://win.niddk.nih.gov/publications/index.htm    And look into health and wellness programs that may be available through your health insurance provider, employer, local community center, or isaías club.       Surgery:    Surgery for obstructive sleep apnea is considered generally only when other therapies fail to work. Surgery may be discussed with you if you are having a difficult time tolerating CPAP and or when there is an abnormal structure that requires surgical correction.  Nose and throat surgeries often  enlarge the airway to prevent collapse.  Most of these surgeries create pain for 1-2 weeks and up to half of the most common surgeries are not effective throughout life.  You should carefully discuss the benefits and drawbacks to surgery with your sleep provider and surgeon to determine if it is the best solution for you.   More information  Surgery for LAMAR is directed at areas that are responsible for narrowing or complete obstruction of the airway during sleep.  There are a wide range of procedures available to enlarge and/or stabilize the airway to prevent blockage of breathing in the three major areas where it can occur: the palate, tongue, and nasal regions.  Successful surgical treatment depends on the accurate identification of the factors responsible for obstructive sleep apnea in each person.  A personalized approach is required because there is no single treatment that works well for everyone.  Because of anatomic variation, consultation with an examination by a sleep surgeon is a critical first step in determining what surgical options are best for each patient.  In some cases, examination during sedation may be recommended in order to guide the selection of procedures.  Patients will be counseled about risks and benefits as well as the typical recovery course after surgery. Surgery is typically not a cure for a person s LAMAR.  However, surgery will often significantly improve one s LAMAR severity (termed  success rate ).  Even in the absence of a cure, surgery will decrease the cardiovascular risk associated with OSA7; improve overall quality of life8 (sleepiness, functionality, sleep quality, etc).      Palate Procedures:  Patients with LAMAR often have narrowing of their airway in the region of their tonsils and uvula.  The goals of palate procedures are to widen the airway in this region as well as to help the tissues resist collapse.  Modern palate procedure techniques focus on tissue conservation and  soft tissue rearrangement, rather than tissue removal.  Often the uvula is preserved in this procedure. Residual sleep apnea is common in patient after pharyngoplasty with an average reduction in sleep apnea events of 33%2.      Tongue Procedures:  ExamWhile patients are awake, the muscles that surround the throat are active and keep this region open for breathing. These muscles relax during sleep, allowing the tongue and other structures to collapse and block breathing.  There are several different tongue procedures available.  Selection of a tongue base procedure depends on characteristics seen on physical exam.  Generally, procedures are aimed at removing bulky tissues in this area or preventing the back of the tongue from falling back during sleep.  Success rates for tongue surgery range from 50-62%3.    Hypoglossal Nerve Stimulation:  Hypoglossal nerve stimulation has recently received approval from the [place] Food and Drug Administration for the treatment of obstructive sleep apnea.  This is based on research showing that the system was safe and effective in treating sleep apnea6.  Results showed that the median AHI score decreased 68%, from 29.3 to 9.0. This therapy uses an implant system that senses breathing patterns and delivers mild stimulation to airway muscles, which keeps the airway open during sleep.  The system consists of three fully implanted components: a small generator (similar in size to a pacemaker), a breathing sensor, and a stimulation lead.  Using a small handheld remote, a patient turns the therapy on before bed and off upon awakening.    Candidates for this device must be greater than 22 years of age, have moderate to severe LAMAR (AHI between 20-65), BMI less than 32, have tried CPAP/oral appliance without success, and have appropriate upper airway anatomy (determined by a sleep endoscopy performed by Dr. Carrizales).    Hypoglossal Nerve Stimulation Pathway:    The sleep surgeon s office  will work with the patient through the insurance prior-authorization process (including communications and appeals).    Nasal Procedures:  Nasal obstruction can interfere with nasal breathing during the day and night.  Studies have shown that relief of nasal obstruction can improve the ability of some patients to tolerate positive airway pressure therapy for obstructive sleep apnea1.  Treatment options include medications such as nasal saline, topical corticosteroid and antihistamine sprays, and oral medications such as antihistamines or decongestants. Non-surgical treatments can include external nasal dilators for selected patients. If these are not successful by themselves, surgery can improve the nasal airway either alone or in combination with these other options.      Combination Procedures:  Combination of surgical procedures and other treatments may be recommended, particularly if patients have more than one area of narrowing or persistent positional disease.  The success rate of combination surgery ranges from 66-80%2,3.    References  1. Karrie MENDEZ. The Role of the Nose in Snoring and Obstructive Sleep Apnoea: An Update.  Eur Arch Otorhinolaryngol. 2011; 268: 1365-73.  2.  Geraldo SM; Justen JA; Jeremiah JR; Pallanch JF; Socrates MB; Stan SG; Kathy SIMENTAL. Surgical modifications of the upper airway for obstructive sleep apnea in adults: a systematic review and meta-analysis. SLEEP 2010;33(10):9021-3826. Delmer HARRY. Hypopharyngeal surgery in obstructive sleep apnea: an evidence-based medicine review.  Arch Otolaryngol Head Neck Surg. 2006 Feb;132(2):206-13.  3. Phil YH1, Aidee Y, Leodan LAUREN. The efficacy of anatomically based multilevel surgery for obstructive sleep apnea. Otolaryngol Head Neck Surg. 2003 Oct;129(4):327-35.  4. Delmer HARRY, Goldberg A. Hypopharyngeal Surgery in Obstructive Sleep Apnea: An Evidence-Based Medicine Review. Arch Otolaryngol Head Neck Surg. 2006 Feb;132(2):206-13.  5. Gerson SANTOS et al.  Upper-Airway Stimulation for Obstructive Sleep Apnea.  N Engl J Med. 2014 Jan 9;370(2):139-49.  6. Ishaan Y et al. Increased Incidence of Cardiovascular Disease in Middle-aged Men with Obstructive Sleep Apnea. Am J Respir Crit Care Med; 2002 166: 159-165  7. Aakash DU et al. Studying Life Effects and Effectiveness of Palatopharyngoplasty (SLEEP) study: Subjective Outcomes of Isolated Uvulopalatopharyngoplasty. Otolaryngol Head Neck Surg. 2011; 144: 623-631.    Drive Safe... Drive Alive     Sleep health profoundly affects your health, mood, and your safety.  Thirty three percent of the population (one in three of us) is not getting enough sleep and many have a sleep disorder. Not getting enough sleep or having an untreated / undertreated sleep condition may make us sleepy without even knowing it. In fact, our driving could be dramatically impaired due to our sleep health. As your provider, here are some things I would like you to know about driving:     Here are some warning signs for impairment and dangerous drowsy driving:              -Having been awake more than 16 hours               -Looking tired               -Eyelid drooping              -Head nodding (it could be too late at this point)              -Driving for more than 30 minutes     Some things you could do to make the driving safer if you are experiencing some drowsiness:              -Stop driving and rest              -Call for transportation              -Make sure your sleep disorder is adequately treated     Some things that have been shown NOT to work when experiencing drowsiness while driving:              -Turning on the radio              -Opening windows              -Eating any  distracting  /  entertaining  foods (e.g., sunflower seeds, candy, or any other)              -Talking on the phone      Your decision may not only impact your life, but also the life of others. Please, remember to drive safe for yourself and all of us.

## 2021-05-11 NOTE — PROGRESS NOTES
"    Assessment & Plan     Dyspepsia  She will continue the PPI and sucralfate (although with PPI she could test negative for H P). If she teste negative for H pylori will need to send her to GI for upper endoscopy   - Helicobacter pylori Antigen Stool; Future    Numbness  I think it is an anxiety reaction but will refer her to Neurology for an opinion.   - NEUROLOGY ADULT REFERRAL         BMI:   Estimated body mass index is 26.89 kg/m  as calculated from the following:    Height as of this encounter: 1.575 m (5' 2\").    Weight as of this encounter: 66.7 kg (147 lb).         Return in about 4 weeks (around 6/8/2021).    Celina Riggs MD  [facility] is a 35 year old who presents for the following health issues     HPI     Follow up GI issues/muscle issues.    Please see my note from last week. The Flexeril helps with the numbness in her legs. And she clearly can see the numbness gets worse when she is having her abdominal pain. She feels the best when she takes the PPI bid and the sucralfate every 4-6 hours. Her pain starts in the epigastrium comes up into her chest and radiates into her upper back. She feels hungry all the time.   Wt is stable. Denies any fever or night sweats. But she feels cold all the time. Her daughter tested positive for H pylori. She she has been in [place] in recent months before the symptoms started.       Review of Systems   Constitutional, HEENT, cardiovascular, pulmonary, GI, , musculoskeletal, neuro, skin, endocrine and psych systems are negative, except as otherwise noted.      Objective    /74 (BP Location: Right arm, Patient Position: Chair, Cuff Size: Adult Large)   Pulse 86   Temp 99.1  F (37.3  C)   Resp 20   Ht 1.575 m (5' 2\")   Wt 66.7 kg (147 lb)   LMP 04/20/2021 (Approximate)   SpO2 100%   Breastfeeding No   BMI 26.89 kg/m    Body mass index is 26.89 kg/m .  Physical Exam   GENERAL: healthy, alert and no " distress  RESP: lungs clear to auscultation - no rales, rhonchi or wheezes  CV: regular rate and rhythm, normal S1 S2, no S3 or S4, no murmur, click or rub, no peripheral edema and peripheral pulses strong  ABDOMEN: soft, nontender, no hepatosplenomegaly, no masses and bowel sounds normal

## 2021-05-12 ENCOUNTER — TELEPHONE (OUTPATIENT)
Dept: INTERNAL MEDICINE | Facility: CLINIC | Age: 36
End: 2021-05-12

## 2021-05-12 ENCOUNTER — HOSPITAL ENCOUNTER (OUTPATIENT)
Facility: CLINIC | Age: 36
End: 2021-05-12
Attending: INTERNAL MEDICINE | Admitting: INTERNAL MEDICINE
Payer: COMMERCIAL

## 2021-05-12 ENCOUNTER — VIRTUAL VISIT (OUTPATIENT)
Dept: SLEEP MEDICINE | Facility: CLINIC | Age: 36
End: 2021-05-12
Payer: COMMERCIAL

## 2021-05-12 DIAGNOSIS — F41.9 ANXIETY: Primary | ICD-10-CM

## 2021-05-12 DIAGNOSIS — G47.9 SLEEP DISTURBANCE: Primary | ICD-10-CM

## 2021-05-12 LAB — H PYLORI AG STL QL IA: NEGATIVE

## 2021-05-12 PROCEDURE — 99204 OFFICE O/P NEW MOD 45 MIN: CPT | Mod: 95 | Performed by: INTERNAL MEDICINE

## 2021-05-12 RX ORDER — SERTRALINE HYDROCHLORIDE 25 MG/1
25 TABLET, FILM COATED ORAL DAILY
Qty: 30 TABLET | Refills: 1 | Status: SHIPPED | OUTPATIENT
Start: 2021-05-12 | End: 2021-08-10

## 2021-05-12 NOTE — TELEPHONE ENCOUNTER
Pt calling regarding anxiety she thinks she may be having. She stated that she recently saw Dr. Riggs for some issues and it was discussed that anxiety could be the issue. Pt is wondering if there is anything that she can take when symptoms start? She will be going out of town on Friday and would like this addressed before then. Prescription can be sent to pharmacy attached. Pt can be reached at [phone]. Please advise. Thanks.

## 2021-05-12 NOTE — TELEPHONE ENCOUNTER
I would start low dose zoloft once a day (every day) for a while. She should see me back in 1 month. She should stay on it till then. If she has side effects or is not helping she should let us know.

## 2021-05-13 ENCOUNTER — TELEPHONE (OUTPATIENT)
Dept: SLEEP MEDICINE | Facility: CLINIC | Age: 36
End: 2021-05-13

## 2021-05-13 NOTE — TELEPHONE ENCOUNTER
----- Message from Vesta Apodaca CMA sent at 5/13/2021  7:36 AM CDT -----  Regarding: SCHEDULE PSG, RETURN VISIT DR. LINARES  INSURANCE: [facility]

## 2021-05-13 NOTE — TELEPHONE ENCOUNTER
Attempted to reach Unique today to schedule in lab sleep study, and return visit with Dr. Newell for test results. No answer. [facility]      Sita CASTELLANOS CMA, [facility], 5/13/2021 2:08 PM

## 2021-05-14 ENCOUNTER — TELEPHONE (OUTPATIENT)
Dept: INTERNAL MEDICINE | Facility: CLINIC | Age: 36
End: 2021-05-14

## 2021-05-14 DIAGNOSIS — K62.5 RECTAL BLEEDING: Primary | ICD-10-CM

## 2021-05-14 NOTE — TELEPHONE ENCOUNTER
Patient is calling to ask about her omeprazole 40mg. She used to take one pill in the am and one pill in the pm. Is it more effective if she takes them both at the same time ?    Also she is scheduled for a upper endoscopy but she thought she was supposed to be doing a colonoscopy at the same time. Is this how the order/referral was placed? She notes she has had blood in her stools.

## 2021-05-14 NOTE — TELEPHONE ENCOUNTER
Routed to provider to review.   GI referral was only written for upper endoscopy, no mention of colonoscopy in last visit notes.

## 2021-05-17 DIAGNOSIS — Z11.59 ENCOUNTER FOR SCREENING FOR OTHER VIRAL DISEASES: ICD-10-CM

## 2021-05-19 NOTE — TELEPHONE ENCOUNTER
Unique is scheduled for PSG 6/30/21.  Virtual visit with Dr. Newell is scheduled 7/14/21.     () sleep study packet mailed 5/19 ([facility])    Sita CASTELLANOS CMA, [facility], 5/19/2021 12:59 PM

## 2021-05-24 DIAGNOSIS — Z11.59 ENCOUNTER FOR SCREENING FOR OTHER VIRAL DISEASES: ICD-10-CM

## 2021-05-24 DIAGNOSIS — Z11.59 ENCOUNTER FOR SCREENING FOR OTHER VIRAL DISEASES: Primary | ICD-10-CM

## 2021-05-24 LAB
LABORATORY COMMENT REPORT: NORMAL
SARS-COV-2 RNA RESP QL NAA+PROBE: NEGATIVE
SARS-COV-2 RNA RESP QL NAA+PROBE: NORMAL
SPECIMEN SOURCE: NORMAL
SPECIMEN SOURCE: NORMAL

## 2021-05-24 PROCEDURE — U0003 INFECTIOUS AGENT DETECTION BY NUCLEIC ACID (DNA OR RNA); SEVERE ACUTE RESPIRATORY SYNDROME CORONAVIRUS 2 (SARS-COV-2) (CORONAVIRUS DISEASE [COVID-19]), AMPLIFIED PROBE TECHNIQUE, MAKING USE OF HIGH THROUGHPUT TECHNOLOGIES AS DESCRIBED BY CMS-2020-01-R: HCPCS | Performed by: SPECIALIST

## 2021-05-24 PROCEDURE — U0005 INFEC AGEN DETEC AMPLI PROBE: HCPCS | Performed by: SPECIALIST

## 2021-05-25 ENCOUNTER — TRANSFERRED RECORDS (OUTPATIENT)
Dept: HEALTH INFORMATION MANAGEMENT | Facility: CLINIC | Age: 36
End: 2021-05-25

## 2021-05-26 ENCOUNTER — HOSPITAL ENCOUNTER (OUTPATIENT)
Facility: CLINIC | Age: 36
Discharge: HOME | End: 2021-05-26
Attending: INTERNAL MEDICINE | Admitting: INTERNAL MEDICINE
Payer: COMMERCIAL

## 2021-05-26 VITALS
WEIGHT: 139 LBS | BODY MASS INDEX: 25.58 KG/M2 | RESPIRATION RATE: 12 BRPM | HEIGHT: 62 IN | HEART RATE: 72 BPM | DIASTOLIC BLOOD PRESSURE: 71 MMHG | SYSTOLIC BLOOD PRESSURE: 106 MMHG | OXYGEN SATURATION: 98 %

## 2021-05-26 LAB — UPPER GI ENDOSCOPY: NORMAL

## 2021-05-26 PROCEDURE — 250N000009 HC RX 250: Performed by: INTERNAL MEDICINE

## 2021-05-26 PROCEDURE — 250N000011 HC RX IP 250 OP 636: Performed by: INTERNAL MEDICINE

## 2021-05-26 PROCEDURE — 88342 IMHCHEM/IMCYTCHM 1ST ANTB: CPT | Mod: 26 | Performed by: PATHOLOGY

## 2021-05-26 PROCEDURE — 88305 TISSUE EXAM BY PATHOLOGIST: CPT | Mod: 26 | Performed by: PATHOLOGY

## 2021-05-26 PROCEDURE — 999N000099 HC STATISTIC MODERATE SEDATION < 10 MIN: Performed by: INTERNAL MEDICINE

## 2021-05-26 PROCEDURE — 88305 TISSUE EXAM BY PATHOLOGIST: CPT | Mod: TC | Performed by: INTERNAL MEDICINE

## 2021-05-26 PROCEDURE — 88342 IMHCHEM/IMCYTCHM 1ST ANTB: CPT | Mod: TC | Performed by: INTERNAL MEDICINE

## 2021-05-26 PROCEDURE — 43239 EGD BIOPSY SINGLE/MULTIPLE: CPT | Performed by: INTERNAL MEDICINE

## 2021-05-26 RX ORDER — NALOXONE HYDROCHLORIDE 0.4 MG/ML
0.2 INJECTION, SOLUTION INTRAMUSCULAR; INTRAVENOUS; SUBCUTANEOUS
Status: DISCONTINUED | OUTPATIENT
Start: 2021-05-26 | End: 2021-05-26 | Stop reason: HOSPADM

## 2021-05-26 RX ORDER — ONDANSETRON 4 MG/1
4 TABLET, ORALLY DISINTEGRATING ORAL EVERY 6 HOURS PRN
Status: DISCONTINUED | OUTPATIENT
Start: 2021-05-26 | End: 2021-05-26 | Stop reason: HOSPADM

## 2021-05-26 RX ORDER — FLUMAZENIL 0.1 MG/ML
0.2 INJECTION, SOLUTION INTRAVENOUS
Status: DISCONTINUED | OUTPATIENT
Start: 2021-05-26 | End: 2021-05-26 | Stop reason: HOSPADM

## 2021-05-26 RX ORDER — ONDANSETRON 2 MG/ML
4 INJECTION INTRAMUSCULAR; INTRAVENOUS EVERY 6 HOURS PRN
Status: DISCONTINUED | OUTPATIENT
Start: 2021-05-26 | End: 2021-05-26 | Stop reason: HOSPADM

## 2021-05-26 RX ORDER — LIDOCAINE 40 MG/G
CREAM TOPICAL
Status: DISCONTINUED | OUTPATIENT
Start: 2021-05-26 | End: 2021-05-26 | Stop reason: HOSPADM

## 2021-05-26 RX ORDER — FENTANYL CITRATE 50 UG/ML
INJECTION, SOLUTION INTRAMUSCULAR; INTRAVENOUS PRN
Status: COMPLETED | OUTPATIENT
Start: 2021-05-26 | End: 2021-05-26

## 2021-05-26 RX ORDER — NALOXONE HYDROCHLORIDE 0.4 MG/ML
0.4 INJECTION, SOLUTION INTRAMUSCULAR; INTRAVENOUS; SUBCUTANEOUS
Status: DISCONTINUED | OUTPATIENT
Start: 2021-05-26 | End: 2021-05-26 | Stop reason: HOSPADM

## 2021-05-26 RX ORDER — ONDANSETRON 2 MG/ML
4 INJECTION INTRAMUSCULAR; INTRAVENOUS
Status: DISCONTINUED | OUTPATIENT
Start: 2021-05-26 | End: 2021-05-26 | Stop reason: HOSPADM

## 2021-05-26 RX ORDER — PROCHLORPERAZINE MALEATE 10 MG
10 TABLET ORAL EVERY 6 HOURS PRN
Status: DISCONTINUED | OUTPATIENT
Start: 2021-05-26 | End: 2021-05-26 | Stop reason: HOSPADM

## 2021-05-26 RX ADMIN — TOPICAL ANESTHETIC 1 SPRAY: 200 SPRAY DENTAL; PERIODONTAL at 09:44

## 2021-05-26 RX ADMIN — MIDAZOLAM 1 MG: 1 INJECTION INTRAMUSCULAR; INTRAVENOUS at 09:41

## 2021-05-26 RX ADMIN — FENTANYL CITRATE 50 MCG: 50 INJECTION, SOLUTION INTRAMUSCULAR; INTRAVENOUS at 09:41

## 2021-05-26 ASSESSMENT — MIFFLIN-ST. JEOR: SCORE: 1263.75

## 2021-05-26 NOTE — LETTER
May 24, 2021      Unique Baker  [address] [address] [facility] [address]        Dear Unique,     Thank you for choosing [facility]. You are scheduled for the following service(s).   Please be aware that coverage of these services is subject to the terms and limitations of your health insurance plan.  Call member services at your health plan with any benefit or coverage questions.    Date:   5/26/2021      Procedure: UPPER ENDOSCOPY-EGD  Doctor:            Arrival Time:  09:00 am   *Enter and check in at the Main Hospital Entrance  Procedure Time: 09:30 am       Location:   [facility]        Endoscopy Department, First Floor *         [address] [place], [place] [id number] [phone] or [phone] (Wei) to reschedule            PRE-PROCEDURE CHECKLIST    If you have diabetes, ask your regular doctor for diet and medication restrictions.  If you take any antiplatelet or anticoagulant medications (such as Coumadin, Lovenox, Plavix, etc.) and have not already discussed this, please call your primary physician for advice on holding this medication.  If you take Aspirin, you may continue to do so.  If you are or may be pregnant, please discuss the risks and benefits of this procedure with your doctor.  You must arrange for a ride for the day of your exam. If you fail to arrange transportation with a responsible adult, your procedure will need to be cancelled and rescheduled. Taxi, bus and medical transport are not acceptable unless you have a responsible adult that you know & trust with you. Please arrange for this  to be able to pick you up in our department, approximately one hour after your scheduled procedure, if they are not able to stay with you.      Canceling or rescheduling   If you must cancel or reschedule your appointment, please call [phone] as soon as possible.      Upper Endoscopy or Esophagogastroduodenoscopy (EGD)  is a test performed to evaluate symptoms of persistent abdominal pain, nausea, vomiting and difficulty swallowing. It may also be used to treat various conditions of the upper gastrointestinal tract, such as bleeding, narrowing or abnormal growths.     What happens during an upper endoscopy?  On the day of your procedure, plan to spend up to one and a half hour after your arrival at the endoscopy center. The exam itself takes about 5 to 10 minutes.    Before the exam:  - You will change into a gown.   - Your medical history and medication list will be reviewed with you, unless it has already been done over the phone.   - A nurse will insert an intravenous (IV) line into your hand or arm.  - The doctor will talk to you and give you a consent form to sign.    During the exam:  - Medicine will be given through the IV line to help you relax and feel comfortable.   - Your heart rate and oxygen levels will be monitored. If your blood pressure is low, you may be given fluids through the IV line.   - The doctor will insert a flexible, hollow tube, called an endoscope, into your mouth and will advance it slowly through the esophagus, stomach and duodenum (the first part of your small intestine).   - You may have a feeling of pressure or fullness.   - If you have difficulty swallowing, and the doctor finds a narrowing in your esophagus, it may be possible for the area to be expanded-dilated during the exam.   - If abnormal tissue is found, the doctor may remove it through the endoscope (biopsy it) for closer examination. The tissue removal is painless.    After the exam:  - Any tissue samples removed during the exam will be sent to a lab for evaluation. It may take 5 to 7 working days for you to be notified of the results  - The doctor will prepare a full report for the physician who referred you for the upper endoscopy.   - The doctor will talk with you about the initial results of your exam.   - You may feel bloated after  the procedure. That is normal and should not last long.   - Your throat may feel sore for a short time.   - Following the exam, you may resume your normal diet. Avoid alcohol until the next day.   - You may resume your regular activities the day after the procedure.   - Medication given during the exam will prohibit you from driving for the rest of the day.  - A nurse will provide you with complete discharge instructions before you leave the endoscopy center. Be sure to ask the nurse for specific instructions if you take blood thinners such as Aspirin , Coumadin , Lovenox , Plavix , etc.       PREPARATION    To ensure a successful exam, please follow all instructions carefully.      The night before your exam:    STOP eating solid foods at 11:45 pm.     Clear liquids are okay to drink (examples: Gatorade , apple juice, clear broth,coffee or tea without milk or cream, etc.).     DO NOT drink red liquids or alcoholic beverages.    The day of your exam:    STOP drinking clear liquids 4 hours before your exam.     You may take your usual medications with 4 oz. of water, but it needs to be at least 4 hours prior to your procedure.    When you leave for the procedure:    Bring a list of all of your current medications, including any allergy or over-the-counter medications, unless you have already reviewed that with an Endoscopy RN over the phone.     Bring a photo ID as well as up-to-date insurance information, such as your insurance card and any referral forms that might be required by your payer.       DIRECTIONS TO THE ENDOSCOPY DEPARTMENT    From the north ([facility])  Take [address] South, exit on [address] . Get into the left hand brianna, turn left (east), go one-half mile to [address] and turn left. Go north to the second stoplight, take a right on [address] and follow it to the Main Hospital entrance.  From the south ([facility])  Take [address]N to the [address]E split and exit  on [address]. On [address] , turn left (west) to [address]. Turn right (north) on [address]. Go north to the second stoplight, take a right on [address] and follow it to the Main Hospital entrance.  From the east via 35E ([facility])  Take [address] south to [place] [address] exit. Turn right on [address]. Go west to [address]. Turn right (north) on [address]. Go to the second stoplight, take a right on [address] to the Main Hospital entrance.  From the east via [address] ([facility])  Take [address] to [address]. Turn left (south) on [address] to [address], turn left (east) on [address] and follow it to the Main Hospital entrance.    From the west via [address] ([place], [place])  Take [address] east to [address]. Turn right (south) on [address] to [address], turn left (east) on [address] and follow it to the Main Hospital entrance.

## 2021-05-26 NOTE — H&P
Pre-Endoscopy History and Physical     Unique Baker MRN# [id number]   YOB: 1985 Age: 36 year old     Date of Procedure: 2021  Primary care provider: Celina Riggs  Type of Endoscopy: Gastroscopy with possible biopsy, possible dilation  Reason for Procedure: reflux  Type of Anesthesia Anticipated: Conscious Sedation    HPI:    Unique is a 36 year old female who will be undergoing the above procedure.      A history and physical has been performed. The patient's medications and allergies have been reviewed. The risks and benefits of the procedure and the sedation options and risks were discussed with the patient.  All questions were answered and informed consent was obtained.      She denies a personal or family history of anesthesia complications or bleeding disorders.     Patient Active Problem List   Diagnosis     Esophageal reflux     CARDIOVASCULAR SCREENING; LDL GOAL LESS THAN 160     Irritable bowel syndrome     Controlled substance agreement signed- checked 2020 - no concerns        Past Medical History:   Diagnosis Date     Diabetes (H)     GDM insulin     GERD (gastroesophageal reflux disease)     w/u otherwise neg      History of colposcopy with cervical biopsy 3/23/07    WNL     Papanicolaou smear of cervix with low grade squamous intraepithelial lesion (LGSIL) 07     PONV (postoperative nausea and vomiting)      S/P  10/13/2017        Past Surgical History:   Procedure Laterality Date     BREAST SURGERY      augmentation       SECTION N/A 2015    Procedure:  SECTION;  Surgeon: Tremaine Gaona MD;  Location:  OR      SECTION, TUBAL LIGATION, COMBINED N/A 10/13/2017    Procedure: COMBINED  SECTION, TUBAL LIGATION;  Repeat  SECTION, TUBAL LIGATION ;  Surgeon: Sidra Salamanca DO;  Location:  OR     COLONOSCOPY N/A 2016    Procedure: COLONOSCOPY;  Surgeon: Lyudmila Pastor MD;  Location:  GI      DILATION AND CURETTAGE SUCTION  2010    elective termination     ESOPHAGOSCOPY, GASTROSCOPY, DUODENOSCOPY (EGD), COMBINED  4/8/2014    Procedure: COMBINED ESOPHAGOSCOPY, GASTROSCOPY, DUODENOSCOPY (EGD);   ESOPHAGOSCOPY, GASTROSCOPY, DUODENOSCOPY (EGD) ;  Surgeon: Vishnu Lanier MD;  Location:  GI     ESOPHAGOSCOPY, GASTROSCOPY, DUODENOSCOPY (EGD), COMBINED Left 9/16/2020    Procedure: ESOPHAGOGASTRODUODENOSCOPY, WITH BIOPSIES USING BIOPSY FORCEPS;  Surgeon: Vishnu Hopkins MD;  Location:  GI     GYN SURGERY  2001     c section      ZZC NONSPECIFIC PROCEDURE  09/27/01    Primary [facility]       Social History     Tobacco Use     Smoking status: Former Smoker     Years: 7.00     Types: Cigarettes     Smokeless tobacco: Never Used     Tobacco comment: only if she drinks alcohol   Substance Use Topics     Alcohol use: Not Currently     Alcohol/week: 8.3 standard drinks       Family History   Problem Relation Age of Onset     Hyperlipidemia Sister      Stomach Cancer Sister      Cancer Sister         stomach     C.A.D. No family hx of      Diabetes No family hx of      Breast Cancer No family hx of      Cancer - colorectal No family hx of      Colon Cancer No family hx of        Prior to Admission medications    Medication Sig Start Date End Date Taking? Authorizing Provider   cyclobenzaprine (FLEXERIL) 10 MG tablet Take 1 tablet (10 mg) by mouth 3 times daily as needed for muscle spasms 5/3/21  Yes Celina Riggs MD   fluocinonide emulsified base 0.05 % external cream apply to rash as needed - sparingly 8/9/19  Yes No Small APRN CNP   multivitamin (ONE-DAILY) tablet Take 1 tablet by mouth daily 5/11/21  Yes Celina Riggs MD   pantoprazole (PROTONIX) 40 MG EC tablet Take 2 tablets (80 mg) by mouth daily 5/3/21  Yes Celina Riggs MD   sucralfate (CARAFATE) 1 GM tablet Take 1 tablet (1 g) by mouth 4 times daily 5/3/21  Yes Celina Riggs MD   vitamin D3 (CHOLECALCIFEROL) 50 mcg  "(2000 units) tablet Take 1 tablet (50 mcg) by mouth daily 2/9/21  Yes No Small APRN CNP   hydrOXYzine (ATARAX) 25 MG tablet Take 1 tablet (25 mg) by mouth every 8 hours as needed for anxiety 4/21/21   Padmini Borden CNP   ibuprofen (ADVIL/MOTRIN) 800 MG tablet Take 1 tablet (800 mg) by mouth every 8 hours as needed for moderate pain 10/15/17   Ness Kelsey DO   sertraline (ZOLOFT) 25 MG tablet Take 1 tablet (25 mg) by mouth daily 5/12/21   Celina Riggs MD       Allergies   Allergen Reactions     No Known Drug Allergies         REVIEW OF SYSTEMS:   5 point ROS negative except as noted above in HPI, including Gen., Resp., CV, GI &  system review.    PHYSICAL EXAM:   There were no vitals taken for this visit. Estimated body mass index is 26.89 kg/m  as calculated from the following:    Height as of 5/11/21: 1.575 m (5' 2\").    Weight as of 5/11/21: 66.7 kg (147 lb).   GENERAL APPEARANCE: alert, and oriented  MENTAL STATUS: alert  AIRWAY EXAM: Mallampatti Class I (visualization of the soft palate, fauces, uvula, anterior and posterior pillars)  RESP: lungs clear to auscultation - no rales, rhonchi or wheezes  CV: regular rates and rhythm  DIAGNOSTICS:    Not indicated    IMPRESSION   ASA Class 2 - Mild systemic disease    PLAN:   Plan for Gastroscopy with possible biopsy, possible dilation. We discussed the risks, benefits and alternatives and the patient wished to proceed.    The above has been forwarded to the consulting provider.      Signed Electronically by: Vishnu Lanier MD  May 26, 2021          "

## 2021-05-28 ENCOUNTER — HOSPITAL ENCOUNTER (OUTPATIENT)
Facility: CLINIC | Age: 36
End: 2021-05-28
Attending: INTERNAL MEDICINE | Admitting: INTERNAL MEDICINE
Payer: COMMERCIAL

## 2021-05-28 ENCOUNTER — TELEPHONE (OUTPATIENT)
Dept: INTERNAL MEDICINE | Facility: CLINIC | Age: 36
End: 2021-05-28

## 2021-05-28 LAB — COPATH REPORT: NORMAL

## 2021-05-28 NOTE — TELEPHONE ENCOUNTER
Call received from patient stating she saw No Small in February and reported pain in right breast. States she was advised to get a mamogram which she did and it was normal other than density. Per chart patient also had ultrasound. Patient states she only had a mammogram. Patient is still having sharp/stabbing/burning pain in right breast. States the burning is still in the same area of her breast but is also in other areas of the breast now. Please advise regarding next steps.

## 2021-06-06 DIAGNOSIS — Z11.59 ENCOUNTER FOR SCREENING FOR OTHER VIRAL DISEASES: ICD-10-CM

## 2021-06-07 NOTE — TELEPHONE ENCOUNTER
Patient's home/cell number message on machine to call back.  (Next avail with any provider if wants to come in.)

## 2021-06-10 ENCOUNTER — TRANSFERRED RECORDS (OUTPATIENT)
Dept: HEALTH INFORMATION MANAGEMENT | Facility: CLINIC | Age: 36
End: 2021-06-10

## 2021-06-30 ENCOUNTER — DOCUMENTATION ONLY (OUTPATIENT)
Dept: SLEEP MEDICINE | Facility: CLINIC | Age: 36
End: 2021-06-30

## 2021-06-30 ENCOUNTER — THERAPY VISIT (OUTPATIENT)
Dept: SLEEP MEDICINE | Facility: CLINIC | Age: 36
End: 2021-06-30
Payer: COMMERCIAL

## 2021-06-30 DIAGNOSIS — G47.9 SLEEP DISTURBANCE: ICD-10-CM

## 2021-06-30 PROCEDURE — 95810 POLYSOM 6/> YRS 4/> PARAM: CPT | Performed by: INTERNAL MEDICINE

## 2021-07-05 ENCOUNTER — TELEPHONE (OUTPATIENT)
Dept: INTERNAL MEDICINE | Facility: CLINIC | Age: 36
End: 2021-07-05

## 2021-07-05 NOTE — TELEPHONE ENCOUNTER
Reason for Call:  Same Day Appointment, Requested Provider:  Celina Riggs    PCP: Celina Riggs    Reason for visit: referral for r breast pain/discuss acid reflux    Duration of symptoms:     Have you been treated for this in the past?     Additional comments: pt was told to make an appt with Dr Riggs for a referral to see a breast specialist. Pt doesn't want to wait for next avail in clinic appt in Aug. Is Dr Riggs able to see pt the week of 7/12? Aware Dr Riggs out of clinic this week.    Can we leave a detailed message on this number? YES    Phone number patient can be reached at: Home number on file [phone] (home)    Best Time:     Call taken on 7/5/2021 at 9:27 AM by Ashely Lanier

## 2021-07-08 NOTE — PROCEDURES
" SLEEP STUDY INTERPRETATION  DIAGNOSTIC POLYSOMNOGRAPHY REPORT      Patient: LIN DUPREE  YOB: 1985  Study Date: 6/30/2021  MRN: [id number]  Referring Provider: SELF  Ordering Provider: PRISCILLA DWYER    Indications for Polysomnography: The patient is a 36-year-old Female who is 5' 2\" and weighs 139.0 lbs. Her BMI is 25.6, [place] sleepiness scale 03 and neck circumference is - cm. Relevant medical history includes gestational diabetes mellitus, esophageal reflux. She reports snoring. A diagnostic polysomnogram was performed to evaluate for sleep apnea /hypoxemia.    Polysomnogram Data: A full night polysomnogram recorded the standard physiologic parameters including EEG, EOG, EMG, ECG, nasal and oral airflow. Respiratory parameters of chest and abdominal movements were recorded with respiratory inductance plethysmography. Oxygen saturation was recorded by pulse oximetry. Hypopnea scoring rule used: 1B 4%.    Sleep Architecture: Sleep architecture was remarkable for prolonged initial sleep latency and sleep fragmentation, but normal sleep efficiency. All sleep stages were observed.  REM sleep was reduced.  The total recording time of the polysomnogram was 432.3 minutes. The total sleep time was 369.0 minutes. Sleep latency was increased at 39.5 minutes without the use of a sleep aid. REM latency was 81.5 minutes. Arousal index was increased at 45.9 arousals per hour (majority of the arousals were spontaneous and some due to respiratory effort related arousals). Sleep efficiency was normal at 85.4%. Wake after sleep onset was 22.5 minutes. The patient spent 7.6% of total sleep time in Stage N1, 45.4% in Stage N2, 31.2% in Stage N3, and 15.9% in REM. Time in REM supine was 45.0 minutes.    Respiration: Upper airway resistance syndrome, subtype of LAMAR, was present, with frequent respiratory effort related arousals.  The disordered breathing events were predominant during supine sleep " position.  Events ? The polysomnogram revealed a presence of 5 obstructive, - central, and - mixed apneas resulting in an apnea index of 0.8 events per hour. There were 3 obstructive hypopneas and - central hypopneas resulting in an obstructive hypopnea index of 0.5 and central hypopnea index of - events per hour. The combined apnea/hypopnea index was 1.3 events per hour (central apnea/hypopnea index was - events per hour). The REM AHI was 4.1 events per hour. The supine AHI was 1.9 events per hour. The RERA index was 11.7 events per hour.  The RDI was 13.0 events per hour.    Snoring - was reported as moderate, reported during supine and non-supine sleep.    Respiratory rate and pattern - was notable for normal respiratory rate and pattern.    Sustained Sleep Associated Hypoventilation - Transcutaneous carbon dioxide monitoring was not used, however significant hypoventilation was not suggested by oximetry.    Sleep Associated Hypoxemia - (Greater than 5 minutes O2 sat at or below 88%) was not present. Baseline oxygen saturation was 96.9%. Lowest oxygen saturation was 92.5%. Time spent less than or equal to 88% was 0 minutes. Time spent less than or equal to 89% was 0 minutes.    Movement Activity: Occasional periodic limb movements were observed, however there were not associated with significant arousals. There were no abnormal sleep-related behaviors during the study.    Periodic Limb Activity - There were 87 PLMs during the entire study. The PLM index was 14.1 movements per hour. The PLM Arousal Index was 2.9 per hour.    REM EMG Activity - Excessive transient/sustained muscle activity was not present.    Nocturnal Behavior - Abnormal sleep related behaviors were not noted during/arising out of NREM / REM sleep.     Bruxism - None apparent.    Cardiac Summary: Normal sinus rhythm was noted.  The average pulse rate was 59.4 bpm. The minimum pulse rate was 47.9 bpm while the maximum pulse rate was 94.9 bpm.   Arrhythmias were not noted.    Assessment:     Upper airway resistance syndrome (UARS), subtype of LAMAR, was present, with frequent respiratory effort related arousals.  The disordered breathing events were predominant during supine sleep position.    Sleep architecture was remarkable for prolonged initial sleep latency and sleep fragmentation, but normal sleep efficiency. All sleep stages were observed.  REM sleep was reduced.     Occasional periodic limb movements were observed, however there were associated with significant arousals.    There were no abnormal sleep-related behaviors during the study.      Normal sinus rhythm was noted.    Recommendations:    Suggest positional therapy during sleep using device such as FDA approved zzoma pillow or other devices of similar type along with dental appliance through referral to Sleep Dentistry for the treatment of UARS and/or socially disruptive snoring. Alternate option include treatment of nasal congestion if present/possible referral to ENT provider.    Advice regarding the risks of drowsy driving.    Suggest optimizing sleep schedule and avoiding sleep deprivation.    Weight management (if BMI > 30).    Pharmacologic therapy should be used for management of restless legs syndrome only if present and clinically indicated and not based on the presence of periodic limb movements alone.    Diagnostic Codes:   Obstructive Sleep Apnea G47.33  Repetitive Intrusions Into Sleep F51.8     6/30/2021 [facility] Study (139.0 lbs) - AHI 1.3, RDI 13.0, Supine AHI 1.9, REM AHI 4.1, Low O2 92.5%, Time Spent ?88% 0 minutes / Time Spent ?89% 0 minutes.      _____________________________________   Electronically Signed By: (Meli Mullins MD), 7/8/21

## 2021-07-12 LAB — SLPCOMP: NORMAL

## 2021-07-12 NOTE — TELEPHONE ENCOUNTER
Contacted patient, offered appointment tomorrow at virtual time for Dr. Riggs and she agreed.    Next 5 appointments (look out 90 days)    Jul 13, 2021  1:40 PM  SHORT with Celina Riggs MD  [facility] ([facility] ) [address]  [facility] [id number] [phone]

## 2021-07-13 ENCOUNTER — OFFICE VISIT (OUTPATIENT)
Dept: INTERNAL MEDICINE | Facility: CLINIC | Age: 36
End: 2021-07-13
Payer: COMMERCIAL

## 2021-07-13 VITALS
RESPIRATION RATE: 16 BRPM | SYSTOLIC BLOOD PRESSURE: 106 MMHG | DIASTOLIC BLOOD PRESSURE: 78 MMHG | OXYGEN SATURATION: 98 % | WEIGHT: 145.5 LBS | TEMPERATURE: 98.7 F | HEART RATE: 69 BPM | BODY MASS INDEX: 26.78 KG/M2 | HEIGHT: 62 IN

## 2021-07-13 DIAGNOSIS — K21.00 GASTROESOPHAGEAL REFLUX DISEASE WITH ESOPHAGITIS WITHOUT HEMORRHAGE: ICD-10-CM

## 2021-07-13 DIAGNOSIS — R07.9 CHEST PAIN, UNSPECIFIED TYPE: Primary | ICD-10-CM

## 2021-07-13 PROCEDURE — 99214 OFFICE O/P EST MOD 30 MIN: CPT | Performed by: INTERNAL MEDICINE

## 2021-07-13 RX ORDER — ESOMEPRAZOLE MAGNESIUM 40 MG/1
40 CAPSULE, DELAYED RELEASE ORAL 2 TIMES DAILY
COMMUNITY
End: 2021-10-13

## 2021-07-13 ASSESSMENT — MIFFLIN-ST. JEOR: SCORE: 1293.23

## 2021-07-13 NOTE — PROGRESS NOTES
"    Assessment & Plan     Chest pain, unspecified type  I think she has severe GERD but will check stress tests given her neck and jaw pain  - Echocardiogram Exercise Stress; Future    Gastroesophageal reflux disease with esophagitis without hemorrhage  I dont know what do try for her next will get an opinion from GI  - Adult Gastro Ref - Consult Only; Future             No follow-ups on file.    Celina Riggs MD  [facility] VALORIE Calhoun is a 36 year old who presents for the following health issues     HPI   Breast pain and joint pain    She has severe epigastric pain and burning that happens several times a week. She has had a couple of times when her jaw and neck hurtWhen it gets bad her ankles, elbows and wrists can hurt. She says all this started shortlky after her covid vaccination. She had her vaccination 4 weeks after getting over Covid.     For a year she has been having right sided chest pain that hurts with twisting and sometimes bending. Sometimes can be severe. She had a normal CT scan earlier this year. I offered to send her to PT but she declined.       Review of Systems   Constitutional, HEENT, cardiovascular, pulmonary, GI, , musculoskeletal, neuro, skin, endocrine and psych systems are negative, except as otherwise noted.      Objective    /78 (BP Location: Right arm, Patient Position: Sitting, Cuff Size: Adult Regular)   Pulse 69   Temp 98.7  F (37.1  C) (Oral)   Resp 16   Ht 1.575 m (5' 2\")   Wt 66 kg (145 lb 8 oz)   LMP 07/09/2021 (Within Days)   SpO2 98%   Breastfeeding No   BMI 26.61 kg/m    Body mass index is 26.61 kg/m .  Physical Exam   GENERAL: healthy, alert and no distress  NECK: no adenopathy, no asymmetry, masses, or scars and thyroid normal to palpation  RESP: lungs clear to auscultation - no rales, rhonchi or wheezes  CV: regular rate and rhythm, normal S1 S2, no S3 or S4, no murmur, click or rub, no peripheral edema and " peripheral pulses strong  ABDOMEN: soft, nontender, no hepatosplenomegaly, no masses and bowel sounds normal  MS: no gross musculoskeletal defects noted, no edema  PSYCH: mentation appears normal and anxious
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
## 2021-07-13 NOTE — NURSING NOTE
"Breast pain and joint pain  /78 (BP Location: Right arm, Patient Position: Sitting, Cuff Size: Adult Regular)   Pulse 69   Temp 98.7  F (37.1  C) (Oral)   Resp 16   Ht 1.575 m (5' 2\")   Wt 66 kg (145 lb 8 oz)   LMP 07/09/2021 (Within Days)   SpO2 98%   Breastfeeding No   BMI 26.61 kg/m      "

## 2021-07-14 ENCOUNTER — VIRTUAL VISIT (OUTPATIENT)
Dept: SLEEP MEDICINE | Facility: CLINIC | Age: 36
End: 2021-07-14
Payer: COMMERCIAL

## 2021-07-14 DIAGNOSIS — G47.8 UARS (UPPER AIRWAY RESISTANCE SYNDROME): Primary | ICD-10-CM

## 2021-07-14 PROCEDURE — 99214 OFFICE O/P EST MOD 30 MIN: CPT | Mod: 95 | Performed by: INTERNAL MEDICINE

## 2021-07-14 ASSESSMENT — ANXIETY QUESTIONNAIRES
2. NOT BEING ABLE TO STOP OR CONTROL WORRYING: NOT AT ALL
3. WORRYING TOO MUCH ABOUT DIFFERENT THINGS: SEVERAL DAYS
1. FEELING NERVOUS, ANXIOUS, OR ON EDGE: SEVERAL DAYS
7. FEELING AFRAID AS IF SOMETHING AWFUL MIGHT HAPPEN: NOT AT ALL
6. BECOMING EASILY ANNOYED OR IRRITABLE: NOT AT ALL
IF YOU CHECKED OFF ANY PROBLEMS ON THIS QUESTIONNAIRE, HOW DIFFICULT HAVE THESE PROBLEMS MADE IT FOR YOU TO DO YOUR WORK, TAKE CARE OF THINGS AT HOME, OR GET ALONG WITH OTHER PEOPLE: SOMEWHAT DIFFICULT
5. BEING SO RESTLESS THAT IT IS HARD TO SIT STILL: NOT AT ALL
GAD7 TOTAL SCORE: 3

## 2021-07-14 ASSESSMENT — PATIENT HEALTH QUESTIONNAIRE - PHQ9
SUM OF ALL RESPONSES TO PHQ QUESTIONS 1-9: 1
5. POOR APPETITE OR OVEREATING: SEVERAL DAYS

## 2021-07-15 ASSESSMENT — ANXIETY QUESTIONNAIRES: GAD7 TOTAL SCORE: 3

## 2021-07-15 NOTE — PROGRESS NOTES
Documents mailed to patient today:     Zzoma pillow order  Zzoma pillow brochure  Dental referral  Sleep study report    Sita CASTELLANOS CMA, SLEEP MEDICINE, 7/15/2021 11:16 AM

## 2021-07-23 ENCOUNTER — HOSPITAL ENCOUNTER (OUTPATIENT)
Dept: CARDIOLOGY | Facility: CLINIC | Age: 36
Discharge: HOME | End: 2021-07-23
Attending: INTERNAL MEDICINE | Admitting: INTERNAL MEDICINE
Payer: COMMERCIAL

## 2021-07-23 DIAGNOSIS — R07.9 CHEST PAIN, UNSPECIFIED TYPE: ICD-10-CM

## 2021-07-23 PROCEDURE — 93350 STRESS TTE ONLY: CPT | Mod: 26 | Performed by: INTERNAL MEDICINE

## 2021-07-23 PROCEDURE — 93325 DOPPLER ECHO COLOR FLOW MAPG: CPT | Mod: 26 | Performed by: INTERNAL MEDICINE

## 2021-07-23 PROCEDURE — 999N000208 ECHO STRESS ECHOCARDIOGRAM

## 2021-07-23 PROCEDURE — 93016 CV STRESS TEST SUPVJ ONLY: CPT | Performed by: INTERNAL MEDICINE

## 2021-07-23 PROCEDURE — 93018 CV STRESS TEST I&R ONLY: CPT | Performed by: INTERNAL MEDICINE

## 2021-07-23 PROCEDURE — 255N000002 HC RX 255 OP 636: Performed by: INTERNAL MEDICINE

## 2021-07-23 PROCEDURE — 93321 DOPPLER ECHO F-UP/LMTD STD: CPT | Mod: 26 | Performed by: INTERNAL MEDICINE

## 2021-07-23 RX ADMIN — HUMAN ALBUMIN MICROSPHERES AND PERFLUTREN 3 ML: 10; .22 INJECTION, SOLUTION INTRAVENOUS at 13:30

## 2021-08-05 ENCOUNTER — TRANSFERRED RECORDS (OUTPATIENT)
Dept: HEALTH INFORMATION MANAGEMENT | Facility: CLINIC | Age: 36
End: 2021-08-05

## 2021-08-09 ENCOUNTER — HOSPITAL ENCOUNTER (EMERGENCY)
Facility: CLINIC | Age: 36
End: 2021-08-09
Payer: COMMERCIAL

## 2021-08-10 ENCOUNTER — OFFICE VISIT (OUTPATIENT)
Dept: INTERNAL MEDICINE | Facility: CLINIC | Age: 36
End: 2021-08-10
Payer: COMMERCIAL

## 2021-08-10 VITALS
BODY MASS INDEX: 26.68 KG/M2 | DIASTOLIC BLOOD PRESSURE: 68 MMHG | HEART RATE: 71 BPM | TEMPERATURE: 97.6 F | HEIGHT: 62 IN | RESPIRATION RATE: 20 BRPM | SYSTOLIC BLOOD PRESSURE: 102 MMHG | OXYGEN SATURATION: 98 % | WEIGHT: 145 LBS

## 2021-08-10 DIAGNOSIS — U07.1 INFECTION DUE TO 2019 NOVEL CORONAVIRUS: ICD-10-CM

## 2021-08-10 DIAGNOSIS — K21.00 GASTROESOPHAGEAL REFLUX DISEASE WITH ESOPHAGITIS WITHOUT HEMORRHAGE: Primary | ICD-10-CM

## 2021-08-10 PROCEDURE — 99214 OFFICE O/P EST MOD 30 MIN: CPT | Performed by: INTERNAL MEDICINE

## 2021-08-10 RX ORDER — AMITRIPTYLINE HYDROCHLORIDE 10 MG/1
TABLET ORAL
COMMUNITY
Start: 2021-08-05 | End: 2021-10-20

## 2021-08-10 ASSESSMENT — MIFFLIN-ST. JEOR: SCORE: 1290.97

## 2021-08-10 NOTE — PROGRESS NOTES
"    Assessment & Plan     Gastroesophageal reflux disease with esophagitis without hemorrhage  She will stay on amitriptyline 10 mg given her by GI and Nexium. Follow up in 1 month   - esomeprazole (NEXIUM) 20 MG DR capsule; Take 1 capsule (20 mg) by mouth every morning (before breakfast) Take 30-60 minutes before eating.    Infection due to 2019 novel coronavirus  I suspect all her symptoms are related to the fact she had covid and got the vaccine only 30 days later. As she seems to be improving, Will follow clinically for now. Follow up in 1 month            Return in about 4 weeks (around 9/7/2021).    Celina Riggs MD  [facility] VALORIE Calhoun is a 36 year old who presents for the following health issues  accompanied by herself:    HPI     Follow up after GI.    HPI:   She had covid earlier this year and got the vaccine 30 days after she was over it. She was never that ill. But 3 days after the vaccine she developed severe headache, diffuse joint pains, heart burn, chest pain and jaw pain. The chest and jaw pain came with eating. Nexium gave minimal relief. She alos thinks her GERD is a little better.     CT chest, upper endoscopy and stress echo all unremarkable. In the past month her headaches and joint pain are starting to lesson. Last week GI started her on amitriptyline which has helped with her sleep but has not done much otherwise.       Review of Systems   Constitutional, HEENT, cardiovascular, pulmonary, GI, , musculoskeletal, neuro, skin, endocrine and psych systems are negative, except as otherwise noted.      Objective    /68 (BP Location: Right arm, Patient Position: Sitting, Cuff Size: Adult Regular)   Pulse 71   Temp 97.6  F (36.4  C) (Oral)   Resp 20   Ht 1.575 m (5' 2\")   Wt 65.8 kg (145 lb)   LMP 08/01/2021 (Approximate)   SpO2 98%   BMI 26.52 kg/m    Body mass index is 26.52 kg/m .  Physical Exam   GENERAL: healthy, alert and no " distress  NECK: no adenopathy, no asymmetry, masses, or scars and thyroid normal to palpation  RESP: lungs clear to auscultation - no rales, rhonchi or wheezes  CV: regular rate and rhythm, normal S1 S2, no S3 or S4, no murmur, click or rub, no peripheral edema and peripheral pulses strong  ABDOMEN: soft, nontender, no hepatosplenomegaly, no masses and bowel sounds normal  MS: no gross musculoskeletal defects noted, no edema  PSYCH: mentation appears normal, affect normal/bright

## 2021-08-15 ENCOUNTER — NURSE TRIAGE (OUTPATIENT)
Dept: NURSING | Facility: CLINIC | Age: 36
End: 2021-08-15

## 2021-08-15 NOTE — TELEPHONE ENCOUNTER
Pt has a sharp pinching pain right side of head which started 24 hrs ago - pt has treated with OTC twice.  Pt is on new medication Amitriptyline for acid reflux and Nexium.  Pt has been taking these meds for nine days.  Tylenol has helped some.  Pt warm transferred to Schedule for an appt in clinic.    Briseida James RN, MA  [facility] Nurse Advisor      Reason for Disposition    [1] MODERATE headache (e.g., interferes with normal activities) AND [2] present > 24 hours AND [3] unexplained  (Exceptions: analgesics not tried, typical migraine, or headache part of viral illness)    Additional Information    Negative: Difficult to awaken or acting confused (e.g., disoriented, slurred speech)    Negative: [1] Weakness of the face, arm or leg on one side of the body AND [2] new onset    Negative: [1] Numbness of the face, arm or leg on one side of the body AND [2] new onset    Negative: [1] Loss of speech or garbled speech AND [2] new onset    Negative: Passed out (i.e., lost consciousness, collapsed and was not responding)    Negative: Sounds like a life-threatening emergency to the triager    Protocols used: HEADACHE-A-AH

## 2021-08-16 ENCOUNTER — TELEPHONE (OUTPATIENT)
Dept: INTERNAL MEDICINE | Facility: CLINIC | Age: 36
End: 2021-08-16

## 2021-08-16 ENCOUNTER — OFFICE VISIT (OUTPATIENT)
Dept: INTERNAL MEDICINE | Facility: CLINIC | Age: 36
End: 2021-08-16
Payer: COMMERCIAL

## 2021-08-16 VITALS
OXYGEN SATURATION: 99 % | TEMPERATURE: 97.8 F | WEIGHT: 143 LBS | HEART RATE: 75 BPM | RESPIRATION RATE: 16 BRPM | BODY MASS INDEX: 26.16 KG/M2 | DIASTOLIC BLOOD PRESSURE: 72 MMHG | SYSTOLIC BLOOD PRESSURE: 100 MMHG

## 2021-08-16 DIAGNOSIS — M25.50 MULTIPLE JOINT PAIN: ICD-10-CM

## 2021-08-16 DIAGNOSIS — N64.4 BREAST PAIN: Primary | ICD-10-CM

## 2021-08-16 DIAGNOSIS — R51.9 UNILATERAL HEADACHE: Primary | ICD-10-CM

## 2021-08-16 LAB
CRP SERPL-MCNC: <2.9 MG/L (ref 0–8)
DEPRECATED CALCIDIOL+CALCIFEROL SERPL-MC: 23 UG/L (ref 20–75)
ERYTHROCYTE [SEDIMENTATION RATE] IN BLOOD BY WESTERGREN METHOD: 32 MM/HR (ref 0–20)

## 2021-08-16 PROCEDURE — 82306 VITAMIN D 25 HYDROXY: CPT | Performed by: INTERNAL MEDICINE

## 2021-08-16 PROCEDURE — 36415 COLL VENOUS BLD VENIPUNCTURE: CPT | Performed by: INTERNAL MEDICINE

## 2021-08-16 PROCEDURE — 86140 C-REACTIVE PROTEIN: CPT | Performed by: INTERNAL MEDICINE

## 2021-08-16 PROCEDURE — 99214 OFFICE O/P EST MOD 30 MIN: CPT | Performed by: INTERNAL MEDICINE

## 2021-08-16 PROCEDURE — 85652 RBC SED RATE AUTOMATED: CPT | Performed by: INTERNAL MEDICINE

## 2021-08-16 NOTE — TELEPHONE ENCOUNTER
With a normal chest ct an MRI will not add any information. Her pain is not from the breasts but from her chest wall with radiation into the breasts., if she wants I can send her to the pain clinic

## 2021-08-16 NOTE — PROGRESS NOTES
Assessment & Plan     Unilateral headache  Unclear etiology. No red flag symptoms. She describes it as not really a headache but more of a pinched nerve feeling. No vision changes, numbness, weakness, speech changes, photophobia, phonophobia. Discussed MRI, etc but given this started after she started amitriptyline I think the first thing to do is to have her stop this medication (asked her to run this by the prescribing provider which is not me) and see if the symptoms improve. She will do so.    Multiple joint pain  Unclear etiology. Poorly discussed at this visit. Is following regularly with her PCP who I will defer this to. Unique asked me to check inflammatory markers and I see CRP has not been checked in 3 months and ESR has never been checked.  - Erythrocyte sedimentation rate auto  - CRP inflammation    F/u with regular PCP at regular interval or sooner WINNIE Ashley MD  [facility]    Svetlana Calhoun is a 36 year old who presents for the following health issues:    HPI   Headache  Onset/Duration: 3 days  Description  Location: unilateral in the right temporal area, unilateral in the right occipital area   Character: sharp pain  Frequency:  Constant  Duration:  ongoing  Wake with headaches: YES  Able to do daily activities when headache present: YES  Intensity:  moderate  Progression of Symptoms:  waxing and waning  Accompanying signs and symptoms:  Stiff neck: no  Neck or upper back pain: no  Sinus or URI symptoms no   Fever: no  Nausea or vomiting: no  Dizziness: no  Numbness/tingling: no  Weakness: no  Visual changes: none  History  Head trauma: no  Family history of migraines: no  Daily pain medication use: no  Previous tests for headaches: no  Neurologist evaluation: no  Precipitating or Alleviating factors (light/sound/sleep/caffeine): none  Therapies tried and outcome: Tylenol              Outcome - usually effective  Frequent/daily pain medication use:  no    Still having joint pain.    Review of Systems   Constitutional, HEENT, cardiovascular, pulmonary, gi and MSK systems are negative, except as otherwise noted.      Objective    /72 (BP Location: Left arm, Patient Position: Chair, Cuff Size: Adult Regular)   Pulse 75   Temp 97.8  F (36.6  C) (Temporal)   Resp 16   Wt 64.9 kg (143 lb)   LMP 08/01/2021 (Approximate)   SpO2 99%   BMI 26.16 kg/m    Body mass index is 26.16 kg/m .     Physical Exam   GENERAL: alert and in no distress.  EYES: conjunctivae/corneas clear. EOMs grossly intact  HENT: NC/AT, facies symmetric.  RESP: No iWOB.  MSK: Moves all four extremities freely  SKIN: No significant ulcers, lesions, or rashes on the visualized portions of the skin  NEURO: Alert. Oriented. Sensation grossly WNL.

## 2021-08-16 NOTE — TELEPHONE ENCOUNTER
Patient was seen on 7/13/21 for breast pain. Patient states that her pain has changed and now having sharp pains with certain movements.   Patient states that she had also mentioned this to No at 2/3/21 physical and was sent for a mammogram. She states that No had discussed possibly having MRI done if mammogram is normal.     She also adds that she did see Dr. Downey at [facility] today for headaches and joint pain. He did order CRP and ESR and [facility] that the ESR is elevated. Advised patient that provider is likely going to wait for CRP to be back before commenting on result.     Kathi Chavez RN  [facility]

## 2021-08-16 NOTE — TELEPHONE ENCOUNTER
Patient calling.  She still has right breast pain. She would like to know what is the next step. Please advise. Ok to call and dev [phone]

## 2021-08-17 NOTE — TELEPHONE ENCOUNTER
Patient advised, she has appointment already scheduled with Surgeon 8/23/21.     She states that Dr. Downey recommended she speak to Dr. Riggs about her ESR labs, patient asks if there is anything she can do to help calm inflammation down or not. She states she has stopped taking the Amitriptyline ordered by GI as suggested by Dr. Downey. Patient does not know what is going on or if this could all be related to her acid reflux or the Covid vaccine since symptoms started 3 days after getting the vaccine.    Kathi Chavez RN  [facility]

## 2021-08-17 NOTE — TELEPHONE ENCOUNTER
Ok, its our breast surgeons that deal with breast pain. I will refer her to them for an opinion. Referral has been placed

## 2021-08-18 NOTE — TELEPHONE ENCOUNTER
Patient informed of provider's message below.    She is asking for pain relief of multiple joint pain (knees, ankles, elbow, fingers).    Please advise, thanks.

## 2021-08-18 NOTE — TELEPHONE ENCOUNTER
Her ESR is only mildly elevated and her CRP is normal so I would not do anything different at this time.

## 2021-08-23 ENCOUNTER — OFFICE VISIT (OUTPATIENT)
Dept: SURGERY | Facility: CLINIC | Age: 36
End: 2021-08-23
Payer: COMMERCIAL

## 2021-08-23 VITALS
BODY MASS INDEX: 26.31 KG/M2 | WEIGHT: 143 LBS | RESPIRATION RATE: 16 BRPM | HEIGHT: 62 IN | DIASTOLIC BLOOD PRESSURE: 72 MMHG | SYSTOLIC BLOOD PRESSURE: 100 MMHG | HEART RATE: 70 BPM | OXYGEN SATURATION: 99 %

## 2021-08-23 DIAGNOSIS — N64.4 BREAST PAIN: Primary | ICD-10-CM

## 2021-08-23 PROCEDURE — 99203 OFFICE O/P NEW LOW 30 MIN: CPT | Performed by: SURGERY

## 2021-08-23 ASSESSMENT — MIFFLIN-ST. JEOR: SCORE: 1281.89

## 2021-08-23 NOTE — PROGRESS NOTES
Breast Patients      BREAST PATIENTS (FEMALE)    At what age did your periods begin? 10    What was the date of your last menstrual period? 2021    Have you begun menopause? No    Are you using hormone replacement therapy?  No    Number of full-term pregnancies: 3    Did you nurse your children? No    Are you pregnant now? No    Do you have breast implants? Yes         BREAST PATIENTS (ALL)    Have you had a previous breast biopsy? No    Have you had previous Breast Cancer? No

## 2021-08-23 NOTE — LETTER
"2021    RE: Unique Baker, : 1985      This is a patient who presents today with complaints of right breast pain.  Patient underwent bilateral breast implants about 10 years ago.  Fairly recently, she began to develop a scratching or crawling sensation in the lateral aspect of her right breast.  Sometimes this went through to her back.  She did talk to her plastic surgeon, who did not think there was any obvious implant rupture or capsular contraction.  The patient did have silicone implants placed.  The patient has undergone a mammogram and ultrasound.  These were normal.  The patient is also dealing with some more systemic issues of joint aching and does have an appointment with a rheumatologist in a couple of months.  Patient has not specifically felt any lumps.  She does sometimes notice that she has pain when she moves her arm or twists her body.     Physical exam:  The patient is in no apparent distress.  Breathing is nonlabored.  Breast exam was performed with a chaperone present.  There are no palpable masses in the breast bilaterally.  The implants are soft and there is no obvious wrinkling of the capsule.  I do not feel any obvious suture material in the area of the patient's scratching sensation on the right lateral breast.  Skin appears normal.     Mammogram and ultrasound show no abnormalities.  There is no evidence of extracapsular silicone.  Mammogram did show heterogeneously dense tissue.  I explained to the patient that this is normal.     Assessment and plan: This is a patient with a sensation of scratching or \"crawling\" in the lateral aspect of her right breast.  This is fairly well away from her glandular tissue, and her mammogram and ultrasound appear quite definitively normal.  The etiology of her symptoms is unclear, but I am quite certain that this is not a primary breast tissue problem.  There is certainly no evidence of malignancy.  I have suggested that she discuss this " further with her plastic surgeon to see if any of the symptoms might be related to her implant.  If she develops a palpable lump, she may certainly return to see me.        Elliot Grimm MD  [facility]

## 2021-08-23 NOTE — PROGRESS NOTES
This is a patient who presents today with complaints of right breast pain.  Patient underwent bilateral breast implants about 10 years ago.  Fairly recently, she began to develop a scratching or crawling sensation in the lateral aspect of her right breast.  Sometimes this went through to her back.  She did talk to her plastic surgeon, who did not think there was any obvious implant rupture or capsular contraction.  The patient did have silicone implants placed.  The patient has undergone a mammogram and ultrasound.  These were normal.  The patient is also dealing with some more systemic issues of joint aching and does have an appointment with a rheumatologist in a couple of months.  Patient has not specifically felt any lumps.  She does sometimes notice that she has pain when she moves her arm or twists her body.    Past Medical History:   Diagnosis Date     Diabetes (H)     GDM insulin     GERD (gastroesophageal reflux disease)     w/u otherwise neg      History of colposcopy with cervical biopsy 3/23/07    WNL     Papanicolaou smear of cervix with low grade squamous intraepithelial lesion (LGSIL) 07     PONV (postoperative nausea and vomiting)      S/P  10/13/2017     Past Surgical History:   Procedure Laterality Date     BREAST SURGERY      augmentation       SECTION N/A 2015    Procedure:  SECTION;  Surgeon: Tremaine Gaona MD;  Location: [facility] OR      SECTION, TUBAL LIGATION, COMBINED N/A 10/13/2017    Procedure: COMBINED  SECTION, TUBAL LIGATION;  Repeat  SECTION, TUBAL LIGATION ;  Surgeon: Sidra Salamanca DO;  Location: [facility] OR     COLONOSCOPY N/A 2016    Procedure: COLONOSCOPY;  Surgeon: Lyudmila Pastor MD;  Location:  GI     DILATION AND CURETTAGE SUCTION      elective termination     ESOPHAGOSCOPY, GASTROSCOPY, DUODENOSCOPY (EGD), COMBINED  2014    Procedure: COMBINED ESOPHAGOSCOPY, GASTROSCOPY, DUODENOSCOPY (EGD);    "ESOPHAGOSCOPY, GASTROSCOPY, DUODENOSCOPY (EGD) ;  Surgeon: Vishnu Lanier MD;  Location:  GI     ESOPHAGOSCOPY, GASTROSCOPY, DUODENOSCOPY (EGD), COMBINED Left 9/16/2020    Procedure: ESOPHAGOGASTRODUODENOSCOPY, WITH BIOPSIES USING BIOPSY FORCEPS;  Surgeon: Vishnu Hopkins MD;  Location:  GI     ESOPHAGOSCOPY, GASTROSCOPY, DUODENOSCOPY (EGD), COMBINED N/A 5/26/2021    Procedure: ESOPHAGOGASTRODUODENOSCOPY, WITH BIOPSY using cold forceps;  Surgeon: Vishnu Lanier MD;  Location:  GI     GYN SURGERY  2001     c section      RW LASER WART  2011    Anal wart removal      ZZC NONSPECIFIC PROCEDURE  09/27/01    Primary LTCS     Physical exam:  The patient is in no apparent distress.  Breathing is nonlabored.  Breast exam was performed with a chaperone present.  There are no palpable masses in the breast bilaterally.  The implants are soft and there is no obvious wrinkling of the capsule.  I do not feel any obvious suture material in the area of the patient's scratching sensation on the right lateral breast.  Skin appears normal.    Mammogram and ultrasound show no abnormalities.  There is no evidence of extracapsular silicone.  Mammogram did show heterogeneously dense tissue.  I explained to the patient that this is normal.    Assessment and plan: This is a patient with a sensation of scratching or \"crawling\" in the lateral aspect of her right breast.  This is fairly well away from her glandular tissue, and her mammogram and ultrasound appear quite definitively normal.  The etiology of her symptoms is unclear, but I am quite certain that this is not a primary breast tissue problem.  There is certainly no evidence of malignancy.  I have suggested that she discuss this further with her plastic surgeon to see if any of the symptoms might be related to her implant.  If she develops a palpable lump, she may certainly return to see me.    A total of 35 minutes was spent today in chart review, patient " examination and discussion, and documentation.    Elliot Grimm MD  [facility]    Please route or send letter to:  Primary Care Provider (PCP)

## 2021-09-15 ENCOUNTER — TELEPHONE (OUTPATIENT)
Dept: INTERNAL MEDICINE | Facility: CLINIC | Age: 36
End: 2021-09-15

## 2021-09-15 NOTE — TELEPHONE ENCOUNTER
Patient has scheduled an appt to see primary care provider 9/28/21 but is having significant joint pain in toes, ankles, elbows, knees.  She was seen in UC last week and was placed on a steroid taper which helped initially but pain increased again as soon as she started to taper down on dose.  Finishes Methylprednisolone pack today.  Currently rates pain 7/10, it was worse last night 9-10 out of 10.  States she was unable to walk last night due to pain.      Patient has scheduled rheumatology appt 10/20/21 at [place], would like to be seen sooner if MD can help her.  ANGÉLICA Gunter R.N.

## 2021-09-18 ENCOUNTER — HEALTH MAINTENANCE LETTER (OUTPATIENT)
Age: 36
End: 2021-09-18

## 2021-09-20 NOTE — TELEPHONE ENCOUNTER
Contacted patient. Pain is increasing now that she is off the steroid, but is not as severe as it was before starting the steroid. She was seen at [facility] in [place].     She asks if there could be any correlation between her joint pain and the Nexium she started 5 months and pain started about 3 months ago.     Kathi Chavez RN  [facility]

## 2021-09-20 NOTE — TELEPHONE ENCOUNTER
I dont have any openings until the 29th and cant work her in. Where was she seen. I dont see anything in there chart recently

## 2021-09-21 NOTE — TELEPHONE ENCOUNTER
Left voice message for patient advising her that Dr. Riggs did not think her joint pain was related to starting Nexium.    Kathi Chavez RN  [facility]

## 2021-09-29 ENCOUNTER — OFFICE VISIT (OUTPATIENT)
Dept: INTERNAL MEDICINE | Facility: CLINIC | Age: 36
End: 2021-09-29
Payer: COMMERCIAL

## 2021-09-29 VITALS
TEMPERATURE: 97.5 F | DIASTOLIC BLOOD PRESSURE: 80 MMHG | HEART RATE: 72 BPM | RESPIRATION RATE: 16 BRPM | WEIGHT: 146.5 LBS | BODY MASS INDEX: 26.96 KG/M2 | HEIGHT: 62 IN | SYSTOLIC BLOOD PRESSURE: 116 MMHG | OXYGEN SATURATION: 100 %

## 2021-09-29 DIAGNOSIS — M25.50 ARTHRALGIA, UNSPECIFIED JOINT: ICD-10-CM

## 2021-09-29 DIAGNOSIS — F51.01 PRIMARY INSOMNIA: Primary | ICD-10-CM

## 2021-09-29 DIAGNOSIS — K58.9 IRRITABLE BOWEL SYNDROME, UNSPECIFIED TYPE: ICD-10-CM

## 2021-09-29 PROCEDURE — 99213 OFFICE O/P EST LOW 20 MIN: CPT | Performed by: INTERNAL MEDICINE

## 2021-09-29 RX ORDER — ZOLPIDEM TARTRATE 10 MG/1
10 TABLET ORAL
Qty: 10 TABLET | Refills: 0 | Status: SHIPPED | OUTPATIENT
Start: 2021-09-29 | End: 2022-03-29

## 2021-09-29 ASSESSMENT — MIFFLIN-ST. JEOR: SCORE: 1297.77

## 2021-09-29 NOTE — PROGRESS NOTES
"    Assessment & Plan     Primary insomnia  Will try   - zolpidem (AMBIEN) 10 MG tablet; Take 1 tablet (10 mg) by mouth nightly as needed for sleep    Irritable bowel syndrome, unspecified type  getting better    Arthralgia, unspecified joint  Follow up with Rheumatology            Return in about 3 months (around 12/29/2021).    Celina Riggs MD  [facility] VALORIE Calhoun is a 36 year old who presents for the following health issues     HPI     Follow up GI issues and Covid issues.    She was given prednisone and her GI issues and arthralgias improved. They are not gone but better. She is still not sleeping at night and would like something for sleep. She has an appointment to see Rheumatology in October. She has no red, hot or swollen joints.     Review of Systems   Constitutional, HEENT, cardiovascular, pulmonary, GI, , musculoskeletal, neuro, skin, endocrine and psych systems are negative, except as otherwise noted.      Objective    /80   Pulse 72   Temp 97.5  F (36.4  C) (Oral)   Resp 16   Ht 1.575 m (5' 2\")   Wt 66.5 kg (146 lb 8 oz)   SpO2 100%   Breastfeeding No   BMI 26.80 kg/m    Body mass index is 26.8 kg/m .  Physical Exam   GENERAL: healthy, alert and no distress  NECK: no adenopathy, no asymmetry, masses, or scars and thyroid normal to palpation  RESP: lungs clear to auscultation - no rales, rhonchi or wheezes  CV: regular rate and rhythm, normal S1 S2, no S3 or S4, no murmur, click or rub, no peripheral edema and peripheral pulses strong  ABDOMEN: soft, nontender, no hepatosplenomegaly, no masses and bowel sounds normal  MS: no gross musculoskeletal defects noted, no edema          "

## 2021-10-07 NOTE — RESULT ENCOUNTER NOTE
Reviewed, Xdyniahart message sent [facility]        COLONOSCOPY    Patient:   Sayra Abel    :    1965    Referring/PCP: Dioni Romero DO  Facility:  [facility]  Procedure:   Colonoscopy with polypectomy (cold snare)  Date:     10/7/2021  Endoscopist:  Sandra Carrington MD    Indication:  previous adenomatous polyp. Anesthesia: MAC    Complications:  None      Estimated blood loss: Minimal    Specimen collected: Yes    Scope withdrawal time: 25 min    Description of Procedure:  Informed consent was obtained from the patient after explanation of the procedure including indications, description of the procedure,  benefits and possible risks and complications of the procedure, and alternatives. Questions were answered. The patient's history was reviewed and a directed physical examination was performed prior to the procedure. Patient was monitored throughout the procedure with pulse oximetry and periodic assessment of vital signs. Patient was sedated as noted above. With the patient initially in the left lateral decubitus position, a digital rectal examination was performed and revealed negative without mass, lesions or tenderness. The Olympus video colonoscope was placed in the patient's rectum and advanced without difficulty  to the cecum, which was identified by the ileocecal valve and appendiceal orifice. The prep was good. Examination of the mucosa was performed during both introduction and withdrawal of the colonoscope. Retroflexed view of the rectum was performed. The patient  was taken to the recovery area in good condition. Findings:     1. 6 mm sessile polyp seen in the sigmoid colon . Cold snare polypectomy was done   2. No obvious abnormal mucosa seen at 25 cm from the anus . Area was viewed multiple times. 3. Internal hemorrhoids seen during retroflexion view      Recommendations:  Follow with the biopsy results                                    Repeat colon in 1 year                                    Follow up with PCP    Trudy May MD

## 2021-10-08 ENCOUNTER — TRANSFERRED RECORDS (OUTPATIENT)
Dept: HEALTH INFORMATION MANAGEMENT | Facility: CLINIC | Age: 36
End: 2021-10-08

## 2021-10-11 NOTE — PROGRESS NOTES
Unique is a 36 year old who is being evaluated via a billable video visit.      How would you like to obtain your AVS? Mail a copy  If the video visit is dropped, the invitation should be resent by: Text to cell phone: [phone]  Will anyone else be joining your video visit? No      Video Start Time: 7:45  Video-Visit Details    Type of service:  Video Visit    Video End Time:8:20    Originating Location (pt. Location): Home    Distant Location (provider location):  [facility]     Platform used for Video Visit: Urbasolar

## 2021-10-13 ENCOUNTER — OFFICE VISIT (OUTPATIENT)
Dept: INTERNAL MEDICINE | Facility: CLINIC | Age: 36
End: 2021-10-13
Payer: COMMERCIAL

## 2021-10-13 VITALS
DIASTOLIC BLOOD PRESSURE: 70 MMHG | RESPIRATION RATE: 22 BRPM | BODY MASS INDEX: 27.2 KG/M2 | TEMPERATURE: 98 F | HEART RATE: 85 BPM | HEIGHT: 62 IN | SYSTOLIC BLOOD PRESSURE: 102 MMHG | WEIGHT: 147.8 LBS | OXYGEN SATURATION: 99 %

## 2021-10-13 DIAGNOSIS — J31.0 CHRONIC RHINITIS: ICD-10-CM

## 2021-10-13 DIAGNOSIS — H93.13 TINNITUS, BILATERAL: Primary | ICD-10-CM

## 2021-10-13 PROCEDURE — 99214 OFFICE O/P EST MOD 30 MIN: CPT | Performed by: NURSE PRACTITIONER

## 2021-10-13 RX ORDER — FLUTICASONE PROPIONATE 50 MCG
1 SPRAY, SUSPENSION (ML) NASAL 2 TIMES DAILY PRN
Qty: 11.1 ML | Refills: 1 | Status: SHIPPED | OUTPATIENT
Start: 2021-10-13 | End: 2023-06-14

## 2021-10-13 RX ORDER — FLUTICASONE PROPIONATE 50 MCG
1 SPRAY, SUSPENSION (ML) NASAL DAILY
Qty: 11.1 ML | Refills: 1 | Status: SHIPPED | OUTPATIENT
Start: 2021-10-13 | End: 2021-10-13

## 2021-10-13 RX ORDER — CETIRIZINE HYDROCHLORIDE 10 MG/1
10 TABLET ORAL DAILY
Qty: 30 TABLET | Refills: 1 | Status: SHIPPED | OUTPATIENT
Start: 2021-10-13 | End: 2023-03-23

## 2021-10-13 ASSESSMENT — MIFFLIN-ST. JEOR: SCORE: 1303.67

## 2021-10-13 NOTE — PROGRESS NOTES
Assessment & Plan     Tinnitus, bilateral  - etiology unknown but suspect related to allergies vs virus vs Covid residual so will trial:  - fluticasone (FLONASE) 50 MCG/ACT nasal spray; Spray 1 spray into both nostrils twice daily prn  - cetirizine (ZYRTEC) 10 MG tablet; Take 1 tablet (10 mg) by mouth daily  - if no resolve will consider referral to ENT    Chronic rhinitis  - will trial medications as directed.  - fluticasone (FLONASE) 50 MCG/ACT nasal spray; Spray 1 spray into both nostrils  Twice daily prn  - cetirizine (ZYRTEC) 10 MG tablet; Take 1 tablet (10 mg) by mouth daily  - if no resolve, may need to consider antibiotic    }     Patient Instructions   Will treat stuffy sinuses and ringing in ears with nasal spray 1 spray twice daily x 1 week then 1 spray daily at night x 1 week then as needed + Zyrtec 10 mg to dry the sinuses and ears to take daily x 2 weeks then as needed    Increase fluids especially water    Alternate Tylenol and Aleve for headache and sinus pressure.    Humidifier can help + white nose with fan may help with ringing at night.    Let the clinic know if you start getting discolored secretions and will prescribe an antibiotic.      Return in about 2 weeks (around 10/27/2021), or if symptoms worsen or fail to improve.    Padmini B[facility]    Svetlana Calhoun is a 36 year old who presents for the following health issues  accompanied by herself:    HPI     Patient complain of ringing in her ears.    See above.  Voices complaints of: humming in my ears especially at night; started 2 weeks ago. Throat drainage and headache. Denies sneezing or running nose.  No ear pain but has pressure and sometimes dizziness with room spinning.  Stated has not completely gotten better from having Covid back in March of 2021.  No fever or cough.  No shortness of breathe or chest pain.  No visual problems.  No facial weakness.    Recently seen on 9/29/2021 by PCP   Dana July   MRN: [id number]    Department:  [facility] Emergency Department   Date of Visit:  2/11/2020           Disclosure     Insurance plans vary and the physician(s) referred by the ER may not be covered by your plan.  Please contact your "Riggs for insomnia, IBS, and arthralgia with referral to Rheumatology.    Review of Systems   Constitutional, HEENT, cardiovascular, pulmonary, gi and gu systems are negative, except as otherwise noted.      Objective    /70 (BP Location: Left arm, Patient Position: Sitting, Cuff Size: Adult Large)   Pulse 85   Temp 98  F (36.7  C) (Tympanic)   Resp 22   Ht 1.575 m (5' 2\")   Wt 67 kg (147 lb 12.8 oz)   LMP 09/28/2021 (Exact Date)   SpO2 99%   BMI 27.03 kg/m    Body mass index is 27.03 kg/m .     Physical Exam   GENERAL: alert and no distress  EYES: Eyes grossly normal to inspection, PERRL and conjunctivae and sclerae normal  HENT: ear canals and TM's normal with no pre/post auricular tenderness, nose: swollen and erythematous with limited air movement through nostrils and mouth without ulcers or lesions with clear PND  RESP: lungs clear to auscultation - no rales, rhonchi or wheezes  CV: regular rate and rhythm, normal S1 S2, no S3 or S4, no murmur, click or rub, no peripheral edema and peripheral pulses strong  MS: no edema  SKIN: no suspicious lesions or rashes              " tell this physician (or your personal doctor if your instructions are to return to your personal doctor) about any new or lasting problems. The primary care or specialist physician will see patients referred from the [facility] Emergency Department.  Edwin Blackmon

## 2021-10-13 NOTE — PATIENT INSTRUCTIONS
Will treat stuffy sinuses and ringing in ears with nasal spray 1 spray twice daily x 1 week then 1 spray daily at night x 1 week then as needed + Zyrtec 10 mg to dry the sinuses and ears to take daily x 2 weeks then as needed    Increase fluids especially water    Alternate Tylenol and Aleve for headache and sinus pressure.    Humidifier can help + white nose with fan may help with ringing at night.    Let the clinic know if you start getting discolored secretions and will prescribe an antibiotic.

## 2021-10-18 NOTE — PROGRESS NOTES
"RHEUMATOLOGY INITIAL VIDEO CONSULT NOTE    Chief Complaint:    Chief Complaint   Patient presents with     Consult     discuss multiple joint pain     Referral     referred by: Celina Riggs MD       Reason for consult: Dr. Celina Riggs from  has requested consultation for this patient for polyarthralgia    Video visit may not be as thorough as an in-person visit and physical exam limitations may pose a challenge in formulating an accurate diagnosis.       History of Present Illness:    Unique Baker is a 36 year old female with pmhx GERD, seborrheic keratosis, is referred to rheumatology clinic for polyarthralgia. She reports tejinder COVID-19 beginning of March. She received COVID-19 vaccine in April. 3 days after getting the vaccine, she developed a constellation of symptoms including chest tightness and abnormal sensation over her L arm. She went to the ED for chest tightness and had cardiac evaluation. She was started on reflux medication which she still takes. Has seen GI for acid reflux and loose stools. Had an upper GI in May which showed non-specific chronic inflammation in the stomach. She went to  for evaluation of L arm abnormal sensation. She was given a course of steroids (medrol dosepak) which helped her symptoms somewhat. Shortly thereafter, she started developing sensation of \"burning\" in her head and chest. She then started developing joint pain (around June or July) over her knees, ankles, and wrists. She presented to  again for ongoing symptoms and received steroid burst which helped her symptoms (around September) but they have not subsided. She has since been getting \"flares\" of joint pain.     She describes her flares as abrupt onset mild pain over her knees and ankles when she is resting/watching TV. She takes advil for pain which helps resolve the pain. Pain can last for ~15-20 minutes. Pain episodes come on 4 times a week. However, she feels \"tightness\" over her knees and " "ankles mid-day everyday. She tries and stretch from time to time. Walking helps the stiffness but not the pain. Overall, the intensity of pain has improved since it started in July. Pain does not wake her up at night anymore but it used to back in July. She denies any AM pain or stiffness over her joints. She denies any joint swelling, erythema or warmth.     She denies hx of psoriasis. However, she reports she started having unexplained rash ~4 years ago. She has seen dermatology in the past and has been diagnosed with seborrheic keratosis. She denies photosensitivity. Rash can be over her back, legs, stomach, neck. Denies any facial rash. She has been given topical steroids by dermatology which helps but rash recurs. She describes the rash as \"bumpy\" and pruritic. She denies scalp flakiness.     Denies fatigue, fevers, chills, weight loss, night sweats, vision changes, eye pain/redness, inflammatory eye disease, dry eyes, dry mouth, +reports episode of painful oral ulcer over the roof of the mouth which has resolved, denies nasal ulcers, +chronic hair loss/thinning with hx of alopecia, denies raynaud's, cough, SOB, pleurisy, difficulty swallowing, abdominal pain, diarrhea, hematochezia, melena, dysuria, back pain, enthesitis, dactylitis, +numbness/tingling over hands and feet. No hx of IBD. No hx of blood clots. One ectopic pregnancy.    Pertinent labs and imaging: (per chart review in [facility] and care everywhere)    Labs:   21: +ESR 32, negative CRP  21: negative BMP, CRP, CBC w diff, negative LFTs    Past Medical History:    Past Medical History:   Diagnosis Date     Diabetes (H)     GDM insulin     GERD (gastroesophageal reflux disease)     w/u otherwise neg      History of colposcopy with cervical biopsy 3/23/07    WNL     Papanicolaou smear of cervix with low grade squamous intraepithelial lesion (LGSIL) 07     PONV (postoperative nausea and vomiting)      S/P  10/13/2017     Past " Surgical History:   Past Surgical History:   Procedure Laterality Date     BREAST SURGERY      augmentation       SECTION N/A 2015    Procedure:  SECTION;  Surgeon: Tremaine Gaona MD;  Location: [facility] OR      SECTION, TUBAL LIGATION, COMBINED N/A 10/13/2017    Procedure: COMBINED  SECTION, TUBAL LIGATION;  Repeat  SECTION, TUBAL LIGATION ;  Surgeon: Sidra Salamanca DO;  Location:  OR     COLONOSCOPY N/A 2016    Procedure: COLONOSCOPY;  Surgeon: Lyudmila Pastor MD;  Location:  GI     DILATION AND CURETTAGE SUCTION      elective termination     ESOPHAGOSCOPY, GASTROSCOPY, DUODENOSCOPY (EGD), COMBINED  2014    Procedure: COMBINED ESOPHAGOSCOPY, GASTROSCOPY, DUODENOSCOPY (EGD);   ESOPHAGOSCOPY, GASTROSCOPY, DUODENOSCOPY (EGD) ;  Surgeon: Vishnu Lanier MD;  Location:  GI     ESOPHAGOSCOPY, GASTROSCOPY, DUODENOSCOPY (EGD), COMBINED Left 2020    Procedure: ESOPHAGOGASTRODUODENOSCOPY, WITH BIOPSIES USING BIOPSY FORCEPS;  Surgeon: Vishnu Hopkins MD;  Location:  GI     ESOPHAGOSCOPY, GASTROSCOPY, DUODENOSCOPY (EGD), COMBINED N/A 2021    Procedure: ESOPHAGOGASTRODUODENOSCOPY, WITH BIOPSY using cold forceps;  Surgeon: Vishnu Lanier MD;  Location:  GI     GYN SURGERY       c section      RW LASER WART      Anal wart removal      ZZC NONSPECIFIC PROCEDURE  01    Primary LTCS     Family History:   Denies known family hx of RA, SLE, Sjogren's syndrome, scleroderma, PsA, ankylosing spondylitis, psoriasis, vasculitis, gout, pseudogout    Social History:   Alcohol use: none currently  Tobacco use: former smoker, quit 2 years ago  Occupational hx: currently not working  Sexual history: currently sexually active, no contraception use. Hx of tubal ligation    Allergies   Allergen Reactions     No Known Drug Allergies      Other Environmental Allergy      Patch Test Results 3-10-20    Very Strong (3+) or Strong (2+)  reactions:  none     Mild (1+) reactions:  Fragrance mix I  Linalool  Oleamidopropyl dimethylamine     Borderline/questionable reactions:  Balsam of [place]  Diphenylguanidine      Immunization History   Administered Date(s) Administered     COVID-19,PF,Pfizer 04/16/2021     DTAP (<7y) 03/20/1992, 07/28/1992, 08/17/1993     HPV9 08/30/2019, 10/02/2019     Influenza (IIV3) PF 10/06/2009     Influenza Vaccine IM > 6 months Valent IIV4 (Alfuria,Fluzone) 10/27/2014, 10/20/2015, 09/25/2017, 11/06/2018, 09/20/2019, 02/03/2021     MMR 03/20/1992, 08/29/1996     Poliovirus, inactivated (IPV) 03/20/1992, 07/28/1992, 08/17/1993     TD (ADULT, 7+) 03/20/1992, 07/28/1992, 08/17/1993, 01/25/2007     TDAP Vaccine (Adacel) 01/25/2007, 04/06/2015, 10/15/2017     Td (Adult), Adsorbed 03/20/1992, 07/28/1992, 08/17/1993, 01/25/2007, 05/21/2008     Twinrix A/B 08/27/2019, 10/02/2019     Typhoid Oral 08/27/2019     Medications:  Current Outpatient Medications   Medication Sig Dispense Refill     cetirizine (ZYRTEC) 10 MG tablet Take 1 tablet (10 mg) by mouth daily 30 tablet 1     esomeprazole (NEXIUM) 20 MG DR capsule Take 1 capsule (20 mg) by mouth every morning (before breakfast) Take 30-60 minutes before eating. 30 capsule 1     fluocinonide emulsified base 0.05 % external cream apply to rash as needed - sparingly 60 g 1     fluticasone (FLONASE) 50 MCG/ACT nasal spray Spray 1 spray into both nostrils 2 times daily as needed for rhinitis or allergies Take 1 spray twice daily x 1 week then 1 spray daily at night x 1 week then as needed 11.1 mL 1     hydrOXYzine (ATARAX) 25 MG tablet Take 1 tablet (25 mg) by mouth every 8 hours as needed for anxiety 30 tablet 0     multivitamin (ONE-DAILY) tablet Take 1 tablet by mouth daily       sucralfate (CARAFATE) 1 GM tablet Take 1 tablet (1 g) by mouth 4 times daily       vitamin D3 (CHOLECALCIFEROL) 50 mcg (2000 units) tablet Take 1 tablet (50 mcg) by mouth daily 100 tablet 3     zolpidem (AMBIEN)  10 MG tablet Take 1 tablet (10 mg) by mouth nightly as needed for sleep 10 tablet 0     PHYSICAL EXAMINATION: (limited as pt was evaluated via video visit)  Vital signs: (not taken due to evaluation via video visit)    Skin: no facial rashes  Pulm: non-labored respirations, no conversational dyspnea  MSK: no hand joint swelling, able to make a fist b/l, wrist ROM intact b/l, ROM in shoulder intact b/l      Labs:      I have reviewed all pertinent investigations including labs, including outside records if relevant    RF/CCP  Recent Labs   Lab Test 01/21/16  1152   RHF <20     FELICITY  Recent Labs   Lab Test 01/21/16  1152   ADIN <1.0  Interpretation:  Negative       RNP/Sm/SSA/SSB  Recent Labs   Lab Test 10/13/17  1430   TREPAB Negative     CBC  Recent Labs   Lab Test 05/01/21  1248 04/20/21  2350 02/03/21  1626 03/16/17  0955 01/21/16  1152   WBC 9.2 10.6 7.3   < > 7.3   RBC 4.49 4.48 4.14   < > 4.77   HGB 12.7 13.0 11.9   < > 14.1   HCT 38.4 39.7 36.4   < > 42.4   MCV 86 89 88   < > 89   RDW 13.2 13.2 13.1   < > 12.7    214 181   < > 209   MCH 28.3 29.0 28.7   < > 29.6   MCHC 33.1 32.7 32.7   < > 33.3   NEUTROPHIL 73.6 78.9  --   --  62.7   LYMPH 18.2 14.4  --   --  29.4   MONOCYTE 7.8 4.8  --   --  6.2   EOSINOPHIL 0.1 1.1  --   --  1.4   BASOPHIL 0.3 0.4  --   --  0.3   ANEU 6.8 8.4*  --   --  4.6   ALYM 1.7 1.5  --   --  2.1   BRODY 0.7 0.5  --   --  0.5   AEOS 0.0 0.1  --   --  0.1   ABAS 0.0 0.0  --   --  0.0    < > = values in this interval not displayed.     CMP  Recent Labs   Lab Test 05/01/21  1248 04/20/21  2350 02/03/21  1626 02/04/19  1632 01/21/16  1152 01/21/16  1152 12/22/14  1039 05/10/14  1702    140 138 138   < > 136 138   < >   POTASSIUM 4.0 3.9 4.3 3.9   < > 4.2 3.7   < >   CHLORIDE 103 105 107 108   < > 104 107   < >   CO2 25 28 30 25   < > 27 23   < >   ANIONGAP 5 7 1* 5   < > 5 8   < >   GLC 87 111* 102* 98  --  86 103*  --    BUN 13 12 12 17   < > 11 5*   < >   CR 0.78 0.74 0.58  0.62   < > 0.60 0.48*   < >   GFRESTIMATED >90 >90 >90 >90   < > >90  Non  GFR Calc   >90  Non  GFR Calc     < >   GFRESTBLACK >90 >90 >90 >90   < > >90   GFR Calc   >90   GFR Calc     < >   PREETI 8.6 9.5 9.0 9.2   < > 8.9 8.9   < >   BILITOTAL 0.3  --  0.2 0.2  --  0.4  --    < >   ALBUMIN 4.1  --  3.6 4.0  --  4.3  --    < >   PROTTOTAL 7.8  --  7.5 7.8  --  7.9  --    < >   ALKPHOS 61  --  61 67  --  82  --    < >   AST 11  --  10 14  --  8  --    < >   ALT 18  --  18 27  --  18  --    < >    < > = values in this interval not displayed.     HgA1c  Recent Labs   Lab Test 02/03/21  1626   A1C 5.5     Iron Studies  Recent Labs   Lab Test 02/03/21  1626 01/21/16  1152   BOB 15 67   IRON 81 96    343   IRONSAT 23 28     Calcium/VitaminD  Recent Labs   Lab Test 08/16/21  1214 05/01/21  1248 04/20/21  2350 02/03/21  1626   PREETI  --  8.6 9.5 9.0   VITDT 23  --   --  18*     ESR/CRP  Recent Labs   Lab Test 08/16/21  1214 05/01/21  1248 01/21/16  1152   SED 32*  --  18   CRP <2.9 <2.9 <2.9     TSH/T4  Recent Labs   Lab Test 05/01/21  1248 02/03/21  1626 02/04/19  1632   TSH 1.18 2.28 1.68   T4 1.00  --   --      Lipid Panel  Recent Labs   Lab Test 05/03/21  1129 06/19/19  0921   CHOL 203* 215*   TRIG 150* 164*   HDL 47* 44*   * 138*   NHDL 156* 171*     Hepatitis B  Recent Labs   Lab Test 03/16/17  0955 09/23/14  0935 03/04/14  1004   HEPBANG Nonreactive Negative Negative     Hepatitis C  Recent Labs   Lab Test 01/21/16  1152 03/04/14  1004   HCVAB Nonreactive   Assay performance characteristics have not been established for newborns,   infants, and children   Negative     Lyme ab screening  Recent Labs   Lab Test 01/21/16  1152   LYMEGM 0.08     HIV Screening  Recent Labs   Lab Test 03/16/17  0955 09/23/14  0935 03/04/14  1004   HIAGAB Nonreactive   HIV-1 p24 Ag & HIV-1/HIV-2 Ab Not Detected   Nonreactive   HIV-1 p24 Ag & HIV-1/HIV-2 Ab Not  Detected   Nonreactive  HIV-1 p24 Ag & HIV-1/HIV-2 Ab Not Detected     UA  Recent Labs   Lab Test 05/03/21  1129 08/16/17  2200 05/10/14  1656   COLOR Yellow Straw Straw   APPEARANCE Clear Clear Clear   URINEGLC Negative Negative Negative   URINEBILI Negative Negative Negative   SG 1.015 1.004 1.003   URINEPH 7.0 7.0 6.5   PROTEIN Negative Negative Negative   UROBILINOGEN 0.2  --   --    NITRITE Negative Negative Negative   UBLD Moderate* Negative Moderate*   LEUKEST Negative Negative Negative   WBCU 0 - 5 1 <1   RBCU 2-5* <1 3*   SQUAMOUSEPI Few  --   --    BACTERIA Many* Few* Few*   MUCOUS  --  Present* Present*     Urine Microscopic  Recent Labs   Lab Test 05/03/21  1129 08/16/17  2200 05/10/14  1656   WBCU 0 - 5 1 <1   RBCU 2-5* <1 3*   SQUAMOUSEPI Few  --   --    BACTERIA Many* Few* Few*   MUCOUS  --  Present* Present*       Imaging:      I have reviewed all pertinent investigations including imaging, including outside records if relevant    CT chest w contrast (5/2/21)     FINDINGS: The heart is not enlarged without significant coronary  calcifications. The thoracic aorta is normal variant right arch and  otherwise unremarkable. without evidence of dissection or aneurysm.  The lungs are clear. There are no effusions. There is no axillary,  hilar, or mediastinal adenopathy.     The adrenal glands are normal. Question some hepatic steatosis. The  remainder of the visualized abdomen is unremarkable.     Bone windows show no destructive bony lesions.                                                                      IMPRESSION:  Negative CT study of the chest.    Procedures:   Upper GI endoscopy (5/26/21)  Impression:               - Normal esophagus.                             - Normal stomach. Biopsied.                             - Normal examined duodenum.     FINAL DIAGNOSIS:   Stomach, random, biopsy-   -Gastric oxyntic mucosa with focal mild nonspecific chronic inflammation   -Negative for active  inflammation, dysplasia or malignancy   -H. Pylori organisms are not identified on routine or immunostains.     Assessment / Plan:    Unique Baker is a 36 year old female with pmhx GERD, seborrheic keratosis, is referred to rheumatology clinic for polyarthralgia. Any prior notes, outside records, laboratory results, and imaging studies were reviewed if relevant. Pertinent work-up thus far includes mildly positive ESR (32) and negative CRP. She developed a constellation of symptoms shortly after receiving COVID-19 vaccine in April. Some time around June/July, she developed severe joint pain involving knees, ankles, wrists and elbows. She has received 2 separate courses of steroids with benefit but not complete resolution of her symptoms. Overall, her symptoms have improved significantly since onset and currently, her polyarthralgia occurs in flares (~4x/week) in the evening, lasting ~15-20 minutes. She does not have typical features of inflammatory arthritis such as prolonged AM stiffness, AM pain, or joint swelling/warmth. I am not sure if her symptoms are related to COVID-19/COVID-19 vaccine. I will perform a diagnostic evaluation for possible underlying autoimmune CTD contributing to her symptoms. Other ddx include PsA -she reports hx of rashes without clear explanation, though most recent dermatology notes report seborrheic keratosis and allergic dermatitis.     1) Polyarthralgia   -check CBC w diff, CMP, Hep C, RF, CCP, FELICITY, SSA, SSB, Sm, RNP, Scl-70, C3, C4, dsDNA  -given hx of GERD, advised avoiding ibuprofen/advil. Can take celebrex with meals PRN (no more than BID). Alternatively, recommend trying topical diclofenac gel, advised not to combine with PO NSAIDs      Ms. Baker verbalized agreement with and understanding of the rationale for the diagnosis and treatment plan.  All questions were answered to best of my ability and the patient's satisfaction. Ms. Baker was advised to contact the clinic with any  questions that may arise after the clinic visit.      Chart documentation done in part with Dragon Voice recognition Software. Although reviewed after completion, some word and grammatical error may remain.      RTC 6 weeks, in-person    Marisela Ceron MD

## 2021-10-20 ENCOUNTER — VIRTUAL VISIT (OUTPATIENT)
Dept: RHEUMATOLOGY | Facility: CLINIC | Age: 36
End: 2021-10-20
Payer: COMMERCIAL

## 2021-10-20 DIAGNOSIS — M25.50 MULTIPLE JOINT PAIN: ICD-10-CM

## 2021-10-20 PROCEDURE — 99204 OFFICE O/P NEW MOD 45 MIN: CPT | Mod: 95 | Performed by: STUDENT IN AN ORGANIZED HEALTH CARE EDUCATION/TRAINING PROGRAM

## 2021-10-20 RX ORDER — CELECOXIB 200 MG/1
200 CAPSULE ORAL DAILY PRN
Qty: 30 CAPSULE | Refills: 0 | Status: SHIPPED | OUTPATIENT
Start: 2021-10-20 | End: 2022-03-29

## 2021-10-20 NOTE — PATIENT INSTRUCTIONS
-Please make an appointment for lab at any [facility] location near you  -I will be in touch once all test results are back  -Can take celebrex as needed for pain management, do not take more than twice in one day. Do NOT combine with other oral NSAIDs such as ibuprofen, advil, aleve, naproxen  -Can use topical voltaren gel over knees and ankles for pain management. Do NOT combine with celebrex or other oral NSAIDs such as ibuprofen, advil, aleve, naproxen  -Follow-up in 6 weeks, in-person

## 2021-10-22 ENCOUNTER — LAB (OUTPATIENT)
Dept: LAB | Facility: CLINIC | Age: 36
End: 2021-10-22
Payer: COMMERCIAL

## 2021-10-22 DIAGNOSIS — M25.50 MULTIPLE JOINT PAIN: ICD-10-CM

## 2021-10-22 LAB
BASOPHILS # BLD AUTO: 0 10E3/UL (ref 0–0.2)
BASOPHILS NFR BLD AUTO: 0 %
EOSINOPHIL # BLD AUTO: 0.1 10E3/UL (ref 0–0.7)
EOSINOPHIL NFR BLD AUTO: 1 %
ERYTHROCYTE [DISTWIDTH] IN BLOOD BY AUTOMATED COUNT: 14.6 % (ref 10–15)
HCT VFR BLD AUTO: 36.9 % (ref 35–47)
HGB BLD-MCNC: 12.1 G/DL (ref 11.7–15.7)
LYMPHOCYTES # BLD AUTO: 2.1 10E3/UL (ref 0.8–5.3)
LYMPHOCYTES NFR BLD AUTO: 28 %
MCH RBC QN AUTO: 26.7 PG (ref 26.5–33)
MCHC RBC AUTO-ENTMCNC: 32.8 G/DL (ref 31.5–36.5)
MCV RBC AUTO: 81 FL (ref 78–100)
MONOCYTES # BLD AUTO: 0.6 10E3/UL (ref 0–1.3)
MONOCYTES NFR BLD AUTO: 8 %
NEUTROPHILS # BLD AUTO: 4.7 10E3/UL (ref 1.6–8.3)
NEUTROPHILS NFR BLD AUTO: 63 %
PLATELET # BLD AUTO: 206 10E3/UL (ref 150–450)
RBC # BLD AUTO: 4.54 10E6/UL (ref 3.8–5.2)
WBC # BLD AUTO: 7.4 10E3/UL (ref 4–11)

## 2021-10-22 PROCEDURE — 86200 CCP ANTIBODY: CPT

## 2021-10-22 PROCEDURE — 85025 COMPLETE CBC W/AUTO DIFF WBC: CPT

## 2021-10-22 PROCEDURE — 86431 RHEUMATOID FACTOR QUANT: CPT

## 2021-10-22 PROCEDURE — 86039 ANTINUCLEAR ANTIBODIES (ANA): CPT

## 2021-10-22 PROCEDURE — 86160 COMPLEMENT ANTIGEN: CPT

## 2021-10-22 PROCEDURE — 86160 COMPLEMENT ANTIGEN: CPT | Mod: 59

## 2021-10-22 PROCEDURE — 86235 NUCLEAR ANTIGEN ANTIBODY: CPT | Mod: 59

## 2021-10-22 PROCEDURE — 80053 COMPREHEN METABOLIC PANEL: CPT

## 2021-10-22 PROCEDURE — 86235 NUCLEAR ANTIGEN ANTIBODY: CPT

## 2021-10-22 PROCEDURE — 86225 DNA ANTIBODY NATIVE: CPT

## 2021-10-22 PROCEDURE — 86038 ANTINUCLEAR ANTIBODIES: CPT

## 2021-10-22 PROCEDURE — 36415 COLL VENOUS BLD VENIPUNCTURE: CPT

## 2021-10-22 PROCEDURE — 86803 HEPATITIS C AB TEST: CPT

## 2021-10-23 LAB — HCV AB SERPL QL IA: NONREACTIVE

## 2021-10-24 LAB
ALBUMIN SERPL-MCNC: 3.6 G/DL (ref 3.4–5)
ALP SERPL-CCNC: 73 U/L (ref 40–150)
ALT SERPL W P-5'-P-CCNC: 25 U/L (ref 0–50)
ANION GAP SERPL CALCULATED.3IONS-SCNC: 6 MMOL/L (ref 3–14)
AST SERPL W P-5'-P-CCNC: 10 U/L (ref 0–45)
BILIRUB SERPL-MCNC: 0.3 MG/DL (ref 0.2–1.3)
BUN SERPL-MCNC: 11 MG/DL (ref 7–30)
CALCIUM SERPL-MCNC: 8.8 MG/DL (ref 8.5–10.1)
CHLORIDE BLD-SCNC: 106 MMOL/L (ref 94–109)
CO2 SERPL-SCNC: 23 MMOL/L (ref 20–32)
CREAT SERPL-MCNC: 0.67 MG/DL (ref 0.52–1.04)
GFR SERPL CREATININE-BSD FRML MDRD: >90 ML/MIN/1.73M2
GLUCOSE BLD-MCNC: 85 MG/DL (ref 70–99)
POTASSIUM BLD-SCNC: 3.9 MMOL/L (ref 3.4–5.3)
PROT SERPL-MCNC: 7.8 G/DL (ref 6.8–8.8)
SODIUM SERPL-SCNC: 135 MMOL/L (ref 133–144)

## 2021-10-25 LAB
ANA PAT SER IF-IMP: ABNORMAL
ANA SER QL IF: ABNORMAL
ANA TITR SER IF: ABNORMAL {TITER}
C3 SERPL-MCNC: 157 MG/DL (ref 81–157)
C4 SERPL-MCNC: 49 MG/DL (ref 13–39)
CCP AB SER IA-ACNC: 1.3 U/ML
DSDNA AB SER-ACNC: 1.6 IU/ML
ENA SM IGG SER IA-ACNC: <1.6 U/ML
ENA SM IGG SER IA-ACNC: NEGATIVE
ENA SS-A AB SER IA-ACNC: <0.5 U/ML
ENA SS-A AB SER IA-ACNC: NEGATIVE
ENA SS-B IGG SER IA-ACNC: <0.6 U/ML
ENA SS-B IGG SER IA-ACNC: NEGATIVE
RHEUMATOID FACT SER NEPH-ACNC: <7 IU/ML
U1 SNRNP IGG SER IA-ACNC: <1.1 U/ML
U1 SNRNP IGG SER IA-ACNC: NEGATIVE

## 2021-10-26 LAB
ENA SCL70 IGG SER IA-ACNC: <0.6 U/ML
ENA SCL70 IGG SER IA-ACNC: NEGATIVE

## 2021-10-27 ENCOUNTER — TELEPHONE (OUTPATIENT)
Dept: RHEUMATOLOGY | Facility: CLINIC | Age: 36
End: 2021-10-27

## 2021-10-27 NOTE — RESULT ENCOUNTER NOTE
Dear Unique,     Your labs showed a borderline positive test result called FELICITY. This is a screening antibody test for a variety of autoimmune connective tissue diseases. If positive, we check specific antibodies related to these connective tissue diseases. In your case, the specific antibodies (for lupus, Sjogren's syndrome, scleroderma) were negative, which is good! Antibodies for rheumatoid arthritis are negative. Cell counts, kidney marker and liver markers are in the normal range. These results point against an underlying active systemic connective tissue disease. I would still like to see you in clinic as planned so we can do a thorough joint exam. If your symptoms are not severe, you do not need to take the celebrex on a regular basis.     Please let us know if you have any questions or concerns.    Regards,  Marisela Ceron MD

## 2021-10-27 NOTE — TELEPHONE ENCOUNTER
Patient was calling and wondering if Dr. Ceron was able to review her lab results. I told her that Dr. Ceron had already sent her a result note. She has not read this yet. We went over her result note. She verbalized understanding. Reviewed that she should keep her in person follow up so Dr. Ceron can do a thorough joint exam, she will keep this appointment. She was appreciative of the phone call and had no further questions at this time.     Huyen Lyn, BSN, RN  Medical Specialty Care Coordinator  [facility]

## 2021-10-27 NOTE — TELEPHONE ENCOUNTER
[facility] Call Center    Phone Message    May a detailed message be left on voicemail: yes     Reason for Call: Other: Pt would like to talk to someone about her lab results and starting the mediation that Dr Ceron prescribed. Please call pt [phone].     Action Taken: Message routed to:  Adult Clinics: Rheumatology p [id number]    Travel Screening: Not Applicable

## 2021-10-28 ENCOUNTER — TELEPHONE (OUTPATIENT)
Dept: INTERNAL MEDICINE | Facility: CLINIC | Age: 36
End: 2021-10-28

## 2021-10-28 DIAGNOSIS — J31.0 CHRONIC RHINITIS: ICD-10-CM

## 2021-10-28 DIAGNOSIS — H93.13 TINNITUS, BILATERAL: Primary | ICD-10-CM

## 2021-10-28 NOTE — TELEPHONE ENCOUNTER
Patient calling  She started Flonase and Zyrtec and its not helping her. She wants to know what is the next step  Ok to call and  [phone]

## 2021-10-29 NOTE — TELEPHONE ENCOUNTER
Left voice message for patient that seeing ENT would be the next step. This RN does not see referral on file, asked patient to call back if she does need a referral.    Kathi Chavez RN  [facility]

## 2021-10-29 NOTE — TELEPHONE ENCOUNTER
Routed to Suzanne Borden NP to review as she saw patient in clinic.    Kathi Chavez RN  [facility]

## 2021-11-01 ENCOUNTER — MYC MEDICAL ADVICE (OUTPATIENT)
Dept: RHEUMATOLOGY | Facility: CLINIC | Age: 36
End: 2021-11-01

## 2021-11-02 DIAGNOSIS — K21.00 GASTROESOPHAGEAL REFLUX DISEASE WITH ESOPHAGITIS WITHOUT HEMORRHAGE: Primary | ICD-10-CM

## 2021-11-02 NOTE — TELEPHONE ENCOUNTER
Nba sent to patient.     JONAS HendrixN, RN   Rheumatology RN Care Coordinator   [facility]   [phone]

## 2021-11-02 NOTE — PROGRESS NOTES
"RHEUMATOLOGY FOLLOW-UP NOTE    Chief Complaint:    Chief Complaint   Patient presents with     RECHECK     6 week follow-up, labs completed 10/22/2021       Interval History:    -she sent us a mychart stating she tried celebrex x 1 week without any relief in symptoms. Today, she reports it may have helped a little but she can still feel the pain. Pain gets worse around her menstrual cycle. She describes her pain as achy in character. Pain migrates and is not consistent. Pain seems to involve difference areas, even areas away from the joints. Denies any joint swelling  -she finds tylenol helpful  -she overall feels better compared to when symptoms initially started  -denies fevers, recent infections or hospitalizations  -no new symptoms    Rheumatological History:  Unique Baker is a 36 year old female with pmhx GERD, seborrheic keratosis, is referred to rheumatology clinic for polyarthralgia. She reports tejinder COVID-19 beginning of March. She received COVID-19 vaccine in April. 3 days after getting the vaccine, she developed a constellation of symptoms including chest tightness and abnormal sensation over her L arm. She went to the ED for chest tightness and had cardiac evaluation. She was started on reflux medication which she still takes. Has seen GI for acid reflux and loose stools. Had an upper GI in May which showed non-specific chronic inflammation in the stomach. She went to  for evaluation of L arm abnormal sensation. She was given a course of steroids (medrol dosepak) which helped her symptoms somewhat. Shortly thereafter, she started developing sensation of \"burning\" in her head and chest. She then started developing joint pain (around June or July) over her knees, ankles, and wrists. She presented to UC again for ongoing symptoms and received steroid burst which helped her symptoms (around September) but they have not subsided. She has since been getting \"flares\" of joint pain.      She describes " "her flares as abrupt onset mild pain over her knees and ankles when she is resting/watching TV. She takes advil for pain which helps resolve the pain. Pain can last for ~15-20 minutes. Pain episodes come on 4 times a week. However, she feels \"tightness\" over her knees and ankles mid-day everyday. She tries and stretch from time to time. Walking helps the stiffness but not the pain. Overall, the intensity of pain has improved since it started in July. Pain does not wake her up at night anymore but it used to back in July. She denies any AM pain or stiffness over her joints. She denies any joint swelling, erythema or warmth.      She denies hx of psoriasis. However, she reports she started having unexplained rash ~4 years ago. She has seen dermatology in the past and has been diagnosed with seborrheic keratosis. She denies photosensitivity. Rash can be over her back, legs, stomach, neck. Denies any facial rash. She has been given topical steroids by dermatology which helps but rash recurs. She describes the rash as \"bumpy\" and pruritic. She denies scalp flakiness.      Denies fatigue, fevers, chills, weight loss, night sweats, vision changes, eye pain/redness, inflammatory eye disease, dry eyes, dry mouth, +reports episode of painful oral ulcer over the roof of the mouth which has resolved, denies nasal ulcers, +chronic hair loss/thinning with hx of alopecia, denies raynaud's, cough, SOB, pleurisy, difficulty swallowing, abdominal pain, diarrhea, hematochezia, melena, dysuria, back pain, enthesitis, dactylitis, +numbness/tingling over hands and feet. No hx of IBD. No hx of blood clots. One ectopic pregnancy.     Pertinent labs and imaging: (per chart review in [facility] and care everywhere)     Labs:   10/22/21: negative CBC w diff, CMP, +FELICITY 1:80 homogeneous, no hypocomplementemia, negative dsDNA, SSA, SSB, Sm, RNP, Hep C, RF, CCP, Scl-70  8/16/21: +ESR 32, negative CRP  5/1/21: negative BMP, CRP, CBC w diff, negative " LFTs    Medications:  Current Outpatient Medications   Medication Sig Dispense Refill     celecoxib (CELEBREX) 200 MG capsule Take 1 capsule (200 mg) by mouth daily as needed for pain 30 capsule 0     cetirizine (ZYRTEC) 10 MG tablet Take 1 tablet (10 mg) by mouth daily 30 tablet 1     esomeprazole (NEXIUM) 20 MG DR capsule Take 1 capsule (20 mg) by mouth every morning (before breakfast) Take 30-60 minutes before eating. 30 capsule 1     famotidine (PEPCID) 20 MG tablet TAKE ONE TABLET BY MOUTH TWICE A DAY AS NEEDED FOR REFLUX 180 tablet 3     fluocinonide emulsified base 0.05 % external cream apply to rash as needed - sparingly 60 g 1     fluticasone (FLONASE) 50 MCG/ACT nasal spray Spray 1 spray into both nostrils 2 times daily as needed for rhinitis or allergies Take 1 spray twice daily x 1 week then 1 spray daily at night x 1 week then as needed 11.1 mL 1     hydrOXYzine (ATARAX) 25 MG tablet Take 1 tablet (25 mg) by mouth every 8 hours as needed for anxiety 30 tablet 0     multivitamin (ONE-DAILY) tablet Take 1 tablet by mouth daily       vitamin D3 (CHOLECALCIFEROL) 50 mcg (2000 units) tablet Take 1 tablet (50 mcg) by mouth daily 100 tablet 3     zolpidem (AMBIEN) 10 MG tablet Take 1 tablet (10 mg) by mouth nightly as needed for sleep 10 tablet 0     sucralfate (CARAFATE) 1 GM tablet Take 1 tablet (1 g) by mouth 4 times daily (Patient not taking: Reported on 11/4/2021)         PHYSICAL EXAMINATION:   Vital signs:  /64 (BP Location: Left arm, Patient Position: Sitting)   Pulse 65   Temp 98.4  F (36.9  C) (Oral)   Wt 69.5 kg (153 lb 3.2 oz)   LMP  (LMP Unknown)   SpO2 100%   Breastfeeding No   BMI 28.02 kg/m      MSK: no synovitis of joints, no joint effusions/warmth/erythema, no joint tenderness, no dactylitis, no enthesitis, ROM intact in all joints, able to make a fist b/l, no nail pitting, no nailfold capillary abnormalities, no SI joint tenderness  Skin: no rashes  HEENT: MMM, no oral  ulcers/lesions, no alopecia, no BRYAN  CV: RRR  Pulm: CTAB, non-labored respirations, no c/r/w  Neuro: A&O x3, no focal deficits    Labs:      I have reviewed all pertinent investigations including labs, including outside records if relevant    RF/CCP  Recent Labs   Lab Test 10/22/21  1200 01/21/16  1152   CCPIGG 1.3  --    RHF <7 <20     FELICITY  Recent Labs   Lab Test 10/22/21  1200 01/21/16  1152   ADIN  --  <1.0  Interpretation:  Negative     CHARLEE Borderline Positive*  --    ANAP1 Homogeneous  --    ANAT1 1:80  --      RNP/Sm/SSA/SSB  Recent Labs   Lab Test 10/22/21  1200 10/13/17  1430   RNPIGG Negative  --    SMIGG Negative  --    SSAIGG Negative  --    SSBIGG Negative  --    SCLIGG Negative  --    TREPAB  --  Negative     dsDNA  Recent Labs   Lab Test 10/22/21  1200   DNA 1.6     C3/C4  Recent Labs   Lab Test 10/22/21  1200   I7GSNAW 157   Y6ZJQDG 49*     CBC  Recent Labs   Lab Test 10/22/21  1200 05/01/21  1248 04/20/21  2350 03/16/17  0955 01/21/16  1152   WBC 7.4 9.2 10.6   < > 7.3   RBC 4.54 4.49 4.48   < > 4.77   HGB 12.1 12.7 13.0   < > 14.1   HCT 36.9 38.4 39.7   < > 42.4   MCV 81 86 89   < > 89   RDW 14.6 13.2 13.2   < > 12.7    258 214   < > 209   MCH 26.7 28.3 29.0   < > 29.6   MCHC 32.8 33.1 32.7   < > 33.3   NEUTROPHIL 63 73.6 78.9  --  62.7   LYMPH 28 18.2 14.4  --  29.4   MONOCYTE 8 7.8 4.8  --  6.2   EOSINOPHIL 1 0.1 1.1  --  1.4   BASOPHIL 0 0.3 0.4  --  0.3   ANEU  --  6.8 8.4*  --  4.6   ALYM  --  1.7 1.5  --  2.1   BRODY  --  0.7 0.5  --  0.5   AEOS  --  0.0 0.1  --  0.1   ABAS  --  0.0 0.0  --  0.0    < > = values in this interval not displayed.     CMP  Recent Labs   Lab Test 10/22/21  1200 05/01/21  1248 04/20/21  2350 02/03/21  1626 02/04/19  1632 02/04/19  1632 01/21/16  1152 01/21/16  1152    133 140 138   < > 138   < > 136   POTASSIUM 3.9 4.0 3.9 4.3   < > 3.9   < > 4.2   CHLORIDE 106 103 105 107   < > 108   < > 104   CO2 23 25 28 30   < > 25   < > 27   ANIONGAP 6 5 7 1*   < >  5   < > 5   GLC 85 87 111* 102*  --  98  --  86   BUN 11 13 12 12   < > 17   < > 11   CR 0.67 0.78 0.74 0.58   < > 0.62   < > 0.60   GFRESTIMATED >90 >90 >90 >90   < > >90   < > >90  Non  GFR Calc     GFRESTBLACK  --  >90 >90 >90  --  >90   < > >90  African American GFR Calc     PREETI 8.8 8.6 9.5 9.0   < > 9.2   < > 8.9   BILITOTAL 0.3 0.3  --  0.2  --  0.2   < > 0.4   ALBUMIN 3.6 4.1  --  3.6  --  4.0   < > 4.3   PROTTOTAL 7.8 7.8  --  7.5  --  7.8   < > 7.9   ALKPHOS 73 61  --  61  --  67   < > 82   AST 10 11  --  10  --  14   < > 8   ALT 25 18  --  18  --  27   < > 18    < > = values in this interval not displayed.     HgA1c  Recent Labs   Lab Test 02/03/21  1626   A1C 5.5     Calcium/VitaminD  Recent Labs   Lab Test 10/22/21  1200 08/16/21  1214 05/01/21  1248 04/20/21  2350 02/03/21  1626 02/03/21  1626   PREETI 8.8  --  8.6 9.5   < > 9.0   VITDT  --  23  --   --   --  18*    < > = values in this interval not displayed.     ESR/CRP  Recent Labs   Lab Test 08/16/21  1214 05/01/21  1248 01/21/16  1152   SED 32*  --  18   CRP <2.9 <2.9 <2.9     TSH/T4  Recent Labs   Lab Test 05/01/21  1248 02/03/21  1626 02/04/19  1632   TSH 1.18 2.28 1.68   T4 1.00  --   --      Lipid Panel  Recent Labs   Lab Test 05/03/21  1129 06/19/19  0921   CHOL 203* 215*   TRIG 150* 164*   HDL 47* 44*   * 138*   NHDL 156* 171*     Hepatitis B  Recent Labs   Lab Test 03/16/17  0955 09/23/14  0935 03/04/14  1004   HEPBANG Nonreactive Negative Negative     Hepatitis C  Recent Labs   Lab Test 10/22/21  1200 01/21/16  1152 03/04/14  1004   HCVAB Nonreactive Nonreactive   Assay performance characteristics have not been established for newborns,   infants, and children   Negative     HIV Screening  Recent Labs   Lab Test 03/16/17  0955 09/23/14  0935 03/04/14  1004   HIAGAB Nonreactive   HIV-1 p24 Ag & HIV-1/HIV-2 Ab Not Detected   Nonreactive   HIV-1 p24 Ag & HIV-1/HIV-2 Ab Not Detected   Nonreactive  HIV-1 p24 Ag & HIV-1/HIV-2  Ab Not Detected     UA  Recent Labs   Lab Test 05/03/21  1129 08/16/17  2200 05/10/14  1656   COLOR Yellow Straw Straw   APPEARANCE Clear Clear Clear   URINEGLC Negative Negative Negative   URINEBILI Negative Negative Negative   SG 1.015 1.004 1.003   URINEPH 7.0 7.0 6.5   PROTEIN Negative Negative Negative   UROBILINOGEN 0.2  --   --    NITRITE Negative Negative Negative   UBLD Moderate* Negative Moderate*   LEUKEST Negative Negative Negative   WBCU 0 - 5 1 <1   RBCU 2-5* <1 3*   SQUAMOUSEPI Few  --   --    BACTERIA Many* Few* Few*   MUCOUS  --  Present* Present*     Urine Microscopic  Recent Labs   Lab Test 05/03/21  1129 08/16/17  2200 05/10/14  1656   WBCU 0 - 5 1 <1   RBCU 2-5* <1 3*   SQUAMOUSEPI Few  --   --    BACTERIA Many* Few* Few*   MUCOUS  --  Present* Present*       Imaging:    I have reviewed all pertinent investigations including imaging, including outside records if relevant    CT chest w contrast (5/2/21)     FINDINGS: The heart is not enlarged without significant coronary  calcifications. The thoracic aorta is normal variant right arch and  otherwise unremarkable. without evidence of dissection or aneurysm.  The lungs are clear. There are no effusions. There is no axillary,  hilar, or mediastinal adenopathy.     The adrenal glands are normal. Question some hepatic steatosis. The  remainder of the visualized abdomen is unremarkable.     Bone windows show no destructive bony lesions.                                                                      IMPRESSION:  Negative CT study of the chest.     Procedures:   Upper GI endoscopy (5/26/21)  Impression:               - Normal esophagus.                             - Normal stomach. Biopsied.                             - Normal examined duodenum.      FINAL DIAGNOSIS:   Stomach, random, biopsy-   -Gastric oxyntic mucosa with focal mild nonspecific chronic inflammation   -Negative for active inflammation, dysplasia or malignancy   -H. Pylori  organisms are not identified on routine or immunostains.        Assessment / Plan:    Unique Baker is a 36 year old female with pmhx GERD, seborrheic keratosis, is presenting for follow-up of polyarthralgia. Any prior notes, outside records, laboratory results, and imaging studies were reviewed if relevant. Pertinent work-up thus far includes mildly positive ESR (32) and negative CRP. On my evaluation, she is found to have negative CBC w diff, CMP, +FELICITY 1:80 homogeneous, no hypocomplementemia, negative dsDNA, SSA, SSB, Sm, RNP, Hep C, RF, CCP, Scl-70.     She developed a constellation of symptoms shortly after receiving COVID-19 vaccine in April. Some time around June/July, she developed severe joint pain involving knees, ankles, wrists and elbows. She has received 2 separate courses of steroids with benefit but not complete resolution of her symptoms. Overall, her symptoms have improved significantly since onset and currently, her polyarthralgia occurs in flares (~4x/week) in the evening, lasting ~15-20 minutes. She does not have typical features of inflammatory arthritis such as prolonged AM stiffness, AM pain, or joint swelling/warmth. I am not sure if her symptoms are related to COVID-19/COVID-19 vaccine. Her rheumatologic evaluation is largely negative. She does not have any synovitis, joint effusions, joint tenderness, enthesitis, dactylitis or nail pitting on exam today. Strength is intact throughout. Other ddx include PsA -she reports hx of rashes without clear explanation, though most recent dermatology notes report seborrheic keratosis and allergic dermatitis.     We discussed that a positive FELICITY can be seen in a variety of different diseases and syndrome.  The anti-nuclear antibody is a screening test for auto-immune disease.  Depending on the level or titer, it may be seen in autoimmune diseases such as SLE, sjogren's, scleroderma but could also be seen in auto-immune hepatitis, hypothyroidism and  various other disease.  A positive FELICITY does not give one a conclusive diagnosis of SLE as it may be a false positive and may not be significant as well.  Further w/u for auto-immune disease can be considered based on clinical symptoms and other laboratory findings. In her case, specific autoantibodies are negative.       1) Polyarthralgia   -can take tylenol PRN as it helps the pain  -follow-up with PCP  -can also try topical diclofenac gel, advised not to combine with PO NSAIDs  -she is advised to inform me if she has any persistent joint swelling       Ms. Baker verbalized agreement with and understanding of the rationale for the diagnosis and treatment plan.  All questions were answered to best of my ability and the patient's satisfaction. Ms. Baker was advised to contact the clinic with any questions that may arise after the clinic visit.        Chart documentation done in part with Dragon Voice recognition Software. Although reviewed after completion, some word and grammatical error may remain      RTC PRN    Marisela Ceron MD

## 2021-11-03 NOTE — TELEPHONE ENCOUNTER
Patient asking for Pepcid??    Is/was currently on Omeprazole. See notes from visit 8/10/21.    esomeprazole (NEXIUM) 20 MG DR capsule 30 capsule 1 8/10/2021  --   Sig - Route: Take 1 capsule (20 mg) by mouth every morning (before breakfast) Take 30-60 minutes before eating. - Oral   Sent to pharmacy as: Esomeprazole Magnesium 20 MG Oral Capsule Delayed Release (nexIUM)   Class: E-Prescribe     Please clarify.    Please refill as appropriate.  Thank you,    Augusta Canales RN  [facility]

## 2021-11-03 NOTE — NURSING NOTE
"Unique Baker's goals for this visit include:   Chief Complaint   Patient presents with     RECHECK     6 week follow-up, labs completed 10/22/2021       PCP: Celina Riggs    Referring Provider:  No referring provider defined for this encounter.      Initial /64 (BP Location: Left arm, Patient Position: Sitting)   Pulse 65   Temp 98.4  F (36.9  C) (Oral)   Wt 69.5 kg (153 lb 3.2 oz)   LMP  (LMP Unknown)   SpO2 100%   Breastfeeding No   BMI 28.02 kg/m   Estimated body mass index is 28.02 kg/m  as calculated from the following:    Height as of 10/13/21: 1.575 m (5' 2\").    Weight as of this encounter: 69.5 kg (153 lb 3.2 oz).    Medication Reconciliation: complete    Do you need any medication refills at today's visit? none      JOSE ANTONIO Goodman [facility]   "

## 2021-11-04 ENCOUNTER — OFFICE VISIT (OUTPATIENT)
Dept: RHEUMATOLOGY | Facility: CLINIC | Age: 36
End: 2021-11-04
Payer: COMMERCIAL

## 2021-11-04 VITALS
OXYGEN SATURATION: 100 % | SYSTOLIC BLOOD PRESSURE: 107 MMHG | WEIGHT: 153.2 LBS | DIASTOLIC BLOOD PRESSURE: 64 MMHG | TEMPERATURE: 98.4 F | BODY MASS INDEX: 28.02 KG/M2 | HEART RATE: 65 BPM

## 2021-11-04 DIAGNOSIS — M25.50 POLYARTHRALGIA: Primary | ICD-10-CM

## 2021-11-04 PROCEDURE — 99213 OFFICE O/P EST LOW 20 MIN: CPT | Performed by: STUDENT IN AN ORGANIZED HEALTH CARE EDUCATION/TRAINING PROGRAM

## 2021-11-04 RX ORDER — FAMOTIDINE 20 MG/1
TABLET, FILM COATED ORAL
Qty: 180 TABLET | Refills: 3 | Status: SHIPPED | OUTPATIENT
Start: 2021-11-04

## 2021-11-04 ASSESSMENT — PAIN SCALES - GENERAL: PAINLEVEL: MODERATE PAIN (5)

## 2021-11-04 NOTE — PATIENT INSTRUCTIONS
1. Can use tylenol for pain management  2. Let me know if you have joint swelling  3. Follow-up as needed

## 2021-11-09 ENCOUNTER — TRANSFERRED RECORDS (OUTPATIENT)
Dept: HEALTH INFORMATION MANAGEMENT | Facility: CLINIC | Age: 36
End: 2021-11-09
Payer: COMMERCIAL

## 2021-12-06 ENCOUNTER — TELEPHONE (OUTPATIENT)
Dept: INTERNAL MEDICINE | Facility: CLINIC | Age: 36
End: 2021-12-06
Payer: COMMERCIAL

## 2021-12-06 DIAGNOSIS — G89.4 CHRONIC PAIN SYNDROME: Primary | ICD-10-CM

## 2021-12-06 NOTE — TELEPHONE ENCOUNTER
Patient is having left side joint pain and her head feels like it is on fire and she just feels sick.  atient states that her symptoms only calm down when she is on Steroids.  Patient states that it is worse when she has her menstrual cycle. Patient was told by ENT to go back to Rheumatology and Rheumatology told patient to go back to her PCP.  Patient would like to know what next steps are.  Please advise.

## 2021-12-07 NOTE — TELEPHONE ENCOUNTER
"Call to patient. Patient reports she saw ENT and was put on steroids. They told her that if the steroids work for her symptoms then to see a rheumatologist. Patient called rheumatology and was told they can't do anything. Patient called ENT back and then was told to speak to her primary care provider and get another rheumatologist.     Patient reports right now she has ankle and knee pain, mostly on the left. \"My head feels like it's on fire. I feel like my head is heavy. It's not a headache.\"     \"When I'm on there steroids, it helps me. I can function.\"  \"My symptoms are worse around my period.\"    Patient denies fever.     Patient reports she has \"been dealing with these symptoms for some time now.\" Patient aware that Dr. Riggs is out of the clinic for the rest of the day, but requests Dr. Riggs review and advise on next steps when she returns to clinic tomorrow.  Advised patient that should symptoms worsen or she has questions/concerns, she is to call the clinic back or go to urgent care/ED. Patient verbalized understanding.    Patient wants primary care provider to either order steroids for her or send another referral to rheumatology.     Mariela CM RN   [facility]      "

## 2021-12-08 NOTE — TELEPHONE ENCOUNTER
The next step would be for her to see the pain clinic. Let me know if she wants me to place a referral

## 2021-12-09 ENCOUNTER — TRANSFERRED RECORDS (OUTPATIENT)
Dept: HEALTH INFORMATION MANAGEMENT | Facility: CLINIC | Age: 36
End: 2021-12-09
Payer: COMMERCIAL

## 2021-12-14 ENCOUNTER — TELEPHONE (OUTPATIENT)
Dept: SLEEP MEDICINE | Facility: CLINIC | Age: 36
End: 2021-12-14
Payer: COMMERCIAL

## 2021-12-14 ENCOUNTER — TELEPHONE (OUTPATIENT)
Dept: PALLIATIVE MEDICINE | Facility: CLINIC | Age: 36
End: 2021-12-14
Payer: COMMERCIAL

## 2021-12-14 NOTE — TELEPHONE ENCOUNTER
My Clinical Question Is:   she has had diffuse pain since covid injection almost a year ago, any suggestions how to manage pain,            Timeframe Requested:   Routine: Next available opening            Priority:   Routine [1]            Reason for Referral:   Evaluation for Comprehensive Services            Please review opioid agreement in process instructions, do you agree to these terms?   Yes            Are there any red flags that may impact the assessment or management of the patient?   N/A            Preferred Location:   [facility] - Various Locations          Please review

## 2021-12-14 NOTE — LETTER
December 15, 2021    Unique Baker  [address] [address] [address]    Dear Unique             Welcome to the [facility] at the [facility]. We are located at [address], [address], [place] [address]. Your appointment has been scheduled on Tuesday 1/11/2022 at 2:30p with Huyen Nelson NP.    At your first visit, you will meet your team of caregivers who will help you to develop pain management strategies that will last a lifetime. You will meet with our support staff to review your insurance information and collect your co-payment if required by your insurance company. You will meet with a medical pain specialist and care coordinator who will assess your pain and develop a plan of care for your successful pain rehabilitation. You should expect to spend approximately 1 hour at your first visit with us. Usually, patients work with us for a period of 6-12 months, and eventually return to their primary doctor once their pain management has stabilized.      To help us make your visit go as smoothly as possible, please bring the following items with you on your visit:     Completed Pain Questionnaire enclosed in this packet.  If you do not bring the completed questionnaire, we may have to reschedule your appointment.    List of any medicines that you are currently taking or have been prescribed    Pertinent NON-[place] medical information such as medical records or tests results (X-rays, or laboratory tests)    Your health insurance card    Financial resources to cover your co-payment or balance due at the time of service (cash, personal check, Visa, and MasterCard are acceptable methods of payment)     Due to the high demand for new patient evaluations, you must notify the scheduling department 48 hours (2 days) in advance if you are not able to keep this appointment.  Failure to do so could affect your ability to reschedule with our  clinic. Please do not assume that you will receive any prescription medications at your first visit.    Please call [phone] with any questions regarding your appointment. We look forward to meeting you and working to address your health care needs.     Sincerely,    [facility]

## 2021-12-14 NOTE — TELEPHONE ENCOUNTER
Dental Device Orders have been received from [facility] and given to [facility] to review and sign.    JOSE ANTONIO Rice

## 2021-12-15 NOTE — TELEPHONE ENCOUNTER

## 2021-12-17 ENCOUNTER — TRANSFERRED RECORDS (OUTPATIENT)
Dept: HEALTH INFORMATION MANAGEMENT | Facility: CLINIC | Age: 36
End: 2021-12-17
Payer: COMMERCIAL

## 2021-12-30 ENCOUNTER — OFFICE VISIT (OUTPATIENT)
Dept: INTERNAL MEDICINE | Facility: CLINIC | Age: 36
End: 2021-12-30
Payer: COMMERCIAL

## 2021-12-30 VITALS
WEIGHT: 155 LBS | HEIGHT: 62 IN | TEMPERATURE: 97.9 F | BODY MASS INDEX: 28.52 KG/M2 | HEART RATE: 82 BPM | OXYGEN SATURATION: 100 % | DIASTOLIC BLOOD PRESSURE: 70 MMHG | SYSTOLIC BLOOD PRESSURE: 104 MMHG

## 2021-12-30 DIAGNOSIS — M25.50 MULTIPLE JOINT PAIN: ICD-10-CM

## 2021-12-30 DIAGNOSIS — K21.00 GASTROESOPHAGEAL REFLUX DISEASE WITH ESOPHAGITIS WITHOUT HEMORRHAGE: ICD-10-CM

## 2021-12-30 DIAGNOSIS — T88.1XXD: ICD-10-CM

## 2021-12-30 DIAGNOSIS — H93.19 TINNITUS, UNSPECIFIED LATERALITY: Primary | ICD-10-CM

## 2021-12-30 LAB
BASOPHILS # BLD AUTO: 0 10E3/UL (ref 0–0.2)
BASOPHILS NFR BLD AUTO: 0 %
EOSINOPHIL # BLD AUTO: 0.1 10E3/UL (ref 0–0.7)
EOSINOPHIL NFR BLD AUTO: 1 %
ERYTHROCYTE [DISTWIDTH] IN BLOOD BY AUTOMATED COUNT: 14.6 % (ref 10–15)
ERYTHROCYTE [SEDIMENTATION RATE] IN BLOOD BY WESTERGREN METHOD: 34 MM/HR (ref 0–20)
HCT VFR BLD AUTO: 37.9 % (ref 35–47)
HGB BLD-MCNC: 12.1 G/DL (ref 11.7–15.7)
LYMPHOCYTES # BLD AUTO: 2.4 10E3/UL (ref 0.8–5.3)
LYMPHOCYTES NFR BLD AUTO: 32 %
MCH RBC QN AUTO: 26.3 PG (ref 26.5–33)
MCHC RBC AUTO-ENTMCNC: 31.9 G/DL (ref 31.5–36.5)
MCV RBC AUTO: 82 FL (ref 78–100)
MONOCYTES # BLD AUTO: 0.5 10E3/UL (ref 0–1.3)
MONOCYTES NFR BLD AUTO: 7 %
NEUTROPHILS # BLD AUTO: 4.3 10E3/UL (ref 1.6–8.3)
NEUTROPHILS NFR BLD AUTO: 60 %
PLATELET # BLD AUTO: 220 10E3/UL (ref 150–450)
RBC # BLD AUTO: 4.6 10E6/UL (ref 3.8–5.2)
WBC # BLD AUTO: 7.3 10E3/UL (ref 4–11)

## 2021-12-30 PROCEDURE — 85025 COMPLETE CBC W/AUTO DIFF WBC: CPT | Performed by: NURSE PRACTITIONER

## 2021-12-30 PROCEDURE — 80053 COMPREHEN METABOLIC PANEL: CPT | Performed by: NURSE PRACTITIONER

## 2021-12-30 PROCEDURE — 36415 COLL VENOUS BLD VENIPUNCTURE: CPT | Performed by: NURSE PRACTITIONER

## 2021-12-30 PROCEDURE — 85652 RBC SED RATE AUTOMATED: CPT | Performed by: NURSE PRACTITIONER

## 2021-12-30 PROCEDURE — 99214 OFFICE O/P EST MOD 30 MIN: CPT | Performed by: NURSE PRACTITIONER

## 2021-12-30 PROCEDURE — 86140 C-REACTIVE PROTEIN: CPT | Performed by: NURSE PRACTITIONER

## 2021-12-30 RX ORDER — METHYLPREDNISOLONE 4 MG
TABLET, DOSE PACK ORAL
Qty: 21 TABLET | Refills: 0 | Status: SHIPPED | OUTPATIENT
Start: 2021-12-30 | End: 2022-01-11

## 2021-12-30 ASSESSMENT — MIFFLIN-ST. JEOR: SCORE: 1336.33

## 2021-12-30 NOTE — PROGRESS NOTES
Assessment & Plan     Tinnitus, unspecified laterality  Helped with medrol   Will do inflammation markers and treat once more   covid clinic and pain clinic in future   - Adult Post Covid Clinic Referral; Future  - methylPREDNISolone (MEDROL DOSEPAK) 4 MG tablet therapy pack; Follow Package Directions    Gastroesophageal reflux disease with esophagitis without hemorrhage    - Adult Post Covid Clinic Referral; Future  - ESR: Erythrocyte sedimentation rate; Future  - CRP, inflammation; Future  - CBC with platelets and differential; Future  - Comprehensive metabolic panel (BMP + Alb, Alk Phos, ALT, AST, Total. Bili, TP); Future  - methylPREDNISolone (MEDROL DOSEPAK) 4 MG tablet therapy pack; Follow Package Directions  - ESR: Erythrocyte sedimentation rate  - CRP, inflammation  - CBC with platelets and differential  - Comprehensive metabolic panel (BMP + Alb, Alk Phos, ALT, AST, Total. Bili, TP)    Postimmunization reaction, subsequent encounter    - Adult Post Covid Clinic Referral; Future  - ESR: Erythrocyte sedimentation rate; Future  - CRP, inflammation; Future  - CBC with platelets and differential; Future  - Comprehensive metabolic panel (BMP + Alb, Alk Phos, ALT, AST, Total. Bili, TP); Future  - methylPREDNISolone (MEDROL DOSEPAK) 4 MG tablet therapy pack; Follow Package Directions  - ESR: Erythrocyte sedimentation rate  - CRP, inflammation  - CBC with platelets and differential  - Comprehensive metabolic panel (BMP + Alb, Alk Phos, ALT, AST, Total. Bili, TP)    Multiple joint pain    - Adult Post Covid Clinic Referral; Future  - ESR: Erythrocyte sedimentation rate; Future  - CRP, inflammation; Future  - CBC with platelets and differential; Future  - Comprehensive metabolic panel (BMP + Alb, Alk Phos, ALT, AST, Total. Bili, TP); Future  - methylPREDNISolone (MEDROL DOSEPAK) 4 MG tablet therapy pack; Follow Package Directions  - ESR: Erythrocyte sedimentation rate  - CRP, inflammation  - CBC with platelets and  "differential  - Comprehensive metabolic panel (BMP + Alb, Alk Phos, ALT, AST, Total. Bili, TP)      30 minutes spent on the date of the encounter doing chart review, history and exam, documentation and further activities per the note       BMI:   Estimated body mass index is 28.35 kg/m  as calculated from the following:    Height as of this encounter: 1.575 m (5' 2\").    Weight as of this encounter: 70.3 kg (155 lb).       Patient Instructions   Lab in [address]    COVID referral      Medrol dose  Pack       Return in about 3 months (around 3/30/2022).    CARRIE Barahona CNP  [facility] VALORIE Calhoun is a 36 year old who presents for the following health issues     HPI     Follow  Up Covid from 4-2021.    Had covid 4 weeks prior to vaccine   2 days after had chest pain   ER did EKG and ok - left     Urgent  Care - steroid-  left side pain  And burning feeling   Went to ENT - ear ringing -hearing test -  gave her prednisone for 6 days for inflammation  Decreased ringing and phlegm decreased   Ringing is Less loud and intense     If helped was to see rheum again and they said no    GI - plan to do PH test or other test        Neuro    After she eats has stomach pain and then ankle feels weird left side       Review of Systems   Constitutional, HEENT, cardiovascular, pulmonary, GI, , musculoskeletal, neuro, skin, endocrine and psych systems are negative, except as otherwise noted.      Objective    /70 (BP Location: Right arm, Patient Position: Chair, Cuff Size: Adult Large)   Pulse 82   Temp 97.9  F (36.6  C) (Oral)   Ht 1.575 m (5' 2\")   Wt 70.3 kg (155 lb)   LMP 12/23/2021   SpO2 100%   Breastfeeding No   BMI 28.35 kg/m    Body mass index is 28.35 kg/m .  Physical Exam   GENERAL: alert and no distress  RESP: lungs clear to auscultation - no rales, rhonchi or wheezes  CV: regular rate and rhythm, normal S1 S2, no S3 or S4, no murmur, click or rub, no " peripheral edema and peripheral pulses strong  ABDOMEN: soft, nontender, no hepatosplenomegaly, no masses and bowel sounds normal  MS: no gross musculoskeletal defects noted, no edema  NEURO: Normal strength and tone, mentation intact and speech normal  PSYCH: mentation appears normal, affect normal/bright    Lab

## 2021-12-31 LAB
ALBUMIN SERPL-MCNC: 4 G/DL (ref 3.4–5)
ALP SERPL-CCNC: 76 U/L (ref 40–150)
ALT SERPL W P-5'-P-CCNC: 18 U/L (ref 0–50)
ANION GAP SERPL CALCULATED.3IONS-SCNC: 2 MMOL/L (ref 3–14)
AST SERPL W P-5'-P-CCNC: 10 U/L (ref 0–45)
BILIRUB SERPL-MCNC: 0.2 MG/DL (ref 0.2–1.3)
BUN SERPL-MCNC: 13 MG/DL (ref 7–30)
CALCIUM SERPL-MCNC: 8.6 MG/DL (ref 8.5–10.1)
CHLORIDE BLD-SCNC: 105 MMOL/L (ref 94–109)
CO2 SERPL-SCNC: 29 MMOL/L (ref 20–32)
CREAT SERPL-MCNC: 0.7 MG/DL (ref 0.52–1.04)
CRP SERPL-MCNC: <2.9 MG/L (ref 0–8)
GFR SERPL CREATININE-BSD FRML MDRD: >90 ML/MIN/1.73M2
GLUCOSE BLD-MCNC: 102 MG/DL (ref 70–99)
POTASSIUM BLD-SCNC: 4.1 MMOL/L (ref 3.4–5.3)
PROT SERPL-MCNC: 7.9 G/DL (ref 6.8–8.8)
SODIUM SERPL-SCNC: 136 MMOL/L (ref 133–144)

## 2022-01-10 NOTE — PROGRESS NOTES
[facility]     Date of visit: 1/11/2022    Assessment:  Unique Baker is a 36 year old female with a past medical history significant for GERD, gestational diabetes mellitus, and post operative nausea and vomiting who presents with complaints of left head, chest, left neck, left arm, leg, and foot/ankle pain.     1. Left head, chest, left neck, left arm, leg, and foot/ankle pain- etiology unclear, symptoms developed 3 days after COVID-19 vaccine which was 1 month after COVID. It sounds like there is a neuropathic and/or inflammatory component to pain. Her headaches originate around her left greater occipital nerve and radiate over the top of her head.   2. Mental Health - the patient's mental health concerns, specifically situational depression, affect her experience of pain and contribute to her clinically significant distress.    1. Chronic pain syndrome    2. Occipital neuralgia of left side    3. Neuropathic pain        Plan:  The following recommendations were given to the patient. Diagnosis, treatment options, risks, benefits, and alternatives were discussed, and all questions were answered. The patient expressed understanding of the plan for management.     I am recommending a multidisciplinary treatment plan to help this patient better manage her pain.  This includes:      1.  Pain Physical Therapy:     YES  I think working with the post COVID clinic is most likely to be helpful but first appointment isn't until April. I recommend working with chronic pain physical therapy in the interim with Deandre Ortiz PT. She is open to this. She will schedule first visit and have visits on a weekly basis as able.    2.  Pain Psychologist to address relaxation, behavioral change, coping style, and other factors important to improvement.  NO   3.  Medication Management:     1. I wonder if her pain is neuropathic in origin. We discussed a trial of gabapentin, could be helpful and she is  interested. Start gabapentin as directed below. Call with issues.   Gabapentin 1 tab= 100mg    AM   PM   Bedtime  0   0   100mg (1 tab).  After 3-5 days, increase as tolerated   100mg (1 tab)  0   100mg (1 tab).  After 3-5 days, increase as tolerated   100mg (1 tab)  100mg (1 tab)  100mg (1 tab).  After 3-5 days, increase as tolerated   100mg (1 tab)  100mg (1 tab)  200mg (2 tabs). After 3-5 days, increase as tolerated   200mg (2 tabs)  100mg (1 tab)  200mg (2 tabs). After 3-5 days, increase as tolerated   200mg (2 tabs)  200mg (2 tabs)  200mg (2 tabs). After 3-5 days, increase as tolerated   200mg (2 tabs)  200mg (2 tabs)  300mg (3 tabs). After 3-5 days, increase as tolerated   300mg (3 tabs)  200mg (2 tabs)  300mg (3 tabs). After 3-5 days, increase as tolerated   300mg (3 tabs)  300mg (3 tabs)  300mg (3 tabs).     Call with any problems.  Caution for sedation.    Do not drive until you know how the medication affects you.   You can go slower if you need to or increasing only one dose at a time.  Do not stop abruptly once at higher doses.  This medication must be tapered off.     2. Okay to continue Celebrex 100mg BID. COuld restart Tylenol as needed.    4.  Potential procedures: it seems like she may have some occipital neuralgia. We discussed trying na occipital nerve block and she is interested. Ordered today. They will call to schedule. Monitor pain benefit.    5.  Referrals: not at this time.    6.  Follow up with CARRIE Boyce CNP in 4-6 weeks.     Review of Electronic Chart: Today I have also reviewed available medical information in the patient's medical record at [place] ([facility]), including relevant provider notes, laboratory work, and imaging.       CARRIE Boyce CNP  [facility]       -------------------------------------------------    Subjective:    Reason for consultation:    Unique Baker is a 36 year old female who is seen in consultation today at the request of her  PCP,  Celina Riggs for evaluation of her pain issues and recommendations for management, with specific emphasis on  My Clinical Question Is: she has had diffuse pain since covid injection almost a year ago, any suggestions how to manage pain,   Timeframe Requested: Routine: Next available opening   Priority: Routine   Reason for Referral: Evaluation for Comprehensive Services   Please review opioid agreement in process instructions, do you agree to these terms? Yes       Please see the [facility] health questionnaire which the patient completed and reviewed with me in detail.    Review of [place] Prescription Monitoring Program (): No concern for abuse or misuse of controlled medications based on this report.     Review of Electronic Chart: Today I have also reviewed available medical information in the patient's medical record at [place] ([facility]), including relevant provider notes, laboratory work, and imaging.     Pain medications are being prescribed by NA.     Chief Complaint:    Chief Complaint   Patient presents with     Pain       Pain history:  Unique Baker is a 36 year old female who presents for initial evaluation of chief complaint of widespread pain.      She first started having problems with widespread pain in April in March of 2020. She contracted COVID-19 at that time. One month later she got the COVID vaccine. 3 days after that she developed visual disturbances, headaches, chest, left neck, left arm, leg, and foot/ankle pain and numbness. She was evaluated in urgent care and primary care. Visual disturbances subsided and did not return. She was referred to Gastroenterology and then Neurology. She was put on a round of steroids in April with improvement in symptoms until August. The symptoms returned in August as well as ear ringing. She was evaluated by ENT and then was given another round of steroids with temporary improvement, same as in October. She was referred to  "Rheumatology and had evaluation without any definite conclusions. She was recently seen by her primary care provider and referred to the pain clinic and the post COVID clinic. SHe has an appointment in April. Of note, her symptoms are worse 3-5 days after her menstrual cycle. She reports headache as, \"it feels like my head is on fire\" and as pulsating or sharp. Her pain is located in left head, chest, left neck, left arm, leg, and foot/ankle pain. Sometimes she has symptoms on the right. Some numbness in left arm and left foot. Denies weakness.       Pain description:  Location: left head, chest, left neck, left arm, leg, and foot/ankle pain  Quality: throbbing, pulsating  Severity/Intensity: 3/10 at best, 8/10 at worst, 5/10 on average  Aggravating factors include: sitting still  Relieving factors include: nothing    The patient otherwise denies bowel or bladder incontinence, parasthesias, weakness, saddle anesthesia, unintentional weight loss, or fever/chills/sweats.     Unique Baker has not been seen at a pain clinic in the past.      Pain Treatments:  (H--helped; HI--Helped initially; [facility]--Somewhat helpful; [facility]--No help; W--worse; SE--side effects; ?--Unsure if helpful)   1. Medications:       Current pain medications:   Tylenol- [facility], not currently taking   Celebrex 200mg daily- [facility]   Hydroxyzine 25mg Q8h- [facility] for anxiety or sleep   Medrol dose pack- prescribed 12/30, states she usually has 1 month of benefit, recently completed    Current calculated MME: 0    1. Previous Pain Relevant Medications:  NOTE: This medication information taken from patient's intake form, not medical records.    Opiates: no   NSAIDS: Celebrex-?, ibuprofen- ?    Muscle Relaxants: no   Anti-migraine mediations: no   Anti-depressants: amitriptyline- [facility], mouth sores   Sleep aids: no   Anxiolytics: hydroxyzine- [facility]   Neuropathics: no    Topicals: no   Other medications not covered above: Tylenol- [facility]    2. Physical Therapy: no  3. Pain " Psychology: no  4. Surgery: no  5. Injections: no  6. Chiropractic: no  7. Acupuncture: no  8. TENS Unit: no    Past Medical History:  Past Medical History:   Diagnosis Date     Diabetes (H)     GDM insulin     GERD (gastroesophageal reflux disease)     w/u otherwise neg      History of colposcopy with cervical biopsy 3/23/07    WNL     Papanicolaou smear of cervix with low grade squamous intraepithelial lesion (LGSIL) 07     PONV (postoperative nausea and vomiting)      S/P  10/13/2017       Past Surgical History:  Past Surgical History:   Procedure Laterality Date     BREAST SURGERY      augmentation       SECTION N/A 2015    Procedure:  SECTION;  Surgeon: Tremaine Gaona MD;  Location: [facility] OR      SECTION, TUBAL LIGATION, COMBINED N/A 10/13/2017    Procedure: COMBINED  SECTION, TUBAL LIGATION;  Repeat  SECTION, TUBAL LIGATION ;  Surgeon: Sidra Salamanca DO;  Location:  OR     COLONOSCOPY N/A 2016    Procedure: COLONOSCOPY;  Surgeon: Lyudmila Pastor MD;  Location:  GI     DILATION AND CURETTAGE SUCTION      elective termination     ESOPHAGOSCOPY, GASTROSCOPY, DUODENOSCOPY (EGD), COMBINED  2014    Procedure: COMBINED ESOPHAGOSCOPY, GASTROSCOPY, DUODENOSCOPY (EGD);   ESOPHAGOSCOPY, GASTROSCOPY, DUODENOSCOPY (EGD) ;  Surgeon: Vishnu Lanier MD;  Location:  GI     ESOPHAGOSCOPY, GASTROSCOPY, DUODENOSCOPY (EGD), COMBINED Left 2020    Procedure: ESOPHAGOGASTRODUODENOSCOPY, WITH BIOPSIES USING BIOPSY FORCEPS;  Surgeon: Vishnu Hopkins MD;  Location:  GI     ESOPHAGOSCOPY, GASTROSCOPY, DUODENOSCOPY (EGD), COMBINED N/A 2021    Procedure: ESOPHAGOGASTRODUODENOSCOPY, WITH BIOPSY using cold forceps;  Surgeon: Vishnu Lanier MD;  Location:  GI     GYN SURGERY       c section      RW LASER WART      Anal wart removal      ZZC NONSPECIFIC PROCEDURE  01    Primary LTCS       Medications:  Current  Outpatient Medications   Medication Sig Dispense Refill     cetirizine (ZYRTEC) 10 MG tablet Take 1 tablet (10 mg) by mouth daily 30 tablet 1     famotidine (PEPCID) 20 MG tablet TAKE ONE TABLET BY MOUTH TWICE A DAY AS NEEDED FOR REFLUX 180 tablet 3     fluocinonide emulsified base 0.05 % external cream apply to rash as needed - sparingly 60 g 1     fluticasone (FLONASE) 50 MCG/ACT nasal spray Spray 1 spray into both nostrils 2 times daily as needed for rhinitis or allergies Take 1 spray twice daily x 1 week then 1 spray daily at night x 1 week then as needed 11.1 mL 1     gabapentin (NEURONTIN) 100 MG capsule Take 3 capsules (300 mg) by mouth 3 times daily 270 capsule 1     hydrOXYzine (ATARAX) 25 MG tablet Take 1 tablet (25 mg) by mouth every 8 hours as needed for anxiety 30 tablet 0     multivitamin (ONE-DAILY) tablet Take 1 tablet by mouth daily       sucralfate (CARAFATE) 1 GM tablet Take 1 g by mouth 4 times daily        vitamin D3 (CHOLECALCIFEROL) 50 mcg (2000 units) tablet Take 1 tablet (50 mcg) by mouth daily 100 tablet 3     zolpidem (AMBIEN) 10 MG tablet Take 1 tablet (10 mg) by mouth nightly as needed for sleep 10 tablet 0     celecoxib (CELEBREX) 200 MG capsule Take 1 capsule (200 mg) by mouth daily as needed for pain 30 capsule 0       Allergies:     Allergies   Allergen Reactions     No Known Drug Allergies      Other Environmental Allergy      Patch Test Results 3-10-20    Very Strong (3+) or Strong (2+) reactions:  none     Mild (1+) reactions:  Fragrance mix I  Linalool  Oleamidopropyl dimethylamine     Borderline/questionable reactions:  Balsam of [place]  Diphenylguanidine       Social History:  Home situation: lives at home with  and 3 children, 3, 6, 18  Support system:   Occupation/Schooling: , not currently working  Tobacco use: socially, not anymore  Drug use: no  Alcohol use: monthly, hasn't drank alcohol for 1 year  History of chemical dependency treatment:  no  Mental health admissions: no    Family history:  Family History   Problem Relation Age of Onset     Hyperlipidemia Sister      Stomach Cancer Sister      Cancer Sister         stomach     C.A.D. No family hx of      Diabetes No family hx of      Breast Cancer No family hx of      Cancer - colorectal No family hx of      Colon Cancer No family hx of        Review of Systems:    POSTIVE IN BOLD  GENERAL: fever/chills, fatigue, general unwell feeling, weight gain/loss.  HEAD/EYES:  headache, dizziness, or vision changes.    EARS/NOSE/THROAT: nosebleeds, hearing loss, sinus infection, earache, tinnitus.  IMMUNE:  allergies, cancer, immune deficiency, or infections.  SKIN:  itching, rash, hives  HEME/Lymphatic: anemia, easy bruising, easy bleeding.  RESPIRATORY: cough, wheezing, or shortness of breath  CARDIOVASCULAR/Circulation: extremity edema, syncope, hypertension, tachycardia, or angina.  GASTROINTESTINAL: abdominal pain, nausea/emesis, diarrhea, constipation,  hematochezia, or melena.  ENDOCRINE:  diabetes, steroid use,  thyroid disease or osteoporosis.  MUSCULOSKELETAL: joint pain, stiffness, neck pain, back pain, arthritis, or gout.  GENITOURINARY: frequency, urgency, dysuria, difficulty voiding, hematuria or incontinence.  NEUROLOGIC: weakness, numbness, paresthesias, seizure, tremor, stroke or memory loss.  PSYCHIATRIC: depression, anxiety, stress, suicidal thoughts or mood swings.     Objective:    Imaging:  CT of chest was completed on 5/2/2021 and shows:  FINDINGS: The heart is not enlarged without significant coronary  calcifications. The thoracic aorta is normal variant right arch and  otherwise unremarkable. without evidence of dissection or aneurysm.  The lungs are clear. There are no effusions. There is no axillary,  hilar, or mediastinal adenopathy.     The adrenal glands are normal. Question some hepatic steatosis. The  remainder of the visualized abdomen is unremarkable.     Bone windows show no  destructive bony lesions.                                                                      IMPRESSION:  Negative CT study of the chest.    Physical Exam:  Vitals:    01/11/22 1437   BP: 109/71   Pulse: 78   SpO2: 98%     Exam:  Constitutional: Well developed, well nourished, appears stated age.  HEENT: Head atraumatic, normocephalic. Eyes without conjunctival injection or jaundice. Oropharynx clear. Neck supple. No obvious neck masses.  Skin: No rash, lesions, or petechiae of exposed skin.   Extremities: Peripheral pulses intact. No clubbing, cyanosis, or edema.  Psychiatric/mental status: Alert, without lethargy or stupor. Speech fluent. Appropriate affect. Mood normal. Able to follow commands without difficulty.     Musculoskeletal exam:  Able to walk on the heels and toes without difficulty. Patient does not have an antalgic gait.   Normal bulk and tone. Unremarkable spinal curvature.     Cervical spine:  Range of motion within normal limits.   Tenderness in the cervical paraspinal muscles.No    Thoracic spine:    Kyphosis. No   Tenderness in the thoracic paraspinal muscles.No    Lumbar spine:    Flex:  90 degrees   Ext: 30 degrees   Tenderness in the lumbar paraspinal muscles.No    Focal tenderness: No SI joint, gluteal, piriformis, or GT tenderness    Neurologic exam:  CN:  Cranial nerves 2-12 are grossly intact  Motor:  5/5 UE and LE strength  Strength:       C4 (shoulder shrug)  symmetric 5/5       C5 (shoulder abduction) symmetric 5/5       C6 (elbow flexion) symmetric 5/5       C7 (elbow extension) symmetric 5/5       C8 (finger abduction, thumb flexion) symmetric 5/5    Reflexes:     Biceps:     R:  2/4 L: 2/4   Brachioradialis   R:  2/4 L: 2/4   Patella:  R:  2/4 L: 2/4   Achilles:  R:  2/4 L: 2/4    Sensory:   Light touch: normal bilateral upper and lower extremities    No allodynia, dysesthesia, or hyperalgesia.    BILLING TIME DOCUMENTATION:   The total TIME spent on this patient on the date of the  encounter/appointment was 47 minutes.      TOTAL TIME includes:   Time spent preparing to see the patient (reviewing records and tests)   Time spent face to face (or over the phone) with the patient   Time spent ordering tests, medications, procedures and referrals   Time spent Referring and communicating with other healthcare professionals   Time spent documenting clinical information in Epic

## 2022-01-11 ENCOUNTER — OFFICE VISIT (OUTPATIENT)
Dept: PALLIATIVE MEDICINE | Facility: CLINIC | Age: 37
End: 2022-01-11
Attending: INTERNAL MEDICINE
Payer: COMMERCIAL

## 2022-01-11 ENCOUNTER — TELEPHONE (OUTPATIENT)
Dept: SLEEP MEDICINE | Facility: CLINIC | Age: 37
End: 2022-01-11

## 2022-01-11 VITALS — SYSTOLIC BLOOD PRESSURE: 109 MMHG | DIASTOLIC BLOOD PRESSURE: 71 MMHG | OXYGEN SATURATION: 98 % | HEART RATE: 78 BPM

## 2022-01-11 DIAGNOSIS — M54.81 OCCIPITAL NEURALGIA OF LEFT SIDE: ICD-10-CM

## 2022-01-11 DIAGNOSIS — M79.2 NEUROPATHIC PAIN: ICD-10-CM

## 2022-01-11 DIAGNOSIS — G89.4 CHRONIC PAIN SYNDROME: Primary | ICD-10-CM

## 2022-01-11 PROCEDURE — 99204 OFFICE O/P NEW MOD 45 MIN: CPT | Performed by: NURSE PRACTITIONER

## 2022-01-11 RX ORDER — GABAPENTIN 100 MG/1
300 CAPSULE ORAL 3 TIMES DAILY
Qty: 270 CAPSULE | Refills: 1 | Status: SHIPPED | OUTPATIENT
Start: 2022-01-11 | End: 2022-03-29

## 2022-01-11 ASSESSMENT — PAIN SCALES - GENERAL: PAINLEVEL: SEVERE PAIN (6)

## 2022-01-11 NOTE — TELEPHONE ENCOUNTER
Orders for dental device rev'd again and have been signed by DOD, faxed back and copy scanned into chart.      JOSE ANTONIO Rice

## 2022-01-11 NOTE — PATIENT INSTRUCTIONS
1.  Pain Physical Therapy:     YES   Highly recommend chronic pain physical therapy with Deandre Ortiz PT. Schedule first visit and have visits on a weekly basis as able.    2.  Pain Psychologist to address relaxation, behavioral change, coping style, and other factors important to improvement.  NO   3.  Medication Management:     1. Start gabapentin as directed below. Call with issues.   Start gabapentin as follows (slow titration)   Gabapentin 1 tab= 100mg    AM   PM   Bedtime  0   0   100mg (1 tab).  After 3-5 days, increase as tolerated   100mg (1 tab)  0   100mg (1 tab).  After 3-5 days, increase as tolerated   100mg (1 tab)  100mg (1 tab)  100mg (1 tab).  After 3-5 days, increase as tolerated   100mg (1 tab)  100mg (1 tab)  200mg (2 tabs). After 3-5 days, increase as tolerated   200mg (2 tabs)  100mg (1 tab)  200mg (2 tabs). After 3-5 days, increase as tolerated   200mg (2 tabs)  200mg (2 tabs)  200mg (2 tabs). After 3-5 days, increase as tolerated   200mg (2 tabs)  200mg (2 tabs)  300mg (3 tabs). After 3-5 days, increase as tolerated   300mg (3 tabs)  200mg (2 tabs)  300mg (3 tabs). After 3-5 days, increase as tolerated   300mg (3 tabs)  300mg (3 tabs)  300mg (3 tabs).     Call with any problems.  Caution for sedation.    Do not drive until you know how the medication affects you.   You can go slower if you need to or increasing only one dose at a time.  Do not stop abruptly once at higher doses.  This medication must be tapered off.    4.  Potential procedures: occipital nerve block ordered today. They will call to schedule. Monitor pain benefit.    5.  Referrals: not at this time.    6.  Follow up with CARRIE Boyce CNP in 4-6 weeks.       ----------------------------------------------------------------  Clinic Number:  [phone]     Call with any questions about your care and for scheduling assistance.     Calls are returned Monday through Friday between 8 AM and 4:30 PM. We usually get back to you  within 2 business days depending on the issue/request.    If we are prescribing your medications:    For opioid medication refills, call the clinic or send a [employer] message 7 days in advance.  Please include:    Name of requested medication    Name of the pharmacy.    For non-opioid medications, call your pharmacy directly to request a refill. Please allow 3-4 days to be processed.     Per [place] State Law:    All controlled substance prescriptions must be filled within 30 days of being written.      For those controlled substances allowing refills, pickup must occur within 30 days of last fill.      We believe regular attendance is key to your success in our program!      Any time you are unable to keep your appointment we ask that you call us at least 24 hours in advance to cancel.This will allow us to offer the appointment time to another patient.     Multiple missed appointments may lead to dismissal from the clinic.

## 2022-01-11 NOTE — NURSING NOTE
PEG Score 1/11/2022   PEG Total Score 5         Alicia Wise MA  [facility] [place]

## 2022-01-12 ENCOUNTER — TELEPHONE (OUTPATIENT)
Dept: PALLIATIVE MEDICINE | Facility: CLINIC | Age: 37
End: 2022-01-12
Payer: COMMERCIAL

## 2022-01-12 NOTE — TELEPHONE ENCOUNTER
Pre-screening Questions for Clinic Based Injections, Shoulder/Knee, ONB, TPI Injections and Bursa Injection:    Botox- no questions needed  *of note, it is possible to do occipital nerve blocks and trigger point injections without steroid, so if they feel they need to schedule, we can do that and leave out steroid.      Location:    [place]:     Only schedule on Wednesdays (ultrasound machine NOT available Tuesday and Thursday)    If need to double book, use the 3:30 pm slot     [place]:  Ultrasound appointments NOT available at this time    [place]:      [place]:  Please send to RN pool after scheduling to verify U/S availability     Does patient have an active infection or treated for one within the past week? No  (If YES, do NOT schedule and route to RN)     Is patient currently taking any antibiotics?  No  (If YES, do NOT schedule and route to RN)  For patients on chronic, preventative or prophylactic antibiotics, procedures can be scheduled.    Jesus Aguilar Snitzer-antibiotic course must have been completed for 4 days    Is patient taking any aspirin products? No     No need to hold non-aspirin anticoagulants.     If taking > 325mg/day of aspirin, limit aspirin to 81-325mg/day x 1 week.     No hold required day of procedure.    Has the patient had a flu shot or any other vaccinations within 7 days before or after the procedure.  No     Have you recently had a COVID vaccine or have plans to get it in the near future? No    If yes, explain that for the vaccine to work best they need to:       wait 1 week before and 1 week after getting Vaccine #1    wait 1 week before and 2 weeks after getting Vaccine #2    If patient has concerns about the timing, send to ROSEANN ROSALES    [facility]

## 2022-01-26 ENCOUNTER — TRANSFERRED RECORDS (OUTPATIENT)
Dept: HEALTH INFORMATION MANAGEMENT | Facility: CLINIC | Age: 37
End: 2022-01-26
Payer: COMMERCIAL

## 2022-02-01 ENCOUNTER — HOSPITAL ENCOUNTER (OUTPATIENT)
Dept: GENERAL RADIOLOGY | Facility: CLINIC | Age: 37
Discharge: HOME | End: 2022-02-01
Attending: INTERNAL MEDICINE | Admitting: INTERNAL MEDICINE
Payer: COMMERCIAL

## 2022-02-01 DIAGNOSIS — R07.89 NON-CARDIAC CHEST PAIN: ICD-10-CM

## 2022-02-01 PROCEDURE — 71046 X-RAY EXAM CHEST 2 VIEWS: CPT

## 2022-02-02 ENCOUNTER — TRANSFERRED RECORDS (OUTPATIENT)
Dept: HEALTH INFORMATION MANAGEMENT | Facility: CLINIC | Age: 37
End: 2022-02-02
Payer: COMMERCIAL

## 2022-02-03 ENCOUNTER — MYC MEDICAL ADVICE (OUTPATIENT)
Dept: PALLIATIVE MEDICINE | Facility: CLINIC | Age: 37
End: 2022-02-03
Payer: COMMERCIAL

## 2022-02-03 NOTE — TELEPHONE ENCOUNTER
Agree with plan as laid out by nursing.    CARRIE Boyce CNP  [facility]

## 2022-02-03 NOTE — TELEPHONE ENCOUNTER
Routing to provider for any further recommendation.  ----------------Nba Below from pt----------------    Hello, so I ve  seen you at the beginning of January and I m scheduled to do an Occipital injection on the 8th. I m wondering if I should still do it due to my headaches is not as intense right now. I think it s because  stopping the medications l was on, but I m wondering if I should hold off the procedure to see if it go away on its own  or should I go ahead and do it? I was wondering about the side affects of the procedure too?   Thank you, Unique     Below sent to pt  Jazmine Calhoun,     The occipital nerve block is well tolerated. The most common side effect is going to be tenderness at the injection site and you may have soreness at injection sites for up to 24 hours.  We would recommend that you may apply ice to the painful areas to help minimize the discomfort of the needle pokes. It is elective so if you feel that your headaches are improving you can certainly hold off on the injection. If needed, you may always reschedule it in the future.     Kathi HOSKINS, RN Care Coordinator  [facility]  Pain Management

## 2022-02-04 ENCOUNTER — TRANSFERRED RECORDS (OUTPATIENT)
Dept: HEALTH INFORMATION MANAGEMENT | Facility: CLINIC | Age: 37
End: 2022-02-04
Payer: COMMERCIAL

## 2022-02-07 NOTE — PROGRESS NOTES
[facility]    Date of visit: February 7, 2022    Pre procedure Diagnosis: occipital neuralgia   Post procedure Diagnosis: Same  Procedure performed: left occipital nerve block  Anesthesia: none  Complications: none  Operators: Huyen BUTLER CNP     Indications:   Unique Baker is a 36 year old female with a history of left occipital neuralgia.  They have tried conservative treatment including medication management.    Options/alternatives, benefits and risks were discussed with the patient including bleeding, infection, hematoma, nerve damage, and stroke, Questions were answered to her satisfaction and she agrees to proceed. Voluntary informed consent was obtained and signed.     Vitals were reviewed: Yes  Allergies were reviewed:  Yes   Medications were reviewed:  Yes   Pre-procedure pain score: 5/10    Procedure:  After getting informed consent, a Pause for the Cause was performed.    The occipital ridge, occipital protuberance, and mastoid process were palpated on the left side.  The location of maximal tenderness which was consistent with the location of the left occipital nerve was palpated.  The area was cleaned.    Palpation for a pulse was completed, with no pulse noted at the site of the injection.  A 25G, 1.5 inch needle was introduced, aimed cephalad, in the superficial tissues at this area of tenderness.  The injection was completed at this location, fanning in 3 different directions.  Aspiration for heme was negative before all injections.    In total, 2ml of 0.5% bupivacaine and 1ml of 40mg Kenalog was injected.     The patient tolerated the procedure well, hemostasis was achieved.      Post-procedure pain score: 5/10  Follow-up includes:   -f/u with me in clinic  -schedule prn    Huyen BUTLER CNP   [facility] Pain Management

## 2022-02-07 NOTE — TELEPHONE ENCOUNTER
Chart review. Has appt 02/08/22    [address]    MyChart User Last Read On   Unique Baker 2/3/2022  8:17 PM

## 2022-02-08 ENCOUNTER — OFFICE VISIT (OUTPATIENT)
Dept: PALLIATIVE MEDICINE | Facility: CLINIC | Age: 37
End: 2022-02-08
Attending: NURSE PRACTITIONER
Payer: COMMERCIAL

## 2022-02-08 VITALS — HEART RATE: 77 BPM | DIASTOLIC BLOOD PRESSURE: 75 MMHG | SYSTOLIC BLOOD PRESSURE: 122 MMHG | OXYGEN SATURATION: 99 %

## 2022-02-08 DIAGNOSIS — M54.81 OCCIPITAL NEURALGIA OF LEFT SIDE: ICD-10-CM

## 2022-02-08 PROCEDURE — 64405 NJX AA&/STRD GR OCPL NRV: CPT | Mod: LT | Performed by: NURSE PRACTITIONER

## 2022-02-08 RX ORDER — TRIAMCINOLONE ACETONIDE 40 MG/ML
40 INJECTION, SUSPENSION INTRA-ARTICULAR; INTRAMUSCULAR ONCE
Status: COMPLETED | OUTPATIENT
Start: 2022-02-08 | End: 2022-02-08

## 2022-02-08 RX ORDER — PANTOPRAZOLE SODIUM 40 MG/1
TABLET, DELAYED RELEASE ORAL
COMMUNITY
Start: 2022-02-02 | End: 2022-03-29

## 2022-02-08 RX ORDER — BUPIVACAINE HYDROCHLORIDE 5 MG/ML
2 INJECTION, SOLUTION EPIDURAL; INTRACAUDAL ONCE
Status: COMPLETED | OUTPATIENT
Start: 2022-02-08 | End: 2022-02-08

## 2022-02-08 RX ADMIN — TRIAMCINOLONE ACETONIDE 40 MG: 40 INJECTION, SUSPENSION INTRA-ARTICULAR; INTRAMUSCULAR at 15:26

## 2022-02-08 RX ADMIN — BUPIVACAINE HYDROCHLORIDE 10 MG: 5 INJECTION, SOLUTION EPIDURAL; INTRACAUDAL at 15:26

## 2022-02-08 ASSESSMENT — PAIN SCALES - GENERAL: PAINLEVEL: MODERATE PAIN (5)

## 2022-02-08 NOTE — PATIENT INSTRUCTIONS
[facility]  Post Procedure Instructions    Today you had:  occipital nerve block         Medications used:     bupivicaine   louolog           Go to the emergency room if you develop any shortness of breath    Monitor the injection sites for signs and symptoms of infection-fever, chills, redness, swelling, warmth, or drainage to areas.    You may have soreness at injection sites for up to 24 hours.    You may apply ice to the painful areas to help minimize the discomfort of the needle pokes.    Do not apply heat to sites for at least 12 hours.    You may use anti-inflammatory medications or Tylenol for pain control if necessary    Pain Clinic phone number during work hours (Monday through Friday 8 am-4:30 pm) at [phone] or the Provider Line after hours at [phone]:

## 2022-02-09 ENCOUNTER — TRANSFERRED RECORDS (OUTPATIENT)
Dept: HEALTH INFORMATION MANAGEMENT | Facility: CLINIC | Age: 37
End: 2022-02-09
Payer: COMMERCIAL

## 2022-02-21 ENCOUNTER — OFFICE VISIT (OUTPATIENT)
Dept: OBGYN | Facility: CLINIC | Age: 37
End: 2022-02-21
Payer: COMMERCIAL

## 2022-02-21 VITALS — SYSTOLIC BLOOD PRESSURE: 108 MMHG | WEIGHT: 150.6 LBS | BODY MASS INDEX: 27.55 KG/M2 | DIASTOLIC BLOOD PRESSURE: 70 MMHG

## 2022-02-21 DIAGNOSIS — Z11.3 SCREEN FOR STD (SEXUALLY TRANSMITTED DISEASE): ICD-10-CM

## 2022-02-21 DIAGNOSIS — N94.6 DYSMENORRHEA: ICD-10-CM

## 2022-02-21 DIAGNOSIS — N64.4 BREAST PAIN: ICD-10-CM

## 2022-02-21 DIAGNOSIS — N92.0 MENORRHAGIA WITH REGULAR CYCLE: Primary | ICD-10-CM

## 2022-02-21 PROCEDURE — 87591 N.GONORRHOEAE DNA AMP PROB: CPT | Performed by: OBSTETRICS & GYNECOLOGY

## 2022-02-21 PROCEDURE — 99204 OFFICE O/P NEW MOD 45 MIN: CPT | Performed by: OBSTETRICS & GYNECOLOGY

## 2022-02-21 PROCEDURE — 87491 CHLMYD TRACH DNA AMP PROBE: CPT | Performed by: OBSTETRICS & GYNECOLOGY

## 2022-02-21 NOTE — PROGRESS NOTES
"  Assessment & Plan     Menorrhagia with regular cycle  - Need to r/o infection, assess uterus and endometrium  - US Pelvic Complete with Transvaginal; Future at end of current menses  - NEISSERIA GONORRHOEA PCR  - CHLAMYDIA TRACHOMATIS PCR  - If Endometrium is normal, and no uterine lesions seen, Pt can monitor for now as she declined hormonal Rx.  RTC if IMB occurs or worsening sx's.  Consider EMBx if this occurs.    Dysmenorrhea  - Need to r/o infection, uterine abnormalities  - US Pelvic Complete with Transvaginal; Future  - NEISSERIA GONORRHOEA PCR  - CHLAMYDIA TRACHOMATIS PCR    Breast pain (Chronic) - some worsening sx's currently  - Per plastic surgeon recommendation and because prior mammogram and U/S were neg, will get MRI to r/o lesion  - MR Breast Right w/o & w Contrast; Future  - If neg, recommend Pt follow-up w/ plastic surgeon to see if implant is causing pain.    Screen for STD (sexually transmitted disease)  - R/O infection as noted above  - NEISSERIA GONORRHOEA PCR  - CHLAMYDIA TRACHOMATIS PCR             BMI:   Estimated body mass index is 27.55 kg/m  as calculated from the following:    Height as of 12/30/21: 1.575 m (5' 2\").    Weight as of this encounter: 68.3 kg (150 lb 9.6 oz).           No follow-ups on file.    Cresencio Rojas MD  [facility] [place] is a 36 year old who presents for the following health issues     Pt here for 2 problems.  Her first is that around the time of her first COVID Pfizer vaccine 4/2021, she started noticing her menses to be heavier and with clots, and also with more cramping.  She still has regular menses Q 30 days but CD#1-3 are heavy w/ quarter-sized clots, as well as lasting longer to around 7-8 days.  She had a Hgb 12.1 (12/30/2021).  She doesn't want any hormonal Rx as it isn't bothering her enough to warrant that.  She can deal with her menses, but rather just wants to make sure all is okay.  She has no IMB, no " abnl discharge.  Last Pap WNL, Neg HPV 2/2021.    Her other problem is a chronic pain in her right breast for about 2 years, that she has had in the medial breast approx 3:00 and also a fullness in the 9:00 position.  She has had a prior neg Mammogram and neg Right breast U/S 2/2021.  She has bilateral breast implants and her plastic surgeon recommends that any further imaging requires an MRI due to the implants.  Her sx's are seemingly worse now and particularly when she twists her upper torso to either side.             Review of Systems         Objective    /70 (BP Location: Right arm, Cuff Size: Adult Regular)   Wt 68.3 kg (150 lb 9.6 oz)   LMP 02/15/2022 (Exact Date)   Breastfeeding No   BMI 27.55 kg/m    Body mass index is 27.55 kg/m .  Physical Exam  Chest:      Chest wall: No mass, deformity, swelling or tenderness.   Breasts:      Right: Normal. No swelling, bleeding, nipple discharge, skin change, tenderness or axillary adenopathy.      Left: Normal. No swelling, bleeding, nipple discharge, skin change, tenderness or axillary adenopathy.        Comments: Bilateral breast implants present  Lymphadenopathy:      Upper Body:      Right upper body: No axillary adenopathy.      Left upper body: No axillary adenopathy.     Abdomen- Abdomen soft, non-tender. BS normal. No masses, organomegaly  Pelvic- Exam chaperoned by nurse, External genitalia normal, Bartholin's glands normal, [place]'s glands normal, Urethral meatus normal, Urethra normal, Bladder normal, Vagina with normal rugae, no abnormal lesions, no abnormal discharge, moderate old blood in vault, Normal cervix without lesions or mucopus, no cervical motion tenderness, Uterus normal size, shape, and contour, no masses, non-tender, Adnexa normal size without masses or tenderness bilaterally, Anus normal, GC/Chlam probe was Done         Prior Labs:  Office Visit on 12/30/2021   Component Date Value Ref Range Status     Erythrocyte Sedimentation  Rate 12/30/2021 34 (A) 0 - 20 mm/hr Final     CRP Inflammation 12/30/2021 <2.9  0.0 - 8.0 mg/L Final     Sodium 12/30/2021 136  133 - 144 mmol/L Final     Potassium 12/30/2021 4.1  3.4 - 5.3 mmol/L Final     Chloride 12/30/2021 105  94 - 109 mmol/L Final     Carbon Dioxide (CO2) 12/30/2021 29  20 - 32 mmol/L Final     Anion Gap 12/30/2021 2 (A) 3 - 14 mmol/L Final     Urea Nitrogen 12/30/2021 13  7 - 30 mg/dL Final     Creatinine 12/30/2021 0.70  0.52 - 1.04 mg/dL Final     Calcium 12/30/2021 8.6  8.5 - 10.1 mg/dL Final     Glucose 12/30/2021 102 (A) 70 - 99 mg/dL Final     Alkaline Phosphatase 12/30/2021 76  40 - 150 U/L Final     AST 12/30/2021 10  0 - 45 U/L Final     ALT 12/30/2021 18  0 - 50 U/L Final     Protein Total 12/30/2021 7.9  6.8 - 8.8 g/dL Final     Albumin 12/30/2021 4.0  3.4 - 5.0 g/dL Final     Bilirubin Total 12/30/2021 0.2  0.2 - 1.3 mg/dL Final     GFR Estimate 12/30/2021 >90  >60 mL/min/1.73m2 Final    Effective December 21, 2021 eGFRcr in adults is calculated using the 2021 CKD-EPI creatinine equation which includes age and gender (Elen et al., NEJ, DOI: 10.1056/IUQGpy5846416)     WBC Count 12/30/2021 7.3  4.0 - 11.0 10e3/uL Final     RBC Count 12/30/2021 4.60  3.80 - 5.20 10e6/uL Final     Hemoglobin 12/30/2021 12.1  11.7 - 15.7 g/dL Final     Hematocrit 12/30/2021 37.9  35.0 - 47.0 % Final     MCV 12/30/2021 82  78 - 100 fL Final     MCH 12/30/2021 26.3 (A) 26.5 - 33.0 pg Final     MCHC 12/30/2021 31.9  31.5 - 36.5 g/dL Final     RDW 12/30/2021 14.6  10.0 - 15.0 % Final     Platelet Count 12/30/2021 220  150 - 450 10e3/uL Final     % Neutrophils 12/30/2021 60  % Final     % Lymphocytes 12/30/2021 32  % Final     % Monocytes 12/30/2021 7  % Final     % Eosinophils 12/30/2021 1  % Final     % Basophils 12/30/2021 0  % Final     Absolute Neutrophils 12/30/2021 4.3  1.6 - 8.3 10e3/uL Final     Absolute Lymphocytes 12/30/2021 2.4  0.8 - 5.3 10e3/uL Final     Absolute Monocytes 12/30/2021 0.5   0.0 - 1.3 10e3/uL Final     Absolute Eosinophils 12/30/2021 0.1  0.0 - 0.7 10e3/uL Final     Absolute Basophils 12/30/2021 0.0  0.0 - 0.2 10e3/uL Final

## 2022-02-21 NOTE — NURSING NOTE
"Chief Complaint   Patient presents with     Menstrual Problem     Every 28 days  Heavy for 3 days, cramping        Initial /70 (BP Location: Right arm, Cuff Size: Adult Regular)   Wt 68.3 kg (150 lb 9.6 oz)   LMP 02/15/2022 (Exact Date)   Breastfeeding No   BMI 27.55 kg/m   Estimated body mass index is 27.55 kg/m  as calculated from the following:    Height as of 21: 1.575 m (5' 2\").    Weight as of this encounter: 68.3 kg (150 lb 9.6 oz).  BP completed using cuff size: regular    Questioned patient about current smoking habits.  Pt. quit smoking some time ago.          The following HM Due: NONE      Aracely Conde, ROGELIO on 2022 at 9:25 AM     "

## 2022-02-22 ENCOUNTER — TRANSFERRED RECORDS (OUTPATIENT)
Dept: HEALTH INFORMATION MANAGEMENT | Facility: CLINIC | Age: 37
End: 2022-02-22
Payer: COMMERCIAL

## 2022-02-22 LAB
C TRACH DNA SPEC QL NAA+PROBE: NEGATIVE
N GONORRHOEA DNA SPEC QL NAA+PROBE: NEGATIVE

## 2022-02-25 ENCOUNTER — HOSPITAL ENCOUNTER (OUTPATIENT)
Dept: ULTRASOUND IMAGING | Facility: CLINIC | Age: 37
Discharge: HOME | End: 2022-02-25
Attending: OBSTETRICS & GYNECOLOGY | Admitting: OBSTETRICS & GYNECOLOGY
Payer: COMMERCIAL

## 2022-02-25 DIAGNOSIS — N92.0 MENORRHAGIA WITH REGULAR CYCLE: ICD-10-CM

## 2022-02-25 DIAGNOSIS — N94.6 DYSMENORRHEA: ICD-10-CM

## 2022-02-25 PROCEDURE — 76856 US EXAM PELVIC COMPLETE: CPT

## 2022-03-02 ENCOUNTER — LAB (OUTPATIENT)
Dept: LAB | Facility: CLINIC | Age: 37
End: 2022-03-02
Payer: COMMERCIAL

## 2022-03-02 DIAGNOSIS — N92.0 MENORRHAGIA WITH REGULAR CYCLE: Primary | ICD-10-CM

## 2022-03-02 DIAGNOSIS — N92.0 MENORRHAGIA WITH REGULAR CYCLE: ICD-10-CM

## 2022-03-02 PROCEDURE — 36415 COLL VENOUS BLD VENIPUNCTURE: CPT

## 2022-03-02 PROCEDURE — 84443 ASSAY THYROID STIM HORMONE: CPT

## 2022-03-02 NOTE — PROGRESS NOTES
Pt on lab schedule. No orders were placed from [email or web address] on 2/28/2022. Order placed per Dr. Rojas in [email or web address].    Katya Gibbons CMA

## 2022-03-03 ENCOUNTER — HOSPITAL ENCOUNTER (OUTPATIENT)
Dept: MRI IMAGING | Facility: CLINIC | Age: 37
Discharge: HOME | End: 2022-03-03
Attending: OBSTETRICS & GYNECOLOGY | Admitting: OBSTETRICS & GYNECOLOGY
Payer: COMMERCIAL

## 2022-03-03 DIAGNOSIS — N64.4 BREAST PAIN: ICD-10-CM

## 2022-03-03 LAB — TSH SERPL DL<=0.005 MIU/L-ACNC: 2.06 MU/L (ref 0.4–4)

## 2022-03-03 PROCEDURE — 77049 MRI BREAST C-+ W/CAD BI: CPT

## 2022-03-03 PROCEDURE — 255N000002 HC RX 255 OP 636: Performed by: OBSTETRICS & GYNECOLOGY

## 2022-03-03 PROCEDURE — A9585 GADOBUTROL INJECTION: HCPCS | Performed by: OBSTETRICS & GYNECOLOGY

## 2022-03-03 RX ORDER — GADOBUTROL 604.72 MG/ML
15 INJECTION INTRAVENOUS ONCE
Status: COMPLETED | OUTPATIENT
Start: 2022-03-03 | End: 2022-03-03

## 2022-03-03 RX ADMIN — GADOBUTROL 7 ML: 604.72 INJECTION INTRAVENOUS at 09:21

## 2022-03-05 ENCOUNTER — HEALTH MAINTENANCE LETTER (OUTPATIENT)
Age: 37
End: 2022-03-05

## 2022-03-07 ENCOUNTER — TRANSFERRED RECORDS (OUTPATIENT)
Dept: HEALTH INFORMATION MANAGEMENT | Facility: CLINIC | Age: 37
End: 2022-03-07
Payer: COMMERCIAL

## 2022-03-08 ENCOUNTER — LAB REQUISITION (OUTPATIENT)
Dept: LAB | Facility: CLINIC | Age: 37
End: 2022-03-08
Payer: COMMERCIAL

## 2022-03-08 DIAGNOSIS — Z86.19 PERSONAL HISTORY OF OTHER INFECTIOUS AND PARASITIC DISEASES: ICD-10-CM

## 2022-03-08 PROCEDURE — 88112 CYTOPATH CELL ENHANCE TECH: CPT | Mod: TC,ORL | Performed by: PHYSICIAN ASSISTANT

## 2022-03-10 LAB
PATH REPORT.COMMENTS IMP SPEC: ABNORMAL
PATH REPORT.COMMENTS IMP SPEC: YES
PATH REPORT.FINAL DX SPEC: ABNORMAL
PATH REPORT.GROSS SPEC: ABNORMAL
PATH REPORT.RELEVANT HX SPEC: ABNORMAL

## 2022-03-10 PROCEDURE — 88112 CYTOPATH CELL ENHANCE TECH: CPT | Mod: 26

## 2022-03-29 ENCOUNTER — OFFICE VISIT (OUTPATIENT)
Dept: INTERNAL MEDICINE | Facility: CLINIC | Age: 37
End: 2022-03-29
Payer: COMMERCIAL

## 2022-03-29 VITALS
TEMPERATURE: 98.9 F | OXYGEN SATURATION: 99 % | WEIGHT: 150.31 LBS | BODY MASS INDEX: 27.66 KG/M2 | HEART RATE: 89 BPM | DIASTOLIC BLOOD PRESSURE: 76 MMHG | HEIGHT: 62 IN | SYSTOLIC BLOOD PRESSURE: 115 MMHG

## 2022-03-29 DIAGNOSIS — Z00.00 ROUTINE HISTORY AND PHYSICAL EXAMINATION OF ADULT: Primary | ICD-10-CM

## 2022-03-29 DIAGNOSIS — K21.00 GASTROESOPHAGEAL REFLUX DISEASE WITH ESOPHAGITIS WITHOUT HEMORRHAGE: ICD-10-CM

## 2022-03-29 PROCEDURE — 80061 LIPID PANEL: CPT | Performed by: FAMILY MEDICINE

## 2022-03-29 PROCEDURE — 36415 COLL VENOUS BLD VENIPUNCTURE: CPT | Performed by: FAMILY MEDICINE

## 2022-03-29 PROCEDURE — 82306 VITAMIN D 25 HYDROXY: CPT | Performed by: FAMILY MEDICINE

## 2022-03-29 PROCEDURE — 99395 PREV VISIT EST AGE 18-39: CPT | Performed by: FAMILY MEDICINE

## 2022-03-29 RX ORDER — SUCRALFATE ORAL 1 G/10ML
1 SUSPENSION ORAL
Qty: 420 ML | Refills: 1 | Status: SHIPPED | OUTPATIENT
Start: 2022-03-29 | End: 2022-07-14

## 2022-03-29 ASSESSMENT — ENCOUNTER SYMPTOMS
HEADACHES: 0
MYALGIAS: 0
NAUSEA: 0
SORE THROAT: 0
SHORTNESS OF BREATH: 0
PALPITATIONS: 0
DYSURIA: 0
EYE PAIN: 0
HEARTBURN: 1
HEMATOCHEZIA: 1
PARESTHESIAS: 0
DIZZINESS: 0
ARTHRALGIAS: 1
NERVOUS/ANXIOUS: 1
COUGH: 0
JOINT SWELLING: 0
HEMATURIA: 0
FREQUENCY: 0
CHILLS: 0
ABDOMINAL PAIN: 0
DIARRHEA: 1
CONSTIPATION: 0
WEAKNESS: 0
FEVER: 0

## 2022-03-29 NOTE — PROGRESS NOTES
SUBJECTIVE:   CC: Unique Baker is an 36 year old woman who presents for preventive health visit.       Patient has been advised of split billing requirements and indicates understanding: Yes  Healthy Habits:     Getting at least 3 servings of Calcium per day:  Yes    Bi-annual eye exam:  Yes    Dental care twice a year:  Yes    Sleep apnea or symptoms of sleep apnea:  Sleep apnea    Diet:  Regular (no restrictions)    Frequency of exercise:  2-3 days/week    Duration of exercise:  30-45 minutes    Taking medications regularly:  Yes    Medication side effects:  Not applicable    PHQ-2 Total Score: 2    Additional concerns today:  No        Unique has had difficulty following a bout of Covid in March 2021 followed by an apparent reaction to a Covid vaccination which followed.    She describes various symptoms including initially feeling a rush type feeling and hot feeling in her back, last of appetite and thirst, inability to see the upper portion of visual field, tinnitus, diarrhea. She has had a number of labs and consultations without a definite explanation of this.    She says she seems to feel better when she is on a Medrol Dosepak.    She has a colonoscopy planned in a couple of days for diagnostic purposes.    Patient has 3 children ages 20, 6, 4.  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    Still has a regular menstrual cycle.  She does see OB/GYN.    Only current medications are Dexilant and Pepcid.  She asked about refill on some Carafate which she uses as needed.            Today's PHQ-2 Score:   PHQ-2 ( 1999 Pfizer) 3/29/2022   Q1: Little interest or pleasure in doing things 1   Q2: Feeling down, depressed or hopeless 1   PHQ-2 Score 2   PHQ-2 Total Score (12-17 Years)- Positive if 3 or more points; Administer PHQ-A if positive -   Q1: Little interest or pleasure in doing things Several days   Q2: Feeling down, depressed or hopeless Several days   PHQ-2 Score 2       Abuse: Current or Past (Physical,  Sexual or Emotional) - No  Do you feel safe in your environment? Yes    Have you ever done Advance Care Planning? (For example, a Health Directive, POLST, or a discussion with a medical provider or your loved ones about your wishes): Yes, patient states has an Advance Care Planning document and will bring a copy to the clinic.    Social History     Tobacco Use     Smoking status: Former Smoker     Years: 7.00     Types: Cigarettes     Smokeless tobacco: Never Used     Tobacco comment: only if she drinks alcohol   Substance Use Topics     Alcohol use: Not Currently     Alcohol/week: 8.3 standard drinks     If you drink alcohol do you typically have >3 drinks per day or >7 drinks per week? No    Alcohol Use 3/29/2022   Prescreen: >3 drinks/day or >7 drinks/week? Not Applicable   Prescreen: >3 drinks/day or >7 drinks/week? -       Reviewed orders with patient.  Reviewed health maintenance and updated orders accordingly - Yes  Lab work is in process    Breast Cancer Screening:    Breast CA Risk Assessment (FHS-7) 3/29/2022   Do you have a family history of breast, colon, or ovarian cancer? No / Unknown       Mammogram would be advised at age 40.    History of abnormal Pap smear:   PAP / HPV Latest Ref Rng & Units 2/3/2021 4/7/2017 9/29/2014   PAP (Historical) - NIL NIL NIL   HPV16 NEG:Negative Negative Negative -   HPV18 NEG:Negative Negative Negative -   HRHPV NEG:Negative Negative Negative -     Reviewed and updated as needed this visit by clinical staff   Tobacco  Allergies  Meds   Med Hx  Surg Hx  Fam Hx  Soc Hx        Reviewed and updated as needed this visit by Provider                     Review of Systems   Constitutional: Negative for chills and fever.   HENT: Negative for congestion, ear pain, hearing loss and sore throat.    Eyes: Negative for pain and visual disturbance.   Respiratory: Negative for cough and shortness of breath.    Cardiovascular: Negative for chest pain, palpitations and peripheral  "edema.   Gastrointestinal: Positive for diarrhea, heartburn and hematochezia. Negative for abdominal pain, constipation and nausea.   Genitourinary: Negative for dysuria, frequency, genital sores, hematuria and urgency.   Musculoskeletal: Positive for arthralgias. Negative for joint swelling and myalgias.   Skin: Positive for rash.   Neurological: Negative for dizziness, weakness, headaches and paresthesias.   Psychiatric/Behavioral: Negative for mood changes. The patient is nervous/anxious.           OBJECTIVE:   /76 (BP Location: Right arm, Patient Position: Sitting, Cuff Size: Adult Regular)   Pulse 89   Temp 98.9  F (37.2  C) (Oral)   Ht 1.575 m (5' 2\")   Wt 68.2 kg (150 lb 5 oz)   SpO2 99%   BMI 27.49 kg/m    Physical Exam    Patient appears well in general.  PERRL, conjunctiva clear.  Pharynx WNL.  Neck no thyromegaly or mass.  Lungs clear.  Cardiac regular without murmur.  Rate normal.  Breasts no masses or adenopathy.  Abdomen nontender, no distention or mass.  Extremities without edema.  No obvious joint swelling or redness presently.  Speech, motor, gait normal.        ASSESSMENT/PLAN:   (Z00.00) Routine history and physical examination of adult  (primary encounter diagnosis)  Comment:   Plan: Vitamin D Deficiency, Lipid panel reflex to         direct LDL Non-fasting            (K21.00) Gastroesophageal reflux disease with esophagitis without hemorrhage  Comment:   Plan: sucralfate (CARAFATE) 1 GM/10ML suspension            Discussion -   Possible adverse reaction to Covid vaccine following an initial Covid infection.  Difficult to categorize this.  I suspect there is not a consistent way to manage this.  Would avoid another vaccine under the circumstances.  Patient advised.  She has heard this before.        COUNSELING:  Reviewed preventive health counseling, as reflected in patient instructions       Regular exercise       Family planning       Colorectal Cancer Screening    Estimated body " "mass index is 27.49 kg/m  as calculated from the following:    Height as of this encounter: 1.575 m (5' 2\").    Weight as of this encounter: 68.2 kg (150 lb 5 oz).    Weight management plan: Low-carb diet, exercise.    She reports that she has quit smoking. Her smoking use included cigarettes. She quit after 7.00 years of use. She has never used smokeless tobacco.      Counseling Resources:  ATP IV Guidelines  Pooled Cohorts Equation Calculator  Breast Cancer Risk Calculator  BRCA-Related Cancer Risk Assessment: FHS-7 Tool  FRAX Risk Assessment  ICSI Preventive Guidelines  Dietary Guidelines for Americans, 2010  USDA's MyPlate  ASA Prophylaxis  Lung CA Screening    Kevyn Story MD  [facility]  "

## 2022-03-30 ENCOUNTER — MYC MEDICAL ADVICE (OUTPATIENT)
Dept: INTERNAL MEDICINE | Facility: CLINIC | Age: 37
End: 2022-03-30
Payer: COMMERCIAL

## 2022-03-30 LAB
CHOLEST SERPL-MCNC: 283 MG/DL
DEPRECATED CALCIDIOL+CALCIFEROL SERPL-MC: 20 UG/L (ref 20–75)
FASTING STATUS PATIENT QL REPORTED: NO
HDLC SERPL-MCNC: 50 MG/DL
LDLC SERPL CALC-MCNC: 199 MG/DL
NONHDLC SERPL-MCNC: 233 MG/DL
TRIGL SERPL-MCNC: 170 MG/DL

## 2022-03-31 ENCOUNTER — TRANSFERRED RECORDS (OUTPATIENT)
Dept: HEALTH INFORMATION MANAGEMENT | Facility: CLINIC | Age: 37
End: 2022-03-31
Payer: COMMERCIAL

## 2022-03-31 DIAGNOSIS — E78.5 HYPERLIPIDEMIA WITH TARGET LDL LESS THAN 130: ICD-10-CM

## 2022-03-31 DIAGNOSIS — E78.5 HYPERLIPIDEMIA LDL GOAL <130: Primary | ICD-10-CM

## 2022-03-31 RX ORDER — ATORVASTATIN CALCIUM 10 MG/1
10 TABLET, FILM COATED ORAL EVERY EVENING
Qty: 30 TABLET | Refills: 3 | Status: SHIPPED | OUTPATIENT
Start: 2022-03-31 | End: 2022-11-01 | Stop reason: SINTOL

## 2022-03-31 NOTE — TELEPHONE ENCOUNTER
Patient could not see Ixsystemst message from Dr Story. Writer read her the message. She has had not change in the last year and is very active. This is a family trait to have raiza cholesterol  Please advise if she should start medication  Ok to call and dev [phone]

## 2022-03-31 NOTE — TELEPHONE ENCOUNTER
"Per routing comment from Dr. Story:    \"Please contact her.   As she has not made any changes this year, I will suggest a trial of medicine for her lipids.   I sent an Rx for a atorvastatin 10 mg to take 1 each evening.   I sent this to the [facility].   I would suggest a repeat lipid profile, fasting, in 3 months and I will place a future order.     Dr. Story\"    Patient informed via [employer] of Dr. Story's message.    "

## 2022-04-04 ENCOUNTER — TRANSFERRED RECORDS (OUTPATIENT)
Dept: HEALTH INFORMATION MANAGEMENT | Facility: CLINIC | Age: 37
End: 2022-04-04
Payer: COMMERCIAL

## 2022-04-11 NOTE — PROGRESS NOTES
[facility] Pain Management     Date of visit: 4/12/2022      Assessment:   Unique Baker is a 36 year old female with a past medical history significant for GERD, gestational diabetes mellitus, and post operative nausea and vomiting who presents with complaints of left head, chest, left neck, left arm, leg, and foot/ankle pain.      1. Left head, chest, left neck, left arm, leg, and foot/ankle pain- etiology unclear, symptoms developed 3 days after COVID-19 vaccine which was 1 month after COVID. It sounds like there is a neuropathic and/or inflammatory component to pain. Her headaches originate around her left greater occipital nerve and radiate over the top of her head.   2. Mental Health - the patient's mental health concerns, specifically situational depression, affect her experience of pain and contribute to her clinically significant distress.     Visit Diagnoses:  1. Occipital neuralgia of left side    2. Neuropathic pain      Plan:    1.  Pain Physical Therapy:                YES  I encouraged Unique consider chronic pain physical therapy with Deandre Ortiz PT while waiting for post COVID clinic.              2.  Pain Psychologist to address relaxation, behavioral change, coping style, and other factors important to improvement.  NO              3.  Medication Management:                           1. Unique continues to have left sided nerve pain that is bothersome. Gabapentin was recommended at last visit but she did not start. Anxiety is also currently bothersome. After discussions he is interested in starting. Start gabapentin 100mg at bedtime for 3-5 days, then take 200mg at bedtime for 3-5 days, then 300mg at bedtime. Okay to take one additional 100mg as needed, max of 400mg/day. If anxiety doesn't improve, I advised she follow up with primary care provider.   2. Okay to continue Celebrex 100mg BID. Could restart Tylenol as needed.               4.  Potential procedures: continue to monitor the  benefit from occipital nerve block.              5.  Referrals: not at this time.               6.  Follow up with CARRIE Boyce CNP in 4-6 weeks.        Huyen BUTLER CNP  [facility] Pain Management     -------------------------------------------------    Subjective:    Chief complaint:   Chief Complaint   Patient presents with     Pain       Interval history:  Unique Baker is a 36 year old female last seen on 1/11/2022.  she is seen in follow up.     Recommendations/plan at the last visit included:           1.  Pain Physical Therapy:                YES  I think working with the post [facility] clinic is most likely to be helpful but first appointment isn't until April. I recommend working with chronic pain physical therapy in the interim with Deandre Ortiz PT. She is open to this. She will schedule first visit and have visits on a weekly basis as able.               2.  Pain Psychologist to address relaxation, behavioral change, coping style, and other factors important to improvement.  NO              3.  Medication Management:                           1. I wonder if her pain is neuropathic in origin. We discussed a trial of gabapentin, could be helpful and she is interested. Start gabapentin as directed below. Call with issues.   Gabapentin 1 tab= 100mg     AM                                       PM                                       Bedtime  0                                           0                                           100mg (1 tab).  After 3-5 days, increase as tolerated   100mg (1 tab)                      0                                           100mg (1 tab).  After 3-5 days, increase as tolerated   100mg (1 tab)                      100mg (1 tab)                      100mg (1 tab).  After 3-5 days, increase as tolerated   100mg (1 tab)                      100mg (1 tab)                      200mg (2 tabs). After 3-5 days, increase as tolerated   200mg (2 tabs)                     100mg (1 tab)                      200mg (2 tabs). After 3-5 days, increase as tolerated   200mg (2 tabs)                    200mg (2 tabs)                    200mg (2 tabs). After 3-5 days, increase as tolerated   200mg (2 tabs)                    200mg (2 tabs)                    300mg (3 tabs). After 3-5 days, increase as tolerated   300mg (3 tabs)                    200mg (2 tabs)                    300mg (3 tabs). After 3-5 days, increase as tolerated   300mg (3 tabs)                    300mg (3 tabs)                    300mg (3 tabs).      Call with any problems.  Caution for sedation.    Do not drive until you know how the medication affects you.   You can go slower if you need to or increasing only one dose at a time.  Do not stop abruptly once at higher doses.  This medication must be tapered off.                                 2. Okay to continue Celebrex 100mg BID. COuld restart Tylenol as needed.               4.  Potential procedures: it seems like she may have some occipital neuralgia. We discussed trying na occipital nerve block and she is interested. Ordered today. They will call to schedule. Monitor pain benefit.               5.  Referrals: not at this time.               6.  Follow up with CARRIE Boyce CNP in 4-6 weeks.        Since her last visit, Unique Baker reports:  -Her pain is somewhat better than it was at last visit.   -She attributes the improvement to be due to recent injection.   -She had a left occipital nerve block with me on 2/8/2022. She reports about 50% improvement in pain, no longer has as constant or severe pain at the back of her head.   -She did not start gabapentin as directed, states she is hesitant to take this medication as she had significant side effects with amitriptyline.   -She states she has been struggling with anxiety and panic attacks, this is a new problem for her. She tries hydroxyzine with minimal improvement. She doesn't want to be on a daily  medication for this. Her brother has panic attacks as well.   -Her appointment with the post [facility] clinic was cancelled again,       Pain Information:   Pain rating: averages 2/10 on a 0-10 scale.      Current pain medications:    Tylenol- [facility], not currently taking              Celebrex 200mg daily- [facility]              Hydroxyzine 25mg Q8h- [facility] for anxiety or sleep              Medrol dose pack- prescribed 12/30, states she usually has 1 month of benefit, recently completed    Current MME: 0    Review of [place] Prescription Monitoring Program (): No concern for abuse or misuse of controlled medications based on this report.     Annual Controlled Substance Agreement/UDS due date: NA    1. Previous Pain Relevant Medications:              Opiates: no              NSAIDS: Celebrex-?, ibuprofen- ?              Muscle Relaxants: no              Anti-migraine mediations: no              Anti-depressants: amitriptyline- [facility], mouth sores              Sleep aids: no              Anxiolytics: hydroxyzine- [facility]              Neuropathics: no                       Topicals: no              Other medications not covered above: Tylenol- [facility]     2. Physical Therapy: no  3. Pain Psychology: no  4. Surgery: no  5. Injections: eft occipital nerve block with me on 2/8/2022- 50% improvement  6. Chiropractic: no  7. Acupuncture: no  8. TENS Unit: no    Medications:  Current Outpatient Medications   Medication Sig Dispense Refill     atorvastatin (LIPITOR) 10 MG tablet Take 1 tablet (10 mg) by mouth every evening 30 tablet 3     cetirizine (ZYRTEC) 10 MG tablet Take 1 tablet (10 mg) by mouth daily 30 tablet 1     DEXILANT 30 MG CPDR CR capsule        famotidine (PEPCID) 20 MG tablet TAKE ONE TABLET BY MOUTH TWICE A DAY AS NEEDED FOR REFLUX 180 tablet 3     fluocinonide emulsified base 0.05 % external cream apply to rash as needed - sparingly 60 g 1     fluticasone (FLONASE) 50 MCG/ACT nasal spray Spray 1 spray into both nostrils 2 times  daily as needed for rhinitis or allergies Take 1 spray twice daily x 1 week then 1 spray daily at night x 1 week then as needed 11.1 mL 1     hydrOXYzine (ATARAX) 25 MG tablet Take 1 tablet (25 mg) by mouth every 8 hours as needed for anxiety 30 tablet 0     multivitamin (ONE-DAILY) tablet Take 1 tablet by mouth daily       sucralfate (CARAFATE) 1 GM/10ML suspension Take 10 mLs (1 g) by mouth 4 times daily (before meals and nightly) 420 mL 1       Medical History: any changes in medical history since they were last seen? No    Review of Systems: A 10-point review of systems was negative, with the exception of chronic pain issues.      Objective:    Physical Exam:  Blood pressure 113/75, pulse 60, last menstrual period 02/15/2022, SpO2 98 %, not currently breastfeeding.  Constitutional: Well developed, well nourished, appears stated age.  Gait: Normal  HEENT: Head atraumatic, normocephalic. Eyes without conjunctival injection or jaundice. Oropharynx clear. Neck supple. No obvious neck masses.  Skin: No rash, lesions, or petechiae of exposed skin.   Psychiatric/mental status: Alert, without lethargy or stupor. Speech fluent. Appropriate affect. Mood normal. Able to follow commands without difficulty.     Imaging:  CT of chest was completed on 5/2/2021 and shows:  FINDINGS: The heart is not enlarged without significant coronary  calcifications. The thoracic aorta is normal variant right arch and  otherwise unremarkable. without evidence of dissection or aneurysm.  The lungs are clear. There are no effusions. There is no axillary,  hilar, or mediastinal adenopathy.     The adrenal glands are normal. Question some hepatic steatosis. The  remainder of the visualized abdomen is unremarkable.     Bone windows show no destructive bony lesions.                                                                      IMPRESSION:  Negative CT study of the chest.    BILLING TIME DOCUMENTATION:   The total TIME spent on this patient  on the date of the encounter/appointment was 18 minutes.      TOTAL TIME includes:   Time spent preparing to see the patient (reviewing records and tests)   Time spent face to face (or over the phone) with the patient   Time spent ordering tests, medications, procedures and referrals   Time spent Referring and communicating with other healthcare professionals   Time spent documenting clinical information in Epic

## 2022-04-12 ENCOUNTER — OFFICE VISIT (OUTPATIENT)
Dept: PALLIATIVE MEDICINE | Facility: CLINIC | Age: 37
End: 2022-04-12
Payer: COMMERCIAL

## 2022-04-12 VITALS — HEART RATE: 60 BPM | OXYGEN SATURATION: 98 % | SYSTOLIC BLOOD PRESSURE: 113 MMHG | DIASTOLIC BLOOD PRESSURE: 75 MMHG

## 2022-04-12 DIAGNOSIS — M54.81 OCCIPITAL NEURALGIA OF LEFT SIDE: Primary | ICD-10-CM

## 2022-04-12 DIAGNOSIS — M79.2 NEUROPATHIC PAIN: ICD-10-CM

## 2022-04-12 PROCEDURE — 99213 OFFICE O/P EST LOW 20 MIN: CPT | Performed by: NURSE PRACTITIONER

## 2022-04-12 ASSESSMENT — PAIN SCALES - GENERAL: PAINLEVEL: MILD PAIN (2)

## 2022-04-12 NOTE — NURSING NOTE
PEG Score 1/11/2022 2/8/2022 4/12/2022   PEG Total Score 5 4.33 0.67         Alicia Wise MA  [facility] Pain Management [place]

## 2022-04-12 NOTE — PATIENT INSTRUCTIONS
1.  Pain Physical Therapy:                YES  Consider chronic pain physical therapy with Deandre Ortiz PT while waiting for post [facility] clinic.              2.  Pain Psychologist to address relaxation, behavioral change, coping style, and other factors important to improvement.  NO              3.  Medication Management:                           1. Start gabapentin 100mg at bedtime for 3-5 days, then take 200mg at bedtime for 3-5 days, then 300mg at bedtime. Okay to take one additional 100mg as needed, max of 400mg/day. If anxiety doesn't improve, follow up with your primary care provider.   2. Okay to continue Celebrex 100mg BID. Could restart Tylenol as needed.               4.  Potential procedures: continue to monitor the benefit from occipital nerve block.              5.  Referrals: not at this time.               6.  Follow up with CARRIE Boyce CNP in 4-6 weeks.        ----------------------------------------------------------------  Clinic Number:  [phone]   Call with any questions about your care and for scheduling assistance.   Calls are returned Monday through Friday between 8 AM and 4:30 PM. We usually get back to you within 2 business days depending on the issue/request.    If we are prescribing your medications:  For opioid medication refills, call the clinic or send a AnyCloud message 7 days in advance.  Please include:  Name of requested medication  Name of the pharmacy.  For non-opioid medications, call your pharmacy directly to request a refill. Please allow 3-4 days to be processed.   Per [place] State Law:  All controlled substance prescriptions must be filled within 30 days of being written.    For those controlled substances allowing refills, pickup must occur within 30 days of last fill.      We believe regular attendance is key to your success in our program!    Any time you are unable to keep your appointment we ask that you call us at least 24 hours in advance to cancel.This will  allow us to offer the appointment time to another patient.   Multiple missed appointments may lead to dismissal from the clinic.

## 2022-04-28 ENCOUNTER — APPOINTMENT (OUTPATIENT)
Dept: URBAN - METROPOLITAN AREA CLINIC 253 | Age: 37
Setting detail: DERMATOLOGY
End: 2022-05-01

## 2022-04-28 VITALS — WEIGHT: 143 LBS | RESPIRATION RATE: 15 BRPM | HEIGHT: 62 IN

## 2022-04-28 DIAGNOSIS — L60.8 OTHER NAIL DISORDERS: ICD-10-CM

## 2022-04-28 DIAGNOSIS — L21.8 OTHER SEBORRHEIC DERMATITIS: ICD-10-CM

## 2022-04-28 DIAGNOSIS — L82.1 OTHER SEBORRHEIC KERATOSIS: ICD-10-CM

## 2022-04-28 DIAGNOSIS — D22 MELANOCYTIC NEVI: ICD-10-CM

## 2022-04-28 DIAGNOSIS — D18.0 HEMANGIOMA: ICD-10-CM

## 2022-04-28 DIAGNOSIS — L81.4 OTHER MELANIN HYPERPIGMENTATION: ICD-10-CM

## 2022-04-28 DIAGNOSIS — Z71.89 OTHER SPECIFIED COUNSELING: ICD-10-CM

## 2022-04-28 DIAGNOSIS — L82.0 INFLAMED SEBORRHEIC KERATOSIS: ICD-10-CM

## 2022-04-28 PROBLEM — L30.9 DERMATITIS, UNSPECIFIED: Status: ACTIVE | Noted: 2022-04-28

## 2022-04-28 PROBLEM — D18.01 HEMANGIOMA OF SKIN AND SUBCUTANEOUS TISSUE: Status: ACTIVE | Noted: 2022-04-28

## 2022-04-28 PROBLEM — D22.5 MELANOCYTIC NEVI OF TRUNK: Status: ACTIVE | Noted: 2022-04-28

## 2022-04-28 PROCEDURE — 99213 OFFICE O/P EST LOW 20 MIN: CPT

## 2022-04-28 PROCEDURE — OTHER PRESCRIPTION: OTHER

## 2022-04-28 PROCEDURE — OTHER COUNSELING: OTHER

## 2022-04-28 PROCEDURE — OTHER MIPS QUALITY: OTHER

## 2022-04-28 PROCEDURE — OTHER ADDITIONAL NOTES: OTHER

## 2022-04-28 RX ORDER — TRETIONIN 0.25 MG/G
0.025% CREAM TOPICAL QHS
Qty: 45 | Refills: 3 | Status: ERX | COMMUNITY
Start: 2022-04-28

## 2022-04-28 ASSESSMENT — LOCATION SIMPLE DESCRIPTION DERM
LOCATION SIMPLE: UPPER BACK
LOCATION SIMPLE: LEFT PRETIBIAL REGION
LOCATION SIMPLE: LEFT CHEEK
LOCATION SIMPLE: RIGHT GREAT TOE
LOCATION SIMPLE: LEFT GREAT TOE
LOCATION SIMPLE: SCALP

## 2022-04-28 ASSESSMENT — LOCATION ZONE DERM
LOCATION ZONE: LEG
LOCATION ZONE: TOENAIL
LOCATION ZONE: SCALP
LOCATION ZONE: FACE
LOCATION ZONE: TRUNK

## 2022-04-28 ASSESSMENT — LOCATION DETAILED DESCRIPTION DERM
LOCATION DETAILED: LEFT CENTRAL PARIETAL SCALP
LOCATION DETAILED: RIGHT GREAT TOENAIL
LOCATION DETAILED: LEFT INFERIOR CENTRAL MALAR CHEEK
LOCATION DETAILED: INFERIOR THORACIC SPINE
LOCATION DETAILED: LEFT GREAT TOENAIL
LOCATION DETAILED: LEFT PROXIMAL PRETIBIAL REGION

## 2022-04-28 NOTE — HPI: FULL BODY SKIN EXAMINATION
How Severe Are Your Spot(S)?: mild
What Type Of Note Output Would You Prefer (Optional)?: Standard Output
What Is The Reason For Today's Visit?: Full Body Skin Examination
What Is The Reason For Today's Visit? (Being Monitored For X): concerning skin lesions on an annual basis
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

## 2022-04-28 NOTE — PROCEDURE: ADDITIONAL NOTES
Detail Level: Zone
Additional Notes: She has fluocinonide at home to use. Counseled on proper use.
Render Risk Assessment In Note?: no
Additional Notes: Patient declines tx.
Additional Notes: Recommend you take pictures to track.

## 2022-05-12 ENCOUNTER — APPOINTMENT (OUTPATIENT)
Dept: URBAN - METROPOLITAN AREA CLINIC 253 | Age: 37
Setting detail: DERMATOLOGY
End: 2022-05-12

## 2022-05-12 VITALS — WEIGHT: 146 LBS | HEIGHT: 62 IN

## 2022-05-12 DIAGNOSIS — L60.8 OTHER NAIL DISORDERS: ICD-10-CM

## 2022-05-12 PROCEDURE — OTHER ORDER TESTS: OTHER

## 2022-05-12 PROCEDURE — 36415 COLL VENOUS BLD VENIPUNCTURE: CPT

## 2022-05-12 PROCEDURE — 99212 OFFICE O/P EST SF 10 MIN: CPT

## 2022-05-12 PROCEDURE — OTHER ADDITIONAL NOTES: OTHER

## 2022-05-12 PROCEDURE — OTHER COUNSELING: OTHER

## 2022-05-12 PROCEDURE — OTHER VENIPUNCTURE: OTHER

## 2022-05-12 PROCEDURE — OTHER PHOTO-DOCUMENTATION: OTHER

## 2022-05-12 ASSESSMENT — LOCATION SIMPLE DESCRIPTION DERM
LOCATION SIMPLE: RIGHT GREAT TOE
LOCATION SIMPLE: LEFT GREAT TOE
LOCATION SIMPLE: LEFT 5TH TOE
LOCATION SIMPLE: LEFT UPPER ARM

## 2022-05-12 ASSESSMENT — LOCATION ZONE DERM
LOCATION ZONE: TOENAIL
LOCATION ZONE: ARM

## 2022-05-12 ASSESSMENT — LOCATION DETAILED DESCRIPTION DERM
LOCATION DETAILED: LEFT ANTECUBITAL SKIN
LOCATION DETAILED: LEFT 5TH TOENAIL
LOCATION DETAILED: LEFT GREAT TOENAIL
LOCATION DETAILED: RIGHT GREAT TOENAIL

## 2022-05-12 NOTE — PROCEDURE: COUNSELING
Patient Specific Counseling (Will Not Stick From Patient To Patient): Negative Perez’s sign
Detail Level: Detailed

## 2022-05-12 NOTE — PROCEDURE: ADDITIONAL NOTES
Render Risk Assessment In Note?: no
Additional Notes: Discussed the bilateral presentation in her great toes appearing around the time she went through the significant stress post Covid and vaccination last year, likely related. \\nThe 5 great toe pigment appears to be traumatic injury, base of nail has no pigment. \\nRecommended referral to  for further assessment and possible nail biopsy. She expressed desire for a biopsy.  \\nOtherwise, recheck in 4-6 weeks to observe for growth/any changes. \\n\\nWith history of elevated VANESSA/ESR last year, she has not had levels rechecked since then to see if she continues to have elevation, labs drawn today.
Detail Level: Detailed

## 2022-06-15 DIAGNOSIS — G47.33 OSA (OBSTRUCTIVE SLEEP APNEA): Primary | ICD-10-CM

## 2022-06-20 ENCOUNTER — TRANSFERRED RECORDS (OUTPATIENT)
Dept: HEALTH INFORMATION MANAGEMENT | Facility: CLINIC | Age: 37
End: 2022-06-20

## 2022-06-21 ENCOUNTER — TRANSFERRED RECORDS (OUTPATIENT)
Dept: HEALTH INFORMATION MANAGEMENT | Facility: CLINIC | Age: 37
End: 2022-06-21

## 2022-06-24 ENCOUNTER — TRANSFERRED RECORDS (OUTPATIENT)
Dept: HEALTH INFORMATION MANAGEMENT | Facility: CLINIC | Age: 37
End: 2022-06-24

## 2022-06-27 ENCOUNTER — LAB (OUTPATIENT)
Dept: LAB | Facility: CLINIC | Age: 37
End: 2022-06-27
Payer: COMMERCIAL

## 2022-06-27 DIAGNOSIS — E78.5 HYPERLIPIDEMIA LDL GOAL <130: ICD-10-CM

## 2022-06-27 LAB
CHOLEST SERPL-MCNC: 128 MG/DL
FASTING STATUS PATIENT QL REPORTED: YES
HDLC SERPL-MCNC: 41 MG/DL
LDLC SERPL CALC-MCNC: 71 MG/DL
NONHDLC SERPL-MCNC: 87 MG/DL
TRIGL SERPL-MCNC: 78 MG/DL

## 2022-06-27 PROCEDURE — 80061 LIPID PANEL: CPT

## 2022-06-27 PROCEDURE — 36415 COLL VENOUS BLD VENIPUNCTURE: CPT

## 2022-07-05 ENCOUNTER — MYC MEDICAL ADVICE (OUTPATIENT)
Dept: INTERNAL MEDICINE | Facility: CLINIC | Age: 37
End: 2022-07-05

## 2022-07-05 ENCOUNTER — VIRTUAL VISIT (OUTPATIENT)
Dept: PHYSICAL MEDICINE AND REHAB | Facility: CLINIC | Age: 37
End: 2022-07-05
Attending: NURSE PRACTITIONER
Payer: COMMERCIAL

## 2022-07-05 DIAGNOSIS — R76.8 ELEVATED ANTINUCLEAR ANTIBODY (ANA) LEVEL: ICD-10-CM

## 2022-07-05 DIAGNOSIS — U09.9 POST-COVID CHRONIC MUSCLE PAIN: ICD-10-CM

## 2022-07-05 DIAGNOSIS — U09.9 POST-COVID CHRONIC JOINT PAIN: Primary | ICD-10-CM

## 2022-07-05 DIAGNOSIS — M25.50 POST-COVID CHRONIC JOINT PAIN: Primary | ICD-10-CM

## 2022-07-05 DIAGNOSIS — G89.29 POST-COVID CHRONIC MUSCLE PAIN: ICD-10-CM

## 2022-07-05 DIAGNOSIS — H93.19 TINNITUS, UNSPECIFIED LATERALITY: ICD-10-CM

## 2022-07-05 DIAGNOSIS — G62.9 SMALL FIBER NEUROPATHY: ICD-10-CM

## 2022-07-05 DIAGNOSIS — T88.1XXD: ICD-10-CM

## 2022-07-05 DIAGNOSIS — K21.00 GASTROESOPHAGEAL REFLUX DISEASE WITH ESOPHAGITIS WITHOUT HEMORRHAGE: ICD-10-CM

## 2022-07-05 DIAGNOSIS — M79.10 POST-COVID CHRONIC MUSCLE PAIN: ICD-10-CM

## 2022-07-05 DIAGNOSIS — G89.29 POST-COVID CHRONIC JOINT PAIN: Primary | ICD-10-CM

## 2022-07-05 DIAGNOSIS — M25.50 MULTIPLE JOINT PAIN: ICD-10-CM

## 2022-07-05 PROCEDURE — 99205 OFFICE O/P NEW HI 60 MIN: CPT | Mod: 95 | Performed by: PHYSICIAN ASSISTANT

## 2022-07-05 SDOH — SOCIAL STABILITY: SOCIAL NETWORK: I HAVE TROUBLE DOING ALL OF THE FAMILY ACTIVITIES THAT I WANT TO DO: NEVER

## 2022-07-05 SDOH — SOCIAL STABILITY: SOCIAL NETWORK: PROMIS ABILITY TO PARTICIPATE IN SOCIAL ROLES & ACTIVITIES T-SCORE: 58.1

## 2022-07-05 SDOH — SOCIAL STABILITY: SOCIAL NETWORK: I HAVE TROUBLE DOING ALL OF THE ACTIVITIES WITH FRIENDS THAT I WANT TO DO: RARELY

## 2022-07-05 SDOH — SOCIAL STABILITY: SOCIAL NETWORK: I HAVE TROUBLE DOING ALL OF MY REGULAR LEISURE ACTIVITIES WITH OTHERS: NEVER

## 2022-07-05 SDOH — SOCIAL STABILITY: SOCIAL NETWORK: I HAVE TROUBLE DOING ALL OF MY USUAL WORK (INCLUDE WORK AT HOME): NEVER

## 2022-07-05 SDOH — SOCIAL STABILITY: SOCIAL NETWORK

## 2022-07-05 ASSESSMENT — PATIENT HEALTH QUESTIONNAIRE - PHQ9
SUM OF ALL RESPONSES TO PHQ QUESTIONS 1-9: 3
SUM OF ALL RESPONSES TO PHQ QUESTIONS 1-9: 3
10. IF YOU CHECKED OFF ANY PROBLEMS, HOW DIFFICULT HAVE THESE PROBLEMS MADE IT FOR YOU TO DO YOUR WORK, TAKE CARE OF THINGS AT HOME, OR GET ALONG WITH OTHER PEOPLE: NOT DIFFICULT AT ALL

## 2022-07-05 ASSESSMENT — ENCOUNTER SYMPTOMS
WEAKNESS: 0
SPUTUM PRODUCTION: 1
BLOOD IN STOOL: 0
SPEECH CHANGE: 0
POSTURAL DYSPNEA: 0
LEG PAIN: 1
NUMBNESS: 0
VOMITING: 0
HYPERTENSION: 0
JAUNDICE: 0
TREMORS: 0
FATIGUE: 0
HOT FLASHES: 0
ABDOMINAL PAIN: 1
COUGH DISTURBING SLEEP: 0
MUSCLE CRAMPS: 1
SHORTNESS OF BREATH: 0
DEPRESSION: 0
DECREASED CONCENTRATION: 0
INSOMNIA: 0
BRUISES/BLEEDS EASILY: 0
MEMORY LOSS: 0
NAUSEA: 0
LIGHT-HEADEDNESS: 0
CHILLS: 0
POLYPHAGIA: 0
MYALGIAS: 0
WEIGHT GAIN: 1
BACK PAIN: 0
PALPITATIONS: 0
BREAST MASS: 0
DECREASED APPETITE: 0
DISTURBANCES IN COORDINATION: 0
SYNCOPE: 0
ORTHOPNEA: 0
COUGH: 0
BOWEL INCONTINENCE: 0
HALLUCINATIONS: 0
DYSPNEA ON EXERTION: 0
HEMOPTYSIS: 0
PANIC: 1
STIFFNESS: 1
HYPOTENSION: 0
EXERCISE INTOLERANCE: 0
ARTHRALGIAS: 1
SEIZURES: 0
SKIN CHANGES: 0
SNORES LOUDLY: 1
DIZZINESS: 0
WHEEZING: 0
CONSTIPATION: 0
HEADACHES: 0
RECTAL PAIN: 1
FEVER: 0
MUSCLE WEAKNESS: 0
NERVOUS/ANXIOUS: 1
POLYDIPSIA: 0
BLOATING: 0
DIARRHEA: 0
SLEEP DISTURBANCES DUE TO BREATHING: 0
TINGLING: 0
LOSS OF CONSCIOUSNESS: 0
SWOLLEN GLANDS: 1
NAIL CHANGES: 1
HEARTBURN: 1
NECK PAIN: 0
JOINT SWELLING: 0
ALTERED TEMPERATURE REGULATION: 0
POOR WOUND HEALING: 0
NIGHT SWEATS: 0
WEIGHT LOSS: 0
BREAST PAIN: 1
INCREASED ENERGY: 0
DECREASED LIBIDO: 0
PARALYSIS: 0

## 2022-07-05 ASSESSMENT — ANXIETY QUESTIONNAIRES
5. BEING SO RESTLESS THAT IT IS HARD TO SIT STILL: NOT AT ALL
4. TROUBLE RELAXING: NOT AT ALL
7. FEELING AFRAID AS IF SOMETHING AWFUL MIGHT HAPPEN: NOT AT ALL
GAD7 TOTAL SCORE: 2
GAD7 TOTAL SCORE: 2
8. IF YOU CHECKED OFF ANY PROBLEMS, HOW DIFFICULT HAVE THESE MADE IT FOR YOU TO DO YOUR WORK, TAKE CARE OF THINGS AT HOME, OR GET ALONG WITH OTHER PEOPLE?: SOMEWHAT DIFFICULT
2. NOT BEING ABLE TO STOP OR CONTROL WORRYING: SEVERAL DAYS
3. WORRYING TOO MUCH ABOUT DIFFERENT THINGS: NOT AT ALL
6. BECOMING EASILY ANNOYED OR IRRITABLE: NOT AT ALL
1. FEELING NERVOUS, ANXIOUS, OR ON EDGE: SEVERAL DAYS
7. FEELING AFRAID AS IF SOMETHING AWFUL MIGHT HAPPEN: NOT AT ALL
GAD7 TOTAL SCORE: 2

## 2022-07-05 NOTE — PATIENT INSTRUCTIONS
Post COVID Self Care Suggestions:     Fatigue Management:       https://www.archives-pmr.org/action/showPdf?fgu=-[email or web address]       Self Care:      [email or web address]  Recovery World Health Organization:    https://apps.who.int/iris/bitstream/handle/09868/146727/GEX-YTAK-3204-244-26310-71278-eng.pdf  Breathing exercises:    [email or web address]

## 2022-07-05 NOTE — PROGRESS NOTES
Unique is a 37 year old who is being evaluated via a billable video visit.      Patient denies any changes since echeck-in regarding medication and allergies and states all information entered during echeck-in remains accurate.    How would you like to obtain your AVS? MyChart  If the video visit is dropped, the invitation should be resent by: Text to cell phone: [phone]  Will anyone else be joining your video visit? No       Assessment/ Impression:   1. Post-COVID chronic joint pain/ Post-COVID chronic muscle pain  Patient with multiple joint pains and muscle pain that migrates. She has seen Rheumatology in the past and note reviewed. Discussed rechecking labs as some were elevated in the past and advised to follow up with Rheumatology due to continued symptoms some of which have worsened.  Also discussed seeing an integrative medicine specialist due to symptoms being triggered by her abdomen after eating.  Referrals and labs placed.    - Erythrocyte sedimentation rate auto; Future  - CRP inflammation; Future  - CK total; Future  - TSH with free T4 reflex; Future  - Anti Nuclear Irene IgG by IFA with Reflex; Future  - Adult Rheumatology  Referral; Future  - SSB La RADHA Antibody IgG; Future  - SSA Ro RADHA Antibody IgG; Future  - Internal Medicine Referral; Future    4. Elevated antinuclear antibody (FELICITY) level  Recheck FELICITY due to elevation in past.   - Anti Nuclear Irene IgG by IFA with Reflex; Future    5. Gastroesophageal reflux disease with esophagitis without hemorrhage  Discussed continuing to work with GI for   - Adult Post Covid Clinic Referral  - Internal Medicine Referral; Future    6. Small fiber neuropathy  Will refer to Neurology due to symptoms of neuropath that is not improving with medications. Discussed this is likely small fiber and will refer to the small fiber specialists.   - Adult Neurology  Referral; Future    7. Tinnitus, unspecified laterality  Patient does not endorse  tinnitus. Resolved  - Adult Post Covid Clinic Referral      Plan:  1. I reviewed present knowledge on long-Covid.  Education was provided and question were answered.  2. Orders/Referrals as above  3. Will advised patient on test results  4. I will follow up with Unique Baker in 3 month I will review progress and consider need for any other therapeutic interventions. If there are any questions and/or concerns she will call the clinic.      On day of encounter time spent in chart review and with patient in consultation, exam, education and coordination of care, review of outside notes and labs, and documentation: 65 minutes              _________________________________  Corina Aranda PA-C  [facility] PHYSICAL MEDICINE AND REHABILITATION CLINIC [facility]   This 37 year old female presents to the [facility] Medicine Post-COVID clinic as a new consult to evaluate continuing symptoms after COVID infection initially [id number]/02/2021   Unique Baker presented to their primary care physician on 3/2/2022 complaining of Chest tightness, congestion, shortness of breath, cough, fever, chills, generalized aches and pains, fatigue and weakness. Treatment was symptomtatic. Patient took two weeks to recover Unique Baker experienced complications of Acid reflux.  Continuing symptoms include Abdominal pain, heartburn, arthralgias, anxiety and depression.   Patient has noticed after her mentural cycle she will have pain in her bones. She stands she feels tightness.  She states the pain moves as well.  Patient has been working with pain management. She does feel throbbing pain and pulsating. Patient states when she eats she gets sharp pinching pain. She does have has seen Rheumatology, Neurology, and ENT due to her issues. She states her symptoms have worsened and she is having more issues with joint pain and reflux. She endorse pain that worsens after she eats and with  her cycle. She did have a positive FELICITY in the past. She also has a dry mouth now which is new.   Past medical history is significant for smoking history. The patient was vaccinated against COVID X1.  Previous activity was full time work.    History of COVID-19 infection: 2021   Date of first symptoms:  3/2/21  Diagnosis: PCR  Hospitalization: No  Treatment: Symptomatic   Current Symptoms: See subjective  Goals of Care: Decrease joint/muscle/nerve pain, decrease abominal pain and improve quality of life      PHQ Assesment Total Score(s) 2022   PHQ-9 Score 3   Some recent data might be hidden     RENÉE-7 Results 2022   RENÉE 7 TOTAL SCORE 2 (minimal anxiety)   Some recent data might be hidden     PTSD Screen Score 2022   Have you ever experienced this kind of event? No   Some recent data might be hidden     PROMIS-29 2022   PROMIS Physical Function T-Score 43.4 (mild dysfunction)   PROMIS Anxiety T-Score 53.7 (within normal limits)   PROMIS Depression T-Score 41 (within normal limits)   PROMIS Fatigue T-Score 48.6 (within normal limits)   PROMIS Sleep Disturbance T-Score 50.5 (within normal limits)   PROMIS Ability to Participate in Social Roles & Activities T-Score 58.1 (within normal limits)   PROMIS Pain Interference T-Score 53.9 (within normal limits)   PROMIS Pain Intensity 7       Past Medical History:   Diagnosis Date     Diabetes (H)     GDM insulin     GERD (gastroesophageal reflux disease)     w/u otherwise neg      History of colposcopy with cervical biopsy 3/23/07    WNL     Papanicolaou smear of cervix with low grade squamous intraepithelial lesion (LGSIL) 07     PONV (postoperative nausea and vomiting)      S/P  10/13/2017       Past Surgical History:   Procedure Laterality Date     BREAST SURGERY      augmentation       SECTION N/A 2015    Procedure:  SECTION;  Surgeon: Tremaine Gaona MD;  Location: [facility] OR      SECTION, TUBAL  LIGATION, COMBINED N/A 10/13/2017    Procedure: COMBINED  SECTION, TUBAL LIGATION;  Repeat  SECTION, TUBAL LIGATION ;  Surgeon: Sidra Salamanca DO;  Location: [facility] OR     COLONOSCOPY N/A 2016    Procedure: COLONOSCOPY;  Surgeon: Lyudmila Pastor MD;  Location:  GI     DILATION AND CURETTAGE SUCTION      elective termination     ESOPHAGOSCOPY, GASTROSCOPY, DUODENOSCOPY (EGD), COMBINED  2014    Procedure: COMBINED ESOPHAGOSCOPY, GASTROSCOPY, DUODENOSCOPY (EGD);   ESOPHAGOSCOPY, GASTROSCOPY, DUODENOSCOPY (EGD) ;  Surgeon: Vishnu Lanier MD;  Location:  GI     ESOPHAGOSCOPY, GASTROSCOPY, DUODENOSCOPY (EGD), COMBINED Left 2020    Procedure: ESOPHAGOGASTRODUODENOSCOPY, WITH BIOPSIES USING BIOPSY FORCEPS;  Surgeon: Vishnu Hopkins MD;  Location:  GI     ESOPHAGOSCOPY, GASTROSCOPY, DUODENOSCOPY (EGD), COMBINED N/A 2021    Procedure: ESOPHAGOGASTRODUODENOSCOPY, WITH BIOPSY using cold forceps;  Surgeon: Vishnu Lanier MD;  Location:  GI     GYN SURGERY       c section      RW LASER WART      Anal wart removal      ZZC NONSPECIFIC PROCEDURE  01    Primary LTCS       Family History   Problem Relation Age of Onset     Colon Polyps Brother      Hyperlipidemia Sister      Cancer Sister         stomach     C.A.D. No family hx of      Diabetes No family hx of      Breast Cancer No family hx of      Cancer - colorectal No family hx of      Colon Cancer No family hx of        Social History     Tobacco Use     Smoking status: Former Smoker     Years: 7.00     Types: Cigarettes     Smokeless tobacco: Never Used     Tobacco comment: only if she drinks alcohol   Vaping Use     Vaping Use: Never used   Substance Use Topics     Alcohol use: Not Currently     Alcohol/week: 8.3 standard drinks     Drug use: No         Current Outpatient Medications:      atorvastatin (LIPITOR) 10 MG tablet, Take 1 tablet (10 mg) by mouth every evening, Disp: 30 tablet, Rfl: 3      cetirizine (ZYRTEC) 10 MG tablet, Take 1 tablet (10 mg) by mouth daily, Disp: 30 tablet, Rfl: 1     DEXILANT 30 MG CPDR CR capsule, , Disp: , Rfl:      famotidine (PEPCID) 20 MG tablet, TAKE ONE TABLET BY MOUTH TWICE A DAY AS NEEDED FOR REFLUX, Disp: 180 tablet, Rfl: 3     fluocinonide emulsified base 0.05 % external cream, apply to rash as needed - sparingly, Disp: 60 g, Rfl: 1     fluticasone (FLONASE) 50 MCG/ACT nasal spray, Spray 1 spray into both nostrils 2 times daily as needed for rhinitis or allergies Take 1 spray twice daily x 1 week then 1 spray daily at night x 1 week then as needed, Disp: 11.1 mL, Rfl: 1     hydrOXYzine (ATARAX) 25 MG tablet, Take 1 tablet (25 mg) by mouth every 8 hours as needed for anxiety, Disp: 30 tablet, Rfl: 0     multivitamin (ONE-DAILY) tablet, Take 1 tablet by mouth daily, Disp:  , Rfl:      sucralfate (CARAFATE) 1 GM/10ML suspension, Take 10 mLs (1 g) by mouth 4 times daily (before meals and nightly), Disp: 420 mL, Rfl: 1  No current facility-administered medications for this visit.    Facility-Administered Medications Ordered in Other Visits:      fentaNYL Citrate (PF) (SUBLIMAZE) injection, , , PRN, Lyudmila Pastor MD, 100 mcg at 07/19/16 1412    Answers to ROS/HPI submitted by patient on 7/5/22  Review of Systems   Constitutional: Negative for chills, fatigue and fever.   Respiratory: Negative for cough, shortness of breath and wheezing.    Cardiovascular: Negative for chest pain and palpitations.   Gastrointestinal: Positive for abdominal pain, heartburn and rectal pain. Negative for constipation, diarrhea, nausea and vomiting.   Endocrine: Negative for polydipsia and polyphagia.   Breasts:  Negative for breast mass and discharge.   Genitourinary: Negative for dyspareunia, genital sores and vaginal discharge.   Musculoskeletal: Positive for arthralgias. Negative for back pain, joint swelling, myalgias and neck pain.   Skin: Negative for rash.   Neurological: Negative  for dizziness, tremors, seizures, weakness, light-headedness, numbness and headaches.   Hematological: Does not bruise/bleed easily.   Psychiatric/Behavioral: Negative for decreased concentration and hallucinations. The patient is nervous/anxious.           Objective    Vitals:  No vitals were obtained today due to virtual visit.    Physical Exam   GENERAL: Healthy, alert and no distress  EYES: Eyes grossly normal to inspection.  No discharge or erythema, or obvious scleral/conjunctival abnormalities.  RESP: No audible wheeze, cough, or visible cyanosis.  No visible retractions or increased work of breathing.    SKIN: Visible skin clear. No significant rash, abnormal pigmentation or lesions.  NEURO: Cranial nerves grossly intact.  Mentation and speech appropriate for age.  PSYCH: Mentation appears normal, affect normal/bright, judgement and insight intact, normal speech and appearance well-groomed.            CRP Inflammation   Date Value Ref Range Status   12/30/2021 <2.9 0.0 - 8.0 mg/L Final   05/01/2021 <2.9 0.0 - 8.0 mg/L Final      Sed Rate   Date Value Ref Range Status   01/21/2016 18 0 - 20 mm/h Final     Erythrocyte Sedimentation Rate   Date Value Ref Range Status   12/30/2021 34 (H) 0 - 20 mm/hr Final      Last Renal Panel:  Sodium   Date Value Ref Range Status   12/30/2021 136 133 - 144 mmol/L Final   05/01/2021 133 133 - 144 mmol/L Final     Potassium   Date Value Ref Range Status   12/30/2021 4.1 3.4 - 5.3 mmol/L Final   05/01/2021 4.0 3.4 - 5.3 mmol/L Final     Chloride   Date Value Ref Range Status   12/30/2021 105 94 - 109 mmol/L Final   05/01/2021 103 94 - 109 mmol/L Final     Carbon Dioxide   Date Value Ref Range Status   05/01/2021 25 20 - 32 mmol/L Final     Carbon Dioxide (CO2)   Date Value Ref Range Status   12/30/2021 29 20 - 32 mmol/L Final     Anion Gap   Date Value Ref Range Status   12/30/2021 2 (L) 3 - 14 mmol/L Final   05/01/2021 5 3 - 14 mmol/L Final     Glucose   Date Value Ref Range  Status   12/30/2021 102 (H) 70 - 99 mg/dL Final   05/01/2021 87 70 - 99 mg/dL Final     Urea Nitrogen   Date Value Ref Range Status   12/30/2021 13 7 - 30 mg/dL Final   05/01/2021 13 7 - 30 mg/dL Final     Creatinine   Date Value Ref Range Status   12/30/2021 0.70 0.52 - 1.04 mg/dL Final   05/01/2021 0.78 0.52 - 1.04 mg/dL Final     GFR Estimate   Date Value Ref Range Status   12/30/2021 >90 >60 mL/min/1.73m2 Final     Comment:     Effective December 21, 2021 eGFRcr in adults is calculated using the 2021 CKD-EPI creatinine equation which includes age and gender (Elen et al., [place], DOI: 10.1056/KLOXen9533345)   05/01/2021 >90 >60 mL/min/[1.73_m2] Final     Comment:     Non  GFR Calc  Starting 12/18/2018, serum creatinine based estimated GFR (eGFR) will be   calculated using the Chronic Kidney Disease Epidemiology Collaboration   (CKD-EPI) equation.       Calcium   Date Value Ref Range Status   12/30/2021 8.6 8.5 - 10.1 mg/dL Final   05/01/2021 8.6 8.5 - 10.1 mg/dL Final     Albumin   Date Value Ref Range Status   12/30/2021 4.0 3.4 - 5.0 g/dL Final   05/01/2021 4.1 3.4 - 5.0 g/dL Final      Lab Results   Component Value Date    WBC 7.3 12/30/2021    WBC 9.2 05/01/2021     Lab Results   Component Value Date    RBC 4.60 12/30/2021    RBC 4.49 05/01/2021     Lab Results   Component Value Date    HGB 12.1 12/30/2021    HGB 12.7 05/01/2021     Lab Results   Component Value Date    HCT 37.9 12/30/2021    HCT 38.4 05/01/2021     No components found for: MCT  Lab Results   Component Value Date    MCV 82 12/30/2021    MCV 86 05/01/2021     Lab Results   Component Value Date    MCH 26.3 12/30/2021    MCH 28.3 05/01/2021     Lab Results   Component Value Date    MCHC 31.9 12/30/2021    MCHC 33.1 05/01/2021     Lab Results   Component Value Date    RDW 14.6 12/30/2021    RDW 13.2 05/01/2021     Lab Results   Component Value Date     12/30/2021     05/01/2021      Lab Results   Component Value Date     A1C 5.5 02/03/2021      TSH   Date Value Ref Range Status   03/02/2022 2.06 0.40 - 4.00 mU/L Final   05/01/2021 1.18 0.40 - 4.00 mU/L Final      Lab Results   Component Value Date    VITDT 20 03/29/2022    VITDT 23 08/16/2021    VITDT 18 (L) 02/03/2021      No results for input(s): MAG in the last [id number] hours.  Last Comprehensive Metabolic Panel:  Sodium   Date Value Ref Range Status   12/30/2021 136 133 - 144 mmol/L Final   05/01/2021 133 133 - 144 mmol/L Final     Potassium   Date Value Ref Range Status   12/30/2021 4.1 3.4 - 5.3 mmol/L Final   05/01/2021 4.0 3.4 - 5.3 mmol/L Final     Chloride   Date Value Ref Range Status   12/30/2021 105 94 - 109 mmol/L Final   05/01/2021 103 94 - 109 mmol/L Final     Carbon Dioxide   Date Value Ref Range Status   05/01/2021 25 20 - 32 mmol/L Final     Carbon Dioxide (CO2)   Date Value Ref Range Status   12/30/2021 29 20 - 32 mmol/L Final     Anion Gap   Date Value Ref Range Status   12/30/2021 2 (L) 3 - 14 mmol/L Final   05/01/2021 5 3 - 14 mmol/L Final     Glucose   Date Value Ref Range Status   12/30/2021 102 (H) 70 - 99 mg/dL Final   05/01/2021 87 70 - 99 mg/dL Final     Urea Nitrogen   Date Value Ref Range Status   12/30/2021 13 7 - 30 mg/dL Final   05/01/2021 13 7 - 30 mg/dL Final     Creatinine   Date Value Ref Range Status   12/30/2021 0.70 0.52 - 1.04 mg/dL Final   05/01/2021 0.78 0.52 - 1.04 mg/dL Final     GFR Estimate   Date Value Ref Range Status   12/30/2021 >90 >60 mL/min/1.73m2 Final     Comment:     Effective December 21, 2021 eGFRcr in adults is calculated using the 2021 CKD-EPI creatinine equation which includes age and gender (Elen casillas al., NEJM, DOI: 10.1056/JIACtj0327978)   05/01/2021 >90 >60 mL/min/[1.73_m2] Final     Comment:     Non  GFR Calc  Starting 12/18/2018, serum creatinine based estimated GFR (eGFR) will be   calculated using the Chronic Kidney Disease Epidemiology Collaboration   (CKD-EPI) equation.       Calcium   Date Value  Ref Range Status   12/30/2021 8.6 8.5 - 10.1 mg/dL Final   05/01/2021 8.6 8.5 - 10.1 mg/dL Final     Bilirubin Total   Date Value Ref Range Status   12/30/2021 0.2 0.2 - 1.3 mg/dL Final   05/01/2021 0.3 0.2 - 1.3 mg/dL Final     Alkaline Phosphatase   Date Value Ref Range Status   12/30/2021 76 40 - 150 U/L Final   05/01/2021 61 40 - 150 U/L Final     ALT   Date Value Ref Range Status   12/30/2021 18 0 - 50 U/L Final   05/01/2021 18 0 - 50 U/L Final     AST   Date Value Ref Range Status   12/30/2021 10 0 - 45 U/L Final   05/01/2021 11 0 - 45 U/L Final     Most Recent D-dimer:No lab results found.    Lab on 06/27/2022   Component Date Value Ref Range Status     Cholesterol 06/27/2022 128  <200 mg/dL Final     Triglycerides 06/27/2022 78  <150 mg/dL Final     Direct Measure HDL 06/27/2022 41 (A) >=50 mg/dL Final     LDL Cholesterol Calculated 06/27/2022 71  <=100 mg/dL Final     Non HDL Cholesterol 06/27/2022 87  <130 mg/dL Final     Patient Fasting > 8hrs? 06/27/2022 Yes   Final      Ref Range & Units 8 mo ago   FELICITY interpretation Negative Borderline PositiveAbnormal     Comment:    Negative: <1:40   Borderline Positive: 1:40 - 1:80   Positive: >1:80   FELICITY pattern 1  Homogeneous     FELICITY titer 1  1:80     Resulting Agency  [facility]      Component Ref Range & Units 8 mo ago     C4 Complement 13 - 39 mg/dL 49 High     Resulting Agency  [facility]     Rheumatoid Factor <20 IU/mL <7     <20     Resulting Agency  [facility]      Ref Range & Units 8 mo ago   RNP Irene IgG Instrument Value <5.0 U/mL <1.1     RNP Antibody IgG Negative Negative     Tripp RADHA Irene IgG Instrument Value <7.0 U/mL <1.6     Smith ARDHA Antibody IgG Negative Negative     SSA Irene IgG Instrument Value <7.0 U/mL <0.5     SSA (Ro) Antibody IgG Negative Negative     SSB Irene IgG Instrument Value <7.0 U/mL <0.6     SSB (La) Antibody IgG Negative Negative     Resulting Agency  [facility]       Video-Visit Details    Video visit Start  time:10:30 AM    Type of service:  Video Visit    Video End Time:11 AM    Originating Location (pt. Location): Home    Distant Location (provider location):  [facility] PHYSICAL MEDICINE AND REHABILITATION CLINIC [place]     Platform used for Video Visit: [employer]

## 2022-07-05 NOTE — LETTER
7/5/2022       RE: Unique Baker  [address] [facility] [place] [address]     Dear Colleague,    Thank you for referring your patient, Unique Baker, to the [facility] PHYSICAL MEDICINE AND REHABILITATION CLINIC [place] at [facility]. Please see a copy of my visit note below.       Assessment/ Impression:   1. Post-COVID chronic joint pain/ Post-COVID chronic muscle pain  Patient with multiple joint pains and muscle pain that migrates. She has seen Rheumatology in the past and note reviewed. Discussed rechecking labs as some were elevated in the past and advised to follow up with Rheumatology due to continued symptoms some of which have worsened.  Also discussed seeing an integrative medicine specialist due to symptoms being triggered by her abdomen after eating.  Referrals and labs placed.    - Erythrocyte sedimentation rate auto; Future  - CRP inflammation; Future  - CK total; Future  - TSH with free T4 reflex; Future  - Anti Nuclear Irene IgG by IFA with Reflex; Future  - Adult Rheumatology  Referral; Future  - SSB La RADHA Antibody IgG; Future  - SSA Ro RADHA Antibody IgG; Future  - Internal Medicine Referral; Future    4. Elevated antinuclear antibody (FELICITY) level  Recheck FELICITY due to elevation in past.   - Anti Nuclear Irene IgG by IFA with Reflex; Future    5. Gastroesophageal reflux disease with esophagitis without hemorrhage  Discussed continuing to work with GI for   - Adult Post Covid Clinic Referral  - Internal Medicine Referral; Future    6. Small fiber neuropathy  Will refer to Neurology due to symptoms of neuropath that is not improving with medications. Discussed this is likely small fiber and will refer to the small fiber specialists.   - Adult Neurology  Referral; Future    7. Tinnitus, unspecified laterality  Patient does not endorse tinnitus. Resolved  - Adult Post Covid Clinic Referral      Plan:  1. I reviewed present  knowledge on long-Covid.  Education was provided and question were answered.  2. Orders/Referrals as above  3. Will advised patient on test results  4. I will follow up with Unique Baker in 3 month I will review progress and consider need for any other therapeutic interventions. If there are any questions and/or concerns she will call the clinic.      On day of encounter time spent in chart review and with patient in consultation, exam, education and coordination of care, review of outside notes and labs, and documentation: 65 minutes              _________________________________  Corina Aranda PA-C  [facility] PHYSICAL MEDICINE AND REHABILITATION CLINIC [facility]   This 37 year old female presents to the [facility] Rehabilitation Medicine Post-COVID clinic as a new consult to evaluate continuing symptoms after COVID infection initially [id number]/2021   Unique Baker presented to their primary care physician on 3/2/2022 complaining of Chest tightness, congestion, shortness of breath, cough, fever, chills, generalized aches and pains, fatigue and weakness. Treatment was symptomtatic. Patient took two weeks to recover Unique Baker experienced complications of Acid reflux.  Continuing symptoms include Abdominal pain, heartburn, arthralgias, anxiety and depression.   Patient has noticed after her mentural cycle she will have pain in her bones. She stands she feels tightness.  She states the pain moves as well.  Patient has been working with pain management. She does feel throbbing pain and pulsating. Patient states when she eats she gets sharp pinching pain. She does have has seen Rheumatology, Neurology, and ENT due to her issues. She states her symptoms have worsened and she is having more issues with joint pain and reflux. She endorse pain that worsens after she eats and with her cycle. She did have a positive FELICITY in the past. She also has a dry mouth now which is  new.   Past medical history is significant for smoking history. The patient was vaccinated against COVID X1.  Previous activity was full time work.    History of COVID-19 infection: 2021   Date of first symptoms:  3/2/21  Diagnosis: PCR  Hospitalization: No  Treatment: Symptomatic   Current Symptoms: See subjective  Goals of Care: Decrease joint/muscle/nerve pain, decrease abominal pain and improve quality of life      PHQ Assesment Total Score(s) 2022   PHQ-9 Score 3   Some recent data might be hidden     RENÉE-7 Results 2022   RENÉE 7 TOTAL SCORE 2 (minimal anxiety)   Some recent data might be hidden     PTSD Screen Score 2022   Have you ever experienced this kind of event? No   Some recent data might be hidden     PROMIS-29 2022   PROMIS Physical Function T-Score 43.4 (mild dysfunction)   PROMIS Anxiety T-Score 53.7 (within normal limits)   PROMIS Depression T-Score 41 (within normal limits)   PROMIS Fatigue T-Score 48.6 (within normal limits)   PROMIS Sleep Disturbance T-Score 50.5 (within normal limits)   PROMIS Ability to Participate in Social Roles & Activities T-Score 58.1 (within normal limits)   PROMIS Pain Interference T-Score 53.9 (within normal limits)   PROMIS Pain Intensity 7       Past Medical History:   Diagnosis Date     Diabetes (H)     GDM insulin     GERD (gastroesophageal reflux disease)     w/u otherwise neg      History of colposcopy with cervical biopsy 3/23/07    WNL     Papanicolaou smear of cervix with low grade squamous intraepithelial lesion (LGSIL) 07     PONV (postoperative nausea and vomiting)      S/P  10/13/2017       Past Surgical History:   Procedure Laterality Date     BREAST SURGERY      augmentation       SECTION N/A 2015    Procedure:  SECTION;  Surgeon: Tremaine Gaona MD;  Location: [facility] OR      SECTION, TUBAL LIGATION, COMBINED N/A 10/13/2017    Procedure: COMBINED  SECTION, TUBAL LIGATION;   Repeat  SECTION, TUBAL LIGATION ;  Surgeon: Sidra Salamanca DO;  Location: [facility] OR     COLONOSCOPY N/A 2016    Procedure: COLONOSCOPY;  Surgeon: Lyudmila Pastor MD;  Location:  GI     DILATION AND CURETTAGE SUCTION      elective termination     ESOPHAGOSCOPY, GASTROSCOPY, DUODENOSCOPY (EGD), COMBINED  2014    Procedure: COMBINED ESOPHAGOSCOPY, GASTROSCOPY, DUODENOSCOPY (EGD);   ESOPHAGOSCOPY, GASTROSCOPY, DUODENOSCOPY (EGD) ;  Surgeon: Vishnu Lanier MD;  Location:  GI     ESOPHAGOSCOPY, GASTROSCOPY, DUODENOSCOPY (EGD), COMBINED Left 2020    Procedure: ESOPHAGOGASTRODUODENOSCOPY, WITH BIOPSIES USING BIOPSY FORCEPS;  Surgeon: Vishnu Hopkins MD;  Location:  GI     ESOPHAGOSCOPY, GASTROSCOPY, DUODENOSCOPY (EGD), COMBINED N/A 2021    Procedure: ESOPHAGOGASTRODUODENOSCOPY, WITH BIOPSY using cold forceps;  Surgeon: Vishnu Lanier MD;  Location:  GI     GYN SURGERY       c section      RW LASER WART      Anal wart removal      ZZC NONSPECIFIC PROCEDURE  01    Primary LTCS       Family History   Problem Relation Age of Onset     Colon Polyps Brother      Hyperlipidemia Sister      Cancer Sister         stomach     C.A.D. No family hx of      Diabetes No family hx of      Breast Cancer No family hx of      Cancer - colorectal No family hx of      Colon Cancer No family hx of        Social History     Tobacco Use     Smoking status: Former Smoker     Years: 7.00     Types: Cigarettes     Smokeless tobacco: Never Used     Tobacco comment: only if she drinks alcohol   Vaping Use     Vaping Use: Never used   Substance Use Topics     Alcohol use: Not Currently     Alcohol/week: 8.3 standard drinks     Drug use: No         Current Outpatient Medications:      atorvastatin (LIPITOR) 10 MG tablet, Take 1 tablet (10 mg) by mouth every evening, Disp: 30 tablet, Rfl: 3     cetirizine (ZYRTEC) 10 MG tablet, Take 1 tablet (10 mg) by mouth daily, Disp: 30 tablet, Rfl:  1     DEXILANT 30 MG CPDR CR capsule, , Disp: , Rfl:      famotidine (PEPCID) 20 MG tablet, TAKE ONE TABLET BY MOUTH TWICE A DAY AS NEEDED FOR REFLUX, Disp: 180 tablet, Rfl: 3     fluocinonide emulsified base 0.05 % external cream, apply to rash as needed - sparingly, Disp: 60 g, Rfl: 1     fluticasone (FLONASE) 50 MCG/ACT nasal spray, Spray 1 spray into both nostrils 2 times daily as needed for rhinitis or allergies Take 1 spray twice daily x 1 week then 1 spray daily at night x 1 week then as needed, Disp: 11.1 mL, Rfl: 1     hydrOXYzine (ATARAX) 25 MG tablet, Take 1 tablet (25 mg) by mouth every 8 hours as needed for anxiety, Disp: 30 tablet, Rfl: 0     multivitamin (ONE-DAILY) tablet, Take 1 tablet by mouth daily, Disp:  , Rfl:      sucralfate (CARAFATE) 1 GM/10ML suspension, Take 10 mLs (1 g) by mouth 4 times daily (before meals and nightly), Disp: 420 mL, Rfl: 1  No current facility-administered medications for this visit.    Facility-Administered Medications Ordered in Other Visits:      fentaNYL Citrate (PF) (SUBLIMAZE) injection, , , PRN, Lyudmila Pastor MD, 100 mcg at 07/19/16 1412    Answers to ROS/HPI submitted by patient on 7/5/22  Review of Systems   Constitutional: Negative for chills, fatigue and fever.   Respiratory: Negative for cough, shortness of breath and wheezing.    Cardiovascular: Negative for chest pain and palpitations.   Gastrointestinal: Positive for abdominal pain, heartburn and rectal pain. Negative for constipation, diarrhea, nausea and vomiting.   Endocrine: Negative for polydipsia and polyphagia.   Breasts:  Negative for breast mass and discharge.   Genitourinary: Negative for dyspareunia, genital sores and vaginal discharge.   Musculoskeletal: Positive for arthralgias. Negative for back pain, joint swelling, myalgias and neck pain.   Skin: Negative for rash.   Neurological: Negative for dizziness, tremors, seizures, weakness, light-headedness, numbness and headaches.    Hematological: Does not bruise/bleed easily.   Psychiatric/Behavioral: Negative for decreased concentration and hallucinations. The patient is nervous/anxious.           Objective    Vitals:  No vitals were obtained today due to virtual visit.    Physical Exam   GENERAL: Healthy, alert and no distress  EYES: Eyes grossly normal to inspection.  No discharge or erythema, or obvious scleral/conjunctival abnormalities.  RESP: No audible wheeze, cough, or visible cyanosis.  No visible retractions or increased work of breathing.    SKIN: Visible skin clear. No significant rash, abnormal pigmentation or lesions.  NEURO: Cranial nerves grossly intact.  Mentation and speech appropriate for age.  PSYCH: Mentation appears normal, affect normal/bright, judgement and insight intact, normal speech and appearance well-groomed.            CRP Inflammation   Date Value Ref Range Status   12/30/2021 <2.9 0.0 - 8.0 mg/L Final   05/01/2021 <2.9 0.0 - 8.0 mg/L Final      Sed Rate   Date Value Ref Range Status   01/21/2016 18 0 - 20 mm/h Final     Erythrocyte Sedimentation Rate   Date Value Ref Range Status   12/30/2021 34 (H) 0 - 20 mm/hr Final      Last Renal Panel:  Sodium   Date Value Ref Range Status   12/30/2021 136 133 - 144 mmol/L Final   05/01/2021 133 133 - 144 mmol/L Final     Potassium   Date Value Ref Range Status   12/30/2021 4.1 3.4 - 5.3 mmol/L Final   05/01/2021 4.0 3.4 - 5.3 mmol/L Final     Chloride   Date Value Ref Range Status   12/30/2021 105 94 - 109 mmol/L Final   05/01/2021 103 94 - 109 mmol/L Final     Carbon Dioxide   Date Value Ref Range Status   05/01/2021 25 20 - 32 mmol/L Final     Carbon Dioxide (CO2)   Date Value Ref Range Status   12/30/2021 29 20 - 32 mmol/L Final     Anion Gap   Date Value Ref Range Status   12/30/2021 2 (L) 3 - 14 mmol/L Final   05/01/2021 5 3 - 14 mmol/L Final     Glucose   Date Value Ref Range Status   12/30/2021 102 (H) 70 - 99 mg/dL Final   05/01/2021 87 70 - 99 mg/dL Final      Urea Nitrogen   Date Value Ref Range Status   12/30/2021 13 7 - 30 mg/dL Final   05/01/2021 13 7 - 30 mg/dL Final     Creatinine   Date Value Ref Range Status   12/30/2021 0.70 0.52 - 1.04 mg/dL Final   05/01/2021 0.78 0.52 - 1.04 mg/dL Final     GFR Estimate   Date Value Ref Range Status   12/30/2021 >90 >60 mL/min/1.73m2 Final     Comment:     Effective December 21, 2021 eGFRcr in adults is calculated using the 2021 CKD-EPI creatinine equation which includes age and gender (Elen et al., NEJM, DOI: 10.1056/TMCXbm1156052)   05/01/2021 >90 >60 mL/min/[1.73_m2] Final     Comment:     Non  GFR Calc  Starting 12/18/2018, serum creatinine based estimated GFR (eGFR) will be   calculated using the Chronic Kidney Disease Epidemiology Collaboration   (CKD-EPI) equation.       Calcium   Date Value Ref Range Status   12/30/2021 8.6 8.5 - 10.1 mg/dL Final   05/01/2021 8.6 8.5 - 10.1 mg/dL Final     Albumin   Date Value Ref Range Status   12/30/2021 4.0 3.4 - 5.0 g/dL Final   05/01/2021 4.1 3.4 - 5.0 g/dL Final      Lab Results   Component Value Date    WBC 7.3 12/30/2021    WBC 9.2 05/01/2021     Lab Results   Component Value Date    RBC 4.60 12/30/2021    RBC 4.49 05/01/2021     Lab Results   Component Value Date    HGB 12.1 12/30/2021    HGB 12.7 05/01/2021     Lab Results   Component Value Date    HCT 37.9 12/30/2021    HCT 38.4 05/01/2021     No components found for: MCT  Lab Results   Component Value Date    MCV 82 12/30/2021    MCV 86 05/01/2021     Lab Results   Component Value Date    MCH 26.3 12/30/2021    MCH 28.3 05/01/2021     Lab Results   Component Value Date    MCHC 31.9 12/30/2021    MCHC 33.1 05/01/2021     Lab Results   Component Value Date    RDW 14.6 12/30/2021    RDW 13.2 05/01/2021     Lab Results   Component Value Date     12/30/2021     05/01/2021      Lab Results   Component Value Date    A1C 5.5 02/03/2021      TSH   Date Value Ref Range Status   03/02/2022 2.06 0.40  - 4.00 mU/L Final   05/01/2021 1.18 0.40 - 4.00 mU/L Final      Lab Results   Component Value Date    VITDT 20 03/29/2022    VITDT 23 08/16/2021    VITDT 18 (L) 02/03/2021      No results for input(s): MAG in the last 98511 hours.  Last Comprehensive Metabolic Panel:  Sodium   Date Value Ref Range Status   12/30/2021 136 133 - 144 mmol/L Final   05/01/2021 133 133 - 144 mmol/L Final     Potassium   Date Value Ref Range Status   12/30/2021 4.1 3.4 - 5.3 mmol/L Final   05/01/2021 4.0 3.4 - 5.3 mmol/L Final     Chloride   Date Value Ref Range Status   12/30/2021 105 94 - 109 mmol/L Final   05/01/2021 103 94 - 109 mmol/L Final     Carbon Dioxide   Date Value Ref Range Status   05/01/2021 25 20 - 32 mmol/L Final     Carbon Dioxide (CO2)   Date Value Ref Range Status   12/30/2021 29 20 - 32 mmol/L Final     Anion Gap   Date Value Ref Range Status   12/30/2021 2 (L) 3 - 14 mmol/L Final   05/01/2021 5 3 - 14 mmol/L Final     Glucose   Date Value Ref Range Status   12/30/2021 102 (H) 70 - 99 mg/dL Final   05/01/2021 87 70 - 99 mg/dL Final     Urea Nitrogen   Date Value Ref Range Status   12/30/2021 13 7 - 30 mg/dL Final   05/01/2021 13 7 - 30 mg/dL Final     Creatinine   Date Value Ref Range Status   12/30/2021 0.70 0.52 - 1.04 mg/dL Final   05/01/2021 0.78 0.52 - 1.04 mg/dL Final     GFR Estimate   Date Value Ref Range Status   12/30/2021 >90 >60 mL/min/1.73m2 Final     Comment:     Effective December 21, 2021 eGFRcr in adults is calculated using the 2021 CKD-EPI creatinine equation which includes age and gender (Elen casillas al., NEJM, DOI: 10.1056/MAPDeh1737113)   05/01/2021 >90 >60 mL/min/[1.73_m2] Final     Comment:     Non  GFR Calc  Starting 12/18/2018, serum creatinine based estimated GFR (eGFR) will be   calculated using the Chronic Kidney Disease Epidemiology Collaboration   (CKD-EPI) equation.       Calcium   Date Value Ref Range Status   12/30/2021 8.6 8.5 - 10.1 mg/dL Final   05/01/2021 8.6 8.5 -  10.1 mg/dL Final     Bilirubin Total   Date Value Ref Range Status   12/30/2021 0.2 0.2 - 1.3 mg/dL Final   05/01/2021 0.3 0.2 - 1.3 mg/dL Final     Alkaline Phosphatase   Date Value Ref Range Status   12/30/2021 76 40 - 150 U/L Final   05/01/2021 61 40 - 150 U/L Final     ALT   Date Value Ref Range Status   12/30/2021 18 0 - 50 U/L Final   05/01/2021 18 0 - 50 U/L Final     AST   Date Value Ref Range Status   12/30/2021 10 0 - 45 U/L Final   05/01/2021 11 0 - 45 U/L Final     Most Recent D-dimer:No lab results found.    Lab on 06/27/2022   Component Date Value Ref Range Status     Cholesterol 06/27/2022 128  <200 mg/dL Final     Triglycerides 06/27/2022 78  <150 mg/dL Final     Direct Measure HDL 06/27/2022 41 (A) >=50 mg/dL Final     LDL Cholesterol Calculated 06/27/2022 71  <=100 mg/dL Final     Non HDL Cholesterol 06/27/2022 87  <130 mg/dL Final     Patient Fasting > 8hrs? 06/27/2022 Yes   Final      Ref Range & Units 8 mo ago   FELICITY interpretation Negative Borderline PositiveAbnormal     Comment:    Negative: <1:40   Borderline Positive: 1:40 - 1:80   Positive: >1:80   FELICITY pattern 1  Homogeneous     FELICITY titer 1  1:80     Resulting Agency  [facility]      Component Ref Range & Units 8 mo ago     C4 Complement 13 - 39 mg/dL 49 High     Resulting Agency  [facility]     Rheumatoid Factor <20 IU/mL <7     <20     Resulting Agency  [facility]      Ref Range & Units 8 mo ago   RNP Irene IgG Instrument Value <5.0 U/mL <1.1     RNP Antibody IgG Negative Negative     Tripp RADHA Irene IgG Instrument Value <7.0 U/mL <1.6     Smith RADHA Antibody IgG Negative Negative     SSA Irene IgG Instrument Value <7.0 U/mL <0.5     SSA (Ro) Antibody IgG Negative Negative     SSB Irene IgG Instrument Value <7.0 U/mL <0.6     SSB (La) Antibody IgG Negative Negative     Resulting Agency  [facility]           Sincerely,    Corina Aranda PA-C

## 2022-07-08 ENCOUNTER — TELEPHONE (OUTPATIENT)
Dept: PALLIATIVE MEDICINE | Facility: CLINIC | Age: 37
End: 2022-07-08

## 2022-07-08 NOTE — TELEPHONE ENCOUNTER
Reason for call:  Other   Patient called regarding (reason for call):   Additional comments: Pt had Left ONB w/Omar 2/2022 and is concerned about an indent in her head. Please call to advise.    Phone number to reach patient:  [phone]    Can we leave a detailed message on this number?  YES    Travel screening: Not Applicable          Gaby Carter    [facility] [place]

## 2022-07-11 ENCOUNTER — APPOINTMENT (OUTPATIENT)
Dept: URBAN - METROPOLITAN AREA CLINIC 255 | Age: 37
Setting detail: DERMATOLOGY
End: 2022-07-12

## 2022-07-11 DIAGNOSIS — E88.1 LIPODYSTROPHY, NOT ELSEWHERE CLASSIFIED: ICD-10-CM

## 2022-07-11 DIAGNOSIS — L60.8 OTHER NAIL DISORDERS: ICD-10-CM

## 2022-07-11 PROCEDURE — 99213 OFFICE O/P EST LOW 20 MIN: CPT

## 2022-07-11 PROCEDURE — OTHER PHOTO-DOCUMENTATION: OTHER

## 2022-07-11 PROCEDURE — OTHER MIPS QUALITY: OTHER

## 2022-07-11 PROCEDURE — OTHER COUNSELING: OTHER

## 2022-07-11 ASSESSMENT — LOCATION DETAILED DESCRIPTION DERM
LOCATION DETAILED: RIGHT GREAT TOENAIL
LOCATION DETAILED: LEFT SUPERIOR OCCIPITAL SCALP
LOCATION DETAILED: LEFT GREAT TOENAIL

## 2022-07-11 ASSESSMENT — LOCATION SIMPLE DESCRIPTION DERM
LOCATION SIMPLE: RIGHT GREAT TOE
LOCATION SIMPLE: LEFT GREAT TOE
LOCATION SIMPLE: POSTERIOR SCALP

## 2022-07-11 ASSESSMENT — LOCATION ZONE DERM
LOCATION ZONE: SCALP
LOCATION ZONE: TOENAIL

## 2022-07-11 NOTE — PROCEDURE: COUNSELING
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 Patient reassured that these appear benign on today’s exam.  Patient instructed to monitor for changes and notify clinic if needed. Patient will return to [facility] in 6 months for a follow up. 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 Patient reassured that these appear benign on today’s exam.  Patient instructed to monitor for changes and notify clinic if needed. Patient will return to [facility] in 6 months for a follow up.

## 2022-07-11 NOTE — PROCEDURE: COUNSELING
Patient Specific Counseling (Will Not Stick From Patient To Patient): Noted that there is no abnormality of the underlying bone on palpation. \\n There is a small area of alopecia (she had alopecia areata many years ago).  Explained that the most likely explanation is steroid-induced fat atrophy due to the occipital block injection in February. There is a possibility that her adipose tissue could recover over time (1+ years).

## 2022-07-12 NOTE — TELEPHONE ENCOUNTER
"Routing as FYI    Pt states she has small indent on L side of neck behind ear where she believes ONB was done. She states she is not sure what this is from but an MD advised her since this is new that she will need a CT scan to investigate if the origins cannot be determined.    Pt states no pain, swelling, s/sx of infection, or other concerns, just \"an indentation\"    Advised that this could be from ONB but w/o exam cannot exclude. Pt advised to make OV to discuss with provider    Pt agreeable and scheduled 7/25    Angelique HAMMOND RN Care Coordinator  [facility]    "

## 2022-07-13 NOTE — TELEPHONE ENCOUNTER
Thank you, information noted.  Agree with plan as laid out by nursing.    CARRIE Boyce [facility]

## 2022-07-14 ENCOUNTER — OFFICE VISIT (OUTPATIENT)
Dept: RHEUMATOLOGY | Facility: CLINIC | Age: 37
End: 2022-07-14
Payer: COMMERCIAL

## 2022-07-14 VITALS
OXYGEN SATURATION: 99 % | TEMPERATURE: 98.7 F | BODY MASS INDEX: 27.44 KG/M2 | DIASTOLIC BLOOD PRESSURE: 72 MMHG | HEART RATE: 78 BPM | WEIGHT: 150 LBS | SYSTOLIC BLOOD PRESSURE: 106 MMHG

## 2022-07-14 DIAGNOSIS — R76.8 POSITIVE ANA (ANTINUCLEAR ANTIBODY): ICD-10-CM

## 2022-07-14 DIAGNOSIS — M79.10 POST-COVID CHRONIC MUSCLE PAIN: ICD-10-CM

## 2022-07-14 DIAGNOSIS — G89.29 POST-COVID CHRONIC JOINT PAIN: ICD-10-CM

## 2022-07-14 DIAGNOSIS — K21.00 GASTROESOPHAGEAL REFLUX DISEASE WITH ESOPHAGITIS WITHOUT HEMORRHAGE: ICD-10-CM

## 2022-07-14 DIAGNOSIS — G89.29 POST-COVID CHRONIC MUSCLE PAIN: ICD-10-CM

## 2022-07-14 DIAGNOSIS — R68.2 DRY MOUTH: ICD-10-CM

## 2022-07-14 DIAGNOSIS — U09.9 POST-COVID CHRONIC JOINT PAIN: ICD-10-CM

## 2022-07-14 DIAGNOSIS — U09.9 POST-COVID CHRONIC MUSCLE PAIN: ICD-10-CM

## 2022-07-14 DIAGNOSIS — M25.50 POLYARTHRALGIA: Primary | ICD-10-CM

## 2022-07-14 DIAGNOSIS — M25.50 POST-COVID CHRONIC JOINT PAIN: ICD-10-CM

## 2022-07-14 DIAGNOSIS — M25.50 MULTIPLE JOINT PAIN: ICD-10-CM

## 2022-07-14 LAB
ALBUMIN SERPL-MCNC: 4 G/DL (ref 3.4–5)
ALBUMIN UR-MCNC: NEGATIVE MG/DL
ALP SERPL-CCNC: 86 U/L (ref 40–150)
ALT SERPL W P-5'-P-CCNC: 15 U/L (ref 0–50)
ANION GAP SERPL CALCULATED.3IONS-SCNC: 5 MMOL/L (ref 3–14)
APPEARANCE UR: CLEAR
AST SERPL W P-5'-P-CCNC: 8 U/L (ref 0–45)
BACTERIA #/AREA URNS HPF: ABNORMAL /HPF
BASOPHILS # BLD AUTO: 0 10E3/UL (ref 0–0.2)
BASOPHILS NFR BLD AUTO: 1 %
BILIRUB SERPL-MCNC: 0.4 MG/DL (ref 0.2–1.3)
BILIRUB UR QL STRIP: NEGATIVE
BUN SERPL-MCNC: 9 MG/DL (ref 7–30)
CALCIUM SERPL-MCNC: 8.9 MG/DL (ref 8.5–10.1)
CHLORIDE BLD-SCNC: 109 MMOL/L (ref 94–109)
CK SERPL-CCNC: 76 U/L (ref 30–225)
CO2 SERPL-SCNC: 26 MMOL/L (ref 20–32)
COLOR UR AUTO: COLORLESS
CREAT SERPL-MCNC: 0.65 MG/DL (ref 0.52–1.04)
CRP SERPL-MCNC: <2.9 MG/L (ref 0–8)
EOSINOPHIL # BLD AUTO: 0 10E3/UL (ref 0–0.7)
EOSINOPHIL NFR BLD AUTO: 1 %
ERYTHROCYTE [DISTWIDTH] IN BLOOD BY AUTOMATED COUNT: 15 % (ref 10–15)
ERYTHROCYTE [SEDIMENTATION RATE] IN BLOOD BY WESTERGREN METHOD: 43 MM/HR (ref 0–20)
GFR SERPL CREATININE-BSD FRML MDRD: >90 ML/MIN/1.73M2
GLUCOSE BLD-MCNC: 97 MG/DL (ref 70–99)
GLUCOSE UR STRIP-MCNC: 30 MG/DL
HCT VFR BLD AUTO: 36.2 % (ref 35–47)
HGB BLD-MCNC: 11.6 G/DL (ref 11.7–15.7)
HGB UR QL STRIP: NEGATIVE
IMM GRANULOCYTES # BLD: 0 10E3/UL
IMM GRANULOCYTES NFR BLD: 0 %
KETONES UR STRIP-MCNC: NEGATIVE MG/DL
LEUKOCYTE ESTERASE UR QL STRIP: NEGATIVE
LYMPHOCYTES # BLD AUTO: 2.1 10E3/UL (ref 0.8–5.3)
LYMPHOCYTES NFR BLD AUTO: 28 %
MCH RBC QN AUTO: 26.2 PG (ref 26.5–33)
MCHC RBC AUTO-ENTMCNC: 32 G/DL (ref 31.5–36.5)
MCV RBC AUTO: 82 FL (ref 78–100)
MONOCYTES # BLD AUTO: 0.6 10E3/UL (ref 0–1.3)
MONOCYTES NFR BLD AUTO: 8 %
NEUTROPHILS # BLD AUTO: 4.6 10E3/UL (ref 1.6–8.3)
NEUTROPHILS NFR BLD AUTO: 62 %
NITRATE UR QL: NEGATIVE
NRBC # BLD AUTO: 0 10E3/UL
NRBC BLD AUTO-RTO: 0 /100
PH UR STRIP: 7.5 [PH] (ref 5–7)
PLATELET # BLD AUTO: 248 10E3/UL (ref 150–450)
POTASSIUM BLD-SCNC: 4 MMOL/L (ref 3.4–5.3)
PROT SERPL-MCNC: 8 G/DL (ref 6.8–8.8)
RBC # BLD AUTO: 4.43 10E6/UL (ref 3.8–5.2)
RBC #/AREA URNS AUTO: ABNORMAL /HPF
SODIUM SERPL-SCNC: 140 MMOL/L (ref 133–144)
SP GR UR STRIP: 1.01 (ref 1–1.03)
SQUAMOUS #/AREA URNS AUTO: ABNORMAL /LPF
TSH SERPL DL<=0.005 MIU/L-ACNC: 1.54 MU/L (ref 0.4–4)
UROBILINOGEN UR STRIP-MCNC: NORMAL MG/DL
WBC # BLD AUTO: 7.3 10E3/UL (ref 4–11)
WBC #/AREA URNS AUTO: ABNORMAL /HPF

## 2022-07-14 PROCEDURE — 85652 RBC SED RATE AUTOMATED: CPT | Performed by: NURSE PRACTITIONER

## 2022-07-14 PROCEDURE — 86376 MICROSOMAL ANTIBODY EACH: CPT | Performed by: NURSE PRACTITIONER

## 2022-07-14 PROCEDURE — 86160 COMPLEMENT ANTIGEN: CPT | Performed by: NURSE PRACTITIONER

## 2022-07-14 PROCEDURE — 85390 FIBRINOLYSINS SCREEN I&R: CPT | Performed by: PATHOLOGY

## 2022-07-14 PROCEDURE — 86038 ANTINUCLEAR ANTIBODIES: CPT | Performed by: NURSE PRACTITIONER

## 2022-07-14 PROCEDURE — 86235 NUCLEAR ANTIGEN ANTIBODY: CPT | Performed by: NURSE PRACTITIONER

## 2022-07-14 PROCEDURE — 83516 IMMUNOASSAY NONANTIBODY: CPT | Mod: 90 | Performed by: NURSE PRACTITIONER

## 2022-07-14 PROCEDURE — 80050 GENERAL HEALTH PANEL: CPT | Performed by: NURSE PRACTITIONER

## 2022-07-14 PROCEDURE — 86147 CARDIOLIPIN ANTIBODY EA IG: CPT | Performed by: NURSE PRACTITIONER

## 2022-07-14 PROCEDURE — 86800 THYROGLOBULIN ANTIBODY: CPT | Performed by: NURSE PRACTITIONER

## 2022-07-14 PROCEDURE — 99205 OFFICE O/P NEW HI 60 MIN: CPT | Performed by: NURSE PRACTITIONER

## 2022-07-14 PROCEDURE — 82550 ASSAY OF CK (CPK): CPT | Performed by: NURSE PRACTITIONER

## 2022-07-14 PROCEDURE — 86665 EPSTEIN-BARR CAPSID VCA: CPT | Performed by: NURSE PRACTITIONER

## 2022-07-14 PROCEDURE — 86225 DNA ANTIBODY NATIVE: CPT | Performed by: NURSE PRACTITIONER

## 2022-07-14 PROCEDURE — 85613 RUSSELL VIPER VENOM DILUTED: CPT | Performed by: NURSE PRACTITIONER

## 2022-07-14 PROCEDURE — 86618 LYME DISEASE ANTIBODY: CPT | Performed by: NURSE PRACTITIONER

## 2022-07-14 PROCEDURE — 81001 URINALYSIS AUTO W/SCOPE: CPT | Performed by: NURSE PRACTITIONER

## 2022-07-14 PROCEDURE — 99000 SPECIMEN HANDLING OFFICE-LAB: CPT | Performed by: NURSE PRACTITIONER

## 2022-07-14 PROCEDURE — 86140 C-REACTIVE PROTEIN: CPT | Performed by: NURSE PRACTITIONER

## 2022-07-14 PROCEDURE — 86146 BETA-2 GLYCOPROTEIN ANTIBODY: CPT | Performed by: NURSE PRACTITIONER

## 2022-07-14 PROCEDURE — 85730 THROMBOPLASTIN TIME PARTIAL: CPT | Performed by: NURSE PRACTITIONER

## 2022-07-14 PROCEDURE — 36415 COLL VENOUS BLD VENIPUNCTURE: CPT | Performed by: NURSE PRACTITIONER

## 2022-07-14 RX ORDER — DEXLANSOPRAZOLE 60 MG/1
60 CAPSULE, DELAYED RELEASE ORAL DAILY
COMMUNITY
End: 2022-12-13 | Stop reason: DRUGHIGH

## 2022-07-14 RX ORDER — HYDROCORTISONE ACETATE 25 MG/1
SUPPOSITORY RECTAL
COMMUNITY
End: 2022-10-21

## 2022-07-14 RX ORDER — FAMOTIDINE 40 MG/1
40 TABLET, FILM COATED ORAL DAILY
COMMUNITY
End: 2022-07-14

## 2022-07-14 RX ORDER — PREDNISONE 5 MG/1
10 TABLET ORAL DAILY
Qty: 60 TABLET | Refills: 2 | Status: SHIPPED | OUTPATIENT
Start: 2022-07-14 | End: 2022-10-21

## 2022-07-14 RX ORDER — CELECOXIB 200 MG/1
CAPSULE ORAL
COMMUNITY
End: 2022-09-12

## 2022-07-14 RX ORDER — ACETAMINOPHEN 500 MG
TABLET ORAL
COMMUNITY
Start: 2022-02-04

## 2022-07-14 RX ORDER — DICYCLOMINE HYDROCHLORIDE 10 MG/1
CAPSULE ORAL
COMMUNITY
Start: 2022-04-20 | End: 2022-11-01

## 2022-07-14 ASSESSMENT — PAIN SCALES - GENERAL: PAINLEVEL: EXTREME PAIN (8)

## 2022-07-14 NOTE — PROGRESS NOTES
"    Rheumatology Clinic Visit  Erum Allen, AGUSTÍN      Unique Baker  YOB: 1985    Age: 37 year old   MRN# [id number]       Date of Visit: 07/14/2022  Primary care provider: Celina Riggs              Subjective:     Unique is a 37 year old female presents today for LOW +FELICITY 1:80 with arthralgias that developed post covid vaccine and had covid the month prior. Transfer of care from Dr. Ceron.      Today: Pt reports worsening of her arthralgias and was [facility] and dermatology  to return to rheumatology.  Prior to covid/or vaccine was healthy and had none of these issues, now would define health as poor. Feels worse during and 1 week after menses.   Has been on 3 medrol dose packs, with significant response, every thing improved.   Feels like she has inflammation going through her body.   +Her reflux is \"boiling\", it's worsening,  can only eat white rice. Reflux does not worsen when she lays down. Woke the other night and feels like she had epigastric spasms.  She wasn't sure if it was reflex, this did make her anxious. Eating makes worse. Has failed 4 medications, now considering surgical options.  +Joint pain: Started post covid, most on legs points to spots on legs., more on L then R.  These spots are not visualized, no change in color, temp sensation, it is non TTP and helps to rub area.  Ankles swell, has an indent when wears socks. Rings are no longer fitting.   +Has gained 10 lbs.   +Is losing hair, diffusely, people she lives with notice hair all over now. Last child was 4 yrs ago.   +Has mouth sores. Most recent 3 weeks, L buccal mucous sore, lasted 3 days and was very painful.  Has had sores of palate. Denies vaginal sores.   +Diarrhea, worked up, dx as IBS.  +Rashes in past all over, none since covid.   +Feels SOB a lot, unsure if anxiety related.  Maybe some pain with breathing.   + Had L side HA's in beging of this prodrome but have improved. " "  +Dry mouth, really bad, wakes her up at night.  Dentist told her that she can see dry patches. This started 3 months ago. Eyes are fine. (not taking adderal currently,so not a contributing factor)   +Feels flu like.   + Difference in brain function, forgets things, struggling with short term memory. Speech is ok. No seizures.        ROS: Denies sun sensitivity, did have blotches on face one time after being in sun that lasted 3 days. Possible tubal pregnancy vs miscarriage, no blood clots. Np fevers. Denies psych hx. Has ADD, tx with adderal in past. Denies tri-color digit changes. Denies vaginal sores.    HPI at initial consult 10/2021: \"Unique Baker is a 36 year old female with pmhx GERD, seborrheic keratosis, is referred to rheumatology clinic for polyarthralgia. She reports tejinder COVID-19 beginning of March 2021. She received COVID-19 vaccine in April. 3 days after getting the vaccine, she developed a constellation of symptoms including chest tightness and abnormal sensation over her L arm. She went to the ED for chest tightness and had cardiac evaluation. She was started on reflux medication which she still takes. Has seen GI for acid reflux and loose stools. Had an upper GI in May which showed non-specific chronic inflammation in the stomach. She went to  for evaluation of L arm abnormal sensation. She was given a course of steroids (medrol dosepak) which helped her symptoms somewhat. Shortly thereafter, she started developing sensation of \"burning\" in her head and chest. She then started developing joint pain (around June or July) over her knees, ankles, and wrists. She presented to UC again for ongoing symptoms and received steroid burst which helped her symptoms (around September) but they have not subsided. She has since been getting \"flares\" of joint pain.      She describes her flares as abrupt onset mild pain over her knees and ankles when she is resting/watching TV. She takes advil for pain " "which helps resolve the pain. Pain can last for ~15-20 minutes. Pain episodes come on 4 times a week. However, she feels \"tightness\" over her knees and ankles mid-day everyday. She tries and stretch from time to time. Walking helps the stiffness but not the pain. Overall, the intensity of pain has improved since it started in July. Pain does not wake her up at night anymore but it used to back in July. She denies any AM pain or stiffness over her joints. She denies any joint swelling, erythema or warmth.    She denies hx of psoriasis. However, she reports she started having unexplained rash ~4 years ago. She has seen dermatology in the past and has been diagnosed with seborrheic keratosis. She denies photosensitivity. Rash can be over her back, legs, stomach, neck. Denies any facial rash. She has been given topical steroids by dermatology which helps but rash recurs. She describes the rash as \"bumpy\" and pruritic. She denies scalp flakiness.    Denies fatigue, fevers, chills, weight loss, night sweats, vision changes, eye pain/redness, inflammatory eye disease, dry eyes, dry mouth, +reports episode of painful oral ulcer over the roof of the mouth which has resolved, denies nasal ulcers, +chronic hair loss/thinning with hx of alopecia, denies raynaud's, cough, SOB, pleurisy, difficulty swallowing, abdominal pain, diarrhea, hematochezia, melena, dysuria, back pain, enthesitis, dactylitis, +numbness/tingling over hands and feet. No hx of IBD. No hx of blood clots. One ectopic pregnancy.   Pertinent labs and imaging: (per chart review in [facility] and care everywhere)   Labs:   10/22/21: negative CBC w diff, CMP, +FELICITY 1:80 homogeneous, no hypocomplementemia, negative dsDNA, SSA, SSB, Sm, RNP, Hep C, RF, CCP, Scl-70  8/16/21: +ESR 32, negative CRP  5/1/21: negative BMP, CRP, CBC w diff, negative LFTs      FMH:  Unsure    Social History      Active Problem list:  Patient Active Problem List    Diagnosis Date Noted     Breast " pain 02/21/2022     Priority: Medium     Menorrhagia with regular cycle 02/21/2022     Priority: Medium     Dysmenorrhea 02/21/2022     Priority: Medium     Controlled substance agreement signed- checked 8/12/2020 - no concerns 08/09/2019     Priority: Medium     Patient is followed by KELTON JACOB for ongoing prescription of stimulants.  All refills should be approved by this provider, or covering partner.    Medication(s): adderall.   Maximum quantity per month: 30  Clinic visit frequency required: Q 6  months     Controlled substance agreement on file: Yes       Date(s): 8/9/2019  Re sign 2/3/2021  Neuropsych evaluation for ADD completed:  Yes, completed Rosalba , on file and diagnosis confirmed  Done 5/14/2019    Last [facility] website verification:  none  [email or web address]           Irritable bowel syndrome 07/15/2015     Priority: Medium     CARDIOVASCULAR SCREENING; LDL GOAL LESS THAN 160 10/31/2010     Priority: Medium     Esophageal reflux 06/21/2006     Priority: Medium            Objective:     Physical Exam  Blood Pressure 106/72 (BP Location: Left arm, Patient Position: Sitting, Cuff Size: Adult Regular)   Pulse 78   Temperature 98.7  F (37.1  C) (Oral)   Weight 68 kg (150 lb)   Last Menstrual Period  (LMP Unknown)   Oxygen Saturation 99%   Breastfeeding No   Body Mass Index 27.44 kg/m    Body mass index is 27.44 kg/m .    Constitutional: Normal body habitus with out gross deformities. Well groomed.   Psych: nl judgement, orientation, memory, affect.  Eyes: wnl EOM, PERRLA, vision, conjunctiva, sclera   ENT: wnl external ears, nose, hearing, lips, teeth, gums, throat. 1 mucous membrane lesion on left in healing stage, normal saliva pool.  Full mandible.   Neck: no mass, thyroid enlargement, enlarged cervical lymph nodes or parotid glands  Resp: lungs clear to auscultation, nl work of breathing  CV: RRR, no murmurs, rubs or gallops, no edema  Skin: no nail pitting,  "alopecia, rash, nodules or lesions of exposed areas of face, neck, bilateral upper and lower extremity   Neuro: Strength WNL of bilateral upper and lower extremity large muscle groups.     MSK- (TTP=tender to palpation, S=swelling, W=warmth, R=redness)  TMJ: -TTP/S/W/R ,FROM   Cervical spine:  FROM  Shoulders: -TTP/S/W/R ,FROM   Elbows: -TTP/S/W/R ,FROM   Wrists: -TTP/S/W/R ,FROM   Hands: -TTP/S/W/R ,FROM  Normal  strength. No dactylitis.   Hips: -TTP of GT  Knees: -TTP/S/W/R ,FROM    Ankles: -TTP/S/W/R ,FROM  No enthesopathy or tenosynovitis.   Feet: -TTP/S/W/R ,FROM  . No dactylitis.     Of note, No tender points that are often seen in FM, no \"touch me not\" sign.    Labs:    Lab Results   Component Value Date    ALT 18 12/30/2021    AST 10 12/30/2021    CR 0.70 12/30/2021       Imaging: reviewed            Assessment and Plan:     Dx: Polyarthralgia with +FELICITY 1:80.  Pt with profound change in health status post covid infection. Reports prior to this was completely healthy. Now with new symptom of dry mouth that dentist has seen \"patches\" of dry area. Overall Pt's symptoms dramatically improved when on prednisone in past.  Symptoms potentially related to a CTD include; dry mouth, hair loss, mouth sores, arthralgias, reflux, feeling of SOB/chest pain, change in cognitive function, past rash in sun. Exam today shows one healing mucosal lesion, full mandible.     Plan:   -Repeat lab eval given new symptoms and worsening/ not improving. Will check lyme as symptom onset initially spring/ summer.   -Would like pt to have xray at time of chest pain/ SOB  -trial of prednisone, 10 mg daily x 7 days, 7.5 mg daily x 7 days, 5 mg daily until RTC  -RTC in 4 weeks to discuss results, if prednisone responsive consider trial of plaquenil.       Erum Allen, CNP  Rheumatology, [facility]              "

## 2022-07-14 NOTE — PATIENT INSTRUCTIONS
1) Labs today    2) Prednisone 5 mg tablets  -Start with 2 tabs (10 mg) daily in am for 1 week,  then decrease to   -1 & 1/2 tabs (7.5 mg) daily in am for 1 week, then decrease to  -1 tab (5 mg) daily in am until you return to see me    2) See me back in 4 weeks.

## 2022-07-14 NOTE — NURSING NOTE
"Unique Baker's goals for this visit include:   Chief Complaint   Patient presents with     Joint Pain     Follow-up on joint pain, previous Dr. Ceron patient       PCP: Celina Riggs    Referring Provider:  Corina Aranda PA-C  [address] [place] [id number]      Initial /72 (BP Location: Left arm, Patient Position: Sitting, Cuff Size: Adult Regular)   Pulse 78   Temp 98.7  F (37.1  C) (Oral)   Wt 68 kg (150 lb)   LMP  (LMP Unknown)   SpO2 99%   Breastfeeding No   BMI 27.44 kg/m   Estimated body mass index is 27.44 kg/m  as calculated from the following:    Height as of 3/29/22: 1.575 m (5' 2\").    Weight as of this encounter: 68 kg (150 lb).    Medication Reconciliation: complete    Do you need any medication refills at today's visit? none    JOSE ANTONIO Goodman  Rheumatology/Infectious disease  [facility]   [phone]    "

## 2022-07-15 ENCOUNTER — TELEPHONE (OUTPATIENT)
Dept: PHYSICAL MEDICINE AND REHAB | Facility: CLINIC | Age: 37
End: 2022-07-15

## 2022-07-15 LAB
ANA SER QL IF: NEGATIVE
B BURGDOR IGG+IGM SER QL: 0.21
B2 GLYCOPROT1 IGA SER-ACNC: 9.3 U/ML
B2 GLYCOPROT1 IGG SERPL IA-ACNC: 0.9 U/ML
B2 GLYCOPROT1 IGM SERPL IA-ACNC: 29 U/ML
C3 SERPL-MCNC: 157 MG/DL (ref 81–157)
C4 SERPL-MCNC: 56 MG/DL (ref 13–39)
CARDIOLIPIN IGA SER IA-ACNC: 4.7 APL-U/ML
CARDIOLIPIN IGA SER IA-ACNC: NEGATIVE
DRVVT SCREEN RATIO: 0.91
EBV VCA IGM SER IA-ACNC: 12.8 U/ML
EBV VCA IGM SER IA-ACNC: NORMAL
ENA SCL70 IGG SER IA-ACNC: <0.6 U/ML
ENA SCL70 IGG SER IA-ACNC: NEGATIVE
ENA SS-A AB SER IA-ACNC: <0.5 U/ML
ENA SS-A AB SER IA-ACNC: NEGATIVE
ENA SS-B IGG SER IA-ACNC: <0.6 U/ML
ENA SS-B IGG SER IA-ACNC: NEGATIVE
INR PPP: 0.99 (ref 0.85–1.15)
LA PPP-IMP: NEGATIVE
LUPUS INTERPRETATION: NORMAL
PTT RATIO: 1.19
THROMBIN TIME: 17.5 SECONDS (ref 13–19)
THYROGLOB AB SERPL IA-ACNC: <20 IU/ML
THYROPEROXIDASE AB SERPL-ACNC: <10 IU/ML

## 2022-07-15 NOTE — NURSING NOTE
Patient would like to have labs called to her. Does not check her Mychart regularly.      JOSE ANTONIO Goodman  Rheumatology/Infectious disease  [facility]   [phone]

## 2022-07-15 NOTE — TELEPHONE ENCOUNTER
Called patient and provided phone number to schedule Internal Medicine referral.  The patient questioned whether this referral was necessary and stated this referral wasn't discussed in her virtual visit on 7/5/22.  She may contact Corina Aranda through [employer] for further explanation.    Angel Davalos

## 2022-07-18 LAB
CARDIOLIPIN IGG SER IA-ACNC: <2 GPL-U/ML
CARDIOLIPIN IGG SER IA-ACNC: NEGATIVE
CARDIOLIPIN IGM SER IA-ACNC: 2.3 MPL-U/ML
CARDIOLIPIN IGM SER IA-ACNC: NEGATIVE
DSDNA AB SER-ACNC: 2.9 IU/ML
RNAP III IGG SERPL IA-ACNC: 7 UNITS

## 2022-07-20 ENCOUNTER — TELEPHONE (OUTPATIENT)
Dept: RHEUMATOLOGY | Facility: CLINIC | Age: 37
End: 2022-07-20

## 2022-07-20 NOTE — TELEPHONE ENCOUNTER
[facility] Call Center    Phone Message    May a detailed message be left on voicemail: yes     Reason for Call: Requesting Results   Name/type of test: Pt is not sure what type of test Erum ordered. Wondering if Erum did a test for Gout and would like the results on all the other test.  Date of test: 07/14  Was test done at a location other than [facility] (Please fill in the location if not [facility])?: No      Action Taken: Other: [facility]    Travel Screening: Not Applicable

## 2022-07-20 NOTE — TELEPHONE ENCOUNTER
Patient requesting for lab results from 7/14/22. Patient would like to know if labs were order to check for gout. Please advise.      JOSE ANTONIO Goodman  Rheumatology/Infectious disease  [facility]   [phone]

## 2022-07-21 ENCOUNTER — TRANSFERRED RECORDS (OUTPATIENT)
Dept: HEALTH INFORMATION MANAGEMENT | Facility: CLINIC | Age: 37
End: 2022-07-21

## 2022-07-21 NOTE — TELEPHONE ENCOUNTER
Kidney, liver and thyroid labs are normal and look good. Your hemoglobin is stable.   Sed rate and C4 are slightly high and we see this with inflammation.  In addition there is a positive antibody to Beta 2 gylcoprotein, we some times see this positive in lupus.    We did not check uric acid which looks at gout as symptoms are not consistent with gout.

## 2022-07-21 NOTE — PROGRESS NOTES
[facility] Pain Management     Date of visit: 7/22/2022      Assessment:   Unique Baker is a 37 year old female with a past medical history significant for GERD, gestational diabetes mellitus, and post operative nausea and vomiting who presents with complaints of left head, chest, left neck, left arm, leg, and foot/ankle pain.      1. Left head, chest, left neck, left arm, leg, and foot/ankle pain- etiology unclear, symptoms developed 3 days after COVID-19 vaccine which was 1 month after COVID. It sounds like there is a neuropathic and/or inflammatory component to pain. Her headaches originate around her left greater occipital nerve and radiate over the top of her head.   2. Mental Health - the patient's mental health concerns, specifically situational depression, affect her experience of pain and contribute to her clinically significant distress.     Visit Diagnoses:  1. Neuropathic pain    2. Atrophy of muscle of other site    3. Occipital neuralgia of left side         Plan:    1.  Pain Physical Therapy:                YES  Discussed the benefits of chronic pain physical therapy. I would recommend. After discussion, she is interested. She will start with Deandre Ortiz PT as able/interested.               2.  Pain Psychologist to address relaxation, behavioral change, coping style, and other factors important to improvement.  NO              3.  Medication Management:                           1. Gabapentin was discussed at last visit but she held off because of some worries about this medication. After discussion today she is interested in starting. She will start gabapentin 100mg at bedtime for 3-5 days, then take 200mg at bedtime for 3-5 days, then 300mg at bedtime. Okay to take one additional 100mg as needed, max of 400mg/day.  2. Okay to continue Celebrex 100mg BID. Could restart Tylenol as needed.               4.  Potential procedures: I advised she continue to monitor the benefit from occipital  nerve block. It appears that she has a small amount of localized muscle atrophy noted near her injection site from February. It is about 1cm and non tender, likely a mild complication from the injection. Reassurance provided, this may improve some with time but should not worsen.                5.  Referrals: continue follow up with Rheumatology and post COVID clinic.               6.  Follow up with CARRIE Boyce CNP in 4-6 weeks.     Huyen BUTLER CNP  [facility] Pain Management     -------------------------------------------------    Subjective:    Chief complaint:   Chief Complaint   Patient presents with     Pain       Interval history:  Unique Baker is a 36 year old female last seen on 4/12/2022.  she is seen in follow up.     Recommendations/plan at the last visit included:  1.  Pain Physical Therapy:                YES  I encouraged Unique consider chronic pain physical therapy with Deandre Ortiz PT while waiting for post COVID clinic.              2.  Pain Psychologist to address relaxation, behavioral change, coping style, and other factors important to improvement.  NO              3.  Medication Management:                           1. Unique continues to have left sided nerve pain that is bothersome. Gabapentin was recommended at last visit but she did not start. Anxiety is also currently bothersome. After discussions he is interested in starting. Start gabapentin 100mg at bedtime for 3-5 days, then take 200mg at bedtime for 3-5 days, then 300mg at bedtime. Okay to take one additional 100mg as needed, max of 400mg/day. If anxiety doesn't improve, I advised she follow up with primary care provider.   2. Okay to continue Celebrex 100mg BID. Could restart Tylenol as needed.               4.  Potential procedures: continue to monitor the benefit from occipital nerve block.              5.  Referrals: not at this time.               6.  Follow up with CARRIE Boyce CNP in 4-6 weeks.     "    Since her last visit, Unique Baker reports:  -Her pain is somewhat better than it was at last visit.   -She attributes this to be due to starting steroids. [facility] She was referred to Neurology. Rheumatology started her on prednisone.   -She continues to report left head, chest, left neck, left arm, leg, and foot/ankle pain but also reports right breast pain today. She states this pain has been present over the last 2 years and is interruptive to sleep. Mammogram was negative.  -She ended up not starting gabapentin as she was anxious about starting it and didn't know how she would taper off.   -She has an appointment with a plastic surgeon next week to evaluate her breast pain.   -She had a left occipital nerve block with me on 2/8/2022. Of note, she no longer has posterior headaches after the injection. She reports over time she has developed a \"dent\" in the back of her head and worries it would be related to this. She worries that she could have a \"hole in my brain.\"          Pain Information:   Pain rating: averages 5/10 on a 0-10 scale.      Current pain medications:    Tylenol- [facility], not currently taking              Celebrex 200mg daily- [facility]              Hydroxyzine 25mg Q8h- [facility] for anxiety or sleep              Medrol dose pack- prescribed 12/30, states she usually has 1 month of benefit, recently completed    Current MME: 0    Review of [place] Prescription Monitoring Program (): No concern for abuse or misuse of controlled medications based on this report.     Annual Controlled Substance Agreement/UDS due date: NA    1. Previous Pain Relevant Medications:              Opiates: no              NSAIDS: Celebrex-?, ibuprofen- ?              Muscle Relaxants: no              Anti-migraine mediations: no              Anti-depressants: amitriptyline- [facility], mouth sores              Sleep aids: no              Anxiolytics: hydroxyzine- [facility]              " Neuropathics: no                       Topicals: no              Other medications not covered above: Tylenol- [facility]     2. Physical Therapy: no  3. Pain Psychology: no  4. Surgery: no  5. Injections: left occipital nerve block with me on 2/8/2022- 100% improvement  6. Chiropractic: no  7. Acupuncture: no  8. TENS Unit: no    Medications:  Current Outpatient Medications   Medication Sig Dispense Refill     predniSONE (DELTASONE) 5 MG tablet Take 2 tablets (10 mg) by mouth daily 60 tablet 2     acetaminophen (TYLENOL) 500 MG tablet take 1 - 2 tablet by oral route  every 6 hours as needed as needed       atorvastatin (LIPITOR) 10 MG tablet Take 1 tablet (10 mg) by mouth every evening 30 tablet 3     celecoxib (CELEBREX) 200 MG capsule celecoxib 200 mg capsule (Patient not taking: No sig reported)       cetirizine (ZYRTEC) 10 MG tablet Take 1 tablet (10 mg) by mouth daily 30 tablet 1     cholecalciferol 25 MCG (1000 UT) TABS take 1 tablet by oral route  every month       Cyanocobalamin (VITAMIN B12 TR PO) 1 patch daily       dexlansoprazole (DEXILANT) 60 MG CPDR CR capsule Take 60 mg by mouth daily       dicyclomine (BENTYL) 10 MG capsule dicyclomine 10 mg capsule (Patient not taking: Reported on 7/14/2022)       diltiazem 2% in lipovan cream 2% GEL APPLY PEA SIZED AMOUNT TO PERIANAL SKIN THREE TIMES DAILY       famotidine (PEPCID) 20 MG tablet TAKE ONE TABLET BY MOUTH TWICE A DAY AS NEEDED FOR REFLUX 180 tablet 3     fluocinonide emulsified base 0.05 % external cream apply to rash as needed - sparingly 60 g 1     fluticasone (FLONASE) 50 MCG/ACT nasal spray Spray 1 spray into both nostrils 2 times daily as needed for rhinitis or allergies Take 1 spray twice daily x 1 week then 1 spray daily at night x 1 week then as needed 11.1 mL 1     hydrocortisone (ANUSOL-HC) 25 MG suppository hydrocortisone acetate 25 mg rectal suppository (Patient not taking: No sig reported)       hydrOXYzine (ATARAX) 25 MG tablet Take 1 tablet  (25 mg) by mouth every 8 hours as needed for anxiety 30 tablet 0     multivitamin (ONE-DAILY) tablet Take 1 tablet by mouth daily         Medical History: any changes in medical history since they were last seen? No    Review of Systems: A 10-point review of systems was negative, with the exception of chronic pain issues.      Objective:    Physical Exam:  Blood pressure 114/79, pulse 62, SpO2 98 %, not currently breastfeeding.  Constitutional: Well developed, well nourished, appears stated age.  Gait: Normal  HEENT: Head atraumatic, normocephalic. Eyes without conjunctival injection or jaundice. Oropharynx clear. Neck supple. No obvious neck masses.  Skin: No rash, lesions, or petechiae of exposed skin.   Psychiatric/mental status: Alert, without lethargy or stupor. Speech fluent. Appropriate affect. Mood normal. Able to follow commands without difficulty.     MSK exam: small, approximately 1cm area of thinned muscle noted near superior left lesser occipital nerve. Non-tender.     Imaging:  CT of chest was completed on 5/2/2021 and shows:  FINDINGS: The heart is not enlarged without significant coronary  calcifications. The thoracic aorta is normal variant right arch and  otherwise unremarkable. without evidence of dissection or aneurysm.  The lungs are clear. There are no effusions. There is no axillary,  hilar, or mediastinal adenopathy.     The adrenal glands are normal. Question some hepatic steatosis. The  remainder of the visualized abdomen is unremarkable.     Bone windows show no destructive bony lesions.                                                                      IMPRESSION:  Negative CT study of the chest.    BILLING TIME DOCUMENTATION:   The total TIME spent on this patient on the date of the encounter/appointment was 26 minutes.      TOTAL TIME includes:   Time spent preparing to see the patient (reviewing records and tests)   Time spent face to face (or over the phone) with the patient   Time  spent ordering tests, medications, procedures and referrals   Time spent Referring and communicating with other healthcare professionals   Time spent documenting clinical information in Epic

## 2022-07-22 ENCOUNTER — OFFICE VISIT (OUTPATIENT)
Dept: PALLIATIVE MEDICINE | Facility: CLINIC | Age: 37
End: 2022-07-22
Payer: COMMERCIAL

## 2022-07-22 VITALS — DIASTOLIC BLOOD PRESSURE: 79 MMHG | SYSTOLIC BLOOD PRESSURE: 114 MMHG | OXYGEN SATURATION: 98 % | HEART RATE: 62 BPM

## 2022-07-22 DIAGNOSIS — M54.81 OCCIPITAL NEURALGIA OF LEFT SIDE: ICD-10-CM

## 2022-07-22 DIAGNOSIS — M79.2 NEUROPATHIC PAIN: Primary | ICD-10-CM

## 2022-07-22 DIAGNOSIS — M62.58 ATROPHY OF MUSCLE OF OTHER SITE: ICD-10-CM

## 2022-07-22 PROCEDURE — 99213 OFFICE O/P EST LOW 20 MIN: CPT | Performed by: NURSE PRACTITIONER

## 2022-07-22 RX ORDER — SUCRALFATE 1 G/1
TABLET ORAL
Status: ON HOLD | COMMUNITY
Start: 2022-07-21 | End: 2023-10-16

## 2022-07-22 ASSESSMENT — PAIN SCALES - GENERAL: PAINLEVEL: MODERATE PAIN (5)

## 2022-07-22 NOTE — PATIENT INSTRUCTIONS
1.  Pain Physical Therapy:                YES  Start chronic pain physical therapy with Deandre Ortiz PT as able/interested.               2.  Pain Psychologist to address relaxation, behavioral change, coping style, and other factors important to improvement.  NO              3.  Medication Management:                           1. Start gabapentin 100mg at bedtime for 3-5 days, then take 200mg at bedtime for 3-5 days, then 300mg at bedtime. Okay to take one additional 100mg as needed, max of 400mg/day.  2. Okay to continue Celebrex 100mg BID. Could restart Tylenol as needed.               4.  Potential procedures: continue to monitor the benefit from occipital nerve block.              5.  Referrals: continue follow up with Rheumatology and post COVID clinic.               6.  Follow up with CARRIE Boyce CNP in 4-6 weeks.        ----------------------------------------------------------------  Clinic Number:  [phone]   Call with any questions about your care and for scheduling assistance.   Calls are returned Monday through Friday between 8 AM and 4:30 PM. We usually get back to you within 2 business days depending on the issue/request.    If we are prescribing your medications:  For opioid medication refills, call the clinic or send a CyberX message 7 days in advance.  Please include:  Name of requested medication  Name of the pharmacy.  For non-opioid medications, call your pharmacy directly to request a refill. Please allow 3-4 days to be processed.   Per [place] State Law:  All controlled substance prescriptions must be filled within 30 days of being written.    For those controlled substances allowing refills, pickup must occur within 30 days of last fill.      We believe regular attendance is key to your success in our program!    Any time you are unable to keep your appointment we ask that you call us at least 24 hours in advance to cancel.This will allow us to offer the appointment time to another  patient.   Multiple missed appointments may lead to dismissal from the clinic.

## 2022-08-01 ENCOUNTER — TRANSFERRED RECORDS (OUTPATIENT)
Dept: HEALTH INFORMATION MANAGEMENT | Facility: CLINIC | Age: 37
End: 2022-08-01

## 2022-08-03 ENCOUNTER — OFFICE VISIT (OUTPATIENT)
Dept: RHEUMATOLOGY | Facility: CLINIC | Age: 37
End: 2022-08-03
Payer: COMMERCIAL

## 2022-08-03 VITALS
HEART RATE: 63 BPM | BODY MASS INDEX: 27.69 KG/M2 | SYSTOLIC BLOOD PRESSURE: 111 MMHG | OXYGEN SATURATION: 99 % | WEIGHT: 151.4 LBS | DIASTOLIC BLOOD PRESSURE: 72 MMHG | TEMPERATURE: 98.3 F

## 2022-08-03 DIAGNOSIS — M35.9 UNDIFFERENTIATED CONNECTIVE TISSUE DISEASE (H): Primary | ICD-10-CM

## 2022-08-03 PROCEDURE — 99214 OFFICE O/P EST MOD 30 MIN: CPT | Performed by: NURSE PRACTITIONER

## 2022-08-03 RX ORDER — GABAPENTIN 100 MG/1
CAPSULE ORAL
COMMUNITY
Start: 2022-07-26 | End: 2022-09-12 | Stop reason: SINTOL

## 2022-08-03 ASSESSMENT — PAIN SCALES - GENERAL: PAINLEVEL: MODERATE PAIN (5)

## 2022-08-03 NOTE — PATIENT INSTRUCTIONS
1) Continue prednisone 5 mg daily for 2 weeks, then decrease to 5 mg every other day for 2 weeks and then stop    2) See me back in 6 weeks

## 2022-08-03 NOTE — PROGRESS NOTES
COVID-19 PCR test completed. Patient handout For Patients Who Have Been Tested for Covid-19 (Coronavirus) was given to the patient, which includes test result notification process.     contact guard

## 2022-08-03 NOTE — NURSING NOTE
"Unique Baker's goals for this visit include:   Chief Complaint   Patient presents with     RECHECK     Follow-up on labs and joint pain, labs completed 7/14/22       PCP: Celina Riggs    Referring Provider:  No referring provider defined for this encounter.      Initial /72 (BP Location: Left arm, Patient Position: Sitting, Cuff Size: Adult Regular)   Pulse 63   Temp 98.3  F (36.8  C) (Oral)   Wt 68.7 kg (151 lb 6.4 oz)   LMP  (LMP Unknown)   SpO2 99%   Breastfeeding No   BMI 27.69 kg/m   Estimated body mass index is 27.69 kg/m  as calculated from the following:    Height as of 3/29/22: 1.575 m (5' 2\").    Weight as of this encounter: 68.7 kg (151 lb 6.4 oz).    Medication Reconciliation: complete    Do you need any medication refills at today's visit? none    JOSE ANTONIO Goodman  Rheumatology/Infectious disease  [facility]   [phone]    "

## 2022-08-03 NOTE — PROGRESS NOTES
"    Rheumatology Clinic Visit  Erum Allen, AGUSTÍN      Unique Baker  YOB: 1985    Age: 37 year old   MRN# [id number]       Date of Visit: 08/03/2022  Primary care provider: Celina Riggs              Subjective:     Unique is a 37 year old female presents today for Follow-up to work up for LOW +FELICITY 1:80 with arthralgias that developed post covid vaccine and had covid the month prior. Transfer of care from Dr. Ceron.   7/22 visit suspicious for CTD, Recommended trial of prednisone, 10 mg daily x 7 days, 7.5 mg daily x 7 days, 5 mg daily until RTC    Today:  Over all felt %50 improved on prednisone. Noticed the most improvement on the 10 mg and then symptoms became closer to baseline as she weaned down to 5 mg. Is having mouth sores, has pcitures. Her symptoms are worse during and after menses. Feels like her joint pain is better, however she states what feels better is she doesn't feel like something is \"eating her\" and touches all her joints as she says this, also doesn't fell \"all inflamed\". Started gabapentin.     HPI 7/2022: Pt reports worsening of her arthralgias and was instructed by [facility] clinic and dermatology  to return to rheumatology.  Prior to covid/or vaccine was healthy and had none of these issues, now would define health as poor. Feels worse during and 1 week after menses.   Has been on 3 medrol dose packs, with significant response, every thing improved.   Feels like she has inflammation going through her body.   +Her reflux is \"boiling\", it's worsening,  can only eat white rice. Reflux does not worsen when she lays down. Woke the other night and feels like she had epigastric spasms.  She wasn't sure if it was reflex, this did make her anxious. Eating makes worse. Has failed 4 medications, now considering surgical options.  +Joint pain: Started post covid, most on legs points to spots on legs., more on L then R.  These spots are not visualized, no change " "in color, temp sensation, it is non TTP and helps to rub area.  Ankles swell, has an indent when wears socks. Rings are no longer fitting.   +Has gained 10 lbs.   +Is losing hair, diffusely, people she lives with notice hair all over now. Last child was 4 yrs ago.   +Has mouth sores. Most recent 3 weeks, L buccal mucous sore, lasted 3 days and was very painful.  Has had sores of palate. Denies vaginal sores.   +Diarrhea, worked up, dx as IBS.  +Rashes in past all over, none since covid vaccine.   +Feels SOB a lot, unsure if anxiety related.  Maybe some pain with breathing.   + Had L side HA's in beging of this prodrome but have improved.   +Dry mouth, really bad, wakes her up at night.  Dentist told her that she can see dry patches. This started 3 months ago. Eyes are fine. (not taking adderal currently,so not a contributing factor)   +Feels flu like.   + Difference in brain function, forgets things, struggling with short term memory. Speech is ok. No seizures.  ROS: Denies sun sensitivity, did have blotches on face one time after being in sun that lasted 3 days. Possible tubal pregnancy vs miscarriage, no blood clots. Np fevers. Denies psych hx. Has ADD, tx with adderal in past. Denies tri-color digit changes. Denies vaginal sores.    HPI at initial consult 10/2021: \"Unique Baker is a 36 year old female with pmhx GERD, seborrheic keratosis, is referred to rheumatology clinic for polyarthralgia. She reports tejinder COVID-19 beginning of March 2021. She received COVID-19 vaccine in April. 3 days after getting the vaccine, she developed a constellation of symptoms including chest tightness and abnormal sensation over her L arm. She went to the ED for chest tightness and had cardiac evaluation. She was started on reflux medication which she still takes. Has seen GI for acid reflux and loose stools. Had an upper GI in May which showed non-specific chronic inflammation in the stomach. She went to  for evaluation " "of L arm abnormal sensation. She was given a course of steroids (medrol dosepak) which helped her symptoms somewhat. Shortly thereafter, she started developing sensation of \"burning\" in her head and chest. She then started developing joint pain (around June or July) over her knees, ankles, and wrists. She presented to  again for ongoing symptoms and received steroid burst which helped her symptoms (around September) but they have not subsided. She has since been getting \"flares\" of joint pain.      She describes her flares as abrupt onset mild pain over her knees and ankles when she is resting/watching TV. She takes advil for pain which helps resolve the pain. Pain can last for ~15-20 minutes. Pain episodes come on 4 times a week. However, she feels \"tightness\" over her knees and ankles mid-day everyday. She tries and stretch from time to time. Walking helps the stiffness but not the pain. Overall, the intensity of pain has improved since it started in July. Pain does not wake her up at night anymore but it used to back in July. She denies any AM pain or stiffness over her joints. She denies any joint swelling, erythema or warmth.    She denies hx of psoriasis. However, she reports she started having unexplained rash ~4 years ago. She has seen dermatology in the past and has been diagnosed with seborrheic keratosis. She denies photosensitivity. Rash can be over her back, legs, stomach, neck. Denies any facial rash. She has been given topical steroids by dermatology which helps but rash recurs. She describes the rash as \"bumpy\" and pruritic. She denies scalp flakiness.    Denies fatigue, fevers, chills, weight loss, night sweats, vision changes, eye pain/redness, inflammatory eye disease, dry eyes, dry mouth, +reports episode of painful oral ulcer over the roof of the mouth which has resolved, denies nasal ulcers, +chronic hair loss/thinning with hx of alopecia, denies raynaud's, cough, SOB, pleurisy, difficulty " swallowing, abdominal pain, diarrhea, hematochezia, melena, dysuria, back pain, enthesitis, dactylitis, +numbness/tingling over hands and feet. No hx of IBD. No hx of blood clots. One ectopic pregnancy.   Pertinent labs and imaging: (per chart review in [facility] and care everywhere)   Labs:   10/22/21: negative CBC w diff, CMP, +FELICITY 1:80 homogeneous, no hypocomplementemia, negative dsDNA, SSA, SSB, Sm, RNP, Hep C, RF, CCP, Scl-70  8/16/21: +ESR 32, negative CRP  5/1/21: negative BMP, CRP, CBC w diff, negative LFTs      FMH:  Unsure    Social History      Active Problem list:  Patient Active Problem List    Diagnosis Date Noted     Breast pain 02/21/2022     Priority: Medium     Menorrhagia with regular cycle 02/21/2022     Priority: Medium     Dysmenorrhea 02/21/2022     Priority: Medium     Controlled substance agreement signed- checked 8/12/2020 - no concerns 08/09/2019     Priority: Medium     Patient is followed by KELTON JACOB for ongoing prescription of stimulants.  All refills should be approved by this provider, or covering partner.    Medication(s): adderall.   Maximum quantity per month: 30  Clinic visit frequency required: Q 6  months     Controlled substance agreement on file: Yes       Date(s): 8/9/2019  Re sign 2/3/2021  Neuropsych evaluation for ADD completed:  Yes, completed Rosalba , on file and diagnosis confirmed  Done 5/14/2019    [facility] website verification:  none  [email or web address]           Irritable bowel syndrome 07/15/2015     Priority: Medium     CARDIOVASCULAR SCREENING; LDL GOAL LESS THAN 160 10/31/2010     Priority: Medium     Esophageal reflux 06/21/2006     Priority: Medium            Objective:     Physical Exam  Blood Pressure 111/72 (BP Location: Left arm, Patient Position: Sitting, Cuff Size: Adult Regular)   Pulse 63   Temperature 98.3  F (36.8  C) (Oral)   Weight 68.7 kg (151 lb 6.4 oz)   Last Menstrual Period  (LMP Unknown)   Oxygen Saturation  99%   Breastfeeding No   Body Mass Index 27.69 kg/m    Body mass index is 27.69 kg/m .    Constitutional: Normal body habitus with out gross deformities. Well groomed.   Psych: nl judgement, orientation, memory, affect. Scattered in history, difficulty staying on subject of question, not finishing sentences before moving on to next one.   Eyes: wnl EOM, PERRLA, vision, conjunctiva, sclera   ENT: wnl external ears, nose, hearing, lips, teeth, gums, throat. 1 mucous membrane lesion on left in healing stage, normal saliva pool.  Full mandible.     Skin: no nail pitting, alopecia, rash, nodules or lesions of exposed areas of face, neck, bilateral upper and lower extremity     Labs:    Lab Results   Component Value Date    ALT 15 07/14/2022    AST 8 07/14/2022    CR 0.65 07/14/2022      Latest Reference Range & Units 07/14/22 14:34 07/14/22 14:38   Lyme Disease Antibodies Serum <0.90  0.21    EBV Capsid Blake IgM Instrument Value <36.0 U/mL 12.8    EBV Capsid Antibody IgM No detectable antibody.  No detectable antibody.    ANTI NUCLEAR BLAKE IGG BY IFA WITH REFLEX  Rpt    Beta 2 Glycoprotein 1 Antibody IgA <7.0 U/mL 9.3 (H)    Beta 2 Glycoprotein 1 Antibody IgG <7.0 U/mL 0.9    Beta 2 Glycoprotein 1 Antibody IgM <7.0 U/mL 29.0 (H)    Cardiolipin IgG Blake Negative   Negative   Cardiolipin IgM Blake Negative   Negative   Complement C3 81 - 157 mg/dL 157    Complement C4 13 - 39 mg/dL 56 (H)    DNA-ds <10.0 IU/mL 2.9    Scleroderma Antibody Scl-70 RADHA IgG Negative  Negative    Thyroid Peroxidase Antibody <35 IU/mL <10    (H): Data is abnormally high  Rpt: View report in Results Review for more information    Imaging: reviewed            Assessment and Plan:     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 Pt with profound change in health status post covid infection. Reports prior to this was completely healthy. Now with new symptom of dry mouth that dentist has seen  "\"patches\" of dry area and mouth sores. Overall Pt's symptoms dramatically improved when on prednisone in past and partial response with most recent burst that started at 10 mg daily, reports felt better in past when she did the dose pack.  Symptoms potentially related to a CTD include; dry mouth, hair loss, mouth sores, arthralgias, reflux, feeling of SOB/chest pain, change in cognitive function, past rash in sun. Exam at last visit showed one healing mucosal lesion, full mandible. Given  response to prednisone consider treating as UCTD.  Since she recently added the gabapentin I would like to wait to start her on anything new until she RTC's in 4-6 weeks.     Plan:   -Prednisone 5 mg daily x 2 weeks, then 5 mg EOD for 2 weeks, then off  -see me back in 4-6 weeks, likely will add plaquenil at that time       Erum Allen, CNP  Rheumatology, [facility]              "

## 2022-08-04 ENCOUNTER — HOSPITAL ENCOUNTER (OUTPATIENT)
Dept: NUCLEAR MEDICINE | Facility: CLINIC | Age: 37
Setting detail: NUCLEAR MEDICINE
Discharge: HOME | End: 2022-08-04
Attending: PHYSICIAN ASSISTANT | Admitting: PHYSICIAN ASSISTANT
Payer: COMMERCIAL

## 2022-08-04 DIAGNOSIS — K21.9 CHRONIC GERD: ICD-10-CM

## 2022-08-04 PROCEDURE — 78264 GASTRIC EMPTYING IMG STUDY: CPT

## 2022-08-04 PROCEDURE — 343N000001 HC RX 343: Performed by: PHYSICIAN ASSISTANT

## 2022-08-04 PROCEDURE — A9541 TC99M SULFUR COLLOID: HCPCS | Performed by: PHYSICIAN ASSISTANT

## 2022-08-04 RX ADMIN — Medication 2 MCI.: at 08:05

## 2022-08-09 ENCOUNTER — TRANSFERRED RECORDS (OUTPATIENT)
Dept: HEALTH INFORMATION MANAGEMENT | Facility: CLINIC | Age: 37
End: 2022-08-09

## 2022-08-22 ENCOUNTER — OFFICE VISIT (OUTPATIENT)
Dept: RHEUMATOLOGY | Facility: CLINIC | Age: 37
End: 2022-08-22
Payer: COMMERCIAL

## 2022-08-22 VITALS
OXYGEN SATURATION: 100 % | WEIGHT: 150.6 LBS | SYSTOLIC BLOOD PRESSURE: 120 MMHG | TEMPERATURE: 97.7 F | DIASTOLIC BLOOD PRESSURE: 79 MMHG | HEART RATE: 66 BPM | BODY MASS INDEX: 27.55 KG/M2

## 2022-08-22 DIAGNOSIS — F32.A DEPRESSION, UNSPECIFIED DEPRESSION TYPE: ICD-10-CM

## 2022-08-22 DIAGNOSIS — M15.0 PRIMARY OSTEOARTHRITIS INVOLVING MULTIPLE JOINTS: Primary | ICD-10-CM

## 2022-08-22 DIAGNOSIS — M35.9 UNDIFFERENTIATED CONNECTIVE TISSUE DISEASE (H): ICD-10-CM

## 2022-08-22 PROCEDURE — 99214 OFFICE O/P EST MOD 30 MIN: CPT | Performed by: NURSE PRACTITIONER

## 2022-08-22 RX ORDER — HYDROXYCHLOROQUINE SULFATE 200 MG/1
200 TABLET, FILM COATED ORAL 2 TIMES DAILY
Qty: 60 TABLET | Refills: 3 | Status: SHIPPED | OUTPATIENT
Start: 2022-08-22 | End: 2022-10-03

## 2022-08-22 ASSESSMENT — PAIN SCALES - GENERAL: PAINLEVEL: EXTREME PAIN (8)

## 2022-08-22 NOTE — PROGRESS NOTES
Rheumatology Clinic Visit  Erum Allen CNP      Unique Baker  YOB: 1985    Age: 37 year old   MRN# [id number]       Date of Visit: 2022  Primary care provider: Celina Riggs              Subjective:     Unique is a 37 year old female presents today for Follow-up to work up for LOW +FELICITY 1:80 with arthralgias that developed post covid vaccine and had covid the month prior. Transfer of care from Dr. Ceron.    visit suspicious for CTD, Recommended trial of prednisone, 10 mg daily x 7 days, 7.5 mg daily x 7 days, 5 mg daily until RTC    Today:  Acid is going up, not being controlled, has failed multiple therapies. Has abdominal pain of her LL side that rates to back. As soon as she drinks water in am, her throat burns and acid comes up. Reports [facility] did upper endoscopy and saw inflammation but hpylori was negative. Is planning an esophagus surgery??Concerned about this as her sister  from stomach cancer.  Feels like vision changed after vaccine. Couldn't see up close anymore.   Has ongoing joint pain that roves.  Is concerned she has gout as has pain in her big toes after she eats, noticed recently after eating salmon and her dad has gout. Denies red hot swollen joints.  Hx of 2 miscarriages, 2-3 months a long. Reports she continued to pass multiple clots with her menses, she is concerned about this, did see Gyn.  Reports poor mental health, cries easily, depressed, sometimes can not leave house. Denies desire to harm self.         HPI 2022: Pt reports worsening of her arthralgias and was instructed by [facility]id [facility] clinic and dermatology  to return to rheumatology.  Prior to covid/or vaccine was healthy and had none of these issues, now would define health as poor. Feels worse during and 1 week after menses.   Has been on 3 medrol dose packs, with significant response, every thing improved.   Feels like she has inflammation going through her body.   +Her  "reflux is \"boiling\", it's worsening,  can only eat white rice. Reflux does not worsen when she lays down. Woke the other night and feels like she had epigastric spasms.  She wasn't sure if it was reflex, this did make her anxious. Eating makes worse. Has failed 4 medications, now considering surgical options.  +Joint pain: Started post covid, most on legs points to spots on legs., more on L then R.  These spots are not visualized, no change in color, temp sensation, it is non TTP and helps to rub area.  Ankles swell, has an indent when wears socks. Rings are no longer fitting.   +Has gained 10 lbs.   +Is losing hair, diffusely, people she lives with notice hair all over now. Last child was 4 yrs ago.   +Has mouth sores. Most recent 3 weeks, L buccal mucous sore, lasted 3 days and was very painful.  Has had sores of palate. Denies vaginal sores.   +Diarrhea, worked up, dx as IBS.  +Rashes in past all over, none since covid vaccine.   +Feels SOB a lot, unsure if anxiety related.  Maybe some pain with breathing.   + Had L side HA's in beging of this prodrome but have improved.   +Dry mouth, really bad, wakes her up at night.  Dentist told her that she can see dry patches. This started 3 months ago. Eyes are fine. (not taking adderal currently,so not a contributing factor)   +Feels flu like.   + Difference in brain function, forgets things, struggling with short term memory. Speech is ok. No seizures.  ROS: Denies sun sensitivity, did have blotches on face one time after being in sun that lasted 3 days. Possible tubal pregnancy vs miscarriage, no blood clots. Np fevers. Denies psych hx. Has ADD, tx with adderal in past. Denies tri-color digit changes. Denies vaginal sores.    HPI at initial consult 10/2021: \"Unique Baker is a 36 year old female with pmhx GERD, seborrheic keratosis, is referred to rheumatology clinic for polyarthralgia. She reports tejinder COVID-19 beginning of March 2021. She received COVID-19 " "vaccine in April. 3 days after getting the vaccine, she developed a constellation of symptoms including chest tightness and abnormal sensation over her L arm. She went to the ED for chest tightness and had cardiac evaluation. She was started on reflux medication which she still takes. Has seen GI for acid reflux and loose stools. Had an upper GI in May which showed non-specific chronic inflammation in the stomach. She went to  for evaluation of L arm abnormal sensation. She was given a course of steroids (medrol dosepak) which helped her symptoms somewhat. Shortly thereafter, she started developing sensation of \"burning\" in her head and chest. She then started developing joint pain (around June or July) over her knees, ankles, and wrists. She presented to  again for ongoing symptoms and received steroid burst which helped her symptoms (around September) but they have not subsided. She has since been getting \"flares\" of joint pain.      She describes her flares as abrupt onset mild pain over her knees and ankles when she is resting/watching TV. She takes advil for pain which helps resolve the pain. Pain can last for ~15-20 minutes. Pain episodes come on 4 times a week. However, she feels \"tightness\" over her knees and ankles mid-day everyday. She tries and stretch from time to time. Walking helps the stiffness but not the pain. Overall, the intensity of pain has improved since it started in July. Pain does not wake her up at night anymore but it used to back in July. She denies any AM pain or stiffness over her joints. She denies any joint swelling, erythema or warmth.    She denies hx of psoriasis. However, she reports she started having unexplained rash ~4 years ago. She has seen dermatology in the past and has been diagnosed with seborrheic keratosis. She denies photosensitivity. Rash can be over her back, legs, stomach, neck. Denies any facial rash. She has been given topical steroids by dermatology which " "helps but rash recurs. She describes the rash as \"bumpy\" and pruritic. She denies scalp flakiness.    Denies fatigue, fevers, chills, weight loss, night sweats, vision changes, eye pain/redness, inflammatory eye disease, dry eyes, dry mouth, +reports episode of painful oral ulcer over the roof of the mouth which has resolved, denies nasal ulcers, +chronic hair loss/thinning with hx of alopecia, denies raynaud's, cough, SOB, pleurisy, difficulty swallowing, abdominal pain, diarrhea, hematochezia, melena, dysuria, back pain, enthesitis, dactylitis, +numbness/tingling over hands and feet. No hx of IBD. No hx of blood clots. One ectopic pregnancy.   Pertinent labs and imaging: (per chart review in [facility] and care everywhere)   Labs:   10/22/21: negative CBC w diff, CMP, +FELICITY 1:80 homogeneous, no hypocomplementemia, negative dsDNA, SSA, SSB, Sm, RNP, Hep C, RF, CCP, Scl-70  8/16/21: +ESR 32, negative CRP  5/1/21: negative BMP, CRP, CBC w diff, negative LFTs      FMH:  Unsure  Sister passed from stomach CA.   Dad gout    Social History      Active Problem list:  Patient Active Problem List    Diagnosis Date Noted     Breast pain 02/21/2022     Priority: Medium     Menorrhagia with regular cycle 02/21/2022     Priority: Medium     Dysmenorrhea 02/21/2022     Priority: Medium     Controlled substance agreement signed- checked 8/12/2020 - no concerns 08/09/2019     Priority: Medium     Patient is followed by KELTON JACOB for ongoing prescription of stimulants.  All refills should be approved by this provider, or covering partner.    Medication(s): adderall.   Maximum quantity per month: 30  Clinic visit frequency required: Q 6  months     Controlled substance agreement on file: Yes       Date(s): 8/9/2019  Re sign 2/3/2021  Neuropsych evaluation for ADD completed:  Yes, completed Rosalba , on file and diagnosis confirmed  Done 5/14/2019    [facility] website verification:  " none  [email or web address]           Irritable bowel syndrome 07/15/2015     Priority: Medium     CARDIOVASCULAR SCREENING; LDL GOAL LESS THAN 160 10/31/2010     Priority: Medium     Esophageal reflux 06/21/2006     Priority: Medium            Objective:     Physical Exam  Blood Pressure 120/79 (BP Location: Left arm, Patient Position: Sitting, Cuff Size: Adult Regular)   Pulse 66   Temperature 97.7  F (36.5  C) (Oral)   Weight 68.3 kg (150 lb 9.6 oz)   Last Menstrual Period  (LMP Unknown)   Oxygen Saturation 100%   Body Mass Index 27.55 kg/m    Body mass index is 27.55 kg/m .    Constitutional: Normal body habitus with out gross deformities. Well groomed.   Psych: tearring easily and crying multiple times through appointment. Scattered in history, difficulty staying on subject of question.  Joints: Hands WNL, no TTP, swelling. Feet, bilateral 1st MTP bony changes with out swelling.   Skin:   Hyperpigminted papules lower back over spine that cover a hand size area (no horizontal pattern).  Hair thin frontal area with multiple different lengths of short hair.     Labs:    Lab Results   Component Value Date    ALT 15 07/14/2022    AST 8 07/14/2022    CR 0.65 07/14/2022      Latest Reference Range & Units 07/14/22 14:34 07/14/22 14:38   Lyme Disease Antibodies Serum <0.90  0.21    EBV Capsid Blake IgM Instrument Value <36.0 U/mL 12.8    EBV Capsid Antibody IgM No detectable antibody.  No detectable antibody.    ANTI NUCLEAR BLAKE IGG BY IFA WITH REFLEX  Rpt    Beta 2 Glycoprotein 1 Antibody IgA <7.0 U/mL 9.3 (H)    Beta 2 Glycoprotein 1 Antibody IgG <7.0 U/mL 0.9    Beta 2 Glycoprotein 1 Antibody IgM <7.0 U/mL 29.0 (H)    Cardiolipin IgG Blake Negative   Negative   Cardiolipin IgM Blake Negative   Negative   Complement C3 81 - 157 mg/dL 157    Complement C4 13 - 39 mg/dL 56 (H)    DNA-ds <10.0 IU/mL 2.9    Scleroderma Antibody Scl-70 RADHA IgG Negative  Negative    Thyroid Peroxidase Antibody <35 IU/mL <10   "  (H): Data is abnormally high  Rpt: View report in Results Review for more information    Imaging: reviewed            Assessment and Plan:     #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 Pt with profound change in health status post covid infection. Reports prior to this was completely healthy. Now with new symptom of dry mouth that dentist has seen \"patches\" of dry area and mouth sores. Overall Pt's symptoms dramatically improved when on prednisone in past and partial response with most recent burst that started at 10 mg daily, reports felt better in past when she did the dose pack.  Symptoms potentially related to a CTD include; dry mouth, hair loss, mouth sores, arthralgias, reflux, feeling of SOB/chest pain, change in cognitive function, past rash in sun. Exam at last visit showed one healing mucosal lesion, full mandible. Given  response to prednisone consider treating as UCTD.     Plan:   -Start plaquenil, goal of working up to dose of 200 mg BID for 6 months with then plan to lower to wgt appr dosing.  -Repeat Beta 2 glycoprotein 1/2023  -RTC in 6 weeks    #2) OA  -Voltaren gel QID     #3) Depression. Pt tearful during visit, reports depressed, difficulty leaving house at time, asking to see someone, Denies self harm.  -Referral to mental health    Pt education provided on plaquenil SE, risks and benefits, Pt states understanding and agreement to plan of care, has no further questions.   Pt states understanding and agreement to plan of care, has no further questions.         Erum Allen, CNP  Rheumatology, [facility]              "

## 2022-08-22 NOTE — NURSING NOTE
"Unique Baker's goals for this visit include:   Chief Complaint   Patient presents with     RECHECK     Follow-up, patient notes increased in symptoms        PCP: Celina Riggs    Referring Provider:  No referring provider defined for this encounter.      Initial /79 (BP Location: Left arm, Patient Position: Sitting, Cuff Size: Adult Regular)   Pulse 66   Temp 97.7  F (36.5  C) (Oral)   Wt 68.3 kg (150 lb 9.6 oz)   LMP  (LMP Unknown)   SpO2 100%   BMI 27.55 kg/m   Estimated body mass index is 27.55 kg/m  as calculated from the following:    Height as of 3/29/22: 1.575 m (5' 2\").    Weight as of this encounter: 68.3 kg (150 lb 9.6 oz).    Medication Reconciliation: complete    Do you need any medication refills at today's visit? none    JOSE ANTONIO Goodman  Rheumatology/Infectious disease  [facility]   [phone]    "

## 2022-08-22 NOTE — PATIENT INSTRUCTIONS
There is a question if you are developing a connective tissue disease versus post covid syndrome    1) Start plaquenil 200 mg tablets,   -start with 1 tablet every other day, when tolerated increase to 1 tablet a day.  -then in 2 weeks increase to 1 tab in am 1 tab in pm    2) Voltaren gel  -use a small amount up to 4 times a day on painful hand or toe joints for pain relief     3) RTC 6 weeks     4) See psychology

## 2022-08-23 ENCOUNTER — TELEPHONE (OUTPATIENT)
Dept: BEHAVIORAL HEALTH | Facility: CLINIC | Age: 37
End: 2022-08-23

## 2022-08-23 NOTE — TELEPHONE ENCOUNTER
Reached patient and scheduled initial IN PERSON therapy appointment 8/26/2022 at 1pm with Leodan. Intake docs sent via [employer]. Clinic address provided verbally to patient as well as in [employer] message.    Alfreda Church  Transition Clinic Coordinator  Date and Time: 08/23/22 12:18 PM    ----- Message from Gamaliel Johnson sent at 8/23/2022 11:58 AM CDT -----  Regarding: Transition clinic referral  Transition Clinic Referral   [place] (Limited)        Please Check Type of Referral Requested:       __X__THERAPY: The Transition clinic is able to schedule patients without current medical insurance; these patient will be referred to our Social Work Care Coordinator for Medical Insurance              Assistance. We are open for referral for psychotherapy. Patient is referred from:  Outpatient Intake      Referring Provider Contact Name: Gamaliel R; Phone Number: [phone]    Reason for Transition Clinic Referral: Pt has a referral for therapy. Please help bridge the wait until they can be seen. Thanks!    Next Level of Care Patient Will Be Transitioned To:  Debbie Knight LICSW Department: [facility]   Provider(s)  Debbie Knight LICSW   Location Department: [facility]   Date/Time 1/11/23 1230pm    What Would Be Helpful from the Transition Clinic: Pt has a referral for therapy. Please help bridge the wait until they can be seen. Thanks!     Needs: NO    Does Patient Have Access to Technology: yes    Patient E-mail Address: [email or web address]    Current Patient Phone Number: [phone];     Clinician Gender Preference (if applicable): NO    Gamaliel Johnson

## 2022-08-25 ENCOUNTER — TELEPHONE (OUTPATIENT)
Dept: BEHAVIORAL HEALTH | Facility: CLINIC | Age: 37
End: 2022-08-25

## 2022-08-25 NOTE — TELEPHONE ENCOUNTER
Writer spoke with patient on que and rescheduled appointment for 08/30/2022 @ 2:30 pm.    Briseida Enriquez  08/25/22  [phone]

## 2022-08-30 ENCOUNTER — VIRTUAL VISIT (OUTPATIENT)
Dept: BEHAVIORAL HEALTH | Facility: CLINIC | Age: 37
End: 2022-08-30

## 2022-08-30 DIAGNOSIS — F41.9 ANXIETY DISORDER, UNSPECIFIED TYPE: Primary | ICD-10-CM

## 2022-08-30 ASSESSMENT — COLUMBIA-SUICIDE SEVERITY RATING SCALE - C-SSRS
1. IN THE PAST MONTH, HAVE YOU WISHED YOU WERE DEAD OR WISHED YOU COULD GO TO SLEEP AND NOT WAKE UP?: NO
2. HAVE YOU ACTUALLY HAD ANY THOUGHTS OF KILLING YOURSELF?: NO
6. IN YOUR LIFETIME, HAVE YOU EVER DONE ANYTHING, STARTED TO DO ANYTHING, OR PREPARED TO DO ANYTHING TO END YOUR LIFE?: NO

## 2022-08-31 ENCOUNTER — TRANSFERRED RECORDS (OUTPATIENT)
Dept: HEALTH INFORMATION MANAGEMENT | Facility: CLINIC | Age: 37
End: 2022-08-31

## 2022-08-31 LAB
ALT SERPL-CCNC: 8 IU/L (ref 0–32)
AST SERPL-CCNC: 12 IU/L (ref 0–40)

## 2022-09-02 ENCOUNTER — TELEPHONE (OUTPATIENT)
Dept: MULTI SPECIALTY CLINIC | Facility: CLINIC | Age: 37
End: 2022-09-02

## 2022-09-02 NOTE — TELEPHONE ENCOUNTER
Patient would like to know if you will order Hepatitis C labs for her? Please advise. Thank you.      JOSE ANTONIO Goodman  Rheumatology/Infectious disease  [facility]   [phone]

## 2022-09-07 ENCOUNTER — TELEPHONE (OUTPATIENT)
Dept: BEHAVIORAL HEALTH | Facility: CLINIC | Age: 37
End: 2022-09-07

## 2022-09-07 NOTE — TELEPHONE ENCOUNTER
Writer spoke with patient and scheduled return visit for 09/07/2022 @ 12:00 pm with Leodan Enriquez  09/07/22  [phone]

## 2022-09-07 NOTE — TELEPHONE ENCOUNTER
[employer] message sent to patient.     Huyen Lyn, BSN, RN, PHN  Medical Specialty Care Coordinator  [facility]

## 2022-09-09 ENCOUNTER — VIRTUAL VISIT (OUTPATIENT)
Dept: BEHAVIORAL HEALTH | Facility: CLINIC | Age: 37
End: 2022-09-09

## 2022-09-09 DIAGNOSIS — F41.1 GAD (GENERALIZED ANXIETY DISORDER): ICD-10-CM

## 2022-09-09 DIAGNOSIS — F33.1 MAJOR DEPRESSIVE DISORDER, RECURRENT EPISODE, MODERATE (H): Primary | ICD-10-CM

## 2022-09-09 NOTE — PROGRESS NOTES
[facility]     Date of visit: 9/12/2022      Assessment:   Unique Baker is a 37 year old female with a past medical history significant for GERD, gestational diabetes mellitus, and post operative nausea and vomiting who presents with complaints of left head, chest, left neck, left arm, leg, and foot/ankle pain.      1. Left head, chest, left neck, left arm, leg, and foot/ankle pain- etiology unclear, symptoms developed 3 days after COVID-19 vaccine which was 1 month after COVID. It sounds like there is a neuropathic and/or inflammatory component to pain. Her headaches originate around her left greater occipital nerve and radiate over the top of her head.   2. Mental Health - the patient's mental health concerns, specifically situational depression, affect her experience of pain and contribute to her clinically significant distress.     Visit Diagnoses:  1. Neuropathic pain    2. Occipital neuralgia of left side    3. Chronic pain syndrome         Plan:    1.  Pain Physical Therapy:                YES  Highly recommend pain physical therapy and she has th is scheduled already. I recommended she start pain physical therapy with Deandre Ortiz PT as planned.                2.  Pain Psychologist to address relaxation, behavioral change, coping style, and other factors important to improvement.     Continue visits with Hailey Kaba on a regular basis for now. I will see if we can get you established with Violette Damon PsyD, LP.               3.  Medication Management:                           1. Continue medication management per Rheumatology. We discussed a trial of Cymbalta, while I do think this could be helpful she would prefer to focus on one medication at a time, was just started on Plaquenil. She will consider a trial of Cymbalta.   2. Continue Tylenol as needed.               4.  Potential procedures: continue to monitor benefit from occipital nerve block- still having benefit.                5.  Referrals: continue follow up with Rheumatology and post COVID clinic. Establish with integrative medicine as planned.               6.  I will be leaving [facility] pain management and my last day will be on 10/19. Plan will be to follow up with CARRIE Boyce CNP in 4-6 weeks and long term new pain provider.     Huyen BUTLER CNP  [facility]     -------------------------------------------------    Subjective:    Chief complaint:   Chief Complaint   Patient presents with     Pain       Interval history:  Unique Baker is a 36 year old female last seen on 7/22/2022.  she is seen in follow up.     Recommendations/plan at the last visit included:  1.  Pain Physical Therapy:                YES  Discussed the benefits of chronic pain physical therapy. I would recommend. After discussion, she is interested. She will start with Deandre Ortiz PT as able/interested.               2.  Pain Psychologist to address relaxation, behavioral change, coping style, and other factors important to improvement.  NO              3.  Medication Management:                           1. Gabapentin was discussed at last visit but she held off because of some worries about this medication. After discussion today she is interested in starting. She will start gabapentin 100mg at bedtime for 3-5 days, then take 200mg at bedtime for 3-5 days, then 300mg at bedtime. Okay to take one additional 100mg as needed, max of 400mg/day.  2. Okay to continue Celebrex 100mg BID. Could restart Tylenol as needed.               4.  Potential procedures: I advised she continue to monitor the benefit from occipital nerve block. It appears that she has a small amount of localized muscle atrophy noted near her injection site from February. It is about 1cm and non tender, likely a mild complication from the injection. Reassurance provided, this may improve some with time but should not worsen.                5.   Referrals: continue follow up with Rheumatology and post [facility] clinic.               6.  Follow up with CARRIE Boyce CNP in 4-6 weeks.        Since her last visit, Unique Baker reports:  -Her pain is somewhat better than it was at last visit.   -She started gabapentin 100mg at bedtime. Unfortunately it was very sedating, she would sleep for long periods and would wake up feeling disoriented.   -She has her first visit of pain physical therapy scheduled for 10/3/2022. This was first available.   -She has been struggling with acid reflux over the last few months, states it has seemed to be progressively worsening. She has had extensive testing and evaluation. She notes x3 different PPIs didn't help, neither did sucralfate.   -She followed up with Rheumatology and was told she may have a connective tissue disorder. She was started on Methotrexate. She has been on this 2-3 weeks and reports she is feeling better. She reports improvement in joint pain, acid reflux, and appetite. She has follow up planned soon.   -She has felt overwhelmed and stressed with her health limitations. She established with a therapist, has had two sessions with Hailey Kaba. She thinks this could be helpful.   -The [facility] clinic referred her to integrative medicine. She has an appointment with Estephania BUTLER CNP tomorrow.       Pain Information:   Pain rating: averages 3/10 on a 0-10 scale.      Current pain medications:    Tylenol- H, not currently taking              Celebrex 200mg daily- [facility]              Hydroxyzine 25mg Q8h- [facility] for anxiety or sleep    Other relevant medications:    Plaquenil 200mg BID    Current MME: 0    Review of [place] Prescription Monitoring Program (): No concern for abuse or misuse of controlled medications based on this report.     Annual Controlled Substance Agreement/UDS due date: NA    1. Previous Pain Relevant Medications:              Opiates: no              NSAIDS: Celebrex-?,  ibuprofen- ?              Muscle Relaxants: no              Anti-migraine mediations: no              Anti-depressants: amitriptyline- [facility], mouth sores              Sleep aids: no              Anxiolytics: hydroxyzine- [facility]              Neuropathics: gabapentin- [facility], sedating, confusion                       Topicals: no              Other medications not covered above: Tylenol- [facility]     2. Physical Therapy: no  3. Pain Psychology: no  4. Surgery: no  5. Injections: left occipital nerve block with me on 2/8/2022- 100% improvement  6. Chiropractic: no  7. Acupuncture: no  8. TENS Unit: no    Medications:  Current Outpatient Medications   Medication Sig Dispense Refill     acetaminophen (TYLENOL) 500 MG tablet take 1 - 2 tablet by oral route  every 6 hours as needed as needed       atorvastatin (LIPITOR) 10 MG tablet Take 1 tablet (10 mg) by mouth every evening 30 tablet 3     cetirizine (ZYRTEC) 10 MG tablet Take 1 tablet (10 mg) by mouth daily 30 tablet 1     cholecalciferol 25 MCG (1000 UT) TABS take 1 tablet by oral route  every month       Cyanocobalamin (VITAMIN B12 TR PO) 1 patch daily       dexlansoprazole (DEXILANT) 60 MG CPDR CR capsule Take 60 mg by mouth daily       diclofenac (VOLTAREN) 1 % topical gel Apply 2 g topically 4 times daily To joints as needed for pain 100 g 3     dicyclomine (BENTYL) 10 MG capsule dicyclomine 10 mg capsule       diltiazem 2% in lipovan cream 2% GEL APPLY PEA SIZED AMOUNT TO PERIANAL SKIN THREE TIMES DAILY       famotidine (PEPCID) 20 MG tablet TAKE ONE TABLET BY MOUTH TWICE A DAY AS NEEDED FOR REFLUX 180 tablet 3     fluocinonide emulsified base 0.05 % external cream apply to rash as needed - sparingly 60 g 1     fluticasone (FLONASE) 50 MCG/ACT nasal spray Spray 1 spray into both nostrils 2 times daily as needed for rhinitis or allergies Take 1 spray twice daily x 1 week then 1 spray daily at night x 1 week then as needed 11.1 mL 1     hydrocortisone (ANUSOL-HC) 25 MG suppository  hydrocortisone acetate 25 mg rectal suppository       hydroxychloroquine (PLAQUENIL) 200 MG tablet Take 1 tablet (200 mg) by mouth 2 times daily Annual Plaquenil toxicity eye screening required. 60 tablet 3     hydrOXYzine (ATARAX) 25 MG tablet Take 1 tablet (25 mg) by mouth every 8 hours as needed for anxiety 30 tablet 0     multivitamin (ONE-DAILY) tablet Take 1 tablet by mouth daily       predniSONE (DELTASONE) 5 MG tablet Take 2 tablets (10 mg) by mouth daily 60 tablet 2     sucralfate (CARAFATE) 1 GM tablet take 1 tablet by oral route 3 times every day on an empty stomach 1 hour before meals         Medical History: any changes in medical history since they were last seen? No    Review of Systems: A 10-point review of systems was negative, with the exception of chronic pain issues.      Objective:    Physical Exam:  Blood pressure 107/69, pulse 72, SpO2 98 %, not currently breastfeeding.  Constitutional: Well developed, well nourished, appears stated age.  Gait: Normal  HEENT: Head atraumatic, normocephalic. Eyes without conjunctival injection or jaundice. Oropharynx clear. Neck supple. No obvious neck masses.  Skin: No rash, lesions, or petechiae of exposed skin.   Psychiatric/mental status: Alert, without lethargy or stupor. Speech fluent. Appropriate affect. Mood normal. Able to follow commands without difficulty.     Imaging:  CT of chest was completed on 5/2/2021 and shows:  FINDINGS: The heart is not enlarged without significant coronary  calcifications. The thoracic aorta is normal variant right arch and  otherwise unremarkable. without evidence of dissection or aneurysm.  The lungs are clear. There are no effusions. There is no axillary,  hilar, or mediastinal adenopathy.     The adrenal glands are normal. Question some hepatic steatosis. The  remainder of the visualized abdomen is unremarkable.     Bone windows show no destructive bony  lesions.                                                                      IMPRESSION:  Negative CT study of the chest.    BILLING TIME DOCUMENTATION:   The total TIME spent on this patient on the date of the encounter/appointment was 24 minutes.      TOTAL TIME includes:   Time spent preparing to see the patient (reviewing records and tests)   Time spent face to face (or over the phone) with the patient   Time spent ordering tests, medications, procedures and referrals   Time spent Referring and communicating with other healthcare professionals   Time spent documenting clinical information in Epic

## 2022-09-11 NOTE — PROGRESS NOTES
[facility]   Transition Clinic      Provider Name:  Hondozara Kaba, MSW LICSW       PATIENT'S NAME: Unique Baker  PREFERRED NAME: Unique  PRONOUNS: Her, her's, She  MRN: [id number]  : 1985  ADDRESS: [address] [place] [address]. NUMBER:  [id number]  DATE OF SERVICE: 22  START TIME: 1432  END TIME: 1534  PREFERRED PHONE: [phone]  May we leave a program related message: Yes  SERVICE MODALITY:  Video Visit:      Provider verified identity through the following two step process.  Patient provided:  Patient photo, Patient  and Patient address    Telemedicine Visit: The patient's condition can be safely assessed and treated via synchronous audio and visual telemedicine encounter.      Reason for Telemedicine Visit: Patient has requested telehealth visit    Originating Site (Patient Location): Patient's home    Distant Site (Provider Location): [facility]    Consent:  The patient/guardian has verbally consented to: the potential risks and benefits of telemedicine (video visit) versus in person care; bill my insurance or make self-payment for services provided; and responsibility for payment of non-covered services.     Patient would like the video invitation sent by:  Text to cell phone: [phone]    Mode of Communication:  Video Conference via [employer]       As the provider I attest to compliance with applicable laws and regulations related to telemedicine.    Informed Consent:  This writer and patient contracted for therapy: discussed HIPAA, and the limits of confidentiality; mandated reporting, possibility of collaborative discussions with patients primary care provider and the multidisciplinary team in the  clinic during consultation. Disclosed therapist remote status. Discussed the no show policy the benefits and possible consequences of therapy.   "Discussed preliminary goals for Transition Care. Patient verbally indicated agreement to the informed consent and contract for therapy.      [place] ADULT Mental Health DIAGNOSTIC ASSESSMENT    Identifying Information:  Patient is a 37 year old,  other individual.    Patient was referred for an assessment by referring provider.  Patient attended the session alone.    Chief Complaint:   The reason for seeking services at this time is: \"Self\".  The problem(s) began 1/1/2022.    Patient has attempted to resolve these concerns in the past through Problem began recently.  Went to PCP. .    Social/Family History:  Patient reported they grew up in [place] [facility].  They were raised by other Parents till 5 then Sisters.  Parents were always together.  Patient reported that their childhood was chaotic and at times scary.  Patient described their current relationships with family of origin as  good    The patient describes their cultural background as other.  Cultural influences and impact on patient's life structure, values, norms, and healthcare: Yazdanism.  Contextual influences on patient's health include: Individual Factors Support through medical issues..    These factors will be addressed in the Preliminary Treatment plan. Patient identified their preferred language to be English. Patient reported she does not need the assistance of an  or other support involved in therapy.     Patient reported had no significant delays in developmental tasks.   Patient's highest education level was other [employer].  Patient identified the following learning problems: none reported.  Modifications will not be used to assist communication in therapy.  Patient reports she is  able to understand written materials.    Patient reported the following relationship history: Marrieed twice.  Patient's current relationship status is  for: no info provided  .   Patient identified their sexual orientation as " heterosexual.  Patient reported having 3 child(maren). Patient identified mother; father; siblings; adult child; friends; spouse as part of their support system.  Patient identified the quality of these relationships as fair.      Patient's current living/housing situation involves staying in own home/apartment.  The immediate members of family and household include Vega, 47, and they report that housing is stable.    Patient is currently unemployed.  Patient reports their finances are obtained through spouse. Patient does identify finances as a current stressor.      Patient reported that they have not been involved with the legal system.  Patient does not report being under probation/ parole/ jurisdiction. They are not under any current court jurisdiction. .    Patient's Strengths and Limitations:  Patient identified the following strengths or resources that will help them succeed in treatment: Methodist / Religious, shanell / spirituality, family support and motivation. Things that may interfere with the patient's success in treatment include: physical health concerns.     Assessments:  The following assessments were completed by patient for this visit:  PHQ2:   PHQ-2 ( 1999 [employer]) 8/22/2022 8/3/2022 7/14/2022 3/29/2022 11/4/2021 10/20/2021 10/13/2021   Q1: Little interest or pleasure in doing things 1 0 1 1 0 0 0   Q2: Feeling down, depressed or hopeless 1 0 1 1 0 0 0   PHQ-2 Score 2 0 2 2 0 0 0   PHQ-2 Total Score (12-17 Years)- Positive if 3 or more points; Administer PHQ-A if positive 2 0 2 - 0 0 0   Q1: Little interest or pleasure in doing things - - - Several days - - -   Q2: Feeling down, depressed or hopeless - - - Several days - - -   PHQ-2 Score - - - 2 - - -     PHQ9:   PHQ-9 SCORE 5/29/2015 7/14/2021 7/5/2022   PHQ-9 Total Score 2 - -   PHQ-9 Total Score MyChart - - 3 (Minimal depression)   PHQ-9 Total Score - 1 3     GAD7:   RENÉE-7 SCORE 7/14/2021 7/5/2022   Total Score - 2 (minimal anxiety)    Total Score 3 2     CAGE-AID: No flowsheet data found.  PROMIS 10-Global Health (only subscores and total score):   PROMIS-10 Scores Only 8/30/2022   Global Mental Health Score 11   Global Physical Health Score 12   PROMIS TOTAL - SUBSCORES 23     [place] Suicide Severity Rating Scale (Lifetime/Recent)  [place] Suicide Severity Rating (Lifetime/Recent) 8/30/2022   1. Wish to be Dead (Past 1 Month) 0   2. Non-Specific Active Suicidal Thoughts (Past 1 Month) 0   Calculated C-SSRS Risk Score (Lifetime/Recent) No Risk Indicated     [place] Suicide Severity Rating Scale (Short Version)  [place] Suicide Severity Rating (Short Version) 4/20/2021   Over the past 2 weeks have you felt down, depressed, or hopeless? no   Over the past 2 weeks have you had thoughts of killing yourself? no   Have you ever attempted to kill yourself? no       Personal and Family Medical History:  Patient does not report a family history of mental health concerns.  Patient reports family history includes Cancer in her sister; Colon Polyps in her brother; Hyperlipidemia in her sister; Other - See Comments in her brother; Stomach Cancer in her sister..     Patient does not report Mental Health Diagnosis or Treatment.      Patient has had a physical exam to rule out medical causes for current symptoms.  Date of last physical exam was within the past year. Client was encouraged to follow up with PCP if symptoms were to develop. The patient has a [place] Primary Care Provider, who is named Celina Riggs..  Patient reports the following current medical concerns: Gerd; possible gallbladder problmes.  Has gall stones.  and no current medical and/or dental concerns.  Patient reports pain concerns including mid right pain near the sternum, stomach and upper mid back.  Patient does not want help addressing pain concerns..Nya has a medical care team treating her health issues which results in less pain.  There are significant appetite /  nutritional concerns / weight changes. Patient pain increases with food ingestion.  She has vomited after eating due to pain. Pain. Her appetite is los because of gastrointestinal issues.    Patient does not report a history of head injury / trauma / cognitive impairment.      Patient reports current meds as:   No outpatient medications have been marked as taking for the 22 encounter (Virtual Visit) with Hailey Kaba LICSW.       Medication Adherence:  Patient reports taking.  taking prescribed medications as prescribed.    Patient Allergies:    Allergies   Allergen Reactions     No Known Drug Allergies      Other Environmental Allergy      Patch Test Results 3-10-20    Very Strong (3+) or Strong (2+) reactions:  none     Mild (1+) reactions:  Fragrance mix I  Linalool  Oleamidopropyl dimethylamine     Borderline/questionable reactions:  Balsam of [place]  Diphenylguanidine       Medical History:    Past Medical History:   Diagnosis Date     Diabetes (H)     GDM insulin     GERD (gastroesophageal reflux disease)     w/u otherwise neg      History of colposcopy with cervical biopsy 3/23/07    WNL     Papanicolaou smear of cervix with low grade squamous intraepithelial lesion (LGSIL) 07     PONV (postoperative nausea and vomiting)      S/P  10/13/2017         Current Mental Status Exam:   Appearance:  Appropriate    Eye Contact:  Good   Psychomotor:  Normal       Gait / station:  no problem  Attitude / Demeanor: Cooperative  Friendly  Speech      Rate / Production: Normal/ Responsive      Volume:  Normal  volume      Language:  no problems  Mood:   Anxious  Depressed   Affect:   Appropriate    Thought Content: Clear   Thought Process: Coherent  Goal Directed       Associations: No loosening of associations  Insight:   Good   Judgment:  Intact   Orientation:  Person Place Time Situation  Attention/concentration: Good      Substance Use:  Patient did not report a family history of  substance use concerns; see medical history section for details.  Patient has not received chemical dependency treatment in the past.  Patient has not ever been to detox.      Patient is not currently receiving any chemical dependency treatment.           Substance History of use Age of first use Date of last use     Pattern and duration of use (include amounts and frequency)   Alcohol used in the past   25 at gatherings 8/31/2020 Used during social events.  Se not currently using.   Cannabis   never used     Never used   Amphetamines   used in the past   11/2/2020 Not currently using   Cocaine/crack    never used       Never used   Hallucinogens never used         Never used   Inhalants never used         Never used   Heroin never used         Never used   Other Opiates never used     Never used   Benzodiazepine   never used     Never used   Barbiturates never used     Never used      Over the counter meds never used        Caffeine currently use 10   when she has guest over hous   Nicotine  never used     Never used   Other substances not listed above:  Identify:  never used     Never used     Patient reported the following problems as a result of their substance use: no problems, not applicable.    Substance Use: No symptoms    Based on the negative CAGE score and clinical interview there  are not indications of drug or alcohol abuse.      Significant Losses / Trauma / Abuse / Neglect Issues:   Patient did not  serve in the .  There are indications or report of significant loss, trauma, abuse or neglect issues related to: are no indications and client denies any losses, trauma, abuse, or neglect concerns and client's experience of neglect.  Concerns for possible neglect are not present.     Safety Assessment:   Patient denies current homicidal ideation and behaviors.  Patient denies current self-injurious ideation and behaviors.    Patient denied risk behaviors associated with substance use.  Patient  denies any high risk behaviors associated with mental health symptoms.  Patient reports the following current concerns for their personal safety: None.  Patient reports there are not firearms in the house.       There are no firearms in the home..    History of Safety Concerns:  Patient denied a history of homicidal ideation.     Patient denied a history of personal safety concerns.    Patient denied a history of assaultive behaviors.    Patient denied a history of sexual assault behaviors.     Patient denied a history of risk behaviors associated with substance use.  Patient denies any history of high risk behaviors associated with mental health symptoms.  Patient reports the following protective factors: dedication to family or friends; sense of belonging; purpose; living with other people; sense of meaning; healthy fear of risky behaviors or pain; strong sense of self worth or esteem    Risk Plan:  See Recommendations for Safety and Risk Management Plan    Review of Symptoms per patient report:  Depression: Change in sleep, Lack of interest, Excessive or inappropriate guilt, Difficulties concentrating and Change in appetite  Taryn:  No Symptoms  Psychosis: No Symptoms  Anxiety: Excessive worry, Nervousness, Physical complaints, such as headaches, stomachaches, muscle tension, Sleep disturbance, Ruminations, Poor concentration and Irritability  Panic:  No symptoms  Post Traumatic Stress Disorder:  No Symptoms   Eating Disorder: No Symptoms  ADD / ADHD:  No symptoms  Conduct Disorder: No symptoms  Autism Spectrum Disorder: No symptoms  Obsessive Compulsive Disorder: No Symptoms    Patient reports the following compulsive behaviors and treatment history: None      Diagnostic Criteria:   Generalized Anxiety Disorder  A. Excessive anxiety and worry about a number of events or activities (such as work or school performance).   B. The person finds it difficult to control the worry.  C. Select 3 or more symptoms  (required for diagnosis). Only one item is required in children.   - Restlessness or feeling keyed up or on edge.    - Being easily fatigued.    - Difficulty concentrating or mind going blank.    - Irritability.    - Muscle tension.    - Sleep disturbance (difficulty falling or staying asleep, or restless unsatisfying sleep).   D. The focus of the anxiety and worry is not confined to features of an Axis I disorder.  E. The anxiety, worry, or physical symptoms cause clinically significant distress or impairment in social, occupational, or other important areas of functioning.   F. The disturbance is not due to the direct physiological effects of a substance (e.g., a drug of abuse, a medication) or a general medical condition (e.g., hyperthyroidism) and does not occur exclusively during a Mood Disorder, a Psychotic Disorder, or a Pervasive Developmental Disorder. Major Depressive Disorder  A) Recurrent episode(s) - symptoms have been present during the same 2-week period and represent a change from previous functioning 5 or more symptoms (required for diagnosis)   - Depressed mood. Note: In children and adolescents, can be irritable mood.     - Diminished interest or pleasure in all, or almost all, activities.    - Significant weight gainincrease in appetite.    - Decreased sleep.    - Psychomotor activity no problem.    - Fatigue or loss of energy.    - Feelings of worthlessness or inappropriate and excessive guilt.    - Diminished ability to think or concentrate, or indecisiveness.    - Denies recurrent thoughts of death (not just fear of dying), recurrent suicidal ideation without a specific plan, or a suicide attempt or a specific plan for committing suicide.       Functional Status:  Patient reports the following functional impairments:  childcare / parenting, chronic disease management, educational activities, health maintenance, home life with C, management of the household and or completion of tasks,  relationship(s), use of public transportation, work / vocational responsibilities and spouse and children.     Nonprogrammatic care:  Patient is requesting basic services to address current mental health concerns.  linical Summary:  1. Reason for assessment: Depressed mood due to health issues  .  2. Psychosocial, Cultural and Contextual Factors: patient was born in [place].The family moved to [place] and then at age 5 her siblings and patient moved to the [place],  3. Principal DSM5 Diagnoses  (Sustained by DSM5 Criteria Listed Above):   296.32 (F33.1) Major Depressive Disorder, Recurrent Episode, Moderate _  300.02 (F41.1) Generalized Anxiety Disorder.  4. Other Diagnoses that is relevant to services:   None.  5. Provisional Diagnosis:  None .  6. Prognosis: Return to Normal Functioning, Expect Improvement and Relieve Acute Symptoms.  7. Likely consequences of symptoms if not treated: increased symptoms with decrease in individual daily functioning..  8. Client strengths include:  caring, creative, educated, empathetic, good listener, insightful, intelligent, motivated and support of family, friends and providers .     Recommendations:     1. Plan for Safety and Risk Management:   Safety and Risk: Recommended that patient call 911 or go to the local ED should there be a change in any of these risk factors..          Report to child / adult protection services was No issues or problems noted or reported. .     2. Patient's identified mental health and physical health concerns with a cultural influence will be addressed by PCP and surgeon.  Patient will have support through seeking treatment. .     3. Initial Treatment will focus on:    Depressed Mood - Support, validation, stress management skills.     4. Resources/Service Plan:    services are not indicated.   Modifications to assist communication are not indicated.   Additional disability accommodations are not indicated.      5.  Collaboration:   Collaboration / coordination of treatment will be initiated with the following  support professionals: primary care physician.      6.  Referrals:   The following referral(s) will be initiated: [facility]. Next Scheduled Appointment:  9/9/2022.        Release of Information has been obtained for the following: No LENARD needs identified at intake.     Emergency Contact Spouse,  was obtained.     7. MI:    MI:  Discussed the general effects of drugs and alcohol on health and well-being. Provider gave patient printed information about the effects of chemical use on their health and well being. Recommendations:  Continue sobriety .     8. Records:   These were reviewed at time of assessment.   Information in this assessment was obtained from the medical record and provided by patient who is a good historian.      Patient will have open access to their mental health medical record.        Provider Name/ Credentials:  NIKO HookerSW                                                                                                                                                                September 11, 2022

## 2022-09-11 NOTE — PROGRESS NOTES
Outpatient [facility]                                    Progress Note    Patient Name: Unique Baker  Date: 2022         Service Type: Individual      Session Start Time: 1203  Session End Time: 1251      Session Length: 48    Session #: 1    Attendees: Client attended alone    Service Modality:  Video Visit:      Provider verified identity through the following two step process.  Patient provided:  Patient photo, Patient  and Patient address    Telemedicine Visit: The patient's condition can be safely assessed and treated via synchronous audio and visual telemedicine encounter.      Reason for Telemedicine Visit: Patient has requested telehealth visit    Originating Site (Patient Location): Patient's home    Distant Site (Provider Location): [facility]    Consent:  The patient/guardian has verbally consented to: the potential risks and benefits of telemedicine (video visit) versus in person care; bill my insurance or make self-payment for services provided; and responsibility for payment of non-covered services.     Patient would like the video invitation sent by:  Text to cell phone: [phone]    Mode of Communication:  Video Conference via Doximity    As the provider I attest to compliance with applicable laws and regulations related to telemedicine.    DATA  Interactive Complexity: No  Crisis: No        Progress Since Last Session (Related to Symptoms / Goals / Homework):   Symptoms: No change physical symptoms    Homework: Achieved / completed to satisfaction      Episode of Care Goals: Satisfactory progress - PREPARATION (Decided to change - considering how); Intervened by negotiating a change plan and determining options / strategies for behavior change, identifying triggers, exploring social supports, and working towards setting a date to begin behavior change     Current / Ongoing Stressors and Concerns:   Continued  gastrointestinal pain. Pain in upper back and mid right abdomin.  Patient reports she does have a gallstone. She will see a surgeon in a week. Patient reports continued anxiety and depression. Patient reports she did not have depression prior to physical symptoms. She has had anxiety but with the pain and not receiving effective treatment her anxiety has escalated. Patient doesn't feel she is taken seriously. Patient states she does not eat fearing pain. She has lost weight.  She wakes at night in pain which leads to vomiting.  She often wakes with back and pain just below the sternum. She also has GERD which is aggravated by diet.    Patient is frustrated with her provider(s) feeling they are not listening to her. Provider affirmed patient thoughts and desire to change; validated patient stating,  It s clear that you re really trying to find an answer and effective treatment for your health issues.      Treatment Objective(s) Addressed in This Session:   identify patients initial signs or symptoms of anxiety  Decrease frequency and intensity of feeling down, depressed, hopeless  Support patient through health care, securing medical intervention to address her pain.     Intervention:   Motivational Interviewing: open ended questions.  Permission to suggest and provide advise on treatment.    Assessments completed prior to visit:  The following assessments were completed by patient for this visit:  PHQ2:   PHQ-2 ( 1999 Pf[employer]) 8/22/2022 8/3/2022 7/14/2022 3/29/2022 11/4/2021 10/20/2021 10/13/2021   Q1: Little interest or pleasure in doing things 1 0 1 1 0 0 0   Q2: Feeling down, depressed or hopeless 1 0 1 1 0 0 0   PHQ-2 Score 2 0 2 2 0 0 0   PHQ-2 Total Score (12-17 Years)- Positive if 3 or more points; Administer PHQ-A if positive 2 0 2 - 0 0 0   Q1: Little interest or pleasure in doing things - - - Several days - - -   Q2: Feeling down, depressed or hopeless - - - Several days - - -   PHQ-2 Score - - - 2 - - -      PHQ9:   PHQ-9 SCORE 5/29/2015 7/14/2021 7/5/2022   PHQ-9 Total Score 2 - -   PHQ-9 Total Score MyChart - - 3 (Minimal depression)   PHQ-9 Total Score - 1 3     GAD7:   RENÉE-7 SCORE 7/14/2021 7/5/2022   Total Score - 2 (minimal anxiety)   Total Score 3 2     PROMIS 10-Global Health (only subscores and total score):   PROMIS-10 Scores Only 8/30/2022   Global Mental Health Score 11   Global Physical Health Score 12   PROMIS TOTAL - SUBSCORES 23     Josephine Suicide Severity Rating Scale (Lifetime/Recent)  Josephine Suicide Severity Rating (Lifetime/Recent) 8/30/2022   1. Wish to be Dead (Past 1 Month) 0   2. Non-Specific Active Suicidal Thoughts (Past 1 Month) 0   Calculated C-SSRS Risk Score (Lifetime/Recent) No Risk Indicated     Josephine Suicide Severity Rating Scale (Short Version)  Josephine Suicide Severity Rating (Short Version) 4/20/2021   Over the past 2 weeks have you felt down, depressed, or hopeless? no   Over the past 2 weeks have you had thoughts of killing yourself? no   Have you ever attempted to kill yourself? no         ASSESSMENT: Current Emotional / Mental Status (status of significant symptoms):   Risk status (Self / Other harm or suicidal ideation)   Patient denies current fears or concerns for personal safety.   Patient denies current or recent suicidal ideation or behaviors.   Patient denies current or recent homicidal ideation or behaviors.   Patient denies current or recent self injurious behavior or ideation.   Patient denies other safety concerns.   Patient reports there has been no change in risk factors since their last session.     Patient reports there has been no change in protective factors since their last session.     Recommended that patient call 911 or go to the local ED should there be a change in any of these risk factors.     Appearance:   Appropriate    Eye Contact:   Good    Psychomotor Behavior: Normal    Attitude:   Cooperative    Orientation:   All   Speech    Rate /  Production: Normal/ Responsive    Volume:  Normal    Mood:    Anxious  Depressed  Irritable  Agitated   Affect:    Appropriate    Thought Content:  Clear    Thought Form:  Coherent  Goal Directed  Logical    Insight:    Good      Medication Review:   No changes to current psychiatric medication(s)     Medication Compliance:   Yes     Changes in Health Issues:   Yes: Pain, No Psychological Distress  Sleep disturbance, Associated Psychological Distress  Weight / dietary issues, No Psychological Distress     Chemical Use Review:   Substance Use: Chemical use reviewed, no active concerns identified      Tobacco Use: No current tobacco use.      Diagnosis:  1. Major depressive disorder, recurrent episode, moderate (H)    2. RENÉE (generalized anxiety disorder)        Collateral Reports Completed:   Routed note to PCP    PLAN: (Patient Tasks / Therapist Tasks / Other)  Follow up with surgeon.  Ask for referral to another PCP for second opinion. Use techniques of relaxation with guided imagery and deep breathing.  Walk if phasically able.  Return in 1 week.         Hailey Kaba, [facility]                                                    ______________________________________________________________________    Individual Treatment Plan    Patient's Name: Unique Baker  YOB: 1985    Date of Creation: 9/09/2022  Date Treatment Plan Last Reviewed/Revised: initial review on 12/09/2022    DSM5 Diagnoses: 296.32 (F33.1) Major Depressive Disorder, Recurrent Episode, Moderate _ or 300.02 (F41.1) Generalized Anxiety Disorder  Psychosocial / Contextual Factors: Practices her shanell.  Siblings are near and supportive.  Patient parents are [place] of [place].  Patient struggle to manage home with health problems.  PROMIS (reviewed every 90 days): 23  Referral / Collaboration:  patient is scheduled for long term therapy with Debbie Knight, on January 11, 2023 .    Anticipated number of session for this episode of  care: 3-6 sessions  Anticipation frequency of session: Weekly  Anticipated Duration of each session: 38-52 minutes  Treatment plan will be reviewed in 90 days or when goals have been changed.       Goal 1: Patient will increase awareness of depression symptoms and their impact on functioning and develop skills to reduce negative impact     I will know I've met my goal when I am able to better manage my mood.           Objective #A (Patient Action)        Patient will describe thoughts, feelings, and actions associated with depression.    Status:?New - Date(s): 12/9/2022 or until transfer to LTT ??       Intervention(s)     Therapist will will explore and process with patient how depression has impacted them.         Objective #B     Patient will increase depression coping skills.                           Status:?New - Date(s): 12/9/2022 or until transfer to LTT ??        Intervention(s)     Therapist will teach CBT skills and model their use.           Objective #C    Patient will identify cognitive distortions in relation to depression and explore alternatives.    Status:?New - Date(s): 12/9/2022 or until transfer to LTT ??       Intervention(s)     Therapist will guide discussion and assist patient in identification of negative beliefs about self and world.          Goal?2:?Patient will?experience a decrease in anxiety symptoms, as measured by a?2 point?reduction in her RENÉE-7 score.?  I will?know I've met my goal when I feel less stress and less worry.   I'll probably do things like I used to do.       Objective #A?(Patient Action)????   Patient will?identify?3?fears / thoughts that contribute to feeling anxious.?       Intervention(s)?    Therapist will?provide cognitive behavioral therapeutic approach in processing patient's thoughts, feelings and beliefs and toward assisting patient in developing greater    awareness of unhelpful thoughts that associate with increased?anxiety.??     ??  Objective #B?     Patient will?use relaxation strategies?3?times per day to reduce the physical symptoms of anxiety.??????????????    Status:?Status:?New - Date(s): 12/9/2022 or until transfer to LTT ?? ??      Intervention(s)?    Therapist will?teach mindfulness strategies toward building diaphragmatic breathing/relaxation skills and assign exercises as?homework.?     ??  Objective #C?    Patient will?use cognitive strategies identified in therapy to challenge anxious thoughts.?    Status:?Continued -New - Date(s): 12/9/2022 or until transfer to LTT ??     ?? Intervention(s)?    Therapist will?assign homework - CBT and thought exercises, as well, as distraction techniques.?          Patient has not reviewed nor agreed to the above plan.      Hailey Kaba, [facility]  September 09, 2022

## 2022-09-12 ENCOUNTER — OFFICE VISIT (OUTPATIENT)
Dept: PALLIATIVE MEDICINE | Facility: CLINIC | Age: 37
End: 2022-09-12
Payer: COMMERCIAL

## 2022-09-12 VITALS — DIASTOLIC BLOOD PRESSURE: 69 MMHG | SYSTOLIC BLOOD PRESSURE: 107 MMHG | HEART RATE: 72 BPM | OXYGEN SATURATION: 98 %

## 2022-09-12 DIAGNOSIS — G89.4 CHRONIC PAIN SYNDROME: ICD-10-CM

## 2022-09-12 DIAGNOSIS — M54.81 OCCIPITAL NEURALGIA OF LEFT SIDE: ICD-10-CM

## 2022-09-12 DIAGNOSIS — M79.2 NEUROPATHIC PAIN: Primary | ICD-10-CM

## 2022-09-12 PROCEDURE — 99213 OFFICE O/P EST LOW 20 MIN: CPT | Performed by: NURSE PRACTITIONER

## 2022-09-12 ASSESSMENT — PAIN SCALES - GENERAL: PAINLEVEL: MILD PAIN (3)

## 2022-09-12 NOTE — NURSING NOTE
PEG Score 2/8/2022 4/12/2022 9/12/2022   PEG Total Score 4.33 0.67 3.67         Alicia Wise MA  [facility] [place]

## 2022-09-12 NOTE — PATIENT INSTRUCTIONS
1.  Pain Physical Therapy:                YES  Start pain physical therapy with Deandre Ortiz PT as able.               2.  Pain Psychologist to address relaxation, behavioral change, coping style, and other factors important to improvement.     Continue visits with Hailey Kaba on a regular basis for now. I will see if we can get you established with Violette Damon PsyD, LP.               3.  Medication Management:                           1. Continue medication management per Rheumatology.     2. Consider Cymbalta (duloxetine) an antidepressant that gives your brain more serotonin and norepinephrine.   2. Continue Tylenol as needed.               4.  Potential procedures: continue to monitor benefit from occipital nerve block.               5.  Referrals: continue follow up with Rheumatology and post [facility] clinic.               6.  I will be leaving [facility] pain management and my last day will be on 10/19. Plan will be to follow up with CARRIE Boyce CNP in 4-6 weeks and long term new pain provider.     ----------------------------------------------------------------  Clinic Number:  [phone]   Call with any questions about your care and for scheduling assistance.   Calls are returned Monday through Friday between 8 AM and 4:30 PM. We usually get back to you within 2 business days depending on the issue/request.    If we are prescribing your medications:  For opioid medication refills, call the clinic or send a Ewireless message 7 days in advance.  Please include:  Name of requested medication  Name of the pharmacy.  For non-opioid medications, call your pharmacy directly to request a refill. Please allow 3-4 days to be processed.   Per [place] State Law:  All controlled substance prescriptions must be filled within 30 days of being written.    For those controlled substances allowing refills, pickup must occur within 30 days of last fill.      We believe regular attendance is parra to your success  in our program!    Any time you are unable to keep your appointment we ask that you call us at least 24 hours in advance to cancel.This will allow us to offer the appointment time to another patient.   Multiple missed appointments may lead to dismissal from the clinic.

## 2022-09-21 ENCOUNTER — OFFICE VISIT (OUTPATIENT)
Dept: URGENT CARE | Facility: URGENT CARE | Age: 37
End: 2022-09-21
Payer: COMMERCIAL

## 2022-09-21 ENCOUNTER — NURSE TRIAGE (OUTPATIENT)
Dept: INTERNAL MEDICINE | Facility: CLINIC | Age: 37
End: 2022-09-21

## 2022-09-21 VITALS
OXYGEN SATURATION: 100 % | WEIGHT: 169 LBS | TEMPERATURE: 99.2 F | HEART RATE: 74 BPM | HEIGHT: 62 IN | BODY MASS INDEX: 31.1 KG/M2 | RESPIRATION RATE: 20 BRPM | SYSTOLIC BLOOD PRESSURE: 113 MMHG | DIASTOLIC BLOOD PRESSURE: 78 MMHG

## 2022-09-21 DIAGNOSIS — L50.9 HIVES: Primary | ICD-10-CM

## 2022-09-21 PROCEDURE — U0003 INFECTIOUS AGENT DETECTION BY NUCLEIC ACID (DNA OR RNA); SEVERE ACUTE RESPIRATORY SYNDROME CORONAVIRUS 2 (SARS-COV-2) (CORONAVIRUS DISEASE [COVID-19]), AMPLIFIED PROBE TECHNIQUE, MAKING USE OF HIGH THROUGHPUT TECHNOLOGIES AS DESCRIBED BY CMS-2020-01-R: HCPCS | Performed by: PHYSICIAN ASSISTANT

## 2022-09-21 PROCEDURE — U0005 INFEC AGEN DETEC AMPLI PROBE: HCPCS | Performed by: PHYSICIAN ASSISTANT

## 2022-09-21 PROCEDURE — 99214 OFFICE O/P EST MOD 30 MIN: CPT | Mod: CS | Performed by: PHYSICIAN ASSISTANT

## 2022-09-21 RX ORDER — PREDNISONE 20 MG/1
20 TABLET ORAL DAILY
Qty: 5 TABLET | Refills: 0 | Status: SHIPPED | OUTPATIENT
Start: 2022-09-21 | End: 2022-09-26

## 2022-09-21 NOTE — PROGRESS NOTES
Assessment & Plan     1. Hives  Possibly related to current viral URI.   Currently, not having any signs or symptoms of anaphylaxis, severe systemic reaction or angioedema.   Currently, not taking her rx for prednisone or Zyrtec.   Treatment with a short burst of prednisone for 5 days, zyrtec and continuing with pepcid.   Patient has borderline FELICITY, +inflammatory markers and has been following with rheumatology. Advised her to keep a journal of her symptoms of hives, menses, joint pain, fatigue etc and bring to rheum appts.   - predniSONE (DELTASONE) 20 MG tablet; Take 1 tablet (20 mg) by mouth daily for 5 days  Dispense: 5 tablet; Refill: 0  - Symptomatic; Unknown COVID-19 Virus (Coronavirus) by PCR Nose        Return in about 5 days (around 9/26/2022), or if symptoms worsen or fail to improve.    Diagnosis and treatment plan was reviewed with patient and/or family.   We went over any labs or imaging. Discussed worsening symptoms or little to no relief despite treatment plan to follow-up with PCP or return to clinic.  Patient verbalizes understanding. All questions were addressed and answered.     Carmen Curtis PA-C  [facility]    CHIEF COMPLAINT:   Chief Complaint   Patient presents with     Hives     URI     Cough, hives -X 2- patient is on medications for autoimmune issue - patient unsure if medication could be causing all these issues.       Svetlana Calhoun is a 37 year old female who presents to clinic today for evaluation. Patient developed cold symptoms two days ago with sore throat, runny nose and cough. No fever. Contacts at home with similar symptoms. She has not been using any medications for her symptoms. She has history of hives intermittently for hhe past 5-6 years, feels like it may be related to her menses. No hx of fever, chills, tongue / lip swelling, difficulty breathing, difficulty swallowing or wheezing.   Currently, not taking her prednisone as prescribed  her by rheumatologist.       Past Medical History:   Diagnosis Date     Diabetes (H)     GDM insulin     GERD (gastroesophageal reflux disease)     w/u otherwise neg      History of colposcopy with cervical biopsy 3/23/07    WNL     Papanicolaou smear of cervix with low grade squamous intraepithelial lesion (LGSIL) 07     PONV (postoperative nausea and vomiting)      S/P  10/13/2017     Past Surgical History:   Procedure Laterality Date     BREAST SURGERY      augmentation       SECTION N/A 2015    Procedure:  SECTION;  Surgeon: Tremaine Gaona MD;  Location: [facility] OR      SECTION, TUBAL LIGATION, COMBINED N/A 10/13/2017    Procedure: COMBINED  SECTION, TUBAL LIGATION;  Repeat  SECTION, TUBAL LIGATION ;  Surgeon: Sidra Salamanca DO;  Location:  OR     COLONOSCOPY N/A 2016    Procedure: COLONOSCOPY;  Surgeon: Lyudmila Pastor MD;  Location:  GI     DILATION AND CURETTAGE SUCTION      elective termination     ESOPHAGOSCOPY, GASTROSCOPY, DUODENOSCOPY (EGD), COMBINED  2014    Procedure: COMBINED ESOPHAGOSCOPY, GASTROSCOPY, DUODENOSCOPY (EGD);   ESOPHAGOSCOPY, GASTROSCOPY, DUODENOSCOPY (EGD) ;  Surgeon: Vishnu Lanier MD;  Location:  GI     ESOPHAGOSCOPY, GASTROSCOPY, DUODENOSCOPY (EGD), COMBINED Left 2020    Procedure: ESOPHAGOGASTRODUODENOSCOPY, WITH BIOPSIES USING BIOPSY FORCEPS;  Surgeon: Vishnu Hopkins MD;  Location:  GI     ESOPHAGOSCOPY, GASTROSCOPY, DUODENOSCOPY (EGD), COMBINED N/A 2021    Procedure: ESOPHAGOGASTRODUODENOSCOPY, WITH BIOPSY using cold forceps;  Surgeon: Vishnu Lanier MD;  Location:  GI     GYN SURGERY       c section      RW LASER WART      Anal wart removal      ZZC NONSPECIFIC PROCEDURE  01    Primary LT[facility]     Social History     Tobacco Use     Smoking status: Former Smoker     Years: 7.00     Types: Cigarettes     Smokeless tobacco: Never Used     Tobacco  "comment: only if she drinks alcohol   Substance Use Topics     Alcohol use: Not Currently     Alcohol/week: 8.3 standard drinks     Current Outpatient Medications   Medication     acetaminophen (TYLENOL) 500 MG tablet     cholecalciferol 25 MCG (1000 UT) TABS     Cyanocobalamin (VITAMIN B12 TR PO)     dexlansoprazole (DEXILANT) 60 MG CPDR CR capsule     diclofenac (VOLTAREN) 1 % topical gel     diltiazem 2% in lipovan cream 2% GEL     famotidine (PEPCID) 20 MG tablet     fluocinonide emulsified base 0.05 % external cream     fluticasone (FLONASE) 50 MCG/ACT nasal spray     hydrocortisone (ANUSOL-HC) 25 MG suppository     hydroxychloroquine (PLAQUENIL) 200 MG tablet     multivitamin (ONE-DAILY) tablet     predniSONE (DELTASONE) 20 MG tablet     predniSONE (DELTASONE) 5 MG tablet     sucralfate (CARAFATE) 1 GM tablet     atorvastatin (LIPITOR) 10 MG tablet     cetirizine (ZYRTEC) 10 MG tablet     dicyclomine (BENTYL) 10 MG capsule     hydrOXYzine (ATARAX) 25 MG tablet     No current facility-administered medications for this visit.     Facility-Administered Medications Ordered in Other Visits   Medication     fentaNYL Citrate (PF) (SUBLIMAZE) injection     Allergies   Allergen Reactions     No Known Drug Allergies      Other Environmental Allergy      Patch Test Results 3-10-20    Very Strong (3+) or Strong (2+) reactions:  none     Mild (1+) reactions:  Fragrance mix I  Linalool  Oleamidopropyl dimethylamine     Borderline/questionable reactions:  Balsam of [place]  Diphenylguanidine       10 point ROS of systems were all negative except for pertinent positives noted in my HPI.      Exam:   /78   Pulse 74   Temp 99.2  F (37.3  C) (Tympanic)   Resp 20   Ht 1.575 m (5' 2\")   Wt 76.7 kg (169 lb)   SpO2 100%   BMI 30.91 kg/m    Constitutional: healthy, alert and no distress  Head: Normocephalic, atraumatic.  Eyes: conjunctiva clear, no drainage  ENT: TMs clear and shiny farida, nasal mucosa pink and moist, throat " without tonsillar hypertrophy or erythema  Neck: neck is supple, no cervical lymphadenopathy or nuchal rigidity  Cardiovascular: RRR  Respiratory: CTA bilaterally, no rhonchi or rales  Gastrointestinal: soft and nontender  Skin: wheals noted on her arms and legs bilaterally.   Neurologic: Speech clear, gait normal. Moves all extremities.

## 2022-09-21 NOTE — TELEPHONE ENCOUNTER
Pt reporting that she has widespread hives, since yesterday. Has cough for 3 days, dry. Scratchy throat.   Hives are Mild itching, rash on back, stomach, legs, and arms. Red red Blotching. She states it is raised red spots.     Her 8 yo Son has virus, cold symptoms. He has Covid Neg.   She has not tested herself yet. She doesn't think she has it.   No fever.     She takes Hydroxychloroquine per Rheumatology.    Pt had COVID last April 2021.   She has Prednisone at home but never took it because it upset her stomach.   Has not tried Benadryl, she will try that now.     Please advise. If needs OV, VV, or if this is Viral, or should test for Covid?       Reason for Disposition    Hives suspected    Patient wants to be seen    Additional Information    Negative: Sudden onset of rash (within last 2 hours) and difficulty with breathing or swallowing    Negative: Difficult to awaken or acting confused (e.g., disoriented, slurred speech)    Negative: Fever and purple or blood-colored spots or dots    Negative: Too weak or sick to stand    Negative: Life-threatening reaction (anaphylaxis) in the past to similar substance (e.g., food, insect bite/sting, chemical, etc.) and < 2 hours since exposure    Negative: Sounds like a life-threatening emergency to the triager    Negative: Insect bites suspected    Negative: Sunburn suspected    Negative: Difficulty breathing or wheezing now    Negative: Rapid onset of swollen tongue    Negative: Rapid onset of hoarseness or cough    Negative: Very weak (can't stand)    Negative: Difficult to awaken or acting confused (e.g., disoriented, slurred speech)    Negative: Life-threatening reaction (anaphylaxis) in the past to similar substance (e.g., food, insect bite/sting, chemical, etc.) and < 2 hours since exposure    Negative: Sounds like a life-threatening emergency to the triager    Negative: Bee, wasp, or yellow jacket sting within last 24 hours    Negative: Taking a new medicine now  or within last 3 days  (Exception: Antihistamine, decongestant or other OTC cough/cold medicines.)    Negative: Doesn't match the SYMPTOMS of hives    Negative: Swollen tongue    Negative: Widespread hives, itching, or facial swelling and onset < 2 hours of exposure to high-risk allergen (e.g., 1st dose of antibiotic, nuts, sting)    Negative: Patient sounds very sick or weak to the triager    Negative: MODERATE-SEVERE hives persist (i.e., hives interfere with normal activities or work) and taking antihistamine (e.g., Benadryl, Claritin) > 24 hours    Negative: Hives have become worse and taking oral steroids (e.g., prednisone) > 24 hours    Negative: Abdominal pain    Negative: Joint swelling    Negative: Fever    Protocols used: RASH OR REDNESS - WIDESPREAD-A-OH, HIVES-A-OH

## 2022-09-21 NOTE — PATIENT INSTRUCTIONS
Restart Zyrtec   Prednisone 20 mg for 5 days (Ok to stop after 3 days if hives resolve)    Keep a journal of your rashes, symptoms and record of your period. Bring journal with you when you see your rheumatologist

## 2022-09-21 NOTE — TELEPHONE ENCOUNTER
Provider Recommendation Follow Up:   Reached patient/caregiver. Informed of provider's recommendations. Patient verbalized understanding and agrees with the plan.     Kathi Chavez RN  [facility]

## 2022-09-22 ENCOUNTER — VIRTUAL VISIT (OUTPATIENT)
Dept: BEHAVIORAL HEALTH | Facility: CLINIC | Age: 37
End: 2022-09-22

## 2022-09-22 DIAGNOSIS — F06.30 MOOD DISORDER DUE TO MEDICAL CONDITION: Primary | ICD-10-CM

## 2022-09-22 LAB — SARS-COV-2 RNA RESP QL NAA+PROBE: POSITIVE

## 2022-09-23 ENCOUNTER — TELEPHONE (OUTPATIENT)
Dept: INTERNAL MEDICINE | Facility: CLINIC | Age: 37
End: 2022-09-23

## 2022-09-23 NOTE — TELEPHONE ENCOUNTER
Patient is calling to ask if she should get on medication to treat her covid. Her symptoms started on Monday.  She said she also had covid last year as well.

## 2022-09-23 NOTE — TELEPHONE ENCOUNTER
Per review if patient's medical history, she may qualify for treatment based on BMI, but tomorrow will be day 5 since symptoms started.     Contacted patient. She is being evaluated for an auto-immune disorder, but not diagnosed with any conditions yet. She states that she continues to have symptoms intermittently of feeling feverish and chest tightness. Cough has improved today. Informed patient that she would meet criteria for evaluation for the antiviral treatment, but it would need to be started today or tomorrow. There may not be any appointments available, but offered to transfer call to scheduling to check and patient agrees.      Kathi Chavez RN  [facility]

## 2022-09-24 ENCOUNTER — VIRTUAL VISIT (OUTPATIENT)
Dept: URGENT CARE | Facility: CLINIC | Age: 37
End: 2022-09-24
Payer: COMMERCIAL

## 2022-09-24 DIAGNOSIS — U07.1 COVID-19: Primary | ICD-10-CM

## 2022-09-24 PROCEDURE — 99214 OFFICE O/P EST MOD 30 MIN: CPT | Mod: CS

## 2022-09-24 NOTE — PROGRESS NOTES
"Unique is a 37 year old who is being evaluated via a billable video visit.      How would you like to obtain your AVS? MyChart  If the video visit is dropped, the invitation should be resent by: Text to cell phone: [phone]  Will anyone else be joining your video visit? No      Assessment & Plan     (U07.1) COVID-19  (primary encounter diagnosis)    Plan: high risk due to BMI >25  Treat with paxlovid  Normal kidney function  DIscussed home care and monitor symptoms  Call if new or worsening  Will hold statin for 5 days    BMI:   Estimated body mass index is 30.91 kg/m  as calculated from the following:    Height as of 9/21/22: 1.575 m (5' 2\").    Weight as of 9/21/22: 76.7 kg (169 lb).   Weight management plan: Patient was referred to their PCP to discuss a diet and exercise plan.      CARRIE Childress [facility]  Virtual Urgent Care  [facility] VIRTUAL URGENT CARE    Subjective   Unique is a 37 year old presenting for the following health issues:  Covid treatment    HPI   Positive covid 4 days ago  Had rash cough runny nose fever  No sob wheezing  Hydrated        Objective         Vitals:  No vitals were obtained today due to virtual visit.    Physical Exam   GENERAL: Healthy, alert and no distress  EYES: Eyes grossly normal to inspection.  No discharge or erythema, or obvious scleral/conjunctival abnormalities.  RESP: No audible wheeze, cough, or visible cyanosis.  No visible retractions or increased work of breathing.    SKIN: Visible skin clear. No significant rash, abnormal pigmentation or lesions.  NEURO: Cranial nerves grossly intact.  Mentation and speech appropriate for age.  PSYCH: Mentation appears normal, affect normal/bright, judgement and insight intact, normal speech and appearance well-groomed.          Video-Visit Details    Video Start Time: 10:00am    Type of service:  Video Visit    Video End Time:10:30 am    Originating Location (pt. Location): Home    Distant Location (provider " location):  [facility] URGENT CARE     Platform used for Video Visit: Gabriel

## 2022-09-27 ENCOUNTER — TELEPHONE (OUTPATIENT)
Dept: INTERNAL MEDICINE | Facility: CLINIC | Age: 37
End: 2022-09-27

## 2022-09-27 NOTE — TELEPHONE ENCOUNTER
Attempted to contact patient. Left voice message to call back.     Please transfer call to a nurse to discuss Paxlojace questions.     Kathi Chavez RN  [facility]

## 2022-09-27 NOTE — TELEPHONE ENCOUNTER
Patient calls to ask questions about paxlovid. She is on her 4th day of taking it. Does she need to test again after she finished meds?  What happens if she rebounds after completion of medication?            Best number to call back [phone]

## 2022-10-03 ENCOUNTER — OFFICE VISIT (OUTPATIENT)
Dept: RHEUMATOLOGY | Facility: CLINIC | Age: 37
End: 2022-10-03

## 2022-10-03 VITALS
WEIGHT: 146.2 LBS | BODY MASS INDEX: 26.74 KG/M2 | DIASTOLIC BLOOD PRESSURE: 67 MMHG | SYSTOLIC BLOOD PRESSURE: 113 MMHG | OXYGEN SATURATION: 100 % | HEART RATE: 70 BPM

## 2022-10-03 DIAGNOSIS — M35.9 UNDIFFERENTIATED CONNECTIVE TISSUE DISEASE (H): ICD-10-CM

## 2022-10-03 PROCEDURE — 99214 OFFICE O/P EST MOD 30 MIN: CPT | Performed by: NURSE PRACTITIONER

## 2022-10-03 RX ORDER — HYDROXYCHLOROQUINE SULFATE 200 MG/1
200 TABLET, FILM COATED ORAL 2 TIMES DAILY
Qty: 180 TABLET | Refills: 3 | Status: ON HOLD | OUTPATIENT
Start: 2022-10-03 | End: 2023-10-16

## 2022-10-03 ASSESSMENT — ENCOUNTER SYMPTOMS
COUGH: 0
TASTE DISTURBANCE: 0
SLEEP DISTURBANCES DUE TO BREATHING: 0
PALPITATIONS: 1
SINUS CONGESTION: 0
MUSCLE CRAMPS: 0
INSOMNIA: 0
BACK PAIN: 0
POLYDIPSIA: 0
HEMOPTYSIS: 0
COUGH DISTURBING SLEEP: 1
SNORES LOUDLY: 0
ORTHOPNEA: 0
SYNCOPE: 0
FATIGUE: 1
TROUBLE SWALLOWING: 0
POOR WOUND HEALING: 0
INCREASED ENERGY: 1
ALTERED TEMPERATURE REGULATION: 0
HALLUCINATIONS: 0
SORE THROAT: 0
SHORTNESS OF BREATH: 0
WEIGHT GAIN: 0
ARTHRALGIAS: 1
NAIL CHANGES: 0
MUSCLE WEAKNESS: 0
NECK MASS: 0
STIFFNESS: 0
CHILLS: 0
JOINT SWELLING: 0
NERVOUS/ANXIOUS: 0
SMELL DISTURBANCE: 0
POLYPHAGIA: 0
LIGHT-HEADEDNESS: 0
SINUS PAIN: 0
DECREASED APPETITE: 0
WEIGHT LOSS: 1
POSTURAL DYSPNEA: 1
HYPOTENSION: 0
DYSPNEA ON EXERTION: 0
WHEEZING: 0
HYPERTENSION: 0
DECREASED CONCENTRATION: 0
SPUTUM PRODUCTION: 1
PANIC: 0
NECK PAIN: 0
LEG PAIN: 0
FEVER: 0
DEPRESSION: 0
EXERCISE INTOLERANCE: 0
NIGHT SWEATS: 1
SKIN CHANGES: 0
HOARSE VOICE: 0
MYALGIAS: 0

## 2022-10-03 ASSESSMENT — ANXIETY QUESTIONNAIRES
1. FEELING NERVOUS, ANXIOUS, OR ON EDGE: MORE THAN HALF THE DAYS
8. IF YOU CHECKED OFF ANY PROBLEMS, HOW DIFFICULT HAVE THESE MADE IT FOR YOU TO DO YOUR WORK, TAKE CARE OF THINGS AT HOME, OR GET ALONG WITH OTHER PEOPLE?: NOT DIFFICULT AT ALL
7. FEELING AFRAID AS IF SOMETHING AWFUL MIGHT HAPPEN: NOT AT ALL
7. FEELING AFRAID AS IF SOMETHING AWFUL MIGHT HAPPEN: NOT AT ALL
3. WORRYING TOO MUCH ABOUT DIFFERENT THINGS: NOT AT ALL
2. NOT BEING ABLE TO STOP OR CONTROL WORRYING: NOT AT ALL
6. BECOMING EASILY ANNOYED OR IRRITABLE: NOT AT ALL
5. BEING SO RESTLESS THAT IT IS HARD TO SIT STILL: MORE THAN HALF THE DAYS
GAD7 TOTAL SCORE: 6
GAD7 TOTAL SCORE: 6
IF YOU CHECKED OFF ANY PROBLEMS ON THIS QUESTIONNAIRE, HOW DIFFICULT HAVE THESE PROBLEMS MADE IT FOR YOU TO DO YOUR WORK, TAKE CARE OF THINGS AT HOME, OR GET ALONG WITH OTHER PEOPLE: NOT DIFFICULT AT ALL
4. TROUBLE RELAXING: MORE THAN HALF THE DAYS
GAD7 TOTAL SCORE: 6

## 2022-10-03 ASSESSMENT — PATIENT HEALTH QUESTIONNAIRE - PHQ9
SUM OF ALL RESPONSES TO PHQ QUESTIONS 1-9: 12
10. IF YOU CHECKED OFF ANY PROBLEMS, HOW DIFFICULT HAVE THESE PROBLEMS MADE IT FOR YOU TO DO YOUR WORK, TAKE CARE OF THINGS AT HOME, OR GET ALONG WITH OTHER PEOPLE: SOMEWHAT DIFFICULT
SUM OF ALL RESPONSES TO PHQ QUESTIONS 1-9: 12

## 2022-10-03 ASSESSMENT — PAIN SCALES - GENERAL: PAINLEVEL: NO PAIN (0)

## 2022-10-03 NOTE — PROGRESS NOTES
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 Current treatment: Plaquenil 200 mg BID (8/2022).    Today: Second covid infection recently 9/21/2022 and still very fatigued.  Reports is still so much better on prednisone, feels non functioning when not on prednisone 10 mg daily, hard to walk to mail box.       HPI 7/2022: Pt reports worsening of her arthralgias and [facility]  to return to rheumatology.  Prior to covid/or vaccine was healthy and had none of these issues, now would define health as poor. Feels worse during and 1 week after menses.   Has been on 3 medrol dose packs, with significant response, every thing improved.   Feels like she has inflammation going through her body.   +Her reflux is \"boiling\", it's worsening,  can only eat white rice. Reflux does not worsen when she lays down. Woke the other night and feels like she had epigastric spasms.  She wasn't sure if it was reflex, this did make her anxious. Eating makes worse. Has failed 4 medications, now considering surgical options.  +Joint pain: Started post covid, most on legs points to spots on legs., more on L then R.  These spots are not visualized, no change in color, temp sensation, it is non TTP and helps to rub area.  Ankles swell, has an indent when wears socks. Rings are no longer fitting.   +Has gained 10 lbs.   +Is losing hair, diffusely, people she lives with notice hair all over now. Last child was 4 yrs ago.   +Has mouth sores. Most recent 3 weeks, L buccal mucous sore, lasted " "3 days and was very painful.  Has had sores of palate. Denies vaginal sores.   +Diarrhea, worked up, dx as IBS.  +Rashes in past all over, none since covid vaccine.   +Feels SOB a lot, unsure if anxiety related.  Maybe some pain with breathing.   + Had L side HA's in beging of this prodrome but have improved.   +Dry mouth, really bad, wakes her up at night.  Dentist told her that she can see dry patches. This started 3 months ago. Eyes are fine. (not taking adderal currently,so not a contributing factor)   +Feels flu like.   + Difference in brain function, forgets things, struggling with short term memory. Speech is ok. No seizures.  ROS: Denies sun sensitivity, did have blotches on face one time after being in sun that lasted 3 days. Possible tubal pregnancy vs miscarriage, no blood clots. Np fevers. Denies psych hx. Has ADD, tx with adderal in past. Denies tri-color digit changes. Denies vaginal sores.    HPI at initial consult 10/2021: \"Unique Baker is a 36 year old female with pmhx GERD, seborrheic keratosis, is referred to rheumatology clinic for polyarthralgia. She reports tejinder COVID-19 beginning of March 2021. She received COVID-19 vaccine in April. 3 days after getting the vaccine, she developed a constellation of symptoms including chest tightness and abnormal sensation over her L arm. She went to the ED for chest tightness and had cardiac evaluation. She was started on reflux medication which she still takes. Has seen GI for acid reflux and loose stools. Had an upper GI in May which showed non-specific chronic inflammation in the stomach. She went to  for evaluation of L arm abnormal sensation. She was given a course of steroids (medrol dosepak) which helped her symptoms somewhat. Shortly thereafter, she started developing sensation of \"burning\" in her head and chest. She then started developing joint pain (around June or July) over her knees, ankles, and wrists. She presented to UC again for " "ongoing symptoms and received steroid burst which helped her symptoms (around September) but they have not subsided. She has since been getting \"flares\" of joint pain.      She describes her flares as abrupt onset mild pain over her knees and ankles when she is resting/watching TV. She takes advil for pain which helps resolve the pain. Pain can last for ~15-20 minutes. Pain episodes come on 4 times a week. However, she feels \"tightness\" over her knees and ankles mid-day everyday. She tries and stretch from time to time. Walking helps the stiffness but not the pain. Overall, the intensity of pain has improved since it started in July. Pain does not wake her up at night anymore but it used to back in July. She denies any AM pain or stiffness over her joints. She denies any joint swelling, erythema or warmth.    She denies hx of psoriasis. However, she reports she started having unexplained rash ~4 years ago. She has seen dermatology in the past and has been diagnosed with seborrheic keratosis. She denies photosensitivity. Rash can be over her back, legs, stomach, neck. Denies any facial rash. She has been given topical steroids by dermatology which helps but rash recurs. She describes the rash as \"bumpy\" and pruritic. She denies scalp flakiness.    Denies fatigue, fevers, chills, weight loss, night sweats, vision changes, eye pain/redness, inflammatory eye disease, dry eyes, dry mouth, +reports episode of painful oral ulcer over the roof of the mouth which has resolved, denies nasal ulcers, +chronic hair loss/thinning with hx of alopecia, denies raynaud's, cough, SOB, pleurisy, difficulty swallowing, abdominal pain, diarrhea, hematochezia, melena, dysuria, back pain, enthesitis, dactylitis, +numbness/tingling over hands and feet. No hx of IBD. No hx of blood clots. One ectopic pregnancy.   Pertinent labs and imaging: (per chart review in [facility] and care everywhere)   Labs:   10/22/21: negative CBC w diff, CMP, +FELICITY 1:80 " homogeneous, no hypocomplementemia, negative dsDNA, SSA, SSB, Sm, RNP, Hep C, RF, CCP, Scl-70  8/16/21: +ESR 32, negative CRP  5/1/21: negative BMP, CRP, CBC w diff, negative LFTs      FMH:  Unsure  Sister passed from stomach CA.   Dad gout    Social History      Active Problem list:  Patient Active Problem List    Diagnosis Date Noted     Breast pain 02/21/2022     Priority: Medium     Menorrhagia with regular cycle 02/21/2022     Priority: Medium     Dysmenorrhea 02/21/2022     Priority: Medium     Controlled substance agreement signed- checked 8/12/2020 - no concerns 08/09/2019     Priority: Medium     Patient is followed by KELTON JACOB for ongoing prescription of stimulants.  All refills should be approved by this provider, or covering partner.    Medication(s): adderall.   Maximum quantity per month: 30  Clinic visit frequency required: Q 6  months     Controlled substance agreement on file: Yes       Date(s): 8/9/2019  Re sign 2/3/2021  Neuropsych evaluation for ADD completed:  Yes, completed Rosalba , on file and diagnosis confirmed  Done 5/14/2019    Last [facility] website verification:  none  [email or web address]           Irritable bowel syndrome 07/15/2015     Priority: Medium     CARDIOVASCULAR SCREENING; LDL GOAL LESS THAN 160 10/31/2010     Priority: Medium     Esophageal reflux 06/21/2006     Priority: Medium            Objective:     Physical Exam  Blood Pressure 113/67 (BP Location: Left arm, Patient Position: Sitting, Cuff Size: Adult Regular)   Pulse 70   Weight 66.3 kg (146 lb 3.2 oz)   Oxygen Saturation 100%   Body Mass Index 26.74 kg/m    Body mass index is 26.74 kg/m .    Constitutional: Normal body habitus with out gross deformities. Appears tired.  Psych:Appears fatigued. Scattered in history, difficulty staying on subject of question.    Labs:    Lab Results   Component Value Date    ALT 15 07/14/2022    AST 8 07/14/2022    CR 0.65 07/14/2022      Latest Reference  "Range & Units 07/14/22 14:34 07/14/22 14:38   Lyme Disease Antibodies Serum <0.90  0.21    EBV Capsid Blake IgM Instrument Value <36.0 U/mL 12.8    EBV Capsid Antibody IgM No detectable antibody.  No detectable antibody.    ANTI NUCLEAR BLAKE IGG BY IFA WITH REFLEX  Rpt    Beta 2 Glycoprotein 1 Antibody IgA <7.0 U/mL 9.3 (H)    Beta 2 Glycoprotein 1 Antibody IgG <7.0 U/mL 0.9    Beta 2 Glycoprotein 1 Antibody IgM <7.0 U/mL 29.0 (H)    Cardiolipin IgG Blake Negative   Negative   Cardiolipin IgM Blake Negative   Negative   Complement C3 81 - 157 mg/dL 157    Complement C4 13 - 39 mg/dL 56 (H)    DNA-ds <10.0 IU/mL 2.9    Scleroderma Antibody Scl-70 RADHA IgG Negative  Negative    Thyroid Peroxidase Antibody <35 IU/mL <10    (H): Data is abnormally high  Rpt: View report in Results Review for more information    Imaging: reviewed            Assessment and Plan:     #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 Pt with profound change in health status post covid infection now with repeat covid infection in the last 2 weeks with increased fatigue. Reports prior to covid vaccine and original infection was completely healthy. Now with symptoms of dry mouth that dentist has seen \"patches\" of dry area and I have seen mouth sores. Overall Pt's symptoms dramatically improved when on prednisone in past and partial response with most recent burst that started at 10 mg daily, reports felt better in past when she did the dose pack.  Symptoms potentially related to a CTD include; dry mouth, hair loss, mouth sores, arthralgias, reflux, feeling of SOB/chest pain, change in cognitive function, past rash in sun. Exam at last visit showed one healing mucosal lesion, full mandible. Given  response to prednisone treating as UCTD, no signs of visceral involvement currently.     Plan:   -Continue plaquenil 200 mg BID  -Decrease prednisone to 5 mg daily  -Repeat Beta 2 glycoprotein 1/2023  -RTC in " 8-12 weeks  -Consider neuropysch testing at next visit    #2) OA  -Voltaren gel QID     #3) Depression.   -Referral to mental health at last visit    Pt education provided on plaquenil SE, risks and benefits, Pt states understanding and agreement to plan of care, has no further questions.   Pt states understanding and agreement to plan of care, has no further questions.         Erum Allen CNP  Rheumatology, [facility]

## 2022-10-03 NOTE — PATIENT INSTRUCTIONS
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

## 2022-10-04 ENCOUNTER — VIRTUAL VISIT (OUTPATIENT)
Dept: PHYSICAL MEDICINE AND REHAB | Facility: CLINIC | Age: 37
End: 2022-10-04
Payer: COMMERCIAL

## 2022-10-04 DIAGNOSIS — U09.9 POST-COVID CHRONIC FATIGUE: ICD-10-CM

## 2022-10-04 DIAGNOSIS — G89.29 POST-COVID CHRONIC MUSCLE PAIN: ICD-10-CM

## 2022-10-04 DIAGNOSIS — M79.10 POST-COVID CHRONIC MUSCLE PAIN: ICD-10-CM

## 2022-10-04 DIAGNOSIS — R00.2 PALPITATIONS: ICD-10-CM

## 2022-10-04 DIAGNOSIS — Z86.16 HISTORY OF COVID-19: ICD-10-CM

## 2022-10-04 DIAGNOSIS — G62.9 SMALL FIBER NEUROPATHY: ICD-10-CM

## 2022-10-04 DIAGNOSIS — M25.50 POST-COVID CHRONIC JOINT PAIN: Primary | ICD-10-CM

## 2022-10-04 DIAGNOSIS — G89.29 POST-COVID CHRONIC JOINT PAIN: Primary | ICD-10-CM

## 2022-10-04 DIAGNOSIS — R06.02 SHORTNESS OF BREATH: ICD-10-CM

## 2022-10-04 DIAGNOSIS — U09.9 POST-COVID CHRONIC JOINT PAIN: Primary | ICD-10-CM

## 2022-10-04 DIAGNOSIS — U09.9 POST-COVID CHRONIC MUSCLE PAIN: ICD-10-CM

## 2022-10-04 DIAGNOSIS — G93.32 POST-COVID CHRONIC FATIGUE: ICD-10-CM

## 2022-10-04 PROCEDURE — 99215 OFFICE O/P EST HI 40 MIN: CPT | Mod: 95 | Performed by: PHYSICIAN ASSISTANT

## 2022-10-04 ASSESSMENT — ENCOUNTER SYMPTOMS
SHORTNESS OF BREATH: 0
FATIGUE: 1
NERVOUS/ANXIOUS: 0
PALPITATIONS: 1
TROUBLE SWALLOWING: 0
POLYDIPSIA: 0
DECREASED CONCENTRATION: 0
ARTHRALGIAS: 1
SORE THROAT: 0
COUGH: 0
LIGHT-HEADEDNESS: 0
HALLUCINATIONS: 0
WHEEZING: 0
JOINT SWELLING: 0
POLYPHAGIA: 0
BACK PAIN: 0
NECK PAIN: 0
CHILLS: 0
SINUS PAIN: 0
MYALGIAS: 0
FEVER: 0

## 2022-10-04 NOTE — PATIENT INSTRUCTIONS
Post COVID Self Care Suggestions:     Fatigue Management:       https://www.archives-pmr.org/action/showPdf?hbf=-[email or web address]%[email or web address]       Self Care:      [email or web address]  Recovery World Health Organization:    https://apps.who.int/iris/bitstream/handle/78291/761846/IKQ-AQQO-5224-423-95276-60984-eng.pdf  Breathing exercises:    [email or web address] [email or web address]

## 2022-10-04 NOTE — LETTER
10/4/2022       RE: Unique Baker  [id number] [facility] [id number]     Dear Colleague,    Thank you for referring your patient, Unique Baker, to the [facility] PHYSICAL MEDICINE AND REHABILITATION CLINIC [place] at [facility]. Please see a copy of my visit note below.    Assessment/Impression   1. 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  Advised to continue with Rheumatology and pain clinic for her pain.  She is due to start physical therapy. She was referred to acupuncture through [facility].    2. Post-COVID chronic fatigue  Patient with fatigue after 1-2 tasks which is worse since recent COVID infection.  Discussed energy conservation and provided information on fatigue management.  Also discussed referral to Occupational therapy for the COVID-19 program. She is having a lab work up in two weeks through [facility] therefore  will hold of on ordering more labs.  - Occupational Therapy Referral; Future    3. Small fiber neuropathy  Patient still with burning paint but feels this is slightly improved with the plaquenil.     4. Palpitations  Noticed more frequent heart racing with little activity a    5. Shortness of breath  Patient has notice more shortness of breath when she lays down during the day after becoming fatigued. She also  Noticed more phlegm and chest tightness in am.  Unsure if the chest tightness is due to GERD or respiratory. Dicussed having patient see her PCP for an in person exam of her lungs due to new respiratory symptoms.     6. History of COVID-19  Discussed COVID and Post COVID with patient.  Educational materials provided and all questions answered.    Plan:  1. I reviewed present knowledge on long-Covid.  Education was provided and question were  answered.  2. Orders/Referrals as above  3. I will advised patient on test results  4. I will follow up with Uniqeu Baker in 3 months. I will review progress and consider need for any other therapeutic interventions. If there are any questions and/or concerns she will call the clinic.      On day of encounter time spent in chart review and with patient in consultation, exam, education,coordination of care, and documentation:  50 minutes     _________________________________  BRITTNEE [facility] [place]    Subjective   HPI   Briefly patient was seen on 7/5/22 in the Post COVID clinic for lingering symptoms from their COVID infection in March 2021. At the time of that appointment they were  complaining of muscle/joint pain, acid reflux, neuropath and tinnitus. Patient returns for a follow up.  Today she is complain of palpitations, fatigue and joint pain.  She unfortunately contracted COVID on 9/21/22.  She has seen Rheumatology and is on prednisone and plaquenil.  She did see [facility] for evaluation as well and is being evaluate for food allergies.  She since contracted COVID she feels like she needs to take deeper breaths when laying down.  She denies shortness of breath with walking, she does feel tightness in her upper chest when she wakes up. She is unsure if this is respiratory or acid reflux.  She also notices she has heart racing as well when she walks, and also increase fatigue.  Patient can do 1-2 task before she gets tired.  She is sleeping well at night. Still having neuropathy and states its intermittent now.     Current Symptoms:  Shortness of breath (functional assessment based on ADLS): New/ only when laying down  Fatigue (functional assessment based on ADLS): stable/worse    Goals of Care: improve fatigue, improve respiratory symptoms    Current concerns: No    PHQ Assesment Total Score(s) 10/3/2022   PHQ-2 Score 2    PHQ-9 Score 12   Some recent data might be hidden     RENÉE-7 Results 10/3/2022   RENÉE 7 TOTAL SCORE 6 (mild anxiety)   Some recent data might be hidden     PTSD Screen Score 10/3/2022   Have you ever experienced this kind of event? No   Some recent data might be hidden     PROMIS-29 2022   PROMIS Physical Function T-Score 43.4 (mild dysfunction)   PROMIS Anxiety T-Score 53.7 (within normal limits)   PROMIS Depression T-Score 41 (within normal limits)   PROMIS Fatigue T-Score 48.6 (within normal limits)   PROMIS Sleep Disturbance T-Score 50.5 (within normal limits)   PROMIS Ability to Participate in Social Roles & Activities T-Score 58.1 (within normal limits)   PROMIS Pain Interference T-Score 53.9 (within normal limits)   PROMIS Pain Intensity 7       Past Medical History:   Diagnosis Date     Diabetes (H)     GDM insulin     GERD (gastroesophageal reflux disease)     w/u otherwise neg      History of colposcopy with cervical biopsy 3/23/07    WNL     Papanicolaou smear of cervix with low grade squamous intraepithelial lesion (LGSIL) 07     PONV (postoperative nausea and vomiting)      S/P  10/13/2017       Past Surgical History:   Procedure Laterality Date     BREAST SURGERY      augmentation       SECTION N/A 2015    Procedure:  SECTION;  Surgeon: Tremaine Gaona MD;  Location:  OR      SECTION, TUBAL LIGATION, COMBINED N/A 10/13/2017    Procedure: COMBINED  SECTION, TUBAL LIGATION;  Repeat  SECTION, TUBAL LIGATION ;  Surgeon: Sidra Salamanca DO;  Location:  OR     COLONOSCOPY N/A 2016    Procedure: COLONOSCOPY;  Surgeon: Lyudmila Pastor MD;  Location:  GI     DILATION AND CURETTAGE SUCTION      elective termination     ESOPHAGOSCOPY, GASTROSCOPY, DUODENOSCOPY (EGD), COMBINED  2014    Procedure: COMBINED ESOPHAGOSCOPY, GASTROSCOPY, DUODENOSCOPY (EGD);   ESOPHAGOSCOPY, GASTROSCOPY, DUODENOSCOPY (EGD) ;  Surgeon:  Vishnu Lanier MD;  Location:  GI     ESOPHAGOSCOPY, GASTROSCOPY, DUODENOSCOPY (EGD), COMBINED Left 9/16/2020    Procedure: ESOPHAGOGASTRODUODENOSCOPY, WITH BIOPSIES USING BIOPSY FORCEPS;  Surgeon: Vishnu Hopkins MD;  Location:  GI     ESOPHAGOSCOPY, GASTROSCOPY, DUODENOSCOPY (EGD), COMBINED N/A 5/26/2021    Procedure: ESOPHAGOGASTRODUODENOSCOPY, WITH BIOPSY using cold forceps;  Surgeon: Vishnu Lanier MD;  Location:  GI     GYN SURGERY  2001     c section      RW LASER WART  2011    Anal wart removal      ZZC NONSPECIFIC PROCEDURE  09/27/01    Primary LTCS       Family History   Problem Relation Age of Onset     Colon Polyps Brother      Other - See Comments Brother         colon polyps     Hyperlipidemia Sister      Stomach Cancer Sister      Cancer Sister         stomach     C.A.D. No family hx of      Diabetes No family hx of      Breast Cancer No family hx of      Cancer - colorectal No family hx of      Colon Cancer No family hx of        Social History     Tobacco Use     Smoking status: Former Smoker     Years: 7.00     Types: Cigarettes     Smokeless tobacco: Never Used     Tobacco comment: only if she drinks alcohol   Vaping Use     Vaping Use: Never used   Substance Use Topics     Alcohol use: Not Currently     Alcohol/week: 8.3 standard drinks     Drug use: No         Current Outpatient Medications:      acetaminophen (TYLENOL) 500 MG tablet, take 1 - 2 tablet by oral route  every 6 hours as needed as needed, Disp: , Rfl:      atorvastatin (LIPITOR) 10 MG tablet, Take 1 tablet (10 mg) by mouth every evening (Patient not taking: No sig reported), Disp: 30 tablet, Rfl: 3     cetirizine (ZYRTEC) 10 MG tablet, Take 1 tablet (10 mg) by mouth daily (Patient not taking: No sig reported), Disp: 30 tablet, Rfl: 1     cholecalciferol 25 MCG (1000 UT) TABS, take 1 tablet by oral route  every month, Disp: , Rfl:      Cyanocobalamin (VITAMIN B12 TR PO), 1 patch daily, Disp: , Rfl:       dexlansoprazole (DEXILANT) 60 MG CPDR CR capsule, Take 60 mg by mouth daily, Disp: , Rfl:      diclofenac (VOLTAREN) 1 % topical gel, Apply 2 g topically 4 times daily To joints as needed for pain, Disp: 100 g, Rfl: 3     dicyclomine (BENTYL) 10 MG capsule, dicyclomine 10 mg capsule (Patient not taking: No sig reported), Disp: , Rfl:      diltiazem 2% in lipovan cream 2% GEL, APPLY PEA SIZED AMOUNT TO PERIANAL SKIN THREE TIMES DAILY, Disp: , Rfl:      famotidine (PEPCID) 20 MG tablet, TAKE ONE TABLET BY MOUTH TWICE A DAY AS NEEDED FOR REFLUX, Disp: 180 tablet, Rfl: 3     fluocinonide emulsified base 0.05 % external cream, apply to rash as needed - sparingly, Disp: 60 g, Rfl: 1     fluticasone (FLONASE) 50 MCG/ACT nasal spray, Spray 1 spray into both nostrils 2 times daily as needed for rhinitis or allergies Take 1 spray twice daily x 1 week then 1 spray daily at night x 1 week then as needed, Disp: 11.1 mL, Rfl: 1     hydrocortisone (ANUSOL-HC) 25 MG suppository, hydrocortisone acetate 25 mg rectal suppository (Patient not taking: Reported on 10/3/2022), Disp: , Rfl:      hydroxychloroquine (PLAQUENIL) 200 MG tablet, Take 1 tablet (200 mg) by mouth 2 times daily Annual Plaquenil toxicity eye screening required., Disp: 180 tablet, Rfl: 3     hydrOXYzine (ATARAX) 25 MG tablet, Take 1 tablet (25 mg) by mouth every 8 hours as needed for anxiety (Patient not taking: No sig reported), Disp: 30 tablet, Rfl: 0     multivitamin (ONE-DAILY) tablet, Take 1 tablet by mouth daily, Disp:  , Rfl:      predniSONE (DELTASONE) 5 MG tablet, Take 2 tablets (10 mg) by mouth daily, Disp: 60 tablet, Rfl: 2     sucralfate (CARAFATE) 1 GM tablet, take 1 tablet by oral route 3 times every day on an empty stomach 1 hour before meals, Disp: , Rfl:   No current facility-administered medications for this visit.    Facility-Administered Medications Ordered in Other Visits:      fentaNYL Citrate (PF) (SUBLIMAZE) injection, , , PRN, Grahn, Lyudmila  MD Samuel, 100 mcg at 07/19/16 1412    Answer to ROS/HPI submitted by patient on 10/3/22  Review of Systems   Constitutional: Positive for fatigue. Negative for chills and fever.   HENT: Negative for ear discharge, ear pain, hearing loss, mouth sores, nosebleeds, sinus pain, sore throat, tinnitus and trouble swallowing.    Respiratory: Negative for cough, shortness of breath and wheezing.    Cardiovascular: Positive for palpitations. Negative for chest pain.   Endocrine: Negative for polydipsia and polyphagia.   Musculoskeletal: Positive for arthralgias. Negative for back pain, joint swelling, myalgias and neck pain.   Skin: Negative for rash.   Neurological: Negative for light-headedness.   Psychiatric/Behavioral: Negative for decreased concentration and hallucinations. The patient is not nervous/anxious.             Objective    Vitals - Patient Reported  Pain Score: Mild Pain (2)       Objective         Vitals:  No vitals were obtained today due to virtual visit.    Physical Exam   GENERAL: Healthy, alert and no distress  EYES: Eyes grossly normal to inspection.  No discharge or erythema, or obvious scleral/conjunctival abnormalities.  RESP: No audible wheeze, cough, or visible cyanosis.  No visible retractions or increased work of breathing.    SKIN: Visible skin clear. No significant rash, abnormal pigmentation or lesions.  NEURO: Cranial nerves grossly intact.  Mentation and speech appropriate for age.  PSYCH: Mentation appears normal, affect normal/bright, judgement and insight intact, normal speech and appearance well-groomed.      CRP Inflammation   Date Value Ref Range Status   07/14/2022 <2.9 0.0 - 8.0 mg/L Final   05/01/2021 <2.9 0.0 - 8.0 mg/L Final      Sed Rate   Date Value Ref Range Status   01/21/2016 18 0 - 20 mm/h Final     Erythrocyte Sedimentation Rate   Date Value Ref Range Status   07/14/2022 43 (H) 0 - 20 mm/hr Final      Last Renal Panel:  Sodium   Date Value Ref Range Status   07/14/2022  140 133 - 144 mmol/L Final   05/01/2021 133 133 - 144 mmol/L Final     Potassium   Date Value Ref Range Status   07/14/2022 4.0 3.4 - 5.3 mmol/L Final   05/01/2021 4.0 3.4 - 5.3 mmol/L Final     Chloride   Date Value Ref Range Status   07/14/2022 109 94 - 109 mmol/L Final   05/01/2021 103 94 - 109 mmol/L Final     Carbon Dioxide   Date Value Ref Range Status   05/01/2021 25 20 - 32 mmol/L Final     Carbon Dioxide (CO2)   Date Value Ref Range Status   07/14/2022 26 20 - 32 mmol/L Final     Anion Gap   Date Value Ref Range Status   07/14/2022 5 3 - 14 mmol/L Final   05/01/2021 5 3 - 14 mmol/L Final     Glucose   Date Value Ref Range Status   07/14/2022 97 70 - 99 mg/dL Final   05/01/2021 87 70 - 99 mg/dL Final     Urea Nitrogen   Date Value Ref Range Status   07/14/2022 9 7 - 30 mg/dL Final   05/01/2021 13 7 - 30 mg/dL Final     Creatinine   Date Value Ref Range Status   07/14/2022 0.65 0.52 - 1.04 mg/dL Final   05/01/2021 0.78 0.52 - 1.04 mg/dL Final     GFR Estimate   Date Value Ref Range Status   07/14/2022 >90 >60 mL/min/1.73m2 Final     Comment:     Effective December 21, 2021 eGFRcr in adults is calculated using the 2021 CKD-EPI creatinine equation which includes age and gender (Elen et al., NEJ, DOI: 10.1056/KMZMjz8768747)   05/01/2021 >90 >60 mL/min/[1.73_m2] Final     Comment:     Non  GFR Calc  Starting 12/18/2018, serum creatinine based estimated GFR (eGFR) will be   calculated using the Chronic Kidney Disease Epidemiology Collaboration   (CKD-EPI) equation.       Calcium   Date Value Ref Range Status   07/14/2022 8.9 8.5 - 10.1 mg/dL Final   05/01/2021 8.6 8.5 - 10.1 mg/dL Final     Albumin   Date Value Ref Range Status   07/14/2022 4.0 3.4 - 5.0 g/dL Final   05/01/2021 4.1 3.4 - 5.0 g/dL Final      Lab Results   Component Value Date    WBC 7.3 07/14/2022    WBC 9.2 05/01/2021     Lab Results   Component Value Date    RBC 4.43 07/14/2022    RBC 4.49 05/01/2021     Lab Results   Component  Value Date    HGB 11.6 07/14/2022    HGB 12.7 05/01/2021     Lab Results   Component Value Date    HCT 36.2 07/14/2022    HCT 38.4 05/01/2021     No components found for: MCT  Lab Results   Component Value Date    MCV 82 07/14/2022    MCV 86 05/01/2021     Lab Results   Component Value Date    MCH 26.2 07/14/2022    MCH 28.3 05/01/2021     Lab Results   Component Value Date    MCHC 32.0 07/14/2022    MCHC 33.1 05/01/2021     Lab Results   Component Value Date    RDW 15.0 07/14/2022    RDW 13.2 05/01/2021     Lab Results   Component Value Date     07/14/2022     05/01/2021      Lab Results   Component Value Date    A1C 5.5 02/03/2021      TSH   Date Value Ref Range Status   07/14/2022 1.54 0.40 - 4.00 mU/L Final   05/01/2021 1.18 0.40 - 4.00 mU/L Final      Lab Results   Component Value Date    VITDT 20 03/29/2022    VITDT 23 08/16/2021    VITDT 18 (L) 02/03/2021      No results for input(s): MAG in the last 05829 hours.  Last Comprehensive Metabolic Panel:  Sodium   Date Value Ref Range Status   07/14/2022 140 133 - 144 mmol/L Final   05/01/2021 133 133 - 144 mmol/L Final     Potassium   Date Value Ref Range Status   07/14/2022 4.0 3.4 - 5.3 mmol/L Final   05/01/2021 4.0 3.4 - 5.3 mmol/L Final     Chloride   Date Value Ref Range Status   07/14/2022 109 94 - 109 mmol/L Final   05/01/2021 103 94 - 109 mmol/L Final     Carbon Dioxide   Date Value Ref Range Status   05/01/2021 25 20 - 32 mmol/L Final     Carbon Dioxide (CO2)   Date Value Ref Range Status   07/14/2022 26 20 - 32 mmol/L Final     Anion Gap   Date Value Ref Range Status   07/14/2022 5 3 - 14 mmol/L Final   05/01/2021 5 3 - 14 mmol/L Final     Glucose   Date Value Ref Range Status   07/14/2022 97 70 - 99 mg/dL Final   05/01/2021 87 70 - 99 mg/dL Final     Urea Nitrogen   Date Value Ref Range Status   07/14/2022 9 7 - 30 mg/dL Final   05/01/2021 13 7 - 30 mg/dL Final     Creatinine   Date Value Ref Range Status   07/14/2022 0.65 0.52 - 1.04  mg/dL Final   05/01/2021 0.78 0.52 - 1.04 mg/dL Final     GFR Estimate   Date Value Ref Range Status   07/14/2022 >90 >60 mL/min/1.73m2 Final     Comment:     Effective December 21, 2021 eGFRcr in adults is calculated using the 2021 CKD-EPI creatinine equation which includes age and gender (Elen et al., NE, DOI: 10.1056/DRZZge1893623)   05/01/2021 >90 >60 mL/min/[1.73_m2] Final     Comment:     Non  GFR Calc  Starting 12/18/2018, serum creatinine based estimated GFR (eGFR) will be   calculated using the Chronic Kidney Disease Epidemiology Collaboration   (CKD-EPI) equation.       Calcium   Date Value Ref Range Status   07/14/2022 8.9 8.5 - 10.1 mg/dL Final   05/01/2021 8.6 8.5 - 10.1 mg/dL Final     Bilirubin Total   Date Value Ref Range Status   07/14/2022 0.4 0.2 - 1.3 mg/dL Final   05/01/2021 0.3 0.2 - 1.3 mg/dL Final     Alkaline Phosphatase   Date Value Ref Range Status   07/14/2022 86 40 - 150 U/L Final   05/01/2021 61 40 - 150 U/L Final     ALT   Date Value Ref Range Status   07/14/2022 15 0 - 50 U/L Final   05/01/2021 18 0 - 50 U/L Final     AST   Date Value Ref Range Status   07/14/2022 8 0 - 45 U/L Final   05/01/2021 11 0 - 45 U/L Final     Most Recent D-dimer:No lab results found.    Office Visit on 09/21/2022   Component Date Value Ref Range Status     SARS CoV2 PCR 09/21/2022 Positive (A) Negative Final    POSITIVE: SARS-CoV-2 (COVID-19) RNA detected, presumed positive.         Sincerely,    Corina Aranda PA-C

## 2022-10-04 NOTE — PROGRESS NOTES
Unique is a 37 year old who is being evaluated via a billable video visit.      How would you like to obtain your AVS? MyChart  If the video visit is dropped, the invitation should be resent by: Text to cell phone: [phone]  Will anyone else be joining your video visit? No      Assessment/Impression   1. 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  Advised to continue with Rheumatology and pain clinic for her pain.  She is due to start physical therapy. She was referred to acupuncture through [facility].    2. Post-COVID chronic fatigue  Patient with fatigue after 1-2 tasks which is worse since recent COVID infection.  Discussed energy conservation and provided information on fatigue management.  Also discussed referral to Occupational therapy for the COVID-19 program. She is having a lab work up in two weeks through [facility] therefore  will hold of on ordering more labs.  - Occupational Therapy Referral; Future    3. Small fiber neuropathy  Patient still with burning paint but feels this is slightly improved with the plaquenil.     4. Palpitations  Noticed more frequent heart racing with little activity a    5. Shortness of breath  Patient has notice more shortness of breath when she lays down during the day after becoming fatigued. She also  Noticed more phlegm and chest tightness in am.  Unsure if the chest tightness is due to GERD or respiratory. Dicussed having patient see her PCP for an in person exam of her lungs due to new respiratory symptoms.     6. History of COVID-19  Discussed COVID and Post COVID with patient.  Educational materials provided and all questions answered.    Plan:  1. I reviewed present knowledge on long-Covid.  Education was provided and question were answered.  2. Orders/Referrals as above  3. I will advised patient on test  results  4. I will follow up with Unique Baker in 3 months. I will review progress and consider need for any other therapeutic interventions. If there are any questions and/or concerns she will call the clinic.      On day of encounter time spent in chart review and with patient in consultation, exam, education,coordination of care, and documentation:  50 minutes     _________________________________  Corina Aranda PA-C  [facility]   Briefly patient was seen on 7/5/22 in the Post COVID clinic for lingering symptoms from their COVID infection in March 2021. At the time of that appointment they were  complaining of muscle/joint pain, acid reflux, neuropath and tinnitus. Patient returns for a follow up.  Today she is complain of palpitations, fatigue and joint pain.  She unfortunately contracted COVID on 9/21/22.  She has seen Rheumatology and is on prednisone and plaquenil.  She did see [facility] for evaluation as well and is being evaluate for food allergies.  She since contracted COVID she feels like she needs to take deeper breaths when laying down.  She denies shortness of breath with walking, she does feel tightness in her upper chest when she wakes up. She is unsure if this is respiratory or acid reflux.  She also notices she has heart racing as well when she walks, and also increase fatigue.  Patient can do 1-2 task before she gets tired.  She is sleeping well at night. Still having neuropathy and states its intermittent now.     Current Symptoms:  Shortness of breath (functional assessment based on ADLS): New/ only when laying down  Fatigue (functional assessment based on ADLS): stable/worse    Goals of Care: improve fatigue, improve respiratory symptoms    Current concerns: No    PHQ Assesment Total Score(s) 10/3/2022   PHQ-2 Score 2   PHQ-9 Score 12   Some recent data might be hidden     RENÉE-7 Results  10/3/2022   RENÉE 7 TOTAL SCORE 6 (mild anxiety)   Some recent data might be hidden     PTSD Screen Score 10/3/2022   Have you ever experienced this kind of event? No   Some recent data might be hidden     PROMIS-29 2022   PROMIS Physical Function T-Score 43.4 (mild dysfunction)   PROMIS Anxiety T-Score 53.7 (within normal limits)   PROMIS Depression T-Score 41 (within normal limits)   PROMIS Fatigue T-Score 48.6 (within normal limits)   PROMIS Sleep Disturbance T-Score 50.5 (within normal limits)   PROMIS Ability to Participate in Social Roles & Activities T-Score 58.1 (within normal limits)   PROMIS Pain Interference T-Score 53.9 (within normal limits)   PROMIS Pain Intensity 7       Past Medical History:   Diagnosis Date     Diabetes (H)     GDM insulin     GERD (gastroesophageal reflux disease)     w/u otherwise neg      History of colposcopy with cervical biopsy 3/23/07    WNL     Papanicolaou smear of cervix with low grade squamous intraepithelial lesion (LGSIL) 07     PONV (postoperative nausea and vomiting)      S/P  10/13/2017       Past Surgical History:   Procedure Laterality Date     BREAST SURGERY      augmentation       SECTION N/A 2015    Procedure:  SECTION;  Surgeon: Tremaine Gaona MD;  Location:  OR      SECTION, TUBAL LIGATION, COMBINED N/A 10/13/2017    Procedure: COMBINED  SECTION, TUBAL LIGATION;  Repeat  SECTION, TUBAL LIGATION ;  Surgeon: Sidra Salamanca DO;  Location:  OR     COLONOSCOPY N/A 2016    Procedure: COLONOSCOPY;  Surgeon: Lyudmila Pastor MD;  Location:  GI     DILATION AND CURETTAGE SUCTION      elective termination     ESOPHAGOSCOPY, GASTROSCOPY, DUODENOSCOPY (EGD), COMBINED  2014    Procedure: COMBINED ESOPHAGOSCOPY, GASTROSCOPY, DUODENOSCOPY (EGD);   ESOPHAGOSCOPY, GASTROSCOPY, DUODENOSCOPY (EGD) ;  Surgeon: Vishnu Lanier MD;  Location:  GI     ESOPHAGOSCOPY, GASTROSCOPY,  DUODENOSCOPY (EGD), COMBINED Left 9/16/2020    Procedure: ESOPHAGOGASTRODUODENOSCOPY, WITH BIOPSIES USING BIOPSY FORCEPS;  Surgeon: Vishnu Hopkins MD;  Location:  GI     ESOPHAGOSCOPY, GASTROSCOPY, DUODENOSCOPY (EGD), COMBINED N/A 5/26/2021    Procedure: ESOPHAGOGASTRODUODENOSCOPY, WITH BIOPSY using cold forceps;  Surgeon: Vishnu Lanier MD;  Location:  GI     GYN SURGERY  2001     c section      RW LASER WART  2011    Anal wart removal      ZZC NONSPECIFIC PROCEDURE  09/27/01    Primary LTCS       Family History   Problem Relation Age of Onset     Colon Polyps Brother      Other - See Comments Brother         colon polyps     Hyperlipidemia Sister      Stomach Cancer Sister      Cancer Sister         stomach     C.A.D. No family hx of      Diabetes No family hx of      Breast Cancer No family hx of      Cancer - colorectal No family hx of      Colon Cancer No family hx of        Social History     Tobacco Use     Smoking status: Former Smoker     Years: 7.00     Types: Cigarettes     Smokeless tobacco: Never Used     Tobacco comment: only if she drinks alcohol   Vaping Use     Vaping Use: Never used   Substance Use Topics     Alcohol use: Not Currently     Alcohol/week: 8.3 standard drinks     Drug use: No         Current Outpatient Medications:      acetaminophen (TYLENOL) 500 MG tablet, take 1 - 2 tablet by oral route  every 6 hours as needed as needed, Disp: , Rfl:      atorvastatin (LIPITOR) 10 MG tablet, Take 1 tablet (10 mg) by mouth every evening (Patient not taking: No sig reported), Disp: 30 tablet, Rfl: 3     cetirizine (ZYRTEC) 10 MG tablet, Take 1 tablet (10 mg) by mouth daily (Patient not taking: No sig reported), Disp: 30 tablet, Rfl: 1     cholecalciferol 25 MCG (1000 UT) TABS, take 1 tablet by oral route  every month, Disp: , Rfl:      Cyanocobalamin (VITAMIN B12 TR PO), 1 patch daily, Disp: , Rfl:      dexlansoprazole (DEXILANT) 60 MG CPDR CR capsule, Take 60 mg by mouth daily, Disp:  , Rfl:      diclofenac (VOLTAREN) 1 % topical gel, Apply 2 g topically 4 times daily To joints as needed for pain, Disp: 100 g, Rfl: 3     dicyclomine (BENTYL) 10 MG capsule, dicyclomine 10 mg capsule (Patient not taking: No sig reported), Disp: , Rfl:      diltiazem 2% in lipovan cream 2% GEL, APPLY PEA SIZED AMOUNT TO PERIANAL SKIN THREE TIMES DAILY, Disp: , Rfl:      famotidine (PEPCID) 20 MG tablet, TAKE ONE TABLET BY MOUTH TWICE A DAY AS NEEDED FOR REFLUX, Disp: 180 tablet, Rfl: 3     fluocinonide emulsified base 0.05 % external cream, apply to rash as needed - sparingly, Disp: 60 g, Rfl: 1     fluticasone (FLONASE) 50 MCG/ACT nasal spray, Spray 1 spray into both nostrils 2 times daily as needed for rhinitis or allergies Take 1 spray twice daily x 1 week then 1 spray daily at night x 1 week then as needed, Disp: 11.1 mL, Rfl: 1     hydrocortisone (ANUSOL-HC) 25 MG suppository, hydrocortisone acetate 25 mg rectal suppository (Patient not taking: Reported on 10/3/2022), Disp: , Rfl:      hydroxychloroquine (PLAQUENIL) 200 MG tablet, Take 1 tablet (200 mg) by mouth 2 times daily Annual Plaquenil toxicity eye screening required., Disp: 180 tablet, Rfl: 3     hydrOXYzine (ATARAX) 25 MG tablet, Take 1 tablet (25 mg) by mouth every 8 hours as needed for anxiety (Patient not taking: No sig reported), Disp: 30 tablet, Rfl: 0     multivitamin (ONE-DAILY) tablet, Take 1 tablet by mouth daily, Disp:  , Rfl:      predniSONE (DELTASONE) 5 MG tablet, Take 2 tablets (10 mg) by mouth daily, Disp: 60 tablet, Rfl: 2     sucralfate (CARAFATE) 1 GM tablet, take 1 tablet by oral route 3 times every day on an empty stomach 1 hour before meals, Disp: , Rfl:   No current facility-administered medications for this visit.    Facility-Administered Medications Ordered in Other Visits:      fentaNYL Citrate (PF) (SUBLIMAZE) injection, , , PRN, Dawit, Lyudmila Baker MD, 100 mcg at 07/19/16 1412    Answer to ROS/HPI submitted by patient on  10/3/22  Review of Systems   Constitutional: Positive for fatigue. Negative for chills and fever.   HENT: Negative for ear discharge, ear pain, hearing loss, mouth sores, nosebleeds, sinus pain, sore throat, tinnitus and trouble swallowing.    Respiratory: Negative for cough, shortness of breath and wheezing.    Cardiovascular: Positive for palpitations. Negative for chest pain.   Endocrine: Negative for polydipsia and polyphagia.   Musculoskeletal: Positive for arthralgias. Negative for back pain, joint swelling, myalgias and neck pain.   Skin: Negative for rash.   Neurological: Negative for light-headedness.   Psychiatric/Behavioral: Negative for decreased concentration and hallucinations. The patient is not nervous/anxious.             Objective    Vitals - Patient Reported  Pain Score: Mild Pain (2)       Objective         Vitals:  No vitals were obtained today due to virtual visit.    Physical Exam   GENERAL: Healthy, alert and no distress  EYES: Eyes grossly normal to inspection.  No discharge or erythema, or obvious scleral/conjunctival abnormalities.  RESP: No audible wheeze, cough, or visible cyanosis.  No visible retractions or increased work of breathing.    SKIN: Visible skin clear. No significant rash, abnormal pigmentation or lesions.  NEURO: Cranial nerves grossly intact.  Mentation and speech appropriate for age.  PSYCH: Mentation appears normal, affect normal/bright, judgement and insight intact, normal speech and appearance well-groomed.      CRP Inflammation   Date Value Ref Range Status   07/14/2022 <2.9 0.0 - 8.0 mg/L Final   05/01/2021 <2.9 0.0 - 8.0 mg/L Final      Sed Rate   Date Value Ref Range Status   01/21/2016 18 0 - 20 mm/h Final     Erythrocyte Sedimentation Rate   Date Value Ref Range Status   07/14/2022 43 (H) 0 - 20 mm/hr Final      Last Renal Panel:  Sodium   Date Value Ref Range Status   07/14/2022 140 133 - 144 mmol/L Final   05/01/2021 133 133 - 144 mmol/L Final     Potassium    Date Value Ref Range Status   07/14/2022 4.0 3.4 - 5.3 mmol/L Final   05/01/2021 4.0 3.4 - 5.3 mmol/L Final     Chloride   Date Value Ref Range Status   07/14/2022 109 94 - 109 mmol/L Final   05/01/2021 103 94 - 109 mmol/L Final     Carbon Dioxide   Date Value Ref Range Status   05/01/2021 25 20 - 32 mmol/L Final     Carbon Dioxide (CO2)   Date Value Ref Range Status   07/14/2022 26 20 - 32 mmol/L Final     Anion Gap   Date Value Ref Range Status   07/14/2022 5 3 - 14 mmol/L Final   05/01/2021 5 3 - 14 mmol/L Final     Glucose   Date Value Ref Range Status   07/14/2022 97 70 - 99 mg/dL Final   05/01/2021 87 70 - 99 mg/dL Final     Urea Nitrogen   Date Value Ref Range Status   07/14/2022 9 7 - 30 mg/dL Final   05/01/2021 13 7 - 30 mg/dL Final     Creatinine   Date Value Ref Range Status   07/14/2022 0.65 0.52 - 1.04 mg/dL Final   05/01/2021 0.78 0.52 - 1.04 mg/dL Final     GFR Estimate   Date Value Ref Range Status   07/14/2022 >90 >60 mL/min/1.73m2 Final     Comment:     Effective December 21, 2021 eGFRcr in adults is calculated using the 2021 CKD-EPI creatinine equation which includes age and gender (Elen casillas al., NEJ, DOI: 10.1056/ULGYzj7501118)   05/01/2021 >90 >60 mL/min/[1.73_m2] Final     Comment:     Non  GFR Calc  Starting 12/18/2018, serum creatinine based estimated GFR (eGFR) will be   calculated using the Chronic Kidney Disease Epidemiology Collaboration   (CKD-EPI) equation.       Calcium   Date Value Ref Range Status   07/14/2022 8.9 8.5 - 10.1 mg/dL Final   05/01/2021 8.6 8.5 - 10.1 mg/dL Final     Albumin   Date Value Ref Range Status   07/14/2022 4.0 3.4 - 5.0 g/dL Final   05/01/2021 4.1 3.4 - 5.0 g/dL Final      Lab Results   Component Value Date    WBC 7.3 07/14/2022    WBC 9.2 05/01/2021     Lab Results   Component Value Date    RBC 4.43 07/14/2022    RBC 4.49 05/01/2021     Lab Results   Component Value Date    HGB 11.6 07/14/2022    HGB 12.7 05/01/2021     Lab Results    Component Value Date    HCT 36.2 07/14/2022    HCT 38.4 05/01/2021     No components found for: MCT  Lab Results   Component Value Date    MCV 82 07/14/2022    MCV 86 05/01/2021     Lab Results   Component Value Date    MCH 26.2 07/14/2022    MCH 28.3 05/01/2021     Lab Results   Component Value Date    MCHC 32.0 07/14/2022    MCHC 33.1 05/01/2021     Lab Results   Component Value Date    RDW 15.0 07/14/2022    RDW 13.2 05/01/2021     Lab Results   Component Value Date     07/14/2022     05/01/2021      Lab Results   Component Value Date    A1C 5.5 02/03/2021      TSH   Date Value Ref Range Status   07/14/2022 1.54 0.40 - 4.00 mU/L Final   05/01/2021 1.18 0.40 - 4.00 mU/L Final      Lab Results   Component Value Date    VITDT 20 03/29/2022    VITDT 23 08/16/2021    VITDT 18 (L) 02/03/2021      No results for input(s): MAG in the last 52776 hours.  Last Comprehensive Metabolic Panel:  Sodium   Date Value Ref Range Status   07/14/2022 140 133 - 144 mmol/L Final   05/01/2021 133 133 - 144 mmol/L Final     Potassium   Date Value Ref Range Status   07/14/2022 4.0 3.4 - 5.3 mmol/L Final   05/01/2021 4.0 3.4 - 5.3 mmol/L Final     Chloride   Date Value Ref Range Status   07/14/2022 109 94 - 109 mmol/L Final   05/01/2021 103 94 - 109 mmol/L Final     Carbon Dioxide   Date Value Ref Range Status   05/01/2021 25 20 - 32 mmol/L Final     Carbon Dioxide (CO2)   Date Value Ref Range Status   07/14/2022 26 20 - 32 mmol/L Final     Anion Gap   Date Value Ref Range Status   07/14/2022 5 3 - 14 mmol/L Final   05/01/2021 5 3 - 14 mmol/L Final     Glucose   Date Value Ref Range Status   07/14/2022 97 70 - 99 mg/dL Final   05/01/2021 87 70 - 99 mg/dL Final     Urea Nitrogen   Date Value Ref Range Status   07/14/2022 9 7 - 30 mg/dL Final   05/01/2021 13 7 - 30 mg/dL Final     Creatinine   Date Value Ref Range Status   07/14/2022 0.65 0.52 - 1.04 mg/dL Final   05/01/2021 0.78 0.52 - 1.04 mg/dL Final     GFR Estimate    Date Value Ref Range Status   07/14/2022 >90 >60 mL/min/1.73m2 Final     Comment:     Effective December 21, 2021 eGFRcr in adults is calculated using the 2021 CKD-EPI creatinine equation which includes age and gender (Elen casillas al., NEJ, DOI: 10.1056/MWADlm1863704)   05/01/2021 >90 >60 mL/min/[1.73_m2] Final     Comment:     Non  GFR Calc  Starting 12/18/2018, serum creatinine based estimated GFR (eGFR) will be   calculated using the Chronic Kidney Disease Epidemiology Collaboration   (CKD-EPI) equation.       Calcium   Date Value Ref Range Status   07/14/2022 8.9 8.5 - 10.1 mg/dL Final   05/01/2021 8.6 8.5 - 10.1 mg/dL Final     Bilirubin Total   Date Value Ref Range Status   07/14/2022 0.4 0.2 - 1.3 mg/dL Final   05/01/2021 0.3 0.2 - 1.3 mg/dL Final     Alkaline Phosphatase   Date Value Ref Range Status   07/14/2022 86 40 - 150 U/L Final   05/01/2021 61 40 - 150 U/L Final     ALT   Date Value Ref Range Status   07/14/2022 15 0 - 50 U/L Final   05/01/2021 18 0 - 50 U/L Final     AST   Date Value Ref Range Status   07/14/2022 8 0 - 45 U/L Final   05/01/2021 11 0 - 45 U/L Final     Most Recent D-dimer:No lab results found.    Office Visit on 09/21/2022   Component Date Value Ref Range Status     SARS CoV2 PCR 09/21/2022 Positive (A) Negative Final    POSITIVE: SARS-CoV-2 (COVID-19) RNA detected, presumed positive.       Video-Visit Details    Video Visit start Time: 1:30 PM      Type of service:  Video Visit    Video End Time:2:03 PM    Originating Location (pt. Location): Home    Distant Location (provider location):  [facility] MEDIC[facility] CLIN[facility] [place]     Platform used for Video Visit: Ready To Travel

## 2022-10-04 NOTE — NURSING NOTE
Chief Complaint   Patient presents with     Follow Up     Unique, states she had Covid 3 weeks ago again and she is fatigue     Bobby Goyal VF

## 2022-10-05 ENCOUNTER — VIRTUAL VISIT (OUTPATIENT)
Dept: SLEEP MEDICINE | Facility: CLINIC | Age: 37
End: 2022-10-05
Payer: COMMERCIAL

## 2022-10-05 VITALS
SYSTOLIC BLOOD PRESSURE: 113 MMHG | BODY MASS INDEX: 26.87 KG/M2 | HEIGHT: 62 IN | WEIGHT: 146 LBS | DIASTOLIC BLOOD PRESSURE: 67 MMHG

## 2022-10-05 DIAGNOSIS — G47.8 UPPER AIRWAY RESISTANCE SYNDROME: Primary | ICD-10-CM

## 2022-10-05 PROCEDURE — 99213 OFFICE O/P EST LOW 20 MIN: CPT | Mod: 95 | Performed by: INTERNAL MEDICINE

## 2022-10-05 ASSESSMENT — SLEEP AND FATIGUE QUESTIONNAIRES
HOW LIKELY ARE YOU TO NOD OFF OR FALL ASLEEP WHILE WATCHING TV: SLIGHT CHANCE OF DOZING
HOW LIKELY ARE YOU TO NOD OFF OR FALL ASLEEP WHILE SITTING AND TALKING TO SOMEONE: WOULD NEVER DOZE
HOW LIKELY ARE YOU TO NOD OFF OR FALL ASLEEP WHEN YOU ARE A PASSENGER IN A CAR FOR AN HOUR WITHOUT A BREAK: SLIGHT CHANCE OF DOZING
HOW LIKELY ARE YOU TO NOD OFF OR FALL ASLEEP WHILE SITTING INACTIVE IN A PUBLIC PLACE: WOULD NEVER DOZE
HOW LIKELY ARE YOU TO NOD OFF OR FALL ASLEEP WHILE LYING DOWN TO REST IN THE AFTERNOON WHEN CIRCUMSTANCES PERMIT: SLIGHT CHANCE OF DOZING
HOW LIKELY ARE YOU TO NOD OFF OR FALL ASLEEP IN A CAR, WHILE STOPPED FOR A FEW MINUTES IN TRAFFIC: WOULD NEVER DOZE
HOW LIKELY ARE YOU TO NOD OFF OR FALL ASLEEP WHILE SITTING QUIETLY AFTER LUNCH WITHOUT ALCOHOL: SLIGHT CHANCE OF DOZING
HOW LIKELY ARE YOU TO NOD OFF OR FALL ASLEEP WHILE SITTING AND READING: WOULD NEVER DOZE

## 2022-10-05 NOTE — TELEPHONE ENCOUNTER
Called patient back, she states that she did speak to someone regarding questions and nothing further needed.     Kathi Chavez RN  [facility]

## 2022-10-05 NOTE — PROGRESS NOTES
Unique is a 37 year old who is being evaluated via a billable video visit.      How would you like to obtain your AVS? MyChart  If the video visit is dropped, the invitation should be resent by: Text to cell phone: [phone]  Will anyone else be joining your video visit? No        Video-Visit Details    Video Start Time: 339PM     Type of service:  Video Visit    Video End Time: 354PM    Originating Location (pt. Location): Home    Distant Location (provider location):  [facility] [place]     Platform used for Video Visit: Gabriel Jaime             [place] [facility]  Sleep clinic follow-up visit note      Date on this visit:  October 5, 2022     Chief complaint: Follow-up of previously diagnosed obstructive sleep apnea    History of present illness: Unique Baker is a pleasant 37 year old adult who was diagnosed with obstructive sleep apnea during a diagnostic polysomnogram was performed on 6/30/21.   She was last seen at the sleep clinic on July 14, 2021 to review the results of sleep study and after we discussed the results patient wanted to obtain the prescription for zzoma pillow for positional therapy during sleep and also a dental referral.  During today's visit she reports that the dental appliance has not been comfortable. She thought it was not helping and she could not use it at all. She has gained approximately 7 pounds during since her last sleep study.  Her current weight is 146 pounds.  She reports snoring, snort arousals, dry mouth , .non-refreshing sleep, daytime sleepiness/fatigue. She denies awakenings due to gasping for air or choking.    Previous sleep study report:  Study date: June 30, 2021  Weight : 139 pounds   Respiration: Upper airway resistance syndrome, subtype of LAMAR, was present, with frequent respiratory effort related arousals.  The disordered breathing events were predominant during supine sleep position.  Events ? The polysomnogram  "revealed a presence of 5 obstructive, - central, and - mixed apneas resulting in an apnea index of 0.8 events per hour. There were 3 obstructive hypopneas and - central hypopneas resulting in an obstructive hypopnea index of 0.5 and central hypopnea index of - events per hour. The combined apnea/hypopnea index was 1.3 events per hour (central apnea/hypopnea index was - events per hour). The REM AHI was 4.1 events per hour. The supine AHI was 1.9 events per hour. The RERA index was 11.7 events per hour.  The RDI was 13.0 events per hour.    Snoring - was reported as moderate, reported during supine and non-supine sleep.    Respiratory rate and pattern - was notable for normal respiratory rate and pattern.    Sustained Sleep Associated Hypoventilation - Transcutaneous carbon dioxide monitoring was not used, however significant hypoventilation was not suggested by oximetry.    Sleep Associated Hypoxemia - (Greater than 5 minutes O2 sat at or below 88%) was not present. Baseline oxygen saturation was 96.9%. Lowest oxygen saturation was 92.5%. Time spent less than or equal to 88% was 0 minutes. Time spent less than or equal to 89% was 0 minutes.       Past medical/surgical history, family history, social history, medications and allergies were reviewed.            Physical Examination:    /67   Ht 1.575 m (5' 2\")   Wt 66.2 kg (146 lb)   BMI 26.70 kg/m    General: Pleasant. Cooperative. In no apparent distress.  Pulmonary: Able to speak in full sentences easily. No cough or wheeze.   Neurologic: Alert, oriented x3.  Psychiatric: Mood euthymic. Affect congruent with full range and intensity.             Assessment and Plan:    Upper airway resistance syndrome, subtype of LAMAR. Patient did not find dental appliance useful or comfortable.  She reports symptoms of snoring, nonrestorative sleep, fatigue and excessive daytime sleepiness.  Recommended obtaining an overnight polysomnography to reevaluate the current " "status of the sleep disordered breathing.  Patient was agreeable with the plan. Will communicate with the patient the test results via video visit in 10 to 14 days after the test is completed. In the interim, encouraged her to sleep on the sides using positional devices such as body pillow since during the last sleep study the obstructive events were more pronounced while she was sleeping on her back.    --We also discussed weight management with diet and exercise.  --Patient was strongly advised to avoid driving, operating any heavy machinery or other hazardous situations while drowsy or sleepy.  Patient was counseled on the importance of driving while alert, to pull over if drowsy, or nap before getting into the vehicle if sleepy.     The above note was dictated using voice recognition software. Although reviewed after completion, some word and grammatical error may remain . Please contact the author for any clarifications.     \"I spent a total of 20  minutes with Unique Baker  during today's video visit. Most of this time was spent counseling the patient and  coordinating care regarding upper airway resistance syndrome, dental appliance, polysomnography, going over results of previous sleep study and chart review., including documentation and further activities as noted above.\"       Karen Mullins MD  [facility]  [address] , [place] [address]       "

## 2022-10-09 NOTE — PROGRESS NOTES
[facility]                                    Progress Note    Patient Name: Unique Baker  Date: 2022         Service Type: Individual      Session Start Time: 1308  Session End Time: 1358    Session Length: 50    Session #: 2    Attendees: Client attended alone    Service Modality:  Video Visit:      Provider verified identity through the following two step process.  Patient provided:  Patient photo, Patient  and Patient address    Telemedicine Visit: The patient's condition can be safely assessed and treated via synchronous audio and visual telemedicine encounter.      Reason for Telemedicine Visit: Patient has requested telehealth visit    Originating Site (Patient Location): Patient's home    Distant Site (Provider Location): [facility]    Consent:  The patient/guardian has verbally consented to: the potential risks and benefits of telemedicine (video visit) versus in person care; bill my insurance or make self-payment for services provided; and responsibility for payment of non-covered services.     Patient would like the video invitation sent by:  Text to cell phone: [phone]    Mode of Communication:  Video Conference via Doximity    As the provider I attest to compliance with applicable laws and regulations related to telemedicine.    DATA  Interactive Complexity: No  Crisis: No        Progress Since Last Session (Related to Symptoms / Goals / Homework):   Symptoms: No change physical symptoms    Homework: Achieved / completed to satisfaction      Episode of Care Goals: Satisfactory progress - PREPARATION (Decided to change - considering how); Intervened by negotiating a change plan and determining options / strategies for behavior change, identifying triggers, exploring social supports, and working towards setting a date to begin behavior change     Current / Ongoing Stressors and Concerns:   Continued  gastrointestinal pain. Pain in upper back and mid right abdomin.    Mother in law has been visiting.  Mother in law abuses alcohol.     Patient states she believes her immunity is low. She states she has gallbladder problems.           Treatment Objective(s) Addressed in This Session:   identify patients initial signs or symptoms of anxiety  Decrease frequency and intensity of feeling down, depressed, hopeless       Support patient through health care, securing medical intervention to address her pain.     Intervention:   Motivational Interviewing: open ended questions.  Permission to suggest and provide advise on treatment.    Assessments completed prior to visit:  The following assessments were completed by patient for this visit:    No assessments, screens or questionnaires.      ASSESSMENT: Current Emotional / Mental Status (status of significant symptoms):     Patient states there has not been changes to her Mental Status     Misk status (Self / Other harm or suicidal ideation)   Patient denies current fears or concerns for personal safety.   Patient denies current or recent suicidal ideation or behaviors.   Patient denies current or recent homicidal ideation or behaviors.   Patient denies current or recent self injurious behavior or ideation.   Patient denies other safety concerns.   Patient reports there has been no change in risk factors since their last session.     Patient reports there has been no change in protective factors since their last session.     Recommended that patient call 911 or go to the local ED should there be a change in any of these risk factors.     Appearance:   Appropriate    Eye Contact:   Good    Psychomotor Behavior: Normal    Attitude:   Cooperative    Orientation:   All   Speech    Rate / Production: Normal/ Responsive    Volume:  Normal    Mood:    Anxious  Depressed  Irritable  Agitated   Affect:    Appropriate    Thought Content:  Clear    Thought Form:  Coherent  Goal  Directed  Logical    Insight:    Good      Medication Review:   No changes to current psychiatric medication(s)     Medication Compliance:   Yes     Changes in Health Issues:   Yes: Pain, No Psychological Distress  Sleep disturbance, Associated Psychological Distress  Weight / dietary issues, No Psychological Distress     Chemical Use Review:   Substance Use: Chemical use reviewed, no active concerns identified      Tobacco Use: No current tobacco use.      Diagnosis:  1. Mood disorder due to medical condition        Collateral Reports Completed:   Routed note to PCP    PLAN: (Patient Tasks / Therapist Tasks / Other)  Use techniques of relaxation with guided imagery and deep breathing.  Walk if phasically able.  Return in 1 week.         Hailey Kaba, [facility]                                                    ______________________________________________________________________    Individual Treatment Plan    Patient's Name: Unique Baker  YOB: 1985    Date of Creation: 9/09/2022  Date Treatment Plan Last Reviewed/Revised: initial review on 12/09/2022    DSM5 Diagnoses: 296.32 (F33.1) Major Depressive Disorder, Recurrent Episode, Moderate _ or 300.02 (F41.1) Generalized Anxiety Disorder  Psychosocial / Contextual Factors: Practices her shanell.  Siblings are near and supportive.  Patient parents are [place] of [place].  Patient struggle to manage home with health problems.  PROMIS (reviewed every 90 days): 23  Referral / Collaboration:  patient is scheduled for long term therapy with Debbie Knight on January 11, 2023 .    Anticipated number of session for this episode of care: 3-6 sessions  Anticipation frequency of session: Weekly  Anticipated Duration of each session: 38-52 minutes  Treatment plan will be reviewed in 90 days or when goals have been changed.       Goal 1: Patient will increase awareness of depression symptoms and their impact on functioning and develop skills to reduce  negative impact     I will know I've met my goal when I am able to better manage my mood.           Objective #A (Patient Action)        Patient will describe thoughts, feelings, and actions associated with depression.    Status:?New - Date(s): 12/9/2022 or until transfer to LTT ??       Intervention(s)     Therapist will will explore and process with patient how depression has impacted them.         Objective #B     Patient will increase depression coping skills.                           Status:?New - Date(s): 12/9/2022 or until transfer to LTT ??        Intervention(s)     Therapist will teach CBT skills and model their use.           Objective #C    Patient will identify cognitive distortions in relation to depression and explore alternatives.    Status:?New - Date(s): 12/9/2022 or until transfer to LTT ??       Intervention(s)     Therapist will guide discussion and assist patient in identification of negative beliefs about self and world.          Goal?2:?Patient will?experience a decrease in anxiety symptoms, as measured by a?2 point?reduction in her RENÉE-7 score.?  I will?know I've met my goal when I feel less stress and less worry.   I'll probably do things like I used to do.       Objective #A?(Patient Action)????   Patient will?identify?3?fears / thoughts that contribute to feeling anxious.?       Intervention(s)?    Therapist will?provide cognitive behavioral therapeutic approach in processing patient's thoughts, feelings and beliefs and toward assisting patient in developing greater    awareness of unhelpful thoughts that associate with increased?anxiety.??     ??  Objective #B?    Patient will?use relaxation strategies?3?times per day to reduce the physical symptoms of anxiety.??????????????    Status:?Status:?New - Date(s): 12/9/2022 or until transfer to LTT ?? ??      Intervention(s)?    Therapist will?teach mindfulness strategies toward building diaphragmatic breathing/relaxation skills and assign  exercises as?homework.?     ??  Objective #C?    Patient will?use cognitive strategies identified in therapy to challenge anxious thoughts.?    Status:?Continued -New - Date(s): 12/9/2022 or until transfer to LTT ??     ?? Intervention(s)?    Therapist will?assign homework - CBT and thought exercises, as well, as distraction techniques.?          Patient has not reviewed nor agreed to the above plan.      Hailey Kaba, [facility]  September 09, 2022

## 2022-10-17 ENCOUNTER — MYC MEDICAL ADVICE (OUTPATIENT)
Dept: PHYSICAL MEDICINE AND REHAB | Facility: CLINIC | Age: 37
End: 2022-10-17

## 2022-10-17 DIAGNOSIS — R00.2 PALPITATIONS: Primary | ICD-10-CM

## 2022-10-17 DIAGNOSIS — U09.9 LONG COVID: ICD-10-CM

## 2022-10-19 ENCOUNTER — MYC MEDICAL ADVICE (OUTPATIENT)
Dept: PHYSICAL MEDICINE AND REHAB | Facility: CLINIC | Age: 37
End: 2022-10-19

## 2022-10-19 DIAGNOSIS — D50.9 IRON DEFICIENCY ANEMIA, UNSPECIFIED IRON DEFICIENCY ANEMIA TYPE: ICD-10-CM

## 2022-10-19 DIAGNOSIS — E53.8 VITAMIN B12 DEFICIENCY: ICD-10-CM

## 2022-10-19 DIAGNOSIS — E55.9 VITAMIN D DEFICIENCY: Primary | ICD-10-CM

## 2022-10-20 RX ORDER — CHOLECALCIFEROL (VITAMIN D3) 50 MCG
1 TABLET ORAL DAILY
Qty: 30 TABLET | Refills: 3 | Status: SHIPPED | OUTPATIENT
Start: 2022-10-20 | End: 2022-11-19

## 2022-10-20 RX ORDER — CYANOCOBALAMIN (VITAMIN B-12) 2500 MCG
2500 TABLET, SUBLINGUAL SUBLINGUAL DAILY
Qty: 30 TABLET | Refills: 1 | Status: SHIPPED | OUTPATIENT
Start: 2022-10-20 | End: 2022-11-01

## 2022-10-21 ENCOUNTER — APPOINTMENT (OUTPATIENT)
Dept: GENERAL RADIOLOGY | Facility: CLINIC | Age: 37
End: 2022-10-21
Attending: EMERGENCY MEDICINE
Payer: COMMERCIAL

## 2022-10-21 ENCOUNTER — HOSPITAL ENCOUNTER (EMERGENCY)
Facility: CLINIC | Age: 37
Discharge: HOME | End: 2022-10-21
Attending: EMERGENCY MEDICINE | Admitting: EMERGENCY MEDICINE
Payer: COMMERCIAL

## 2022-10-21 ENCOUNTER — NURSE TRIAGE (OUTPATIENT)
Dept: NURSING | Facility: CLINIC | Age: 37
End: 2022-10-21

## 2022-10-21 ENCOUNTER — TELEPHONE (OUTPATIENT)
Dept: BEHAVIORAL HEALTH | Facility: CLINIC | Age: 37
End: 2022-10-21

## 2022-10-21 ENCOUNTER — APPOINTMENT (OUTPATIENT)
Dept: CARDIOLOGY | Facility: CLINIC | Age: 37
End: 2022-10-21
Attending: EMERGENCY MEDICINE
Payer: COMMERCIAL

## 2022-10-21 ENCOUNTER — OFFICE VISIT (OUTPATIENT)
Dept: INTERNAL MEDICINE | Facility: CLINIC | Age: 37
End: 2022-10-21
Payer: COMMERCIAL

## 2022-10-21 VITALS
RESPIRATION RATE: 18 BRPM | HEIGHT: 62 IN | OXYGEN SATURATION: 98 % | HEART RATE: 64 BPM | DIASTOLIC BLOOD PRESSURE: 81 MMHG | BODY MASS INDEX: 26.31 KG/M2 | SYSTOLIC BLOOD PRESSURE: 115 MMHG | WEIGHT: 143 LBS | TEMPERATURE: 97.7 F

## 2022-10-21 VITALS
DIASTOLIC BLOOD PRESSURE: 71 MMHG | TEMPERATURE: 99.2 F | RESPIRATION RATE: 18 BRPM | OXYGEN SATURATION: 97 % | SYSTOLIC BLOOD PRESSURE: 117 MMHG | HEART RATE: 96 BPM

## 2022-10-21 DIAGNOSIS — K21.00 GASTROESOPHAGEAL REFLUX DISEASE WITH ESOPHAGITIS WITHOUT HEMORRHAGE: ICD-10-CM

## 2022-10-21 DIAGNOSIS — E78.5 HYPERLIPIDEMIA LDL GOAL <130: ICD-10-CM

## 2022-10-21 DIAGNOSIS — R42 DIZZINESS: Primary | ICD-10-CM

## 2022-10-21 DIAGNOSIS — R00.2 PALPITATIONS: Primary | ICD-10-CM

## 2022-10-21 DIAGNOSIS — R06.02 SHORTNESS OF BREATH: ICD-10-CM

## 2022-10-21 DIAGNOSIS — D50.9 IRON DEFICIENCY ANEMIA, UNSPECIFIED IRON DEFICIENCY ANEMIA TYPE: ICD-10-CM

## 2022-10-21 DIAGNOSIS — F41.0 PANIC ATTACK: ICD-10-CM

## 2022-10-21 DIAGNOSIS — R07.9 CHEST PAIN, UNSPECIFIED TYPE: ICD-10-CM

## 2022-10-21 DIAGNOSIS — R73.09 ELEVATED GLUCOSE: ICD-10-CM

## 2022-10-21 LAB
ABO/RH(D): NORMAL
ALBUMIN SERPL BCG-MCNC: 4.4 G/DL (ref 3.5–5.2)
ALP SERPL-CCNC: 89 U/L (ref 35–104)
ALT SERPL W P-5'-P-CCNC: 12 U/L (ref 10–35)
ANION GAP SERPL CALCULATED.3IONS-SCNC: 11 MMOL/L (ref 7–15)
ANTIBODY SCREEN: NEGATIVE
AST SERPL W P-5'-P-CCNC: 17 U/L (ref 10–35)
ATRIAL RATE - MUSE: 107 BPM
ATRIAL RATE - MUSE: 110 BPM
BASOPHILS # BLD AUTO: 0 10E3/UL (ref 0–0.2)
BASOPHILS NFR BLD AUTO: 1 %
BILIRUB SERPL-MCNC: 0.2 MG/DL
BUN SERPL-MCNC: 7.5 MG/DL (ref 6–20)
CALCIUM SERPL-MCNC: 9.9 MG/DL (ref 8.6–10)
CHLORIDE SERPL-SCNC: 101 MMOL/L (ref 98–107)
CHOLEST SERPL-MCNC: 202 MG/DL
CREAT SERPL-MCNC: 0.59 MG/DL (ref 0.51–0.95)
D DIMER PPP FEU-MCNC: <0.27 UG/ML FEU (ref 0–0.5)
DEPRECATED HCO3 PLAS-SCNC: 25 MMOL/L (ref 22–29)
DIASTOLIC BLOOD PRESSURE - MUSE: NORMAL MMHG
DIASTOLIC BLOOD PRESSURE - MUSE: NORMAL MMHG
EOSINOPHIL # BLD AUTO: 0 10E3/UL (ref 0–0.7)
EOSINOPHIL NFR BLD AUTO: 1 %
ERYTHROCYTE [DISTWIDTH] IN BLOOD BY AUTOMATED COUNT: 16.1 % (ref 10–15)
FASTING STATUS PATIENT QL REPORTED: YES
GFR SERPL CREATININE-BSD FRML MDRD: >90 ML/MIN/1.73M2
GLUCOSE SERPL-MCNC: 124 MG/DL (ref 70–99)
HBA1C MFR BLD: 5.6 % (ref 0–5.6)
HCT VFR BLD AUTO: 38.9 % (ref 35–47)
HDLC SERPL-MCNC: 38 MG/DL
HGB BLD-MCNC: 11.5 G/DL (ref 11.7–15.7)
HOLD SPECIMEN: NORMAL
IMM GRANULOCYTES # BLD: 0 10E3/UL
IMM GRANULOCYTES NFR BLD: 0 %
INTERPRETATION ECG - MUSE: NORMAL
INTERPRETATION ECG - MUSE: NORMAL
LDLC SERPL CALC-MCNC: 131 MG/DL
LIPASE SERPL-CCNC: 38 U/L (ref 13–60)
LYMPHOCYTES # BLD AUTO: 1.3 10E3/UL (ref 0.8–5.3)
LYMPHOCYTES NFR BLD AUTO: 21 %
MAGNESIUM SERPL-MCNC: 2.1 MG/DL (ref 1.7–2.3)
MCH RBC QN AUTO: 24.3 PG (ref 26.5–33)
MCHC RBC AUTO-ENTMCNC: 29.6 G/DL (ref 31.5–36.5)
MCV RBC AUTO: 82 FL (ref 78–100)
MONOCYTES # BLD AUTO: 0.4 10E3/UL (ref 0–1.3)
MONOCYTES NFR BLD AUTO: 6 %
NEUTROPHILS # BLD AUTO: 4.4 10E3/UL (ref 1.6–8.3)
NEUTROPHILS NFR BLD AUTO: 71 %
NONHDLC SERPL-MCNC: 164 MG/DL
NRBC # BLD AUTO: 0 10E3/UL
NRBC BLD AUTO-RTO: 0 /100
NT-PROBNP SERPL-MCNC: 7 PG/ML (ref 0–450)
P AXIS - MUSE: 59 DEGREES
P AXIS - MUSE: 70 DEGREES
PLATELET # BLD AUTO: 281 10E3/UL (ref 150–450)
POTASSIUM SERPL-SCNC: 3.9 MMOL/L (ref 3.4–5.3)
PR INTERVAL - MUSE: 142 MS
PR INTERVAL - MUSE: 174 MS
PROT SERPL-MCNC: 8.1 G/DL (ref 6.4–8.3)
QRS DURATION - MUSE: 74 MS
QRS DURATION - MUSE: 80 MS
QT - MUSE: 326 MS
QT - MUSE: 348 MS
QTC - MUSE: 441 MS
QTC - MUSE: 464 MS
R AXIS - MUSE: 60 DEGREES
R AXIS - MUSE: 76 DEGREES
RBC # BLD AUTO: 4.73 10E6/UL (ref 3.8–5.2)
SODIUM SERPL-SCNC: 137 MMOL/L (ref 136–145)
SPECIMEN EXPIRATION DATE: NORMAL
SYSTOLIC BLOOD PRESSURE - MUSE: NORMAL MMHG
SYSTOLIC BLOOD PRESSURE - MUSE: NORMAL MMHG
T AXIS - MUSE: 26 DEGREES
T AXIS - MUSE: 52 DEGREES
TRIGL SERPL-MCNC: 167 MG/DL
TROPONIN T SERPL HS-MCNC: 6 NG/L
TROPONIN T SERPL HS-MCNC: <6 NG/L
TSH SERPL DL<=0.005 MIU/L-ACNC: 1.38 UIU/ML (ref 0.3–4.2)
VENTRICULAR RATE- MUSE: 107 BPM
VENTRICULAR RATE- MUSE: 110 BPM
WBC # BLD AUTO: 6.2 10E3/UL (ref 4–11)

## 2022-10-21 PROCEDURE — 85025 COMPLETE CBC W/AUTO DIFF WBC: CPT | Performed by: EMERGENCY MEDICINE

## 2022-10-21 PROCEDURE — 250N000011 HC RX IP 250 OP 636: Performed by: EMERGENCY MEDICINE

## 2022-10-21 PROCEDURE — 93242 EXT ECG>48HR<7D RECORDING: CPT

## 2022-10-21 PROCEDURE — 36415 COLL VENOUS BLD VENIPUNCTURE: CPT | Performed by: EMERGENCY MEDICINE

## 2022-10-21 PROCEDURE — 96374 THER/PROPH/DIAG INJ IV PUSH: CPT

## 2022-10-21 PROCEDURE — 99285 EMERGENCY DEPT VISIT HI MDM: CPT | Mod: 25

## 2022-10-21 PROCEDURE — 96375 TX/PRO/DX INJ NEW DRUG ADDON: CPT

## 2022-10-21 PROCEDURE — 84484 ASSAY OF TROPONIN QUANT: CPT | Performed by: EMERGENCY MEDICINE

## 2022-10-21 PROCEDURE — 83690 ASSAY OF LIPASE: CPT | Performed by: EMERGENCY MEDICINE

## 2022-10-21 PROCEDURE — 36415 COLL VENOUS BLD VENIPUNCTURE: CPT

## 2022-10-21 PROCEDURE — 83735 ASSAY OF MAGNESIUM: CPT | Performed by: EMERGENCY MEDICINE

## 2022-10-21 PROCEDURE — 80061 LIPID PANEL: CPT

## 2022-10-21 PROCEDURE — 83036 HEMOGLOBIN GLYCOSYLATED A1C: CPT

## 2022-10-21 PROCEDURE — 258N000003 HC RX IP 258 OP 636: Performed by: EMERGENCY MEDICINE

## 2022-10-21 PROCEDURE — 83880 ASSAY OF NATRIURETIC PEPTIDE: CPT | Performed by: EMERGENCY MEDICINE

## 2022-10-21 PROCEDURE — 99215 OFFICE O/P EST HI 40 MIN: CPT

## 2022-10-21 PROCEDURE — 84443 ASSAY THYROID STIM HORMONE: CPT | Performed by: EMERGENCY MEDICINE

## 2022-10-21 PROCEDURE — 96361 HYDRATE IV INFUSION ADD-ON: CPT

## 2022-10-21 PROCEDURE — 86850 RBC ANTIBODY SCREEN: CPT | Performed by: EMERGENCY MEDICINE

## 2022-10-21 PROCEDURE — 93248 EXT ECG>7D<15D REV&INTERPJ: CPT | Performed by: INTERNAL MEDICINE

## 2022-10-21 PROCEDURE — 80053 COMPREHEN METABOLIC PANEL: CPT | Performed by: EMERGENCY MEDICINE

## 2022-10-21 PROCEDURE — 84484 ASSAY OF TROPONIN QUANT: CPT | Mod: 91 | Performed by: EMERGENCY MEDICINE

## 2022-10-21 PROCEDURE — 82040 ASSAY OF SERUM ALBUMIN: CPT | Performed by: EMERGENCY MEDICINE

## 2022-10-21 PROCEDURE — 85379 FIBRIN DEGRADATION QUANT: CPT | Performed by: EMERGENCY MEDICINE

## 2022-10-21 PROCEDURE — 71046 X-RAY EXAM CHEST 2 VIEWS: CPT

## 2022-10-21 PROCEDURE — 93005 ELECTROCARDIOGRAM TRACING: CPT

## 2022-10-21 RX ORDER — DIPHENHYDRAMINE HYDROCHLORIDE 50 MG/ML
25 INJECTION INTRAMUSCULAR; INTRAVENOUS ONCE
Status: COMPLETED | OUTPATIENT
Start: 2022-10-21 | End: 2022-10-21

## 2022-10-21 RX ORDER — FERROUS SULFATE 325(65) MG
325 TABLET, DELAYED RELEASE (ENTERIC COATED) ORAL DAILY
Qty: 30 TABLET | Refills: 1 | Status: SHIPPED | OUTPATIENT
Start: 2022-10-21 | End: 2022-11-01

## 2022-10-21 RX ORDER — VENLAFAXINE HYDROCHLORIDE 37.5 MG/1
75 CAPSULE, EXTENDED RELEASE ORAL DAILY
Qty: 120 CAPSULE | Refills: 0 | Status: SHIPPED | OUTPATIENT
Start: 2022-10-21 | End: 2022-10-31 | Stop reason: SINTOL

## 2022-10-21 RX ADMIN — DIPHENHYDRAMINE HYDROCHLORIDE 25 MG: 50 INJECTION, SOLUTION INTRAMUSCULAR; INTRAVENOUS at 14:04

## 2022-10-21 RX ADMIN — PROCHLORPERAZINE EDISYLATE 10 MG: 5 INJECTION INTRAMUSCULAR; INTRAVENOUS at 14:06

## 2022-10-21 RX ADMIN — SODIUM CHLORIDE, POTASSIUM CHLORIDE, SODIUM LACTATE AND CALCIUM CHLORIDE 1000 ML: 600; 310; 30; 20 INJECTION, SOLUTION INTRAVENOUS at 14:05

## 2022-10-21 ASSESSMENT — ENCOUNTER SYMPTOMS
DYSURIA: 0
EYE PAIN: 0
RHINORRHEA: 0
NAUSEA: 1
SHORTNESS OF BREATH: 1
FATIGUE: 1
ARTHRALGIAS: 1
PALPITATIONS: 1
NERVOUS/ANXIOUS: 1
DIARRHEA: 0
CONFUSION: 0
COLOR CHANGE: 0
WEAKNESS: 1
NUMBNESS: 0
LIGHT-HEADEDNESS: 1
CHEST TIGHTNESS: 1
HEMATURIA: 0
FEVER: 0
VOMITING: 1
NAUSEA: 1
DIZZINESS: 1
CHILLS: 0
BLOOD IN STOOL: 0
ABDOMINAL PAIN: 1
HEARTBURN: 1
ABDOMINAL PAIN: 0
LIGHT-HEADEDNESS: 1

## 2022-10-21 ASSESSMENT — ACTIVITIES OF DAILY LIVING (ADL)
ADLS_ACUITY_SCORE: 37

## 2022-10-21 NOTE — PATIENT INSTRUCTIONS
For the Effexor, you will take one capsule (37.5 MG) for the first 10 days) after 10 days if you are tolerating the medication okay, go ahead and increase to 2 capsules (a total of 75MG). You should see me back in 6-8 weeks to assess how you are feeling. We may need to increase the dosage at that time.       Please let me know if you have any questions.    Thanks!  CARRIE Recio, CNP   [facility]

## 2022-10-21 NOTE — ED PROVIDER NOTES
"  History   Chief Complaint:  Palpitations and Dizziness    The history is provided by the patient.      Unique Baker is a 37 year old female with a history of prediabetes who presents with palpitations, lightheadedness, and general malaise beginning around last week after tejinder COVID-19 two weeks ago. She notes that she saw a cardiologist on Monday (4 days ago) but feels like she is worsening. After going to the store with her son she experienced sudden onset of her symptoms alongside lightheadedness when she got out of her car. With increasing concern she presented to the ED today. Exacerbation of her symptoms occurs upon any activity. She currently endorses a headache and also endorses chest pain that she describes as \"squeezing\". This pain radiates to her left shoulder. Alongside these symptoms she additionally endorses feelings of anxiety. She notes that at one point her heart made a \"funny beat\" that she could feel under her left breast. The patient has had COVID-19 twice, but did not have any palpitations after she first contracted the virus. She does have a history of high cholesterol. She has no history of high blood pressure, diabetes, or smoking.     Review of Systems   Constitutional: Negative for chills and fever.        (+) malaise   HENT: Negative for congestion and rhinorrhea.    Eyes: Negative for pain and visual disturbance.   Respiratory: Positive for chest tightness and shortness of breath.    Cardiovascular: Positive for chest pain (squeezing; intermittent) and palpitations.   Gastrointestinal: Positive for nausea and vomiting. Negative for abdominal pain, blood in stool and diarrhea.   Genitourinary: Negative for dysuria and hematuria.   Musculoskeletal: Positive for arthralgias (left shoulder).   Skin: Negative for color change and pallor.   Neurological: Positive for light-headedness. Negative for numbness.        (+) bilateral hand and foot tingling   Psychiatric/Behavioral: Negative " for confusion. The patient is nervous/anxious.    All other systems reviewed and are negative.    Allergies:  The patient denies any known allergies.    Medications:  Atorvastatin  Cetirizine  Vitamin D3  Vitamin B12  Dexlansoprazole  Dicyclomine  Famotidine  Ferrous sulfate  Fluticasone  Hydroxychloroquine  Multivitamin  Sucralfate  Venlafaxine  Celecoxib  Hydroxyzine    Past Medical History:     Esophageal reflux  IBS  Sleep disorder  Prediabetes  ADHD    Past Surgical History:    Breast augmentation   section, tubal ligation, combined  Colonoscopy  Dilation and curettage suction  Esophagogastroduodenoscopy  Anal wart removal    Family History:    Colon polyps  Hyperlipidemia  Stomach cancer    Social History:  The patient presents to the ED on her own.  The patient presents to the ED in a private vehicle.  PCP: Celina Riggs     Physical Exam     Patient Vitals for the past 24 hrs:   BP Temp Temp src Pulse Resp SpO2   10/21/22 1630 117/71 -- -- -- 18 97 %   10/21/22 1600 -- -- -- 96 13 97 %   10/21/22 1500 101/63 -- -- 69 19 100 %   10/21/22 1430 113/71 -- -- 84 28 97 %   10/21/22 1400 106/75 -- -- 80 19 99 %   10/21/22 1345 126/87 -- -- 87 13 100 %   10/21/22 1144 115/78 -- -- 96 16 100 %   10/21/22 1051 (!) 128/90 99.2  F (37.3  C) Temporal 94 18 99 %       Physical Exam  Constitutional:       General: Not in acute distress.     Appearance: Normal appearance.   HENT:      Head: Normocephalic and atraumatic.   Eyes:      Extraocular Movements: Extraocular movements intact.      Conjunctiva/sclera: Conjunctivae normal.   Cardiovascular:      Rate and Rhythm: Regular rate and rhythm.  Pulmonary:      Effort: Pulmonary effort is normal. No respiratory distress.      Breath sounds: Normal breath sounds.  Abdominal:      General: Abdomen is flat. There is no distension.      Palpations: Abdomen is soft.      Tenderness: There is no abdominal tenderness.   Musculoskeletal:      Cervical back: Normal range  of motion. No rigidity.      Right lower leg: No edema.      Left lower leg: No edema.   Skin:     General: Skin is warm and dry.   Neurological:      General: No focal deficit present.      Mental Status: Alert and oriented to person, place, and time.   Psychiatric:         Mood and Affect: Mood normal.         Behavior: Behavior normal.    Emergency Department Course     ECG results from 10/21/22   EKG 12-lead, tracing only     Value    Systolic Blood Pressure     Diastolic Blood Pressure     Ventricular Rate 110    Atrial Rate 110    SC Interval 174    QRS Duration 74        QTc 441    P Axis 70    R AXIS 76    T Axis 52    Interpretation ECG      Sinus tachycardia  Right atrial enlargement  Nonspecific ST abnormality  Abnormal ECG  When compared with ECG of 23-JUL-2021 13:09,  Vent. rate has increased BY  37 BPM  Nonspecific T wave abnormality now evident in Inferior leads     EKG 12-lead, tracing only     Value    Systolic Blood Pressure     Diastolic Blood Pressure     Ventricular Rate 107    Atrial Rate 107    SC Interval 142    QRS Duration 80        QTc 464    P Axis 59    R AXIS 60    T Axis 26    Interpretation ECG      Sinus tachycardia  Nonspecific T wave abnormality  Abnormal ECG  When compared with ECG of 21-OCT-2022 10:48,  Nonspecific T wave abnormality now evident in Anterior leads  Confirmed by - EMERGENCY ROOM, PHYSICIAN ([id number]),  JOSELIN HUNT ([id number]) on 10/21/2022 2:55:19 PM         Imaging:  XR Chest 2 Views   Final Result   IMPRESSION: No acute cardiopulmonary disease. Electronic device   overlying the left chest.      BRETT WATSON MD            SYSTEM ID:  [id number]      Leadless EKG Monitor 3 to 7 Days    (Results Pending)     Report per radiology    Laboratory:  Labs Ordered and Resulted from Time of ED Arrival to Time of ED Departure   COMPREHENSIVE METABOLIC PANEL - Abnormal       Result Value    Sodium 137      Potassium 3.9      Chloride 101      Carbon Dioxide  (CO2) 25      Anion Gap 11      Urea Nitrogen 7.5      Creatinine 0.59      Calcium 9.9      Glucose 124 (*)     Alkaline Phosphatase 89      AST 17      ALT 12      Protein Total 8.1      Albumin 4.4      Bilirubin Total 0.2      GFR Estimate >90     CBC WITH PLATELETS AND DIFFERENTIAL - Abnormal    WBC Count 6.2      RBC Count 4.73      Hemoglobin 11.5 (*)     Hematocrit 38.9      MCV 82      MCH 24.3 (*)     MCHC 29.6 (*)     RDW 16.1 (*)     Platelet Count 281      % Neutrophils 71      % Lymphocytes 21      % Monocytes 6      % Eosinophils 1      % Basophils 1      % Immature Granulocytes 0      NRBCs per 100 WBC 0      Absolute Neutrophils 4.4      Absolute Lymphocytes 1.3      Absolute Monocytes 0.4      Absolute Eosinophils 0.0      Absolute Basophils 0.0      Absolute Immature Granulocytes 0.0      Absolute NRBCs 0.0     LIPASE - Normal    Lipase 38     TROPONIN T, HIGH SENSITIVITY - Normal    Troponin T, High Sensitivity <6     MAGNESIUM - Normal    Magnesium 2.1     TSH WITH FREE T4 REFLEX - Normal    TSH 1.38     NT PROBNP INPATIENT - Normal    N terminal Pro BNP Inpatient 7     D DIMER QUANTITATIVE - Normal    D-Dimer Quantitative <0.27     TROPONIN T, HIGH SENSITIVITY - Normal    Troponin T, High Sensitivity 6     TYPE AND SCREEN, ADULT    ABO/RH(D) O POS      Antibody Screen Negative      SPECIMEN EXPIRATION DATE [id number]     ABO/RH TYPE AND SCREEN        Emergency Department Course:     Reviewed:  I reviewed nursing notes, vitals and past medical history    Assessments & Consults:  ED Course as of 10/21/22 1652   Fri Oct 21, 2022   1335 I obtained history and examined the patient as noted above.   1455 I rechecked the patient and explained findings.   1632 XR Chest 2 Views  No acute cardiopulmonary disease. Electronic device  overlying the left chest.   1637 Patient resting comfortably bed.  Updated on lab and image findings.  Compazine Benadryl helped out with a headache.  Will discharge  patient to follow-up with cardiology on Monday as previously scheduled.  Discussed return precautions.  Answered all questions.  Patient and daughter voiced understanding and agreement with plan.  Patient's anxiety medication venlafaxine, iron supplementation, and PPI is at bedside prescribed by primary care.     Interventions:  1405 Lactated ringers 1L IV  1404 Benadryl 25mg IV  1406 Compazine 10mg IV    Disposition:  The patient was discharged to home.     Impression & Plan     CMS Diagnoses: None    HEART Score  Background  Calculates the overall risk of adverse event in patient's presenting with chest pain.  Based on 5 criteria (each assigned 0-2 points) including suspiciousness of history, EKG, age, risk factors and troponin.    Data  37 year old female  has Esophageal reflux; CARDIOVASCULAR SCREENING; LDL GOAL LESS THAN 160; Irritable bowel syndrome; Controlled substance agreement signed- checked 2020 - no concerns; Breast pain; Menorrhagia with regular cycle; and Dysmenorrhea on their problem list.   reports that she has quit smoking. Her smoking use included cigarettes. She has never used smokeless tobacco.  family history includes Cancer in her sister; Colon Polyps in her brother; Hyperlipidemia in her sister; Other - See Comments in her brother; Stomach Cancer in her sister.  Lab Results   Component Value Date    TROPI <0.015 2021     Criteria   0-2 points for each of 5 items (maximum of 10 points):  Score 1- History moderately suspicious for coronary syndrome  Score 1- EKG with Non-specific repolarization disturbance  Score 0- Age <45 years old  Score 1- One to 2 risk factors for atherosclerotic disease  Score 0- Within normal limits for troponin levels  Interpretation  Risk of adverse outcome  Heart Score: 3  Total Score 0-3- Adverse Outcome Risk 2.5% - Supports early discharge with appropriate follow-up              Medical Decision Makin-year-old female as described above presents  to the emergency department for palpitations and chest tightness with exertion after recent COVID-19 diagnosis.  Patient evaluated at triage under rapid assessment with cardiac and dyspnea work-up ordered.  Differential diagnosis considered includes, but not limited to, ACS, unstable angina, pulmonary embolism, pneumonia, and POTS.  Work-up so far unremarkable with no signs of anemia, troponin anemia, and no elevated dimer.  PE ruled out at this point.  Nonetheless, given patient's recurrent palpitations, will order patient a Zio patch for outpatient follow-up with cardiology which she already has scheduled.  Patient complaining of left-sided headache and nausea at this time.  We will trial Compazine Benadryl and IV fluids as patient also states that she has been dehydrated.  Patient visibly anxious at time evaluation.  Had extensive discussion regarding cardiac risk factors with patient and patient has a heart score of 3.  Nonetheless, we will repeat troponin at this time.  Chest x-ray pending at my time evaluation.  Discussed care plan with patient who voiced understanding and agreement with plan.  Answered all questions.  Additional work-up and orders as listed in chart.     Please refer to ED course above for details on the patient's treatment course and any changes or updates in care plan beyond my initial evaluation and MDM.    Diagnosis:    ICD-10-CM    1. Palpitations  R00.2       2. Chest pain, unspecified type  R07.9       3. Shortness of breath  R06.02           Discharge Medications:  New Prescriptions    No medications on file       Scribe Disclosure:  Francisco HAMMOND Hired, am serving as a scribe at 1:43 PM on 10/21/2022 to document services personally performed by Hardik Allen DO based on my observations and the provider's statements to me.        Hardik Allen DO  10/21/22 5601     Valtrex Pregnancy And Lactation Text: this medication is Pregnancy Category B and is considered safe during pregnancy. This medication is not directly found in breast milk but it's metabolite acyclovir is present.

## 2022-10-21 NOTE — PROGRESS NOTES
Assessment & Plan     (R42) Dizziness  (primary encounter diagnosis)  Comment: Just had Covid about a month ago and has been feeling like her heart is racing at rest and with minimal activity. She feels very weak and is having panic attacks. She denies any history of anxiety. She has appt scheduled with cardiology for palpitations/dizziness.   Plan: See cardiology as scheduled on Monday 10/24/22.    (E78.5) Hyperlipidemia LDL goal <130  Comment: LDL in June of 2022 was 71. She would like LDL updated today. She was on Lipitor 10MG in the past but I noticed she reported she is no longer taking this. This will need to be addressed at next visit. Her LDL prior to June of 2022 was 199 in March of 2022.  Plan: Lipid panel reflex to direct LDL Fasting          (R73.09) Elevated glucose  Plan: Hemoglobin A1c           (K21.00) Gastroesophageal reflux disease with esophagitis without hemorrhage  Comment: Hx of GERD. Follows with GI. Taking pepcid BID. Could consider trial of PPI if needed. Had endoscopy in February of 2022 which was normal.    (D50.9) Iron deficiency anemia, unspecified iron deficiency anemia type  Comment: Iron deficiency anemia: Was told to take daily iron supplement but is having trouble with GI s/e and feeling nauseous. Unclear what etiology of AZAM is. She does note dysmenorrhea and has about 2 days worth of quarter size blood clots during menses. Menses is only about 4 days long. I will have to look into whether she would qualify for IV iron replacement, although I know this is costly. This may be something her primary could consider in the future.       (F41.0) Panic attack  Comment:   Anxiety/Panic attacks: Has tried Zoloft and Celexa in the past but stopped taking them after a month. Lately notes having episodes where she bursts into tears. She is willing to try Effexor today. She will f/u with me in 6-8 weeks to reassess symptoms.  Plan: venlafaxine (EFFEXOR XR) 37.5 MG 24 hr capsule         "    Per chart review, it looks like she had Covid back in March of 2021 and experienced symptoms including: a rush type of feeling, a hot feeling in her back, lack of appetite and thirst, diarrhea, tinnitus. She states her symptoms now feel different than prior episode of symptoms.    She is following with Rheumatology for LOW + FELICITY 1:80. 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 Pt with profound change in health status post covid infection now with repeat covid infection in the last 2 weeks with increased fatigue. Reports prior to covid vaccine and original infection was completely healthy. Now with symptoms of dry mouth that dentist has seen \"patches\" of dry area and I have seen mouth sores. Overall Pt's symptoms dramatically improved when on prednisone in past and partial response with most recent burst that started at 10 mg daily, reports felt better in past when she did the dose pack.  Symptoms potentially related to a CTD include; dry mouth, hair loss, mouth sores, arthralgias, reflux, feeling of SOB/chest pain, change in cognitive function, past rash in sun. Exam at last visit showed one healing mucosal lesion, full mandible. Given  response to prednisone treating as UCTD, no signs of visceral involvement currently.'       40 minutes spent on the date of the encounter doing chart review, history and exam, documentation and further activities per the note           Return in about 6 weeks (around 12/2/2022) for Follow up, with me.    Angeles Pierce, CARRIE CNP  [facility] VALORIE Calhoun is a 37 year old, presenting for the following health issues:  Dizziness (Panic feelings. Patient had " "COVID 9/21/22 and hasn't felt right since.)      HPI       Review of Systems   Constitutional: Positive for fatigue.   Gastrointestinal: Positive for abdominal pain, heartburn and nausea.   Neurological: Positive for dizziness, weakness and light-headedness.          Objective    /81   Pulse 64   Temp 97.7  F (36.5  C)   Resp 18   Ht 1.575 m (5' 2\")   Wt 64.9 kg (143 lb)   SpO2 98%   BMI 26.16 kg/m    Body mass index is 26.16 kg/m .      Physical Exam  Constitutional:       General: She is not in acute distress.     Appearance: Normal appearance. She is not ill-appearing, toxic-appearing or diaphoretic.   HENT:      Head: Normocephalic and atraumatic.   Eyes:      Conjunctiva/sclera: Conjunctivae normal.   Cardiovascular:      Rate and Rhythm: Normal rate and regular rhythm.      Heart sounds: Normal heart sounds.   Pulmonary:      Effort: Pulmonary effort is normal.      Breath sounds: Normal breath sounds.   Skin:     General: Skin is warm and dry.   Neurological:      Mental Status: She is alert and oriented to person, place, and time.   Psychiatric:         Mood and Affect: Mood normal.         Behavior: Behavior normal.         Thought Content: Thought content normal.         Judgment: Judgment normal.                 "

## 2022-10-21 NOTE — ED NOTES
Rapid Assessment Note    History:   Unique Baker is a 37 year old female who presents with palpitations. Patient states that for the last week she has been experiencing increasingly worsening palpitations, shortness of breath and racing heart beat. She notes that any exertion, such as standing up or walking, exacerbates her symptoms. She adds that she is experiencing left shoulder pain and left chest and abdominal pain. She denies dietary changes but states that she has been loosing weight. She notes that she is scheduled to see a cardiologist on Monday. She reports that she was recently told that she is anemic, and has low vitamin B-12 and B-3 levels. She adds that she thought that she might be having panic attacks and so she was prescribed anxiety medication. She was also told that she has gall stones and might need her gall bladder removed to treat her acid reflux. She reports that she recently had COVID and is still taking Hydroxychlorquine. She notes that after the last time she had COVID she had GI problems. She denies pain or swelling in her legs, dietary changes, black or bloody stool.    Exam:   General: Patient is awake, alert and interactive when I enter the room  Head: The scalp, face, and head appear normal  Neck: Normal range of motion.   CV: Regular rate and rhythm.   Resp: Lungs are clear without wheezes or rales. No respiratory distress.   GI: Abdomen is soft, no rigidity, guarding, or rebound. No distension. No tenderness to palpation in any quadrant.     MS: Normal tone. Joints grossly normal without effusions. No asymmetric leg swelling, calf or thigh tenderness.    Skin: No rash or lesions noted. Normal capillary refill noted  Neuro: Speech is normal and fluent. Face is symmetric. Moving all extremities.   Psych:  Normal affect.  Appropriate interactions.      Plan of Care:   Patient with recent COVID infection that is since resolved and now has palpitations and shortness of breath with  exertion.  Also been having left-sided chest pain.  We will perform cardiac work-up and evaluate for possible PE.  Patient also reports she has been having issues with anemia.  We will assess for possible worsening anemia of the patient denies any active bleeding.  If all else is ruled out possible long COVID symptoms.  I evaluated the patient and developed an initial plan of care. I discussed this plan and explained that I, or one of my partners, would be returning to complete the evaluation.         I, Suni Mcneill, am serving as a scribe to document services personally performed by Teja Knight MD, based on my observations and the provider's statements to me.    10/21/2022  [employer]    Portions of this medical record were completed by a scribe. UPON MY REVIEW AND AUTHENTICATION BY ELECTRONIC SIGNATURE, this confirms (a) I performed the applicable clinical services, and (b) the record is accurate.        Tjea Knight MD  10/21/22 1076

## 2022-10-21 NOTE — ED TRIAGE NOTES
Pt arrives with c/o palpitations and dizziness that started 1 week ago. Pt reports she can feel it upon waking and movement makes both things worse. Pt reports covid about 1 month ago. Pt also reports headache to left side of head that started yesterday.     Triage Assessment     Row Name 10/21/22 1059       Triage Assessment (Adult)    Airway WDL WDL       Respiratory WDL    Respiratory WDL WDL       Skin Circulation/Temperature WDL    Skin Circulation/Temperature WDL WDL       Cardiac WDL    Cardiac WDL X  palpitations       Peripheral/Neurovascular WDL    Peripheral Neurovascular WDL WDL       Cognitive/Neuro/Behavioral WDL    Cognitive/Neuro/Behavioral WDL WDL

## 2022-10-21 NOTE — TELEPHONE ENCOUNTER
Karlyr spoke with patient on que and scheduled return visit on 10/26/2022 @ 12:00 pm.    Briseida Enriquez  10/21/22  [id number]

## 2022-10-22 NOTE — TELEPHONE ENCOUNTER
"Just seen in ED today for H/A, Chest pain and palpitations. (palpitations not new) In ER received IV's, Benadryl and Compazine for H/A, Negative EKG, Negative Chest xray. Labs not abnormal. Is calling because she is sleepy but legs are \"jittery\" like restless legs. Patient was positive for Covid 2 weeks ago. Is seeing cardiology on Monday for f/u due to palpitations. Also was seen in clinic by Corina Canales today and Effexor prescribed for ? Panic attacks. Patient reports that yesterday she had a patch of Vit b12 500 mcg on and also started an oral pill of 2500 mcg Vit B12 this am at 10am. She thinks this may have caused the issues today and this jitteriness. Patient says she is very sensitive to meds and thinks this may have been too much. No chest pain or sob present. No palpitations at present. Just feels tired and has this \"jitteriness\" in legs and can't sleep. Discussed with patient to try soothing bath, warm water to drink and calming activities. Asked her to hold the vit b 12 and Vit d she started this am and call back tomorrow if still feeling jittery. Also asked her to call MD who prescribed Effexor (Which she has not yet taken) to discuss this on Monday. Asked patient to call back if any chest pain, palpitations, sob or worsening sx develop, any further questions/concerns. Will route note to provider who had prescribed the vitamin D and Vitamin B12 to f/u with patient.     VASU ROSE RN  [facility] nurse advisors  10/21/2022  10:00 PM      Corina Aranda, Please address dosing and continuation of Vit B12 and Vit D with patient on Monday.     Reason for Disposition    Normal shivering with cold exposure    Additional Information    Negative: Difficult to awaken or acting confused (e.g., disoriented, slurred speech)    Negative: Sounds like a life-threatening emergency to the triager    Negative: [1] Muscle rigidity or tightness AND [2] present now    Negative: [1] New-onset muscle jerks (twitches, " spasms) AND [2] present now    Negative: [1] New-onset muscle jerks AND [2] episode lasts > 1 minute AND [3] resolved    Negative: Patient sounds very sick or weak to the triager    Negative: [1] Muscle rigidity or tightness AND [2] brief (now gone)    Negative: [1] New-onset muscle jerks AND [2] unexplained AND [3] 3 or more times/day    Negative: Shuddering (trembling) occurs without an obvious cause    Negative: [1] Muscle jerks or tics without an obvious cause AND [2] brief (seconds, gone now) AND [3] otherwise feels normal (well)    Negative: Tics occur frequently    Negative: Tics interfere with work, school, or relationships    Negative: [1] SEVERE weakness (i.e., unable to walk or barely able to walk, requires support) AND [2] new-onset or worsening    Negative: [1] Weakness (i.e., paralysis, loss of muscle strength) of the face, arm / hand, or leg / foot on one side of the body AND [2] sudden onset AND [3] present now (Exception: Bell's palsy suspected [i.e., weakness only on one side of the face, developing over hours to days, no other symptoms])    Negative: [1] Numbness (i.e., loss of sensation) of the face, arm / hand, or leg / foot on one side of the body AND [2] sudden onset AND [3] present now    Negative: [1] Loss of speech or garbled speech AND [2] sudden onset AND [3] present now    Negative: Difficult to awaken or acting confused (e.g., disoriented, slurred speech)    Negative: Sounds like a life-threatening emergency to the triager    Negative: Headache  (and neurologic deficit)    Negative: [1] Back pain AND [2] numbness (loss of sensation) in groin or rectal area    Negative: [1] Unable to urinate (or only a few drops) > 4 hours AND [2] bladder feels very full (e.g., palpable bladder or strong urge to urinate)    Negative: [1] Loss of bladder or bowel control (urine or bowel incontinence; wetting self, leaking stool) AND [2] new-onset    Negative: [1] Weakness (i.e., paralysis, loss of muscle  strength) of the face, arm / hand, or leg / foot on one side of the body AND [2] sudden onset AND [3] brief (now gone)    Negative: [1] Numbness (i.e., loss of sensation) of the face, arm / hand, or leg / foot on one side of the body AND [2] sudden onset AND [3] brief (now gone)    Negative: [1] Loss of speech or garbled speech AND [2] sudden onset AND [3] brief (now gone)    Negative: Bell's palsy suspected (i.e., weakness on only one side of the face, developing over hours to days, no other symptoms)    Negative: Patient sounds very sick or weak to the triager    Negative: Neck pain (and neurologic deficit)    Negative: Back pain (and neurologic deficit)    Negative: [1] Weakness of the face, arm / hand, or leg / foot on one side of the body AND [2] gradual onset (e.g., days to weeks) AND [3] present now    Negative: [1] Numbness (i.e., loss of sensation) of the face, arm / hand, or leg / foot on one side of the body AND [2] gradual onset (e.g., days to weeks) AND [3] present now    Negative: [1] Loss of speech or garbled speech AND [2] gradual onset (e.g., days to weeks) AND [3] present now    Negative: [1] Tingling (e.g., pins and needles) of the face, arm / hand, or leg / foot on one side of the body AND [2] present now (Exceptions: chronic/recurrent symptom lasting > 4 weeks or tingling from known cause, such as: bumped elbow, carpal tunnel syndrome, pinched nerve, frostbite)    Negative: [1] Loss of speech or garbled speech AND [2] is a chronic symptom (recurrent or ongoing AND present > 4 weeks)    Negative: [1] Weakness of arm / hand, or leg / foot AND [2] is a chronic symptom (recurrent or ongoing AND present > 4 weeks)    Negative: [1] Numbness or tingling in one or both hands AND [2] is a chronic symptom (recurrent or ongoing AND present > 4 weeks)    Negative: [1] Numbness or tingling in one or both feet AND [2] is a chronic symptom (recurrent or ongoing AND present > 4 weeks)    Negative: [1] Numbness  or tingling on both sides of body AND [2] is a new symptom present > 24 hours    Negative: Muscle jerks, tics, or shudders are a chronic symptom (recurrent or ongoing AND present > 4 weeks)    Negative: Normal muscle jerks while falling asleep    Negative: Normal shudder (startled) or shuddering (trembling) from surprise, fear    Protocols used: MUSCLE JERKS - TICS - [id number]-ET, NEUROLOGIC DEFICIT-A-AH

## 2022-10-23 ENCOUNTER — MYC MEDICAL ADVICE (OUTPATIENT)
Dept: INTERNAL MEDICINE | Facility: CLINIC | Age: 37
End: 2022-10-23

## 2022-10-24 ENCOUNTER — NURSE TRIAGE (OUTPATIENT)
Dept: CARDIOLOGY | Facility: CLINIC | Age: 37
End: 2022-10-24

## 2022-10-24 ENCOUNTER — MYC MEDICAL ADVICE (OUTPATIENT)
Dept: PHYSICAL MEDICINE AND REHAB | Facility: CLINIC | Age: 37
End: 2022-10-24

## 2022-10-24 ENCOUNTER — OFFICE VISIT (OUTPATIENT)
Dept: CARDIOLOGY | Facility: CLINIC | Age: 37
End: 2022-10-24
Attending: PHYSICIAN ASSISTANT
Payer: COMMERCIAL

## 2022-10-24 VITALS
DIASTOLIC BLOOD PRESSURE: 77 MMHG | SYSTOLIC BLOOD PRESSURE: 121 MMHG | BODY MASS INDEX: 26.37 KG/M2 | HEIGHT: 62 IN | HEART RATE: 76 BPM | WEIGHT: 143.3 LBS | OXYGEN SATURATION: 99 %

## 2022-10-24 DIAGNOSIS — R00.2 PALPITATIONS: ICD-10-CM

## 2022-10-24 DIAGNOSIS — I47.11 INAPPROPRIATE SINUS TACHYCARDIA (H): Primary | ICD-10-CM

## 2022-10-24 DIAGNOSIS — U09.9 LONG COVID: ICD-10-CM

## 2022-10-24 PROCEDURE — 99205 OFFICE O/P NEW HI 60 MIN: CPT | Performed by: INTERNAL MEDICINE

## 2022-10-24 PROCEDURE — G0463 HOSPITAL OUTPT CLINIC VISIT: HCPCS

## 2022-10-24 RX ORDER — METOPROLOL SUCCINATE 25 MG/1
25 TABLET, EXTENDED RELEASE ORAL DAILY
Qty: 90 TABLET | Refills: 3 | Status: SHIPPED | OUTPATIENT
Start: 2022-10-24 | End: 2022-11-09

## 2022-10-24 ASSESSMENT — PAIN SCALES - GENERAL: PAINLEVEL: NO PAIN (0)

## 2022-10-24 NOTE — LETTER
10/24/2022      RE: Unique Baker  [id number] [facility] [address]       Dear Colleague,    Thank you for the opportunity to participate in the care of your patient, Unique Baker, at the [facility] [place] at [facility]. Please see a copy of my visit note below.                                                                                                                     General Cardiology Clinic-[facility] [place]    Referring provider:Corina Aranda PA-C    HPI: Ms. Unique Baker is a 37 year old  female with PMH significant for  -Post COVID chronic joint pain/chronic muscle pain  -Post COVID chronic fatigue  -COVID infection March 2021, COVID infection 9/21/2022  -ADHD  -Esophageal reflux  -Iron deficiency anemia    Patient was seen in post-COVID clinic 10/4/2022 and reported palpitations, shortness of breath and chest tightness.  She is referred to cardiology for further evaluation.    Patient was in the ER on 10/21/2022 for palpitations, lightheadedness and general malaise after tejinder COVID-19 2 weeks ago.  Work-up in the ER was unremarkable with no signs of troponin change and no elevated D-dimer.  Given palpitations she was recommended Zio patch.  Patient is still wearing the Zio patch.  She was found to be anxious at the time of evaluation.    No known cardiac disease.  Patient tells me that she had palpitations associated with chest tightness and tingling in the whole body after she had her first COVID infection.  Overall she had 3 or 4 episodes but after the second COVID infection she is feeling palpitations 2 or 3 times a day.  It can happen at rest and with activity.  Now she tells me that even thinking about any kind of physical activity that she is supposed to do creates anxiety and palpitations. She was given SSRI venlafaxine for anxiety however she has not started that yet.    She had a stress echocardiogram a year ago  which was normal.  EKG on 10/2022 shows sinus tachycardia.      Patient has seen rheumatology and is on Plaquenil now. Not taking prednisone or ferrous sulfate.  Recent labs on 10/21 shows mild anemia at 11.5.  BMP is normal.  LDL is mildly elevated at 131.  Patient's LDL in March of this year was 199.  Currently not taking atorvastatin.  Medications, personal, family, and social history reviewed with patient and revised.    PAST MEDICAL HISTORY:  Past Medical History:   Diagnosis Date     Diabetes (H)     GDM insulin     GERD (gastroesophageal reflux disease)     w/u otherwise neg      History of colposcopy with cervical biopsy 3/23/07    WNL     Papanicolaou smear of cervix with low grade squamous intraepithelial lesion (LGSIL) 07     PONV (postoperative nausea and vomiting)      S/P  10/13/2017       CURRENT MEDICATIONS:  Current Outpatient Medications   Medication Sig Dispense Refill     acetaminophen (TYLENOL) 500 MG tablet take 1 - 2 tablet by oral route  every 6 hours as needed as needed       atorvastatin (LIPITOR) 10 MG tablet Take 1 tablet (10 mg) by mouth every evening (Patient not taking: Reported on 2022) 30 tablet 3     cetirizine (ZYRTEC) 10 MG tablet Take 1 tablet (10 mg) by mouth daily 30 tablet 1     cholecalciferol 25 MCG (1000 UT) TABS take 1 tablet by oral route  every month       Cyanocobalamin (VITAMIN B12 TR PO) 1 patch daily       dexlansoprazole (DEXILANT) 60 MG CPDR CR capsule Take 60 mg by mouth daily       diclofenac (VOLTAREN) 1 % topical gel Apply 2 g topically 4 times daily To joints as needed for pain 100 g 3     dicyclomine (BENTYL) 10 MG capsule dicyclomine 10 mg capsule       diltiazem 2% in lipovan cream 2% GEL APPLY PEA SIZED AMOUNT TO PERIANAL SKIN THREE TIMES DAILY       famotidine (PEPCID) 20 MG tablet TAKE ONE TABLET BY MOUTH TWICE A DAY AS NEEDED FOR REFLUX 180 tablet 3     ferrous sulfate (FE TABS) 325 (65 Fe) MG EC tablet Take 1 tablet (325 mg) by  mouth daily for 30 days 30 tablet 1     fluocinonide emulsified base 0.05 % external cream apply to rash as needed - sparingly 60 g 1     fluticasone (FLONASE) 50 MCG/ACT nasal spray Spray 1 spray into both nostrils 2 times daily as needed for rhinitis or allergies Take 1 spray twice daily x 1 week then 1 spray daily at night x 1 week then as needed 11.1 mL 1     hydroxychloroquine (PLAQUENIL) 200 MG tablet Take 1 tablet (200 mg) by mouth 2 times daily Annual Plaquenil toxicity eye screening required. 180 tablet 3     multivitamin (ONE-DAILY) tablet Take 1 tablet by mouth daily       sucralfate (CARAFATE) 1 GM tablet take 1 tablet by oral route 3 times every day on an empty stomach 1 hour before meals       venlafaxine (EFFEXOR XR) 37.5 MG 24 hr capsule Take 2 capsules (75 mg) by mouth daily 120 capsule 0     vitamin B-12 (CYANOCOBALAMIN) 2500 MCG sublingual tablet Take 1 tablet (2,500 mcg) by mouth daily for 30 days 30 tablet 1     vitamin D3 (CHOLECALCIFEROL) 50 mcg (2000 units) tablet Take 1 tablet (50 mcg) by mouth daily for 30 days 30 tablet 3       PAST SURGICAL HISTORY:  Past Surgical History:   Procedure Laterality Date     BREAST SURGERY      augmentation       SECTION N/A 2015    Procedure:  SECTION;  Surgeon: Tremaine Gaona MD;  Location:  OR      SECTION, TUBAL LIGATION, COMBINED N/A 10/13/2017    Procedure: COMBINED  SECTION, TUBAL LIGATION;  Repeat  SECTION, TUBAL LIGATION ;  Surgeon: Sidra Salamanca DO;  Location:  OR     COLONOSCOPY N/A 2016    Procedure: COLONOSCOPY;  Surgeon: Lyudmila Pastor MD;  Location:  GI     DILATION AND CURETTAGE SUCTION      elective termination     ESOPHAGOSCOPY, GASTROSCOPY, DUODENOSCOPY (EGD), COMBINED  2014    Procedure: COMBINED ESOPHAGOSCOPY, GASTROSCOPY, DUODENOSCOPY (EGD);   ESOPHAGOSCOPY, GASTROSCOPY, DUODENOSCOPY (EGD) ;  Surgeon: Vishnu Lanier MD;  Location:  GI      "ESOPHAGOSCOPY, GASTROSCOPY, DUODENOSCOPY (EGD), COMBINED Left 9/16/2020    Procedure: ESOPHAGOGASTRODUODENOSCOPY, WITH BIOPSIES USING BIOPSY FORCEPS;  Surgeon: Vishnu Hopkins MD;  Location:  GI     ESOPHAGOSCOPY, GASTROSCOPY, DUODENOSCOPY (EGD), COMBINED N/A 5/26/2021    Procedure: ESOPHAGOGASTRODUODENOSCOPY, WITH BIOPSY using cold forceps;  Surgeon: Vishnu Lanier MD;  Location:  GI     GYN SURGERY  2001     c section      RW LASER WART  2011    Anal wart removal      ZZC NONSPECIFIC PROCEDURE  09/27/01    Primary LTCS       ALLERGIES:     Allergies   Allergen Reactions     No Known Drug Allergies      Other Environmental Allergy      Patch Test Results 3-10-20    Very Strong (3+) or Strong (2+) reactions:  none     Mild (1+) reactions:  Fragrance mix I  Linalool  Oleamidopropyl dimethylamine     Borderline/questionable reactions:  Balsam of [place]  Diphenylguanidine       FAMILY HISTORY:  Family History   Problem Relation Age of Onset     Colon Polyps Brother      Other - See Comments Brother         colon polyps     Hyperlipidemia Sister      Stomach Cancer Sister      Cancer Sister         stomach     C.A.D. No family hx of      Diabetes No family hx of      Breast Cancer No family hx of      Cancer - colorectal No family hx of      Colon Cancer No family hx of          SOCIAL HISTORY:  Social History     Tobacco Use     Smoking status: Former     Years: 7.00     Types: Cigarettes     Smokeless tobacco: Never     Tobacco comments:     only if she drinks alcohol   Vaping Use     Vaping Use: Never used   Substance Use Topics     Alcohol use: Not Currently     Alcohol/week: 8.3 standard drinks     Drug use: No       ROS:   Constitutional: No fever, chills, or sweats. Weight stable.   Cardiovascular: As per HPI.       Exam:  /77 (BP Location: Right arm, Patient Position: Chair, Cuff Size: Adult Regular)   Pulse 76   Ht 1.565 m (5' 1.61\")   Wt 65 kg (143 lb 4.8 oz)   LMP 10/16/2022   SpO2 99%  "  BMI 26.54 kg/m    GENERAL APPEARANCE: alert and no distress  HEENT: no icterus, no central cyanosis  LYMPH/NECK: no adenopathy, no asymmetry, JVP not elevated, no carotid bruits.  RESPIRATORY: lungs clear to auscultation - no rales, rhonchi or wheezes, no use of accessory muscles, no retractions, respirations are unlabored, normal respiratory rate  CARDIOVASCULAR: regular rhythm, normal S1, S2, no S3 or S4 and no murmur, click or rub, precordium quiet with normal PMI.  GI: soft, non tender  EXTREMITIES: peripheral pulses normal, no edema  NEURO: alert, normal speech,and affect  SKIN: no ecchymoses, no rashes     I have reviewed the labs and personally reviewed the imaging below and made my comment in the assessment and plan.    Labs:  CBC RESULTS:   Lab Results   Component Value Date    WBC 6.2 10/21/2022    WBC 9.2 05/01/2021    RBC 4.73 10/21/2022    RBC 4.49 05/01/2021    HGB 11.5 (L) 10/21/2022    HGB 12.7 05/01/2021    HCT 38.9 10/21/2022    HCT 38.4 05/01/2021    MCV 82 10/21/2022    MCV 86 05/01/2021    MCH 24.3 (L) 10/21/2022    MCH 28.3 05/01/2021    MCHC 29.6 (L) 10/21/2022    MCHC 33.1 05/01/2021    RDW 16.1 (H) 10/21/2022    RDW 13.2 05/01/2021     10/21/2022     05/01/2021       BMP RESULTS:  Lab Results   Component Value Date     10/21/2022     05/01/2021    POTASSIUM 3.9 10/21/2022    POTASSIUM 4.0 07/14/2022    POTASSIUM 4.0 05/01/2021    CHLORIDE 101 10/21/2022    CHLORIDE 109 07/14/2022    CHLORIDE 103 05/01/2021    CO2 25 10/21/2022    CO2 26 07/14/2022    CO2 25 05/01/2021    ANIONGAP 11 10/21/2022    ANIONGAP 5 07/14/2022    ANIONGAP 5 05/01/2021     (H) 10/21/2022    GLC 97 07/14/2022    GLC 87 05/01/2021    BUN 7.5 10/21/2022    BUN 9 07/14/2022    BUN 13 05/01/2021    CR 0.59 10/21/2022    CR 0.78 05/01/2021    GFRESTIMATED >90 10/21/2022    GFRESTIMATED >90 05/01/2021    GFRESTBLACK >90 05/01/2021    PREETI 9.9 10/21/2022    PREETI 8.6 05/01/2021      Exercise  stress echocardiogram 7/23/2021    The patient exercises for 10 min and 30 seconds to maximum of 12 METS. Above  average functional capacity.     The EKG portion of this stress test was negative for inducible ischemia.  This was a normal stress echocardiogram with no evidence of stress-induced  ischemia.     No prior study available for comparison.      EKG 10/21/2022: Sinus tachycardia heart rate 110 bpm.        Assessment and Plan:     #Frequent palpitations after second COVID infection 9/2022  -Patient reports multiple episodes of palpitations associated with anxiety, tingling, chest tightness both at rest and with minimal activity like climbing stairs, walking half a mile or cleaning at home.  These are all new to her after COVID infection.  Prior to COVID infection she did not have any of the symptoms.  Today patient's cardiac exam is normal.  Stress test a year ago showed normal echocardiogram.  Recent TSH is normal. Hemoglobin is mildly low at 11.5.  She is not taking iron.  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  Zio patch that she is wearing will confirm my presumed diagnosis.  Recommend starting metoprolol 25 mg/d. She will send me a [employer] message in a month.  If her symptoms are not well controlled we can increase the dose.  Patient is agreeable to proceed.  In the meantime she was wondering if she needs to start venlafaxine for anxiety. I recommended patient to hold anti-anxiety medication for now.  If beta-blocker does not work for her I think she can start on antianxiety medication.  -Once the Zio patch becomes available I will review and inform patient.    Return to clinic to be determined.    Total time spent today for this visit is 84 minutes including precharting, face-to-face clinic visit, review of labs/imaging and medical documentation.    Please donot hesitate to contact me if you have any questions or concerns. Again, thank you for allowing me to participate in  the care of your patient.    Teo HARTLEY MD  [facility] Division of Cardiology  Pager [phone]

## 2022-10-24 NOTE — TELEPHONE ENCOUNTER
"Received a call from the call center to discuss a medication question.  Spoke to Unique who says she had Covid and ever since last week she has been having \"panic like attacks\" where she feels out of breath, jittery, and has palpitations. She says it starts as soon as she gets in the car to go anywhere. She says she has a prescription for hydroxyzine, which she has rarely used, that she takes for a rash, and was told could help with her anxiety, but is taking hydroxychloroquine and not sure if it would interact. Since she is not sure if she can take this medication, she is not sure she can make it to her appointment without having another attack. She says her Rheumatology clinic recommended she follow up with her PCP regarding the possible drug interaction, and her PCP suggested she talk to a pharmacist specialist. She says when she has gone to the ED for these symptoms before, she is told they cannot prescribe anything for anxiety.  Advised it would be in her best interest to make it to her cardiology appointment today at 1 p.m, and to have someone drive her, and provide support. Encouraged to use relaxation techniques. Also advised I cannot direct her to take any medications, and needs to discuss with the cardiologist or primary doctor.  She verbalized agreement and understanding.   Will send a message to [facility] [place] [facility] for an update.  1. NAME of MEDICATION: \"What medicine are you calling about?\" hydroxyzine  2. QUESTION: \"What is your question?\" (e.g., double dose of medicine, side effect) If she can take this medication for anxiety prior to appointment today.  3. PRESCRIBING HCP: \"Who prescribed it?\" Reason: if prescribed by specialist, call should be referred to that group. PCP  4. SYMPTOMS: \"Do you have any symptoms?\" \"Panic like attacks\"  5. SEVERITY: If symptoms are present, ask \"Are they mild, moderate or severe?\" moderate-severe      "

## 2022-10-24 NOTE — PATIENT INSTRUCTIONS
Begin metoprolol 25 mg - RX sent to pharmacy.   As soon as ZIOpatch results are compiled and reviewed, you will be notified.   In one month, send message via [employer] updating us on your symptoms.

## 2022-10-24 NOTE — NURSING NOTE
Chief Complaint   Patient presents with     New Patient     Urgent- new - referral from [facility]     Vitals were taken and medications reconciled.    Tamir Sanabria, JOSEFA  1:06 PM

## 2022-10-24 NOTE — PROGRESS NOTES
General Cardiology Clinic-[facility] [place]    Referring provider:Corina Aranda PA-C    HPI: Ms. Unique Baker is a 37 year old  female with PMH significant for  -Post COVID chronic joint pain/chronic muscle pain  -Post COVID chronic fatigue  -COVID infection March 2021, COVID infection 9/21/2022  -ADHD  -Esophageal reflux  -Iron deficiency anemia    Patient was seen in post-COVID clinic 10/4/2022 and reported palpitations, shortness of breath and chest tightness.  She is referred to cardiology for further evaluation.    Patient was in the ER on 10/21/2022 for palpitations, lightheadedness and general malaise after tejinder COVID-19 2 weeks ago.  Work-up in the ER was unremarkable with no signs of troponin change and no elevated D-dimer.  Given palpitations she was recommended Zio patch.  Patient is still wearing the Zio patch.  She was found to be anxious at the time of evaluation.    No known cardiac disease.  Patient tells me that she had palpitations associated with chest tightness and tingling in the whole body after she had her first COVID infection.  Overall she had 3 or 4 episodes but after the second COVID infection she is feeling palpitations 2 or 3 times a day.  It can happen at rest and with activity.  Now she tells me that even thinking about any kind of physical activity that she is supposed to do creates anxiety and palpitations. She was given SSRI venlafaxine for anxiety however she has not started that yet.    She had a stress echocardiogram a year ago which was normal.  EKG on 10/2022 shows sinus tachycardia.      Patient has seen rheumatology and is on Plaquenil now. Not taking prednisone or ferrous sulfate.  Recent labs on 10/21 shows mild anemia at 11.5.  BMP is normal.  LDL is mildly elevated at 131.  Patient's LDL in March of this year was 199.  Currently not taking  atorvastatin.  Medications, personal, family, and social history reviewed with patient and revised.    PAST MEDICAL HISTORY:  Past Medical History:   Diagnosis Date     Diabetes (H)     GDM insulin     GERD (gastroesophageal reflux disease)     w/u otherwise neg      History of colposcopy with cervical biopsy 3/23/07    WNL     Papanicolaou smear of cervix with low grade squamous intraepithelial lesion (LGSIL) 07     PONV (postoperative nausea and vomiting)      S/P  10/13/2017       CURRENT MEDICATIONS:  Current Outpatient Medications   Medication Sig Dispense Refill     acetaminophen (TYLENOL) 500 MG tablet take 1 - 2 tablet by oral route  every 6 hours as needed as needed       atorvastatin (LIPITOR) 10 MG tablet Take 1 tablet (10 mg) by mouth every evening (Patient not taking: Reported on 2022) 30 tablet 3     cetirizine (ZYRTEC) 10 MG tablet Take 1 tablet (10 mg) by mouth daily 30 tablet 1     cholecalciferol 25 MCG (1000 UT) TABS take 1 tablet by oral route  every month       Cyanocobalamin (VITAMIN B12 TR PO) 1 patch daily       dexlansoprazole (DEXILANT) 60 MG CPDR CR capsule Take 60 mg by mouth daily       diclofenac (VOLTAREN) 1 % topical gel Apply 2 g topically 4 times daily To joints as needed for pain 100 g 3     dicyclomine (BENTYL) 10 MG capsule dicyclomine 10 mg capsule       diltiazem 2% in lipovan cream 2% GEL APPLY PEA SIZED AMOUNT TO PERIANAL SKIN THREE TIMES DAILY       famotidine (PEPCID) 20 MG tablet TAKE ONE TABLET BY MOUTH TWICE A DAY AS NEEDED FOR REFLUX 180 tablet 3     ferrous sulfate (FE TABS) 325 (65 Fe) MG EC tablet Take 1 tablet (325 mg) by mouth daily for 30 days 30 tablet 1     fluocinonide emulsified base 0.05 % external cream apply to rash as needed - sparingly 60 g 1     fluticasone (FLONASE) 50 MCG/ACT nasal spray Spray 1 spray into both nostrils 2 times daily as needed for rhinitis or allergies Take 1 spray twice daily x 1 week then 1 spray daily at night  x 1 week then as needed 11.1 mL 1     hydroxychloroquine (PLAQUENIL) 200 MG tablet Take 1 tablet (200 mg) by mouth 2 times daily Annual Plaquenil toxicity eye screening required. 180 tablet 3     multivitamin (ONE-DAILY) tablet Take 1 tablet by mouth daily       sucralfate (CARAFATE) 1 GM tablet take 1 tablet by oral route 3 times every day on an empty stomach 1 hour before meals       venlafaxine (EFFEXOR XR) 37.5 MG 24 hr capsule Take 2 capsules (75 mg) by mouth daily 120 capsule 0     vitamin B-12 (CYANOCOBALAMIN) 2500 MCG sublingual tablet Take 1 tablet (2,500 mcg) by mouth daily for 30 days 30 tablet 1     vitamin D3 (CHOLECALCIFEROL) 50 mcg (2000 units) tablet Take 1 tablet (50 mcg) by mouth daily for 30 days 30 tablet 3       PAST SURGICAL HISTORY:  Past Surgical History:   Procedure Laterality Date     BREAST SURGERY      augmentation       SECTION N/A 2015    Procedure:  SECTION;  Surgeon: Tremaine Gaona MD;  Location: [facility] OR      SECTION, TUBAL LIGATION, COMBINED N/A 10/13/2017    Procedure: COMBINED  SECTION, TUBAL LIGATION;  Repeat  SECTION, TUBAL LIGATION ;  Surgeon: Sidra Salamanca DO;  Location: [facility] OR     COLONOSCOPY N/A 2016    Procedure: COLONOSCOPY;  Surgeon: Lyudmila Pastor MD;  Location:  GI     DILATION AND CURETTAGE SUCTION      elective termination     ESOPHAGOSCOPY, GASTROSCOPY, DUODENOSCOPY (EGD), COMBINED  2014    Procedure: COMBINED ESOPHAGOSCOPY, GASTROSCOPY, DUODENOSCOPY (EGD);   ESOPHAGOSCOPY, GASTROSCOPY, DUODENOSCOPY (EGD) ;  Surgeon: Vishnu Lanier MD;  Location:  GI     ESOPHAGOSCOPY, GASTROSCOPY, DUODENOSCOPY (EGD), COMBINED Left 2020    Procedure: ESOPHAGOGASTRODUODENOSCOPY, WITH BIOPSIES USING BIOPSY FORCEPS;  Surgeon: Vishnu Hopkins MD;  Location:  GI     ESOPHAGOSCOPY, GASTROSCOPY, DUODENOSCOPY (EGD), COMBINED N/A 2021    Procedure: ESOPHAGOGASTRODUODENOSCOPY, WITH BIOPSY using  "cold forceps;  Surgeon: Vishnu Lanier MD;  Location:  GI     GYN SURGERY  2001     c section      RW LASER WART  2011    Anal wart removal      ZZC NONSPECIFIC PROCEDURE  09/27/01    Primary LTCS       ALLERGIES:     Allergies   Allergen Reactions     No Known Drug Allergies      Other Environmental Allergy      Patch Test Results 3-10-20    Very Strong (3+) or Strong (2+) reactions:  none     Mild (1+) reactions:  Fragrance mix I  Linalool  Oleamidopropyl dimethylamine     Borderline/questionable reactions:  Balsam of [place]  Diphenylguanidine       FAMILY HISTORY:  Family History   Problem Relation Age of Onset     Colon Polyps Brother      Other - See Comments Brother         colon polyps     Hyperlipidemia Sister      Stomach Cancer Sister      Cancer Sister         stomach     C.A.D. No family hx of      Diabetes No family hx of      Breast Cancer No family hx of      Cancer - colorectal No family hx of      Colon Cancer No family hx of          SOCIAL HISTORY:  Social History     Tobacco Use     Smoking status: Former     Years: 7.00     Types: Cigarettes     Smokeless tobacco: Never     Tobacco comments:     only if she drinks alcohol   Vaping Use     Vaping Use: Never used   Substance Use Topics     Alcohol use: Not Currently     Alcohol/week: 8.3 standard drinks     Drug use: No       ROS:   Constitutional: No fever, chills, or sweats. Weight stable.   Cardiovascular: As per HPI.       Exam:  /77 (BP Location: Right arm, Patient Position: Chair, Cuff Size: Adult Regular)   Pulse 76   Ht 1.565 m (5' 1.61\")   Wt 65 kg (143 lb 4.8 oz)   LMP 10/16/2022   SpO2 99%   BMI 26.54 kg/m    GENERAL APPEARANCE: alert and no distress  HEENT: no icterus, no central cyanosis  LYMPH/NECK: no adenopathy, no asymmetry, JVP not elevated, no carotid bruits.  RESPIRATORY: lungs clear to auscultation - no rales, rhonchi or wheezes, no use of accessory muscles, no retractions, respirations are unlabored, normal " respiratory rate  CARDIOVASCULAR: regular rhythm, normal S1, S2, no S3 or S4 and no murmur, click or rub, precordium quiet with normal PMI.  GI: soft, non tender  EXTREMITIES: peripheral pulses normal, no edema  NEURO: alert, normal speech,and affect  SKIN: no ecchymoses, no rashes     I have reviewed the labs and personally reviewed the imaging below and made my comment in the assessment and plan.    Labs:  CBC RESULTS:   Lab Results   Component Value Date    WBC 6.2 10/21/2022    WBC 9.2 05/01/2021    RBC 4.73 10/21/2022    RBC 4.49 05/01/2021    HGB 11.5 (L) 10/21/2022    HGB 12.7 05/01/2021    HCT 38.9 10/21/2022    HCT 38.4 05/01/2021    MCV 82 10/21/2022    MCV 86 05/01/2021    MCH 24.3 (L) 10/21/2022    MCH 28.3 05/01/2021    MCHC 29.6 (L) 10/21/2022    MCHC 33.1 05/01/2021    RDW 16.1 (H) 10/21/2022    RDW 13.2 05/01/2021     10/21/2022     05/01/2021       BMP RESULTS:  Lab Results   Component Value Date     10/21/2022     05/01/2021    POTASSIUM 3.9 10/21/2022    POTASSIUM 4.0 07/14/2022    POTASSIUM 4.0 05/01/2021    CHLORIDE 101 10/21/2022    CHLORIDE 109 07/14/2022    CHLORIDE 103 05/01/2021    CO2 25 10/21/2022    CO2 26 07/14/2022    CO2 25 05/01/2021    ANIONGAP 11 10/21/2022    ANIONGAP 5 07/14/2022    ANIONGAP 5 05/01/2021     (H) 10/21/2022    GLC 97 07/14/2022    GLC 87 05/01/2021    BUN 7.5 10/21/2022    BUN 9 07/14/2022    BUN 13 05/01/2021    CR 0.59 10/21/2022    CR 0.78 05/01/2021    GFRESTIMATED >90 10/21/2022    GFRESTIMATED >90 05/01/2021    GFRESTBLACK >90 05/01/2021    PREETI 9.9 10/21/2022    PREETI 8.6 05/01/2021      Exercise stress echocardiogram 7/23/2021    The patient exercises for 10 min and 30 seconds to maximum of 12 METS. Above  average functional capacity.     The EKG portion of this stress test was negative for inducible ischemia.  This was a normal stress echocardiogram with no evidence of stress-induced  ischemia.     No prior study available  for comparison.      EKG 10/21/2022: Sinus tachycardia heart rate 110 bpm.        Assessment and Plan:     #Frequent palpitations after second COVID infection 9/2022  -Patient reports multiple episodes of palpitations associated with anxiety, tingling, chest tightness both at rest and with minimal activity like climbing stairs, walking half a mile or cleaning at home.  These are all new to her after COVID infection.  Prior to COVID infection she did not have any of the symptoms.  Today patient's cardiac exam is normal.  Stress test a year ago showed normal echocardiogram.  Recent TSH is normal. Hemoglobin is mildly low at 11.5.  She is not taking iron.  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  Zio patch that she is wearing will confirm my presumed diagnosis.  Recommend starting metoprolol 25 mg/d. She will send me a Slide[employer] message in a month.  If her symptoms are not well controlled we can increase the dose.  Patient is agreeable to proceed.  In the meantime she was wondering if she needs to start venlafaxine for anxiety. I recommended patient to hold anti-anxiety medication for now.  If beta-blocker does not work for her I think she can start on antianxiety medication.  -Once the Zio patch becomes available I will review and inform patient.    Return to clinic to be determined.    Total time spent today for this visit is 84 minutes including precharting, face-to-face clinic visit, review of labs/imaging and medical documentation.    Please donot hesitate to contact me if you have any questions or concerns. Again, thank you for allowing me to participate in the care of your patient.    Teo HARTLEY MD  [facility] Division of Cardiology  Pager [phone]    Addendum note 11/8/2022:  I have reviewed patient's Zio patch which shows symptoms corresponding to sinus rhythm with heart rate less than 100 bpm.  The patient today reported to a different provider that she is feeling  fatigued with metoprolol but the medication is helping with palpitations.    Recommend to cut down on metoprolol 25 mg to half tablet a day (12.5 mg/day.)    No further work-up required from cardiology standpoint.

## 2022-10-24 NOTE — TELEPHONE ENCOUNTER
I replied to one of the messages patient sent me already. I would advise her if she has this many questions she should see someone in clinic. Also, I would send these messages to her PCP as well as I have only seen her once for a brief visit and she has quite a recently complex medical history.    Thanks,  CARRIE Recio, [facility]

## 2022-10-24 NOTE — TELEPHONE ENCOUNTER
Spoke with Tremaine in Clinic Regarding Triage Encounter After Tremaine looked in the pt chart he requested that I call pt and let her know that her PCP can address the  Issues.   Spoke with Pt And verbalized the request and she understood and I also reminded her that she has an appointment with Cardiology today and can address any questions or concerns there.

## 2022-10-26 ENCOUNTER — VIRTUAL VISIT (OUTPATIENT)
Dept: BEHAVIORAL HEALTH | Facility: CLINIC | Age: 37
End: 2022-10-26
Payer: COMMERCIAL

## 2022-10-26 DIAGNOSIS — F41.1 GAD (GENERALIZED ANXIETY DISORDER): ICD-10-CM

## 2022-10-26 DIAGNOSIS — F33.1 MAJOR DEPRESSIVE DISORDER, RECURRENT EPISODE, MODERATE (H): Primary | ICD-10-CM

## 2022-10-28 ENCOUNTER — NURSE TRIAGE (OUTPATIENT)
Dept: NURSING | Facility: CLINIC | Age: 37
End: 2022-10-28

## 2022-10-28 DIAGNOSIS — F41.9 ANXIETY: ICD-10-CM

## 2022-10-28 DIAGNOSIS — F41.0 PANIC ATTACK: Primary | ICD-10-CM

## 2022-10-29 ENCOUNTER — NURSE TRIAGE (OUTPATIENT)
Dept: NURSING | Facility: CLINIC | Age: 37
End: 2022-10-29

## 2022-10-29 DIAGNOSIS — F41.9 ANXIETY: Primary | ICD-10-CM

## 2022-10-29 NOTE — TELEPHONE ENCOUNTER
"Pt reports she was started on Effexor yesterday. Per pt she woke up this morning and legs felt \"tired and weak\", pt reports she took another Effexor today and then hands and feet felt sweaty. Also this morning \"everything in a fog, like I am drunk or something, feeling anxious in a different way than before\". Pt denies fever. Pt reports Effexor was prescribed by Dr. Pierce, 37.5 mg 1/2 tab daily. Pt denies she has any confusion or weakness at the time of the call.    Advised pt full anti anxiety effect of Effexor may take a couple of weeks and sometimes mild adjustment period occurs. Unclear if symptoms related to medication. Can monitor for now if feeling fine. Recommended pt contact pharmacist with questions about possible side effects. Contact prescribing provider if symptoms persist. Call back if new or worsening symptoms.    Pt verbalizes understanding and agrees to plan.     Reason for Disposition    [1] Caller has medicine question about med NOT prescribed by PCP AND [2] triager unable to answer question (e.g., compatibility with other med, storage)    Protocols used: MEDICATION QUESTION CALL-A-AH      "

## 2022-10-29 NOTE — TELEPHONE ENCOUNTER
Nurse Triage SBAR    Is this a 2nd Level Triage? YES, LICENSED PRACTITIONER REVIEW IS REQUIRED    Situation: Unique called stating she has been having bad side effects from a medication she is taking.    Background: Unique states she started taking Effexor 2 days ago.    Assessment: She is c/o feeling restless and SOB after taking Effexor. She states she felt like the room is spinning like she is drunk. It is causing her to have sweaty palms and feet. She states she talked to the pharmacist who informed her that this is a side effect. She also states it makes her feel more anxious than before. She hasn't taken it today and is wanting to know what to do.    Protocol Recommended Disposition:   Call PCP Now    Recommendation: On call provider was paged at 1642 and again at 1653. Spoke to on call provider who stated to hold off on the medication and have her contact her clinic on Monday. I relayed this information to Unique.  Paged to provider    Jannette Lockwood RN  [facility] Nurse Advisor   10/29/2022  4:28 PM    Reason for Disposition    [1] Caller has URGENT medicine question about med that PCP or specialist prescribed AND [2] triager unable to answer question    Additional Information    Negative: [1] Intentional drug overdose AND [2] suicidal thoughts or ideas    Negative: MORE THAN A DOUBLE DOSE of a prescription or over-the-counter (OTC) drug    Negative: [1] DOUBLE DOSE (an extra dose or lesser amount) of prescription drug AND [2] any symptoms (e.g., dizziness, nausea, pain, sleepiness)    Negative: [1] DOUBLE DOSE (an extra dose or lesser amount) of over-the-counter (OTC) drug AND [2] any symptoms (e.g., dizziness, nausea, pain, sleepiness)    Negative: Took another person's prescription drug    Negative: [1] DOUBLE DOSE (an extra dose or lesser amount) of prescription drug AND [2] NO symptoms (Exception: a double dose of antibiotics)    Negative: Diabetes drug error or overdose (e.g., took wrong type  of insulin or took extra dose)    Negative: [1] Prescription not at pharmacy AND [2] was prescribed by PCP recently (Exception: triager has access to EMR and prescription is recorded there. Go to Home Care and confirm for pharmacy.)    Negative: [1] Pharmacy calling with prescription question AND [2] triager unable to answer question    Protocols used: MEDICATION QUESTION CALL-A-AH

## 2022-10-31 ENCOUNTER — OFFICE VISIT (OUTPATIENT)
Dept: FAMILY MEDICINE | Facility: CLINIC | Age: 37
End: 2022-10-31
Payer: COMMERCIAL

## 2022-10-31 VITALS
BODY MASS INDEX: 26.06 KG/M2 | SYSTOLIC BLOOD PRESSURE: 127 MMHG | WEIGHT: 138 LBS | RESPIRATION RATE: 16 BRPM | DIASTOLIC BLOOD PRESSURE: 80 MMHG | TEMPERATURE: 99 F | HEART RATE: 72 BPM | HEIGHT: 61 IN

## 2022-10-31 DIAGNOSIS — G44.209 TENSION HEADACHE: ICD-10-CM

## 2022-10-31 DIAGNOSIS — F43.22 ACUTE ADJUSTMENT DISORDER WITH ANXIETY: ICD-10-CM

## 2022-10-31 DIAGNOSIS — U09.9 LONG COVID: Primary | ICD-10-CM

## 2022-10-31 LAB — CRP SERPL-MCNC: <3 MG/L

## 2022-10-31 PROCEDURE — 86140 C-REACTIVE PROTEIN: CPT | Performed by: FAMILY MEDICINE

## 2022-10-31 PROCEDURE — 36415 COLL VENOUS BLD VENIPUNCTURE: CPT | Performed by: FAMILY MEDICINE

## 2022-10-31 PROCEDURE — 99214 OFFICE O/P EST MOD 30 MIN: CPT | Performed by: FAMILY MEDICINE

## 2022-10-31 PROCEDURE — 96127 BRIEF EMOTIONAL/BEHAV ASSMT: CPT | Performed by: FAMILY MEDICINE

## 2022-10-31 RX ORDER — CYCLOBENZAPRINE HCL 5 MG
5 TABLET ORAL
Qty: 30 TABLET | Refills: 0 | Status: ON HOLD | OUTPATIENT
Start: 2022-10-31 | End: 2023-10-16

## 2022-10-31 RX ORDER — ESCITALOPRAM OXALATE 5 MG/1
5 TABLET ORAL DAILY
Qty: 30 TABLET | Refills: 2 | Status: SHIPPED | OUTPATIENT
Start: 2022-10-31 | End: 2022-11-07 | Stop reason: SINTOL

## 2022-10-31 RX ORDER — HYDROXYZINE HYDROCHLORIDE 25 MG/1
25 TABLET, FILM COATED ORAL 3 TIMES DAILY PRN
Qty: 90 TABLET | Refills: 1 | Status: SHIPPED | OUTPATIENT
Start: 2022-10-31 | End: 2023-06-07

## 2022-10-31 ASSESSMENT — ANXIETY QUESTIONNAIRES
3. WORRYING TOO MUCH ABOUT DIFFERENT THINGS: NEARLY EVERY DAY
2. NOT BEING ABLE TO STOP OR CONTROL WORRYING: NEARLY EVERY DAY
IF YOU CHECKED OFF ANY PROBLEMS ON THIS QUESTIONNAIRE, HOW DIFFICULT HAVE THESE PROBLEMS MADE IT FOR YOU TO DO YOUR WORK, TAKE CARE OF THINGS AT HOME, OR GET ALONG WITH OTHER PEOPLE: VERY DIFFICULT
GAD7 TOTAL SCORE: 18
1. FEELING NERVOUS, ANXIOUS, OR ON EDGE: NEARLY EVERY DAY
8. IF YOU CHECKED OFF ANY PROBLEMS, HOW DIFFICULT HAVE THESE MADE IT FOR YOU TO DO YOUR WORK, TAKE CARE OF THINGS AT HOME, OR GET ALONG WITH OTHER PEOPLE?: VERY DIFFICULT
7. FEELING AFRAID AS IF SOMETHING AWFUL MIGHT HAPPEN: MORE THAN HALF THE DAYS
5. BEING SO RESTLESS THAT IT IS HARD TO SIT STILL: NEARLY EVERY DAY
GAD7 TOTAL SCORE: 18
7. FEELING AFRAID AS IF SOMETHING AWFUL MIGHT HAPPEN: MORE THAN HALF THE DAYS
GAD7 TOTAL SCORE: 18
4. TROUBLE RELAXING: NEARLY EVERY DAY
6. BECOMING EASILY ANNOYED OR IRRITABLE: SEVERAL DAYS

## 2022-10-31 ASSESSMENT — PATIENT HEALTH QUESTIONNAIRE - PHQ9
SUM OF ALL RESPONSES TO PHQ QUESTIONS 1-9: 16
SUM OF ALL RESPONSES TO PHQ QUESTIONS 1-9: 16
10. IF YOU CHECKED OFF ANY PROBLEMS, HOW DIFFICULT HAVE THESE PROBLEMS MADE IT FOR YOU TO DO YOUR WORK, TAKE CARE OF THINGS AT HOME, OR GET ALONG WITH OTHER PEOPLE: EXTREMELY DIFFICULT

## 2022-10-31 ASSESSMENT — ENCOUNTER SYMPTOMS
HEADACHES: 1
FATIGUE: 1
NERVOUS/ANXIOUS: 1

## 2022-10-31 NOTE — TELEPHONE ENCOUNTER
She should stop the medication and we should set her up with Psychiatry for help with med management. Referral has been placed.

## 2022-10-31 NOTE — TELEPHONE ENCOUNTER
Provider Recommendation Follow Up:   Reached patient/caregiver. Informed of provider's recommendations. Patient verbalized understanding and agrees with the plan. She has psychiatry appointment on 11/3, but also made appointment in [facility] today for continued side effects from Effexor.     Kathi Chavez RN  [facility]

## 2022-10-31 NOTE — PROGRESS NOTES
Assessment & Plan     Long COVID  Patient has symptoms consistent with long COVID, CRP was rechecked without significant elevation.  Referral placed to COVID clinic.  She is having some symptoms consistent with POTS, recommend trial of increased oral salt intake.  - CRP, inflammation  - Adult Post Covid Clinic Referral  - CRP, inflammation    Tension headache  Start Flexeril, continue Tylenol and Motrin.  - cyclobenzaprine (FLEXERIL) 5 MG tablet  Dispense: 30 tablet; Refill: 0    Acute adjustment disorder with anxiety  Previously on Effexor, was too activating.  Recommend trial of Lexapro.  Continue hydroxyzine.  Follow-up with PCP for further management  - escitalopram (LEXAPRO) 5 MG tablet; Take 1 tablet (5 mg) by mouth daily  - hydrOXYzine (ATARAX) 25 MG tablet; Take 1 tablet (25 mg) by mouth 3 times daily as needed for anxiety  - EMOTIONAL / BEHAVIORAL ASSESSMENT         Depression Screening Follow Up    PHQ 10/31/2022   PHQ-9 Total Score 16   Q9: Thoughts of better off dead/self-harm past 2 weeks Not at all         Return in about 2 weeks (around 11/14/2022) for If symptoms do not improve or gets worse..    Jovany Puente MD  [facility] RODRÍGUEZ Calhoun is a 37 year old, presenting for the following health issues:  Anxiety (Follow-up anxiety) and Headache (Daily HA's and nausea x2-3 days)      History of Present Illness       Mental Health Follow-up:  Patient presents to follow-up on Depression & Anxiety.Patient's depression since last visit has been:  Worse  The patient is having other symptoms associated with depression.  Patient's anxiety since last visit has been:  Worse  The patient is having other symptoms associated with anxiety.  Any significant life events: health concerns  Patient is feeling anxious or having panic attacks.  Patient has no concerns about alcohol or drug use.    Headaches:   Since the patient's last clinic visit, headaches are: no change  The patient is  "getting headaches:  Couple times a day  She is able to do normal daily activities when she has a migraine.  The patient is taking the following rescue/relief medications:  Tylenol   Patient states \"I get some relief\" from the rescue/relief medications.   The patient is taking the following medications to prevent migraines:  No medications to prevent migraines  In the past 4 weeks, the patient has gone to an Urgent Care or Emergency Room 0 times times due to headaches.    She eats 2-3 servings of fruits and vegetables daily.She consumes 1 sweetened beverage(s) daily.She exercises with enough effort to increase her heart rate 9 or less minutes per day.  She exercises with enough effort to increase her heart rate 3 or less days per week.   She is taking medications regularly.    Today's PHQ-9         PHQ-9 Total Score: 16    PHQ-9 Q9 Thoughts of better off dead/self-harm past 2 weeks :   Not at all    How difficult have these problems made it for you to do your work, take care of things at home, or get along with other people: Extremely difficult  Today's RENÉE-7 Score: 18       Patient is a pleasant 37-year-old female presents to clinic with concerns of headache, fatigue, anxiety, increased stress.  Has not felt right since having COVID 1 month ago.  She was placed on Effexor which made her more anxious..     Also noticed that she gets dizzy when changing positions, occasionally heart rate goes to 150 bpm.    Review of Systems   Constitutional: Positive for fatigue.   Neurological: Positive for headaches.   Psychiatric/Behavioral: Negative for suicidal ideas. The patient is nervous/anxious.             Objective    /80 (BP Location: Right arm, Patient Position: Chair, Cuff Size: Adult Regular)   Pulse 72   Temp 99  F (37.2  C) (Oral)   Resp 16   Ht 1.549 m (5' 1\")   Wt 62.6 kg (138 lb)   LMP 10/16/2022   BMI 26.07 kg/m    Body mass index is 26.07 kg/m .  Physical Exam  Constitutional:       Appearance: She " is not ill-appearing.   Cardiovascular:      Rate and Rhythm: Normal rate and regular rhythm.   Pulmonary:      Effort: Pulmonary effort is normal.      Breath sounds: Normal breath sounds.   Musculoskeletal:      Comments: Tenderness of the left trapezius muscle   Neurological:      Mental Status: She is alert.   Psychiatric:         Behavior: Behavior normal.         Thought Content: Thought content normal.         Judgment: Judgment normal.

## 2022-11-01 ENCOUNTER — VIRTUAL VISIT (OUTPATIENT)
Dept: PHARMACY | Facility: CLINIC | Age: 37
End: 2022-11-01
Payer: COMMERCIAL

## 2022-11-01 DIAGNOSIS — E78.5 HYPERLIPIDEMIA, UNSPECIFIED HYPERLIPIDEMIA TYPE: ICD-10-CM

## 2022-11-01 DIAGNOSIS — J30.1 SEASONAL ALLERGIC RHINITIS DUE TO POLLEN: ICD-10-CM

## 2022-11-01 DIAGNOSIS — E53.8 VITAMIN B12 DEFICIENCY (NON ANEMIC): ICD-10-CM

## 2022-11-01 DIAGNOSIS — F41.9 ANXIETY: Primary | ICD-10-CM

## 2022-11-01 DIAGNOSIS — U09.9 LONG COVID: ICD-10-CM

## 2022-11-01 DIAGNOSIS — G44.209 TENSION HEADACHE: ICD-10-CM

## 2022-11-01 DIAGNOSIS — R00.2 PALPITATIONS: ICD-10-CM

## 2022-11-01 DIAGNOSIS — M35.9 DIFFUSE CONNECTIVE TISSUE DISEASE (H): ICD-10-CM

## 2022-11-01 DIAGNOSIS — K21.00 GASTROESOPHAGEAL REFLUX DISEASE WITH ESOPHAGITIS WITHOUT HEMORRHAGE: ICD-10-CM

## 2022-11-01 PROCEDURE — 99607 MTMS BY PHARM ADDL 15 MIN: CPT | Performed by: PHARMACIST

## 2022-11-01 PROCEDURE — 99605 MTMS BY PHARM NP 15 MIN: CPT | Performed by: PHARMACIST

## 2022-11-01 NOTE — PATIENT INSTRUCTIONS
"Recommendations from today's MTM visit:                                                    MTM (medication therapy management) is a service provided by a clinical pharmacist designed to help you get the most of out of your medicines.   Today we reviewed what your medicines are for, how to know if they are working, that your medicines are safe and how to make your medicine regimen as easy as possible.      1.  Recommend pharmacogenomic testing - discuss with psychiatry and I will talk with [place] about this order.  2.  Continue to take escitalopram daily as prescribed to help with anxiety.  It may take a few weeks for this to be fully effective.  3.  Okay to take dexlansoprazole and famotidine with hydroxychloroquine.  Separate the sucralfate from hydroxychloroquine by at least two hours.    Follow-up: Return in about 1 month (around 12/1/2022) for Medication Therapy Management.    It was great speaking with you today.  I value your experience and would be very thankful for your time in providing feedback in our clinic survey. In the next few days, you may receive an email or text message from [employer] with a link to a survey related to your  clinical pharmacist.\"     To schedule another MTM appointment, please call the clinic directly or you may call the MTM scheduling line at [phone] or toll-free at [phone].     My Clinical Pharmacist's contact information:                                                      Please feel free to contact me with any questions or concerns you have.      Janet Ching , Pharm D  [phone] (phone)  Medication Therapy Management Pharmacist     "

## 2022-11-01 NOTE — PROGRESS NOTES
Medication Therapy Management (MTM) Encounter    ASSESSMENT:                            Medication Adherence/Access: See below for considerations    Anxiety:  Recommend continuing with daily escitalopram since she just started and the medication would not be effective yet; symptoms she is having may be due to the untreated anxiety vs medication.  Discussed pharmacogenomic testing with patient and she is interested.  Will reach out to [facility] or Dr. Riggs for referral.  Follow-up with Psychiatry as planned.      B12 Deficiency:  stable    Connective Tissue Disorder: okay to take hydroxychloroquine with PPI and famotidine.  Should separate the sucralfate from the hydroxyzine by 2 hours.      Palpitations:  Palpitations controlled when anxiety controlled.  See plan below for anxiety.    Long COVID:  Schedule with adult Post Covid Clinic as recommended     Tension headache:  Recommend Tylenol as needed for headaches. Then step up to Motrin and cyclobenzaprine as needed.    Hyperlipidemia: not on therapy at this time.  With so many new medications and reactions she will hold off on restarting statin at this time.    Allergic Rhinitis: stable    GERD: okay to take dexlansoprazole and famotidine with hydroxychloroquine.  Separate the sucralfate from hydroxychloroquine by at least two hours.    PLAN:                            1.  Recommend pharmacogenomic testing - discuss with psychiatry  2.  Escitalopram daily as prescribed  3.  Hydroxychloroquine and sucralfate separate by at least 2 hours    Follow-up: Return in about 1 month (around 12/1/2022) for Medication Therapy Management.    SUBJECTIVE/OBJECTIVE:                          Unique Baker is a 37 year old female contacted via secure video for an initial visit. She was referred to me from [facility] Pierce.      Reason for visit: selective serotonin reuptake inhibitor/SNRI for anxiety    Allergies/ADRs: Reviewed in chart; headache cocktail in ED caused increased  anxiety/restlessness; rash from ZioPatch  Tobacco: She reports that she has quit smoking. Her smoking use included cigarettes. She has never used smokeless tobacco.  Alcohol: not currently using    Medication Adherence/Access: no issues reported    Anxiety:  Started escitalopram yesterday.  Woke up with anxiety, feeling groggy and when she washes her hands she can feel her skin is different.   Just took hydroxyzine to be on video call - this is helpful quickly but doesn't last.  Has been taking hydroxyzine up to three times.    No appetite today.  Effexor - felt her like her legs were cold/tingly from knees down and restless, increased anxiety, insomnia    B12 Deficiency:  Wearing B12 patch 500 mcg on and off.  Not sure she notices any difference.  Tried B12 2500 mcg but was too restless so switched to the patches.        Connective Tissue Disorder:  Started on hydroxychloroquine 200 mg twice daily.  Unclear on diagnosis; symptoms mimic lupus.  No change in symptoms since starting.  Followed by Rheumatology.          Palpitations: Taking metoprolol XL 25 mg daily as needed; not needed for a day.  Palpitations come with anxiety.      BP Readings from Last 3 Encounters:   10/31/22 127/80   10/24/22 121/77   10/21/22 117/71       From Cardiology visit on 10/24/22:  #Frequent palpitations after second COVID infection 9/2022  -Patient reports multiple episodes of palpitations associated with anxiety, tingling, chest tightness both at rest and with minimal activity like climbing stairs, walking half a mile or cleaning at home.  These are all new to her after COVID infection.  Prior to COVID infection she did not have any of the symptoms.  Today patient's cardiac exam is normal.  Stress test a year ago showed normal echocardiogram.  Recent TSH is normal. Hemoglobin is mildly low at 11.5.  She is not taking iron.  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  Zio patch that she is wearing will confirm  my presumed diagnosis.  Recommend starting metoprolol 25 mg/d. She will send me a [employer] message in a month.  If her symptoms are not well controlled we can increase the dose.  Patient is agreeable to proceed.  In the meantime she was wondering if she needs to start venlafaxine for anxiety. I recommended patient to hold anti-anxiety medication for now.  If beta-blocker does not work for her I think she can start on antianxiety medication.  -Once the Zio patch becomes available I will review and inform patient.    GI Pain: Seen in ED on 10/27 for GI pain.    Current medications include: Dexilant 60 mg  once daily, famotidine as needed and sucralfate as needed. Patient reports phlegm in her throat, ear plugged, jaw hurts.  Patient feels that current regimen is not effective.     37-year-old female presenting for evaluation of epigastric abdominal pain radiating into her esophagus. Patient states that she has had GERD for the last year for which she is following with GI. Patient came to the ER today stating that her pain was worse over the last few days. Patient endorses not eating anything besides white rice at this time. Patient is associated nausea without vomiting. Patient denies any fevers at home. She is well-appearing in no distress blood work is reassuring with no leukocytosis, transaminitis or elevated lipase. Abdomen is soft nontender palpation with no rebound or guarding specifically in the right upper quadrant or epigastrium. Given reassuring labs, benign abdominal exam do not suspect cholecystitis. At this point patient states that she is concerned that her gallbladder may not be functioning properly. Have a lengthy discussion with the patient that we can obtain a ultrasound however there is very low suspicion for cholecystitis given lack of tenderness on examination. Discussed that she will likely need to follow with GI consultant for referral for HIDA scan. I do offer the ultrasound however she  declined. She did receive GI cocktail with some improvement of symptoms. Given her nausea discharged home on Zofran. Patient is already taking a PPI, Pepcid, sucralfate, recommend that she can use as needed Maalox as well for her symptoms. We discussed return precautions ER.    Prior to patient leaving the department she complained of whole body numbness, dizziness, concerned that her gallbladder is infected. Again reiterated with her low suspicion for cholecystitis however given her level anxiety and ultrasound was ordered. Ultrasound was negative for cholecystitis, she did have evidence of gallstones which were known. EKG troponin were negative. At this point given reassuring work-up and benign abdominal exam patient be discharged home, do not feel that a CT abdomen pelvis necessary at this time. Recommend follow-up with her GI specialist      Long COVID:  Initially diagnosed in March, second diagnosis on Sept 21. Low grade fever 99.2-99.5 for past week.      Patient has symptoms consistent with long COVID, CRP was rechecked without significant elevation.  Referral placed to COVID clinic.  She is having some symptoms consistent with POTS, recommend trial of increased oral salt intake.  - CRP, inflammation  - Adult Post Covid Clinic Referral  - CRP, inflammation     Tension headache:  Just prescribed cyclobenzaprine to help with tension; has not tried this yet.  Also recommended to try Tylenol and Motrin.      Hyperlipidemia: Current therapy includes atorvastatin 10 mg daily - stopped to see if dry mouth improved.  The dry mouth did not resolve but did get better.    Recent Labs   Lab Test 10/21/22  0818 06/27/22  0813   CHOL 202* 128   HDL 38* 41*   * 71   TRIG 167* 78       Allergic Rhinitis: Current medications include flonase as needed, cetirizine 10mg once daily as needed - has not used for some time.  Will use  Primary symptoms are nasal congestion. Patient feels that current therapy is effective.        Today's Vitals: LMP 10/16/2022   ----------------  Post Discharge Medication Reconciliation Status: discharge medications reconciled, continue medications without change.    I spent 45 minutes with this patient today. All changes were made via collaborative practice agreement with Celina Riggs MD. A copy of the visit note was provided to the referring provider(s).    The patient was sent via [employer] a summary of these recommendations.     Janet Ching , Pharm D  [phone] (phone)  Medication Therapy Management Pharmacist     Telemedicine Visit Details  Type of service:  Video Conference via [employer]  Start Time: 1230pm  End Time: 115pm  Originating Location (pt. Location): Home      Distant Location (provider location):  On-site  Provider has received verbal consent for a visit from the patient? Yes     Medication Therapy Recommendations  Anxiety    Current Medication: escitalopram (LEXAPRO) 5 MG tablet   Rationale: Does not understand instructions - Adherence - Adherence   Recommendation: Provide Education   Status: Patient Agreed - Adherence/Education          Current Medication: escitalopram (LEXAPRO) 5 MG tablet   Rationale: Undesirable effect - Adverse medication event - Safety   Recommendation: Continue to Monitor   Status: Contact Provider - Awaiting Response         Esophageal reflux    Current Medication: dexlansoprazole (DEXILANT) 60 MG CPDR CR capsule   Rationale: Does not understand instructions - Adherence - Adherence   Recommendation: Provide Education   Status: Patient Agreed - Adherence/Education

## 2022-11-02 DIAGNOSIS — T78.40XA SENSITIVITY TO MEDICATION, INITIAL ENCOUNTER: ICD-10-CM

## 2022-11-02 DIAGNOSIS — F41.0 PANIC ATTACK: ICD-10-CM

## 2022-11-02 DIAGNOSIS — F41.9 ANXIETY: Primary | ICD-10-CM

## 2022-11-03 ENCOUNTER — VIRTUAL VISIT (OUTPATIENT)
Dept: PSYCHIATRY | Facility: CLINIC | Age: 37
End: 2022-11-03
Payer: COMMERCIAL

## 2022-11-03 DIAGNOSIS — F41.9 ANXIETY: Primary | ICD-10-CM

## 2022-11-03 PROCEDURE — 99204 OFFICE O/P NEW MOD 45 MIN: CPT | Mod: 95 | Performed by: PSYCHIATRY & NEUROLOGY

## 2022-11-03 NOTE — PROGRESS NOTES
"Unique is a 37 year old who is being evaluated via a billable video visit.      How would you like to obtain your AVS? MyChart  If the video visit is dropped, the invitation should be resent by: Text to cell phone: [phone]  Will anyone else be joining your video visit? No  {If patient encounters technical issues they should call [phone] :[id number]}      Video-Visit Details    Video Start Time: {video visit start/end time for provider to select:650634}    Type of service:  Video Visit    Video End Time:{video visit start/end time for provider to select:183739}    Originating Location (pt. Location): {video visit patient location:600106::\"Home\"}    {PROVIDER LOCATION On-site should be selected for visits conducted from your clinic location or adjoining [facility] hospital, academic office, or other nearby [facility] building. Off-site should be selected for all other provider locations, including home:023359}    Distant Location (provider location):  {virtual location provider:332273}    Platform used for Video Visit: {Virtual Visit Platforms:846842::\"RRsat\"}  "

## 2022-11-03 NOTE — Clinical Note
Thank you for your consult and care of the patient. Lexapro relatively better tolerated than venlafaxine, but having difficulty adjusting - attempting very low dose 2.5 mg po qday.  Sincerely, Shade Strickland M.D. Consultative Psychiatrist Program Medical Director, [facility] Psychiatry Service

## 2022-11-03 NOTE — PROGRESS NOTES
[facility] Psychiatry Intake      IDENTIFICATION   Name: Unique Baker   : 1985/37 year old      Sex:    @ female          Telemedicine Visit: The patient's condition can be safely assessed and treated via synchronous audio and visual telemedicine encounter.      Reason for Telemedicine Visit: COVID 19 pandemic and the social and physical recommendations by the CDC and MD.      Originating Site (Patient Location): Patient's home    Distant Site (Provider Location): Provider Remote Setting    Consent:  The patient/guardian has verbally consented to: the potential risks and benefits of telemedicine (video visit or phone) versus in person care; bill my insurance or make self-payment for services provided; and responsibility for payment of non-covered services.     Mode of Communication:  [employer] video platform     As the provider I attest to compliance with applicable laws and regulations related to telemedicine.      Face to Face/Patient Contact start Time: 3:42PM  Face to Face/Patient Contact end Time: 4:24 PM  Face to Face/patient Contact total time: 42 minutes  Pre Charting time: 4; Post charting time, communication and other activities: 5; Total time 51 minutes      CHIEF COMPLAINT   Source of Referral:  [unfilled]  Primary Care Provider: MD Oneil Purcell Lynn Janette     Consult for anxiety and depression.      HISTORY OF PRESENT ILLNESS   S/p covid infection late September with treatment. On  started to feel numbness, nervousness, anxiety and feeling out of breath. Never had this before. Started to get anxious about the sx experienced and anxiety escalated - such as when in line in public. Had a long wait and with anxiety attack had jittery. Went to a hotel and [employer] a week after that - then noticed heart racing/palpitations with walking some distance. Then had anxiety with lines at restaurants, leading to nausea, dyspnea, and fear of passing out. Then  "feared not being able to get out and called . Then anxiety bridged over to being in a car. Cannot get out of bedroom the last two weeks. Did go out to [employer] today and when she returned she felt like throwing up. Has had nausea since taking escitalopram. Was starting to take hydroxyzine TID to help anxiety.     She was started on venlafaxine and anxiety was worse. Now on escitalopram rx from 10/31. She is experiencing anxiety and hand sweating. A lot of anxiety with going in car, going outside and going places. Hard to wake up yesterday with anxiety increase (better vs venlafaxine). Today having headaches and nausea.         Psychiatric Review of Systems:  Has had depressive symptoms including crying    PHQ-9 scores:   PHQ-9 SCORE 7/5/2022 10/3/2022 10/31/2022   PHQ-9 Total Score - - -   PHQ-9 Total Score MyChart 3 (Minimal depression) 12 (Moderate depression) 16 (Moderately severe depression)   PHQ-9 Total Score 3 12 16     RENÉE-7 scores:    RENÉE-7 SCORE 7/5/2022 10/3/2022 10/31/2022   Total Score 2 (minimal anxiety) 6 (mild anxiety) 18 (severe anxiety)   Total Score 2 6 18         Vital Signs:   LMP 10/16/2022   Vitals - Patient Reported 7/13/2021 7/5/2022   Height (Patient Reported) 5' 2\" -   Weight (Patient Reported) 142 lb 147 lb   BMI (Based on Pt Reported Ht/Wt) 25.97 kg/m2 -                  REVIEW OF SYSTEMS:   Constitutional: reports feeling feverish and cold, 12 lb weight loss    Skin: negative  Eyes: slightly blurry vision reported with antidepressants  Ears/Nose/Throat: negative  Respiratory: anxiety associated shortness of breath  Cardiovascular: palpitations in public x 3 and associated with anxiety  Gastrointestinal: diarrhea and nausea today, stomach feels weird with food and has thrown up x 2 days since starting escitalopram  Genitourinary: negative  Musculoskeletal: joint/knee pain or stiffness - on hydroxychloroquin and finger achiness improved  Neurologic: +tension headaches negative " otherwise  Seizures or Head Injury: No  Hematologic/Lymphatic/Immunologic: anemia  Endocrine: negative       PAST PSYCHIATRIC HISTORY:   Reports no history of clinically significant anxiety/depression - has never been unmanageable  Med Trials:  escitalopram  Venlafaxine - legs felt numb knee down and did not tolerate, taken for 2 days, no appetite, insomnia, hand/foot sweating  Bupropion - tried in 2019 for reported possible seasonal or post partum depressive sx (per chart started for concern of ADHD sx)  and experienced headaches, L hand numbness and intermittent chest pain  Self-Directed Violence: None      PAST MEDICAL HISTORY:     Past Medical History:   Diagnosis Date     Diabetes (H)     GDM insulin     GERD (gastroesophageal reflux disease)     w/u otherwise neg      History of colposcopy with cervical biopsy 3/23/07    WNL     Papanicolaou smear of cervix with low grade squamous intraepithelial lesion (LGSIL) 07     PONV (postoperative nausea and vomiting)      S/P  10/13/2017      has a past medical history of Diabetes (H), GERD (gastroesophageal reflux disease), History of colposcopy with cervical biopsy (3/23/07), Papanicolaou smear of cervix with low grade squamous intraepithelial lesion (LGSIL) (07), PONV (postoperative nausea and vomiting), and S/P  (10/13/2017).    Current medications include:   Current Outpatient Medications   Medication Sig     acetaminophen (TYLENOL) 500 MG tablet take 1 - 2 tablet by oral route  every 6 hours as needed as needed     cetirizine (ZYRTEC) 10 MG tablet Take 1 tablet (10 mg) by mouth daily     Cyanocobalamin (VITAMIN B12 TR PO) 1 patch (500 mcg) daily     cyclobenzaprine (FLEXERIL) 5 MG tablet Take 1 tablet (5 mg) by mouth nightly as needed for muscle spasms     dexlansoprazole (DEXILANT) 60 MG CPDR CR capsule Take 60 mg by mouth daily     diclofenac (VOLTAREN) 1 % topical gel Apply 2 g topically 4 times daily To joints as needed for  pain     escitalopram (LEXAPRO) 5 MG tablet Take 1 tablet (5 mg) by mouth daily     famotidine (PEPCID) 20 MG tablet TAKE ONE TABLET BY MOUTH TWICE A DAY AS NEEDED FOR REFLUX     fluocinonide emulsified base 0.05 % external cream apply to rash as needed - sparingly     fluticasone (FLONASE) 50 MCG/ACT nasal spray Spray 1 spray into both nostrils 2 times daily as needed for rhinitis or allergies Take 1 spray twice daily x 1 week then 1 spray daily at night x 1 week then as needed     hydroxychloroquine (PLAQUENIL) 200 MG tablet Take 1 tablet (200 mg) by mouth 2 times daily Annual Plaquenil toxicity eye screening required.     hydrOXYzine (ATARAX) 25 MG tablet Take 1 tablet (25 mg) by mouth 3 times daily as needed for anxiety     metoprolol succinate ER (TOPROL XL) 25 MG 24 hr tablet Take 1 tablet (25 mg) by mouth daily     multivitamin (ONE-DAILY) tablet Take 1 tablet by mouth daily     sucralfate (CARAFATE) 1 GM tablet take 1 tablet by oral route 3 times every day on an empty stomach 1 hour before meals     vitamin D3 (CHOLECALCIFEROL) 50 mcg (2000 units) tablet Take 1 tablet (50 mcg) by mouth daily for 30 days     cholecalciferol 25 MCG (1000 UT) TABS take 1 tablet by oral route  every month (Patient not taking: Reported on 11/3/2022)     No current facility-administered medications for this visit.     Facility-Administered Medications Ordered in Other Visits   Medication     fentaNYL Citrate (PF) (SUBLIMAZE) injection         FAMILY HISTORY:   Brother with anxiety  No addiction history    SOCIAL HISTORY:   .  3 children.  Unemployed. Previously a hairstylist. No hx abuse/trauma. Follows [employer].     Substance Use History:  Alcohol: Last alcohol use noted July 2, 2020. Decided to not drink anymore. Did not feel good. No hx of problems.     CAGE-AID    Have you felt you ought to cut down on your drinking or drug use? (P) No    Have people annoyed you by criticizing your drinking or drug use? (P) No    Have  "you felt bad or guilty about your drinking or drug use? (P) No    Have you ever had a drink or used drugs first thing in the morning to steady your nerves or to get rid of a hangover (eye opener)? (P) No    CAGE-AID SCORE - (P) 0    CAGE-AID reprinted with permission from the [place] Medical Journal, LIDA De Jesus. and JOSE J Kan, \"Conjoint screening questionnaires for alcohol and drug abuse\" [place] Medical Journal 94: 135-140, 1995.    Nicotine: in past socially smoked a cigarette -never did daily smoking  Recreational substances: Reported never any use of recreational substances    MENTAL STATUS EXAMINATION:   Appearance: Good attention to grooming and hygiene, casual garb  Attitude: Cooperative  Eye Contact: Good  Gait and Station: Within normal limits  Psychomotor Behavior: Overall relatively reduced  Oriented to: Grossly person place and time  Attention Span and Concentration: Grossly intact  Speech: Anxious tone  Mood:  anxious  Affect: Constricted  Associations:  no loose associations  Thought Process:  logical, linear and goal oriented  Thought Content: No evidence of delusions or suicidal or homicidal ideation plan or intent  Memory: grossly intact  Fund of Knowledge: Good  Insight:  good  Judgment:  intact, adequate for safety  Impulse Control:  intact        DIAGNOSES:   Unspecified Anxiety Disorder  Rule Out Anxiety Disorder Due to Another Medical Condition  Unspecified Depressive Disorder      ASSESSMENT:   Pt dealing with new onset clinically significant anxiety with avoidance behaviors and depressive sx in aftermath of 2nd COVID-19 infection, physical sx, and anxiety reaction to sx. Has not tolerated med trials - attempt very low dose escitalopram.     Today Unique Baker reports on suicidal ideaitons. In addition, she has notable risk factors for self-harm, including anxiety. However, risk is mitigated by commitment to family, [employer] beliefs and absence of past attempts. Therefore, based on all " available evidence including the factors cited above, she does not appear to be at imminent risk for self-harm, does not meet criteria for a 72-hr hold, and therefore remains appropriate for ongoing outpatient level of care.       PLAN:       Patient advised of consultative model. Patient will continue to be seen for ongoing consultation and stabilization.    Does not meet criteria for involuntary treatment or hospitalization    escitalopram 5 mg po qday nausea, nightsweats elevated anxiety ==> 11/4/22 2.5 mg po qday -Risks, benefits and alternatives discussed.  Patient provides verbal consent to treatment.    Psychotherapy upcoming    Return in 1-2 weeks    Administrative Billing:   Time spent with patient was 30 minutes and greater than 50% of time or 20 minutes was spent in counseling and coordination of care regarding above diagnoses and treatment plan.      Signed:   Shade Strickland M.D.  [facility] Psychiatry Service    Disclaimer: This note consists of symbols derived from keyboarding, dictation and/or voice recognition software. As a result, there may be errors in the script that have gone undetected. Please consider this when interpreting information found in this chart.

## 2022-11-04 ENCOUNTER — MYC MEDICAL ADVICE (OUTPATIENT)
Dept: FAMILY MEDICINE | Facility: CLINIC | Age: 37
End: 2022-11-04

## 2022-11-05 ENCOUNTER — NURSE TRIAGE (OUTPATIENT)
Dept: NURSING | Facility: CLINIC | Age: 37
End: 2022-11-05

## 2022-11-05 DIAGNOSIS — F41.9 ANXIETY: Primary | ICD-10-CM

## 2022-11-05 NOTE — TELEPHONE ENCOUNTER
S: Depression and anxiety.    B: Woke up with overwhelming feeling of depression and weak legs.  No plan to hurt self.  is supportive.  At home with her children.     Was on Venlafaxine had side effects of legs felt numb knee down and did not tolerate, taken for 2 days, no appetite, insomnia, hand/foot sweating.    Then started on 10/31 Lexapro 5 mg had side effects provider changed to lesser dose on 11/4 then down to 2.5 mg.    Today calling with symptoms of:    Sadness    Crying    Restless legs    Nausea    Wants to stop taking Lexapro.  Patient called her pharmacist was advised take 1/2 pill today and 1/4 pill on Sunday.  For the taper off  of Lexapro.    A: Patient will call psychiatry on Monday.  Has Atarax 25mg  TID for anxiety  She can take.     R: Protocol and care advice reviewed. Patient verbalized understanding, in agreement with plan, and voiced no further questions. Call back with any new or worsening symptoms, concerns, or questions.    Sharee Dominguez RN, [facility] Triage Nurse Advisor    Reason for Disposition    [1] New or changed psychiatric medications > 2 weeks ago AND [2] not feeling any better    Additional Information    Negative: Patient attempted suicide    Negative: Patient is threatening suicide now    Negative: Violent behavior, or threatening to physically hurt or kill someone    Negative: [1] Patient is very confused (disoriented, slurred speech) AND [2] no other adult (e.g., friend or family member) available    Negative: [1] Difficult to awaken or acting very confused (disoriented, slurred speech) AND [2] new-onset    Negative: Sounds like a life-threatening emergency to the triager    Negative: [1] Depression AND [2] unable to do any of normal activities (e.g., self care, school, work; in comparison to baseline).    Negative: Very strange or confused behavior    Negative: Patient sounds very sick or weak to the triager    Negative: [1] Depression AND [2] worsening  (e.g.,sleeping poorly, less able to do activities of daily living)    Negative: Symptoms interfere with work or school    Negative: Sometimes has thoughts of suicide    Negative: Fever > 101 F (38.3 C)    Negative: [1] Significant weight loss (i.e., > 10 pounds or 5 kg) AND [2] not dieting    Negative: Requesting to talk with a counselor (mental health worker, psychiatrist, etc.)    Negative: Unhealthy alcohol use, known or suspected    Negative: Substance use (drug use) or misuse, known or suspected    Protocols used: DEPRESSION-A-AH

## 2022-11-07 RX ORDER — LORAZEPAM 0.5 MG/1
0.5 TABLET ORAL DAILY PRN
Qty: 10 TABLET | Refills: 0 | Status: SHIPPED | OUTPATIENT
Start: 2022-11-07 | End: 2022-12-12

## 2022-11-07 NOTE — PROGRESS NOTES
[facility]                                    Progress Note    Patient Name: Unique Baker  Date: 10/26/2022       Service Type: Individual      Session Start Time: 1203  Session End Time: 1255      Session Length: 52m    Session #: 3    Attendees: Client attended alone    Service Modality:  Video Visit:      Provider verified identity through the following two step process.  Patient provided:  Patient photo, Patient  and Patient address    Telemedicine Visit: The patient's condition can be safely assessed and treated via synchronous audio and visual telemedicine encounter.      Reason for Telemedicine Visit: Patient has requested telehealth visit    Originating Site (Patient Location): Patient's home    Distant Site (Provider Location): [facility]    Consent:  The patient/guardian has verbally consented to: the potential risks and benefits of telemedicine (video visit) versus in person care; bill my insurance or make self-payment for services provided; and responsibility for payment of non-covered services.     Patient would like the video invitation sent by:  Text to cell phone: [phone]    Mode of Communication:  Video Conference via [employer]    As the provider I attest to compliance with applicable laws and regulations related to telemedicine.    DATA  Interactive Complexity: No  Crisis: No        Progress Since Last Session (Related to Symptoms / Goals / Homework):   Symptoms: No change physical symptoms    Homework: Achieved / completed to satisfaction      Episode of Care Goals: Satisfactory progress - PREPARATION (Decided to change - considering how); Intervened by negotiating a change plan and determining options / strategies for behavior change, identifying triggers, exploring social supports, and working towards setting a date to begin behavior change     Current / Ongoing Stressors and Concerns:   Patient  "had presented reporting depression and anxiety. Patient reports her symptoms began 1 year ago she states he had never had depression or anxiety in her past. She states her health issues increased following the COVID vaccine. Patient reports physical symptoms have taken over her life.  Patient has seen many doctors and specialist with no diagnosis to explain her symptoms. Patient states she is exhausted.  She states she is \"depressd form being sick so long\". Patient reports she has had MRI's, CT's of her abdomin, ESR-inflamation markers and still doesn't have an answer for her pain and inability to eat.  Patient states she is unable to eat with out experiencing GERD.  She states the pain and stomach discomfort comes and goes. Patient reports the unpredictability of symptoms increases anxiety. Symptoms include nervousness, worry, rumination on symptoms, fatigue, poor concentration, and physical symptoms that includes chest and stomach pain, GERD, and abnormal menses.  This leads to anxiety and in return more     Today, October 26, 2026   This is patients first session since 9/22/2022. Patient reports continued and increases anxiety. Patient reports she has experienced a tight jaw and a humming in her ears this week.  She reports the symptoms of anxiety has increased the last 2 weeks. She states she has seen Cardiology and a surgeon for gallbladder removal. No answer for her physical problems was found.  Her primary believes she has an auto immune disorder. Patient reports her PCP prescribed Effexor.  Patient reports she is unable to work.She reports her heart races at times and she experiences palpitations. She reports the cardiologist did not find a cause for the pain. Patient is using deep breathing to reduce anxiety.  She is focusing on enjoyable activities. Patient has not discussed a second opinion with her PCP.      Treatment Objective(s) Addressed in This Session:   identify patients initial signs or symptoms " of anxiety  Decrease frequency and intensity of feeling down, depressed, hopeless  Support patient through health care, securing medical intervention to address her pain.  Identify triggers for anxiety.  Change talk encouraged.   Intervention:   Motivational Interviewing: open ended questions.  Permission to suggest and provide advise on treatment.       Assessments completed prior to visit:  No screens were completed prior to this visit.        ASSESSMENT: Current Emotional / Mental Status (status of significant symptoms):    Reviewed with patient. No changed since last session on 9/22/2022   Risk status (Self / Other harm or suicidal ideation)   Patient denies current fears or concerns for personal safety.   Patient denies current or recent suicidal ideation or behaviors.   Patient denies current or recent homicidal ideation or behaviors.   Patient denies current or recent self injurious behavior or ideation.   Patient denies other safety concerns.   Patient reports there has been no change in risk factors since their last session.     Patient reports there has been no change in protective factors since their last session.     Recommended that patient call 911 or go to the local ED should there be a change in any of these risk factors.     Appearance:   Appropriate    Eye Contact:   Good    Psychomotor Behavior: Normal    Attitude:   Cooperative    Orientation:   All   Speech    Rate / Production: Normal/ Responsive    Volume:  Normal    Mood:    Anxious  Depressed  Irritable  Agitated   Affect:    Appropriate    Thought Content:  Clear    Thought Form:  Coherent  Goal Directed  Logical    Insight:    Good      Medication Review:   No changes to current psychiatric medication(s)     Medication Compliance:   Yes     Changes in Health Issues:   Yes: Pain, No Psychological Distress  Sleep disturbance, Associated Psychological Distress  Weight / dietary issues, No Psychological Distress     Chemical Use  Review:   Substance Use: Chemical use reviewed, no active concerns identified      Tobacco Use: No current tobacco use.      Diagnosis:  1. Major depressive disorder, recurrent episode, moderate (H)    2. RENÉE (generalized anxiety disorder)        Collateral Reports Completed:   Routed note to PCP    PLAN: (Patient Tasks / Therapist Tasks / Other)  Follow up with surgeon and primary care physician .  Use techniques of relaxation with guided imager and deep breathing.  Walk if phasically able.  Return in 2 week.       Hailey Kaba, [facility]                                                    ______________________________________________________________________    Individual Treatment Plan    Patient's Name: Unique Baker  YOB: 1985    Date of Creation: 9/09/2022  Date Treatment Plan Last Reviewed/Revised: initial review on 12/09/2022    DSM5 Diagnoses: 296.32 (F33.1) Major Depressive Disorder, Recurrent Episode, Moderate _ or 300.02 (F41.1) Generalized Anxiety Disorder  Psychosocial / Contextual Factors: Practices her shanell.  Siblings are near and supportive.  [place].  Patient struggle to manage home with health problems.  PROMIS (reviewed every 90 days): 23  Referral / Collaboration:  patient is scheduled for long term therapy with Debbie Knight on January 11, 2023 .    Anticipated number of session for this episode of care: 3-6 sessions  Anticipation frequency of session: Weekly  Anticipated Duration of each session: 38-52 minutes  Treatment plan will be reviewed in 90 days or when goals have been changed.       Goal 1: Patient will increase awareness of depression symptoms and their impact on functioning and develop skills to reduce negative impact     I will know I've met my goal when I am able to better manage my mood.           Objective #A (Patient Action)        Patient will describe thoughts, feelings, and actions associated with depression.     Status:?New - Date(s): 12/9/2022 or until transfer to LTT ??       Intervention(s)     Therapist will will explore and process with patient how depression has impacted them.         Objective #B     Patient will increase depression coping skills.                           Status:?New - Date(s): 12/9/2022 or until transfer to LTT ??        Intervention(s)     Therapist will teach CBT skills and model their use.           Objective #C    Patient will identify cognitive distortions in relation to depression and explore alternatives.    Status:?New - Date(s): 12/9/2022 or until transfer to LTT ??       Intervention(s)     Therapist will guide discussion and assist patient in identification of negative beliefs about self and world.          Goal?2:?Patient will?experience a decrease in anxiety symptoms, as measured by a?2 point?reduction in her RENÉE-7 score.?  I will?know I've met my goal when I feel less stress and less worry.   I'll probably do things like I used to do.       Objective #A?(Patient Action)????   Patient will?identify?3?fears / thoughts that contribute to feeling anxious.?       Intervention(s)?    Therapist will?provide cognitive behavioral therapeutic approach in processing patient's thoughts, feelings and beliefs and toward assisting patient in developing greater    awareness of unhelpful thoughts that associate with increased?anxiety.??     ??  Objective #B?    Patient will?use relaxation strategies?3?times per day to reduce the physical symptoms of anxiety.??????????????    Status:?Status:?New - Date(s): 12/9/2022 or until transfer to LTT ?? ??      Intervention(s)?    Therapist will?teach mindfulness strategies toward building diaphragmatic breathing/relaxation skills and assign exercises as?homework.?     ??  Objective #C?    Patient will?use cognitive strategies identified in therapy to challenge anxious thoughts.?    Status:?Continued -New - Date(s): 12/9/2022 or until transfer to LTT ??      ?? Intervention(s)?    Therapist will?assign homework - CBT and thought exercises, as well, as distraction techniques.?          Patient has not reviewed nor agreed to the above plan.      Hailey Kaba, [facility]  September 09, 2022

## 2022-11-07 NOTE — TELEPHONE ENCOUNTER
Called patient. Hydroxyzine works however after taking it tongue is whitish. Advised of agreement to discontinue escitalopram. rx lorazepam 0.5 mg po qday prn anxiety quantity 10 per month. Advised to not take at the same time as lorazepam. Advised not to drive on it (hsuband will drive). Advised not to take with alcohol or alcohol after.

## 2022-11-08 ENCOUNTER — TELEPHONE (OUTPATIENT)
Dept: PHARMACY | Facility: CLINIC | Age: 37
End: 2022-11-08

## 2022-11-08 ENCOUNTER — DOCUMENTATION ONLY (OUTPATIENT)
Dept: CARDIOLOGY | Facility: CLINIC | Age: 37
End: 2022-11-08

## 2022-11-08 ENCOUNTER — VIRTUAL VISIT (OUTPATIENT)
Dept: PHYSICAL MEDICINE AND REHAB | Facility: CLINIC | Age: 37
End: 2022-11-08
Attending: FAMILY MEDICINE
Payer: COMMERCIAL

## 2022-11-08 DIAGNOSIS — G89.29 POST-COVID CHRONIC JOINT PAIN: ICD-10-CM

## 2022-11-08 DIAGNOSIS — M25.50 POST-COVID CHRONIC JOINT PAIN: ICD-10-CM

## 2022-11-08 DIAGNOSIS — G93.32 POST-COVID CHRONIC FATIGUE: ICD-10-CM

## 2022-11-08 DIAGNOSIS — M79.10 POST-COVID CHRONIC MUSCLE PAIN: ICD-10-CM

## 2022-11-08 DIAGNOSIS — F41.9 ANXIETY: Primary | ICD-10-CM

## 2022-11-08 DIAGNOSIS — R00.2 PALPITATIONS: ICD-10-CM

## 2022-11-08 DIAGNOSIS — G62.9 SMALL FIBER NEUROPATHY: ICD-10-CM

## 2022-11-08 DIAGNOSIS — U09.9 LONG COVID: ICD-10-CM

## 2022-11-08 DIAGNOSIS — G89.29 POST-COVID CHRONIC MUSCLE PAIN: ICD-10-CM

## 2022-11-08 DIAGNOSIS — U09.9 POST-COVID CHRONIC MUSCLE PAIN: ICD-10-CM

## 2022-11-08 DIAGNOSIS — U09.9 POST-COVID CHRONIC FATIGUE: ICD-10-CM

## 2022-11-08 DIAGNOSIS — U09.9 POST-COVID CHRONIC JOINT PAIN: ICD-10-CM

## 2022-11-08 PROCEDURE — 99215 OFFICE O/P EST HI 40 MIN: CPT | Mod: GT | Performed by: PHYSICIAN ASSISTANT

## 2022-11-08 ASSESSMENT — ENCOUNTER SYMPTOMS
HEADACHES: 0
DECREASED CONCENTRATION: 1
POLYDIPSIA: 0
NUMBNESS: 0
HALLUCINATIONS: 0
ALTERED TEMPERATURE REGULATION: 1
DECREASED APPETITE: 1
DISTURBANCES IN COORDINATION: 0
COUGH DISTURBING SLEEP: 1
HYPOTENSION: 0
PARALYSIS: 0
INSOMNIA: 0
HYPERTENSION: 1
EXERCISE INTOLERANCE: 1
WEIGHT GAIN: 0
SYNCOPE: 0
WEAKNESS: 1
ORTHOPNEA: 0
DYSPNEA ON EXERTION: 0
SLEEP DISTURBANCES DUE TO BREATHING: 0
HEMOPTYSIS: 0
NERVOUS/ANXIOUS: 1
WEIGHT LOSS: 1
SEIZURES: 0
CHILLS: 1
SHORTNESS OF BREATH: 0
FATIGUE: 1
DEPRESSION: 1
SNORES LOUDLY: 0
POLYPHAGIA: 0
FEVER: 0
COUGH: 0
PALPITATIONS: 1
TREMORS: 0
LOSS OF CONSCIOUSNESS: 0
SPEECH CHANGE: 0
PANIC: 1
TINGLING: 1
MEMORY LOSS: 0
SPUTUM PRODUCTION: 1
LEG PAIN: 0
INCREASED ENERGY: 1
DIZZINESS: 0
POSTURAL DYSPNEA: 0
LIGHT-HEADEDNESS: 0
WHEEZING: 0
NIGHT SWEATS: 0

## 2022-11-08 ASSESSMENT — PATIENT HEALTH QUESTIONNAIRE - PHQ9
SUM OF ALL RESPONSES TO PHQ QUESTIONS 1-9: 8
10. IF YOU CHECKED OFF ANY PROBLEMS, HOW DIFFICULT HAVE THESE PROBLEMS MADE IT FOR YOU TO DO YOUR WORK, TAKE CARE OF THINGS AT HOME, OR GET ALONG WITH OTHER PEOPLE: VERY DIFFICULT
SUM OF ALL RESPONSES TO PHQ QUESTIONS 1-9: 8
10. IF YOU CHECKED OFF ANY PROBLEMS, HOW DIFFICULT HAVE THESE PROBLEMS MADE IT FOR YOU TO DO YOUR WORK, TAKE CARE OF THINGS AT HOME, OR GET ALONG WITH OTHER PEOPLE: VERY DIFFICULT

## 2022-11-08 NOTE — PROGRESS NOTES
Unique Baker  is being evaluated via a billable video visit.      How would you like to obtain your AVS? [facility]  For the video visit, send the invitation by: Text to cell phone: [phone]  Will anyone else be joining your video visit? No      Assessment/Impression   1. Anxiety  Patient with worsening anxiety after being on Plaquenil and Lexparo. She has stopped her lexapro and is only taking hydroxazine.  Discussed speaking with psychiatry and Pharmacy again to see if there is any effect from her plaquenil.  She also is working with Rheumatology to see if there is an alterative medication for her muscle/joint pain.     2. Palpitations  Patient has been working with Cardiology and was placed on Metoprolol. Discussed this can also be from her low B12, Iron and also anxiety.  She does feel the metoprolol is making her fatigued as well as other medication but helping her palpitations.     3. Post-COVID chronic muscle pain/ Post-COVID chronic joint pain/Small fiber neuropathy  Patient feels the Plaquenil is helping her pain and wishes to continue but concerned it is contributing ot her anxiety. Encouraged to work with Rheumatology and Psychiatry to see if there is an alternative medication due to her concern    6. Post-COVID chronic fatigue  Patient feels her fatigue is mostly due to her disturbed sleep. She is taking B12 patches and plans to speak to Leticia Mcdaniel about her anxiety and also low ferritin.     7. Long COVID  Discussed COVID and Post COVID with patient.  Educational materials provided and all questions answered.    - Adult Post Covid Clinic Referral        Plan:  1. I reviewed present knowledge on long-Covid.  Education was provided and question were answered.  2. Orders/Referrals as above  3. I will advised patient on test results  4. I will follow up with Unique Baker in 2 months. I will review progress and consider need for any other therapeutic interventions. If there are any questions and/or  concerns she will call the clinic.      On day of encounter time spent in chart review and with patient in consultation, exam, education,coordination of care,  review of outside charts/documentation and documentation:  50 minutes     I have attempted to proof read for major spelling errors and apologize for any minor errors I may have missed.      _________________________________  Corina Aranda PA-C  [facility] [place]    Subjective   Previously  Briefly patient was seen on 7/5/22 in the Post COVID clinic for lingering symptoms from their COVID infection in March 2021. At the time of that appointment they were  complaining of muscle/joint pain, acid reflux, neuropath and tinnitus. Patient returns for a follow up.  Today she is complain of palpitations, fatigue and joint pain.  She unfortunately contracted COVID on 9/21/22.  She has seen Rheumatology and is on prednisone and plaquenil.  She did see [facility] for evaluation as well and is being evaluate for food allergies.  She since contracted COVID she feels like she needs to take deeper breaths when laying down.  She denies shortness of breath with walking, she does feel tightness in her upper chest when she wakes up. She is unsure if this is respiratory or acid reflux.  She also notices she has heart racing as well when she walks, and also increase fatigue.  Patient can do 1-2 task before she gets tired.  She is sleeping well at night. Still having neuropathy and states its intermittent now.     Today:   Patient returns for a follow up.  She was last seen on 10/4/22.  Patient has worse anxiety.  She is worried about her hydroxychlorquine causing her anxiety.  She is having more difficulty sleeping. She is having trouble eating due to her anxiety.  She is working with Rheumatology to try and get off her medication and discussing if there is another medication.  Patient stopped her lexapro  and feels she is improving slowly with her anxiety.  She also feels her depression is worsening due to anxiety.  Patient feels her fatigue is worse due to the medication that makes her tired.  Patient feels her concentration is worse as well due to her hydroxyzine.  Patient saw cardiology and was placed on metoprolol for inappropriate tachycardia.  She may be having surgery for her reflux.  Feels she is improving but anxiety is worse.    Current Symptoms:  Fatigue (functional assessment based on ADLS): improving   Cognitive impairment: improving     Goals of Care: improve fatigue    Current concerns: No    PHQ Assesment Total Score(s) 11/8/2022   PHQ-9 Score 8   Some recent data might be hidden     RENÉE-7 Results 10/31/2022   RENÉE 7 TOTAL SCORE 18 (severe anxiety)   Some recent data might be hidden     PTSD Screen Score 11/8/2022   Have you ever experienced this kind of event? Yes   PTSD Screen (Score of 3 or more suggests positive screen) 0   Some recent data might be hidden     PROMIS 29  11/8/22 and 10/3/22  Scoring Physical Function (higher score better) (range: 4 - 20) 18 (   Sum of 4 Physical Function Questions) 20 (   Sum of 4 Physical Function Questions)   Scoring Anxiety (lower score better) (range: 4 - 20) 15 (   Sum of 4 Anxiety Questions) 7 (   Sum of 4 Anxiety Questions)   Scoring Depression (lower score better) (range: 4 - 20) 14 (   Sum of 4 Depression Questions) 5 (   Sum of 4 Depression Questions)   Scoring Fatigue (lower score better) (range: 4 - 20) 12 (   Sum of 4 Fatigue Questons ) 11 (   Sum of 4 Fatigue Questons )   Scoring Sleep Disturbance (lower score better) (range: 4 - 20) 8 (   Sum of 4 Sleep Disturbance Questions) 6 (   Sum of 4 Sleep Disturbance Questions)   Scoring Satisfaction with Social Role (higher score better) (range: 4 - 20) 8 (   Sum of 4 Satisfaction with Social Role Questions) 12 (   Sum of 4 Satisfaction with Social Role Questions)   Scoring Pain Interference (lower score  better) (range: 4 - 20) 4 (   Sum of 4 Pain Interference Questions) 12 (   Sum of 4 Pain Interference Questions)         Past Medical History:   Diagnosis Date     Diabetes (H)     GDM insulin     GERD (gastroesophageal reflux disease)     w/u otherwise neg      History of colposcopy with cervical biopsy 3/23/07    WNL     Papanicolaou smear of cervix with low grade squamous intraepithelial lesion (LGSIL) 07     PONV (postoperative nausea and vomiting)      S/P  10/13/2017       Past Surgical History:   Procedure Laterality Date     BREAST SURGERY      augmentation       SECTION N/A 2015    Procedure:  SECTION;  Surgeon: Tremaine Gaona MD;  Location:  OR      SECTION, TUBAL LIGATION, COMBINED N/A 10/13/2017    Procedure: COMBINED  SECTION, TUBAL LIGATION;  Repeat  SECTION, TUBAL LIGATION ;  Surgeon: Sidra Salamanca DO;  Location:  OR     COLONOSCOPY N/A 2016    Procedure: COLONOSCOPY;  Surgeon: Lyudmila Pastor MD;  Location:  GI     DILATION AND CURETTAGE SUCTION      elective termination     ESOPHAGOSCOPY, GASTROSCOPY, DUODENOSCOPY (EGD), COMBINED  2014    Procedure: COMBINED ESOPHAGOSCOPY, GASTROSCOPY, DUODENOSCOPY (EGD);   ESOPHAGOSCOPY, GASTROSCOPY, DUODENOSCOPY (EGD) ;  Surgeon: Vishnu Lanier MD;  Location:  GI     ESOPHAGOSCOPY, GASTROSCOPY, DUODENOSCOPY (EGD), COMBINED Left 2020    Procedure: ESOPHAGOGASTRODUODENOSCOPY, WITH BIOPSIES USING BIOPSY FORCEPS;  Surgeon: Vishnu Hopkins MD;  Location:  GI     ESOPHAGOSCOPY, GASTROSCOPY, DUODENOSCOPY (EGD), COMBINED N/A 2021    Procedure: ESOPHAGOGASTRODUODENOSCOPY, WITH BIOPSY using cold forceps;  Surgeon: Vishnu Lanier MD;  Location:  GI     GYN SURGERY       c section      RW LASER WART      Anal wart removal      ZZC NONSPECIFIC PROCEDURE  01    Primary LTCS       Family History   Problem Relation Age of Onset     Colon  Polyps Brother      Other - See Comments Brother         colon polyps     Hyperlipidemia Sister      Stomach Cancer Sister      Cancer Sister         stomach     C.A.D. No family hx of      Diabetes No family hx of      Breast Cancer No family hx of      Cancer - colorectal No family hx of      Colon Cancer No family hx of        Social History     Tobacco Use     Smoking status: Some Days     Years: 7.00     Types: Cigarettes     Last attempt to quit:      Years since quittin.8     Smokeless tobacco: Never     Tobacco comments:     only if she drinks alcohol   Vaping Use     Vaping Use: Never used   Substance Use Topics     Alcohol use: Not Currently     Alcohol/week: 8.3 standard drinks     Drug use: No         Current Outpatient Medications:      acetaminophen (TYLENOL) 500 MG tablet, take 1 - 2 tablet by oral route  every 6 hours as needed as needed, Disp: , Rfl:      cetirizine (ZYRTEC) 10 MG tablet, Take 1 tablet (10 mg) by mouth daily, Disp: 30 tablet, Rfl: 1     Cyanocobalamin (VITAMIN B12 TR PO), 1 patch (500 mcg) daily, Disp: , Rfl:      cyclobenzaprine (FLEXERIL) 5 MG tablet, Take 1 tablet (5 mg) by mouth nightly as needed for muscle spasms, Disp: 30 tablet, Rfl: 0     dexlansoprazole (DEXILANT) 60 MG CPDR CR capsule, Take 60 mg by mouth daily, Disp: , Rfl:      diclofenac (VOLTAREN) 1 % topical gel, Apply 2 g topically 4 times daily To joints as needed for pain, Disp: 100 g, Rfl: 3     famotidine (PEPCID) 20 MG tablet, TAKE ONE TABLET BY MOUTH TWICE A DAY AS NEEDED FOR REFLUX, Disp: 180 tablet, Rfl: 3     fluocinonide emulsified base 0.05 % external cream, apply to rash as needed - sparingly, Disp: 60 g, Rfl: 1     fluticasone (FLONASE) 50 MCG/ACT nasal spray, Spray 1 spray into both nostrils 2 times daily as needed for rhinitis or allergies Take 1 spray twice daily x 1 week then 1 spray daily at night x 1 week then as needed, Disp: 11.1 mL, Rfl: 1     hydroxychloroquine (PLAQUENIL) 200 MG  tablet, Take 1 tablet (200 mg) by mouth 2 times daily Annual Plaquenil toxicity eye screening required., Disp: 180 tablet, Rfl: 3     hydrOXYzine (ATARAX) 25 MG tablet, Take 1 tablet (25 mg) by mouth 3 times daily as needed for anxiety, Disp: 90 tablet, Rfl: 1     LORazepam (ATIVAN) 0.5 MG tablet, Take 1 tablet (0.5 mg) by mouth daily as needed for anxiety 10 pills/month, Disp: 10 tablet, Rfl: 0     metoprolol succinate ER (TOPROL XL) 25 MG 24 hr tablet, Take 1 tablet (25 mg) by mouth daily, Disp: 90 tablet, Rfl: 3     multivitamin (ONE-DAILY) tablet, Take 1 tablet by mouth daily, Disp:  , Rfl:      sucralfate (CARAFATE) 1 GM tablet, take 1 tablet by oral route 3 times every day on an empty stomach 1 hour before meals, Disp: , Rfl:      vitamin D3 (CHOLECALCIFEROL) 50 mcg (2000 units) tablet, Take 1 tablet (50 mcg) by mouth daily for 30 days, Disp: 30 tablet, Rfl: 3     cholecalciferol 25 MCG (1000 UT) TABS, take 1 tablet by oral route  every month (Patient not taking: Reported on 11/3/2022), Disp: , Rfl:   No current facility-administered medications for this visit.    Facility-Administered Medications Ordered in Other Visits:      fentaNYL Citrate (PF) (SUBLIMAZE) injection, , , PRN, Lyudmila Pastor MD, 100 mcg at 07/19/16 1412    Answers to ROS/HPI submitted by patient on 11/8/22  Review of Systems   Constitutional: Positive for chills and fatigue. Negative for fever.   Respiratory: Negative for cough, shortness of breath and wheezing.    Cardiovascular: Positive for palpitations. Negative for chest pain.   Endocrine: Negative for polydipsia and polyphagia.   Neurological: Positive for weakness. Negative for dizziness, tremors, seizures, light-headedness, numbness and headaches.   Psychiatric/Behavioral: Positive for decreased concentration. Negative for hallucinations. The patient is nervous/anxious.         Objective    Vitals - Patient Reported  Weight (Patient Reported): 61.2 kg (135 lb)  Pain Score: No  Pain (0)        Objective         Vitals:  No vitals were obtained today due to virtual visit.    Physical Exam   GENERAL: Healthy, alert and no distress  EYES: Eyes grossly normal to inspection.  No discharge or erythema, or obvious scleral/conjunctival abnormalities.  RESP: No audible wheeze, cough, or visible cyanosis.  No visible retractions or increased work of breathing.    SKIN: Visible skin clear. No significant rash, abnormal pigmentation or lesions.  NEURO: Cranial nerves grossly intact.  Mentation and speech appropriate for age.  PSYCH: Mentation appears normal, affect normal/bright, judgement and insight intact, normal speech and appearance well-groomed.                CRP Inflammation   Date Value Ref Range Status   10/31/2022 <3.00 <5.00 mg/L Final   07/14/2022 <2.9 0.0 - 8.0 mg/L Final   05/01/2021 <2.9 0.0 - 8.0 mg/L Final      Sed Rate   Date Value Ref Range Status   01/21/2016 18 0 - 20 mm/h Final     Erythrocyte Sedimentation Rate   Date Value Ref Range Status   07/14/2022 43 (H) 0 - 20 mm/hr Final      Last Renal Panel:  Sodium   Date Value Ref Range Status   10/21/2022 137 136 - 145 mmol/L Final   05/01/2021 133 133 - 144 mmol/L Final     Potassium   Date Value Ref Range Status   10/21/2022 3.9 3.4 - 5.3 mmol/L Final   07/14/2022 4.0 3.4 - 5.3 mmol/L Final   05/01/2021 4.0 3.4 - 5.3 mmol/L Final     Chloride   Date Value Ref Range Status   10/21/2022 101 98 - 107 mmol/L Final   07/14/2022 109 94 - 109 mmol/L Final   05/01/2021 103 94 - 109 mmol/L Final     Carbon Dioxide   Date Value Ref Range Status   05/01/2021 25 20 - 32 mmol/L Final     Carbon Dioxide (CO2)   Date Value Ref Range Status   10/21/2022 25 22 - 29 mmol/L Final   07/14/2022 26 20 - 32 mmol/L Final     Anion Gap   Date Value Ref Range Status   10/21/2022 11 7 - 15 mmol/L Final   07/14/2022 5 3 - 14 mmol/L Final   05/01/2021 5 3 - 14 mmol/L Final     Glucose   Date Value Ref Range Status   10/21/2022 124 (H) 70 - 99 mg/dL Final    07/14/2022 97 70 - 99 mg/dL Final   05/01/2021 87 70 - 99 mg/dL Final     Urea Nitrogen   Date Value Ref Range Status   10/21/2022 7.5 6.0 - 20.0 mg/dL Final   07/14/2022 9 7 - 30 mg/dL Final   05/01/2021 13 7 - 30 mg/dL Final     Creatinine   Date Value Ref Range Status   10/21/2022 0.59 0.51 - 0.95 mg/dL Final   05/01/2021 0.78 0.52 - 1.04 mg/dL Final     GFR Estimate   Date Value Ref Range Status   10/21/2022 >90 >60 mL/min/1.73m2 Final     Comment:     Effective December 21, 2021 eGFRcr in adults is calculated using the 2021 CKD-EPI creatinine equation which includes age and gender (Elen casillas al., [place], DOI: 10.1056/JUXVok5542722)   05/01/2021 >90 >60 mL/min/[1.73_m2] Final     Comment:     Non  GFR Calc  Starting 12/18/2018, serum creatinine based estimated GFR (eGFR) will be   calculated using the Chronic Kidney Disease Epidemiology Collaboration   (CKD-EPI) equation.       Calcium   Date Value Ref Range Status   10/21/2022 9.9 8.6 - 10.0 mg/dL Final   05/01/2021 8.6 8.5 - 10.1 mg/dL Final     Albumin   Date Value Ref Range Status   10/21/2022 4.4 3.5 - 5.2 g/dL Final   07/14/2022 4.0 3.4 - 5.0 g/dL Final   05/01/2021 4.1 3.4 - 5.0 g/dL Final      Lab Results   Component Value Date    WBC 6.2 10/21/2022    WBC 9.2 05/01/2021     Lab Results   Component Value Date    RBC 4.73 10/21/2022    RBC 4.49 05/01/2021     Lab Results   Component Value Date    HGB 11.5 10/21/2022    HGB 12.7 05/01/2021     Lab Results   Component Value Date    HCT 38.9 10/21/2022    HCT 38.4 05/01/2021     No components found for: MCT  Lab Results   Component Value Date    MCV 82 10/21/2022    MCV 86 05/01/2021     Lab Results   Component Value Date    MCH 24.3 10/21/2022    MCH 28.3 05/01/2021     Lab Results   Component Value Date    MCHC 29.6 10/21/2022    MCHC 33.1 05/01/2021     Lab Results   Component Value Date    RDW 16.1 10/21/2022    RDW 13.2 05/01/2021     Lab Results   Component Value Date      10/21/2022     05/01/2021      Lab Results   Component Value Date    A1C 5.6 10/21/2022    A1C 5.5 02/03/2021      TSH   Date Value Ref Range Status   10/21/2022 1.38 0.30 - 4.20 uIU/mL Final   07/14/2022 1.54 0.40 - 4.00 mU/L Final   05/01/2021 1.18 0.40 - 4.00 mU/L Final      Lab Results   Component Value Date    VITDT 20 03/29/2022    VITDT 23 08/16/2021    VITDT 18 (L) 02/03/2021      Recent Labs   Lab Test 10/21/22  1138   MAG 2.1     Last Comprehensive Metabolic Panel:  Sodium   Date Value Ref Range Status   10/21/2022 137 136 - 145 mmol/L Final   05/01/2021 133 133 - 144 mmol/L Final     Potassium   Date Value Ref Range Status   10/21/2022 3.9 3.4 - 5.3 mmol/L Final   07/14/2022 4.0 3.4 - 5.3 mmol/L Final   05/01/2021 4.0 3.4 - 5.3 mmol/L Final     Chloride   Date Value Ref Range Status   10/21/2022 101 98 - 107 mmol/L Final   07/14/2022 109 94 - 109 mmol/L Final   05/01/2021 103 94 - 109 mmol/L Final     Carbon Dioxide   Date Value Ref Range Status   05/01/2021 25 20 - 32 mmol/L Final     Carbon Dioxide (CO2)   Date Value Ref Range Status   10/21/2022 25 22 - 29 mmol/L Final   07/14/2022 26 20 - 32 mmol/L Final     Anion Gap   Date Value Ref Range Status   10/21/2022 11 7 - 15 mmol/L Final   07/14/2022 5 3 - 14 mmol/L Final   05/01/2021 5 3 - 14 mmol/L Final     Glucose   Date Value Ref Range Status   10/21/2022 124 (H) 70 - 99 mg/dL Final   07/14/2022 97 70 - 99 mg/dL Final   05/01/2021 87 70 - 99 mg/dL Final     Urea Nitrogen   Date Value Ref Range Status   10/21/2022 7.5 6.0 - 20.0 mg/dL Final   07/14/2022 9 7 - 30 mg/dL Final   05/01/2021 13 7 - 30 mg/dL Final     Creatinine   Date Value Ref Range Status   10/21/2022 0.59 0.51 - 0.95 mg/dL Final   05/01/2021 0.78 0.52 - 1.04 mg/dL Final     GFR Estimate   Date Value Ref Range Status   10/21/2022 >90 >60 mL/min/1.73m2 Final     Comment:     Effective December 21, 2021 eGFRcr in adults is calculated using the 2021 CKD-EPI creatinine equation which  includes age and gender (Elen casillas al., NEJM, DOI: 10.1056/PKFBgx6061775)   05/01/2021 >90 >60 mL/min/[1.73_m2] Final     Comment:     Non  GFR Calc  Starting 12/18/2018, serum creatinine based estimated GFR (eGFR) will be   calculated using the Chronic Kidney Disease Epidemiology Collaboration   (CKD-EPI) equation.       Calcium   Date Value Ref Range Status   10/21/2022 9.9 8.6 - 10.0 mg/dL Final   05/01/2021 8.6 8.5 - 10.1 mg/dL Final     Bilirubin Total   Date Value Ref Range Status   10/21/2022 0.2 <=1.2 mg/dL Final   05/01/2021 0.3 0.2 - 1.3 mg/dL Final     Alkaline Phosphatase   Date Value Ref Range Status   10/21/2022 89 35 - 104 U/L Final   05/01/2021 61 40 - 150 U/L Final     ALT   Date Value Ref Range Status   10/21/2022 12 10 - 35 U/L Final   05/01/2021 18 0 - 50 U/L Final     AST   Date Value Ref Range Status   10/21/2022 17 10 - 35 U/L Final   05/01/2021 11 0 - 45 U/L Final     Most Recent D-dimer:  Recent Labs   Lab Test 10/21/22  1138   DD <0.27       Office Visit on 10/31/2022   Component Date Value Ref Range Status     CRP Inflammation 10/31/2022 <3.00  <5.00 mg/L Final         Video-Visit Details    Video Visit start Time: 07:37    Type of service:  Video Visit    Video End Time:8:00 AM    Originating Location (pt. Location): Home    Distant Location (provider location):  HOME    Platform used for Video Visit: Gabriel

## 2022-11-08 NOTE — TELEPHONE ENCOUNTER
Patient called with questions about hydroxychloroquine and anxiety.  Wonders if the hydroxychloroquine could be contributing to her anxiety symptoms.  There is a case report of anxiety in a patient started on hydroxyzine; symptoms resolved when medication was stopped and restarted with re-challenge.  She plans to follow-up with Rheumatology to discuss possible alternatives to hydroxychloroquine.      Just started metoprolol daily today. Stopped escitalopram last Friday.      Advised to make one change at time so she knows what is doing what.

## 2022-11-08 NOTE — LETTER
11/8/2022       RE: Unique Baker  [id number] [facility] [address]     Dear Colleague,    Thank you for referring your patient, Unique Baker, to the [facility] PHYSICAL MEDICINE [facility]HABIL[facility]ATION [facility]IN[facility] [place] at [facility]. Please see a copy of my visit note below.    Assessment/Impression   1. Anxiety  Patient with worsening anxiety after being on Plaquenil and Lexparo. She has stopped her lexapro and is only taking hydroxazine.  Discussed speaking with psychiatry and Pharmacy again to see if there is any effect from her plaquenil.  She also is working with Rheumatology to see if there is an alterative medication for her muscle/joint pain.     2. Palpitations  Patient has been working with Cardiology and was placed on Metoprolol. Discussed this can also be from her low B12, Iron and also anxiety.  She does feel the metoprolol is making her fatigued as well as other medication but helping her palpitations.     3. Post-COVID chronic muscle pain/ Post-COVID chronic joint pain/Small fiber neuropathy  Patient feels the Plaquenil is helping her pain and wishes to continue but concerned it is contributing ot her anxiety. Encouraged to work with Rheumatology and Psychiatry to see if there is an alternative medication due to her concern    6. Post-COVID chronic fatigue  Patient feels her fatigue is mostly due to her disturbed sleep. She is taking B12 patches and plans to speak to Leticia Mcdaniel about her anxiety and also low ferritin.     7. Long COVID  Discussed COVID and Post COVID with patient.  Educational materials provided and all questions answered.    - Adult Post Covid Clinic Referral        Plan:  1. I reviewed present knowledge on long-Covid.  Education was provided and question were answered.  2. Orders/Referrals as above  3. I will advised patient on test results  4. I will follow up with Unique Baker in 2 months. I will review progress  and consider need for any other therapeutic interventions. If there are any questions and/or concerns she will call the clinic.      On day of encounter time spent in chart review and with patient in consultation, exam, education,coordination of care,  review of outside charts/documentation and documentation:  50 minutes     I have attempted to proof read for major spelling errors and apologize for any minor errors I may have missed.      _________________________________  BRITTNEE Brownlee [facility] PHYSICAL MEDICINE AND REHABILITATION CLINIC [place]    Subjective   Previously  Briefly patient was seen on 7/5/22 in the Post COVID clinic for lingering symptoms from their COVID infection in March 2021. At the time of that appointment they were  complaining of muscle/joint pain, acid reflux, neuropath and tinnitus. Patient returns for a follow up.  Today she is complain of palpitations, fatigue and joint pain.  She unfortunately contracted COVID on 9/21/22.  She has seen Rheumatology and is on prednisone and plaquenil.  She did see [facility] for evaluation as well and is being evaluate for food allergies.  She since contracted COVID she feels like she needs to take deeper breaths when laying down.  She denies shortness of breath with walking, she does feel tightness in her upper chest when she wakes up. She is unsure if this is respiratory or acid reflux.  She also notices she has heart racing as well when she walks, and also increase fatigue.  Patient can do 1-2 task before she gets tired.  She is sleeping well at night. Still having neuropathy and states its intermittent now.     Today:   Patient returns for a follow up.  She was last seen on 10/4/22.  Patient has worse anxiety.  She is worried about her hydroxychlorquine causing her anxiety.  She is having more difficulty sleeping. She is having trouble eating due to her anxiety.  She is working with Rheumatology to try and get off  her medication and discussing if there is another medication.  Patient stopped her lexapro and feels she is improving slowly with her anxiety.  She also feels her depression is worsening due to anxiety.  Patient feels her fatigue is worse due to the medication that makes her tired.  Patient feels her concentration is worse as well due to her hydroxyzine.  Patient saw cardiology and was placed on metoprolol for inappropriate tachycardia.  She may be having surgery for her reflux.  Feels she is improving but anxiety is worse.    Current Symptoms:  Fatigue (functional assessment based on ADLS): improving   Cognitive impairment: improving     Goals of Care: improve fatigue    Current concerns: No    PHQ Assesment Total Score(s) 11/8/2022   PHQ-9 Score 8   Some recent data might be hidden     RENÉE-7 Results 10/31/2022   RENÉE 7 TOTAL SCORE 18 (severe anxiety)   Some recent data might be hidden     PTSD Screen Score 11/8/2022   Have you ever experienced this kind of event? Yes   PTSD Screen (Score of 3 or more suggests positive screen) 0   Some recent data might be hidden     PROMIS 29  11/8/22 and 10/3/22  Scoring Physical Function (higher score better) (range: 4 - 20) 18 (   Sum of 4 Physical Function Questions) 20 (   Sum of 4 Physical Function Questions)   Scoring Anxiety (lower score better) (range: 4 - 20) 15 (   Sum of 4 Anxiety Questions) 7 (   Sum of 4 Anxiety Questions)   Scoring Depression (lower score better) (range: 4 - 20) 14 (   Sum of 4 Depression Questions) 5 (   Sum of 4 Depression Questions)   Scoring Fatigue (lower score better) (range: 4 - 20) 12 (   Sum of 4 Fatigue Questons ) 11 (   Sum of 4 Fatigue Questons )   Scoring Sleep Disturbance (lower score better) (range: 4 - 20) 8 (   Sum of 4 Sleep Disturbance Questions) 6 (   Sum of 4 Sleep Disturbance Questions)   Scoring Satisfaction with Social Role (higher score better) (range: 4 - 20) 8 (   Sum of 4 Satisfaction with Social Role Questions) 12 (   Sum  of 4 Satisfaction with Social Role Questions)   Scoring Pain Interference (lower score better) (range: 4 - 20) 4 (   Sum of 4 Pain Interference Questions) 12 (   Sum of 4 Pain Interference Questions)         Past Medical History:   Diagnosis Date     Diabetes (H)     GDM insulin     GERD (gastroesophageal reflux disease)     w/u otherwise neg      History of colposcopy with cervical biopsy 3/23/07    WNL     Papanicolaou smear of cervix with low grade squamous intraepithelial lesion (LGSIL) 07     PONV (postoperative nausea and vomiting)      S/P  10/13/2017       Past Surgical History:   Procedure Laterality Date     BREAST SURGERY      augmentation       SECTION N/A 2015    Procedure:  SECTION;  Surgeon: Tremaine Gaona MD;  Location: [facility] OR      SECTION, TUBAL LIGATION, COMBINED N/A 10/13/2017    Procedure: COMBINED  SECTION, TUBAL LIGATION;  Repeat  SECTION, TUBAL LIGATION ;  Surgeon: Sidra Salamanca DO;  Location:  OR     COLONOSCOPY N/A 2016    Procedure: COLONOSCOPY;  Surgeon: Lyudmila Pastor MD;  Location:  GI     DILATION AND CURETTAGE SUCTION      elective termination     ESOPHAGOSCOPY, GASTROSCOPY, DUODENOSCOPY (EGD), COMBINED  2014    Procedure: COMBINED ESOPHAGOSCOPY, GASTROSCOPY, DUODENOSCOPY (EGD);   ESOPHAGOSCOPY, GASTROSCOPY, DUODENOSCOPY (EGD) ;  Surgeon: Vishnu Lanier MD;  Location:  GI     ESOPHAGOSCOPY, GASTROSCOPY, DUODENOSCOPY (EGD), COMBINED Left 2020    Procedure: ESOPHAGOGASTRODUODENOSCOPY, WITH BIOPSIES USING BIOPSY FORCEPS;  Surgeon: Vishnu Hopkins MD;  Location:  GI     ESOPHAGOSCOPY, GASTROSCOPY, DUODENOSCOPY (EGD), COMBINED N/A 2021    Procedure: ESOPHAGOGASTRODUODENOSCOPY, WITH BIOPSY using cold forceps;  Surgeon: Vishnu Lanier MD;  Location:  GI     GYN SURGERY       c section      RW LASER WART      Anal wart removal      ZZC NONSPECIFIC PROCEDURE   01    Primary LTCS       Family History   Problem Relation Age of Onset     Colon Polyps Brother      Other - See Comments Brother         colon polyps     Hyperlipidemia Sister      Stomach Cancer Sister      Cancer Sister         stomach     C.A.D. No family hx of      Diabetes No family hx of      Breast Cancer No family hx of      Cancer - colorectal No family hx of      Colon Cancer No family hx of        Social History     Tobacco Use     Smoking status: Some Days     Years: 7.00     Types: Cigarettes     Last attempt to quit:      Years since quittin.8     Smokeless tobacco: Never     Tobacco comments:     only if she drinks alcohol   Vaping Use     Vaping Use: Never used   Substance Use Topics     Alcohol use: Not Currently     Alcohol/week: 8.3 standard drinks     Drug use: No         Current Outpatient Medications:      acetaminophen (TYLENOL) 500 MG tablet, take 1 - 2 tablet by oral route  every 6 hours as needed as needed, Disp: , Rfl:      cetirizine (ZYRTEC) 10 MG tablet, Take 1 tablet (10 mg) by mouth daily, Disp: 30 tablet, Rfl: 1     Cyanocobalamin (VITAMIN B12 TR PO), 1 patch (500 mcg) daily, Disp: , Rfl:      cyclobenzaprine (FLEXERIL) 5 MG tablet, Take 1 tablet (5 mg) by mouth nightly as needed for muscle spasms, Disp: 30 tablet, Rfl: 0     dexlansoprazole (DEXILANT) 60 MG CPDR CR capsule, Take 60 mg by mouth daily, Disp: , Rfl:      diclofenac (VOLTAREN) 1 % topical gel, Apply 2 g topically 4 times daily To joints as needed for pain, Disp: 100 g, Rfl: 3     famotidine (PEPCID) 20 MG tablet, TAKE ONE TABLET BY MOUTH TWICE A DAY AS NEEDED FOR REFLUX, Disp: 180 tablet, Rfl: 3     fluocinonide emulsified base 0.05 % external cream, apply to rash as needed - sparingly, Disp: 60 g, Rfl: 1     fluticasone (FLONASE) 50 MCG/ACT nasal spray, Spray 1 spray into both nostrils 2 times daily as needed for rhinitis or allergies Take 1 spray twice daily x 1 week then 1 spray daily at night x 1  week then as needed, Disp: 11.1 mL, Rfl: 1     hydroxychloroquine (PLAQUENIL) 200 MG tablet, Take 1 tablet (200 mg) by mouth 2 times daily Annual Plaquenil toxicity eye screening required., Disp: 180 tablet, Rfl: 3     hydrOXYzine (ATARAX) 25 MG tablet, Take 1 tablet (25 mg) by mouth 3 times daily as needed for anxiety, Disp: 90 tablet, Rfl: 1     LORazepam (ATIVAN) 0.5 MG tablet, Take 1 tablet (0.5 mg) by mouth daily as needed for anxiety 10 pills/month, Disp: 10 tablet, Rfl: 0     metoprolol succinate ER (TOPROL XL) 25 MG 24 hr tablet, Take 1 tablet (25 mg) by mouth daily, Disp: 90 tablet, Rfl: 3     multivitamin (ONE-DAILY) tablet, Take 1 tablet by mouth daily, Disp:  , Rfl:      sucralfate (CARAFATE) 1 GM tablet, take 1 tablet by oral route 3 times every day on an empty stomach 1 hour before meals, Disp: , Rfl:      vitamin D3 (CHOLECALCIFEROL) 50 mcg (2000 units) tablet, Take 1 tablet (50 mcg) by mouth daily for 30 days, Disp: 30 tablet, Rfl: 3     cholecalciferol 25 MCG (1000 UT) TABS, take 1 tablet by oral route  every month (Patient not taking: Reported on 11/3/2022), Disp: , Rfl:   No current facility-administered medications for this visit.    Facility-Administered Medications Ordered in Other Visits:      fentaNYL Citrate (PF) (SUBLIMAZE) injection, , , PRN, Lyudmila Pastor MD, 100 mcg at 07/19/16 1412    Answers to ROS/HPI submitted by patient on 11/8/22  Review of Systems   Constitutional: Positive for chills and fatigue. Negative for fever.   Respiratory: Negative for cough, shortness of breath and wheezing.    Cardiovascular: Positive for palpitations. Negative for chest pain.   Endocrine: Negative for polydipsia and polyphagia.   Neurological: Positive for weakness. Negative for dizziness, tremors, seizures, light-headedness, numbness and headaches.   Psychiatric/Behavioral: Positive for decreased concentration. Negative for hallucinations. The patient is nervous/anxious.        Objective     Vitals - Patient Reported  Weight (Patient Reported): 61.2 kg (135 lb)  Pain Score: No Pain (0)        Objective         Vitals:  No vitals were obtained today due to virtual visit.    Physical Exam   GENERAL: Healthy, alert and no distress  EYES: Eyes grossly normal to inspection.  No discharge or erythema, or obvious scleral/conjunctival abnormalities.  RESP: No audible wheeze, cough, or visible cyanosis.  No visible retractions or increased work of breathing.    SKIN: Visible skin clear. No significant rash, abnormal pigmentation or lesions.  NEURO: Cranial nerves grossly intact.  Mentation and speech appropriate for age.  PSYCH: Mentation appears normal, affect normal/bright, judgement and insight intact, normal speech and appearance well-groomed.                CRP Inflammation   Date Value Ref Range Status   10/31/2022 <3.00 <5.00 mg/L Final   07/14/2022 <2.9 0.0 - 8.0 mg/L Final   05/01/2021 <2.9 0.0 - 8.0 mg/L Final      Sed Rate   Date Value Ref Range Status   01/21/2016 18 0 - 20 mm/h Final     Erythrocyte Sedimentation Rate   Date Value Ref Range Status   07/14/2022 43 (H) 0 - 20 mm/hr Final      Last Renal Panel:  Sodium   Date Value Ref Range Status   10/21/2022 137 136 - 145 mmol/L Final   05/01/2021 133 133 - 144 mmol/L Final     Potassium   Date Value Ref Range Status   10/21/2022 3.9 3.4 - 5.3 mmol/L Final   07/14/2022 4.0 3.4 - 5.3 mmol/L Final   05/01/2021 4.0 3.4 - 5.3 mmol/L Final     Chloride   Date Value Ref Range Status   10/21/2022 101 98 - 107 mmol/L Final   07/14/2022 109 94 - 109 mmol/L Final   05/01/2021 103 94 - 109 mmol/L Final     Carbon Dioxide   Date Value Ref Range Status   05/01/2021 25 20 - 32 mmol/L Final     Carbon Dioxide (CO2)   Date Value Ref Range Status   10/21/2022 25 22 - 29 mmol/L Final   07/14/2022 26 20 - 32 mmol/L Final     Anion Gap   Date Value Ref Range Status   10/21/2022 11 7 - 15 mmol/L Final   07/14/2022 5 3 - 14 mmol/L Final   05/01/2021 5 3 - 14 mmol/L  Final     Glucose   Date Value Ref Range Status   10/21/2022 124 (H) 70 - 99 mg/dL Final   07/14/2022 97 70 - 99 mg/dL Final   05/01/2021 87 70 - 99 mg/dL Final     Urea Nitrogen   Date Value Ref Range Status   10/21/2022 7.5 6.0 - 20.0 mg/dL Final   07/14/2022 9 7 - 30 mg/dL Final   05/01/2021 13 7 - 30 mg/dL Final     Creatinine   Date Value Ref Range Status   10/21/2022 0.59 0.51 - 0.95 mg/dL Final   05/01/2021 0.78 0.52 - 1.04 mg/dL Final     GFR Estimate   Date Value Ref Range Status   10/21/2022 >90 >60 mL/min/1.73m2 Final     Comment:     Effective December 21, 2021 eGFRcr in adults is calculated using the 2021 CKD-EPI creatinine equation which includes age and gender (Elen et al., NE, DOI: 10.1056/ENBJbn9460283)   05/01/2021 >90 >60 mL/min/[1.73_m2] Final     Comment:     Non  GFR Calc  Starting 12/18/2018, serum creatinine based estimated GFR (eGFR) will be   calculated using the Chronic Kidney Disease Epidemiology Collaboration   (CKD-EPI) equation.       Calcium   Date Value Ref Range Status   10/21/2022 9.9 8.6 - 10.0 mg/dL Final   05/01/2021 8.6 8.5 - 10.1 mg/dL Final     Albumin   Date Value Ref Range Status   10/21/2022 4.4 3.5 - 5.2 g/dL Final   07/14/2022 4.0 3.4 - 5.0 g/dL Final   05/01/2021 4.1 3.4 - 5.0 g/dL Final      Lab Results   Component Value Date    WBC 6.2 10/21/2022    WBC 9.2 05/01/2021     Lab Results   Component Value Date    RBC 4.73 10/21/2022    RBC 4.49 05/01/2021     Lab Results   Component Value Date    HGB 11.5 10/21/2022    HGB 12.7 05/01/2021     Lab Results   Component Value Date    HCT 38.9 10/21/2022    HCT 38.4 05/01/2021     No components found for: MCT  Lab Results   Component Value Date    MCV 82 10/21/2022    MCV 86 05/01/2021     Lab Results   Component Value Date    MCH 24.3 10/21/2022    MCH 28.3 05/01/2021     Lab Results   Component Value Date    MCHC 29.6 10/21/2022    MCHC 33.1 05/01/2021     Lab Results   Component Value Date    RDW 16.1  10/21/2022    RDW 13.2 05/01/2021     Lab Results   Component Value Date     10/21/2022     05/01/2021      Lab Results   Component Value Date    A1C 5.6 10/21/2022    A1C 5.5 02/03/2021      TSH   Date Value Ref Range Status   10/21/2022 1.38 0.30 - 4.20 uIU/mL Final   07/14/2022 1.54 0.40 - 4.00 mU/L Final   05/01/2021 1.18 0.40 - 4.00 mU/L Final      Lab Results   Component Value Date    VITDT 20 03/29/2022    VITDT 23 08/16/2021    VITDT 18 (L) 02/03/2021      Recent Labs   Lab Test 10/21/22  1138   MAG 2.1     Last Comprehensive Metabolic Panel:  Sodium   Date Value Ref Range Status   10/21/2022 137 136 - 145 mmol/L Final   05/01/2021 133 133 - 144 mmol/L Final     Potassium   Date Value Ref Range Status   10/21/2022 3.9 3.4 - 5.3 mmol/L Final   07/14/2022 4.0 3.4 - 5.3 mmol/L Final   05/01/2021 4.0 3.4 - 5.3 mmol/L Final     Chloride   Date Value Ref Range Status   10/21/2022 101 98 - 107 mmol/L Final   07/14/2022 109 94 - 109 mmol/L Final   05/01/2021 103 94 - 109 mmol/L Final     Carbon Dioxide   Date Value Ref Range Status   05/01/2021 25 20 - 32 mmol/L Final     Carbon Dioxide (CO2)   Date Value Ref Range Status   10/21/2022 25 22 - 29 mmol/L Final   07/14/2022 26 20 - 32 mmol/L Final     Anion Gap   Date Value Ref Range Status   10/21/2022 11 7 - 15 mmol/L Final   07/14/2022 5 3 - 14 mmol/L Final   05/01/2021 5 3 - 14 mmol/L Final     Glucose   Date Value Ref Range Status   10/21/2022 124 (H) 70 - 99 mg/dL Final   07/14/2022 97 70 - 99 mg/dL Final   05/01/2021 87 70 - 99 mg/dL Final     Urea Nitrogen   Date Value Ref Range Status   10/21/2022 7.5 6.0 - 20.0 mg/dL Final   07/14/2022 9 7 - 30 mg/dL Final   05/01/2021 13 7 - 30 mg/dL Final     Creatinine   Date Value Ref Range Status   10/21/2022 0.59 0.51 - 0.95 mg/dL Final   05/01/2021 0.78 0.52 - 1.04 mg/dL Final     GFR Estimate   Date Value Ref Range Status   10/21/2022 >90 >60 mL/min/1.73m2 Final     Comment:     Effective December 21,  2021 eGFRcr in adults is calculated using the 2021 CKD-EPI creatinine equation which includes age and gender (Elen et al., NEJ, DOI: 10.1056/WOKArm7742579)   05/01/2021 >90 >60 mL/min/[1.73_m2] Final     Comment:     Non  GFR Calc  Starting 12/18/2018, serum creatinine based estimated GFR (eGFR) will be   calculated using the Chronic Kidney Disease Epidemiology Collaboration   (CKD-EPI) equation.       Calcium   Date Value Ref Range Status   10/21/2022 9.9 8.6 - 10.0 mg/dL Final   05/01/2021 8.6 8.5 - 10.1 mg/dL Final     Bilirubin Total   Date Value Ref Range Status   10/21/2022 0.2 <=1.2 mg/dL Final   05/01/2021 0.3 0.2 - 1.3 mg/dL Final     Alkaline Phosphatase   Date Value Ref Range Status   10/21/2022 89 35 - 104 U/L Final   05/01/2021 61 40 - 150 U/L Final     ALT   Date Value Ref Range Status   10/21/2022 12 10 - 35 U/L Final   05/01/2021 18 0 - 50 U/L Final     AST   Date Value Ref Range Status   10/21/2022 17 10 - 35 U/L Final   05/01/2021 11 0 - 45 U/L Final     Most Recent D-dimer:  Recent Labs   Lab Test 10/21/22  1138   DD <0.27       Office Visit on 10/31/2022   Component Date Value Ref Range Status     CRP Inflammation 10/31/2022 <3.00  <5.00 mg/L Final           Sincerely,    Corina Aranda PA-C

## 2022-11-08 NOTE — PATIENT INSTRUCTIONS
Post COVID Self Care Suggestions:     Fatigue Management:       https://www.archives-pmr.org/action/showPdf?vkp=-1831%2819%9269075-8       Self Care:      [email or web address]  Recovery World Health Organization:    https://apps.who.int/iris/bitstream/handle/14138/975721/GGT-GGUC-2066-012-13510-07756-eng.pdf  Breathing exercises:    [email or web address]-breathing-exercises

## 2022-11-09 DIAGNOSIS — R00.2 PALPITATIONS: ICD-10-CM

## 2022-11-09 RX ORDER — METOPROLOL SUCCINATE 25 MG/1
12.5 TABLET, EXTENDED RELEASE ORAL DAILY
Qty: 45 TABLET | Refills: 3 | COMMUNITY
Start: 2022-11-09 | End: 2022-11-11

## 2022-11-09 NOTE — PROGRESS NOTES
I have reviewed patient's Zio patch which shows symptoms corresponding to sinus rhythm with heart rate less than 100 bpm.  The patient today reported to a different provider that she is feeling fatigued with metoprolol but happy that it helps with palpitations.    Recommend to cut down on metoprolol 25 mg to half tablet a day (12.5 mg/day.)    No further work-up required from cardiology standpoint.

## 2022-11-10 ENCOUNTER — VIRTUAL VISIT (OUTPATIENT)
Dept: CONSULT | Facility: CLINIC | Age: 37
End: 2022-11-10
Payer: COMMERCIAL

## 2022-11-10 VITALS — WEIGHT: 138 LBS | HEIGHT: 61 IN | BODY MASS INDEX: 26.06 KG/M2

## 2022-11-10 DIAGNOSIS — F41.9 ANXIETY: ICD-10-CM

## 2022-11-10 DIAGNOSIS — Z78.9 LACK OF DRUG ACTION (PROPERLY PRESCRIBED AND ADMINISTERED): ICD-10-CM

## 2022-11-10 DIAGNOSIS — Z71.83 ENCOUNTER FOR NONPROCREATIVE GENETIC COUNSELING AND TESTING: ICD-10-CM

## 2022-11-10 DIAGNOSIS — T50.905D ADVERSE EFFECT OF DRUG, SUBSEQUENT ENCOUNTER: Primary | ICD-10-CM

## 2022-11-10 DIAGNOSIS — F41.0 PANIC ATTACK: ICD-10-CM

## 2022-11-10 DIAGNOSIS — T78.40XA SENSITIVITY TO MEDICATION, INITIAL ENCOUNTER: ICD-10-CM

## 2022-11-10 DIAGNOSIS — Z13.71 ENCOUNTER FOR NONPROCREATIVE GENETIC COUNSELING AND TESTING: ICD-10-CM

## 2022-11-10 PROCEDURE — 96040 HC GENETIC COUNSELING, EACH 30 MINUTES: CPT | Mod: GT,95

## 2022-11-10 ASSESSMENT — PAIN SCALES - GENERAL: PAINLEVEL: NO PAIN (0)

## 2022-11-10 NOTE — PROGRESS NOTES
"Unique Baker  is being evaluated via a billable video visit.      How would you like to obtain your AVS? [employer]  For the video visit, send the invitation by: Text to cell phone: [phone]  Will anyone else be joining your video visit? No      Date of visit: 11/10/22    Presenting Information:   Unique Baker is a 37 year old female referred to the [facility] at the request of CARRIE Mcnally CNP, today. Unique was seen for a genetic counseling appointment to discuss the details of pharmacogenomic testing, including her personal medical history, personal medication history including adverse medication responses, her family history of relevant medication responses, and testing logistics. History is obtained from Unique and review of the medical record.     Unique reports a history of the following medical concerns    Autoimmune concerns - currently undergoing workup    Reaction to COVID vaccine including swelling, back pain    History of skin rash and sensitivity in postpartum period    High erythrocyte sedimentation rate (ESR)    Antinuclear antibodies (FELICITY) positive    Lightheaded episodes - out of breath, tingling, feeling \"mimicking anxiety\"    Unique has previously tried the following medications with the listed effects:    hydroxychloroquine - Unsure if this has altered her ESR, unsure if this is causing panic-like episodes    Lexapro - restless, more anxious, leg numbness    Effexor - restless, more anxious, leg numbness    Benadryl - had restless legs for hours; Unique had to call the nurse and went to ER    Hydroxyzine - Unique reports that this is well tolerated, but she thinks that it's still not quite right    Unique is pursuing pharmacogenetic testing because she finds that she is extremely sensitive to medications and hopes to ensure that she's on the correct medications/her medications are working well for her. She notes that she doesn't typically take over the counter medications " like Tylenol given her extreme medication sensitivity.     Family History: A three generation pedigree was obtained and scanned into the electronic medical record. The relevant portions are described below:      Children- Unique has three children. Her daughter, age 21, and sons, ages 7 and 5, are well. Her 5 year old son has a history of hives reaction to penicillin.     Siblings- Unique has five sisters, two brothers. Two of the sisters and both brothers have had children. All of these individuals are reportedly well.     Parents- Unique's mother is living in her 70s-80s and is also reportedly sensitive to medications. Unique's father is living in his 70s-80s and has high blood pressure. He has had his gallbladder removed. He is otherwise well.     Maternal Relatives- Unique's maternal grandmother lived to age 100.     Paternal Relatives- Unique's grandparents  young due to non-medical incident.     Family history is otherwise largely non-contributory. Maternal ancestry is Cambodian and Chinese and paternal ancestry is Cambodian and [place]enegrin. Consanguinity was denied.     Genetic Counseling Discussion:  For review, our bodies are made of cells that contain our chromosomes which are made up of long stretches of DNA containing our genes. Our genes serve as the instructions for our bodies to grow and function. We have two copies of each gene, one inherited from our mother and one inherited from our father.     What pharmacogenomic testing can tell us:  There are several factors that can determine how an individual responds to medications. Some of these factors include environmental factors such as lifestyle choices (diet, alcohol and/or tobacco use) or other medications an individual might be taking, and other factors can include a person's age, sex, ethnic background, disease, and underlying genetic factors. There are several genes in our body that provide instructions for breaking down the medications we take. Changes  in these genes (sometimes called variants or polymorphisms/SNPs) can alter how the body breaks down certain medications. For example, a change in a gene can slow down the process of medication breakdown leading to too much of that medication/drug in the body which can cause side effects. On the other hand, a change in a gene can speed up the breakdown of a medication so a therapeutic level is not reached and the medication may not work well at the standard doses. Therefore, identifying changes in these genes via pharmacogenomic testing can help guide which medications might work best for an individual, what dose of a medication may be best, or if a certain medication has a high risk for causing serious side effects.     The pharmacogenomic testing offered at [facility] analyzes several different polymorphisms in different genes associated with drug metabolism. These variants have been determined to be medically actionable by practice guidelines including CPIC (Clinical Pharmacogenetics Implementation Consortium), PharmGKB and/or FDA gene-drug interaction guidelines. Therefore, prescribing recommendations can be made by the results of this test, so this testing for Unique is DIAGNOSTIC and is NOT investigational.     The results of this test can provide guidance for several different types of drugs such as antiinflammatories, antithrombotics, lipid-lowering medication, acid-lowering medications, antiemetics, immunosuppressants, antivirals, antifungals, antidepressants, ADHD medications, antimetabolites, and/or hormone antagonists. This means that, while this testing was recommended for a specific reason right now (Unique's mental health management), it may provide guidance for future medication use.     As previously mentioned, we inherit our genes from our parents. This means that some of the pharmacogenomic variants found on this test may be shared by other relatives. These results could be helpful to share  with other relatives, but it is important to remember that there are many factors that can contribute to how an individual responds to medications. Relatives should consider their own pharmacogenomics testing to better determine their recommendations and they should consult with their health care providers before making any changes.     What pharmacogenomic testing cannot tell us:  This pharmacogenomic testing is not looking at every known pharmacogenomic polymorphism and therefore the results cannot tell us about every possible gene-drug interaction. It is also not looking at genes outside of the scope of pharmacogenomics, and therefore, the results will not tell us if Unique is at risk for other genetic conditions. If Unique has questions about a possible underlying genetic condition, she should talk with her primary care provider about seeking an appointment in our Genetics Clinic.     Testing logistics:  [facility] will try to obtain insurance coverage for the pharmacogenomic testing; however, this is not always a covered service. A cost waiver form (ARN) is required, but cost to the patient is not expected to exceed $350.     A blood or buccal sample will be collected for DNA analysis. The results of this test take 1-2 weeks. An appointment will be made with the pharmacist to discuss these results in detail and to discuss the medication recommendations based on these results. These test results will be accessible in [employer] and may be viewable prior to the appointment with the pharmacist, but NO CHANGES SHOULD BE MADE TO MEDICATIONS BEFORE TALKING TO THE PHARMACIST OR HEALTHCARE PROVIDER.     Unique consented to pharmacogenomic testing today. The insurance and billing were explained and Unique agreed to the billing plan. She would like to learn more about her insurance coverage, so I have provided her with the billing codes for the test. Due to the fact that this was a virtual visit, Unique gave me verbal  permission/consent to sign the genetic testing consent form and the cost waiver form (ARN) on her behalf. This consent was witnessed by Fabiola Epps [facility].     It was a pleasure meeting with Unique Baker today. She vocalized understanding of the information we discussed today and all her questions and concerns were addressed. She has been provided with my contact information should any questions arise regarding our visit or plan moving forward. In total, 32 minutes were spent in televideo counseling with this patient.     Plan:  1. After speaking with her insurance, if she'd like to move forward with testing Unique will arrange a lab appointment at a [place] laboratory to have a blood sample drawn for the [facility] Pharmacogenomic Test.   2. Unique will be scheduled with one of our [facility] pharmacists 1-2 weeks after her sample is collected to review the results of the Pharmacogenomics Profile. This appointment can be scheduled by calling [phone] after the sample is collected.     Bses Gonzalez MS, [facility]  Genetic Counselor  [facility]   Phone: [phone]  Email: [email or web address]

## 2022-11-10 NOTE — LETTER
"11/10/2022      RE: Unique Baker  [id number] [address] [address]     Dear Colleague,    Thank you for the opportunity to participate in the care of your patient, Unique Baker, at the [facility] at [facility]. Please see a copy of my visit note below.          Date of visit: 11/10/22    Presenting Information:   Unique Baker is a 37 year old female referred to the [facility] at the request of CARRIE Mcnally CNP, today. Unique was seen for a genetic counseling appointment to discuss the details of pharmacogenomic testing, including her personal medical history, personal medication history including adverse medication responses, her family history of relevant medication responses, and testing logistics. History is obtained from Unique and review of the medical record.     Unique reports a history of the following medical concerns    Autoimmune concerns - currently undergoing workup    Reaction to COVID vaccine including swelling, back pain    History of skin rash and sensitivity in postpartum period    High erythrocyte sedimentation rate (ESR)    Antinuclear antibodies (FELICITY) positive    Lightheaded episodes - out of breath, tingling, feeling \"mimicking anxiety\"    Unique has previously tried the following medications with the listed effects:    hydroxychloroquine - Unsure if this has altered her ESR, unsure if this is causing panic-like episodes    Lexapro - restless, more anxious, leg numbness    Effexor - restless, more anxious, leg numbness    Benadryl - had restless legs for hours; Unique had to call the nurse and went to ER    Hydroxyzine - Unique reports that this is well tolerated, but she thinks that it's still not quite right    Unique is pursuing pharmacogenetic testing because she finds that she is extremely sensitive to medications and hopes to ensure that she's on the correct " medications/her medications are working well for her. She notes that she doesn't typically take over the counter medications like Tylenol given her extreme medication sensitivity.     Family History: A three generation pedigree was obtained and scanned into the electronic medical record. The relevant portions are described below:      Children- Unique has three children. Her daughter, age 21, and sons, ages 7 and 5, are well. Her 5 year old son has a history of hives reaction to penicillin.     Siblings- Unique has five sisters, two brothers. Two of the sisters and both brothers have had children. All of these individuals are reportedly well.     Parents- Unique's mother is living in her 70s-80s and is also reportedly sensitive to medications. Unique's father is living in his 70s-80s and has high blood pressure. He has had his gallbladder removed. He is otherwise well.     Maternal Relatives- Unique's maternal grandmother lived to age 100.     Paternal Relatives- Unique's grandparents  young due to non-medical incident.     Family history is otherwise largely non-contributory. Maternal ancestry is Cambodian and Chinese and paternal ancestry is Cambodian and Turkmen. Consanguinity was denied.     Genetic Counseling Discussion:  For review, our bodies are made of cells that contain our chromosomes which are made up of long stretches of DNA containing our genes. Our genes serve as the instructions for our bodies to grow and function. We have two copies of each gene, one inherited from our mother and one inherited from our father.     What pharmacogenomic testing can tell us:  There are several factors that can determine how an individual responds to medications. Some of these factors include environmental factors such as lifestyle choices (diet, alcohol and/or tobacco use) or other medications an individual might be taking, and other factors can include a person's age, sex, ethnic background, disease, and underlying  genetic factors. There are several genes in our body that provide instructions for breaking down the medications we take. Changes in these genes (sometimes called variants or polymorphisms/SNPs) can alter how the body breaks down certain medications. For example, a change in a gene can slow down the process of medication breakdown leading to too much of that medication/drug in the body which can cause side effects. On the other hand, a change in a gene can speed up the breakdown of a medication so a therapeutic level is not reached and the medication may not work well at the standard doses. Therefore, identifying changes in these genes via pharmacogenomic testing can help guide which medications might work best for an individual, what dose of a medication may be best, or if a certain medication has a high risk for causing serious side effects.     The pharmacogenomic testing offered at [facility] analyzes several different polymorphisms in different genes associated with drug metabolism. These variants have been determined to be medically actionable by practice guidelines including CPIC (Clinical Pharmacogenetics Implementation Consortium), PharmGKB and/or FDA gene-drug interaction guidelines. Therefore, prescribing recommendations can be made by the results of this test, so this testing for Unique is DIAGNOSTIC and is NOT investigational.     The results of this test can provide guidance for several different types of drugs such as antiinflammatories, antithrombotics, lipid-lowering medication, acid-lowering medications, antiemetics, immunosuppressants, antivirals, antifungals, antidepressants, ADHD medications, antimetabolites, and/or hormone antagonists. This means that, while this testing was recommended for a specific reason right now (Unique's mental health management), it may provide guidance for future medication use.     As previously mentioned, we inherit our genes from our parents. This means that  some of the pharmacogenomic variants found on this test may be shared by other relatives. These results could be helpful to share with other relatives, but it is important to remember that there are many factors that can contribute to how an individual responds to medications. Relatives should consider their own pharmacogenomics testing to better determine their recommendations and they should consult with their health care providers before making any changes.     What pharmacogenomic testing cannot tell us:  This pharmacogenomic testing is not looking at every known pharmacogenomic polymorphism and therefore the results cannot tell us about every possible gene-drug interaction. It is also not looking at genes outside of the scope of pharmacogenomics, and therefore, the results will not tell us if Unique is at risk for other genetic conditions. If Unique has questions about a possible underlying genetic condition, she should talk with her primary care provider about seeking an appointment in our Genetics Clinic.     Testing logistics:  [facility] will try to obtain insurance coverage for the pharmacogenomic testing; however, this is not always a covered service. A cost waiver form (ARN) is required, but cost to the patient is not expected to exceed $350.     A blood or buccal sample will be collected for DNA analysis. The results of this test take 1-2 weeks. An appointment will be made with the pharmacist to discuss these results in detail and to discuss the medication recommendations based on these results. These test results will be accessible in [employer] and may be viewable prior to the appointment with the pharmacist, but NO CHANGES SHOULD BE MADE TO MEDICATIONS BEFORE TALKING TO THE PHARMACIST OR HEALTHCARE PROVIDER.     Unique consented to pharmacogenomic testing today. The insurance and billing were explained and Unique agreed to the billing plan. She would like to learn more about her insurance coverage,  so I have provided her with the billing codes for the test. Due to the fact that this was a virtual visit, Unique gave me verbal permission/consent to sign the genetic testing consent form and the cost waiver form (ARN) on her behalf. This consent was witnessed by Fabiola Epps [facility].     It was a pleasure meeting with Unique Baker today. She vocalized understanding of the information we discussed today and all her questions and concerns were addressed. She has been provided with my contact information should any questions arise regarding our visit or plan moving forward. In total, 32 minutes were spent in televideo counseling with this patient.     Plan:  1. After speaking with her insurance, if she'd like to move forward with testing Unique will arrange a lab appointment at a [place] laboratory to have a blood sample drawn for the [facility] Pharmacogenomic Test.   2. Unique will be scheduled with one of our [facility] pharmacists 1-2 weeks after her sample is collected to review the results of the Pharmacogenomics Profile. This appointment can be scheduled by calling [phone] after the sample is collected.     Bess Gonzalez MS, [facility]  Genetic Counselor  [facility]   Phone: [phone]  Email: [email or web address]

## 2022-11-11 ENCOUNTER — VIRTUAL VISIT (OUTPATIENT)
Dept: PSYCHIATRY | Facility: CLINIC | Age: 37
End: 2022-11-11
Payer: COMMERCIAL

## 2022-11-11 ENCOUNTER — NURSE TRIAGE (OUTPATIENT)
Dept: NURSING | Facility: CLINIC | Age: 37
End: 2022-11-11

## 2022-11-11 ENCOUNTER — TELEPHONE (OUTPATIENT)
Dept: CARDIOLOGY | Facility: CLINIC | Age: 37
End: 2022-11-11

## 2022-11-11 DIAGNOSIS — F41.9 ANXIETY: ICD-10-CM

## 2022-11-11 DIAGNOSIS — I47.11 INAPPROPRIATE SINUS TACHYCARDIA (H): ICD-10-CM

## 2022-11-11 PROCEDURE — 99214 OFFICE O/P EST MOD 30 MIN: CPT | Mod: 95 | Performed by: PSYCHIATRY & NEUROLOGY

## 2022-11-11 RX ORDER — ATENOLOL 25 MG/1
12.5 TABLET ORAL 2 TIMES DAILY
Qty: 90 TABLET | Refills: 3 | Status: SHIPPED | OUTPATIENT
Start: 2022-11-11 | End: 2022-12-08 | Stop reason: SINTOL

## 2022-11-11 NOTE — TELEPHONE ENCOUNTER
Spoke with pt on the phone about her current symptoms. Discussed in length that Dr. Lozoya has reviewed these messages with her symptoms and recent heart rates and still believes pt should be on a beta blocker. Pt will try atenolol and let us know how she is feeling.     Franc Prasad, RN   Cardiology Nurse Coordinator

## 2022-11-11 NOTE — Clinical Note
Dr. Lozoya,  There was some concern raised about hydroxyzine, metoprolol and reducing her heart rate.  I do not think this would be a considerable reduction given it would be for as needed usage-very aware of any challenges with this?  I prescribed lorazepam that is to be used separately and on limited basis-I do not foresee any issues with heart rate given again this would be used as needed.  Let me know if you feel otherwise-thank you.  Sincerely, Shade Strickland M.D. Consultative Psychiatrist Program Medical Director, [facility]

## 2022-11-11 NOTE — PROGRESS NOTES
Unique is a 37 year old who is being evaluated via a billable video visit.      How would you like to obtain your AVS? MyChart  If the video visit is dropped, the invitation should be resent by: Text to cell phone: [phone]  Will anyone else be joining your video visit? No  {If patient encounters technical issues they should call [phone] :[id number]}      Video-Visit Details    Video Start Time: {video visit start/end time for provider to select:313545}    Type of service:  Video Visit    Video End Time:{video visit start/end time for provider to select:262918}    Originating Location (pt. Location): Home    {PROVIDER LOCATION On-site should be selected for visits conducted from your clinic location or adjoining [facility] hospital, academic office, or other nearby [facility] building. Off-site should be selected for all other provider locations, including home:[id number]}    Distant Location (provider location):  On-site    Platform used for Video Visit: Gabriel

## 2022-11-11 NOTE — TELEPHONE ENCOUNTER
[facility]    Phone Message    May a detailed message be left on voicemail: yes     Reason for Call: Other: Since pt has started taking the atenolol (TENORMIN) 25 MG tablet 4 days ago she has experienced the following side effects that are making her life challenging.   Diarrhea, nausea, waking up out of breath, resting heart rate 57, low /63.  Please call pt as to what she can do about all of these negative effects.  Could pt go back on the metoprolol or is there a different med she could take?     Action Taken: Message routed to:  [facility] cardio    Travel Screening: Not Applicable     Thank you!  Specialty Access Center                                                                           no

## 2022-11-11 NOTE — PROGRESS NOTES
[facility] Psychiatry Consult Note    IDENTIFICATION   Name: Unique Baker   : 1985/37 year old      Sex:    @ female          Telemedicine Visit: The patient's condition can be safely assessed and treated via synchronous audio and visual telemedicine encounter.      Reason for Telemedicine Visit: COVID 19 pandemic and the social and physical recommendations by the CDC and MD.      Originating Site (Patient Location): Patient's home    Distant Site (Provider Location): Provider Remote Setting    Consent:  The patient/guardian has verbally consented to: the potential risks and benefits of telemedicine (video visit or phone) versus in person care; bill my insurance or make self-payment for services provided; and responsibility for payment of non-covered services.     Mode of Communication:  [employer] phone call    As the provider I attest to compliance with applicable laws and regulations related to telemedicine.      Face to Face/Patient Contact start Time: 9:05AM  Face to Face/Patient Contact end Time:  9:32AM  Face to Face/patient Contact total time: 27  Pre Charting time: 5; Post charting time, communication and other activities: 2; Total time 34 minutes            SUBJECTIVE   Nausea with iron on empty stomach. She sent medical messages that did not come to this provider's in basket. She was afraid to try lorazepam due to ongoing challenges with drowsiness and has not tried. Drowsiness lasting maybe 8 hours.     Anxiety overall is better. Heart rates have not been as high. Did go to [employer] and has some weakness after going through line and in the car - was able to make it through the line and when at home cried. She is crying less - 1-2x times a day instead of all day long.     Reports taking 1/2 dose now of propranolol and resting heart rate at sleep 48-54 (known by Cardiology) and during the day 59-71.         PHQ-9 scores:  PHQ-9 SCORE 10/31/2022 2022 2022   PHQ-9 Total Score - -  -   PHQ-9 Total Score MyChart 16 (Moderately severe depression) - 8 (Mild depression)   PHQ-9 Total Score 16 8 8       RENÉE-7 scores:  RENÉE-7 SCORE 7/5/2022 10/3/2022 10/31/2022   Total Score 2 (minimal anxiety) 6 (mild anxiety) 18 (severe anxiety)   Total Score 2 6 18       OBJECTIVE     Vital Signs:   [facility] 10/16/2022       Current Medications:  Current Outpatient Medications   Medication     acetaminophen (TYLENOL) 500 MG tablet     cetirizine (ZYRTEC) 10 MG tablet     Cyanocobalamin (VITAMIN B12 TR PO)     cyclobenzaprine (FLEXERIL) 5 MG tablet     dexlansoprazole (DEXILANT) 60 MG CPDR CR capsule     diclofenac (VOLTAREN) 1 % topical gel     famotidine (PEPCID) 20 MG tablet     fluocinonide emulsified base 0.05 % external cream     fluticasone (FLONASE) 50 MCG/ACT nasal spray     hydroxychloroquine (PLAQUENIL) 200 MG tablet     hydrOXYzine (ATARAX) 25 MG tablet     LORazepam (ATIVAN) 0.5 MG tablet     metoprolol succinate ER (TOPROL XL) 25 MG 24 hr tablet     multivitamin (ONE-DAILY) tablet     sucralfate (CARAFATE) 1 GM tablet     vitamin D3 (CHOLECALCIFEROL) 50 mcg (2000 units) tablet     cholecalciferol 25 MCG (1000 UT) TABS     No current facility-administered medications for this visit.     Facility-Administered Medications Ordered in Other Visits   Medication     fentaNYL Citrate (PF) (SUBLIMAZE) injection        The [place] Prescription Monitoring Program has been reviewed and there are no concerns about diversionary activity for controlled substances at this time.        ADDED HISTORY   Patient was referred to [facility] [place].  Pending pharmacogenomic testing.  Status post appointment with genetic counselor.      MENTAL STATUS EXAMINATION:   Appearance: Good attention to grooming and hygiene, casual garb  Attitude: Cooperative  Eye Contact: Good  Gait and Station: Within normal limits  Psychomotor Behavior: Overall relatively reduced  Oriented to: Grossly person place and time  Attention  Span and Concentration: Grossly intact  Speech: Anxious tone  Mood:  anxious  Affect: Constricted  Associations:  no loose associations  Thought Process:  logical, linear and goal oriented  Thought Content: No evidence of delusions or suicidal or homicidal ideation plan or intent  Memory: grossly intact  Fund of Knowledge: Good  Insight:  good  Judgment:  intact, adequate for safety  Impulse Control:  intact        DIAGNOSES:   Unspecified Anxiety Disorder  Rule Out Anxiety Disorder Due to Another Medical Condition  Unspecified Depressive Disorder      ASSESSMENT:   Pt dealing with new onset clinically significant anxiety with avoidance behaviors and depressive sx in aftermath of 2nd COVID-19 infection, physical sx, and anxiety reaction to sx. Has not tolerated med trials - attempt very low dose escitalopram.     Today Unique Baker reports on suicidal ideaitons. In addition, she has notable risk factors for self-harm, including anxiety. However, risk is mitigated by commitment to family, Holiness beliefs and absence of past attempts. Therefore, based on all available evidence including the factors cited above, she does not appear to be at imminent risk for self-harm, does not meet criteria for a 72-hr hold, and therefore remains appropriate for ongoing outpatient level of care.       PLAN:       Patient advised of consultative model. Patient will continue to be seen for ongoing consultation and stabilization.    Does not meet criteria for involuntary treatment or hospitalization    Pending pharmacogenomic testing    Hydroxyzine 25 mg po qday prn anxiety -Risks, benefits and alternatives discussed.  Patient provides verbal consent to treatment.    Lorazepam 0.25 - 0.5 mg po qday prn anxiety -Risks, benefits and alternatives discussed.  Patient provides verbal consent to treatment.    DC'd escitalopram (nausea, restlessness, insomnia)    Psychotherapy upcoming    Return in 4 weeks    Administrative Billing:   Time  spent with patient was 30 minutes and greater than 50% of time or 20 minutes was spent in counseling and coordination of care regarding above diagnoses and treatment plan.     Signed:   Shade Strickland M.D.  [facility] Psychiatry Service    Disclaimer: This note consists of symbols derived from keyboarding, dictation and/or voice recognition software. As a result, there may be errors in the script that have gone undetected. Please consider this when interpreting information found in this chart.

## 2022-11-12 NOTE — TELEPHONE ENCOUNTER
Patient was told to go to the ER.  She is anemic.    She was short of breath today. She is not short of breath now.  Her O2 level is 98%.  She states she was sweating. She is no longer sweating.  She does not have any chest pain.  Her pulse is 67 right now.  No weakness.  She is not having any symptoms right now.    Patient wants to know if she should go to the ER for her Anemia.     Paged oncall cardiology 6:29 pm.  Providers advice - her hemoglobin is stable- provider states if she is otherwise feeling ok. She can call Dr Lozoya's office on Monday. If she starts having having to have symptoms, then she can go to the ER.    Patient notified of providers advice.    She is wondering if she should take the new medication. Her blood pressure is 113/63. She states her heart rate at night goes down to 40 per her apple watch. She is concerned about this.   She states she sent in a heart monitor on- she was ill and she had anxiety during this. She is wondering if there was any arrhythmia and not just the high heart rate.    Paged oncall cardiology 6:42pm.  Second page 6:56     Providers advice- she can hold her medication if she chooses to- this is against Dr. Meade advice. She can follow up with her cardiologist on Monday.     Patient given providers advice.    She Zapata RN on 11/11/2022 at 6:34 PM        Additional Information    Negative: SEVERE difficulty breathing (e.g., struggling for each breath, speaks in single words)    Negative: [1] Breathing stopped AND [2] hasn't returned    Negative: Choking on something    Negative: Bluish (or gray) lips or face now    Negative: Difficult to awaken or acting confused (e.g., disoriented, slurred speech)    Negative: Passed out (i.e., lost consciousness, collapsed and was not responding)    Negative: Wheezing started suddenly after medicine, an allergic food or bee sting    Negative: Stridor    Negative: Slow, shallow and weak breathing    Negative: Sounds like a  "life-threatening emergency to the triager    Negative: Chest pain    Negative: [1] Wheezing (high pitched whistling sound) AND [2] previous asthma attacks or use of asthma medicines    Negative: [1] Difficulty breathing AND [2] only present when coughing    Negative: [1] Difficulty breathing AND [2] only from stuffy or runny nose    Negative: [1] Difficulty breathing AND [2] within 14 days of COVID-19 Exposure    Negative: [1] MODERATE difficulty breathing (e.g., speaks in phrases, SOB even at rest, pulse 100-120) AND [2] NEW-onset or WORSE than normal    Negative: Oxygen level (e.g., pulse oximetry) 90 percent or lower    Negative: Wheezing can be heard across the room    Negative: Drooling or spitting out saliva (because can't swallow)    Negative: History of prior \"blood clot\" in leg or lungs (i.e., deep vein thrombosis, pulmonary embolism)    Negative: History of inherited increased risk of blood clots (e.g., Factor 5 Leiden, Anti-thrombin 3, Protein C or Protein S deficiency, Prothrombin mutation)    Negative: Major surgery in the past month    Negative: Hip or leg fracture (broken bone) in past month (or had cast on leg or ankle in past month)    Negative: Illness requiring prolonged bedrest in past month (e.g., immobilization, long hospital stay)    Negative: Long-distance travel in past month (e.g., car, bus, train, plane; with trip lasting 6 or more hours)    Negative: Cancer treatment in past six months (or has cancer now)    Negative: Extra heart beats OR irregular heart beating  (i.e., \"palpitations\")    Negative: Fever > 103 F (39.4 C)    Negative: [1] Fever > 101 F (38.3 C) AND [2] age > 60 years    Negative: [1] Fever > 100.0 F (37.8 C) AND [2] bedridden (e.g., nursing home patient, CVA, chronic illness, recovering from surgery)    Negative: [1] Fever > 100.0 F (37.8 C) AND [2] diabetes mellitus or weak immune system (e.g., HIV positive, cancer chemo, splenectomy, organ transplant, chronic " steroids)    Negative: [1] Periods where breathing stops and then resumes normally AND [2] bedridden (e.g., nursing home patient, CVA)    Negative: Pregnant or postpartum (from 0 to 6 weeks after delivery)    Negative: Patient sounds very sick or weak to the triager    Negative: [1] MILD difficulty breathing (e.g., minimal/no SOB at rest, SOB with walking, pulse <100) AND [2] NEW-onset or WORSE than normal    Negative: [1] Longstanding difficulty breathing (e.g., CHF, COPD, emphysema) AND [2] WORSE than normal    Negative: [1] Longstanding difficulty breathing AND [2] not responding to usual therapy    Negative: Continuous (nonstop) coughing interferes with work, school, or sleeping    Negative: Oxygen level (e.g., pulse oximetry) 91 to 94 percent    Negative: [1] MODERATE longstanding difficulty breathing (e.g., speaks in phrases, SOB even at rest, pulse 100-120) AND [2] SAME as normal    Negative: [1] MILD longstanding difficulty breathing AND [2]  SAME as normal    Protocols used: BREATHING DIFFICULTY-A-AH

## 2022-11-14 ENCOUNTER — TELEPHONE (OUTPATIENT)
Dept: BEHAVIORAL HEALTH | Facility: CLINIC | Age: 37
End: 2022-11-14

## 2022-11-14 ENCOUNTER — DOCUMENTATION ONLY (OUTPATIENT)
Dept: BEHAVIORAL HEALTH | Facility: CLINIC | Age: 37
End: 2022-11-14

## 2022-11-14 NOTE — CONFIDENTIAL NOTE
11/9/2022   Patient was scheduled in TC with this provider.  Provider sent 2 links via [employer] however patient did not connect.  T/C was placed to assess disposition and need. Patient states she was able to get an appointment with Mariela Calvo.  She states she saw Mariela Friday 11/4 for intake.  [facility].  NIKO Hooker [facility]

## 2022-11-15 ENCOUNTER — VIRTUAL VISIT (OUTPATIENT)
Dept: CARDIOLOGY | Facility: CLINIC | Age: 37
End: 2022-11-15
Payer: COMMERCIAL

## 2022-11-15 ENCOUNTER — MYC MEDICAL ADVICE (OUTPATIENT)
Dept: CARDIOLOGY | Facility: CLINIC | Age: 37
End: 2022-11-15

## 2022-11-15 DIAGNOSIS — R00.2 PALPITATIONS: ICD-10-CM

## 2022-11-15 DIAGNOSIS — U09.9 LONG COVID: ICD-10-CM

## 2022-11-15 DIAGNOSIS — D50.9 IRON DEFICIENCY ANEMIA, UNSPECIFIED IRON DEFICIENCY ANEMIA TYPE: ICD-10-CM

## 2022-11-15 DIAGNOSIS — R00.0 SINUS TACHYCARDIA: Primary | ICD-10-CM

## 2022-11-15 PROCEDURE — 99215 OFFICE O/P EST HI 40 MIN: CPT | Mod: 95 | Performed by: INTERNAL MEDICINE

## 2022-11-15 NOTE — TELEPHONE ENCOUNTER
Talked with pt over the phone.     Helped pt schedule virtual visit with Dr. Lozoya today at 4 pm. She can discuss her questions and concerns at this visit.

## 2022-11-15 NOTE — PATIENT INSTRUCTIONS
Thank you for coming to the [facility] at [place]; please note the following instructions:    1.  Echocardiogram    2.  Continue atenolol 12.5 mg twice daily for 2 weeks then increase the dose of atenolol in the morning to 25 mg and keep the evening at 12.5 mg.    3.  If you continue to feel nauseous with atenolol we can change to a different medication called ivabradine.        If you have any questions regarding your visit, please contact your care team:     CARDIOLOGY  TELEPHONE NUMBER   Annamaria FRANKHola, Registered Nurse  Nataliia CM, Registered Nurse  Laura LINDSAY, Registered Medical Assistant  Manuela HANCOCK, Certified Medical Assistant  Huyen VILLATORO, Visit Facilitator [phone] (select option 1)    *After hours: [phone]   For Scheduling Appts:     [phone] (select option 1)    *After hours: [phone]   For the Device Clinic (Pacemakers and ICD's)  Angelina JONES, Registered Nurse   During business hours: [phone]    *After business hours:  [phone] (select option 4)      Normal test result notifications will be released via [employer] or mailed within 7 business days.  All other test results, will be communicated via telephone once reviewed by your cardiologist.    If you need a medication refill, please contact your pharmacy.  Please allow 3 business days for your refill to be completed.    As always, thank you for trusting us with your health care needs!

## 2022-11-15 NOTE — PROGRESS NOTES
Unique is a 37 year old who is being evaluated via a billable video visit.      How would you like to obtain your AVS? Mail a copy  If the video visit is dropped, the invitation should be resent by: Send to e-mail at: [email or web address]  Will anyone else be joining your video visit? No        Video-Visit Details    Video Start Time:4:05    Type of service:  Video Visit    Video End Time:4:42 pm (37 min video visit)    Originating Location (pt. Location): Home    Distant Location (provider location):  On-site    Platform used for Video Visit: Doximity                                                                                                                    [facility] [place]    Referring provider:Corina Aranda PA-C    HPI: Ms. Unique Baker is a 37 year old  female with PMH significant for  -Post COVID chronic joint pain/chronic muscle pain  -Post COVID chronic fatigue  -COVID infection March 2021, COVID infection 9/21/2022  -ADHD  -Esophageal reflux  -Iron deficiency anemia    Patient was seen in post-COVID clinic 10/4/2022 and reported palpitations, shortness of breath and chest tightness.  She is referred to cardiology for further evaluation.    Patient was in the ER on 10/21/2022 for palpitations, lightheadedness and general malaise after tejinder COVID-19 2 weeks ago.  Work-up in the ER was unremarkable with no signs of troponin change and no elevated D-dimer.  Given palpitations she was recommended Zio patch.  Patient is still wearing the Zio patch.  She was found to be anxious at the time of evaluation.    No known cardiac disease.  Patient tells me that she had palpitations associated with chest tightness and tingling in the whole body after she had her first COVID infection.  Overall she had 3 or 4 episodes but after the second COVID infection she is feeling palpitations 2 or 3 times a day.  It can happen at rest and with activity.  Now she tells me that even thinking  about any kind of physical activity that she is supposed to do creates anxiety and palpitations. She was given SSRI venlafaxine for anxiety however she has not started that yet.    She had a stress echocardiogram a year ago which was normal.  EKG on 10/2022 shows sinus tachycardia.      Patient has seen rheumatology and is on Plaquenil now. Not taking prednisone or ferrous sulfate.  Recent labs on 10/21 shows mild anemia at 11.5.  BMP is normal.  LDL is mildly elevated at 131.  Patient's LDL in March of this year was 199.  Currently not taking atorvastatin.  Medications, personal, family, and social history reviewed with patient and revised.    Interval history 11/15/2022:  Patient is being seen today through video visit for ongoing symptoms which include palpitations, shortness of breath and chest discomfort with minimal activity.  Patient tells me that she gets up and walks and her heart rate can go up to 110 and 137.  This makes her dizzy and short of breath.  She tells me resting heart rate is usually around 50s.  In the morning when she gets up she feels her legs are weak and hard to walk.  Sometimes she wakes up with palpitations.  When I saw her a month ago I started her on metoprolol however she did not feel well with metoprolol, felt tired and nauseous and throw up several times.  I stopped metoprolol and switched her to atenolol 12.5 mg twice daily.  She started atenolol yesterday.  She tells me that she is not feeling as nauseous as she was on metoprolol.  She reports constant chest discomfort, achiness in the back and tiredness.  She could not tolerate antianxiety medication.  She is on iron for iron deficiency anemia.  Her ferritin is 8 on 11/11/2022.  She is not physically very active due to her symptoms.    PAST MEDICAL HISTORY:  Past Medical History:   Diagnosis Date     Diabetes (H)     GDM insulin     GERD (gastroesophageal reflux disease)     w/u otherwise neg 2008     History of colposcopy with  cervical biopsy 3/23/07    WNL     Papanicolaou smear of cervix with low grade squamous intraepithelial lesion (LGSIL) 07     PONV (postoperative nausea and vomiting)      S/P  10/13/2017       CURRENT MEDICATIONS:  Current Outpatient Medications   Medication Sig Dispense Refill     acetaminophen (TYLENOL) 500 MG tablet take 1 - 2 tablet by oral route  every 6 hours as needed as needed       atenolol (TENORMIN) 25 MG tablet Take 0.5 tablets (12.5 mg) by mouth 2 times daily 90 tablet 3     cetirizine (ZYRTEC) 10 MG tablet Take 1 tablet (10 mg) by mouth daily 30 tablet 1     Cyanocobalamin (VITAMIN B12 TR PO) 1 patch (500 mcg) daily       cyclobenzaprine (FLEXERIL) 5 MG tablet Take 1 tablet (5 mg) by mouth nightly as needed for muscle spasms 30 tablet 0     dexlansoprazole (DEXILANT) 60 MG CPDR CR capsule Take 60 mg by mouth daily       diclofenac (VOLTAREN) 1 % topical gel Apply 2 g topically 4 times daily To joints as needed for pain 100 g 3     famotidine (PEPCID) 20 MG tablet TAKE ONE TABLET BY MOUTH TWICE A DAY AS NEEDED FOR REFLUX 180 tablet 3     fluocinonide emulsified base 0.05 % external cream apply to rash as needed - sparingly 60 g 1     fluticasone (FLONASE) 50 MCG/ACT nasal spray Spray 1 spray into both nostrils 2 times daily as needed for rhinitis or allergies Take 1 spray twice daily x 1 week then 1 spray daily at night x 1 week then as needed 11.1 mL 1     hydroxychloroquine (PLAQUENIL) 200 MG tablet Take 1 tablet (200 mg) by mouth 2 times daily Annual Plaquenil toxicity eye screening required. 180 tablet 3     hydrOXYzine (ATARAX) 25 MG tablet Take 1 tablet (25 mg) by mouth 3 times daily as needed for anxiety 90 tablet 1     LORazepam (ATIVAN) 0.5 MG tablet Take 1 tablet (0.5 mg) by mouth daily as needed for anxiety 10 pills/month 10 tablet 0     multivitamin (ONE-DAILY) tablet Take 1 tablet by mouth daily       sucralfate (CARAFATE) 1 GM tablet take 1 tablet by oral route 3 times every  day on an empty stomach 1 hour before meals       vitamin D3 (CHOLECALCIFEROL) 50 mcg (2000 units) tablet Take 1 tablet (50 mcg) by mouth daily for 30 days 30 tablet 3     cholecalciferol 25 MCG (1000 UT) TABS take 1 tablet by oral route  every month (Patient not taking: Reported on 11/3/2022)         PAST SURGICAL HISTORY:  Past Surgical History:   Procedure Laterality Date     BREAST SURGERY      augmentation       SECTION N/A 2015    Procedure:  SECTION;  Surgeon: Tremaine Gaona MD;  Location:  OR      SECTION, TUBAL LIGATION, COMBINED N/A 10/13/2017    Procedure: COMBINED  SECTION, TUBAL LIGATION;  Repeat  SECTION, TUBAL LIGATION ;  Surgeon: Sidra Salamanca DO;  Location:  OR     COLONOSCOPY N/A 2016    Procedure: COLONOSCOPY;  Surgeon: Lyudmila Pastor MD;  Location:  GI     DILATION AND CURETTAGE SUCTION      elective termination     ESOPHAGOSCOPY, GASTROSCOPY, DUODENOSCOPY (EGD), COMBINED  2014    Procedure: COMBINED ESOPHAGOSCOPY, GASTROSCOPY, DUODENOSCOPY (EGD);   ESOPHAGOSCOPY, GASTROSCOPY, DUODENOSCOPY (EGD) ;  Surgeon: Vishnu Lanier MD;  Location:  GI     ESOPHAGOSCOPY, GASTROSCOPY, DUODENOSCOPY (EGD), COMBINED Left 2020    Procedure: ESOPHAGOGASTRODUODENOSCOPY, WITH BIOPSIES USING BIOPSY FORCEPS;  Surgeon: Vishnu Hopkins MD;  Location:  GI     ESOPHAGOSCOPY, GASTROSCOPY, DUODENOSCOPY (EGD), COMBINED N/A 2021    Procedure: ESOPHAGOGASTRODUODENOSCOPY, WITH BIOPSY using cold forceps;  Surgeon: Vishnu Lanier MD;  Location:  GI     GYN SURGERY       c section      RW LASER WART      Anal wart removal      ZZC NONSPECIFIC PROCEDURE  01    Primary LTCS       ALLERGIES:     Allergies   Allergen Reactions     No Known Drug Allergies      Other Environmental Allergy      Patch Test Results 3-10-20    Very Strong (3+) or Strong (2+) reactions:  none     Mild (1+) reactions:  Fragrance mix  I  Linalool  Oleamidopropyl dimethylamine     Borderline/questionable reactions:  Balsam of [place]  Diphenylguanidine     Venlafaxine Other (See Comments)     legs were cold/tingly from knees down and restless, increased anxiety, insomnia       FAMILY HISTORY:  Family History   Problem Relation Age of Onset     Colon Polyps Brother      Other - See Comments Brother         colon polyps     Hyperlipidemia Sister      Stomach Cancer Sister      Cancer Sister         stomach     C.A.D. No family hx of      Diabetes No family hx of      Breast Cancer No family hx of      Cancer - colorectal No family hx of      Colon Cancer No family hx of          SOCIAL HISTORY:  Social History     Tobacco Use     Smoking status: Some Days     Years: 7.00     Types: Cigarettes     Last attempt to quit:      Years since quittin.8     Smokeless tobacco: Never     Tobacco comments:     only if she drinks alcohol   Vaping Use     Vaping Use: Never used   Substance Use Topics     Alcohol use: Not Currently     Alcohol/week: 8.3 standard drinks     Drug use: No       ROS:   Constitutional: No fever, chills, or sweats. Weight stable.   Cardiovascular: As per HPI.     Exam:  Physical Exam Elements attainable via telehealth:       Constitutional - alert and no distress    Eyes - no redness, no discharge    Respiratory - no cough, no labored breathing    Skin - no discoloration or lesions on the face or the arm.    Neurological -alert, normal speech,and affect, no tremor.     I have reviewed the labs and personally reviewed the imaging below and made my comment in the assessment and plan.    Labs:  CBC RESULTS:   Lab Results   Component Value Date    WBC 6.2 10/21/2022    WBC 9.2 2021    RBC 4.73 10/21/2022    RBC 4.49 2021    HGB 11.5 (L) 10/21/2022    HGB 12.7 2021    HCT 38.9 10/21/2022    HCT 38.4 2021    MCV 82 10/21/2022    MCV 86 2021    MCH 24.3 (L) 10/21/2022    MCH 28.3 2021    MCHC 29.6 (L)  10/21/2022    MCHC 33.1 05/01/2021    RDW 16.1 (H) 10/21/2022    RDW 13.2 05/01/2021     10/21/2022     05/01/2021       BMP RESULTS:  Lab Results   Component Value Date     10/21/2022     05/01/2021    POTASSIUM 3.9 10/21/2022    POTASSIUM 4.0 07/14/2022    POTASSIUM 4.0 05/01/2021    CHLORIDE 101 10/21/2022    CHLORIDE 109 07/14/2022    CHLORIDE 103 05/01/2021    CO2 25 10/21/2022    CO2 26 07/14/2022    CO2 25 05/01/2021    ANIONGAP 11 10/21/2022    ANIONGAP 5 07/14/2022    ANIONGAP 5 05/01/2021     (H) 10/21/2022    GLC 97 07/14/2022    GLC 87 05/01/2021    BUN 7.5 10/21/2022    BUN 9 07/14/2022    BUN 13 05/01/2021    CR 0.59 10/21/2022    CR 0.78 05/01/2021    GFRESTIMATED >90 10/21/2022    GFRESTIMATED >90 05/01/2021    GFRESTBLACK >90 05/01/2021    PREETI 9.9 10/21/2022    PREETI 8.6 05/01/2021      Zio patch 10/21/2022:  I have personally reviewed the test and explained to the patient.  Normal sinus rhythm and sinus tachycardia correspond to her symptoms.    Exercise stress echocardiogram 7/23/2021    The patient exercises for 10 min and 30 seconds to maximum of 12 METS. Above  average functional capacity.     The EKG portion of this stress test was negative for inducible ischemia.  This was a normal stress echocardiogram with no evidence of stress-induced  ischemia.     No prior study available for comparison.      EKG 10/21/2022: Sinus tachycardia heart rate 110 bpm.        Assessment and Plan:     #Frequent palpitations after second COVID infection 9/2022  -Patient reports multiple episodes of palpitations associated with anxiety, tingling, chest tightness, shortness of breath both at rest and with minimal activity like climbing stairs, walking half a mile or cleaning at home.  These are all new to her after COVID infection.  I reviewed her Zio patch which showed sinus rhythm corresponding to her symptoms.  Heart rate is actually less than 100 a lot of times.  She tells me that  she was not feeling well when she was wearing the heart monitor throwing up on most of the days.  Patient tells me that the heart monitor does not reflect her symptoms and is not accurate for her.  I have reassured patient that we are not seeing any life-threatening cardiac arrhythmia.  I think her palpitations with minimal activity and sometimes at rest is related to inappropriate sinus tachycardia probably secondary to COVID and iron deficiency anemia.  She could not tolerate metoprolol due to nausea. She is feeling less nauseous now with atenolol.  I recommended her to continue atenolol 12.5 mg twice daily for at least 2 more weeks then we will bump up the dose in the morning to 25 mg daily and keep the evening dose of 12.5 mg.  If she cannot tolerate atenolol I think I will switch her to ivabradine.  Patient is agreeable to proceed with this plan.  In the meantime I will order an echocardiogram.  She was concerned about heart inflammation.  She recently had multiple blood tests including D-dimer, troponin and BNP and CRP inflammation which were all normal.  I reassured patient that we are not dealing with any kind of heart inflammation.    Return to clinic to be determined.    Total time spent today for this visit is 55 minutes including precharting, telehealth video visit, review of labs/imaging and medical documentation.    Please donot hesitate to contact me if you have any questions or concerns. Again, thank you for allowing me to participate in the care of your patient.    Teo HARTLEY MD  [facility] Division of Cardiology  Pager [phone]

## 2022-11-15 NOTE — LETTER
11/15/2022      RE: Unique Baker  [id number] [facility] [address]       Dear Colleague,    Thank you for the opportunity to participate in the care of your patient, Unique Baker, at the [facility]. Please see a copy of my visit note below.                                                                                                                 General Cardiology Clinic-[facility]    Referring provider:Corina Aranda PA-C    HPI: Ms. Unique Baker is a 37 year old  female with PMH significant for  -Post COVID chronic joint pain/chronic muscle pain  -Post COVID chronic fatigue  -COVID infection March 2021, COVID infection 9/21/2022  -ADHD  -Esophageal reflux  -Iron deficiency anemia    Patient was seen in post-COVID clinic 10/4/2022 and reported palpitations, shortness of breath and chest tightness.  She is referred to cardiology for further evaluation.    Patient was in the ER on 10/21/2022 for palpitations, lightheadedness and general malaise after tejinder COVID-19 2 weeks ago.  Work-up in the ER was unremarkable with no signs of troponin change and no elevated D-dimer.  Given palpitations she was recommended Zio patch.  Patient is still wearing the Zio patch.  She was found to be anxious at the time of evaluation.    No known cardiac disease.  Patient tells me that she had palpitations associated with chest tightness and tingling in the whole body after she had her first COVID infection.  Overall she had 3 or 4 episodes but after the second COVID infection she is feeling palpitations 2 or 3 times a day.  It can happen at rest and with activity.  Now she tells me that even thinking about any kind of physical activity that she is supposed to do creates anxiety and palpitations. She was given SSRI venlafaxine for anxiety however she has not started that yet.    She had a stress echocardiogram a year ago which  was normal.  EKG on 10/2022 shows sinus tachycardia.      Patient has seen rheumatology and is on Plaquenil now. Not taking prednisone or ferrous sulfate.  Recent labs on 10/21 shows mild anemia at 11.5.  BMP is normal.  LDL is mildly elevated at 131.  Patient's LDL in March of this year was 199.  Currently not taking atorvastatin.  Medications, personal, family, and social history reviewed with patient and revised.    Interval history 11/15/2022:  Patient is being seen today through video visit for ongoing symptoms which include palpitations, shortness of breath and chest discomfort with minimal activity.  Patient tells me that she gets up and walks and her heart rate can go up to 110 and 137.  This makes her dizzy and short of breath.  She tells me resting heart rate is usually around 50s.  In the morning when she gets up she feels her legs are weak and hard to walk.  Sometimes she wakes up with palpitations.  When I saw her a month ago I started her on metoprolol however she did not feel well with metoprolol, felt tired and nauseous and throw up several times.  I stopped metoprolol and switched her to atenolol 12.5 mg twice daily.  She started atenolol yesterday.  She tells me that she is not feeling as nauseous as she was on metoprolol.  She reports constant chest discomfort, achiness in the back and tiredness.  She could not tolerate antianxiety medication.  She is on iron for iron deficiency anemia.  Her ferritin is 8 on 2022.  She is not physically very active due to her symptoms.    PAST MEDICAL HISTORY:  Past Medical History:   Diagnosis Date     Diabetes (H)     GDM insulin     GERD (gastroesophageal reflux disease)     w/u otherwise neg      History of colposcopy with cervical biopsy 3/23/07    WNL     Papanicolaou smear of cervix with low grade squamous intraepithelial lesion (LGSIL) 07     PONV (postoperative nausea and vomiting)      S/P  10/13/2017       CURRENT  MEDICATIONS:  Current Outpatient Medications   Medication Sig Dispense Refill     acetaminophen (TYLENOL) 500 MG tablet take 1 - 2 tablet by oral route  every 6 hours as needed as needed       atenolol (TENORMIN) 25 MG tablet Take 0.5 tablets (12.5 mg) by mouth 2 times daily 90 tablet 3     cetirizine (ZYRTEC) 10 MG tablet Take 1 tablet (10 mg) by mouth daily 30 tablet 1     Cyanocobalamin (VITAMIN B12 TR PO) 1 patch (500 mcg) daily       cyclobenzaprine (FLEXERIL) 5 MG tablet Take 1 tablet (5 mg) by mouth nightly as needed for muscle spasms 30 tablet 0     dexlansoprazole (DEXILANT) 60 MG CPDR CR capsule Take 60 mg by mouth daily       diclofenac (VOLTAREN) 1 % topical gel Apply 2 g topically 4 times daily To joints as needed for pain 100 g 3     famotidine (PEPCID) 20 MG tablet TAKE ONE TABLET BY MOUTH TWICE A DAY AS NEEDED FOR REFLUX 180 tablet 3     fluocinonide emulsified base 0.05 % external cream apply to rash as needed - sparingly 60 g 1     fluticasone (FLONASE) 50 MCG/ACT nasal spray Spray 1 spray into both nostrils 2 times daily as needed for rhinitis or allergies Take 1 spray twice daily x 1 week then 1 spray daily at night x 1 week then as needed 11.1 mL 1     hydroxychloroquine (PLAQUENIL) 200 MG tablet Take 1 tablet (200 mg) by mouth 2 times daily Annual Plaquenil toxicity eye screening required. 180 tablet 3     hydrOXYzine (ATARAX) 25 MG tablet Take 1 tablet (25 mg) by mouth 3 times daily as needed for anxiety 90 tablet 1     LORazepam (ATIVAN) 0.5 MG tablet Take 1 tablet (0.5 mg) by mouth daily as needed for anxiety 10 pills/month 10 tablet 0     multivitamin (ONE-DAILY) tablet Take 1 tablet by mouth daily       sucralfate (CARAFATE) 1 GM tablet take 1 tablet by oral route 3 times every day on an empty stomach 1 hour before meals       vitamin D3 (CHOLECALCIFEROL) 50 mcg (2000 units) tablet Take 1 tablet (50 mcg) by mouth daily for 30 days 30 tablet 3     cholecalciferol 25 MCG (1000 UT) TABS take  1 tablet by oral route  every month (Patient not taking: Reported on 11/3/2022)         PAST SURGICAL HISTORY:  Past Surgical History:   Procedure Laterality Date     BREAST SURGERY      augmentation       SECTION N/A 2015    Procedure:  SECTION;  Surgeon: Tremaine Gaona MD;  Location:  OR      SECTION, TUBAL LIGATION, COMBINED N/A 10/13/2017    Procedure: COMBINED  SECTION, TUBAL LIGATION;  Repeat  SECTION, TUBAL LIGATION ;  Surgeon: Sidra Salamanca DO;  Location:  OR     COLONOSCOPY N/A 2016    Procedure: COLONOSCOPY;  Surgeon: Lyudmila Pastor MD;  Location:  GI     DILATION AND CURETTAGE SUCTION      elective termination     ESOPHAGOSCOPY, GASTROSCOPY, DUODENOSCOPY (EGD), COMBINED  2014    Procedure: COMBINED ESOPHAGOSCOPY, GASTROSCOPY, DUODENOSCOPY (EGD);   ESOPHAGOSCOPY, GASTROSCOPY, DUODENOSCOPY (EGD) ;  Surgeon: Vishnu Lanier MD;  Location:  GI     ESOPHAGOSCOPY, GASTROSCOPY, DUODENOSCOPY (EGD), COMBINED Left 2020    Procedure: ESOPHAGOGASTRODUODENOSCOPY, WITH BIOPSIES USING BIOPSY FORCEPS;  Surgeon: Vishnu Hopkins MD;  Location:  GI     ESOPHAGOSCOPY, GASTROSCOPY, DUODENOSCOPY (EGD), COMBINED N/A 2021    Procedure: ESOPHAGOGASTRODUODENOSCOPY, WITH BIOPSY using cold forceps;  Surgeon: Vishnu Lanier MD;  Location:  GI     GYN SURGERY       c section      RW LASER WART      Anal wart removal      ZZC NONSPECIFIC PROCEDURE  01    Primary LTCS       ALLERGIES:     Allergies   Allergen Reactions     No Known Drug Allergies      Other Environmental Allergy      Patch Test Results 3-10-20    Very Strong (3+) or Strong (2+) reactions:  none     Mild (1+) reactions:  Fragrance mix I  Linalool  Oleamidopropyl dimethylamine     Borderline/questionable reactions:  Balsam of [place]  Diphenylguanidine     Venlafaxine Other (See Comments)     legs were cold/tingly from knees down and restless, increased  anxiety, insomnia       FAMILY HISTORY:  Family History   Problem Relation Age of Onset     Colon Polyps Brother      Other - See Comments Brother         colon polyps     Hyperlipidemia Sister      Stomach Cancer Sister      Cancer Sister         stomach     C.A.D. No family hx of      Diabetes No family hx of      Breast Cancer No family hx of      Cancer - colorectal No family hx of      Colon Cancer No family hx of          SOCIAL HISTORY:  Social History     Tobacco Use     Smoking status: Some Days     Years: 7.00     Types: Cigarettes     Last attempt to quit:      Years since quittin.8     Smokeless tobacco: Never     Tobacco comments:     only if she drinks alcohol   Vaping Use     Vaping Use: Never used   Substance Use Topics     Alcohol use: Not Currently     Alcohol/week: 8.3 standard drinks     Drug use: No       ROS:   Constitutional: No fever, chills, or sweats. Weight stable.   Cardiovascular: As per HPI.     Exam:  Physical Exam Elements attainable via telehealth:       Constitutional - alert and no distress    Eyes - no redness, no discharge    Respiratory - no cough, no labored breathing    Skin - no discoloration or lesions on the face or the arm.    Neurological -alert, normal speech,and affect, no tremor.     I have reviewed the labs and personally reviewed the imaging below and made my comment in the assessment and plan.    Labs:  CBC RESULTS:   Lab Results   Component Value Date    WBC 6.2 10/21/2022    WBC 9.2 2021    RBC 4.73 10/21/2022    RBC 4.49 2021    HGB 11.5 (L) 10/21/2022    HGB 12.7 2021    HCT 38.9 10/21/2022    HCT 38.4 2021    MCV 82 10/21/2022    MCV 86 2021    MCH 24.3 (L) 10/21/2022    MCH 28.3 2021    MCHC 29.6 (L) 10/21/2022    MCHC 33.1 2021    RDW 16.1 (H) 10/21/2022    RDW 13.2 2021     10/21/2022     2021       BMP RESULTS:  Lab Results   Component Value Date     10/21/2022      05/01/2021    POTASSIUM 3.9 10/21/2022    POTASSIUM 4.0 07/14/2022    POTASSIUM 4.0 05/01/2021    CHLORIDE 101 10/21/2022    CHLORIDE 109 07/14/2022    CHLORIDE 103 05/01/2021    CO2 25 10/21/2022    CO2 26 07/14/2022    CO2 25 05/01/2021    ANIONGAP 11 10/21/2022    ANIONGAP 5 07/14/2022    ANIONGAP 5 05/01/2021     (H) 10/21/2022    GLC 97 07/14/2022    GLC 87 05/01/2021    BUN 7.5 10/21/2022    BUN 9 07/14/2022    BUN 13 05/01/2021    CR 0.59 10/21/2022    CR 0.78 05/01/2021    GFRESTIMATED >90 10/21/2022    GFRESTIMATED >90 05/01/2021    GFRESTBLACK >90 05/01/2021    PREETI 9.9 10/21/2022    PREETI 8.6 05/01/2021      Zio patch 10/21/2022:  I have personally reviewed the test and explained to the patient.  Normal sinus rhythm and sinus tachycardia correspond to her symptoms.    Exercise stress echocardiogram 7/23/2021    The patient exercises for 10 min and 30 seconds to maximum of 12 METS. Above  average functional capacity.     The EKG portion of this stress test was negative for inducible ischemia.  This was a normal stress echocardiogram with no evidence of stress-induced  ischemia.     No prior study available for comparison.      EKG 10/21/2022: Sinus tachycardia heart rate 110 bpm.        Assessment and Plan:     #Frequent palpitations after second COVID infection 9/2022  -Patient reports multiple episodes of palpitations associated with anxiety, tingling, chest tightness, shortness of breath both at rest and with minimal activity like climbing stairs, walking half a mile or cleaning at home.  These are all new to her after COVID infection.  I reviewed her Zio patch which showed sinus rhythm corresponding to her symptoms.  Heart rate is actually less than 100 a lot of times.  She tells me that she was not feeling well when she was wearing the heart monitor throwing up on most of the days.  Patient tells me that the heart monitor does not reflect her symptoms and is not accurate for her.  I have reassured  patient that we are not seeing any life-threatening cardiac arrhythmia.  I think her palpitations with minimal activity and sometimes at rest is related to inappropriate sinus tachycardia probably secondary to COVID and iron deficiency anemia.  She could not tolerate metoprolol due to nausea. She is feeling less nauseous now with atenolol.  I recommended her to continue atenolol 12.5 mg twice daily for at least 2 more weeks then we will bump up the dose in the morning to 25 mg daily and keep the evening dose of 12.5 mg.  If she cannot tolerate atenolol I think I will switch her to ivabradine.  Patient is agreeable to proceed with this plan.  In the meantime I will order an echocardiogram.  She was concerned about heart inflammation.  She recently had multiple blood tests including D-dimer, troponin and BNP and CRP inflammation which were all normal.  I reassured patient that we are not dealing with any kind of heart inflammation.    Return to clinic to be determined.    Total time spent today for this visit is 55 minutes including precharting, telehealth video visit, review of labs/imaging and medical documentation.    Please donot hesitate to contact me if you have any questions or concerns. Again, thank you for allowing me to participate in the care of your patient.    Teo HARTLEY MD  [facility] Division of Cardiology  Pager [phone]

## 2022-11-16 ENCOUNTER — TELEPHONE (OUTPATIENT)
Dept: CARDIOLOGY | Facility: CLINIC | Age: 37
End: 2022-11-16

## 2022-11-16 NOTE — TELEPHONE ENCOUNTER
Scheduled Echo in [place] per patient request. Patient lives in [place].    Manuela Mendiola CMA ([facility])

## 2022-11-17 ENCOUNTER — LAB (OUTPATIENT)
Dept: LAB | Facility: CLINIC | Age: 37
End: 2022-11-17
Payer: COMMERCIAL

## 2022-11-17 ENCOUNTER — TELEPHONE (OUTPATIENT)
Dept: PALLIATIVE MEDICINE | Facility: CLINIC | Age: 37
End: 2022-11-17

## 2022-11-17 DIAGNOSIS — F41.0 PANIC ATTACK: ICD-10-CM

## 2022-11-17 DIAGNOSIS — T78.40XA SENSITIVITY TO MEDICATION, INITIAL ENCOUNTER: ICD-10-CM

## 2022-11-17 DIAGNOSIS — Z78.9 LACK OF DRUG ACTION (PROPERLY PRESCRIBED AND ADMINISTERED): ICD-10-CM

## 2022-11-17 DIAGNOSIS — F41.9 ANXIETY: ICD-10-CM

## 2022-11-17 DIAGNOSIS — T50.905D ADVERSE EFFECT OF DRUG, SUBSEQUENT ENCOUNTER: ICD-10-CM

## 2022-11-17 LAB — LAB DIRECTOR RESULTS: NORMAL

## 2022-11-17 PROCEDURE — 81335 TPMT GENE COM VARIANTS: CPT

## 2022-11-17 PROCEDURE — 36415 COLL VENOUS BLD VENIPUNCTURE: CPT

## 2022-11-17 PROCEDURE — G0452 MOLECULAR PATHOLOGY INTERPR: HCPCS | Mod: 26 | Performed by: PATHOLOGY

## 2022-11-17 PROCEDURE — 81231 CYP3A5 GENE COMMON VARIANTS: CPT

## 2022-11-17 NOTE — TELEPHONE ENCOUNTER
Last OV: 9/12/22    Next OV: 4-8 weeks    [place] plan: JEIMY HAMMOND RN Care Coordinator  [facility]

## 2022-11-20 ENCOUNTER — MYC MEDICAL ADVICE (OUTPATIENT)
Dept: INTERNAL MEDICINE | Facility: CLINIC | Age: 37
End: 2022-11-20

## 2022-11-20 ENCOUNTER — HEALTH MAINTENANCE LETTER (OUTPATIENT)
Age: 37
End: 2022-11-20

## 2022-11-20 DIAGNOSIS — E78.00 HYPERCHOLESTEREMIA: Primary | ICD-10-CM

## 2022-11-21 ENCOUNTER — MYC MEDICAL ADVICE (OUTPATIENT)
Dept: INTERNAL MEDICINE | Facility: CLINIC | Age: 37
End: 2022-11-21

## 2022-11-21 NOTE — TELEPHONE ENCOUNTER
Called patient regarding Mychart message. She has been struggling a lot with different symptoms since having Covid for a 2nd time and has been seen by several different providers for these symptoms. Main concerns now is the weakness and shakiness she is experiencing in the mornings. She thought it was low blood sugar at first so treating it by drinking orange juice. However, today she was able to locate her old glucometer and glucose was 106 about 10 minutes after drinking orange juice so now she is not sure that was the cause.     Zio Patch results did not show anything concerning, but patient states that she was to anxious to be very active while wearing the monitor; she didn't leave her home or do any physical activity the 7 days she was wearing this. She was also taking Metoprolol so she is unsure how accurate the test was. She asks if this should be repeated to see if any changes would show up when she is being active since she feels the symptoms more with activity.     She has been struggling with increased heart rate since having Covid. Cardiology changed Metoprolol (caused vomiting) to Atenolol, but patient stopped Atenolol over the weekend due to side effects-states she was getting chest tightnesspain over left breast and feeling short of breath after taking this for 6 days. She was also feeling more anxiety and depression after starting this and concerned it was from the medication since she is sensitive to depression and anxiety medications. She has been messaging with cardiology about this, they wanted her to continue on Atenolol, but she is not comfortable doing this. Patient states heart rate right now is in the 80s at rest, this writer commented that heart rate in 80s at rest would not be unusual. However, patient states that she feels the pain in her left shoulder and chest tightness when her heart rate is in the 80s-90s. She went to [employer] [place] [employer] yesterday to  a few items, but  afterwards felt exhausted and could not do anything the rest of the day. Also dealing with muscle spasms in her arms and legs. Today she states her heart rate increased to 127 after doing about 10 squats and made her feel short of breath, states that it took a while to come back down. This also makes her anxiety worse and she has side effects from anxiety medications she has tried in the past. Working with a therapist for her anxiety with deep breathing and other exercises.     Patient states she has low Ferritin levels and states the provider talked about IV Iron infusions, but she is worried about these because of the potential long-term side effects she read about from them.      She had appointment tomorrow Echocardiogram and also scheduled for sleep study tomorrow night, but not sure if her anxiety will allow her to sleep. Will forward message to Dr. Riggs to review so she is aware of this, but unsure if she will have further follow-up recommendations.     Kathi Chavez RN  [facility]

## 2022-11-21 NOTE — TELEPHONE ENCOUNTER
At this point all I know to do is send her to the long [facility] clinic. There is nothing more I can do for her.

## 2022-11-22 ENCOUNTER — HOSPITAL ENCOUNTER (OUTPATIENT)
Dept: CARDIOLOGY | Facility: CLINIC | Age: 37
Discharge: HOME | End: 2022-11-22
Attending: INTERNAL MEDICINE | Admitting: INTERNAL MEDICINE
Payer: COMMERCIAL

## 2022-11-22 DIAGNOSIS — R00.2 PALPITATIONS: ICD-10-CM

## 2022-11-22 LAB — LVEF ECHO: NORMAL

## 2022-11-22 PROCEDURE — 93306 TTE W/DOPPLER COMPLETE: CPT

## 2022-11-22 PROCEDURE — 93306 TTE W/DOPPLER COMPLETE: CPT | Mod: 26 | Performed by: INTERNAL MEDICINE

## 2022-11-22 NOTE — TELEPHONE ENCOUNTER
Patient advised of recommendations via Mychart. Patient was referred to Post-Covid Clinic in July and last appointment was 11/8/22.     Kathi Chavez RN  [facility]

## 2022-11-22 NOTE — TELEPHONE ENCOUNTER
Patient sends [place] message related to Angeles Pierce visit 10/2022.    Cholesterol medication was discontinued due to dry mouth. Should patient get a different cholesterol medication prescribed?

## 2022-11-23 ENCOUNTER — TELEPHONE (OUTPATIENT)
Dept: PHYSICAL MEDICINE AND REHAB | Facility: CLINIC | Age: 37
End: 2022-11-23

## 2022-11-23 ENCOUNTER — OFFICE VISIT (OUTPATIENT)
Dept: NEUROLOGY | Facility: CLINIC | Age: 37
End: 2022-11-23
Attending: PHYSICIAN ASSISTANT
Payer: COMMERCIAL

## 2022-11-23 VITALS
HEART RATE: 100 BPM | WEIGHT: 136 LBS | BODY MASS INDEX: 25.68 KG/M2 | DIASTOLIC BLOOD PRESSURE: 87 MMHG | HEIGHT: 61 IN | OXYGEN SATURATION: 99 % | SYSTOLIC BLOOD PRESSURE: 125 MMHG

## 2022-11-23 DIAGNOSIS — R25.3 BENIGN FASCICULATIONS: ICD-10-CM

## 2022-11-23 DIAGNOSIS — D50.9 IRON DEFICIENCY ANEMIA, UNSPECIFIED IRON DEFICIENCY ANEMIA TYPE: ICD-10-CM

## 2022-11-23 DIAGNOSIS — G90.A POTS (POSTURAL ORTHOSTATIC TACHYCARDIA SYNDROME): Primary | ICD-10-CM

## 2022-11-23 PROCEDURE — 99215 OFFICE O/P EST HI 40 MIN: CPT | Performed by: PSYCHIATRY & NEUROLOGY

## 2022-11-23 NOTE — NURSING NOTE
"Unique Baker is a 37 year old female who presents for:  Chief Complaint   Patient presents with     Referral     Small fiber neuropathy         Initial Vitals:  /74   Pulse 94   Ht 1.549 m (5' 1\")   Wt 61.7 kg (136 lb)   LMP 10/16/2022   SpO2 100%   BMI 25.70 kg/m   Estimated body mass index is 25.7 kg/m  as calculated from the following:    Height as of this encounter: 1.549 m (5' 1\").    Weight as of this encounter: 61.7 kg (136 lb).. Body surface area is 1.63 meters squared. BP completed using cuff size: jarvis Gamez    "

## 2022-11-23 NOTE — PATIENT INSTRUCTIONS
I think it is very likely you have POTS- postural orthostatic tachycardia syndrome. This is a disorder of the autonomic nerves and it is quite frequent in young people after COVID19. Basically the nerves in the feet do not give the proper order to the muscles to contract to get the blood from the legs back to the heart, and the way the heart reacts is to increase the heart rate. This causes people to get shaky, weak, dizzy when they stand.    The best treatment for POTS is WATER. Drinking 500 ml of water from a bottle immediately before standing can help and you can do this 4 and 5 times a day. Also increasing the salt you take can help  If water and salt are not enough then you may try some medications. I was thinking of ivadrabine but you may want to clarify if this interacts with Plaquenil or not    I will refer you to Cardiology at the , Dr Green, who specializes in POTS and does tilt tests, to give you some ideas what medications could be tried.     Follow up with me as needed

## 2022-11-23 NOTE — PROGRESS NOTES
Service Date: 2022    Corina Aranda PA-C  [facility] - [facility] Medicine and Rehab  [address] [place] [address]    RE:  Unique Baker  MRN:  [id number]  :  1985    Dear Ms. Aranda:    I had the pleasure to see Unique Baker at the [facility] in [place] today.  Unique is a 37-year-old woman who has a history of undefined connective tissue disease for which she is following with Rheumatology and mckenna Plaquenil for a number of years.  She was in her usual state of health until she had COVID-19 on 2022.  It manifested with fever, myalgias, arthralgias, cough and a rash that looked like hives actually.  Because of the hives she was given steroids, and in addition she was put on Paxlovid due to having the history of autoimmune disease on hydroxychloroquine.  She took the Paxlovid for 5 days.  The acute COVID symptoms lasted about 2 weeks, but shortly thereafter she developed shakiness and anxiety, which she never had before. She has not had any chronic anxiety disorder and never was on medication for it.  She had a virtual visit with Psychiatry on .  She has been prescribed lorazepam and hydroxyzine.  She tells me that her heart rate remains in the 60s when she is sitting or lying in bed, but the moment she stands it will skyrocket to upper 90s or 100s.  When she stands, she feels shaky, nervous, sometimes a little dizzy.  The shaking is worse in the legs, and the legs feel weak and she reports some difficulty walking.  If she continues to walk, however, the shakiness seems to gradually resolve.  She gets the same shaky feeling in the upper chest at times but not so much in the hands.  Her feet are very sweaty and this is when she is recumbent.  It is not clear if it is worse when she is upright.  She has not noticed any purple discoloration of the toes or mottling of the skin of the toes when standing, and she does not have Raynaud  syndrome.  She denies dysphagia.  She does not have any weakness of arms with raising them overhead and no clumsiness on fine hand movements or dropping things.  She can get the same tachycardia amid of walking, but the worst is usually after prolonged sitting, or first thing in the morning when she wakes up.  Recently, she experienced similar symptoms when she was in a [employer] and had pancakes, and she wondered whether there was any association with carbs in the food.      She has 7 siblings, 5 sisters and 2 brothers, none of which have similar symptoms and nor did her parents.  She denies dysphagia.  She has no ptosis, diplopia, jaw weakness, head drop.  She has noted some pulsating sensation in random areas of bilateral legs and arms in the last month, which is actually muscle twitches and sounds like fasciculations.  She calls it muscle spasms, but it is not painful and it is not a cramp.  She has a history of gastroesophageal reflux, and she was recently found to be anemic with low iron and was prescribed iron supplementation.  Ferritin on 11/11 was only 8.2 and TIBC was elevated at 401.  CBC showed anemia with hypochromia and microcytosis.      On a virtual visit with Cardiology with Dr. Lozoya on 11/1, it was felt that she has inappropriate sinus tachycardia and she was put on a Holter monitor.  She could not tolerate atenolol or metoprolol because of nausea.  The cardiologist proposed ivabradine, but she was reluctant to proceed, because it may interact with the Plaquenil that she is taking for her connective tissue disease.  She does report early bloating when eating, but she does not vomit food.  She has occasional dryness of her mouth.  No incontinence of bowel or bladder or difficulty with urination.      We did orthostatic vitals in clinic today.  Her blood pressure does not drop.  Heart rate was 91 sitting and 100 standing, which actually does not meet criteria for POTS.      She also reports some  "tingling in the ulnar aspect of the hands and towards the forearm, which happens when she wakes up at night, but there is no tingling or numbness in the feet.    /87   Pulse 100   Ht 1.549 m (5' 1\")   Wt 61.7 kg (136 lb)   LMP 10/16/2022   SpO2 99%   BMI 25.70 kg/m      PHYSICAL EXAMINATION:  On exam today, she is awake, alert, oriented x3.  She is somewhat anxious.  Her strength is 5/5 in upper and lower extremities proximally and distally.  There is absolutely no muscle weakness.  Tone is normal.  Reflexes are 2+ and symmetric in the uppers.  They are 2+ at the knees and the right ankle, but the left ankle reflex is difficult to get.  Her sensory exam is intact to all modalities including vibration, joint position, pinprick and light touch.  There are no signs of sensory neuropathy.  I did not see any fasciculations in the legs.  She does not have any tremor of the hands with finger-to-nose or hands outstretched.  She can get up from a chair with arms crossed on the chest with no difficulty.  She can walk on heels and toes without problems.  She has no ptosis or ophthalmoparesis.  No nystagmus.    In summary, I told Unique that I have a strong feeling she has POTS.  She describes that her heart rate goes up from the sitting to the standing position, and he standing position causes her to feel shaky, dizzy and anxious. We could not confirm the diagnosis with orthostatic vitals in clinic today, but I think the suspicion is so high that she should have a tilt test and she should be evaluated by cardiac EP.  I will refer her to Dr. Green.      I told her that the most efficient strategy to treat this problem is plenty of water.  She should try 500 mL of water immediately before adopting the standing position, and she can do this 4-5 times a day; she should drink a total of 3 liters or more.  She can also liberalize her salt intake to 3-4 grams a day. If this is not enough, she could consider medications " that slow the heart rate.  She cannot tolerate beta blockers .  One should figure out if ivabradine can be used with her Plaquenil and, if not, whether Plaquenil can be temporarily stopped.  There are other medications for treatment of POTS, but I would defer to the specialists as I do not manage this condition through our clinic. Importantly, she should treat her anemia aggressively with iron replacement as this can also exacerbate the aforementioned symptoms.     She does not have any evidence of myopathy or neuropathy on exam, so otherwise, I have little to offer; EMG is not needed or indicated here. Her twitching in the legs is almost certainly benign fasciculations, and I reassured her about it.     I told her that POTS after COVID-19 infection is actually fairly common in young people, and I strongly feel that not all of her symptoms can be ascribed to anxiety, as orthostasis appears to be a consistent trigger of them. She agrees with this plan.  She will be seen in our clinic in followup as necessary.    Thank you for allowing me to participate in his care. TT spent on this encounter today 45 minutes including 25 face to face, 10 in post visit note dictation, editing, and orders, and 10 in pre-visit chart review.    Sincerely,        Bryan Hernandez MD    cc:  Celina Riggs MD  [facility]  [address], [address]  [place] [address]    Bryan Hernandez MD        D: 2022   T: 2022   MT: eri/KAREN    Name:     LIN DUPREE  MRN:      [id number]        Account:      [id number]   :      1985           Service Date: 2022       Document: [id number]

## 2022-11-23 NOTE — LETTER
2022         RE: Unique Baker  [id number] [facility] [address]        Dear Colleague,    Thank you for referring your patient, Unique Baker, to the [facility]. Please see a copy of my visit note below.    Service Date: 2022    Corina Aranda PA-C  [facility] - Physical Medicine and Rehab  [address] [place] [address]  [id number]    RE:  Unique Baker  MRN:  [id number]  :  1985    Dear Ms. Aranda:    I had the pleasure to see Unique Baker at the [facility] in [place] today.  Unique is a 37-year-old woman who has a history of undefined connective tissue disease for which she is following with Rheumatology and mckenna Plaquenil for a number of years.  She was in her usual state of health until she had COVID-19 on 2022.  It manifested with fever, myalgias, arthralgias, cough and a rash that looked like hives actually.  Because of the hives she was given steroids, and in addition she was put on Paxlovid due to having the history of autoimmune disease on hydroxychloroquine.  She took the Paxlovid for 5 days.  The acute COVID symptoms lasted about 2 weeks, but shortly thereafter she developed shakiness and anxiety, which she never had before. She has not had any chronic anxiety disorder and never was on medication for it.  She had a virtual visit with Psychiatry on .  She has been prescribed lorazepam and hydroxyzine.  She tells me that her heart rate remains in the 60s when she is sitting or lying in bed, but the moment she stands it will skyrocket to upper 90s or 100s.  When she stands, she feels shaky, nervous, sometimes a little dizzy.  The shaking is worse in the legs, and the legs feel weak and she reports some difficulty walking.  If she continues to walk, however, the shakiness seems to gradually resolve.  She gets the same shaky feeling in the upper chest at times but not so much in the  hands.  Her feet are very sweaty and this is when she is recumbent.  It is not clear if it is worse when she is upright.  She has not noticed any purple discoloration of the toes or mottling of the skin of the toes when standing, and she does not have Raynaud syndrome.  She denies dysphagia.  She does not have any weakness of arms with raising them overhead and no clumsiness on fine hand movements or dropping things.  She can get the same tachycardia amid of walking, but the worst is usually after prolonged sitting, or first thing in the morning when she wakes up.  Recently, she experienced similar symptoms when she was in a [employer] and had pancakes, and she wondered whether there was any association with carbs in the food.      She has 7 siblings, 5 sisters and 2 brothers, none of which have similar symptoms and nor did her parents.  She denies dysphagia.  She has no ptosis, diplopia, jaw weakness, head drop.  She has noted some pulsating sensation in random areas of bilateral legs and arms in the last month, which is actually muscle twitches and sounds like fasciculations.  She calls it muscle spasms, but it is not painful and it is not a cramp.  She has a history of gastroesophageal reflux, and she was recently found to be anemic with low iron and was prescribed iron supplementation.  Ferritin on 11/11 was only 8.2 and TIBC was elevated at 401.  CBC showed anemia with hypochromia and microcytosis.      On a virtual visit with Cardiology with Dr. Lozoya on 11/1, it was felt that she has inappropriate sinus tachycardia and she was put on a Holter monitor.  She could not tolerate atenolol or metoprolol because of nausea.  The cardiologist proposed ivabradine, but she was reluctant to proceed, because it may interact with the Plaquenil that she is taking for her connective tissue disease.  She does report early bloating when eating, but she does not vomit food.  She has occasional dryness of her mouth.  No incontinence  "of bowel or bladder or difficulty with urination.      We did orthostatic vitals in clinic today.  Her blood pressure does not drop.  Heart rate was 91 sitting and 100 standing, which actually does not meet criteria for POTS.      She also reports some tingling in the ulnar aspect of the hands and towards the forearm, which happens when she wakes up at night, but there is no tingling or numbness in the feet.    /87   Pulse 100   Ht 1.549 m (5' 1\")   Wt 61.7 kg (136 lb)   LMP 10/16/2022   SpO2 99%   BMI 25.70 kg/m      PHYSICAL EXAMINATION:  On exam today, she is awake, alert, oriented x3.  She is somewhat anxious.  Her strength is 5/5 in upper and lower extremities proximally and distally.  There is absolutely no muscle weakness.  Tone is normal.  Reflexes are 2+ and symmetric in the uppers.  They are 2+ at the knees and the right ankle, but the left ankle reflex is difficult to get.  Her sensory exam is intact to all modalities including vibration, joint position, pinprick and light touch.  There are no signs of sensory neuropathy.  I did not see any fasciculations in the legs.  She does not have any tremor of the hands with finger-to-nose or hands outstretched.  She can get up from a chair with arms crossed on the chest with no difficulty.  She can walk on heels and toes without problems.  She has no ptosis or ophthalmoparesis.  No nystagmus.    In summary, I told Unique that I have a strong feeling she has POTS.  She describes that her heart rate goes up from the sitting to the standing position, and he standing position causes her to feel shaky, dizzy and anxious. We could not confirm the diagnosis with orthostatic vitals in clinic today, but I think the suspicion is so high that she should have a tilt test and she should be evaluated by cardiac EP.  I will refer her to Dr. Green.      I told her that the most efficient strategy to treat this problem is plenty of water.  She should try 500 mL of " water immediately before adopting the standing position, and she can do this 4-5 times a day; she should drink a total of 3 liters or more.  She can also liberalize her salt intake to 3-4 grams a day. If this is not enough, she could consider medications that slow the heart rate.  She cannot tolerate beta blockers .  One should figure out if ivabradine can be used with her Plaquenil and, if not, whether Plaquenil can be temporarily stopped.  There are other medications for treatment of POTS, but I would defer to the specialists as I do not manage this condition through our clinic. Importantly, she should treat her anemia aggressively with iron replacement as this can also exacerbate the aforementioned symptoms.     She does not have any evidence of myopathy or neuropathy on exam, so otherwise, I have little to offer; EMG is not needed or indicated here. Her twitching in the legs is almost certainly benign fasciculations, and I reassured her about it.     I told her that POTS after COVID-19 infection is actually fairly common in young people, and I strongly feel that not all of her symptoms can be ascribed to anxiety, as orthostasis appears to be a consistent trigger of them. She agrees with this plan.  She will be seen in our clinic in followup as necessary.    Thank you for allowing me to participate in his care. TT spent on this encounter today 45 minutes including 25 face to face, 10 in post visit note dictation, editing, and orders, and 10 in pre-visit chart review.    Sincerely,        Bryan Hernandez MD    cc:  Celina Riggs MD  [facility]  [address], [address]  [place] [address]    Bryan Hernandez MD        D: 2022   T: 2022   MT: eri/KAREN    Name:     LIN DUPREE  MRN:      [id number]        Account:      [id number]   :      1985           Service Date: 2022       Document: [id number]      Again, thank you for allowing me to participate  in the care of your patient.        Sincerely,        Bryan Hernandez MD

## 2022-11-23 NOTE — NURSING NOTE
"Unique Baker is a 37 year old female who presents for:  Chief Complaint   Patient presents with     Referral     Small fiber neuropathy   Patient stated she is experiencing tingling and shaking in legs when she wakes up in the morning, anxiety and muscle spasms in the legs and arms.        Initial Vitals:  /74   Pulse 94   Ht 1.549 m (5' 1\")   Wt 61.7 kg (136 lb)   LMP 10/16/2022   SpO2 100%   BMI 25.70 kg/m   Estimated body mass index is 25.7 kg/m  as calculated from the following:    Height as of this encounter: 1.549 m (5' 1\").    Weight as of this encounter: 61.7 kg (136 lb).. Body surface area is 1.63 meters squared. BP completed using cuff size: jarvis Gamez    "

## 2022-11-23 NOTE — NURSING NOTE
"Unique Baker is a 37 year old female who presents for:  Chief Complaint   Patient presents with     Referral     Small fiber neuropathy   Patient stated she is experiencing tingling and shaking in legs when she wakes up in the morning, anxiety and muscle spasms in the legs and arms.        Initial Vitals:  /74   Pulse 94   Ht 1.549 m (5' 1\")   Wt 61.7 kg (136 lb)   LMP 10/16/2022   SpO2 100%   BMI 25.70 kg/m   Estimated body mass index is 25.7 kg/m  as calculated from the following:    Height as of this encounter: 1.549 m (5' 1\").    Weight as of this encounter: 61.7 kg (136 lb).. Body surface area is 1.63 meters squared. BP completed using cuff size: {BP CUFF SIZE:507::\"regular\"}    Nursing Comments: ***    Luisa Gamez    "

## 2022-11-23 NOTE — TELEPHONE ENCOUNTER
[facility] Call Center    Phone Message    May a detailed message be left on voicemail: yes     Reason for Call: Other: Patient called stating she has been having shakiness and weakness for while on and off, but the past week it has been more consistant. Patient states her feet are cold but sweat a lot and she also feels cold sensations down her arms. Patient requested to move next post COVID appt to a sooner date, writer rescheduled patient for next available 12/8/2022. Patient stated she saw Neurologist today 11/23/2022 and was told by them they thought it could be POTS. Patient was directed to COVID clinic to discuss. Please review and call patient at [phone]    Action Taken: Message routed to:  [facility] ([facility] PM&R    Travel Screening: Not Applicable

## 2022-11-25 DIAGNOSIS — U09.9 LONG COVID: ICD-10-CM

## 2022-11-25 DIAGNOSIS — R00.0 SINUS TACHYCARDIA: Primary | ICD-10-CM

## 2022-11-25 DIAGNOSIS — R00.2 PALPITATIONS: ICD-10-CM

## 2022-11-25 RX ORDER — SODIUM CHLORIDE 9 MG/ML
INJECTION, SOLUTION INTRAVENOUS CONTINUOUS
Status: CANCELLED | OUTPATIENT
Start: 2022-11-25

## 2022-11-25 RX ORDER — LIDOCAINE 40 MG/G
CREAM TOPICAL
Status: CANCELLED | OUTPATIENT
Start: 2022-11-25

## 2022-11-25 NOTE — TELEPHONE ENCOUNTER
Writer called patient and discussed that she was calling to move her appointment with Corina Aranda sooner to discuss the visit she had with Neurologist and that he feels that she may have POTS, and that it may be related to Covid.  Also wants to discuss the medications that have been recommended and/or prescribed that she cannot take.  Writer reminded her of the new appt date and time (12/8 at 0830) with Corina.  Writer also added her to the wait list, as she wishes to be seen sooner yet, if possible.  Patient is in agreement with this plan.    Angelina Clifford RN on 11/25/2022 at 4:45 PM

## 2022-11-28 ENCOUNTER — TRANSFERRED RECORDS (OUTPATIENT)
Dept: HEALTH INFORMATION MANAGEMENT | Facility: CLINIC | Age: 37
End: 2022-11-28

## 2022-11-28 ENCOUNTER — MYC MEDICAL ADVICE (OUTPATIENT)
Dept: CARDIOLOGY | Facility: CLINIC | Age: 37
End: 2022-11-28

## 2022-11-28 NOTE — TELEPHONE ENCOUNTER
Pt needs to follow up with her PCP regarding that question as her PCP is the one who told her to discontinue cholesterol medication.

## 2022-11-30 ENCOUNTER — MYC MEDICAL ADVICE (OUTPATIENT)
Dept: INTERNAL MEDICINE | Facility: CLINIC | Age: 37
End: 2022-11-30

## 2022-11-30 RX ORDER — ATORVASTATIN CALCIUM 10 MG/1
10 TABLET, FILM COATED ORAL DAILY
Qty: 90 TABLET | Refills: 1 | Status: SHIPPED | OUTPATIENT
Start: 2022-11-30 | End: 2023-01-16

## 2022-11-30 NOTE — TELEPHONE ENCOUNTER
"11/23/22- Neurology note expresses \"Importantly, she should treat her anemia aggressively with iron replacement as this can also exacerbate the aforementioned symptoms. \"    Patient is followed by [facility]. Unclear where Iron infusion order is coming from.     [employer] message sent to patient asking for clarification.  "

## 2022-11-30 NOTE — TELEPHONE ENCOUNTER
Please call her and let her know Rx for lipitor was sent in for her and no it will not interact with her Hydroxychloroqiuine

## 2022-12-01 ENCOUNTER — TELEPHONE (OUTPATIENT)
Dept: CARDIOLOGY | Facility: CLINIC | Age: 37
End: 2022-12-01

## 2022-12-01 NOTE — TELEPHONE ENCOUNTER
"11/18/2022 [facility] Office Visit recommends IV Iron infusion    \"Lab review is significant for a ferritin of 8.2, iron saturation of 19%.  B12 is also mildly low at 276. CBC reveals a very mild anemia at 11.5, though microcytic at 79.\"    Under Microcytic Anemia heading:    \"- Recommend IV iron supplementation, she is hesitant to proceed with this that she is worried about the side effects.  -She wishes to proceed with IV iron she has been instructed to reach out to our clinic so we can set this up for her.\"    Please see [employer] message. Patient is requesting to start Iron Infusions with [facility] instead of [facility]. Please advise. Routing to covering providers.  "

## 2022-12-01 NOTE — TELEPHONE ENCOUNTER
Patient calling about gallbladder surgery needed and her surgical team asked her to call EP about timing for gallbladder surgery versus tilt.     Her Tilt Table study is booked in December.     Message sent to medical team to call patient back and discuss.     Saskia Alonzo  Periop Electrophysiology   [phone]

## 2022-12-01 NOTE — TELEPHONE ENCOUNTER
She needs to call hematology and see if they would order it in this area for her - the order should come from them

## 2022-12-04 ENCOUNTER — MYC MEDICAL ADVICE (OUTPATIENT)
Dept: PHYSICAL MEDICINE AND REHAB | Facility: CLINIC | Age: 37
End: 2022-12-04

## 2022-12-04 DIAGNOSIS — U09.9 POST-COVID CHRONIC FATIGUE: Primary | ICD-10-CM

## 2022-12-04 DIAGNOSIS — D50.9 IRON DEFICIENCY ANEMIA, UNSPECIFIED IRON DEFICIENCY ANEMIA TYPE: ICD-10-CM

## 2022-12-04 DIAGNOSIS — G93.32 POST-COVID CHRONIC FATIGUE: Primary | ICD-10-CM

## 2022-12-07 ENCOUNTER — TELEPHONE (OUTPATIENT)
Dept: CARDIOLOGY | Facility: CLINIC | Age: 37
End: 2022-12-07

## 2022-12-07 ENCOUNTER — LAB (OUTPATIENT)
Dept: LAB | Facility: CLINIC | Age: 37
End: 2022-12-07
Payer: COMMERCIAL

## 2022-12-07 DIAGNOSIS — G93.32 POST-COVID CHRONIC FATIGUE: ICD-10-CM

## 2022-12-07 DIAGNOSIS — U09.9 POST-COVID CHRONIC FATIGUE: ICD-10-CM

## 2022-12-07 DIAGNOSIS — D50.9 IRON DEFICIENCY ANEMIA, UNSPECIFIED IRON DEFICIENCY ANEMIA TYPE: ICD-10-CM

## 2022-12-07 LAB
FERRITIN SERPL-MCNC: 27 NG/ML (ref 6–175)
IRON BINDING CAPACITY (ROCHE): 346 UG/DL (ref 240–430)
IRON SATN MFR SERPL: 14 % (ref 15–46)
IRON SERPL-MCNC: 47 UG/DL (ref 37–145)

## 2022-12-07 PROCEDURE — 83540 ASSAY OF IRON: CPT

## 2022-12-07 PROCEDURE — 82306 VITAMIN D 25 HYDROXY: CPT

## 2022-12-07 PROCEDURE — 82728 ASSAY OF FERRITIN: CPT

## 2022-12-07 PROCEDURE — 36415 COLL VENOUS BLD VENIPUNCTURE: CPT

## 2022-12-07 PROCEDURE — 83550 IRON BINDING TEST: CPT

## 2022-12-07 ASSESSMENT — ENCOUNTER SYMPTOMS
DISTURBANCES IN COORDINATION: 0
ARTHRALGIAS: 1
BOWEL INCONTINENCE: 0
VOMITING: 1
NERVOUS/ANXIOUS: 1
MUSCLE CRAMPS: 0
BLOOD IN STOOL: 0
BRUISES/BLEEDS EASILY: 0
HEMOPTYSIS: 0
SPEECH CHANGE: 0
MYALGIAS: 1
POOR WOUND HEALING: 0
LEG PAIN: 1
COUGH: 0
EXERCISE INTOLERANCE: 1
DECREASED CONCENTRATION: 0
HEADACHES: 1
DIARRHEA: 1
NUMBNESS: 0
WEAKNESS: 1
DECREASED APPETITE: 1
STIFFNESS: 0
DYSPNEA ON EXERTION: 1
HEARTBURN: 1
WHEEZING: 0
DECREASED LIBIDO: 0
FEVER: 0
RECTAL PAIN: 0
NECK PAIN: 0
LOSS OF CONSCIOUSNESS: 0
POSTURAL DYSPNEA: 0
DEPRESSION: 1
INCREASED ENERGY: 1
ORTHOPNEA: 0
SWOLLEN GLANDS: 0
COUGH DISTURBING SLEEP: 0
NAUSEA: 1
HOT FLASHES: 0
PALPITATIONS: 1
POLYDIPSIA: 0
HALLUCINATIONS: 0
CHILLS: 1
BLOATING: 1
SEIZURES: 0
SPUTUM PRODUCTION: 0
NAIL CHANGES: 0
PANIC: 1
FATIGUE: 1
INSOMNIA: 1
HYPOTENSION: 0
SHORTNESS OF BREATH: 1
SLEEP DISTURBANCES DUE TO BREATHING: 0
PARALYSIS: 0
MUSCLE WEAKNESS: 0
LIGHT-HEADEDNESS: 0
SNORES LOUDLY: 1
WEIGHT GAIN: 0
CONSTIPATION: 0
DIZZINESS: 1
JAUNDICE: 0
POLYPHAGIA: 0
TREMORS: 1
MEMORY LOSS: 0
TINGLING: 1
NIGHT SWEATS: 1
BACK PAIN: 1
HYPERTENSION: 0
ABDOMINAL PAIN: 1
ALTERED TEMPERATURE REGULATION: 1
JOINT SWELLING: 0
SKIN CHANGES: 0
SYNCOPE: 0
WEIGHT LOSS: 1

## 2022-12-07 ASSESSMENT — ANXIETY QUESTIONNAIRES
5. BEING SO RESTLESS THAT IT IS HARD TO SIT STILL: MORE THAN HALF THE DAYS
IF YOU CHECKED OFF ANY PROBLEMS ON THIS QUESTIONNAIRE, HOW DIFFICULT HAVE THESE PROBLEMS MADE IT FOR YOU TO DO YOUR WORK, TAKE CARE OF THINGS AT HOME, OR GET ALONG WITH OTHER PEOPLE: EXTREMELY DIFFICULT
GAD7 TOTAL SCORE: 17
5. BEING SO RESTLESS THAT IT IS HARD TO SIT STILL: MORE THAN HALF THE DAYS
7. FEELING AFRAID AS IF SOMETHING AWFUL MIGHT HAPPEN: NEARLY EVERY DAY
1. FEELING NERVOUS, ANXIOUS, OR ON EDGE: NEARLY EVERY DAY
GAD7 TOTAL SCORE: 17
GAD7 TOTAL SCORE: 17
8. IF YOU CHECKED OFF ANY PROBLEMS, HOW DIFFICULT HAVE THESE MADE IT FOR YOU TO DO YOUR WORK, TAKE CARE OF THINGS AT HOME, OR GET ALONG WITH OTHER PEOPLE?: EXTREMELY DIFFICULT
4. TROUBLE RELAXING: MORE THAN HALF THE DAYS
6. BECOMING EASILY ANNOYED OR IRRITABLE: SEVERAL DAYS
3. WORRYING TOO MUCH ABOUT DIFFERENT THINGS: NEARLY EVERY DAY
2. NOT BEING ABLE TO STOP OR CONTROL WORRYING: NEARLY EVERY DAY
6. BECOMING EASILY ANNOYED OR IRRITABLE: SEVERAL DAYS
7. FEELING AFRAID AS IF SOMETHING AWFUL MIGHT HAPPEN: NEARLY EVERY DAY
7. FEELING AFRAID AS IF SOMETHING AWFUL MIGHT HAPPEN: NEARLY EVERY DAY
GAD7 TOTAL SCORE: 17
7. FEELING AFRAID AS IF SOMETHING AWFUL MIGHT HAPPEN: NEARLY EVERY DAY
4. TROUBLE RELAXING: MORE THAN HALF THE DAYS
8. IF YOU CHECKED OFF ANY PROBLEMS, HOW DIFFICULT HAVE THESE MADE IT FOR YOU TO DO YOUR WORK, TAKE CARE OF THINGS AT HOME, OR GET ALONG WITH OTHER PEOPLE?: EXTREMELY DIFFICULT
3. WORRYING TOO MUCH ABOUT DIFFERENT THINGS: NEARLY EVERY DAY
1. FEELING NERVOUS, ANXIOUS, OR ON EDGE: NEARLY EVERY DAY
GAD7 TOTAL SCORE: 17
2. NOT BEING ABLE TO STOP OR CONTROL WORRYING: NEARLY EVERY DAY
IF YOU CHECKED OFF ANY PROBLEMS ON THIS QUESTIONNAIRE, HOW DIFFICULT HAVE THESE PROBLEMS MADE IT FOR YOU TO DO YOUR WORK, TAKE CARE OF THINGS AT HOME, OR GET ALONG WITH OTHER PEOPLE: EXTREMELY DIFFICULT
GAD7 TOTAL SCORE: 17

## 2022-12-07 ASSESSMENT — PATIENT HEALTH QUESTIONNAIRE - PHQ9
SUM OF ALL RESPONSES TO PHQ QUESTIONS 1-9: 16
10. IF YOU CHECKED OFF ANY PROBLEMS, HOW DIFFICULT HAVE THESE PROBLEMS MADE IT FOR YOU TO DO YOUR WORK, TAKE CARE OF THINGS AT HOME, OR GET ALONG WITH OTHER PEOPLE: EXTREMELY DIFFICULT
SUM OF ALL RESPONSES TO PHQ QUESTIONS 1-9: 16
SUM OF ALL RESPONSES TO PHQ QUESTIONS 1-9: 16
10. IF YOU CHECKED OFF ANY PROBLEMS, HOW DIFFICULT HAVE THESE PROBLEMS MADE IT FOR YOU TO DO YOUR WORK, TAKE CARE OF THINGS AT HOME, OR GET ALONG WITH OTHER PEOPLE: EXTREMELY DIFFICULT
SUM OF ALL RESPONSES TO PHQ QUESTIONS 1-9: 16

## 2022-12-07 NOTE — TELEPHONE ENCOUNTER
[facility] Call Center    Phone Message    May a detailed message be left on voicemail: yes     Reason for Call: Other: Pt requesting a call back from a nurse to see if having acupuncture done prior to a tilt table test would negatively affect the results. Please return call to discuss.      Action Taken: Other: Cardiology    Travel Screening: Not Applicable     Thank you!  [facility]

## 2022-12-08 ENCOUNTER — VIRTUAL VISIT (OUTPATIENT)
Dept: PHARMACY | Facility: CLINIC | Age: 37
End: 2022-12-08
Payer: COMMERCIAL

## 2022-12-08 ENCOUNTER — VIRTUAL VISIT (OUTPATIENT)
Dept: PHYSICAL MEDICINE AND REHAB | Facility: CLINIC | Age: 37
End: 2022-12-08
Payer: COMMERCIAL

## 2022-12-08 ENCOUNTER — TELEPHONE (OUTPATIENT)
Dept: PHARMACY | Facility: CLINIC | Age: 37
End: 2022-12-08

## 2022-12-08 DIAGNOSIS — M79.10 POST-COVID CHRONIC MUSCLE PAIN: ICD-10-CM

## 2022-12-08 DIAGNOSIS — K21.00 GASTROESOPHAGEAL REFLUX DISEASE WITH ESOPHAGITIS WITHOUT HEMORRHAGE: ICD-10-CM

## 2022-12-08 DIAGNOSIS — U09.9 LONG COVID: ICD-10-CM

## 2022-12-08 DIAGNOSIS — M35.9 DIFFUSE CONNECTIVE TISSUE DISEASE (H): ICD-10-CM

## 2022-12-08 DIAGNOSIS — U09.9 POST-COVID CHRONIC JOINT PAIN: ICD-10-CM

## 2022-12-08 DIAGNOSIS — R00.2 PALPITATIONS: ICD-10-CM

## 2022-12-08 DIAGNOSIS — E61.1 IRON DEFICIENCY: Primary | ICD-10-CM

## 2022-12-08 DIAGNOSIS — G89.29 POST-COVID CHRONIC JOINT PAIN: ICD-10-CM

## 2022-12-08 DIAGNOSIS — E78.5 HYPERLIPIDEMIA, UNSPECIFIED HYPERLIPIDEMIA TYPE: ICD-10-CM

## 2022-12-08 DIAGNOSIS — M25.50 POST-COVID CHRONIC JOINT PAIN: ICD-10-CM

## 2022-12-08 DIAGNOSIS — G89.29 POST-COVID CHRONIC MUSCLE PAIN: ICD-10-CM

## 2022-12-08 DIAGNOSIS — G90.A POTS (POSTURAL ORTHOSTATIC TACHYCARDIA SYNDROME): Primary | ICD-10-CM

## 2022-12-08 DIAGNOSIS — U09.9 POST-COVID CHRONIC MUSCLE PAIN: ICD-10-CM

## 2022-12-08 LAB — DEPRECATED CALCIDIOL+CALCIFEROL SERPL-MC: 35 UG/L (ref 20–75)

## 2022-12-08 PROCEDURE — 99606 MTMS BY PHARM EST 15 MIN: CPT | Mod: 93 | Performed by: PHARMACIST

## 2022-12-08 PROCEDURE — 99607 MTMS BY PHARM ADDL 15 MIN: CPT | Mod: 93 | Performed by: PHARMACIST

## 2022-12-08 PROCEDURE — 99215 OFFICE O/P EST HI 40 MIN: CPT | Mod: 95 | Performed by: PHYSICIAN ASSISTANT

## 2022-12-08 RX ORDER — ERGOCALCIFEROL 1.25 MG/1
50000 CAPSULE, LIQUID FILLED ORAL WEEKLY
Status: ON HOLD | COMMUNITY
End: 2023-10-16

## 2022-12-08 ASSESSMENT — ENCOUNTER SYMPTOMS
FATIGUE: 1
CONSTIPATION: 0
COUGH: 0
SEIZURES: 0
DIZZINESS: 1
POLYPHAGIA: 0
MYALGIAS: 1
NECK PAIN: 0
VOMITING: 1
NAUSEA: 1
DIARRHEA: 1
HEARTBURN: 1
ABDOMINAL PAIN: 1
FEVER: 0
LIGHT-HEADEDNESS: 0
DECREASED CONCENTRATION: 0
WEAKNESS: 1
CHILLS: 1
POLYDIPSIA: 0
TREMORS: 1
NERVOUS/ANXIOUS: 1
WHEEZING: 0
ARTHRALGIAS: 1
JOINT SWELLING: 0
HEADACHES: 1
HALLUCINATIONS: 0
NUMBNESS: 0
PALPITATIONS: 1
BACK PAIN: 1
BRUISES/BLEEDS EASILY: 0
SHORTNESS OF BREATH: 1
RECTAL PAIN: 0

## 2022-12-08 NOTE — PROGRESS NOTES
Medication Therapy Management (MTM) Encounter    ASSESSMENT:                            Medication Adherence/Access: No issues identified    Anemia:  Recommend taking iron supplement every other day and recheck labs in 3 weeks.  She has only been on therapy for about 1 month so may not have see full benefit yet and every other day dosing has been shown to improve absorption and iron levels.    Hyperlipidemia: recently restarted statin.  Advised to take with some food to minimize GI upset.    Connective Tissue Disorder:  No significant change in symptoms since starting Hydroxychloroquine.  Per patient diagnosis unclear.  May consider holding therapy to see if she notices any change in symptoms; holding therapy would also allow her to try the ivabradine for POTs.     Palpitations/POTS: follow-up with Cardiology visit and Tilt Table as recommended.  May consider holding hydroxychloroquine as ivabradine for symptomatic POTs.    GI Pain: gallbladder surgery will be scheduled in Jan or Feb.    Long COVID:  Follow-up with care team as planned      PLAN:                            1.  Iron supplement every other day and recheck iron labs in 3 weeks  2.  Could consider holding hydroxychloroquine and treating the POTs with ivabradine as recommended - she will discuss with Cardiology next week  3.  Pharmacogenomics testing results - call out to [facility] to determine why results are not available.  4.  Recommend taking statin with food to minimize GI upset    Follow-up: Return in about 3 weeks (around 12/29/2022), or recheck iron in 3 weeks, for Lab Work.    SUBJECTIVE/OBJECTIVE:                          Unique Baker is a 37 year old female called for a follow-up visit.  Today's visit is a follow-up MTM visit from 11/1/22.     Reason for visit: medication review    Allergies/ADRs: Reviewed in chart  Tobacco: She reports that she has been smoking cigarettes. She has never used smokeless tobacco.Nicotine/Tobacco Cessation  Plan:   not addressed today    Alcohol: not currently using    Medication Adherence/Access: pharmacogenomics test collected on 11/7, has not received the results yet      Anemia:  Taking iron supplement 36 mg (ferrous Thorn brand - with vitamin C) once daily; she was on two tablets daily and the Hematologist recommend to reduce once daily.  Still weak and feels her heart rate is better.   Some nausea here and there.    Completed upper endoscopy without seeing anything.  Pill test was recommended but she doesn't feel up for this.  Period started yesterday and is really heavy.    Iron labs ordered by [facility] provider.  Surgery planned for Jan or Feb and she wants her iron corrected before then.      Hemoglobin   Date Value Ref Range Status   10/21/2022 11.5 (L) 11.7 - 15.7 g/dL Final   05/01/2021 12.7 11.7 - 15.7 g/dL Final                     Hyperlipidemia: Current therapy includes atorvastatin 10 mg daily.  Patient reports the following side effects: upset stomach, loose stools  Stopped statin in the past due to dry mouth; holding the medication did help.      Recent Labs   Lab Test 10/21/22  0818 06/27/22  0813   CHOL 202* 128   HDL 38* 41*   * 71   TRIG 167* 78         Connective Tissue Disorder:  Started on hydroxychloroquine 200 mg twice daily - started in early November.  Unclear on diagnosis; symptoms mimic lupus.  No change in symptoms since starting; questions if doing anything.  Followed by Rheumatology.      Palpitations/POTS: Stopped metoprolol XL and atenolol due to side effects.   Ivabradine was recommended but use is not recommended with use with hydroxychloroquine (QTc prolongation).    Has an appointment to follow-up with Cardiology next week.    Has been referred for Tilt Table Study.     BP Readings from Last 3 Encounters:   11/23/22 125/87   10/31/22 127/80   10/24/22 121/77       From Cardiology visit on 11/15/22:  #Frequent palpitations after second COVID infection  9/2022  -Patient reports multiple episodes of palpitations associated with anxiety, tingling, chest tightness, shortness of breath both at rest and with minimal activity like climbing stairs, walking half a mile or cleaning at home.  These are all new to her after COVID infection.  I reviewed her Zio patch which showed sinus rhythm corresponding to her symptoms.  Heart rate is actually less than 100 a lot of times.  She tells me that she was not feeling well when she was wearing the heart monitor throwing up on most of the days.  Patient tells me that the heart monitor does not reflect her symptoms and is not accurate for her.  I have reassured patient that we are not seeing any life-threatening cardiac arrhythmia.  I think her palpitations with minimal activity and sometimes at rest is related to inappropriate sinus tachycardia probably secondary to COVID and iron deficiency anemia.  She could not tolerate metoprolol due to nausea. She is feeling less nauseous now with atenolol.  I recommended her to continue atenolol 12.5 mg twice daily for at least 2 more weeks then we will bump up the dose in the morning to 25 mg daily and keep the evening dose of 12.5 mg.  If she cannot tolerate atenolol I think I will switch her to ivabradine.  Patient is agreeable to proceed with this plan.  In the meantime I will order an echocardiogram.  She was concerned about heart inflammation.  She recently had multiple blood tests including D-dimer, troponin and BNP and CRP inflammation which were all normal.  I reassured patient that we are not dealing with any kind of heart inflammation.      From Neurology visit 11/23/22:   In summary, I told Unique that I have a strong feeling she has POTS.  She describes that her heart rate goes up from the sitting to the standing position, and he standing position causes her to feel shaky, dizzy and anxious. We could not confirm the diagnosis with orthostatic vitals in clinic today, but I think  the suspicion is so high that she should have a tilt test and she should be evaluated by cardiac EP.  I will refer her to Dr. Green.       I told her that the most efficient strategy to treat this problem is plenty of water.  She should try 500 mL of water immediately before adopting the standing position, and she can do this 4-5 times a day; she should drink a total of 3 liters or more.  She can also liberalize her salt intake to 3-4 grams a day. If this is not enough, she could consider medications that slow the heart rate.  She cannot tolerate beta blockers .  One should figure out if ivabradine can be used with her Plaquenil and, if not, whether Plaquenil can be temporarily stopped.  There are other medications for treatment of POTS, but I would defer to the specialists as I do not manage this condition through our clinic. Importantly, she should treat her anemia aggressively with iron replacement as this can also exacerbate the aforementioned symptoms.      She does not have any evidence of myopathy or neuropathy on exam, so otherwise, I have little to offer; EMG is not needed or indicated here. Her twitching in the legs is almost certainly benign fasciculations, and I reassured her about it.      I told her that POTS after COVID-19 infection is actually fairly common in young people, and I strongly feel that not all of her symptoms can be ascribed to anxiety, as orthostasis appears to be a consistent trigger of them. She agrees with this plan.  She will be seen in our clinic in followup as necessary.      GI Pain: Seen in ED on 10/27 for GI pain.   CTscan was negative.  Stomach hurts when she eats and burning on left side.  Gallbladder surgery postponed until Jan or Feb.    Current medications include: Dexilant 60 mg  once daily, famotidine as needed and sucralfate as needed.     37-year-old female presenting for evaluation of epigastric abdominal pain radiating into her esophagus. Patient states that she  has had GERD for the last year for which she is following with GI. Patient came to the ER today stating that her pain was worse over the last few days. Patient endorses not eating anything besides white rice at this time. Patient is associated nausea without vomiting. Patient denies any fevers at home. She is well-appearing in no distress blood work is reassuring with no leukocytosis, transaminitis or elevated lipase. Abdomen is soft nontender palpation with no rebound or guarding specifically in the right upper quadrant or epigastrium. Given reassuring labs, benign abdominal exam do not suspect cholecystitis. At this point patient states that she is concerned that her gallbladder may not be functioning properly. Have a lengthy discussion with the patient that we can obtain a ultrasound however there is very low suspicion for cholecystitis given lack of tenderness on examination. Discussed that she will likely need to follow with GI consultant for referral for HIDA scan. I do offer the ultrasound however she declined. She did receive GI cocktail with some improvement of symptoms. Given her nausea discharged home on Zofran. Patient is already taking a PPI, Pepcid, sucralfate, recommend that she can use as needed Maalox as well for her symptoms. We discussed return precautions ER.    Prior to patient leaving the department she complained of whole body numbness, dizziness, concerned that her gallbladder is infected. Again reiterated with her low suspicion for cholecystitis however given her level anxiety and ultrasound was ordered. Ultrasound was negative for cholecystitis, she did have evidence of gallstones which were known. EKG troponin were negative. At this point given reassuring work-up and benign abdominal exam patient be discharged home, do not feel that a CT abdomen pelvis necessary at this time. Recommend follow-up with her GI specialist      Long COVID:  Initially diagnosed in March, second diagnosis on Sept  21.  Has been seen in COVID clinic and continues to follow with them.           Today's Vitals: LMP 10/16/2022   ----------------      I spent 30 minutes with this patient today. All changes were made via collaborative practice agreement with Celina Riggs MD. A copy of the visit note was provided to the patient's provider(s).    A summary of these recommendations was sent via [employer].    Janet Ching , Pharm D  [phone] (phone)  Medication Therapy Management Pharmacist     Telemedicine Visit Details  Type of service:  Telephone visit  Start Time: 230pm  End Time: 315pm  Originating Location (pt. Location): Home      Distant Location (provider location):  On-site  Provider has received verbal consent for a visit from the patient? Yes     Medication Therapy Recommendations  Diffuse connective tissue disease (H)    Current Medication: hydroxychloroquine (PLAQUENIL) 200 MG tablet   Rationale: Condition refractory to medication - Ineffective medication - Effectiveness   Recommendation: Discontinue Medication   Status: Contact Provider - Awaiting Response   Note: Consider stopping hydroxychloroquine since this has not been helpful. If this was stopped, then you could try the ivabradine as recommended by Cardiology.         Hyperlipidemia, unspecified hyperlipidemia type    Current Medication: atorvastatin (LIPITOR) 10 MG tablet   Rationale: Undesirable effect - Adverse medication event - Safety   Recommendation: Provide Education   Status: Patient Agreed - Adherence/Education         Iron deficiency    Current Medication: FERROUS BISGLYCINATE CHELATE PO   Rationale: Dose too high - Dosage too high - Safety   Recommendation: Decrease Frequency   Status: Accepted - no CPA Needed

## 2022-12-08 NOTE — LETTER
12/8/2022       RE: Unique Baker  [id number] [facility] [address]     Dear Colleague,    Thank you for referring your patient, Unique Baker, to the [facility] PHYSICAL MEDICINE AND REHABILITATION CLINIC [place] at [facility]. Please see a copy of my visit note below.    Assessment/Impression   1. POTS (postural orthostatic tachycardia syndrome)  Patient recently saw Neurology and was diagnosed with POTS. She has a confirmatory tilt table testing on 12/26/22.  Encouraged to hydrate with electrolyte solution in the mean time until the confirmatory testing.  She is working with Neurology and cardiology as well.     2. Post-COVID chronic joint pain/Post-COVID chronic muscle pain  Patient with increasing joint and muscle pain.  Discussed how her muscle pain is more to palpation then with positional changes, breathing or exertion.  Advised to follow up with her Rheumatologist.  Also discussed speaking with them in person about the prednisone due to her GI issues and reflux.      3. Gastroesophageal reflux disease with esophagitis without hemorrhage  Patient is working with Gastroenterology and also general surgery. She is recommended to have her gallbladder removed but due to POTS will be postponed until after Tilt table testing.    4. Long COVID  Discussed COVID and Post COVID with patient.  Educational materials provided and all questions answered. Encouraged to schedule a follow up with her PCP due to multiple medical issues.         Plan:  1. I reviewed present knowledge on long-Covid.  Education was provided and question were answered.  2. Orders/Referrals as above  3. I will advised patient on test results  4. I will follow up with Unique Baker in 1 month. I will review progress and consider need for any other therapeutic interventions. If there are any questions and/or concerns she will call the clinic.      On day of encounter time spent in  chart review and with patient in consultation, exam, education,coordination of care,  review of outside charts/documentation and documentation:  40 minutes     I have attempted to proof read for major spelling errors and apologize for any minor errors I may have missed.      _________________________________  Corina Aranda PA-C  [facility] PHYSICAL MEDICINE AND REHABILITATION CLINIC [facility]   Briefly patient was seen on 7/5/22 in the Post COVID clinic for lingering symptoms from their COVID infection in March 2021. At the time of that appointment they were  complaining of muscle/joint pain, acid reflux, neuropath and tinnitus. Patient returns for a follow up.  Today she is complain of palpitations, fatigue and joint pain.  She unfortunately contracted COVID on 9/21/22.  She has seen Rheumatology and is on prednisone and plaquenil.  She did see [facility] for evaluation as well and is being evaluate for food allergies.  She since contracted COVID she feels like she needs to take deeper breaths when laying down.  She denies shortness of breath with walking, she does feel tightness in her upper chest when she wakes up. She is unsure if this is respiratory or acid reflux.  She also notices she has heart racing as well when she walks, and also increase fatigue.  Patient can do 1-2 task before she gets tired.  She is sleeping well at night. Still having neuropathy and states its intermittent now.    Today:   Patient returns for a follow up. She was last seen a month ago in December. She saw neurology on 11/23/22 and was diagnosed with POTS.  Patient is suppose to have her gallbladder remove and this has been postponed due the possible POTS test. Sheis having a Tilt table test on 12/26/22.   Patient is still having her iron replaced through [facility].  She is having more rib pain and joint pain. She states this is only to touch. Denies pain with breathing, position, or  exertion so unlikely cardiac or respiratory.  Unable to examine due to video visit.  She did reach out to her rheumatologist and was reccommended to restart Prednisone. She is concerned about her reflux starting up.     Current Symptoms:  Shortness of breath (functional assessment based on ADLS): stable- only with exertion  Muscle aches/joint aches: worsening- seeing Rheumatology 12/22  Headache: stable  Nausea/Vomiting/Diarrhea: stable- may possibly need a gallbladder    Goals of Care: improving POTS symptoms, improving joint pain, improving weakness    Current concerns: No    PHQ Assesment Total Score(s) 12/7/2022   PHQ-9 Score 16   Some recent data might be hidden     RENÉE-7 Results 12/7/2022   RENÉE 7 TOTAL SCORE 17 (severe anxiety)   Some recent data might be hidden     PTSD Screen Score 12/7/2022   Have you ever experienced this kind of event? Yes   PTSD Screen (Score of 3 or more suggests positive screen) 5   Some recent data might be hidden     Depression Screening Follow-up    PHQ 12/7/2022   PHQ-9 Total Score 16   Q9: Thoughts of better off dead/self-harm past 2 weeks Not at all       Does the patient currently have a mental health provider?  Yes, patient was referred back to current mental health provider.    Corina Aranda PA-C      PROMIS 29                                   12/7/22                               11/8/22                    10/3/22  Scoring Physical Function (higher score better) (range: 4 - 20) 12 (   Sum of 4 Physical Function Questions) 18 (   Sum of 4 Physical Function Questions) 20 (   Sum of 4 Physical Function Questions)   Scoring Anxiety (lower score better) (range: 4 - 20) 17 (   Sum of 4 Anxiety Questions) 15 (   Sum of 4 Anxiety Questions) 7 (   Sum of 4 Anxiety Questions)   Scoring Depression (lower score better) (range: 4 - 20) 15 (   Sum of 4 Depression Questions) 14 (   Sum of 4 Depression Questions) 5 (   Sum of 4 Depression Questions)   Scoring Fatigue (lower score  better) (range: 4 - 20) 13 (   Sum of 4 Fatigue Questons ) 12 (   Sum of 4 Fatigue Questons ) 11 (   Sum of 4 Fatigue Questons )   Scoring Sleep Disturbance (lower score better) (range: 4 - 20) 11 (   Sum of 4 Sleep Disturbance Questions) 8 (   Sum of 4 Sleep Disturbance Questions) 6 (   Sum of 4 Sleep Disturbance Questions)   Scoring Satisfaction with Social Role (higher score better) (range: 4 - 20) 4 (   Sum of 4 Satisfaction with Social Role Questions) 8 (   Sum of 4 Satisfaction with Social Role Questions) 12 (   Sum of 4 Satisfaction with Social Role Questions)   Scoring Pain Interference (lower score better) (range: 4 - 20) 19 (   Sum of 4 Pain Interference Questions) 4 (   Sum of 4 Pain Interference Questions) 12 (   Sum of 4 Pain Interference Questions)     Past Medical History:   Diagnosis Date     Diabetes (H)     GDM insulin     GERD (gastroesophageal reflux disease)     w/u otherwise neg      History of colposcopy with cervical biopsy 3/23/07    WNL     Papanicolaou smear of cervix with low grade squamous intraepithelial lesion (LGSIL) 07     PONV (postoperative nausea and vomiting)      S/P  10/13/2017       Past Surgical History:   Procedure Laterality Date     BREAST SURGERY      augmentation       SECTION N/A 2015    Procedure:  SECTION;  Surgeon: Tremaine Gaona MD;  Location: [facility] OR      SECTION, TUBAL LIGATION, COMBINED N/A 10/13/2017    Procedure: COMBINED  SECTION, TUBAL LIGATION;  Repeat  SECTION, TUBAL LIGATION ;  Surgeon: Sidra Salamanca DO;  Location: [facility] OR     COLONOSCOPY N/A 2016    Procedure: COLONOSCOPY;  Surgeon: Lyudmila Pastor MD;  Location:  GI     DILATION AND CURETTAGE SUCTION      elective termination     ESOPHAGOSCOPY, GASTROSCOPY, DUODENOSCOPY (EGD), COMBINED  2014    Procedure: COMBINED ESOPHAGOSCOPY, GASTROSCOPY, DUODENOSCOPY (EGD);   ESOPHAGOSCOPY, GASTROSCOPY, DUODENOSCOPY (EGD) ;   Surgeon: Vishnu Lanier MD;  Location:  GI     ESOPHAGOSCOPY, GASTROSCOPY, DUODENOSCOPY (EGD), COMBINED Left 2020    Procedure: ESOPHAGOGASTRODUODENOSCOPY, WITH BIOPSIES USING BIOPSY FORCEPS;  Surgeon: Vishnu Hopkins MD;  Location:  GI     ESOPHAGOSCOPY, GASTROSCOPY, DUODENOSCOPY (EGD), COMBINED N/A 2021    Procedure: ESOPHAGOGASTRODUODENOSCOPY, WITH BIOPSY using cold forceps;  Surgeon: Vishnu Lanier MD;  Location:  GI     GYN SURGERY       c section      RW LASER WART      Anal wart removal      ZZC NONSPECIFIC PROCEDURE  01    Primary LTCS       Family History   Problem Relation Age of Onset     Colon Polyps Brother      Other - See Comments Brother         colon polyps     Hyperlipidemia Sister      Stomach Cancer Sister      Cancer Sister         stomach     C.A.D. No family hx of      Diabetes No family hx of      Breast Cancer No family hx of      Cancer - colorectal No family hx of      Colon Cancer No family hx of        Social History     Tobacco Use     Smoking status: Some Days     Years: 7.00     Types: Cigarettes     Last attempt to quit:      Years since quittin.9     Smokeless tobacco: Never     Tobacco comments:     only if she drinks alcohol   Vaping Use     Vaping Use: Never used   Substance Use Topics     Alcohol use: Not Currently     Alcohol/week: 8.3 standard drinks     Drug use: No         Current Outpatient Medications:      acetaminophen (TYLENOL) 500 MG tablet, take 1 - 2 tablet by oral route  every 6 hours as needed as needed, Disp: , Rfl:      atenolol (TENORMIN) 25 MG tablet, Take 0.5 tablets (12.5 mg) by mouth 2 times daily, Disp: 90 tablet, Rfl: 3     atorvastatin (LIPITOR) 10 MG tablet, Take 1 tablet (10 mg) by mouth daily, Disp: 90 tablet, Rfl: 1     cetirizine (ZYRTEC) 10 MG tablet, Take 1 tablet (10 mg) by mouth daily, Disp: 30 tablet, Rfl: 1     cholecalciferol 25 MCG (1000 UT) TABS, take 1 tablet by oral route  every month  (Patient not taking: Reported on 11/3/2022), Disp: , Rfl:      Cyanocobalamin (VITAMIN B12 TR PO), 1 patch (500 mcg) daily, Disp: , Rfl:      cyclobenzaprine (FLEXERIL) 5 MG tablet, Take 1 tablet (5 mg) by mouth nightly as needed for muscle spasms, Disp: 30 tablet, Rfl: 0     dexlansoprazole (DEXILANT) 60 MG CPDR CR capsule, Take 60 mg by mouth daily, Disp: , Rfl:      diclofenac (VOLTAREN) 1 % topical gel, Apply 2 g topically 4 times daily To joints as needed for pain, Disp: 100 g, Rfl: 3     famotidine (PEPCID) 20 MG tablet, TAKE ONE TABLET BY MOUTH TWICE A DAY AS NEEDED FOR REFLUX, Disp: 180 tablet, Rfl: 3     fluocinonide emulsified base 0.05 % external cream, apply to rash as needed - sparingly, Disp: 60 g, Rfl: 1     fluticasone (FLONASE) 50 MCG/ACT nasal spray, Spray 1 spray into both nostrils 2 times daily as needed for rhinitis or allergies Take 1 spray twice daily x 1 week then 1 spray daily at night x 1 week then as needed, Disp: 11.1 mL, Rfl: 1     hydroxychloroquine (PLAQUENIL) 200 MG tablet, Take 1 tablet (200 mg) by mouth 2 times daily Annual Plaquenil toxicity eye screening required., Disp: 180 tablet, Rfl: 3     hydrOXYzine (ATARAX) 25 MG tablet, Take 1 tablet (25 mg) by mouth 3 times daily as needed for anxiety, Disp: 90 tablet, Rfl: 1     LORazepam (ATIVAN) 0.5 MG tablet, Take 1 tablet (0.5 mg) by mouth daily as needed for anxiety 10 pills/month, Disp: 10 tablet, Rfl: 0     multivitamin (ONE-DAILY) tablet, Take 1 tablet by mouth daily, Disp:  , Rfl:      sucralfate (CARAFATE) 1 GM tablet, take 1 tablet by oral route 3 times every day on an empty stomach 1 hour before meals, Disp: , Rfl:   No current facility-administered medications for this visit.    Facility-Administered Medications Ordered in Other Visits:      fentaNYL Citrate (PF) (SUBLIMAZE) injection, , , PRN, Lyudmila Pastor MD, 100 mcg at 07/19/16 1412    Answer to ROS/HPI submitted by patient on 12/7/22  Review of Systems    Constitutional: Positive for chills and fatigue. Negative for fever.   Respiratory: Positive for shortness of breath. Negative for cough and wheezing.    Cardiovascular: Positive for chest pain and palpitations.   Gastrointestinal: Positive for abdominal pain, diarrhea, heartburn, nausea and vomiting. Negative for constipation and rectal pain.   Endocrine: Negative for polydipsia and polyphagia.   Genitourinary: Negative for dyspareunia, genital sores and vaginal discharge.   Musculoskeletal: Positive for arthralgias, back pain and myalgias. Negative for joint swelling and neck pain.   Skin: Positive for rash.   Neurological: Positive for dizziness, tremors, weakness and headaches. Negative for seizures, light-headedness and numbness.   Hematological: Does not bruise/bleed easily.   Psychiatric/Behavioral: Negative for decreased concentration and hallucinations. The patient is nervous/anxious.          Objective    Vitals - Patient Reported  Weight (Patient Reported): 59 kg (130 lb)  Pain Score: Mild Pain (3)        Objective         Vitals:  No vitals were obtained today due to virtual visit.    Physical Exam   GENERAL: Healthy, alert and no distress  EYES: Eyes grossly normal to inspection.  No discharge or erythema, or obvious scleral/conjunctival abnormalities.  RESP: No audible wheeze, cough, or visible cyanosis.  No visible retractions or increased work of breathing.    SKIN: Visible skin clear. No significant rash, abnormal pigmentation or lesions.  NEURO: Cranial nerves grossly intact.  Mentation and speech appropriate for age.  PSYCH: Mentation appears normal, affect normal/bright, judgement and insight intact, normal speech and appearance well-groomed.      CRP Inflammation   Date Value Ref Range Status   10/31/2022 <3.00 <5.00 mg/L Final   07/14/2022 <2.9 0.0 - 8.0 mg/L Final   05/01/2021 <2.9 0.0 - 8.0 mg/L Final      Sed Rate   Date Value Ref Range Status   01/21/2016 18 0 - 20 mm/h Final      Erythrocyte Sedimentation Rate   Date Value Ref Range Status   07/14/2022 43 (H) 0 - 20 mm/hr Final      Last Renal Panel:  Sodium   Date Value Ref Range Status   10/21/2022 137 136 - 145 mmol/L Final   05/01/2021 133 133 - 144 mmol/L Final     Potassium   Date Value Ref Range Status   10/21/2022 3.9 3.4 - 5.3 mmol/L Final   07/14/2022 4.0 3.4 - 5.3 mmol/L Final   05/01/2021 4.0 3.4 - 5.3 mmol/L Final     Chloride   Date Value Ref Range Status   10/21/2022 101 98 - 107 mmol/L Final   07/14/2022 109 94 - 109 mmol/L Final   05/01/2021 103 94 - 109 mmol/L Final     Carbon Dioxide   Date Value Ref Range Status   05/01/2021 25 20 - 32 mmol/L Final     Carbon Dioxide (CO2)   Date Value Ref Range Status   10/21/2022 25 22 - 29 mmol/L Final   07/14/2022 26 20 - 32 mmol/L Final     Anion Gap   Date Value Ref Range Status   10/21/2022 11 7 - 15 mmol/L Final   07/14/2022 5 3 - 14 mmol/L Final   05/01/2021 5 3 - 14 mmol/L Final     Glucose   Date Value Ref Range Status   10/21/2022 124 (H) 70 - 99 mg/dL Final   07/14/2022 97 70 - 99 mg/dL Final   05/01/2021 87 70 - 99 mg/dL Final     Urea Nitrogen   Date Value Ref Range Status   10/21/2022 7.5 6.0 - 20.0 mg/dL Final   07/14/2022 9 7 - 30 mg/dL Final   05/01/2021 13 7 - 30 mg/dL Final     Creatinine   Date Value Ref Range Status   10/21/2022 0.59 0.51 - 0.95 mg/dL Final   05/01/2021 0.78 0.52 - 1.04 mg/dL Final     GFR Estimate   Date Value Ref Range Status   10/21/2022 >90 >60 mL/min/1.73m2 Final     Comment:     Effective December 21, 2021 eGFRcr in adults is calculated using the 2021 CKD-EPI creatinine equation which includes age and gender (Elen et al., NEJM, DOI: 10.1056/HRZIog2470366)   05/01/2021 >90 >60 mL/min/[1.73_m2] Final     Comment:     Non  GFR Calc  Starting 12/18/2018, serum creatinine based estimated GFR (eGFR) will be   calculated using the Chronic Kidney Disease Epidemiology Collaboration   (CKD-EPI) equation.       Calcium   Date Value  Ref Range Status   10/21/2022 9.9 8.6 - 10.0 mg/dL Final   05/01/2021 8.6 8.5 - 10.1 mg/dL Final     Albumin   Date Value Ref Range Status   10/21/2022 4.4 3.5 - 5.2 g/dL Final   07/14/2022 4.0 3.4 - 5.0 g/dL Final   05/01/2021 4.1 3.4 - 5.0 g/dL Final      Lab Results   Component Value Date    WBC 6.2 10/21/2022    WBC 9.2 05/01/2021     Lab Results   Component Value Date    RBC 4.73 10/21/2022    RBC 4.49 05/01/2021     Lab Results   Component Value Date    HGB 11.5 10/21/2022    HGB 12.7 05/01/2021     Lab Results   Component Value Date    HCT 38.9 10/21/2022    HCT 38.4 05/01/2021     No components found for: MCT  Lab Results   Component Value Date    MCV 82 10/21/2022    MCV 86 05/01/2021     Lab Results   Component Value Date    MCH 24.3 10/21/2022    MCH 28.3 05/01/2021     Lab Results   Component Value Date    MCHC 29.6 10/21/2022    MCHC 33.1 05/01/2021     Lab Results   Component Value Date    RDW 16.1 10/21/2022    RDW 13.2 05/01/2021     Lab Results   Component Value Date     10/21/2022     05/01/2021      Lab Results   Component Value Date    A1C 5.6 10/21/2022    A1C 5.5 02/03/2021      TSH   Date Value Ref Range Status   10/21/2022 1.38 0.30 - 4.20 uIU/mL Final   07/14/2022 1.54 0.40 - 4.00 mU/L Final   05/01/2021 1.18 0.40 - 4.00 mU/L Final      Lab Results   Component Value Date    VITDT 20 03/29/2022    VITDT 23 08/16/2021    VITDT 18 (L) 02/03/2021      Recent Labs   Lab Test 10/21/22  1138   MAG 2.1     Last Comprehensive Metabolic Panel:  Sodium   Date Value Ref Range Status   10/21/2022 137 136 - 145 mmol/L Final   05/01/2021 133 133 - 144 mmol/L Final     Potassium   Date Value Ref Range Status   10/21/2022 3.9 3.4 - 5.3 mmol/L Final   07/14/2022 4.0 3.4 - 5.3 mmol/L Final   05/01/2021 4.0 3.4 - 5.3 mmol/L Final     Chloride   Date Value Ref Range Status   10/21/2022 101 98 - 107 mmol/L Final   07/14/2022 109 94 - 109 mmol/L Final   05/01/2021 103 94 - 109 mmol/L Final     Carbon  Dioxide   Date Value Ref Range Status   05/01/2021 25 20 - 32 mmol/L Final     Carbon Dioxide (CO2)   Date Value Ref Range Status   10/21/2022 25 22 - 29 mmol/L Final   07/14/2022 26 20 - 32 mmol/L Final     Anion Gap   Date Value Ref Range Status   10/21/2022 11 7 - 15 mmol/L Final   07/14/2022 5 3 - 14 mmol/L Final   05/01/2021 5 3 - 14 mmol/L Final     Glucose   Date Value Ref Range Status   10/21/2022 124 (H) 70 - 99 mg/dL Final   07/14/2022 97 70 - 99 mg/dL Final   05/01/2021 87 70 - 99 mg/dL Final     Urea Nitrogen   Date Value Ref Range Status   10/21/2022 7.5 6.0 - 20.0 mg/dL Final   07/14/2022 9 7 - 30 mg/dL Final   05/01/2021 13 7 - 30 mg/dL Final     Creatinine   Date Value Ref Range Status   10/21/2022 0.59 0.51 - 0.95 mg/dL Final   05/01/2021 0.78 0.52 - 1.04 mg/dL Final     GFR Estimate   Date Value Ref Range Status   10/21/2022 >90 >60 mL/min/1.73m2 Final     Comment:     Effective December 21, 2021 eGFRcr in adults is calculated using the 2021 CKD-EPI creatinine equation which includes age and gender (Elen et al., NEJ, DOI: 10.1056/RWNEkx4114368)   05/01/2021 >90 >60 mL/min/[1.73_m2] Final     Comment:     Non  GFR Calc  Starting 12/18/2018, serum creatinine based estimated GFR (eGFR) will be   calculated using the Chronic Kidney Disease Epidemiology Collaboration   (CKD-EPI) equation.       Calcium   Date Value Ref Range Status   10/21/2022 9.9 8.6 - 10.0 mg/dL Final   05/01/2021 8.6 8.5 - 10.1 mg/dL Final     Bilirubin Total   Date Value Ref Range Status   10/21/2022 0.2 <=1.2 mg/dL Final   05/01/2021 0.3 0.2 - 1.3 mg/dL Final     Alkaline Phosphatase   Date Value Ref Range Status   10/21/2022 89 35 - 104 U/L Final   05/01/2021 61 40 - 150 U/L Final     ALT   Date Value Ref Range Status   10/21/2022 12 10 - 35 U/L Final   05/01/2021 18 0 - 50 U/L Final     AST   Date Value Ref Range Status   10/21/2022 17 10 - 35 U/L Final   05/01/2021 11 0 - 45 U/L Final     Most Recent  D-dimer:  Recent Labs   Lab Test 10/21/22  1138   DD <0.27       Lab on 12/07/2022   Component Date Value Ref Range Status     Ferritin 12/07/2022 27  6 - 175 ng/mL Final     Iron 12/07/2022 47  37 - 145 ug/dL Final     Iron Sat Index 12/07/2022 14 (L)  15 - 46 % Final     Iron Binding Capacity 12/07/2022 346  240 - 430 ug/dL Final           Sincerely,    Corina Aranda PA-C

## 2022-12-08 NOTE — PATIENT INSTRUCTIONS
Post COVID Self Care Suggestions:     Fatigue Management:       https://www.archives-pmr.org/action/showPdf?ifx=-1542%2819%5582252-0       Self Care:      [email or web address]  Recovery World Health Organization:    https://apps.who.int/iris/bitstream/handle/02470/617764/CXW-EQHK-2010-424-31003-40313-eng.pdf  Breathing exercises:    https://[email or web address].org/health/conditions-and-diseases/coronavirus/coronavirus-recovery-breathing-exercises

## 2022-12-08 NOTE — PATIENT INSTRUCTIONS
"Recommendations from today's MTM visit:                                                       1.  Iron supplement every other day and recheck iron labs in 3 weeks  2.  Could consider holding hydroxychloroquine and treating the POTs with ivabradine as recommended - she will discuss with Cardiology next week  3.  Pharmacogenomics testing results - call out to [facility] to determine why results are not available.  4.  Recommend taking statin with food to minimize GI upset    Follow-up: Return in about 3 weeks (around 12/29/2022), or recheck iron in 3 weeks, for Lab Work.    It was great speaking with you today.  I value your experience and would be very thankful for your time in providing feedback in our clinic survey. In the next few days, you may receive an email or text message from [employer] with a link to a survey related to your  clinical pharmacist.\"     To schedule another MTM appointment, please call the clinic directly or you may call the MTM scheduling line at [phone] or toll-free at [phone].     My Clinical Pharmacist's contact information:                                                      Please feel free to contact me with any questions or concerns you have.      Janet Ching , Pharm D  [phone] (phone)  Medication Therapy Management Pharmacist     "

## 2022-12-08 NOTE — PROGRESS NOTES
Unique Baker  is being evaluated via a billable video visit.      How would you like to obtain your AVS? [facility] [facility]  For the video visit, send the invitation by: Text to cell phone: [phone]  Will anyone else be joining your video visit? No      Assessment/Impression   1. POTS (postural orthostatic tachycardia syndrome)  Patient recently saw Neurology and was diagnosed with POTS. She has a confirmatory tilt table testing on 12/26/22.  Encouraged to hydrate with electrolyte solution in the mean time until the confirmatory testing.  She is working with Neurology and cardiology as well.     2. Post-COVID chronic joint pain/Post-COVID chronic muscle pain  Patient with increasing joint and muscle pain.  Discussed how her muscle pain is more to palpation then with positional changes, breathing or exertion.  Advised to follow up with her Rheumatologist.  Also discussed speaking with them in person about the prednisone due to her GI issues and reflux.      3. Gastroesophageal reflux disease with esophagitis without hemorrhage  Patient is working with Gastroenterology and also general surgery. She is recommended to have her gallbladder removed but due to POTS will be postponed until after Tilt table testing.    4. Long COVID  Discussed COVID and Post COVID with patient.  Educational materials provided and all questions answered. Encouraged to schedule a follow up with her PCP due to multiple medical issues.         Plan:  1. I reviewed present knowledge on long-Covid.  Education was provided and question were answered.  2. Orders/Referrals as above  3. I will advised patient on test results  4. I will follow up with Unique Baker in 1 month. I will review progress and consider need for any other therapeutic interventions. If there are any questions and/or concerns she will call the clinic.      On day of encounter time spent in chart review and with patient in consultation, exam, education,coordination of care,  review  of outside charts/documentation and documentation:  40 minutes     I have attempted to proof read for major spelling errors and apologize for any minor errors I may have missed.      _________________________________  [facility] PH[facility]ICINE [facility]ABILIT[facility] [place]    Subjective   HPI   Briefly patient was seen on 7/5/22 in the Post COVID clinic for lingering symptoms from their COVID infection in March 2021. At the time of that appointment they were  complaining of muscle/joint pain, acid reflux, neuropath and tinnitus. Patient returns for a follow up.  Today she is complain of palpitations, fatigue and joint pain.  She unfortunately contracted COVID on 9/21/22.  She has seen Rheumatology and is on prednisone and plaquenil.  She did see [facility] for evaluation as well and is being evaluate for food allergies.  She since contracted COVID she feels like she needs to take deeper breaths when laying down.  She denies shortness of breath with walking, she does feel tightness in her upper chest when she wakes up. She is unsure if this is respiratory or acid reflux.  She also notices she has heart racing as well when she walks, and also increase fatigue.  Patient can do 1-2 task before she gets tired.  She is sleeping well at night. Still having neuropathy and states its intermittent now.    Today:   Patient returns for a follow up. She was last seen a month ago in December. She saw neurology on 11/23/22 and was diagnosed with POTS.  Patient is suppose to have her gallbladder remove and this has been postponed due the possible POTS test. Sheis having a Tilt table test on 12/26/22.   Patient is still having her iron replaced through [employer].  She is having more rib pain and joint pain. She states this is only to touch. Denies pain with breathing, position, or exertion so unlikely cardiac or respiratory.  Unable to examine due to video visit.  She did  reach out to her rheumatologist and was reccommended to restart Prednisone. She is concerned about her reflux starting up.     Current Symptoms:  Shortness of breath (functional assessment based on ADLS): stable- only with exertion  Muscle aches/joint aches: worsening- seeing Rheumatology 12/22  Headache: stable  Nausea/Vomiting/Diarrhea: stable- may possibly need a gallbladder    Goals of Care: improving POTS symptoms, improving joint pain, improving weakness    Current concerns: No    PHQ Assesment Total Score(s) 12/7/2022   PHQ-9 Score 16   Some recent data might be hidden     RENÉE-7 Results 12/7/2022   RENÉE 7 TOTAL SCORE 17 (severe anxiety)   Some recent data might be hidden     PTSD Screen Score 12/7/2022   Have you ever experienced this kind of event? Yes   PTSD Screen (Score of 3 or more suggests positive screen) 5   Some recent data might be hidden     Depression Screening Follow-up    PHQ 12/7/2022   PHQ-9 Total Score 16   Q9: Thoughts of better off dead/self-harm past 2 weeks Not at all       Does the patient currently have a mental health provider?  Yes, patient was referred back to current mental health provider.    Corina Aranda PA-C      PROMIS 29                                   12/7/22                               11/8/22                    10/3/22  Scoring Physical Function (higher score better) (range: 4 - 20) 12 (   Sum of 4 Physical Function Questions) 18 (   Sum of 4 Physical Function Questions) 20 (   Sum of 4 Physical Function Questions)   Scoring Anxiety (lower score better) (range: 4 - 20) 17 (   Sum of 4 Anxiety Questions) 15 (   Sum of 4 Anxiety Questions) 7 (   Sum of 4 Anxiety Questions)   Scoring Depression (lower score better) (range: 4 - 20) 15 (   Sum of 4 Depression Questions) 14 (   Sum of 4 Depression Questions) 5 (   Sum of 4 Depression Questions)   Scoring Fatigue (lower score better) (range: 4 - 20) 13 (   Sum of 4 Fatigue Questons ) 12 (   Sum of 4 Fatigue Questons ) 11  (   Sum of 4 Fatigue Questons )   Scoring Sleep Disturbance (lower score better) (range: 4 - 20) 11 (   Sum of 4 Sleep Disturbance Questions) 8 (   Sum of 4 Sleep Disturbance Questions) 6 (   Sum of 4 Sleep Disturbance Questions)   Scoring Satisfaction with Social Role (higher score better) (range: 4 - 20) 4 (   Sum of 4 Satisfaction with Social Role Questions) 8 (   Sum of 4 Satisfaction with Social Role Questions) 12 (   Sum of 4 Satisfaction with Social Role Questions)   Scoring Pain Interference (lower score better) (range: 4 - 20) 19 (   Sum of 4 Pain Interference Questions) 4 (   Sum of 4 Pain Interference Questions) 12 (   Sum of 4 Pain Interference Questions)     Past Medical History:   Diagnosis Date     Diabetes (H)     GDM insulin     GERD (gastroesophageal reflux disease)     w/u otherwise neg      History of colposcopy with cervical biopsy 3/23/07    WNL     Papanicolaou smear of cervix with low grade squamous intraepithelial lesion (LGSIL) 07     PONV (postoperative nausea and vomiting)      S/P  10/13/2017       Past Surgical History:   Procedure Laterality Date     BREAST SURGERY      augmentation       SECTION N/A 2015    Procedure:  SECTION;  Surgeon: Tremaine Gaona MD;  Location:  OR      SECTION, TUBAL LIGATION, COMBINED N/A 10/13/2017    Procedure: COMBINED  SECTION, TUBAL LIGATION;  Repeat  SECTION, TUBAL LIGATION ;  Surgeon: Sidra Salamanca DO;  Location:  OR     COLONOSCOPY N/A 2016    Procedure: COLONOSCOPY;  Surgeon: Lyudmila Pastor MD;  Location:  GI     DILATION AND CURETTAGE SUCTION      elective termination     ESOPHAGOSCOPY, GASTROSCOPY, DUODENOSCOPY (EGD), COMBINED  2014    Procedure: COMBINED ESOPHAGOSCOPY, GASTROSCOPY, DUODENOSCOPY (EGD);   ESOPHAGOSCOPY, GASTROSCOPY, DUODENOSCOPY (EGD) ;  Surgeon: Vishnu Lanier MD;  Location:  GI     ESOPHAGOSCOPY, GASTROSCOPY, DUODENOSCOPY  (EGD), COMBINED Left 2020    Procedure: ESOPHAGOGASTRODUODENOSCOPY, WITH BIOPSIES USING BIOPSY FORCEPS;  Surgeon: Vishnu Hopkins MD;  Location:  GI     ESOPHAGOSCOPY, GASTROSCOPY, DUODENOSCOPY (EGD), COMBINED N/A 2021    Procedure: ESOPHAGOGASTRODUODENOSCOPY, WITH BIOPSY using cold forceps;  Surgeon: Vishnu Lanier MD;  Location:  GI     GYN SURGERY       c section      RW LASER WART      Anal wart removal      ZZC NONSPECIFIC PROCEDURE  01    Primary LTCS       Family History   Problem Relation Age of Onset     Colon Polyps Brother      Other - See Comments Brother         colon polyps     Hyperlipidemia Sister      Stomach Cancer Sister      Cancer Sister         stomach     C.A.D. No family hx of      Diabetes No family hx of      Breast Cancer No family hx of      Cancer - colorectal No family hx of      Colon Cancer No family hx of        Social History     Tobacco Use     Smoking status: Some Days     Years: 7.00     Types: Cigarettes     Last attempt to quit:      Years since quittin.9     Smokeless tobacco: Never     Tobacco comments:     only if she drinks alcohol   Vaping Use     Vaping Use: Never used   Substance Use Topics     Alcohol use: Not Currently     Alcohol/week: 8.3 standard drinks     Drug use: No         Current Outpatient Medications:      acetaminophen (TYLENOL) 500 MG tablet, take 1 - 2 tablet by oral route  every 6 hours as needed as needed, Disp: , Rfl:      atenolol (TENORMIN) 25 MG tablet, Take 0.5 tablets (12.5 mg) by mouth 2 times daily, Disp: 90 tablet, Rfl: 3     atorvastatin (LIPITOR) 10 MG tablet, Take 1 tablet (10 mg) by mouth daily, Disp: 90 tablet, Rfl: 1     cetirizine (ZYRTEC) 10 MG tablet, Take 1 tablet (10 mg) by mouth daily, Disp: 30 tablet, Rfl: 1     cholecalciferol 25 MCG (1000 UT) TABS, take 1 tablet by oral route  every month (Patient not taking: Reported on 11/3/2022), Disp: , Rfl:      Cyanocobalamin (VITAMIN B12 TR PO),  1 patch (500 mcg) daily, Disp: , Rfl:      cyclobenzaprine (FLEXERIL) 5 MG tablet, Take 1 tablet (5 mg) by mouth nightly as needed for muscle spasms, Disp: 30 tablet, Rfl: 0     dexlansoprazole (DEXILANT) 60 MG CPDR CR capsule, Take 60 mg by mouth daily, Disp: , Rfl:      diclofenac (VOLTAREN) 1 % topical gel, Apply 2 g topically 4 times daily To joints as needed for pain, Disp: 100 g, Rfl: 3     famotidine (PEPCID) 20 MG tablet, TAKE ONE TABLET BY MOUTH TWICE A DAY AS NEEDED FOR REFLUX, Disp: 180 tablet, Rfl: 3     fluocinonide emulsified base 0.05 % external cream, apply to rash as needed - sparingly, Disp: 60 g, Rfl: 1     fluticasone (FLONASE) 50 MCG/ACT nasal spray, Spray 1 spray into both nostrils 2 times daily as needed for rhinitis or allergies Take 1 spray twice daily x 1 week then 1 spray daily at night x 1 week then as needed, Disp: 11.1 mL, Rfl: 1     hydroxychloroquine (PLAQUENIL) 200 MG tablet, Take 1 tablet (200 mg) by mouth 2 times daily Annual Plaquenil toxicity eye screening required., Disp: 180 tablet, Rfl: 3     hydrOXYzine (ATARAX) 25 MG tablet, Take 1 tablet (25 mg) by mouth 3 times daily as needed for anxiety, Disp: 90 tablet, Rfl: 1     LORazepam (ATIVAN) 0.5 MG tablet, Take 1 tablet (0.5 mg) by mouth daily as needed for anxiety 10 pills/month, Disp: 10 tablet, Rfl: 0     multivitamin (ONE-DAILY) tablet, Take 1 tablet by mouth daily, Disp:  , Rfl:      sucralfate (CARAFATE) 1 GM tablet, take 1 tablet by oral route 3 times every day on an empty stomach 1 hour before meals, Disp: , Rfl:   No current facility-administered medications for this visit.    Facility-Administered Medications Ordered in Other Visits:      fentaNYL Citrate (PF) (SUBLIMAZE) injection, , , PRN, Lyudmila Pastor MD, 100 mcg at 07/19/16 1412    Answer to ROS/HPI submitted by patient on 12/7/22  Review of Systems   Constitutional: Positive for chills and fatigue. Negative for fever.   Respiratory: Positive for shortness  of breath. Negative for cough and wheezing.    Cardiovascular: Positive for chest pain and palpitations.   Gastrointestinal: Positive for abdominal pain, diarrhea, heartburn, nausea and vomiting. Negative for constipation and rectal pain.   Endocrine: Negative for polydipsia and polyphagia.   Genitourinary: Negative for dyspareunia, genital sores and vaginal discharge.   Musculoskeletal: Positive for arthralgias, back pain and myalgias. Negative for joint swelling and neck pain.   Skin: Positive for rash.   Neurological: Positive for dizziness, tremors, weakness and headaches. Negative for seizures, light-headedness and numbness.   Hematological: Does not bruise/bleed easily.   Psychiatric/Behavioral: Negative for decreased concentration and hallucinations. The patient is nervous/anxious.           Objective    Vitals - Patient Reported  Weight (Patient Reported): 59 kg (130 lb)  Pain Score: Mild Pain (3)        Objective         Vitals:  No vitals were obtained today due to virtual visit.    Physical Exam   GENERAL: Healthy, alert and no distress  EYES: Eyes grossly normal to inspection.  No discharge or erythema, or obvious scleral/conjunctival abnormalities.  RESP: No audible wheeze, cough, or visible cyanosis.  No visible retractions or increased work of breathing.    SKIN: Visible skin clear. No significant rash, abnormal pigmentation or lesions.  NEURO: Cranial nerves grossly intact.  Mentation and speech appropriate for age.  PSYCH: Mentation appears normal, affect normal/bright, judgement and insight intact, normal speech and appearance well-groomed.      CRP Inflammation   Date Value Ref Range Status   10/31/2022 <3.00 <5.00 mg/L Final   07/14/2022 <2.9 0.0 - 8.0 mg/L Final   05/01/2021 <2.9 0.0 - 8.0 mg/L Final      Sed Rate   Date Value Ref Range Status   01/21/2016 18 0 - 20 mm/h Final     Erythrocyte Sedimentation Rate   Date Value Ref Range Status   07/14/2022 43 (H) 0 - 20 mm/hr Final      Last Renal  Panel:  Sodium   Date Value Ref Range Status   10/21/2022 137 136 - 145 mmol/L Final   05/01/2021 133 133 - 144 mmol/L Final     Potassium   Date Value Ref Range Status   10/21/2022 3.9 3.4 - 5.3 mmol/L Final   07/14/2022 4.0 3.4 - 5.3 mmol/L Final   05/01/2021 4.0 3.4 - 5.3 mmol/L Final     Chloride   Date Value Ref Range Status   10/21/2022 101 98 - 107 mmol/L Final   07/14/2022 109 94 - 109 mmol/L Final   05/01/2021 103 94 - 109 mmol/L Final     Carbon Dioxide   Date Value Ref Range Status   05/01/2021 25 20 - 32 mmol/L Final     Carbon Dioxide (CO2)   Date Value Ref Range Status   10/21/2022 25 22 - 29 mmol/L Final   07/14/2022 26 20 - 32 mmol/L Final     Anion Gap   Date Value Ref Range Status   10/21/2022 11 7 - 15 mmol/L Final   07/14/2022 5 3 - 14 mmol/L Final   05/01/2021 5 3 - 14 mmol/L Final     Glucose   Date Value Ref Range Status   10/21/2022 124 (H) 70 - 99 mg/dL Final   07/14/2022 97 70 - 99 mg/dL Final   05/01/2021 87 70 - 99 mg/dL Final     Urea Nitrogen   Date Value Ref Range Status   10/21/2022 7.5 6.0 - 20.0 mg/dL Final   07/14/2022 9 7 - 30 mg/dL Final   05/01/2021 13 7 - 30 mg/dL Final     Creatinine   Date Value Ref Range Status   10/21/2022 0.59 0.51 - 0.95 mg/dL Final   05/01/2021 0.78 0.52 - 1.04 mg/dL Final     GFR Estimate   Date Value Ref Range Status   10/21/2022 >90 >60 mL/min/1.73m2 Final     Comment:     Effective December 21, 2021 eGFRcr in adults is calculated using the 2021 CKD-EPI creatinine equation which includes age and gender (Elen casillas al., NEJM, DOI: 10.1056/BOEPbb0710641)   05/01/2021 >90 >60 mL/min/[1.73_m2] Final     Comment:     Non  GFR Calc  Starting 12/18/2018, serum creatinine based estimated GFR (eGFR) will be   calculated using the Chronic Kidney Disease Epidemiology Collaboration   (CKD-EPI) equation.       Calcium   Date Value Ref Range Status   10/21/2022 9.9 8.6 - 10.0 mg/dL Final   05/01/2021 8.6 8.5 - 10.1 mg/dL Final     Albumin   Date Value  Ref Range Status   10/21/2022 4.4 3.5 - 5.2 g/dL Final   07/14/2022 4.0 3.4 - 5.0 g/dL Final   05/01/2021 4.1 3.4 - 5.0 g/dL Final      Lab Results   Component Value Date    WBC 6.2 10/21/2022    WBC 9.2 05/01/2021     Lab Results   Component Value Date    RBC 4.73 10/21/2022    RBC 4.49 05/01/2021     Lab Results   Component Value Date    HGB 11.5 10/21/2022    HGB 12.7 05/01/2021     Lab Results   Component Value Date    HCT 38.9 10/21/2022    HCT 38.4 05/01/2021     No components found for: MCT  Lab Results   Component Value Date    MCV 82 10/21/2022    MCV 86 05/01/2021     Lab Results   Component Value Date    MCH 24.3 10/21/2022    MCH 28.3 05/01/2021     Lab Results   Component Value Date    MCHC 29.6 10/21/2022    MCHC 33.1 05/01/2021     Lab Results   Component Value Date    RDW 16.1 10/21/2022    RDW 13.2 05/01/2021     Lab Results   Component Value Date     10/21/2022     05/01/2021      Lab Results   Component Value Date    A1C 5.6 10/21/2022    A1C 5.5 02/03/2021      TSH   Date Value Ref Range Status   10/21/2022 1.38 0.30 - 4.20 uIU/mL Final   07/14/2022 1.54 0.40 - 4.00 mU/L Final   05/01/2021 1.18 0.40 - 4.00 mU/L Final      Lab Results   Component Value Date    VITDT 20 03/29/2022    VITDT 23 08/16/2021    VITDT 18 (L) 02/03/2021      Recent Labs   Lab Test 10/21/22  1138   MAG 2.1     Last Comprehensive Metabolic Panel:  Sodium   Date Value Ref Range Status   10/21/2022 137 136 - 145 mmol/L Final   05/01/2021 133 133 - 144 mmol/L Final     Potassium   Date Value Ref Range Status   10/21/2022 3.9 3.4 - 5.3 mmol/L Final   07/14/2022 4.0 3.4 - 5.3 mmol/L Final   05/01/2021 4.0 3.4 - 5.3 mmol/L Final     Chloride   Date Value Ref Range Status   10/21/2022 101 98 - 107 mmol/L Final   07/14/2022 109 94 - 109 mmol/L Final   05/01/2021 103 94 - 109 mmol/L Final     Carbon Dioxide   Date Value Ref Range Status   05/01/2021 25 20 - 32 mmol/L Final     Carbon Dioxide (CO2)   Date Value Ref  Range Status   10/21/2022 25 22 - 29 mmol/L Final   07/14/2022 26 20 - 32 mmol/L Final     Anion Gap   Date Value Ref Range Status   10/21/2022 11 7 - 15 mmol/L Final   07/14/2022 5 3 - 14 mmol/L Final   05/01/2021 5 3 - 14 mmol/L Final     Glucose   Date Value Ref Range Status   10/21/2022 124 (H) 70 - 99 mg/dL Final   07/14/2022 97 70 - 99 mg/dL Final   05/01/2021 87 70 - 99 mg/dL Final     Urea Nitrogen   Date Value Ref Range Status   10/21/2022 7.5 6.0 - 20.0 mg/dL Final   07/14/2022 9 7 - 30 mg/dL Final   05/01/2021 13 7 - 30 mg/dL Final     Creatinine   Date Value Ref Range Status   10/21/2022 0.59 0.51 - 0.95 mg/dL Final   05/01/2021 0.78 0.52 - 1.04 mg/dL Final     GFR Estimate   Date Value Ref Range Status   10/21/2022 >90 >60 mL/min/1.73m2 Final     Comment:     Effective December 21, 2021 eGFRcr in adults is calculated using the 2021 CKD-EPI creatinine equation which includes age and gender (Elen et al., NE, DOI: 10.1056/LIIHnl6600425)   05/01/2021 >90 >60 mL/min/[1.73_m2] Final     Comment:     Non  GFR Calc  Starting 12/18/2018, serum creatinine based estimated GFR (eGFR) will be   calculated using the Chronic Kidney Disease Epidemiology Collaboration   (CKD-EPI) equation.       Calcium   Date Value Ref Range Status   10/21/2022 9.9 8.6 - 10.0 mg/dL Final   05/01/2021 8.6 8.5 - 10.1 mg/dL Final     Bilirubin Total   Date Value Ref Range Status   10/21/2022 0.2 <=1.2 mg/dL Final   05/01/2021 0.3 0.2 - 1.3 mg/dL Final     Alkaline Phosphatase   Date Value Ref Range Status   10/21/2022 89 35 - 104 U/L Final   05/01/2021 61 40 - 150 U/L Final     ALT   Date Value Ref Range Status   10/21/2022 12 10 - 35 U/L Final   05/01/2021 18 0 - 50 U/L Final     AST   Date Value Ref Range Status   10/21/2022 17 10 - 35 U/L Final   05/01/2021 11 0 - 45 U/L Final     Most Recent D-dimer:  Recent Labs   Lab Test 10/21/22  1138   DD <0.27       Lab on 12/07/2022   Component Date Value Ref Range Status      Ferritin 12/07/2022 27  6 - 175 ng/mL Final     Iron 12/07/2022 47  37 - 145 ug/dL Final     Iron Sat Index 12/07/2022 14 (L)  15 - 46 % Final     Iron Binding Capacity 12/07/2022 346  240 - 430 ug/dL Final         Video-Visit Details    Video Visit start Time: 8:30    Type of service:  Video Visit    Video End Time:9:01    Originating Location (pt. Location): Home    Distant Location (provider location):  Off-Site    Platform used for Video Visit: Gabriel

## 2022-12-09 ENCOUNTER — TELEPHONE (OUTPATIENT)
Dept: PHARMACY | Facility: CLINIC | Age: 37
End: 2022-12-09

## 2022-12-09 NOTE — TELEPHONE ENCOUNTER
Rheumatology recommended she start prednisone 5 mg daily for 1 week and then every other day for 1 week.  Wondering if taking the steroid would interfere with upcoming tests.  Seeing Cardiology Monday - will ask Cardiology if starting prednisone would impact the results of POTs testing.      Questions who she should ask about skin bumps/rash associated with menstrual cycle.  Also seems like her head is humming.  She has mentioned this to Rheumatology.  Has been seen by GYN who tested hormone levels but she did not mention these symptoms at this visit.  Recommend she follow-up with GYN to discuss this concerns.

## 2022-12-11 NOTE — PROGRESS NOTES
[facility] [place] [place]    [place]erring provider:Corina Aranda PA-C    HPI: Ms. Unique Baker is a 37 year old  female with PMH significant for  -Post COVID chronic joint pain/chronic muscle pain  -Post COVID chronic fatigue  -COVID infection March 2021, COVID infection 9/21/2022  -ADHD  -Esophageal reflux  -Iron deficiency anemia    Patient was seen in post-COVID clinic 10/4/2022 and reported palpitations, shortness of breath and chest tightness.  She is referred to cardiology for further evaluation.    Patient was in the ER on 10/21/2022 for palpitations, lightheadedness and general malaise after tejinder COVID-19 2 weeks ago.  Work-up in the ER was unremarkable with no signs of troponin change and no elevated D-dimer.  Given palpitations she was recommended Zio patch.  Patient is still wearing the Zio patch.  She was found to be anxious at the time of evaluation.    No known cardiac disease.  Patient tells me that she had palpitations associated with chest tightness and tingling in the whole body after she had her first COVID infection.  Overall she had 3 or 4 episodes but after the second COVID infection she is feeling palpitations 2 or 3 times a day.  It can happen at rest and with activity.  Now she tells me that even thinking about any kind of physical activity that she is supposed to do creates anxiety and palpitations. She was given SSRI venlafaxine for anxiety however she has not started that yet.    She had a stress echocardiogram a year ago which was normal.  EKG on 10/2022 shows sinus tachycardia.      Patient has seen rheumatology and is on Plaquenil now. Not taking prednisone or ferrous sulfate.  Recent labs on 10/21 shows mild anemia at 11.5.  BMP is normal.  LDL is mildly elevated at 131.  Patient's LDL in March of this year was 199.  Currently not taking atorvastatin.  Medications, personal, family, and social history  reviewed with patient and revised.    Interval history 2022:  Patient reports feeling exhausted from minimal activity.  She tells me that she feels tired even doing house chores.  When she goes out for walk which she does twice a day for 30 minutes, she feels chest and back pain.  This is new to her since she had COVID infection.  Reports frequent dizziness and presyncopal feelings which improve when she sits down.  She is drinking 100 ounces of water.  She has recently seen neurology who recommended tilt table test.  She is also telling me that she might have a flareup of her connective tissue disease.  She was recommended to start steroids however she is holding off on starting steroids until she completes the tilt table test.  She tells me that her palpitation symptoms has been improving and right now at rest her heart rate is around 65 bpm.  As you know she has iron deficiency anemia and she has been on iron over the last 1 month.    PAST MEDICAL HISTORY:  Past Medical History:   Diagnosis Date     Diabetes (H)     GDM insulin     GERD (gastroesophageal reflux disease)     w/u otherwise neg      History of colposcopy with cervical biopsy 3/23/07    WNL     Papanicolaou smear of cervix with low grade squamous intraepithelial lesion (LGSIL) 07     PONV (postoperative nausea and vomiting)      S/P  10/13/2017       CURRENT MEDICATIONS:  Current Outpatient Medications   Medication Sig Dispense Refill     acetaminophen (TYLENOL) 500 MG tablet take 1 - 2 tablet by oral route  every 6 hours as needed as needed       atorvastatin (LIPITOR) 10 MG tablet Take 1 tablet (10 mg) by mouth daily 90 tablet 1     cetirizine (ZYRTEC) 10 MG tablet Take 1 tablet (10 mg) by mouth daily 30 tablet 1     Cyanocobalamin (VITAMIN B12 TR PO) 1 patch (500 mcg) daily       cyclobenzaprine (FLEXERIL) 5 MG tablet Take 1 tablet (5 mg) by mouth nightly as needed for muscle spasms 30 tablet 0     dexlansoprazole  (DEXILANT) 60 MG CPDR CR capsule Take 60 mg by mouth daily       diclofenac (VOLTAREN) 1 % topical gel Apply 2 g topically 4 times daily To joints as needed for pain 100 g 3     famotidine (PEPCID) 20 MG tablet TAKE ONE TABLET BY MOUTH TWICE A DAY AS NEEDED FOR REFLUX 180 tablet 3     FERROUS BISGLYCINATE CHELATE PO Take 36 mg by mouth every other day       fluocinonide emulsified base 0.05 % external cream apply to rash as needed - sparingly 60 g 1     fluticasone (FLONASE) 50 MCG/ACT nasal spray Spray 1 spray into both nostrils 2 times daily as needed for rhinitis or allergies Take 1 spray twice daily x 1 week then 1 spray daily at night x 1 week then as needed 11.1 mL 1     hydroxychloroquine (PLAQUENIL) 200 MG tablet Take 1 tablet (200 mg) by mouth 2 times daily Annual Plaquenil toxicity eye screening required. 180 tablet 3     hydrOXYzine (ATARAX) 25 MG tablet Take 1 tablet (25 mg) by mouth 3 times daily as needed for anxiety 90 tablet 1     LORazepam (ATIVAN) 0.5 MG tablet Take 1 tablet (0.5 mg) by mouth daily as needed for anxiety 10 pills/month 10 tablet 0     multivitamin (ONE-DAILY) tablet Take 1 tablet by mouth daily       sucralfate (CARAFATE) 1 GM tablet take 1 tablet by oral route 3 times every day on an empty stomach 1 hour before meals       vitamin D2 (ERGOCALCIFEROL) 27053 units (1250 mcg) capsule Take 50,000 Units by mouth once a week         PAST SURGICAL HISTORY:  Past Surgical History:   Procedure Laterality Date     BREAST SURGERY      augmentation       SECTION N/A 2015    Procedure:  SECTION;  Surgeon: Tremaine Gaona MD;  Location:  OR      SECTION, TUBAL LIGATION, COMBINED N/A 10/13/2017    Procedure: COMBINED  SECTION, TUBAL LIGATION;  Repeat  SECTION, TUBAL LIGATION ;  Surgeon: Sidra Salamanca DO;  Location:  OR     COLONOSCOPY N/A 2016    Procedure: COLONOSCOPY;  Surgeon: Lyudmila Pastor MD;  Location:  GI      DILATION AND CURETTAGE SUCTION      elective termination     ESOPHAGOSCOPY, GASTROSCOPY, DUODENOSCOPY (EGD), COMBINED  2014    Procedure: COMBINED ESOPHAGOSCOPY, GASTROSCOPY, DUODENOSCOPY (EGD);   ESOPHAGOSCOPY, GASTROSCOPY, DUODENOSCOPY (EGD) ;  Surgeon: Vishnu Lanier MD;  Location:  GI     ESOPHAGOSCOPY, GASTROSCOPY, DUODENOSCOPY (EGD), COMBINED Left 2020    Procedure: ESOPHAGOGASTRODUODENOSCOPY, WITH BIOPSIES USING BIOPSY FORCEPS;  Surgeon: Vishnu Hopkins MD;  Location:  GI     ESOPHAGOSCOPY, GASTROSCOPY, DUODENOSCOPY (EGD), COMBINED N/A 2021    Procedure: ESOPHAGOGASTRODUODENOSCOPY, WITH BIOPSY using cold forceps;  Surgeon: Vishnu Lanier MD;  Location:  GI     GYN SURGERY       c section      RW LASER WART      Anal wart removal      ZZC NONSPECIFIC PROCEDURE  01    Primary LTCS       ALLERGIES:     Allergies   Allergen Reactions     No Known Drug Allergies      Other Environmental Allergy      Patch Test Results 3-10-20    Very Strong (3+) or Strong (2+) reactions:  none     Mild (1+) reactions:  Fragrance mix I  Linalool  Oleamidopropyl dimethylamine     Borderline/questionable reactions:  Balsam of [place]  Diphenylguanidine     Venlafaxine Other (See Comments)     legs were cold/tingly from knees down and restless, increased anxiety, insomnia       FAMILY HISTORY:  Family History   Problem Relation Age of Onset     Colon Polyps Brother      Other - See Comments Brother         colon polyps     Hyperlipidemia Sister      Stomach Cancer Sister      Cancer Sister         stomach     C.A.D. No family hx of      Diabetes No family hx of      Breast Cancer No family hx of      Cancer - colorectal No family hx of      Colon Cancer No family hx of          SOCIAL HISTORY:  Social History     Tobacco Use     Smoking status: Some Days     Years: 7.00     Types: Cigarettes     Last attempt to quit:      Years since quittin.9     Smokeless tobacco: Never      "Tobacco comments:     only if she drinks alcohol   Vaping Use     Vaping Use: Never used   Substance Use Topics     Alcohol use: Not Currently     Alcohol/week: 8.3 standard drinks     Drug use: No       ROS:   Constitutional: No fever, chills, or sweats. Weight stable.   Cardiovascular: As per HPI.       Exam:  /79 (BP Location: Left arm, Patient Position: Sitting, Cuff Size: Adult Regular)   Pulse 79   Ht 1.562 m (5' 1.5\")   Wt 60.6 kg (133 lb 9.6 oz)   LMP 10/16/2022   SpO2 100%   BMI 24.84 kg/m    GENERAL APPEARANCE: alert and no distress  HEENT: no icterus, no central cyanosis  LYMPH/NECK: no adenopathy, no asymmetry, JVP not elevated  CARDIOVASCULAR: regular rhythm, normal S1, S2, no S3 or S4 and no murmur, click or rub, precordium quiet with normal PMI.  EXTREMITIES: no edema  NEURO: alert, normal speech,and affect  SKIN: no ecchymoses, no rashes     I have reviewed the labs and personally reviewed the imaging below and made my comment in the assessment and plan.    Labs:  CBC RESULTS:   Lab Results   Component Value Date    WBC 6.2 10/21/2022    WBC 9.2 05/01/2021    RBC 4.73 10/21/2022    RBC 4.49 05/01/2021    HGB 11.5 (L) 10/21/2022    HGB 12.7 05/01/2021    HCT 38.9 10/21/2022    HCT 38.4 05/01/2021    MCV 82 10/21/2022    MCV 86 05/01/2021    MCH 24.3 (L) 10/21/2022    MCH 28.3 05/01/2021    MCHC 29.6 (L) 10/21/2022    MCHC 33.1 05/01/2021    RDW 16.1 (H) 10/21/2022    RDW 13.2 05/01/2021     10/21/2022     05/01/2021       BMP RESULTS:  Lab Results   Component Value Date     10/21/2022     05/01/2021    POTASSIUM 3.9 10/21/2022    POTASSIUM 4.0 07/14/2022    POTASSIUM 4.0 05/01/2021    CHLORIDE 101 10/21/2022    CHLORIDE 109 07/14/2022    CHLORIDE 103 05/01/2021    CO2 25 10/21/2022    CO2 26 07/14/2022    CO2 25 05/01/2021    ANIONGAP 11 10/21/2022    ANIONGAP 5 07/14/2022    ANIONGAP 5 05/01/2021     (H) 10/21/2022    GLC 97 07/14/2022    GLC 87 05/01/2021 "    BUN 7.5 10/21/2022    BUN 9 07/14/2022    BUN 13 05/01/2021    CR 0.59 10/21/2022    CR 0.78 05/01/2021    GFRESTIMATED >90 10/21/2022    GFRESTIMATED >90 05/01/2021    GFRESTBLACK >90 05/01/2021    PREETI 9.9 10/21/2022    PREETI 8.6 05/01/2021      Exercise stress echocardiogram 7/23/2021    The patient exercises for 10 min and 30 seconds to maximum of 12 METS. Above  average functional capacity.     The EKG portion of this stress test was negative for inducible ischemia.  This was a normal stress echocardiogram with no evidence of stress-induced  ischemia.     No prior study available for comparison.      EKG 10/21/2022: Sinus tachycardia heart rate 110 bpm.        Assessment and Plan:     #Exertional chest/back pain  -Patient denies resting chest pain.  This is new to her since she had COVID infection.  Cardiovascular risk factors include hyperlipidemia and remote history of smoking.  She has possible connective tissue disease as well.  Recommend CT coronary angiogram.    #Frequent palpitations after second COVID infection   -When I saw her 2 months ago I have recommended starting metoprolol for sinus tachycardia however the patient could not tolerate metoprolol due to frequent nausea and vomiting.  I prescribed atenolol however she was not able to tolerate this medication as well.  Now she is telling me that her palpitation symptoms has improved compared to how it was 2 months ago.  Still continues to report feeling exhausted with minimal activity.  I have explained to her that exhausted feeling with physical activity is not just due to long COVID but iron deficiency anemia as well.  She has been on iron over the last 1 month.  Recommend to continue iron treatment and follow-up with primary care physician.    #Orthostatic dizziness/presyncopal feeling  -Performed lying/sitting and standing test today in clinic which showed initial orthostatic hypotension. The patient completed standing 3-minute test and she was  feeling fine at the end of the test with blood pressure 105/74 mmHg and heart rate 90 bpm.  There was no evidence of POTS.  -Patient is scheduled for tilt table on 12/26 as recommended by neurology.  Today she asked my opinion if she needs to start steroids prior to tilt test. I recommended patient to hold off on starting steroids and initiate steroids after tilt table is completed.  -Patient drinks at least 100 ounces of water per day.  She is also supplementing salt.  Prescribed salt tablet 1 g starting once a day (I am concerned that she might end up with nausea with salt tablet).  Recommended daily salt intake at least 5 g/day.    #Iron deficiency anemia  -Follows with hematology at outside hospital.  On iron replacement.    #Connective tissue disease?  -On Plaquenil.  She is planning to start steroid after tilt table test.    Return to clinic as needed.    Total time spent today for this visit is 70 minutes including precharting, face-to-face clinic visit, review of labs/imaging and medical documentation.    Please donot hesitate to contact me if you have any questions or concerns. Again, thank you for allowing me to participate in the care of your patient.    Teo HARTLEY MD  [facility] Division of Cardiology  Pager [phone]

## 2022-12-12 ENCOUNTER — VIRTUAL VISIT (OUTPATIENT)
Dept: PSYCHIATRY | Facility: CLINIC | Age: 37
End: 2022-12-12
Payer: COMMERCIAL

## 2022-12-12 ENCOUNTER — OFFICE VISIT (OUTPATIENT)
Dept: CARDIOLOGY | Facility: CLINIC | Age: 37
End: 2022-12-12
Attending: INTERNAL MEDICINE
Payer: COMMERCIAL

## 2022-12-12 VITALS
SYSTOLIC BLOOD PRESSURE: 118 MMHG | OXYGEN SATURATION: 97 % | WEIGHT: 133.6 LBS | DIASTOLIC BLOOD PRESSURE: 79 MMHG | BODY MASS INDEX: 25.22 KG/M2 | HEART RATE: 79 BPM | HEIGHT: 61 IN

## 2022-12-12 DIAGNOSIS — U09.9 LONG COVID: ICD-10-CM

## 2022-12-12 DIAGNOSIS — F41.9 ANXIETY: Primary | ICD-10-CM

## 2022-12-12 DIAGNOSIS — R07.9 CHEST PAIN, UNSPECIFIED TYPE: Primary | ICD-10-CM

## 2022-12-12 PROCEDURE — G0463 HOSPITAL OUTPT CLINIC VISIT: HCPCS

## 2022-12-12 PROCEDURE — 99214 OFFICE O/P EST MOD 30 MIN: CPT | Mod: 95 | Performed by: PSYCHIATRY & NEUROLOGY

## 2022-12-12 PROCEDURE — 99215 OFFICE O/P EST HI 40 MIN: CPT | Performed by: INTERNAL MEDICINE

## 2022-12-12 PROCEDURE — 99417 PROLNG OP E/M EACH 15 MIN: CPT | Performed by: INTERNAL MEDICINE

## 2022-12-12 RX ORDER — BUSPIRONE HYDROCHLORIDE 5 MG/1
5 TABLET ORAL 2 TIMES DAILY
Qty: 60 TABLET | Refills: 1 | Status: SHIPPED | OUTPATIENT
Start: 2022-12-12 | End: 2023-02-23

## 2022-12-12 RX ORDER — SODIUM CHLORIDE 1 G/1
1 TABLET ORAL DAILY
Qty: 30 TABLET | Refills: 0 | Status: SHIPPED | OUTPATIENT
Start: 2022-12-12 | End: 2023-06-07

## 2022-12-12 RX ORDER — LORAZEPAM 0.5 MG/1
0.5 TABLET ORAL DAILY PRN
Qty: 10 TABLET | Refills: 0 | Status: SHIPPED | OUTPATIENT
Start: 2022-12-12 | End: 2023-03-30

## 2022-12-12 ASSESSMENT — PAIN SCALES - GENERAL: PAINLEVEL: NO PAIN (0)

## 2022-12-12 NOTE — PROGRESS NOTES
Collaborative Care Psychiatry Consult Note    IDENTIFICATION   Name: Unique Baker   : 1985/37 year old      Sex:    @ female          Telemedicine Visit: The patient's condition can be safely assessed and treated via synchronous audio and visual telemedicine encounter.      Reason for Telemedicine Visit: COVID 19 pandemic and the social and physical recommendations by the CDC and MD.      Originating Site (Patient Location): Patient's home    Distant Site (Provider Location): Provider Remote Setting    Consent:  The patient/guardian has verbally consented to: the potential risks and benefits of telemedicine (video visit or phone) versus in person care; bill my insurance or make self-payment for services provided; and responsibility for payment of non-covered services.     Mode of Communication:  [employer] video platform for video with "[employer] audio/phone to get both video and audio    As the provider I attest to compliance with applicable laws and regulations related to telemedicine.      Face to Face/Patient Contact start Time: 1:34PM  Face to Face/Patient Contact end Time:  1:57PM  Face to Face/patient Contact total time: 23 minutes  Pre Charting time: 3 minutes; Post charting time, communication and other activities: 2 minutes; Total time 28 minutes  1:31 PM          SUBJECTIVE   Anxiety continues to be a challenge. Sx are relatively better. Tried 1/2 tab lorazepam and does not recall significant effects. Plan to try a whole tablet. Has taken 1/2 dose of lorazepam PRN.  Nervous and hesitant to try new medication.  Discussed on limited basis Pharmacogenomic testing.        PHQ-9 scores:  PHQ-9 SCORE 2022   PHQ-9 Total Score - - -   PHQ-9 Total Score MyChart 8 (Mild depression) - 16 (Moderately severe depression)   PHQ-9 Total Score 8 16 16       RENÉE-7 scores:  RENÉE-7 SCORE 10/31/2022 2022 2022   Total Score 18 (severe anxiety) - 17 (severe anxiety)   Total Score 18 17 17        OBJECTIVE     Vital Signs:   LMP 10/16/2022     Labs:  Reviewed Pharmacogenomic's    Current Medications:  Current Outpatient Medications   Medication     acetaminophen (TYLENOL) 500 MG tablet     atorvastatin (LIPITOR) 10 MG tablet     cetirizine (ZYRTEC) 10 MG tablet     Cyanocobalamin (VITAMIN B12 TR PO)     cyclobenzaprine (FLEXERIL) 5 MG tablet     dexlansoprazole (DEXILANT) 60 MG CPDR CR capsule     diclofenac (VOLTAREN) 1 % topical gel     famotidine (PEPCID) 20 MG tablet     FERROUS BISGLYCINATE CHELATE PO     fluocinonide emulsified base 0.05 % external cream     fluticasone (FLONASE) 50 MCG/ACT nasal spray     hydroxychloroquine (PLAQUENIL) 200 MG tablet     hydrOXYzine (ATARAX) 25 MG tablet     LORazepam (ATIVAN) 0.5 MG tablet     multivitamin (ONE-DAILY) tablet     sodium chloride 1 GM tablet     sucralfate (CARAFATE) 1 GM tablet     vitamin D2 (ERGOCALCIFEROL) 81773 units (1250 mcg) capsule     No current facility-administered medications for this visit.     Facility-Administered Medications Ordered in Other Visits   Medication     fentaNYL Citrate (PF) (SUBLIMAZE) injection        The [place] Prescription Monitoring Program has been reviewed and there are no concerns about diversionary activity for controlled substances at this time.        ADDED HISTORY   Brother takes lorazepam - he takes 1 mg.       MENTAL STATUS EXAMINATION:   Appearance: Good attention to grooming and hygiene, casual garb  Attitude: Cooperative  Eye Contact: Good  Gait and Station: Within normal limits  Psychomotor Behavior: Overall relatively reduced  Oriented to: Grossly person place and time  Attention Span and Concentration: Grossly intact  Speech: slightly Anxious tone  Mood:  Slightly anxious  Affect: Constricted  Associations:  no loose associations  Thought Process:  logical, linear and goal oriented  Thought Content: No evidence of delusions or suicidal or homicidal ideation plan or intent  Memory: grossly  intact  Fund of Knowledge: Good  Insight:  good  Judgment:  intact, adequate for safety  Impulse Control:  intact        DIAGNOSES:   Unspecified Anxiety Disorder  Rule Out Anxiety Disorder Due to Another Medical Condition  Unspecified Depressive Disorder      ASSESSMENT:   Pt dealing with new onset clinically significant anxiety with avoidance behaviors and depressive sx in aftermath of 2nd COVID-19 infection, physical sx, and anxiety reaction to sx. Has not tolerated med trials - attempt very low dose escitalopram.     Today Unique Baker reports on suicidal ideaitons. In addition, she has notable risk factors for self-harm, including anxiety. However, risk is mitigated by commitment to family, [employer] beliefs and absence of past attempts. Therefore, based on all available evidence including the factors cited above, she does not appear to be at imminent risk for self-harm, does not meet criteria for a 72-hr hold, and therefore remains appropriate for ongoing outpatient level of care.       PLAN:       Patient advised of consultative model. Patient will continue to be seen for ongoing consultation and stabilization.    Does not meet criteria for involuntary treatment or hospitalization    Trial buspirone 12/12/22 5 mg po BID -Risks, benefits and alternatives discussed.  Patient provides verbal consent to treatment.    Hydroxyzine 25 mg po qday prn anxiety -Risks, benefits and alternatives discussed.  Patient provides verbal consent to treatment.    Lorazepam 0.25 - 0.5 mg po qday prn anxiety 0.25 mg ineffective-Risks, benefits and alternatives discussed.  Patient provides verbal consent to treatment.    DC'd escitalopram (nausea, restlessness, insomnia)    pharmacogenomic testing - completed 11/17/22, pending MTM assessment    Psychotherapy upcoming    Return in 4 weeks    Administrative Billing:   Time spent with patient was 30 minutes and greater than 50% of time or 20 minutes was spent in counseling and  coordination of care regarding above diagnoses and treatment plan.       Signed:   Shade Strickland M.D.  [facility]    Disclaimer: This note consists of symbols derived from keyboarding, dictation and/or voice recognition software. As a result, there may be errors in the script that have gone undetected. Please consider this when interpreting information found in this chart.

## 2022-12-12 NOTE — LETTER
12/12/2022      RE: Unique Baker  [id number] [facility] [address]       Dear Colleague,    Thank you for the opportunity to participate in the care of your patient, Unique Baker, at the [facility] [place] at [facility]. Please see a copy of my visit note below.                                                                    General Cardiology [facility]    Referring provider:Corina Aranda PA-C    HPI: Ms. Unique Baker is a 37 year old  female with PMH significant for  -Post COVID chronic joint pain/chronic muscle pain  -Post COVID chronic fatigue  -COVID infection March 2021, COVID infection 9/21/2022  -ADHD  -Esophageal reflux  -Iron deficiency anemia    Patient was seen in post-COVID clinic 10/4/2022 and reported palpitations, shortness of breath and chest tightness.  She is referred to cardiology for further evaluation.    Patient was in the ER on 10/21/2022 for palpitations, lightheadedness and general malaise after tejinder COVID-19 2 weeks ago.  Work-up in the ER was unremarkable with no signs of troponin change and no elevated D-dimer.  Given palpitations she was recommended Zio patch.  Patient is still wearing the Zio patch.  She was found to be anxious at the time of evaluation.    No known cardiac disease.  Patient tells me that she had palpitations associated with chest tightness and tingling in the whole body after she had her first COVID infection.  Overall she had 3 or 4 episodes but after the second COVID infection she is feeling palpitations 2 or 3 times a day.  It can happen at rest and with activity.  Now she tells me that even thinking about any kind of physical activity that she is supposed to do creates anxiety and palpitations. She was given SSRI venlafaxine for anxiety however she has not started that yet.    She had a stress echocardiogram a year ago which was normal.  EKG on 10/2022 shows sinus  tachycardia.      Patient has seen rheumatology and is on Plaquenil now. Not taking prednisone or ferrous sulfate.  Recent labs on 10/21 shows mild anemia at 11.5.  BMP is normal.  LDL is mildly elevated at 131.  Patient's LDL in March of this year was 199.  Currently not taking atorvastatin.  Medications, personal, family, and social history reviewed with patient and revised.    Interval history 2022:  Patient reports feeling exhausted from minimal activity.  She tells me that she feels tired even doing house chores.  When she goes out for walk which she does twice a day for 30 minutes, she feels chest and back pain.  This is new to her since she had COVID infection.  Reports frequent dizziness and presyncopal feelings which improve when she sits down.  She is drinking 100 ounces of water.  She has recently seen neurology who recommended tilt table test.  She is also telling me that she might have a flareup of her connective tissue disease.  She was recommended to start steroids however she is holding off on starting steroids until she completes the tilt table test.  She tells me that her palpitation symptoms has been improving and right now at rest her heart rate is around 65 bpm.  As you know she has iron deficiency anemia and she has been on iron over the last 1 month.    PAST MEDICAL HISTORY:  Past Medical History:   Diagnosis Date     Diabetes (H)     GDM insulin     GERD (gastroesophageal reflux disease)     w/u otherwise neg      History of colposcopy with cervical biopsy 3/23/07    WNL     Papanicolaou smear of cervix with low grade squamous intraepithelial lesion (LGSIL) 07     PONV (postoperative nausea and vomiting)      S/P  10/13/2017       CURRENT MEDICATIONS:  Current Outpatient Medications   Medication Sig Dispense Refill     acetaminophen (TYLENOL) 500 MG tablet take 1 - 2 tablet by oral route  every 6 hours as needed as needed       atorvastatin (LIPITOR) 10 MG tablet Take  1 tablet (10 mg) by mouth daily 90 tablet 1     cetirizine (ZYRTEC) 10 MG tablet Take 1 tablet (10 mg) by mouth daily 30 tablet 1     Cyanocobalamin (VITAMIN B12 TR PO) 1 patch (500 mcg) daily       cyclobenzaprine (FLEXERIL) 5 MG tablet Take 1 tablet (5 mg) by mouth nightly as needed for muscle spasms 30 tablet 0     dexlansoprazole (DEXILANT) 60 MG CPDR CR capsule Take 60 mg by mouth daily       diclofenac (VOLTAREN) 1 % topical gel Apply 2 g topically 4 times daily To joints as needed for pain 100 g 3     famotidine (PEPCID) 20 MG tablet TAKE ONE TABLET BY MOUTH TWICE A DAY AS NEEDED FOR REFLUX 180 tablet 3     FERROUS BISGLYCINATE CHELATE PO Take 36 mg by mouth every other day       fluocinonide emulsified base 0.05 % external cream apply to rash as needed - sparingly 60 g 1     fluticasone (FLONASE) 50 MCG/ACT nasal spray Spray 1 spray into both nostrils 2 times daily as needed for rhinitis or allergies Take 1 spray twice daily x 1 week then 1 spray daily at night x 1 week then as needed 11.1 mL 1     hydroxychloroquine (PLAQUENIL) 200 MG tablet Take 1 tablet (200 mg) by mouth 2 times daily Annual Plaquenil toxicity eye screening required. 180 tablet 3     hydrOXYzine (ATARAX) 25 MG tablet Take 1 tablet (25 mg) by mouth 3 times daily as needed for anxiety 90 tablet 1     LORazepam (ATIVAN) 0.5 MG tablet Take 1 tablet (0.5 mg) by mouth daily as needed for anxiety 10 pills/month 10 tablet 0     multivitamin (ONE-DAILY) tablet Take 1 tablet by mouth daily       sucralfate (CARAFATE) 1 GM tablet take 1 tablet by oral route 3 times every day on an empty stomach 1 hour before meals       vitamin D2 (ERGOCALCIFEROL) 86987 units (1250 mcg) capsule Take 50,000 Units by mouth once a week         PAST SURGICAL HISTORY:  Past Surgical History:   Procedure Laterality Date     BREAST SURGERY      augmentation       SECTION N/A 2015    Procedure:  SECTION;  Surgeon: Tremaine Gaona MD;  Location:  [facility] OR      SECTION, TUBAL LIGATION, COMBINED N/A 10/13/2017    Procedure: COMBINED  SECTION, TUBAL LIGATION;  Repeat  SECTION, TUBAL LIGATION ;  Surgeon: Sidra Salamanca DO;  Location:  OR     COLONOSCOPY N/A 2016    Procedure: COLONOSCOPY;  Surgeon: Lyudmila Pastor MD;  Location:  GI     DILATION AND CURETTAGE SUCTION      elective termination     ESOPHAGOSCOPY, GASTROSCOPY, DUODENOSCOPY (EGD), COMBINED  2014    Procedure: COMBINED ESOPHAGOSCOPY, GASTROSCOPY, DUODENOSCOPY (EGD);   ESOPHAGOSCOPY, GASTROSCOPY, DUODENOSCOPY (EGD) ;  Surgeon: Vishnu Lanier MD;  Location:  GI     ESOPHAGOSCOPY, GASTROSCOPY, DUODENOSCOPY (EGD), COMBINED Left 2020    Procedure: ESOPHAGOGASTRODUODENOSCOPY, WITH BIOPSIES USING BIOPSY FORCEPS;  Surgeon: Vishnu Hopkins MD;  Location:  GI     ESOPHAGOSCOPY, GASTROSCOPY, DUODENOSCOPY (EGD), COMBINED N/A 2021    Procedure: ESOPHAGOGASTRODUODENOSCOPY, WITH BIOPSY using cold forceps;  Surgeon: Vishnu Lanier MD;  Location:  GI     GYN SURGERY       c section      RW LASER WART  2011    Anal wart removal      ZZC NONSPECIFIC PROCEDURE  01    Primary LTCS       ALLERGIES:     Allergies   Allergen Reactions     No Known Drug Allergies      Other Environmental Allergy      Patch Test Results 3-10-20    Very Strong (3+) or Strong (2+) reactions:  none     Mild (1+) reactions:  Fragrance mix I  Linalool  Oleamidopropyl dimethylamine     Borderline/questionable reactions:  Balsam of [place]  Diphenylguanidine     Venlafaxine Other (See Comments)     legs were cold/tingly from knees down and restless, increased anxiety, insomnia       FAMILY HISTORY:  Family History   Problem Relation Age of Onset     Colon Polyps Brother      Other - See Comments Brother         colon polyps     Hyperlipidemia Sister      Stomach Cancer Sister      Cancer Sister         stomach     C.A.D. No family hx of      Diabetes No family hx of   "    Breast Cancer No family hx of      Cancer - colorectal No family hx of      Colon Cancer No family hx of          SOCIAL HISTORY:  Social History     Tobacco Use     Smoking status: Some Days     Years: 7.00     Types: Cigarettes     Last attempt to quit: 2015     Years since quittin.9     Smokeless tobacco: Never     Tobacco comments:     only if she drinks alcohol   Vaping Use     Vaping Use: Never used   Substance Use Topics     Alcohol use: Not Currently     Alcohol/week: 8.3 standard drinks     Drug use: No       ROS:   Constitutional: No fever, chills, or sweats. Weight stable.   Cardiovascular: As per HPI.       Exam:  /79 (BP Location: Left arm, Patient Position: Sitting, Cuff Size: Adult Regular)   Pulse 79   Ht 1.562 m (5' 1.5\")   Wt 60.6 kg (133 lb 9.6 oz)   LMP 10/16/2022   SpO2 100%   BMI 24.84 kg/m    GENERAL APPEARANCE: alert and no distress  HEENT: no icterus, no central cyanosis  LYMPH/NECK: no adenopathy, no asymmetry, JVP not elevated  CARDIOVASCULAR: regular rhythm, normal S1, S2, no S3 or S4 and no murmur, click or rub, precordium quiet with normal PMI.  EXTREMITIES: no edema  NEURO: alert, normal speech,and affect  SKIN: no ecchymoses, no rashes     I have reviewed the labs and personally reviewed the imaging below and made my comment in the assessment and plan.    Labs:  CBC RESULTS:   Lab Results   Component Value Date    WBC 6.2 10/21/2022    WBC 9.2 2021    RBC 4.73 10/21/2022    RBC 4.49 2021    HGB 11.5 (L) 10/21/2022    HGB 12.7 2021    HCT 38.9 10/21/2022    HCT 38.4 2021    MCV 82 10/21/2022    MCV 86 2021    MCH 24.3 (L) 10/21/2022    MCH 28.3 2021    MCHC 29.6 (L) 10/21/2022    MCHC 33.1 2021    RDW 16.1 (H) 10/21/2022    RDW 13.2 2021     10/21/2022     2021       BMP RESULTS:  Lab Results   Component Value Date     10/21/2022     2021    POTASSIUM 3.9 10/21/2022    POTASSIUM " 4.0 07/14/2022    POTASSIUM 4.0 05/01/2021    CHLORIDE 101 10/21/2022    CHLORIDE 109 07/14/2022    CHLORIDE 103 05/01/2021    CO2 25 10/21/2022    CO2 26 07/14/2022    CO2 25 05/01/2021    ANIONGAP 11 10/21/2022    ANIONGAP 5 07/14/2022    ANIONGAP 5 05/01/2021     (H) 10/21/2022    GLC 97 07/14/2022    GLC 87 05/01/2021    BUN 7.5 10/21/2022    BUN 9 07/14/2022    BUN 13 05/01/2021    CR 0.59 10/21/2022    CR 0.78 05/01/2021    GFRESTIMATED >90 10/21/2022    GFRESTIMATED >90 05/01/2021    GFRESTBLACK >90 05/01/2021    PREETI 9.9 10/21/2022    PREETI 8.6 05/01/2021      Exercise stress echocardiogram 7/23/2021    The patient exercises for 10 min and 30 seconds to maximum of 12 METS. Above  average functional capacity.     The EKG portion of this stress test was negative for inducible ischemia.  This was a normal stress echocardiogram with no evidence of stress-induced  ischemia.     No prior study available for comparison.      EKG 10/21/2022: Sinus tachycardia heart rate 110 bpm.        Assessment and Plan:     #Exertional chest/back pain  -Patient denies resting chest pain.  This is new to her since she had COVID infection.  Cardiovascular risk factors include hyperlipidemia and remote history of smoking.  She has possible connective tissue disease as well.  Recommend CT coronary angiogram.    #Frequent palpitations after second COVID infection   -When I saw her 2 months ago I have recommended starting metoprolol for sinus tachycardia however the patient could not tolerate metoprolol due to frequent nausea and vomiting.  I prescribed atenolol however she was not able to tolerate this medication as well.  Now she is telling me that her palpitation symptoms has improved compared to how it was 2 months ago.  Still continues to report feeling exhausted with minimal activity.  I have explained to her that exhausted feeling with physical activity is not just due to long COVID but iron deficiency anemia as well.  She  has been on iron over the last 1 month.  Recommend to continue iron treatment and follow-up with primary care physician.    #Orthostatic dizziness/presyncopal feeling  -Performed lying/sitting and standing test today in clinic which showed initial orthostatic hypotension. The patient completed standing 3-minute test and she was feeling fine at the end of the test with blood pressure 105/74 mmHg and heart rate 90 bpm.  There was no evidence of POTS.  -Patient is scheduled for tilt table on 12/26 as recommended by neurology.  Today she asked my opinion if she needs to start steroids prior to tilt test. I recommended patient to hold off on starting steroids and initiate steroids after tilt table is completed.  -Patient drinks at least 100 ounces of water per day.  She is also supplementing salt.  Prescribed salt tablet 1 g starting once a day (I am concerned that she might end up with nausea with salt tablet).  Recommended daily salt intake at least 5 g/day.    #Iron deficiency anemia  -Follows with hematology at outside hospital.  On iron replacement.    #Connective tissue disease?  -On Plaquenil.  She is planning to start steroid after tilt table test.    Return to clinic as needed.    Total time spent today for this visit is 70 minutes including precharting, face-to-face clinic visit, review of labs/imaging and medical documentation.    Please donot hesitate to contact me if you have any questions or concerns. Again, thank you for allowing me to participate in the care of your patient.    Teo HARTLEY MD  [facility] Division of Cardiology  Pager [phone]

## 2022-12-12 NOTE — NURSING NOTE
Patient denies any changes since echeck-in regarding medication and allergies and states all information entered during echeck-in remains accurate.  Marga Vyas VF

## 2022-12-12 NOTE — NURSING NOTE
Chief Complaint   Patient presents with     Follow Up     referral from neurology for POTS; also has medication questions     Vitals were taken and medications were reconciled.   Arnulfo Huizar, EMT  11:29 AM

## 2022-12-12 NOTE — PROGRESS NOTES
"Unique is a 37 year old who is being evaluated via a billable video visit.      How would you like to obtain your AVS? MyChart  If the video visit is dropped, the invitation should be resent by: Text to cell phone: [phone]  Will anyone else be joining your video visit? No  {If patient encounters technical issues they should call [phone] :[id number]}      Video-Visit Details    Video Start Time: {video visit start/end time for provider to select:790762}    Type of service:  Video Visit    Video End Time:{video visit start/end time for provider to select:476455}    Originating Location (pt. Location): {video visit patient location:482054::\"Home\"}    {PROVIDER LOCATION On-site should be selected for visits conducted from your clinic location or adjoining [facility] hospital, academic office, or other nearby [facility] building. Off-site should be selected for all other provider locations, including home:[id number]}    Distant Location (provider location):  {virtual location provider:763514}    Platform used for Video Visit: {Virtual Visit Platforms:683121::\"[employer]\"}  Answers for HPI/ROS submitted by the patient on 12/7/2022  If you checked off any problems, how difficult have these problems made it for you to do your work, take care of things at home, or get along with other people?: Extremely difficult  PHQ9 TOTAL SCORE: 16  RENÉE 7 TOTAL SCORE: 17      "

## 2022-12-12 NOTE — PATIENT INSTRUCTIONS
Begin salt tablets, 1 gram per day.  RX sent to pharmacy.   Complete a CT coronary angiogram.  As soon as results are compiled and reviewed you will be notified.     CT Angiogram     1. Nothing to eat or drink except water 3 hours prior.  2. Avoid caffeine, smoking, or strenuous activity on the day of the test.  3. You will receive contrast, so plan to drink extra water the day before and after your test.

## 2022-12-13 ENCOUNTER — VIRTUAL VISIT (OUTPATIENT)
Dept: PHARMACY | Facility: CLINIC | Age: 37
End: 2022-12-13
Payer: COMMERCIAL

## 2022-12-13 ENCOUNTER — MYC MEDICAL ADVICE (OUTPATIENT)
Dept: INTERNAL MEDICINE | Facility: CLINIC | Age: 37
End: 2022-12-13

## 2022-12-13 DIAGNOSIS — G90.A POTS (POSTURAL ORTHOSTATIC TACHYCARDIA SYNDROME): ICD-10-CM

## 2022-12-13 DIAGNOSIS — E61.1 IRON DEFICIENCY: Primary | ICD-10-CM

## 2022-12-13 DIAGNOSIS — E78.5 HYPERLIPIDEMIA, UNSPECIFIED HYPERLIPIDEMIA TYPE: ICD-10-CM

## 2022-12-13 DIAGNOSIS — K21.00 GASTROESOPHAGEAL REFLUX DISEASE WITH ESOPHAGITIS WITHOUT HEMORRHAGE: ICD-10-CM

## 2022-12-13 DIAGNOSIS — R00.2 PALPITATIONS: ICD-10-CM

## 2022-12-13 PROCEDURE — 99607 MTMS BY PHARM ADDL 15 MIN: CPT | Mod: 93 | Performed by: PHARMACIST

## 2022-12-13 PROCEDURE — 99606 MTMS BY PHARM EST 15 MIN: CPT | Mod: 93 | Performed by: PHARMACIST

## 2022-12-13 RX ORDER — DEXLANSOPRAZOLE 30 MG/1
30 CAPSULE, DELAYED RELEASE ORAL DAILY
COMMUNITY
End: 2023-02-23

## 2022-12-13 NOTE — TELEPHONE ENCOUNTER
Please review [employer] message and advise. Next available in-person appointment with Dr. Riggs is in March. Can we use Same day or Virtual Visit for sooner in-person appointment for follow-up?    Kathi Chavez RN  [facility]

## 2022-12-13 NOTE — PROGRESS NOTES
Medication Therapy Management (MTM) Encounter    ASSESSMENT:                            Medication Adherence/Access: No issues identified    Pharmacogenetic Results: reviewed what the test can and can not do for the patient.  Reviewed her individual results and impact on current medications.  She has reduced dexlansoprazole to 30 mg as recommended; if ringing continues may consider additional reduction.       Anemia:  Recommend taking iron with a little food (crackers) to help with tolerance    Hyperlipidemia:  Hold statin to see if heartburn symptoms improve off therapy.  If symptoms do resolve off atorvastatin consider a trial of pravastatin or rosuvastatin.    Palpitations/POTS: recommended starting sodium chloride as prescribed    GI Pain: heartburn symptoms may be related to gallbladder, scheduled for surgery in Feb.    PLAN:                            1.  Hold atorvastatin for 2 weeks to see if GI symptoms improve.  If symptoms do improve, then consider trial of pravastatin or rosuvastatin to see if better tolerated.    2.  Recommend taking iron with food (crackers) to help minimize GI upset    Follow-up: Return in about 2 weeks (around 12/27/2022) for Medication Therapy Management.    SUBJECTIVE/OBJECTIVE:                          Unique Baker is a 37 year old female called for a follow-up visit.  Today's visit is a follow-up MTM visit from 12/8/22.     Reason for visit: medication review    Allergies/ADRs: Reviewed in chart  Tobacco: She reports that she has been smoking cigarettes. She has never used smokeless tobacco.Nicotine/Tobacco Cessation Plan:   Information offered: Patient not interested at this time    Alcohol: not currently using      Medication Adherence/Access: no issues reported    Pharmacogenetic Results: Pharmacogenetic testing was ordered to assist in the treatment of anxiety (see below). See PDF report in laboratory tab for complete list of pharmacogenetic results.   Patient questions if  atorvastatin and Dexilant affected by results.      Anemia:  Taking iron supplement 36 mg (ferrous Thorn brand - with vitamin C) every other day.  She does feel more nausea on the days she takes the iron.       Completed upper endoscopy without seeing anything.  Pill test was recommended but she doesn't feel up for this.  Period started yesterday and is really heavy.    Iron labs ordered by [facility] provider.  Surgery planned for Jan or Feb and she wants her iron corrected before then.      Hemoglobin   Date Value Ref Range Status   10/21/2022 11.5 (L) 11.7 - 15.7 g/dL Final   05/01/2021 12.7 11.7 - 15.7 g/dL Final                     Hyperlipidemia: Current therapy includes atorvastatin 10 mg daily.  Patient reports the following side effects: upset stomach, loose stools, increased heart burn after starting statin.  Stopped statin in the past due to dry mouth; holding the medication did help.      Recent Labs   Lab Test 10/21/22  0818 06/27/22  0813   CHOL 202* 128   HDL 38* 41*   * 71   TRIG 167* 78           Palpitations/POTS: Stopped metoprolol XL and atenolol due to side effects.   Prescribed sodium chloride but has not started yet; using tablet salt.      Ivabradine was recommended but use is not recommended with use with hydroxychloroquine (QTc prolongation).    Followed by Cardiology.  Has been referred for Tilt Table Study.     BP Readings from Last 3 Encounters:   11/23/22 125/87   10/31/22 127/80   10/24/22 121/77       From Cardiology visit on 11/15/22:  #Frequent palpitations after second COVID infection 9/2022  -Patient reports multiple episodes of palpitations associated with anxiety, tingling, chest tightness, shortness of breath both at rest and with minimal activity like climbing stairs, walking half a mile or cleaning at home.  These are all new to her after COVID infection.  I reviewed her Zio patch which showed sinus rhythm corresponding to her symptoms.  Heart rate is actually  less than 100 a lot of times.  She tells me that she was not feeling well when she was wearing the heart monitor throwing up on most of the days.  Patient tells me that the heart monitor does not reflect her symptoms and is not accurate for her.  I have reassured patient that we are not seeing any life-threatening cardiac arrhythmia.  I think her palpitations with minimal activity and sometimes at rest is related to inappropriate sinus tachycardia probably secondary to COVID and iron deficiency anemia.  She could not tolerate metoprolol due to nausea. She is feeling less nauseous now with atenolol.  I recommended her to continue atenolol 12.5 mg twice daily for at least 2 more weeks then we will bump up the dose in the morning to 25 mg daily and keep the evening dose of 12.5 mg.  If she cannot tolerate atenolol I think I will switch her to ivabradine.  Patient is agreeable to proceed with this plan.  In the meantime I will order an echocardiogram.  She was concerned about heart inflammation.  She recently had multiple blood tests including D-dimer, troponin and BNP and CRP inflammation which were all normal.  I reassured patient that we are not dealing with any kind of heart inflammation.      From Neurology visit 11/23/22:   In summary, I told Unique that I have a strong feeling she has POTS.  She describes that her heart rate goes up from the sitting to the standing position, and he standing position causes her to feel shaky, dizzy and anxious. We could not confirm the diagnosis with orthostatic vitals in clinic today, but I think the suspicion is so high that she should have a tilt test and she should be evaluated by cardiac EP.  I will refer her to Dr. Green.       I told her that the most efficient strategy to treat this problem is plenty of water.  She should try 500 mL of water immediately before adopting the standing position, and she can do this 4-5 times a day; she should drink a total of 3 liters or  more.  She can also liberalize her salt intake to 3-4 grams a day. If this is not enough, she could consider medications that slow the heart rate.  She cannot tolerate beta blockers .  One should figure out if ivabradine can be used with her Plaquenil and, if not, whether Plaquenil can be temporarily stopped.  There are other medications for treatment of POTS, but I would defer to the specialists as I do not manage this condition through our clinic. Importantly, she should treat her anemia aggressively with iron replacement as this can also exacerbate the aforementioned symptoms.      She does not have any evidence of myopathy or neuropathy on exam, so otherwise, I have little to offer; EMG is not needed or indicated here. Her twitching in the legs is almost certainly benign fasciculations, and I reassured her about it.      I told her that POTS after COVID-19 infection is actually fairly common in young people, and I strongly feel that not all of her symptoms can be ascribed to anxiety, as orthostasis appears to be a consistent trigger of them. She agrees with this plan.  She will be seen in our clinic in followup as necessary.      GI Pain: Seen in ED on 10/27 for GI pain.   CTscan was negative.  Stomach hurts when she eats and burning on left side.  Gallbladder surgery postponed until Feb 7th.    Current medications include: Dexilant 30 mg  once daily, famotidine at night (plans to increase to twice daily) and sucralfate as needed.     Reduced dose of Dexilant to 30 mg about 1 month ago.  Starting having humming/ear ringing about 2 weeks ago.  Wondering if medication related.      37-year-old female presenting for evaluation of epigastric abdominal pain radiating into her esophagus. Patient states that she has had GERD for the last year for which she is following with GI. Patient came to the ER today stating that her pain was worse over the last few days. Patient endorses not eating anything besides white rice at  this time. Patient is associated nausea without vomiting. Patient denies any fevers at home. She is well-appearing in no distress blood work is reassuring with no leukocytosis, transaminitis or elevated lipase. Abdomen is soft nontender palpation with no rebound or guarding specifically in the right upper quadrant or epigastrium. Given reassuring labs, benign abdominal exam do not suspect cholecystitis. At this point patient states that she is concerned that her gallbladder may not be functioning properly. Have a lengthy discussion with the patient that we can obtain a ultrasound however there is very low suspicion for cholecystitis given lack of tenderness on examination. Discussed that she will likely need to follow with GI consultant for referral for HIDA scan. I do offer the ultrasound however she declined. She did receive GI cocktail with some improvement of symptoms. Given her nausea discharged home on Zofran. Patient is already taking a PPI, Pepcid, sucralfate, recommend that she can use as needed Maalox as well for her symptoms. We discussed return precautions ER.    Prior to patient leaving the department she complained of whole body numbness, dizziness, concerned that her gallbladder is infected. Again reiterated with her low suspicion for cholecystitis however given her level anxiety and ultrasound was ordered. Ultrasound was negative for cholecystitis, she did have evidence of gallstones which were known. EKG troponin were negative. At this point given reassuring work-up and benign abdominal exam patient be discharged home, do not feel that a CT abdomen pelvis necessary at this time. Recommend follow-up with her GI specialist           Today's Vitals: LMP 10/16/2022   ----------------      I spent 30 minutes with this patient today. All changes were made via collaborative practice agreement with Celina Riggs MD. A copy of the visit note was provided to the patient's provider(s).    A summary of  these recommendations was sent via [employer].    Janet Ching , Pharm D  [phone] (phone)  Medication Therapy Management Pharmacist     Telemedicine Visit Details  Type of service:  Telephone visit  Start Time: 230pm  End Time: 300pm  Originating Location (pt. Location): Home      Distant Location (provider location):  On-site  Provider has received verbal consent for a visit from the patient? Yes     Medication Therapy Recommendations  Hyperlipidemia, unspecified hyperlipidemia type    Current Medication: atorvastatin (LIPITOR) 10 MG tablet   Rationale: Undesirable effect - Adverse medication event - Safety   Recommendation: Change Medication   Status: Accepted per CPA         Iron deficiency    Current Medication: FERROUS BISGLYCINATE CHELATE PO   Rationale: Undesirable effect - Adverse medication event - Safety   Recommendation: Provide Education   Status: Patient Agreed - Adherence/Education

## 2022-12-14 NOTE — TELEPHONE ENCOUNTER
Patient states they have been having acid reflux and headaches after restarting atorvastatin. Patient to potentially stop taking it for two weeks to see in symptoms resolve.

## 2022-12-15 ENCOUNTER — OFFICE VISIT (OUTPATIENT)
Dept: RHEUMATOLOGY | Facility: CLINIC | Age: 37
End: 2022-12-15
Payer: COMMERCIAL

## 2022-12-15 VITALS
HEART RATE: 78 BPM | DIASTOLIC BLOOD PRESSURE: 67 MMHG | SYSTOLIC BLOOD PRESSURE: 101 MMHG | WEIGHT: 134.8 LBS | TEMPERATURE: 98.5 F | BODY MASS INDEX: 25.06 KG/M2 | OXYGEN SATURATION: 100 %

## 2022-12-15 DIAGNOSIS — R76.0 LUPUS ANTICOAGULANT POSITIVE: ICD-10-CM

## 2022-12-15 DIAGNOSIS — M35.9 UNDIFFERENTIATED CONNECTIVE TISSUE DISEASE (H): Primary | ICD-10-CM

## 2022-12-15 LAB
ALBUMIN SERPL-MCNC: 4 G/DL (ref 3.4–5)
ALBUMIN UR-MCNC: NEGATIVE MG/DL
ALT SERPL W P-5'-P-CCNC: 24 U/L (ref 0–50)
APPEARANCE UR: CLEAR
AST SERPL W P-5'-P-CCNC: 8 U/L (ref 0–45)
BACTERIA #/AREA URNS HPF: ABNORMAL /HPF
BILIRUB UR QL STRIP: NEGATIVE
COLOR UR AUTO: COLORLESS
CREAT SERPL-MCNC: 0.63 MG/DL (ref 0.52–1.04)
CRP SERPL-MCNC: <2.9 MG/L (ref 0–8)
ERYTHROCYTE [DISTWIDTH] IN BLOOD BY AUTOMATED COUNT: 16.2 % (ref 10–15)
ERYTHROCYTE [SEDIMENTATION RATE] IN BLOOD BY WESTERGREN METHOD: 23 MM/HR (ref 0–20)
GFR SERPL CREATININE-BSD FRML MDRD: >90 ML/MIN/1.73M2
GLUCOSE UR STRIP-MCNC: NEGATIVE MG/DL
HCT VFR BLD AUTO: 37.5 % (ref 35–47)
HGB BLD-MCNC: 12.2 G/DL (ref 11.7–15.7)
HGB UR QL STRIP: NEGATIVE
KETONES UR STRIP-MCNC: NEGATIVE MG/DL
LEUKOCYTE ESTERASE UR QL STRIP: NEGATIVE
Lab: NORMAL
MCH RBC QN AUTO: 26.8 PG (ref 26.5–33)
MCHC RBC AUTO-ENTMCNC: 32.5 G/DL (ref 31.5–36.5)
MCV RBC AUTO: 82 FL (ref 78–100)
NITRATE UR QL: NEGATIVE
PERFORMING LABORATORY: NORMAL
PH UR STRIP: 7 [PH] (ref 5–7)
PLATELET # BLD AUTO: 235 10E3/UL (ref 150–450)
RBC # BLD AUTO: 4.55 10E6/UL (ref 3.8–5.2)
RBC #/AREA URNS AUTO: ABNORMAL /HPF
SP GR UR STRIP: 1 (ref 1–1.03)
SPECIMEN STATUS: NORMAL
SQUAMOUS #/AREA URNS AUTO: ABNORMAL /LPF
TEST NAME: NORMAL
UROBILINOGEN UR STRIP-MCNC: NORMAL MG/DL
WBC # BLD AUTO: 5.5 10E3/UL (ref 4–11)
WBC #/AREA URNS AUTO: ABNORMAL /HPF

## 2022-12-15 PROCEDURE — 85027 COMPLETE CBC AUTOMATED: CPT | Performed by: NURSE PRACTITIONER

## 2022-12-15 PROCEDURE — 86140 C-REACTIVE PROTEIN: CPT | Performed by: NURSE PRACTITIONER

## 2022-12-15 PROCEDURE — 84460 ALANINE AMINO (ALT) (SGPT): CPT | Performed by: NURSE PRACTITIONER

## 2022-12-15 PROCEDURE — 99214 OFFICE O/P EST MOD 30 MIN: CPT | Performed by: NURSE PRACTITIONER

## 2022-12-15 PROCEDURE — 86225 DNA ANTIBODY NATIVE: CPT | Performed by: NURSE PRACTITIONER

## 2022-12-15 PROCEDURE — 82565 ASSAY OF CREATININE: CPT | Performed by: NURSE PRACTITIONER

## 2022-12-15 PROCEDURE — 81001 URINALYSIS AUTO W/SCOPE: CPT | Performed by: NURSE PRACTITIONER

## 2022-12-15 PROCEDURE — 85652 RBC SED RATE AUTOMATED: CPT | Performed by: NURSE PRACTITIONER

## 2022-12-15 PROCEDURE — 86146 BETA-2 GLYCOPROTEIN ANTIBODY: CPT | Performed by: NURSE PRACTITIONER

## 2022-12-15 PROCEDURE — 86160 COMPLEMENT ANTIGEN: CPT | Performed by: NURSE PRACTITIONER

## 2022-12-15 PROCEDURE — 36415 COLL VENOUS BLD VENIPUNCTURE: CPT | Performed by: NURSE PRACTITIONER

## 2022-12-15 PROCEDURE — 82040 ASSAY OF SERUM ALBUMIN: CPT | Performed by: NURSE PRACTITIONER

## 2022-12-15 PROCEDURE — 84450 TRANSFERASE (AST) (SGOT): CPT | Performed by: NURSE PRACTITIONER

## 2022-12-15 PROCEDURE — 86256 FLUORESCENT ANTIBODY TITER: CPT | Performed by: NURSE PRACTITIONER

## 2022-12-15 RX ORDER — ONDANSETRON 4 MG/1
TABLET, ORALLY DISINTEGRATING ORAL
COMMUNITY
Start: 2022-10-27 | End: 2023-03-13

## 2022-12-15 RX ORDER — FERRIC CARBOXYMALTOSE INJECTION 50 MG/ML
INJECTION, SOLUTION INTRAVENOUS
COMMUNITY
Start: 2022-11-16 | End: 2023-02-23

## 2022-12-15 RX ORDER — PREDNISONE 5 MG/1
TABLET ORAL
COMMUNITY
End: 2023-06-07 | Stop reason: DRUGHIGH

## 2022-12-15 NOTE — PROGRESS NOTES
"    Rheumatology Clinic Visit  Erum Allen, AGUSTÍN      Unique Baker  YOB: 1985    Age: 37 year old   MRN# [id number]       Date of Visit: 12/15/2022  Primary care provider: Celina Riggs              Subjective:     Unique is a 37 year old female presents today for Follow-up to work up for LOW +FELICITY 1:80 with arthralgias that developed post covid vaccine and had covid the month prior, suspicious for UCTD vs post viral syndrome. Current treatment: Plaquenil 200 mg BID (8/2022).  Stopped prednisone.    Today:  Hasn't had a mouth sore since starting plaquenil.   Joints: Joint pain not better. Off and on the same. Notices knee pain after walks awhile. Toes feels with weather changes. Toes and fingers get really cold but don't change colors.   Gi: has an hour of burning of stomach and feeling of anxiety and jittery for an hour of taking plaquenil. Takes iron in the am.   Skin: Continues with rashes      HPI 7/2022: Pt reports worsening of her arthralgias and was instructed by  clinic and dermatology  to return to rheumatology.  Prior to covid/or vaccine was healthy and had none of these issues, now would define health as poor. Feels worse during and 1 week after menses.   Has been on 3 medrol dose packs, with significant response, every thing improved.   Feels like she has inflammation going through her body.   +Her reflux is \"boiling\", it's worsening,  can only eat white rice. Reflux does not worsen when she lays down. Woke the other night and feels like she had epigastric spasms.  She wasn't sure if it was reflex, this did make her anxious. Eating makes worse. Has failed 4 medications, now considering surgical options.  +Joint pain: Started post covid, most on legs points to spots on legs., more on L then R.  These spots are not visualized, no change in color, temp sensation, it is non TTP and helps to rub area.  Ankles swell, has an indent when wears socks. Rings are " "no longer fitting.   +Has gained 10 lbs.   +Is losing hair, diffusely, people she lives with notice hair all over now. Last child was 4 yrs ago.   +Has mouth sores. Most recent 3 weeks, L buccal mucous sore, lasted 3 days and was very painful.  Has had sores of palate. Denies vaginal sores.   +Diarrhea, worked up, dx as IBS.  +Rashes in past all over, none since covid vaccine.   +Feels SOB a lot, unsure if anxiety related.  Maybe some pain with breathing.   + Had L side HA's in beging of this prodrome but have improved.   +Dry mouth, really bad, wakes her up at night.  Dentist told her that she can see dry patches. This started 3 months ago. Eyes are fine. (not taking adderal currently,so not a contributing factor)   +Feels flu like.   + Difference in brain function, forgets things, struggling with short term memory. Speech is ok. No seizures.  ROS: Denies sun sensitivity, did have blotches on face one time after being in sun that lasted 3 days. Possible tubal pregnancy vs miscarriage, no blood clots. Np fevers. Denies psych hx. Has ADD, tx with adderal in past. Denies tri-color digit changes. Denies vaginal sores.    HPI at initial consult 10/2021: \"Unique Baker is a 36 year old female with pmhx GERD, seborrheic keratosis, is referred to rheumatology clinic for polyarthralgia. She reports tejinder COVID-19 beginning of March 2021. She received COVID-19 vaccine in April. 3 days after getting the vaccine, she developed a constellation of symptoms including chest tightness and abnormal sensation over her L arm. She went to the ED for chest tightness and had cardiac evaluation. She was started on reflux medication which she still takes. Has seen GI for acid reflux and loose stools. Had an upper GI in May which showed non-specific chronic inflammation in the stomach. She went to  for evaluation of L arm abnormal sensation. She was given a course of steroids (medrol dosepak) which helped her symptoms somewhat. " "Shortly thereafter, she started developing sensation of \"burning\" in her head and chest. She then started developing joint pain (around June or July) over her knees, ankles, and wrists. She presented to  again for ongoing symptoms and received steroid burst which helped her symptoms (around September) but they have not subsided. She has since been getting \"flares\" of joint pain.      She describes her flares as abrupt onset mild pain over her knees and ankles when she is resting/watching TV. She takes advil for pain which helps resolve the pain. Pain can last for ~15-20 minutes. Pain episodes come on 4 times a week. However, she feels \"tightness\" over her knees and ankles mid-day everyday. She tries and stretch from time to time. Walking helps the stiffness but not the pain. Overall, the intensity of pain has improved since it started in July. Pain does not wake her up at night anymore but it used to back in July. She denies any AM pain or stiffness over her joints. She denies any joint swelling, erythema or warmth.    She denies hx of psoriasis. However, she reports she started having unexplained rash ~4 years ago. She has seen dermatology in the past and has been diagnosed with seborrheic keratosis. She denies photosensitivity. Rash can be over her back, legs, stomach, neck. Denies any facial rash. She has been given topical steroids by dermatology which helps but rash recurs. She describes the rash as \"bumpy\" and pruritic. She denies scalp flakiness.    Denies fatigue, fevers, chills, weight loss, night sweats, vision changes, eye pain/redness, inflammatory eye disease, dry eyes, dry mouth, +reports episode of painful oral ulcer over the roof of the mouth which has resolved, denies nasal ulcers, +chronic hair loss/thinning with hx of alopecia, denies raynaud's, cough, SOB, pleurisy, difficulty swallowing, abdominal pain, diarrhea, hematochezia, melena, dysuria, back pain, enthesitis, dactylitis, " +numbness/tingling over hands and feet. No hx of IBD. No hx of blood clots. One ectopic pregnancy.   Pertinent labs and imaging: (per chart review in [facility] and care everywhere)   Labs:   10/22/21: negative CBC w diff, CMP, +FELICITY 1:80 homogeneous, no hypocomplementemia, negative dsDNA, SSA, SSB, Sm, RNP, Hep C, RF, CCP, Scl-70  8/16/21: +ESR 32, negative CRP  5/1/21: negative BMP, CRP, CBC w diff, negative LFTs      FMH:  Unsure  Sister passed from stomach CA.   Dad gout    Social History      Active Problem list:  Patient Active Problem List    Diagnosis Date Noted     Breast pain 02/21/2022     Priority: Medium     Menorrhagia with regular cycle 02/21/2022     Priority: Medium     Dysmenorrhea 02/21/2022     Priority: Medium     Controlled substance agreement signed- checked 8/12/2020 - no concerns 08/09/2019     Priority: Medium     Patient is followed by KELTON JACOB for ongoing prescription of stimulants.  All refills should be approved by this provider, or covering partner.    Medication(s): adderall.   Maximum quantity per month: 30  Clinic visit frequency required: Q 6  months     Controlled substance agreement on file: Yes       Date(s): 8/9/2019  Re sign 2/3/2021  Neuropsych evaluation for ADD completed:  Yes, completed Rosalba , on file and diagnosis confirmed  Done 5/14/2019    Last [facility] website verification:  none  [email or web address]           Irritable bowel syndrome 07/15/2015     Priority: Medium     CARDIOVASCULAR SCREENING; LDL GOAL LESS THAN 160 10/31/2010     Priority: Medium     Esophageal reflux 06/21/2006     Priority: Medium            Objective:     Physical Exam  Blood Pressure 101/67 (BP Location: Left arm, Patient Position: Sitting, Cuff Size: Adult Regular)   Pulse 78   Temperature 98.5  F (36.9  C) (Oral)   Weight 61.1 kg (134 lb 12.8 oz)   Last Menstrual Period 10/16/2022   Oxygen Saturation 100%   Breastfeeding No   Body Mass Index 25.06 kg/m    Body  "mass index is 25.06 kg/m .    Constitutional: Normal body habitus with out gross deformities. Appears less tired.  Psych:improved, able to stay on subject and has good recall today, this is an improvment.  Rash: papular back and abd    Labs:    Lab Results   Component Value Date    ALT 12 10/21/2022    AST 17 10/21/2022    CR 0.59 10/21/2022      Latest Reference Range & Units 07/14/22 14:34 07/14/22 14:38   Lyme Disease Antibodies Serum <0.90  0.21    EBV Capsid Blake IgM Instrument Value <36.0 U/mL 12.8    EBV Capsid Antibody IgM No detectable antibody.  No detectable antibody.    ANTI NUCLEAR BLAKE IGG BY IFA WITH REFLEX  Rpt    Beta 2 Glycoprotein 1 Antibody IgA <7.0 U/mL 9.3 (H)    Beta 2 Glycoprotein 1 Antibody IgG <7.0 U/mL 0.9    Beta 2 Glycoprotein 1 Antibody IgM <7.0 U/mL 29.0 (H)    Cardiolipin IgG Blake Negative   Negative   Cardiolipin IgM Blake Negative   Negative   Complement C3 81 - 157 mg/dL 157    Complement C4 13 - 39 mg/dL 56 (H)    DNA-ds <10.0 IU/mL 2.9    Scleroderma Antibody Scl-70 RADHA IgG Negative  Negative    Thyroid Peroxidase Antibody <35 IU/mL <10    (H): Data is abnormally high  Rpt: View report in Results Review for more information    Imaging: reviewed            Assessment and Plan:     #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 Pt with profound change in health status post covid infection now with repeat covid infection. Reports prior to covid vaccine and original infection was completely healthy. Now with symptoms of dry mouth that dentist has seen \"patches\" of dry area. Overall Pt's symptoms dramatically improved when on prednisone   Symptoms potentially related to a CTD include; dry mouth, hair loss, mouth sores, arthralgias, reflux, feeling of SOB/chest pain, change in cognitive function, past rash in sun. DOCUMENTED mouth sores on exam previously.  Today pt appears to have improvement in cognition, has not had a mouth sore since " starting plaquenil and is describing the intensity of joint pain as less than she previously has. However reports jittery and gi pain for 1 hour with plaquenil. We will have her hold it for 3 days to test this.  Given response to prednisone treating as potential UCTD, now with resolution of mouth sores and appears improvement in cognition on plaquenil, no signs of visceral involvement currently.  Pt reports was told she had a genetic deficiency by a pharm d, will reach out to clarify this.     Pt instructions:    1) Hold plaquenil for 3 days and see if the Side effects go away, if they do not then it's not from the plaquenil.   Resume the plaquenil, first try 1 pill a day and then try 2 pills a day and see what you can tolerate.     2) Do neuropsych testing    3) Ideally you would not restart your prednisone until after neuropysch testing is complete, and then resume at 5 mg daily.  If neurospych testing is going to be more than 2-3 months to get in then start your prednisone after the tilt table test.     4) See me back in April, also schedule an appt for July     5) Labs today      Pt education provided on plaquenil SE, risks and benefits, Pt states understanding and agreement to plan of care, has no further questions.   Pt states understanding and agreement to plan of care, has no further questions.     31 minute spent on the date of the encounter doing chart review, history and exam, documentation and further activities per the note      Erum Allen CNP  [facility]     Addendum 12/19/22   Repeat beta 2 glycoprotein positive, will refer to heme onc to determine of anticoagulant needed.   Erum Allen NP

## 2022-12-15 NOTE — NURSING NOTE
"Unique Baker's goals for this visit include:   Chief Complaint   Patient presents with     RECHECK     2-3 month follow-up       PCP: Celina Riggs    Referring Provider:  No referring provider defined for this encounter.      Initial /67 (BP Location: Left arm, Patient Position: Sitting, Cuff Size: Adult Regular)   Pulse 78   Temp 98.5  F (36.9  C) (Oral)   Wt 61.1 kg (134 lb 12.8 oz)   LMP 10/16/2022   SpO2 100%   Breastfeeding No   BMI 25.06 kg/m   Estimated body mass index is 25.06 kg/m  as calculated from the following:    Height as of 12/12/22: 1.562 m (5' 1.5\").    Weight as of this encounter: 61.1 kg (134 lb 12.8 oz).    Medication Reconciliation: complete    Do you need any medication refills at today's visit? none    JOSE ANTONIO Goodman  Rheumatology/Infectious disease  [facility]   [phone]    "

## 2022-12-15 NOTE — PATIENT INSTRUCTIONS
1) Hold plaquenil for 3 days and see if the Side effects go away, if they do not then it's not from the plaquenil.   Resume the plaquenil, first try 1 pill a day and then try 2 pills a day and see what you can tolerate.     2) Do neuropsych testing    3) Ideally you would not restart your prednisone until after neuropysch testing is complete, and then resume at 5 mg daily.  If neurospych testing is going to be more than 2-3 months to get in then start your prednisone after the tilt table test.     4) See me back in April, also schedule an appt for July     5) Labs today

## 2022-12-15 NOTE — PATIENT INSTRUCTIONS
"Recommendations from today's MTM visit:                                                         1.  Hold atorvastatin for 2 weeks to see if GI symptoms improve.  If symptoms do improve, then consider trial of pravastatin or rosuvastatin to see if better tolerated.    2.  Recommend taking iron with food (crackers) to help minimize GI upset    Follow-up: Return in about 2 weeks (around 12/27/2022) for Medication Therapy Management.    It was great speaking with you today.  I value your experience and would be very thankful for your time in providing feedback in our clinic survey. In the next few days, you may receive an email or text message from [employer] with a link to a survey related to your  clinical pharmacist.\"     To schedule another MTM appointment, please call the clinic directly or you may call the MTM scheduling line at [phone] or toll-free at [phone].     My Clinical Pharmacist's contact information:                                                      Please feel free to contact me with any questions or concerns you have.      Janet Ching , Pharm D  [phone] (phone)  Medication Therapy Management Pharmacist     "

## 2022-12-16 LAB
B2 GLYCOPROT1 IGA SER-ACNC: 9 U/ML
B2 GLYCOPROT1 IGG SERPL IA-ACNC: 1.3 U/ML
B2 GLYCOPROT1 IGM SERPL IA-ACNC: 27 U/ML
C3 SERPL-MCNC: 127 MG/DL (ref 81–157)
C4 SERPL-MCNC: 51 MG/DL (ref 13–39)
DSDNA AB SER-ACNC: 1.9 IU/ML

## 2022-12-18 LAB — MISCELLANEOUS TEST 1 (ARUP): NORMAL

## 2022-12-20 ENCOUNTER — TELEPHONE (OUTPATIENT)
Dept: RHEUMATOLOGY | Facility: CLINIC | Age: 37
End: 2022-12-20

## 2022-12-20 NOTE — TELEPHONE ENCOUNTER
[facility] Call Center    Phone Message    May a detailed message be left on voicemail: yes     Reason for Call: Other: Pt is calling to speak to someone today about the hematology referral that Erum placed. Pt would like to know why she should see hematology. Please call pt back at [phone].     Action Taken: Message routed to:  Adult Clinics: Rheumatology p [id number]    Travel Screening: Not Applicable

## 2022-12-20 NOTE — TELEPHONE ENCOUNTER
Routing to Erum Allen NP to please advise on reason patient was referred to [facility].       Natividad Matthew RN

## 2022-12-21 ENCOUNTER — TELEPHONE (OUTPATIENT)
Dept: CARDIOLOGY | Facility: CLINIC | Age: 37
End: 2022-12-21

## 2022-12-21 NOTE — TELEPHONE ENCOUNTER
Patient is calling asking if any medications are given during the tilt table study. She's concerned and may decide to cancel. She'd like to talk to an EP RNCC.     Saskia Alonzo  [facility]   [phone]

## 2022-12-21 NOTE — TELEPHONE ENCOUNTER
Called patient and gave message per Erum Allen NP.  Patient verbalized understanding and agreement to plan.   Patient is hoping to see the Hematologist sooner as she wants to begin treatment if needed prior to her procedure cholecystectomy on 2/7/22  placed patient on the wait list.    Natividad Matthew RN        Referred to hematology because her repeat beta 2 glycoprotein was positive. This might put her at risk of developing blood clots. Needs to see hematology to determine if needs treatment for this.   Erum Allen NP

## 2022-12-21 NOTE — TELEPHONE ENCOUNTER
Called and spoke to patient. Reviewed that IV fluids will be given and sometimes SL nitroglycerin. Reviewed with Angeles Ochoa NP: nitroglycerin is sometimes given to temporarily induce hypotension if they are not inducing symptoms, and believe hypotension may play a role. Relayed this to patient and answered other questions about the tilt. Patient verbalized understanding and is in agreement to the plan.

## 2022-12-26 ENCOUNTER — APPOINTMENT (OUTPATIENT)
Dept: MEDSURG UNIT | Facility: CLINIC | Age: 37
End: 2022-12-26
Attending: INTERNAL MEDICINE
Payer: COMMERCIAL

## 2022-12-26 ENCOUNTER — HOSPITAL ENCOUNTER (OUTPATIENT)
Facility: CLINIC | Age: 37
Discharge: HOME | End: 2022-12-26
Attending: INTERNAL MEDICINE | Admitting: INTERNAL MEDICINE
Payer: COMMERCIAL

## 2022-12-26 VITALS
RESPIRATION RATE: 16 BRPM | HEART RATE: 78 BPM | DIASTOLIC BLOOD PRESSURE: 86 MMHG | OXYGEN SATURATION: 100 % | BODY MASS INDEX: 24.46 KG/M2 | HEIGHT: 62 IN | SYSTOLIC BLOOD PRESSURE: 128 MMHG | WEIGHT: 132.9 LBS | TEMPERATURE: 98.3 F

## 2022-12-26 DIAGNOSIS — R00.0 SINUS TACHYCARDIA: ICD-10-CM

## 2022-12-26 DIAGNOSIS — R00.2 PALPITATIONS: ICD-10-CM

## 2022-12-26 DIAGNOSIS — U09.9 LONG COVID: ICD-10-CM

## 2022-12-26 PROCEDURE — 999N000142 HC STATISTIC PROCEDURE PREP ONLY

## 2022-12-26 PROCEDURE — 95921 AUTONOMIC NRV PARASYM INERVJ: CPT | Mod: 26 | Performed by: INTERNAL MEDICINE

## 2022-12-26 PROCEDURE — 93660 TILT TABLE EVALUATION: CPT | Performed by: INTERNAL MEDICINE

## 2022-12-26 PROCEDURE — 258N000003 HC RX IP 258 OP 636: Performed by: INTERNAL MEDICINE

## 2022-12-26 PROCEDURE — 258N000003 HC RX IP 258 OP 636: Performed by: PSYCHIATRY & NEUROLOGY

## 2022-12-26 PROCEDURE — 93660 TILT TABLE EVALUATION: CPT | Mod: 26 | Performed by: INTERNAL MEDICINE

## 2022-12-26 PROCEDURE — 95921 AUTONOMIC NRV PARASYM INERVJ: CPT | Performed by: INTERNAL MEDICINE

## 2022-12-26 PROCEDURE — 272N000001 HC OR GENERAL SUPPLY STERILE: Performed by: INTERNAL MEDICINE

## 2022-12-26 PROCEDURE — 999N000132 HC STATISTIC PP CARE STAGE 1

## 2022-12-26 RX ORDER — LIDOCAINE 40 MG/G
CREAM TOPICAL
Status: DISCONTINUED | OUTPATIENT
Start: 2022-12-26 | End: 2022-12-26 | Stop reason: HOSPADM

## 2022-12-26 RX ORDER — SODIUM CHLORIDE 9 MG/ML
INJECTION, SOLUTION INTRAVENOUS CONTINUOUS
Status: DISCONTINUED | OUTPATIENT
Start: 2022-12-26 | End: 2022-12-26 | Stop reason: HOSPADM

## 2022-12-26 RX ADMIN — SODIUM CHLORIDE 600 ML: 9 INJECTION, SOLUTION INTRAVENOUS at 07:58

## 2022-12-26 ASSESSMENT — ACTIVITIES OF DAILY LIVING (ADL)
ADLS_ACUITY_SCORE: 37

## 2022-12-26 NOTE — PROGRESS NOTES
Pt returns to 2a s/p tilt table. Pt tolerating PO. Awaiting Dr Green to come and speak with pt.    How Severe Is It?: mild Is This A New Presentation, Or A Follow-Up?: Follow Up Accutane

## 2022-12-26 NOTE — H&P
"H&P    HPI  Patient is a 37-year-old woman referred by neurology and seen by  for palpitation shortness of breath fatigue and chest discomfort.  Prior stress test has been negative and previous evaluations for coronary disease and pulmonary emboli have been similarly nondiagnostic.  Patient does have symptoms temporally associated with COVID illness in 2022 and possibly additional infections as well..  She subsequently been seen in the ED with palpitations and lightheadedness.    Evaluation in cardiology clinic did not reveal any suggestion of POTS.--like syndrome.    Apparently patient does have underlying connective tissue disorder which may account in part for fatigue and exertional tolerance.  She does have an element of iron deficiency anemia and is on iron supplements    Past medical history  GERD    Diabetes mellitus.  Gastroscopy and duodenoscopy    Medications  See medication list    Fam Hx of DM, but otherwise not pertinent    Rof S  Reviewed   Positive for pa;piytations, dizziness, exertional intolerance , GI symptoms  No HA, or syncope noted  COVID history    Exam:  /79 (BP Location: Left arm, Patient Position: Sitting, Cuff Size: Adult Regular)   Pulse 79   Ht 1.562 m (5' 1.5\")   Wt 60.6 kg (133 lb 9.6 oz)   LMP 10/16/2022   SpO2 100%   BMI 24.84 kg/m    GENERAL APPEARANCE: alert and no distress  HEENT: no icterus, no central cyanosis  LYMPH/NECK: no adenopathy, no asymmetry, JVP not elevated  CARDIOVASCULAR: regular rhythm, normal S1, S2, no S3 or S4 and no murmur, click or rub, precordium quiet with normal PMI.  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 The note above summarizes my findings and current recommendations.  Please do " not hesitate to contact my office if you have any questions or concerns.      Stan Green MD  Cardiac Arrhythmia Service  [facility]  [phone]

## 2022-12-26 NOTE — PROGRESS NOTES
Dr Green spoke with pt. Discharge instruction reviewed with pt, pt verbalizes understanding. Pt has tolerated PO, ambulates with steady gait. PIV has been discontinued.     1120 Ambulated with pt to lobby. Pt reports her spouse will be picking her up.

## 2022-12-26 NOTE — DISCHARGE INSTRUCTIONS
[facility]  DISCHARGE INSTRUCTIONS FOR   TILT TABLE    Date: 12/26/22    Plan:    - Continue hydration with goal to take in 60 oz of water/electrolyte fluids per day. Try to avoid high carbohydrate electrolyte drinks.  - Liberalize salt intake.  - Change positions carefully and slowly and maintain safe environment.  - Take your blood pressure 3-4 times a week, at around the same time. Keep a record to show Dr Green at follow up.     Cardiovascular Clinic:  [address] [place] [address], [id number]    Your Care Team:         Cardiology      Telephone Number         Dr. Stan Burnham                [phone]         Dannielle Ardon, RN    Nataliia Hoover, RN              [phone]         For Scheduling Appointments or              Procedures:      Saskia Fleming               [phone]       As always, Thank you for trusting us with your health care needs!

## 2023-01-04 ENCOUNTER — APPOINTMENT (OUTPATIENT)
Dept: URBAN - METROPOLITAN AREA CLINIC 253 | Age: 38
Setting detail: DERMATOLOGY
End: 2023-01-05

## 2023-01-04 VITALS — HEIGHT: 62 IN | WEIGHT: 129 LBS | RESPIRATION RATE: 14 BRPM

## 2023-01-04 DIAGNOSIS — D49.2 NEOPLASM OF UNSPECIFIED BEHAVIOR OF BONE, SOFT TISSUE, AND SKIN: ICD-10-CM

## 2023-01-04 DIAGNOSIS — L20.89 OTHER ATOPIC DERMATITIS: ICD-10-CM

## 2023-01-04 DIAGNOSIS — L60.8 OTHER NAIL DISORDERS: ICD-10-CM

## 2023-01-04 PROBLEM — L30.9 DERMATITIS, UNSPECIFIED: Status: ACTIVE | Noted: 2023-01-04

## 2023-01-04 PROCEDURE — OTHER MIPS QUALITY: OTHER

## 2023-01-04 PROCEDURE — OTHER COUNSELING: OTHER

## 2023-01-04 PROCEDURE — OTHER PHOTO-DOCUMENTATION: OTHER

## 2023-01-04 PROCEDURE — 11102 TANGNTL BX SKIN SINGLE LES: CPT

## 2023-01-04 PROCEDURE — OTHER BIOPSY BY SHAVE METHOD: OTHER

## 2023-01-04 PROCEDURE — OTHER ADDITIONAL NOTES: OTHER

## 2023-01-04 PROCEDURE — 99213 OFFICE O/P EST LOW 20 MIN: CPT | Mod: 25

## 2023-01-04 PROCEDURE — OTHER PRESCRIPTION: OTHER

## 2023-01-04 RX ORDER — CLOBETASOL PROPIONATE 0.5 MG/G
0.05% OINTMENT TOPICAL BID
Qty: 60 | Refills: 2 | Status: ERX | COMMUNITY
Start: 2023-01-04

## 2023-01-04 RX ORDER — TACROLIMUS 1 MG/G
0.1% OINTMENT TOPICAL BID
Qty: 60 | Refills: 0 | Status: ERX | COMMUNITY
Start: 2023-01-04

## 2023-01-04 ASSESSMENT — LOCATION ZONE DERM: LOCATION ZONE: TRUNK

## 2023-01-04 ASSESSMENT — LOCATION DETAILED DESCRIPTION DERM
LOCATION DETAILED: RIGHT SUPERIOR MEDIAL LOWER BACK
LOCATION DETAILED: RIGHT SUPERIOR MEDIAL UPPER BACK
LOCATION DETAILED: MIDDLE STERNUM

## 2023-01-04 ASSESSMENT — LOCATION SIMPLE DESCRIPTION DERM
LOCATION SIMPLE: RIGHT UPPER BACK
LOCATION SIMPLE: RIGHT LOWER BACK
LOCATION SIMPLE: CHEST

## 2023-01-04 NOTE — PROCEDURE: ADDITIONAL NOTES
Render Risk Assessment In Note?: no
Additional Notes: Pigment has lightened compared to previous observation photos.
Detail Level: Zone

## 2023-01-04 NOTE — PROCEDURE: BIOPSY BY SHAVE METHOD
Post-Care Instructions: I reviewed with the patient in detail post-care instructions. Patient is to keep the biopsy site dry overnight, and then apply bacitracin twice daily until healed. Patient may apply hydrogen peroxide soaks to remove any crusting.
Hide Topical Anesthesia?: No
Notification Instructions: Patient will be notified of biopsy results. However, patient instructed to call the office if not contacted within 2 weeks.
X Size Of Lesion In Cm: 0
Biopsy Type: H and E
Render Post-Care Instructions In Note?: yes
Billing Type: Third-Party Bill
Type Of Destruction Used: Curettage
Dressing: bandage
Cryotherapy Text: The wound bed was treated with cryotherapy after the biopsy was performed.
Consent: Written consent was obtained and risks were reviewed including but not limited to scarring, infection, bleeding, scabbing, incomplete removal, nerve damage and allergy to anesthesia.
Curettage Text: The wound bed was treated with curettage after the biopsy was performed.
Information: Selecting Yes will display possible errors in your note based on the variables you have selected. This validation is only offered as a suggestion for you. PLEASE NOTE THAT THE VALIDATION TEXT WILL BE REMOVED WHEN YOU FINALIZE YOUR NOTE. IF YOU WANT TO FAX A PRELIMINARY NOTE YOU WILL NEED TO TOGGLE THIS TO 'NO' IF YOU DO NOT WANT IT IN YOUR FAXED NOTE.
Biopsy Method: Dermablade
Electrodesiccation And Curettage Text: The wound bed was treated with electrodesiccation and curettage after the biopsy was performed.
Wound Care: Petrolatum
Silver Nitrate Text: The wound bed was treated with silver nitrate after the biopsy was performed.
Electrodesiccation Text: The wound bed was treated with electrodesiccation after the biopsy was performed.
Anesthesia Volume In Cc (Will Not Render If 0): 0.1
Depth Of Biopsy: dermis
Hemostasis: Drysol
Anesthesia Type: 2% lidocaine with epinephrine and a 1:10 solution of 8.4% sodium bicarbonate
Detail Level: Detailed

## 2023-01-06 ENCOUNTER — TRANSFERRED RECORDS (OUTPATIENT)
Dept: HEALTH INFORMATION MANAGEMENT | Facility: CLINIC | Age: 38
End: 2023-01-06
Payer: COMMERCIAL

## 2023-01-09 ENCOUNTER — TRANSFERRED RECORDS (OUTPATIENT)
Dept: HEALTH INFORMATION MANAGEMENT | Facility: CLINIC | Age: 38
End: 2023-01-09
Payer: COMMERCIAL

## 2023-01-09 ENCOUNTER — TELEPHONE (OUTPATIENT)
Dept: INTERNAL MEDICINE | Facility: CLINIC | Age: 38
End: 2023-01-09

## 2023-01-09 DIAGNOSIS — R10.13 DYSPEPSIA: Primary | ICD-10-CM

## 2023-01-09 LAB
ALT SERPL-CCNC: 12 IU/L (ref 0–32)
AST SERPL-CCNC: 14 IU/L (ref 0–40)
CREATININE (EXTERNAL): 0.74 MG/DL (ref 0.57–1)
GFR SERPL CREATININE-BSD FRML MDRD: 0.74 ML/MIN/1.73
GLUCOSE (EXTERNAL): 139 MG/DL (ref 70–99)
POTASSIUM (EXTERNAL): 4.2 MMOL/L (ref 3.5–5.2)

## 2023-01-09 NOTE — TELEPHONE ENCOUNTER
how long ago did she stop PPI and when did her symptoms start? She maybe needs to go back on PPI. She had an upper endoscopy a year ago that was normal so ulcer unlikely. If not getting better we can check an ultrasound to look at the gallbladder. I do agree we should check a CBC and CMP which have been ordered.

## 2023-01-09 NOTE — TELEPHONE ENCOUNTER
Stopped PPI 3 weeks (12/19) based on genetic testing done by a [place] pharmacist that indicated you do not process PPIs.      Symptoms started last week Wednesday (1/4) after 2 weeks being off PPI.      Started prednisone on 1/5 at 5 mg dosing per rheumatologist with no end date yet.     Took dose of dexliant today. Burning went away bust still having nausea but having some dizziness now which feels is from the dexilant since she was not dizzy before.     Took famotidine last night but this has not worked.    Has not tried the zofran or carafate that she has on hand.    Pt wants to know if she should have ultrasound now while she is symptomatic and because she is on Plaquenil 400mg and prednisone. GI is working on finding an acid reducer that would work for patient based on testing and recommended she go back on dexliant at this time.     She note profusely sweaty palms and feet, muscle twitching all over body and is having bone pain in fingers and arms and sometimes legs (shins) that's worse in the morning and sometimes at night.     Please advise    Kassandra CM RN, BSN

## 2023-01-09 NOTE — TELEPHONE ENCOUNTER
She should go back on her PPI. And lets see what she feels like in week. That can account for abdominal pain and diarrhea. The numbness and weakness might be a nervous response to the pain.

## 2023-01-09 NOTE — TELEPHONE ENCOUNTER
Patient calling  She thinks her gall bladder has stones. Not able to eat because of nausea. Please advise. Patient stated next appt in Feb 2023  Ok to call and dev [phone]

## 2023-01-09 NOTE — TELEPHONE ENCOUNTER
"Spoke with patient and she has not been able to eat due to nausea but is drinking fluids. Has been having weakness, tingling in arms and legs and recently stopped a PPI medication. She is having a hard time walking due to the weakness but is able to walk. Legs \"feel like jelly\" but able to get up and do things Working with rheumatology.    Rheumatologist recommended she call her PCP for follow up to see if she off in her electrolytes and anemia.     Burning in the middle of her stomach and nausea but no pain. Dry heaves and vomiting especially in the morning and after trying to eat. Has been have diarrhea x2 per day that is water and \"foam.\" Today stool is slightly formed.  Has been having headaches.    No illness in the household.    Concerned about gallstones or ulcer.     Please advise    Would she be appropriate for ADS?    Kassandra CM RN, BSN    "

## 2023-01-09 NOTE — TELEPHONE ENCOUNTER
Call received by patient. Advised of provider's message. Patient had labs drawn at [facility] today. Patient endorses dizziness, weakness, left arm numbness, nausea, and diarrhea. Patient agrees to plan of trying PPI again and will update clinic in a week.

## 2023-01-11 ENCOUNTER — VIRTUAL VISIT (OUTPATIENT)
Dept: PSYCHOLOGY | Facility: CLINIC | Age: 38
End: 2023-01-11
Attending: NURSE PRACTITIONER
Payer: COMMERCIAL

## 2023-01-11 DIAGNOSIS — F43.23 ADJUSTMENT DISORDER WITH MIXED ANXIETY AND DEPRESSED MOOD: Primary | ICD-10-CM

## 2023-01-11 PROCEDURE — 90834 PSYTX W PT 45 MINUTES: CPT | Mod: 95

## 2023-01-11 ASSESSMENT — COLUMBIA-SUICIDE SEVERITY RATING SCALE - C-SSRS
ATTEMPT LIFETIME: NO
6. HAVE YOU EVER DONE ANYTHING, STARTED TO DO ANYTHING, OR PREPARED TO DO ANYTHING TO END YOUR LIFE?: NO
2. HAVE YOU ACTUALLY HAD ANY THOUGHTS OF KILLING YOURSELF?: NO
TOTAL  NUMBER OF ABORTED OR SELF INTERRUPTED ATTEMPTS LIFETIME: NO
TOTAL  NUMBER OF INTERRUPTED ATTEMPTS LIFETIME: NO
1. HAVE YOU WISHED YOU WERE DEAD OR WISHED YOU COULD GO TO SLEEP AND NOT WAKE UP?: NO

## 2023-01-11 NOTE — PROGRESS NOTES
"Had covid in - then got vaccine and then negarive effects post vaccine andthen increased anxiety  Stomach issues  Higher heart rate  Then covid again in  from son  Weaker- hard to eat- lost weight  Heart racing          [facility]   Mental Health & Addiction Services     Progress Note - Initial Visit    Patient  Name:  Unique Baker Date: 2023         Service Type: Individual     Visit Start Time: 12:40 PM  Visit End Time: 1:25 PM    Visit #: 1    Attendees: Client attended alone    Service Modality:  Video Visit:      Provider verified identity through the following two step process.  Patient provided:  Patient  and Patient address    Telemedicine Visit: The patient's condition can be safely assessed and treated via synchronous audio and visual telemedicine encounter.      Reason for Telemedicine Visit: Patient has requested telehealth visit    Originating Site (Patient Location): Patient's home    Distant Site (Provider Location): Provider Remote Setting- Home Office    Consent:  The patient/guardian has verbally consented to: the potential risks and benefits of telemedicine (video visit) versus in person care; bill my insurance or make self-payment for services provided; and responsibility for payment of non-covered services.     Patient would like the video invitation sent by:  My Chart    Mode of Communication:  Video Conference via [facility]    Distant Location (Provider):  Off-site    As the provider I attest to compliance with applicable laws and regulations related to telemedicine.       DATA:   Interactive Complexity: No   Crisis: No     Presenting Concerns/  Current Stressors:   Unique is coming to therapy to learn coping skills to address her health anxiety post Covid 19 in . Her physical health concerns over the past two years have caused significant distress in her life. She reports often withdrawing from her \"family and life when things are " "bad.\"      ASSESSMENT:  Mental Status Assessment:  Appearance:   Appropriate   Eye Contact:   Good   Psychomotor Behavior: Agitated   Attitude:   Cooperative   Orientation:   All  Speech   Rate / Production: Emotional Talkative   Volume:  Normal   Mood:    Anxious  Depressed   Affect:    Tearful  Thought Content:  Rumination   Thought Form:  Coherent  Circumstantial  Insight:    Fair       Safety Issues and Plan for Safety and Risk Management:     Amery Suicide Severity Rating Scale (Lifetime/Recent)  Amery Suicide Severity Rating (Lifetime/Recent) 8/30/2022 1/11/2023   1. Wish to be Dead (Lifetime) - 0   1. Wish to be Dead (Past 1 Month) 0 -   2. Non-Specific Active Suicidal Thoughts (Lifetime) - 0   2. Non-Specific Active Suicidal Thoughts (Past 1 Month) 0 -   Actual Attempt (Lifetime) - 0   Has subject engaged in non-suicidal self-injurious behavior? (Lifetime) - 0   Interrupted Attempts (Lifetime) - 0   Aborted or Self-Interrupted Attempt (Lifetime) - 0   Preparatory Acts or Behavior (Lifetime) - 0   Calculated C-SSRS Risk Score (Lifetime/Recent) No Risk Indicated No Risk Indicated     Patient denies current fears or concerns for personal safety.  Patient denies current or recent suicidal ideation or behaviors.  Patient denies current or recent homicidal ideation or behaviors.  Patient denies current or recent self injurious behavior or ideation.  Patient denies other safety concerns.  Recommended that patient call 911 or go to the local ED should there be a change in any of these risk factors.  Patient reports there are no firearms in the house.     Diagnostic Criteria:  Adjustment Disorder  A. The development of emotional or behavioral symptoms in response to an identifiable stressor(s) occurring within 3 months of the onset of the stressor(s)  B. These symptoms or behaviors are clinically significant, as evidenced by one or both of the following:       - Significant impairment in social, occupational, " or other important areas of functioning  C. The stress-related disturbance does not meet criteria for another disorder & is not not an exacerbation of another mental disorder  D. The symptoms do not represent normal bereavement  E. Once the stressor or its consequences have terminated, the symptoms do not persist for more than an additional 6 months       * Adjustment Disorder with Mixed Anxiety and Depressed Mood: The predominant manifestation is a combination of depression and anxiety      DSM5 Diagnoses: (Sustained by DSM5 Criteria Listed Above)  Diagnoses: Adjustment Disorders  309.28 (F43.23) With mixed anxiety and depressed mood  Psychosocial & Contextual Factors: Multiple health concerns post Covid-19 in 2020  WHODAS 2.0 (12 item):   WHODAS 2.0 Total Score 11/2/2022   Total Score 48   Total Score MyChart 48     Intervention:   Rapport building/information gathering: Therapist gathered information from patient and father regarding reasons for setting up the appointment. Therapist gathered information on patient's physical health concern as they align with her mental health symptoms. Therapist provided active listening, support, validation and encouragement. Therapist engaged in grounding skills when client was showing signs of escalation.     Collateral Reports Completed:  Not Applicable      PLAN: (Homework, other):  Provider will continue Diagnostic Assessment.  Patient was given the following to do until next session:  Practice box & 4-7-8 breathing at least twice a day.    Patient meets the following risk assessment and triage: Patient denied any current/recent/lifetime history of suicidal ideation and/or behaviors.  No safety plan indicated at this time.       Debbie Knight [facility]  January 11, 2023

## 2023-01-16 ENCOUNTER — OFFICE VISIT (OUTPATIENT)
Dept: HEMATOLOGY | Facility: CLINIC | Age: 38
End: 2023-01-16
Attending: NURSE PRACTITIONER
Payer: COMMERCIAL

## 2023-01-16 VITALS
SYSTOLIC BLOOD PRESSURE: 116 MMHG | BODY MASS INDEX: 23.79 KG/M2 | HEART RATE: 75 BPM | WEIGHT: 128 LBS | OXYGEN SATURATION: 98 % | TEMPERATURE: 98.6 F | DIASTOLIC BLOOD PRESSURE: 76 MMHG

## 2023-01-16 DIAGNOSIS — R76.0 LUPUS ANTICOAGULANT POSITIVE: ICD-10-CM

## 2023-01-16 DIAGNOSIS — M35.9 UNDIFFERENTIATED CONNECTIVE TISSUE DISEASE (H): ICD-10-CM

## 2023-01-16 PROCEDURE — G0463 HOSPITAL OUTPT CLINIC VISIT: HCPCS

## 2023-01-16 PROCEDURE — 99417 PROLNG OP E/M EACH 15 MIN: CPT | Performed by: INTERNAL MEDICINE

## 2023-01-16 PROCEDURE — 99205 OFFICE O/P NEW HI 60 MIN: CPT | Performed by: INTERNAL MEDICINE

## 2023-01-16 PROCEDURE — G0463 HOSPITAL OUTPT CLINIC VISIT: HCPCS | Performed by: INTERNAL MEDICINE

## 2023-01-16 NOTE — PROGRESS NOTES
[facility] Consult    Reasons for Consult: -Intermittently elevated beta-2 glycoprotein 1 IgA and IgM    History of Present Illness: Ms. Baker is a 37-year-old woman referred for history of positive beta-2 glycoprotein 1 antibodies.  She is accompanied by her .  These were checked when she was evaluated for possible rheumatologic condition due to arthralgias.  She has not had any deep vein thrombosis (DVT) or pulmonary embolism (PE).  No arterial thrombosis.     She was seen on 11/18/2022 by Dr.Nicholas Liu regarding the beta-2 glycoprotein 1 antibodies and mild iron deficiency anemia.  He recommended IV iron for the iron deficiency anemia, but she has not done that.  She is taking iron tablets 1 daily.  He also rechecked the beta-2 glycoprotein 1 antibody studies and were all were normal, so he recommended no further follow-up.    She has multiple complaints ever since having COVID in 2021.  She says that she is very fatigued and weak.  She says she is short of breath with stairs.  She has pain in her back that wakes her up.  She has pain in her right shoulder.  Her heart has been racing at times.  She feels lightheaded when she stands up.  She has heartburn, she has intermittent right upper quadrant pain.  She has joint pains.  She has had intermittent hives for many years (starting pre-COVID).  She has menstrual periods that last 3 to 4 days, but sometimes has some clots on the pad.  No other bleeding symptoms.  She has some nausea and heartburn.  She does not associate this significantly with the iron tablet.  She apparently has seen a gastroenterologist and a general surgeon and has been recommended that she have her gallbladder removed.  She has been losing weight over the past year.  She is also thinking about having her breast implants removed.  She has been placed on prednisone 5 mg daily and is also on Plaquenil, although she does not have a formal diagnosis of  any rheumatologic condition.  She does states she feels a bit better being on the prednisone.  She has seen multiple providers in several systems, including rheumatology, gastroenterology, hematology, general surgery, gynecology, cardiology, physical medicine and rehabilitation, neurology, psychology.    She is wondering how the beta-2 glycoprotein 1 antibody studies relate to her symptoms, and if anything special needs to be done if she has gallbladder surgery.    Past Medical History:  - COVID infection March 2021 and 9/21/22-not hospitalized, long-haul COVID  - Polyarthralgia- no clear rheumatologic diagnoses  - Anxiety  - Attention deficit disorder without hyperactivity  -Orthostatic hypotension, negative tilt table study for POTS  -GERD  -Irritable bowel syndrome  - G4P 3- 3 abnormal C-sections, 1 ectopic.  Unclear history as to whether she has possibly had 1 or 2 first trimester miscarriages in her 20s.  -Status post breast augmentation  -Recurrent hives, unclear etiology.    Medications:  - Vitamin B12.  -Prednisone 5 mg daily  -Hydroxychloroquine  - Lorazepam  -Vitamin D  - Ferrous bisglycinate    Family History: No family history of DVT.    Social History: smoking-former, 7-pack-year history, quit 2015.  Alcohol-former, none currently.     Review of systems:  As in HPI.  The rest of the > 10 point review of systems was negative.    PHYSICAL EXAMINATION:  /76 (BP Location: Right arm, Patient Position: Sitting, Cuff Size: Adult Regular)   Pulse 75   Temp 98.6  F (37  C) (Oral)   Wt 58.1 kg (128 lb)   SpO2 98%   BMI 23.79 kg/m      General appearance:  Patient is 37 year old woman in no acute distress.   Depressed affect.  HEENT:  No pallor, icterus, or mucositis.    Lymph nodes:  No cervical, supraclavicular, axillary, or inguinal lymphadenopathy.   Lungs:  Clear to auscultation bilaterally.   Heart:  Regular rate and rhythm; no S3 S4 or murmer.     Abdomen:  Positive bowel sounds, soft and  nontender, nondistended.  No hepatomegaly. No splenomegaly appreciated.    Extremities:  No joint swelling or tenderness.  No ankle edema.     Skin:  No rash, no petechiae or ecchymoses.      Labs:  Care everywhere 11/11/2022  Component      Beta 2 GP1 Ab IgA    Beta 2 GP1 Ab IgG   Beta 2 GP1 Ab IgM      Component 11/11/2022       Beta 2 GP1 Ab IgA  <4.0   Beta 2 GP1 Ab IgG <6.4   Beta 2 GP1 Ab IgM  <1.1        Latest Reference Range & Units 07/14/22 14:34 07/14/22 14:38 12/15/22 14:59   Beta 2 Glycoprotein 1 Antibody IgA <7.0 U/mL 9.3 (H)  9.0 (H)   Beta 2 Glycoprotein 1 Antibody IgG <7.0 U/mL 0.9  1.3   Beta 2 Glycoprotein 1 Antibody IgM <7.0 U/mL 29.0 (H)  27.0 (H)   Cardiolipin IgG Irene Negative   Negative    Cardiolipin IgM Irene Negative   Negative    (H): Data is abnormally high   Latest Reference Range & Units 10/21/22 11:38   D-Dimer Quantitative 0.00 - 0.50 ug/mL FEU <0.27     Component Ref Range & Units 7/14/2022      INR 0.85 - 1.15 0.99     Thrombin Time 13.0 - 19.0 Seconds 17.5     PTT Ratio <1.21 1.19     DRVVT Screen Ratio <1.08 0.91     Lupus Result Negative Negative     Lupus Interpretation  The INR is normal.   APTT ratio is normal.     DRVVT Screen ratio is normal.   Thrombin time is normal.   NEGATIVE TEST; A LUPUS ANTICOAGULANT WAS NOT DETECTED IN THIS SPECIMEN WITHIN THE LIMITS OF THE TESTING REPERTOIRE.   If the clinical picture is strongly suggestive of an antiphospholipid syndrome, recommend anticardiolipin and beta-2-glycoprotein (IgG and IgM) antibody tests.     Vesta Haines MD, PhD   [facility]      Latest Reference Range & Units 12/15/22 14:59   WBC 4.0 - 11.0 10e3/uL 5.5   Hemoglobin 11.7 - 15.7 g/dL 12.2   Hematocrit 35.0 - 47.0 % 37.5   Platelet Count 150 - 450 10e3/uL 235   RBC Count 3.80 - 5.20 10e6/uL 4.55   MCV 78 - 100 fL 82   MCH 26.5 - 33.0 pg 26.8   MCHC 31.5 - 36.5 g/dL 32.5   RDW 10.0 - 15.0 % 16.2 (H)   (H): Data is abnormally high   Latest Reference Range & Units  12/07/22 14:56   Ferritin 6 - 175 ng/mL 27   Iron 37 - 145 ug/dL 47   Iron Binding Capacity 240 - 430 ug/dL 346   Iron Sat Index 15 - 46 % 14 (L)   (L): Data is abnormally low    Assessment and Recommendation: -    #Intermittently elevated beta-2 glycoprotein 1 IgA and IgM- she has no history of deep vein thrombosis, pulmonary embolism, stroke, myocardial infarction, peripheral arterial thrombosis, or recurrent first trimester miscarriage or weight trimester miscarriage for preeclampsia.  She does not meet the definition of antiphospholipid syndrome.  Beta-2 glycoprotein 1 antibodies can be elevated in relation to inflammation, particularly IgM.  This is of no specific clinical consequence.  This does not change any recommendations for prophylactic anticoagulation for procedures.  I discussed with her that this is not the cause of any of her symptoms, and is an incidental finding.  She asked about signs and symptoms of blood clots, and so I discussed these with her.  -There is no indication to start her on anticoagulation.    -There is no need to do anything special regarding DVT prophylaxis for surgical procedures.  - There is no indication to routinely recheck these laboratory tests.    #Iron deficiency anemia- I believe this is due to menstrual blood loss, previous pregnancies,  and is a fairly routine issue.  Her description of menses do not sound particularly heavy to me.  They only last 3 to 4 days.  She has occasional blood clots on the pad, but is not needing to change pad or tampon frequently.  It is okay if she starts on Mirena IUD or Nexplanon to stop her menstrual periods.  Her hemoglobin has improved while on oral iron.  Its not clear how well she is tolerating it, but she has not followed through with IV iron.  At this point, I think she should just continue on the oral iron and recheck through her primary care physician.    #Long-haul COVID with numerous positive review of systems- her very mild iron  deficiency anemia is not the cause of all of her symptoms of fatigue.  She has gone to numerous different subspecialists, and it seems that she needs to return to her primary care physician and her psychologist to help coordinate her overall care.  I discussed with her that having gallstones is not the cause of all of her symptoms, and I am somewhat concerned that if she has surgery, she may just wind up with additional symptoms.  The same is also true for considering having her breast implants removed.    #GI symptoms- she has had extensive evaluations and she has lost about 20 pounds over the past year and a half.  Apparently there is consideration of doing a cholecystectomy and later a hiatal hernia repair and fundoplication (see telemedicine note Dr. Ramírez 11/30/2022)- she seems to be very hesitant about going forward with these procedures.  This is outside my realm of expertise, but I did indicate that having gallbladder surgery is not going to fix her problems of muscle and joint pains and feeling weak.  She may want to consider a second opinion regarding this.  If she does go forward with surgery, the fact that she has had an intermittently elevated beta-2 glycoprotein 1 IgA and IgM does not factor into what is needed for DVT prophylaxis.  She should have what ever routine DVT prophylaxis is used for this procedure.    She does not need routine follow-up in [facility].    Time: I spent a total of 105 minutes on the day of the visit. Please see the note for further information on patient assessment and treatment.     Idalia Kendall MD  Hematology

## 2023-01-17 ENCOUNTER — VIRTUAL VISIT (OUTPATIENT)
Dept: PHARMACY | Facility: CLINIC | Age: 38
End: 2023-01-17
Payer: COMMERCIAL

## 2023-01-17 DIAGNOSIS — E61.1 IRON DEFICIENCY: ICD-10-CM

## 2023-01-17 DIAGNOSIS — M35.9 DIFFUSE CONNECTIVE TISSUE DISEASE (H): Primary | ICD-10-CM

## 2023-01-17 DIAGNOSIS — E78.5 HYPERLIPIDEMIA, UNSPECIFIED HYPERLIPIDEMIA TYPE: ICD-10-CM

## 2023-01-17 DIAGNOSIS — K21.00 GASTROESOPHAGEAL REFLUX DISEASE WITH ESOPHAGITIS WITHOUT HEMORRHAGE: ICD-10-CM

## 2023-01-17 PROCEDURE — 99607 MTMS BY PHARM ADDL 15 MIN: CPT | Performed by: PHARMACIST

## 2023-01-17 PROCEDURE — 99605 MTMS BY PHARM NP 15 MIN: CPT | Performed by: PHARMACIST

## 2023-01-17 NOTE — PROGRESS NOTES
Medication Therapy Management (MTM) Encounter    ASSESSMENT:                            Medication Adherence/Access: No issues identified    Connective Tissue Disease:  Continue prednisone as prescribed.  If stomach burning returns, follow-up with Rheumatology team to discuss possibly lowering her dose of prednisone or trial of different steroid.    GERD:  Symptoms improved with restarting dexlansoprazole and as needed famotidine.  Reviewed PGX results and therapy, appropriate to continue low dose dexlansoprazole and taper off as able.    Anemia:  Continue current oral iron therapy as recommended    Hyperlipidemia: recheck fasting lipids after completion of steroid therapy and determine if statin is needed.  Decided with patient today not to restart as she is feeling better with recent changes in therapy and doesn't want to make more changes.    PLAN:                            1.  Stay off statin, recheck cholesterol labs after you complete steroid therapy  2.  Recommend staying on your current dose of dexlansoprazole while on steroid therapy  3.  If the stomach burning from the steroid returns, recommend reaching out to Rheumatology team to discuss options (lower dose of prednisone, possibly other steroid therapy).        Follow-up: Return in about 3 months (around 4/17/2023) for Medication Therapy Management.    SUBJECTIVE/OBJECTIVE:                          Unique Baker is a 37 year old female called for a follow-up visit.  Today's visit is a follow-up MTM visit from 12/13/22.     Reason for visit: medication questions    Allergies/ADRs: Reviewed in chart  Tobacco: She reports that she quit smoking about 8 years ago. Her smoking use included cigarettes. She has never used smokeless tobacco.  Alcohol: not currently using      Medication Adherence/Access: no issues reported    Connective Tissue Disease:  Continues on hydroxychloroquine 200 mg twice daily.    Started prednisone 5 mg daily (1/5/23)  to help with  weakness; plans to continue therapy until April.  Weakness and leg shakiness has improved.  Has been able to eat and keep food down, feverish thing improved.  Leg soreness still there but not as bad.  Anxiety has improved as well.   Belief that her autoimmune system out of wack from COVID.  The steroid has improved her nausea but stomach still burns.  Questions if symptoms are really related to gallbladder or not.    Has delayed gallbladder surgery due to concerns     Has been seen by [facility] - has been recommended to start low-dose naltrexone (LDN) once she is off the prednisone.    GERD:  Restarted dexlansoprazole 30 mg daily which did help with with burning.  Believes that burning got worse when prednisone was started.  Takes the prednisone with food.  Also taking famotidine as needed; has not been taking sucralfate.    Followed by Dee Dee Zazueta at [facility].              Anemia:  Taking iron supplement 36 mg (ferrous Thorn brand - with vitamin C) every other day.       Hematology visit on 1/16/23:  I believe this is due to menstrual blood loss, previous pregnancies,  and is a fairly routine issue.  Her description of menses do not sound particularly heavy to me.  They only last 3 to 4 days.  She has occasional blood clots on the pad, but is not needing to change pad or tampon frequently.  It is okay if she starts on Mirena IUD or Nexplanon to stop her menstrual periods.  Her hemoglobin has improved while on oral iron.  Its not clear how well she is tolerating it, but she has not followed through with IV iron.  At this point, I think she should just continue on the oral iron and recheck through her primary care physician.    Hemoglobin   Date Value Ref Range Status   10/21/2022 11.5 (L) 11.7 - 15.7 g/dL Final   05/01/2021 12.7 11.7 - 15.7 g/dL Final                     Hyperlipidemia: Stopped atorvastatin.  Stomach upset and heartburn improved since stopping.      Recent Labs   Lab Test 10/21/22  7768  06/27/22  0813   CHOL 202* 128   HDL 38* 41*   * 71   TRIG 167* 78             Today's Vitals: There were no vitals taken for this visit.  ----------------      I spent 30 minutes with this patient today. All changes were made via collaborative practice agreement with Celina Riggs MD. A copy of the visit note was provided to the patient's provider(s).    A summary of these recommendations was sent via [employer].    Janet Ching , Pharm D  [phone] (phone)  Medication Therapy Management Pharmacist     Telemedicine Visit Details  Type of service:  Telephone visit  Start Time: 230pm  End Time: 300pm     Medication Therapy Recommendations  Esophageal reflux    Current Medication: dexlansoprazole (DEXILANT) 30 MG CPDR CR capsule   Rationale: Patient prefers not to take - Adherence - Adherence   Recommendation: Provide Education   Status: Patient Agreed - Adherence/Education

## 2023-01-17 NOTE — PATIENT INSTRUCTIONS
"Recommendations from today's MTM visit:                                                       1.  Stay off statin, recheck cholesterol labs after you complete steroid therapy  2.  Recommend staying on your current dose of dexlansoprazole while on steroid therapy  3.  If the stomach burning from the steroid returns, recommend reaching out to Rheumatology team to discuss options (lower dose of prednisone, possibly other steroid therapy).      Follow-up: Return in about 3 months (around 4/17/2023) for Medication Therapy Management.    It was great speaking with you today.  I value your experience and would be very thankful for your time in providing feedback in our clinic survey. In the next few days, you may receive an email or text message from [employer] with a link to a survey related to your  clinical pharmacist.\"     To schedule another MTM appointment, please call the clinic directly or you may call the MTM scheduling line at [phone] or toll-free at [phone].     My Clinical Pharmacist's contact information:                                                      Please feel free to contact me with any questions or concerns you have.      Janet Ching , Pharm D  [phone] (phone)  Medication Therapy Management Pharmacist     "

## 2023-01-31 RX ORDER — CLOBETASOL PROPIONATE 0.5 MG/G
0.05% OINTMENT TOPICAL BID
Qty: 60 | Refills: 2 | Status: ERX

## 2023-01-31 RX ORDER — TACROLIMUS 1 MG/G
0.1% OINTMENT TOPICAL BID
Qty: 60 | Refills: 0 | Status: ERX

## 2023-02-15 ENCOUNTER — MYC MEDICAL ADVICE (OUTPATIENT)
Dept: INTERNAL MEDICINE | Facility: CLINIC | Age: 38
End: 2023-02-15
Payer: COMMERCIAL

## 2023-02-15 DIAGNOSIS — Z13.0 SCREENING FOR IRON DEFICIENCY ANEMIA: ICD-10-CM

## 2023-02-15 DIAGNOSIS — R73.09 ELEVATED GLUCOSE: ICD-10-CM

## 2023-02-15 DIAGNOSIS — E61.2 MAGNESIUM DEFICIENCY: ICD-10-CM

## 2023-02-15 DIAGNOSIS — E55.9 VITAMIN D DEFICIENCY: Primary | ICD-10-CM

## 2023-02-15 DIAGNOSIS — E53.8 VITAMIN B 12 DEFICIENCY: ICD-10-CM

## 2023-02-15 NOTE — TELEPHONE ENCOUNTER
Please see patient's mychart message below.  Asking for future lab orders - Vit D, Vit B12, Iron, and magnesium (there are future orders in chart for cbc and cmp) .    Please advise, thanks.  Routed to covering provider - Angeles Pierce.

## 2023-02-16 ENCOUNTER — TELEPHONE (OUTPATIENT)
Dept: PHARMACY | Facility: CLINIC | Age: 38
End: 2023-02-16
Payer: COMMERCIAL

## 2023-02-16 NOTE — TELEPHONE ENCOUNTER
Patient called with question about medication.    Dexilant caused dizziness.  Changed to lansoprazole in mid-Jan - took for 7 days and started to have joint pain in legs and arms.  Stopped for 4 days and symptoms resolved.  Retried lansoprazole and joint pains returned.  Stopped again 4 days ago. Wants to taper off but unsure how. Tried lansoprazole every other day but still caused side effects.  Doesn't want to restart the lansoprazole.  Has omeprazole at home and wants to try this; wasn't very helpful back then.    Since being on steroids her stomach has been feeling better.  Has been  Taking Pepcid 40 mg twice daily (8am and before bed) but has not been enough on it's own.  Symptoms seem to be worse before bed.      Recommend slowly tapering off the dexilant by taking 1 tablet every 2 days, can try taking at bedtime since symptoms are worse over night.      Has been taking iron every other day.  Feels weird/dizzy when she takes the iron.  Lab reported from patient.  She does plan to follow-up with Hematology and will ask about plan.      Dec ferritin 27, Hgb 11, iron   January ferritin 31, Hgb 13, iron 76  Feb ferritin 26.5, Hgb 12.6, iron 155, iron saturation 46%

## 2023-02-17 ENCOUNTER — LAB (OUTPATIENT)
Dept: LAB | Facility: CLINIC | Age: 38
End: 2023-02-17
Payer: COMMERCIAL

## 2023-02-17 DIAGNOSIS — R10.13 DYSPEPSIA: ICD-10-CM

## 2023-02-17 DIAGNOSIS — E61.2 MAGNESIUM DEFICIENCY: ICD-10-CM

## 2023-02-17 DIAGNOSIS — E55.9 VITAMIN D DEFICIENCY: ICD-10-CM

## 2023-02-17 DIAGNOSIS — Z13.0 SCREENING FOR IRON DEFICIENCY ANEMIA: ICD-10-CM

## 2023-02-17 DIAGNOSIS — R73.09 ELEVATED GLUCOSE: ICD-10-CM

## 2023-02-17 DIAGNOSIS — E53.8 VITAMIN B 12 DEFICIENCY: ICD-10-CM

## 2023-02-17 LAB
ALBUMIN SERPL BCG-MCNC: 4.4 G/DL (ref 3.5–5.2)
ALP SERPL-CCNC: 52 U/L (ref 35–104)
ALT SERPL W P-5'-P-CCNC: 14 U/L (ref 10–35)
ANION GAP SERPL CALCULATED.3IONS-SCNC: 13 MMOL/L (ref 7–15)
AST SERPL W P-5'-P-CCNC: 19 U/L (ref 10–35)
BASOPHILS # BLD AUTO: 0 10E3/UL (ref 0–0.2)
BASOPHILS NFR BLD AUTO: 0 %
BILIRUB SERPL-MCNC: 0.2 MG/DL
BUN SERPL-MCNC: 12.6 MG/DL (ref 6–20)
CALCIUM SERPL-MCNC: 9.1 MG/DL (ref 8.6–10)
CHLORIDE SERPL-SCNC: 105 MMOL/L (ref 98–107)
CREAT SERPL-MCNC: 0.66 MG/DL (ref 0.51–0.95)
DEPRECATED HCO3 PLAS-SCNC: 23 MMOL/L (ref 22–29)
EOSINOPHIL # BLD AUTO: 0 10E3/UL (ref 0–0.7)
EOSINOPHIL NFR BLD AUTO: 0 %
ERYTHROCYTE [DISTWIDTH] IN BLOOD BY AUTOMATED COUNT: 13.8 % (ref 10–15)
FERRITIN SERPL-MCNC: 32 NG/ML (ref 6–175)
GFR SERPL CREATININE-BSD FRML MDRD: >90 ML/MIN/1.73M2
GLUCOSE SERPL-MCNC: 91 MG/DL (ref 70–99)
HBA1C MFR BLD: 5.5 % (ref 0–5.6)
HCT VFR BLD AUTO: 37.5 % (ref 35–47)
HGB BLD-MCNC: 12.3 G/DL (ref 11.7–15.7)
IMM GRANULOCYTES # BLD: 0 10E3/UL
IMM GRANULOCYTES NFR BLD: 0 %
IRON BINDING CAPACITY (ROCHE): 332 UG/DL (ref 240–430)
IRON SATN MFR SERPL: 19 % (ref 15–46)
IRON SERPL-MCNC: 63 UG/DL (ref 37–145)
LYMPHOCYTES # BLD AUTO: 1.2 10E3/UL (ref 0.8–5.3)
LYMPHOCYTES NFR BLD AUTO: 15 %
MAGNESIUM SERPL-MCNC: 2.5 MG/DL (ref 1.7–2.3)
MCH RBC QN AUTO: 28 PG (ref 26.5–33)
MCHC RBC AUTO-ENTMCNC: 32.8 G/DL (ref 31.5–36.5)
MCV RBC AUTO: 85 FL (ref 78–100)
MONOCYTES # BLD AUTO: 0.3 10E3/UL (ref 0–1.3)
MONOCYTES NFR BLD AUTO: 4 %
NEUTROPHILS # BLD AUTO: 6.7 10E3/UL (ref 1.6–8.3)
NEUTROPHILS NFR BLD AUTO: 81 %
PHOSPHATE SERPL-MCNC: 3.2 MG/DL (ref 2.5–4.5)
PLATELET # BLD AUTO: 213 10E3/UL (ref 150–450)
POTASSIUM SERPL-SCNC: 4.3 MMOL/L (ref 3.4–5.3)
PROT SERPL-MCNC: 7 G/DL (ref 6.4–8.3)
RBC # BLD AUTO: 4.4 10E6/UL (ref 3.8–5.2)
SODIUM SERPL-SCNC: 141 MMOL/L (ref 136–145)
VIT B12 SERPL-MCNC: 1832 PG/ML (ref 232–1245)
WBC # BLD AUTO: 8.3 10E3/UL (ref 4–11)

## 2023-02-17 PROCEDURE — 80053 COMPREHEN METABOLIC PANEL: CPT

## 2023-02-17 PROCEDURE — 83550 IRON BINDING TEST: CPT

## 2023-02-17 PROCEDURE — 82306 VITAMIN D 25 HYDROXY: CPT

## 2023-02-17 PROCEDURE — 83036 HEMOGLOBIN GLYCOSYLATED A1C: CPT

## 2023-02-17 PROCEDURE — 36415 COLL VENOUS BLD VENIPUNCTURE: CPT

## 2023-02-17 PROCEDURE — 82728 ASSAY OF FERRITIN: CPT

## 2023-02-17 PROCEDURE — 82607 VITAMIN B-12: CPT

## 2023-02-17 PROCEDURE — 84100 ASSAY OF PHOSPHORUS: CPT | Performed by: INTERNAL MEDICINE

## 2023-02-17 PROCEDURE — 83735 ASSAY OF MAGNESIUM: CPT

## 2023-02-17 PROCEDURE — 85025 COMPLETE CBC W/AUTO DIFF WBC: CPT

## 2023-02-17 PROCEDURE — 83540 ASSAY OF IRON: CPT

## 2023-02-17 NOTE — TELEPHONE ENCOUNTER
Patient sent additional message asking if phosphate can be checked and if she needs an A1c for elevated glucose reading.     Routed to covering provider - Angeles Pierce, to review. Patient did have labs drawn today, could try to order to add on to specimen drawn today.      Kathi Chavez RN  [facility]

## 2023-02-18 LAB — DEPRECATED CALCIDIOL+CALCIFEROL SERPL-MC: 32 UG/L (ref 20–75)

## 2023-02-23 ENCOUNTER — VIRTUAL VISIT (OUTPATIENT)
Dept: CARDIOLOGY | Facility: CLINIC | Age: 38
End: 2023-02-23
Attending: INTERNAL MEDICINE
Payer: COMMERCIAL

## 2023-02-23 DIAGNOSIS — R00.2 PALPITATIONS: ICD-10-CM

## 2023-02-23 DIAGNOSIS — R00.0 SINUS TACHYCARDIA: Primary | ICD-10-CM

## 2023-02-23 PROCEDURE — 99214 OFFICE O/P EST MOD 30 MIN: CPT | Mod: VID | Performed by: INTERNAL MEDICINE

## 2023-02-23 NOTE — LETTER
2/23/2023      RE: Unique Baker  [id number] [facility] [address]       Dear Colleague,    Thank you for the opportunity to participate in the care of your patient, Unique Baker, at the [facility] [place] [facility]. Please see a copy of my visit note below.    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  However she has had several comorbidities that could have contributed to sinus tachycardia-including an inflammatory condition which is.now being treated with prednisone, and a recent modest anemia which is being treated with iron supplementation.  Her hemoglobin has now responded to over 12.    Autonomic studies were essentially within normal limits.  Patient did show low pressure phenotype but no true POTS.  She indicates that she tends to eat relatively high salt diet.  I suggested that she also needed to increase her electrolyte fluid intake in order for the overall electrolytes to improve her blood pressure and perhaps help to reduce her heart rate.    Unique notes that since beginning prednisone she has ultimately had some improvement in her palpitation symptoms.  Almost certainly this is due to management of the inflammatory trigger.  Ultimately when prednisone is withdrawn or titrated downward we will need to focus more heavily on other means for heart rate control.  In this regard the increase in electrolyte containing beverages (approximately 50 ounces per day) may be advantageous in conjunction with her tendency to relatively high salt intake in her diet.     I reassured Unique that we would keep an eye on her heart rate as the prednisone treatment cannot be pursued forever.  In this regard she will be followed by both Dr Lozoya in the general cardiology clinic as well has in this clinic.  I did not recommend any additional  interventions at this time.  Patient voiced understanding.    PAST MEDICAL HISTORY:  Past Medical History:   Diagnosis Date     Diabetes (H)     GDM insulin     GERD (gastroesophageal reflux disease)     w/u otherwise neg      History of colposcopy with cervical biopsy 3/23/07    WNL     Papanicolaou smear of cervix with low grade squamous intraepithelial lesion (LGSIL) 07     PONV (postoperative nausea and vomiting)      S/P  10/13/2017       CURRENT MEDICATIONS:  Current Outpatient Medications   Medication Sig Dispense Refill     acetaminophen (TYLENOL) 500 MG tablet take 1 - 2 tablet by oral route  every 6 hours as needed as needed       cetirizine (ZYRTEC) 10 MG tablet Take 1 tablet (10 mg) by mouth daily 30 tablet 1     Cyanocobalamin (VITAMIN B12 TR PO) 1 patch (500 mcg) daily       cyclobenzaprine (FLEXERIL) 5 MG tablet Take 1 tablet (5 mg) by mouth nightly as needed for muscle spasms 30 tablet 0     diclofenac (VOLTAREN) 1 % topical gel Apply 2 g topically 4 times daily To joints as needed for pain 100 g 3     famotidine (PEPCID) 20 MG tablet TAKE ONE TABLET BY MOUTH TWICE A DAY AS NEEDED FOR REFLUX 180 tablet 3     FERROUS BISGLYCINATE CHELATE PO Take 36 mg by mouth every other day       fluocinonide emulsified base 0.05 % external cream apply to rash as needed - sparingly 60 g 1     fluticasone (FLONASE) 50 MCG/ACT nasal spray Spray 1 spray into both nostrils 2 times daily as needed for rhinitis or allergies Take 1 spray twice daily x 1 week then 1 spray daily at night x 1 week then as needed 11.1 mL 1     hydroxychloroquine (PLAQUENIL) 200 MG tablet Take 1 tablet (200 mg) by mouth 2 times daily Annual Plaquenil toxicity eye screening required. 180 tablet 3     hydrOXYzine (ATARAX) 25 MG tablet Take 1 tablet (25 mg) by mouth 3 times daily as needed for anxiety 90 tablet 1     LORazepam (ATIVAN) 0.5 MG tablet Take 1 tablet (0.5 mg) by mouth daily as needed for anxiety 10 pills/month 10  tablet 0     multivitamin (ONE-DAILY) tablet Take 1 tablet by mouth daily       ondansetron (ZOFRAN ODT) 4 MG ODT tab Place 1 Tablet (4 mg) on the tongue every 8 hours if needed for Nausea/Vomiting.       predniSONE (DELTASONE) 5 MG tablet take 1 tablet by oral route  every day as needed       sucralfate (CARAFATE) 1 GM tablet        busPIRone (BUSPAR) 5 MG tablet Take 1 tablet (5 mg) by mouth 2 times daily (Patient not taking: Reported on 2023) 60 tablet 1     dexlansoprazole (DEXILANT) 30 MG CPDR CR capsule Take 30 mg by mouth daily (Patient not taking: Reported on 2023)       ferric carboxymaltose (INJECTAFER) 50 MG/ML injection Administer 2 doses of 750mg Injectafer by IV route at least 7 days apart over at least 15 minutes (Patient not taking: Reported on 2023)       iron sucrose (VENOFER) 20 MG/ML injection Administer 5 100mg doses of Venofer by IV route over a 14 day period over at least 15 minutes (Patient not taking: Reported on 2023)       omeprazole (PRILOSEC) 20 MG DR capsule        sodium chloride 1 GM tablet Take 1 tablet (1 g) by mouth daily (Patient not taking: Reported on 2023) 30 tablet 0     vitamin D2 (ERGOCALCIFEROL) 17940 units (1250 mcg) capsule Take 50,000 Units by mouth once a week Patient taking Vitamin D3 90508 units         PAST SURGICAL HISTORY:  Past Surgical History:   Procedure Laterality Date     BREAST SURGERY      augmentation       SECTION N/A 2015    Procedure:  SECTION;  Surgeon: Tremaine Gaona MD;  Location: [facility] OR      SECTION, TUBAL LIGATION, COMBINED N/A 10/13/2017    Procedure: COMBINED  SECTION, TUBAL LIGATION;  Repeat  SECTION, TUBAL LIGATION ;  Surgeon: Sidra Salamanca DO;  Location: [facility] OR     COLONOSCOPY N/A 2016    Procedure: COLONOSCOPY;  Surgeon: Lyudmila Pastor MD;  Location:  GI     DILATION AND CURETTAGE SUCTION      elective termination     EP STUDY TILT TABLE N/A  2022    Procedure: Tilt Table Study;  Surgeon: Stan Green MD;  Location: [facility] CARDIAC CATH LAB     ESOPHAGOSCOPY, GASTROSCOPY, DUODENOSCOPY (EGD), COMBINED  2014    Procedure: COMBINED ESOPHAGOSCOPY, GASTROSCOPY, DUODENOSCOPY (EGD);   ESOPHAGOSCOPY, GASTROSCOPY, DUODENOSCOPY (EGD) ;  Surgeon: Vishnu Lanier MD;  Location:  GI     ESOPHAGOSCOPY, GASTROSCOPY, DUODENOSCOPY (EGD), COMBINED Left 2020    Procedure: ESOPHAGOGASTRODUODENOSCOPY, WITH BIOPSIES USING BIOPSY FORCEPS;  Surgeon: Vishnu Hopkins MD;  Location:  GI     ESOPHAGOSCOPY, GASTROSCOPY, DUODENOSCOPY (EGD), COMBINED N/A 2021    Procedure: ESOPHAGOGASTRODUODENOSCOPY, WITH BIOPSY using cold forceps;  Surgeon: Vishnu Lanier MD;  Location:  GI     GYN SURGERY       c section      RW LASER WART      Anal wart removal      ZZC NONSPECIFIC PROCEDURE  01    Primary LTCS       ALLERGIES:     Allergies   Allergen Reactions     Other Environmental Allergy      Patch Test Results 3-10-20    Very Strong (3+) or Strong (2+) reactions:  none     Mild (1+) reactions:  Fragrance mix I  Linalool  Oleamidopropyl dimethylamine     Borderline/questionable reactions:  Balsam of [place]  Diphenylguanidine     Venlafaxine Other (See Comments)     legs were cold/tingly from knees down and restless, increased anxiety, insomnia       FAMILY HISTORY:  Family History   Problem Relation Age of Onset     Colon Polyps Brother      Other - See Comments Brother         colon polyps     Hyperlipidemia Sister      Stomach Cancer Sister      Cancer Sister         stomach     C.A.D. No family hx of      Diabetes No family hx of      Breast Cancer No family hx of      Cancer - colorectal No family hx of      Colon Cancer No family hx of        SOCIAL HISTORY:  Social History     Tobacco Use     Smoking status: Former     Years: 7.00     Types: Cigarettes     Quit date:      Years since quittin.1     Smokeless tobacco:  Never     Tobacco comments:     only if she drinks alcohol   Vaping Use     Vaping Use: Never used   Substance Use Topics     Alcohol use: Not Currently     Alcohol/week: 8.3 standard drinks     Drug use: No       ROS:   Constitutional: No fever, chills, or sweats. Weight stable.   ENT: No visual disturbance,  Cardiovascular: As per HPI.   Respiratory: No cough, hemoptysis.    GI: No nausea, vomiting,  : No hematuria.   Integument: Negative.   Psychiatric: Negative.   Hematologic: no easy bleeding.  Neuro: Negative.   Endocrinology: No significant heat or cold intolerance   Musculoskeletal: No myalgia.    Exam:  There were no vitals taken for this visit.  GENERAL APPEARANCE:  alert and no distress  HEENT: no icterus,  no central cyanosis  NECK no asymmetry, JVP not elevated  RESPIRATORY:- no rales, rhonchi or wheezes, no use of accessory muscles, no retractions, respirations are unlabored, normal respiratory rate  NEURO: alert and oriented to person/place/time, normal speech, and affect  SKIN: no ecchymoses, no rashes    Labs:  CBC RESULTS:   Lab Results   Component Value Date    WBC 8.3 02/17/2023    WBC 9.2 05/01/2021    RBC 4.40 02/17/2023    RBC 4.49 05/01/2021    HGB 12.3 02/17/2023    HGB 12.7 05/01/2021    HCT 37.5 02/17/2023    HCT 38.4 05/01/2021    MCV 85 02/17/2023    MCV 86 05/01/2021    MCH 28.0 02/17/2023    MCH 28.3 05/01/2021    MCHC 32.8 02/17/2023    MCHC 33.1 05/01/2021    RDW 13.8 02/17/2023    RDW 13.2 05/01/2021     02/17/2023     05/01/2021       BMP RESULTS:  Lab Results   Component Value Date     02/17/2023     05/01/2021    POTASSIUM 4.3 02/17/2023    POTASSIUM 4.0 07/14/2022    POTASSIUM 4.0 05/01/2021    CHLORIDE 105 02/17/2023    CHLORIDE 109 07/14/2022    CHLORIDE 103 05/01/2021    CO2 23 02/17/2023    CO2 26 07/14/2022    CO2 25 05/01/2021    ANIONGAP 13 02/17/2023    ANIONGAP 5 07/14/2022    ANIONGAP 5 05/01/2021    GLC 91 02/17/2023    GLC 97 07/14/2022     GLC 87 05/01/2021    BUN 12.6 02/17/2023    BUN 9 07/14/2022    BUN 13 05/01/2021    CR 0.66 02/17/2023    CR 0.78 05/01/2021    GFRESTIMATED >90 02/17/2023    GFRESTIMATED 0.74 01/09/2023    GFRESTBLACK >90 05/01/2021    PREETI 9.1 02/17/2023    PREETI 8.6 05/01/2021        INR RESULTS:  Lab Results   Component Value Date    INR 0.99 07/14/2022       Procedures:  PULMONARY FUNCTION TESTS:   No flowsheet data found.      ECHOCARDIOGRAM:   No results found for this or any previous visit (from the past 8760 hour(s)).      Assessment and Plan:     1.  Sinus tachycardia likely secondary to underlying comorbidities-particularly inflammatory condition currently being treated with prednisone by rheumatology  2.  Some clinical improvement on prednisone  3.  Encouraged maintaining high salt diet and adding electrolyte fluids    Plan  1.  Dietary management as in #3 above-this will become more important when prednisone is titrated down  2.  Follow-up with Dr Bruce Can approximately 3 to 4 months  3.  Follow-up in this clinic 1 year    Total elapsed time today with chart review, video visit and documentation 30 minutes    I very much appreciated the opportunity to see and assess Unique Baker in the clinic today. Please do not hesitate to contact my office if you have any questions or concerns.      Stan Green MD  Cardiac Arrhythmia Service  [facility]  [phone]

## 2023-02-23 NOTE — PATIENT INSTRUCTIONS
You were seen in the Electrophysiology Clinic today by: Dr Green    Plan:     Follow up Visit:  Dr Lozoya in 3-4 months  Dr Green or EP [facility] in 1 year          If you have further questions, please utilize [employer] to contact us.     Your Care Team:    EP Cardiology   Telephone Number     Nurse Line  Kristel Phillips, RN   Alejandrina Ardon, RN   [phone]     For scheduling appointments:   [place]   [phone]   For procedure scheduling:    Saskia Alonzo     [phone]   For the Device Clinic (Pacemakers, ICDs, Loop Recorders)    During business hours: [phone]  After business hours:   [phone]- select option 4 and ask for job code [id number].       On-call cardiologist for after hours or on weekends:   [phone], option #4, and ask to speak to the on-call cardiologist.     Cardiovascular Clinic:   [address] [place] [address]. [place] [address]      As always, Thank you for trusting us with your health care needs!

## 2023-02-23 NOTE — NURSING NOTE
Chief Complaint   Patient presents with     Follow Up     Follow up post tilt, no updates per pt. Medications/allergies reviewed with pt, pt states she is taking Omeprazole in place of Dexalant.        Is the patient currently in the state of [place]? YES    Visit mode:VIDEO    If the visit is dropped, the patient can be reconnected by: VIDEO VISIT: Text to cell phone: [phone]    Will anyone else be joining the visit? NO      How would you like to obtain your AVS? MyChart    Are changes needed to the allergy or medication list? YES: Reconciled Omeprazole, pt taking in place of Dexalant.    Patient denies any changes since echeck-in regarding medication and allergies and states all information entered during echeck-in remains accurate.    Hayder Tripp, Visit Facilitator/MA.

## 2023-02-23 NOTE — PROGRESS NOTES
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  However she has had several comorbidities that could have contributed to sinus tachycardia-including an inflammatory condition which is.now being treated with prednisone, and a recent modest anemia which is being treated with iron supplementation.  Her hemoglobin has now responded to over 12.    Autonomic studies were essentially within normal limits.  Patient did show low pressure phenotype but no true POTS.  She indicates that she tends to eat relatively high salt diet.  I suggested that she also needed to increase her electrolyte fluid intake in order for the overall electrolytes to improve her blood pressure and perhaps help to reduce her heart rate.    Unique notes that since beginning prednisone she has ultimately had some improvement in her palpitation symptoms.  Almost certainly this is due to management of the inflammatory trigger.  Ultimately when prednisone is withdrawn or titrated downward we will need to focus more heavily on other means for heart rate control.  In this regard the increase in electrolyte containing beverages (approximately 50 ounces per day) may be advantageous in conjunction with her tendency to relatively high salt intake in her diet.     I reassured Unique that we would keep an eye on her heart rate as the prednisone treatment cannot be pursued forever.  In this regard she will be followed by both Dr Lozoya in the general cardiology clinic as well has in this clinic.  I did not recommend any additional interventions at this time.  Patient voiced understanding.    PAST MEDICAL HISTORY:  Past Medical History:   Diagnosis Date     Diabetes (H)     GDM insulin     GERD (gastroesophageal reflux disease)     w/u otherwise neg 2008     History of colposcopy with cervical biopsy 3/23/07    WNL     Papanicolaou smear of cervix with low grade squamous  intraepithelial lesion (LGSIL) 07     PONV (postoperative nausea and vomiting)      S/P  10/13/2017       CURRENT MEDICATIONS:  Current Outpatient Medications   Medication Sig Dispense Refill     acetaminophen (TYLENOL) 500 MG tablet take 1 - 2 tablet by oral route  every 6 hours as needed as needed       cetirizine (ZYRTEC) 10 MG tablet Take 1 tablet (10 mg) by mouth daily 30 tablet 1     Cyanocobalamin (VITAMIN B12 TR PO) 1 patch (500 mcg) daily       cyclobenzaprine (FLEXERIL) 5 MG tablet Take 1 tablet (5 mg) by mouth nightly as needed for muscle spasms 30 tablet 0     diclofenac (VOLTAREN) 1 % topical gel Apply 2 g topically 4 times daily To joints as needed for pain 100 g 3     famotidine (PEPCID) 20 MG tablet TAKE ONE TABLET BY MOUTH TWICE A DAY AS NEEDED FOR REFLUX 180 tablet 3     FERROUS BISGLYCINATE CHELATE PO Take 36 mg by mouth every other day       fluocinonide emulsified base 0.05 % external cream apply to rash as needed - sparingly 60 g 1     fluticasone (FLONASE) 50 MCG/ACT nasal spray Spray 1 spray into both nostrils 2 times daily as needed for rhinitis or allergies Take 1 spray twice daily x 1 week then 1 spray daily at night x 1 week then as needed 11.1 mL 1     hydroxychloroquine (PLAQUENIL) 200 MG tablet Take 1 tablet (200 mg) by mouth 2 times daily Annual Plaquenil toxicity eye screening required. 180 tablet 3     hydrOXYzine (ATARAX) 25 MG tablet Take 1 tablet (25 mg) by mouth 3 times daily as needed for anxiety 90 tablet 1     LORazepam (ATIVAN) 0.5 MG tablet Take 1 tablet (0.5 mg) by mouth daily as needed for anxiety 10 pills/month 10 tablet 0     multivitamin (ONE-DAILY) tablet Take 1 tablet by mouth daily       ondansetron (ZOFRAN ODT) 4 MG ODT tab Place 1 Tablet (4 mg) on the tongue every 8 hours if needed for Nausea/Vomiting.       predniSONE (DELTASONE) 5 MG tablet take 1 tablet by oral route  every day as needed       sucralfate (CARAFATE) 1 GM tablet        busPIRone  (BUSPAR) 5 MG tablet Take 1 tablet (5 mg) by mouth 2 times daily (Patient not taking: Reported on 2023) 60 tablet 1     dexlansoprazole (DEXILANT) 30 MG CPDR CR capsule Take 30 mg by mouth daily (Patient not taking: Reported on 2023)       ferric carboxymaltose (INJECTAFER) 50 MG/ML injection Administer 2 doses of 750mg Injectafer by IV route at least 7 days apart over at least 15 minutes (Patient not taking: Reported on 2023)       iron sucrose (VENOFER) 20 MG/ML injection Administer 5 100mg doses of Venofer by IV route over a 14 day period over at least 15 minutes (Patient not taking: Reported on 2023)       omeprazole (PRILOSEC) 20 MG DR capsule        sodium chloride 1 GM tablet Take 1 tablet (1 g) by mouth daily (Patient not taking: Reported on 2023) 30 tablet 0     vitamin D2 (ERGOCALCIFEROL) 08916 units (1250 mcg) capsule Take 50,000 Units by mouth once a week Patient taking Vitamin D3 21071 units         PAST SURGICAL HISTORY:  Past Surgical History:   Procedure Laterality Date     BREAST SURGERY      augmentation       SECTION N/A 2015    Procedure:  SECTION;  Surgeon: Tremaine Gaona MD;  Location: [facility] OR      SECTION, TUBAL LIGATION, COMBINED N/A 10/13/2017    Procedure: COMBINED  SECTION, TUBAL LIGATION;  Repeat  SECTION, TUBAL LIGATION ;  Surgeon: Sidra Salamanca DO;  Location: [facility] OR     COLONOSCOPY N/A 2016    Procedure: COLONOSCOPY;  Surgeon: Lyudmila Pastor MD;  Location:  GI     DILATION AND CURETTAGE SUCTION      elective termination     EP STUDY TILT TABLE N/A 2022    Procedure: Tilt Table Study;  Surgeon: Stan Green MD;  Location:  HEART CARDIAC CATH LAB     ESOPHAGOSCOPY, GASTROSCOPY, DUODENOSCOPY (EGD), COMBINED  2014    Procedure: COMBINED ESOPHAGOSCOPY, GASTROSCOPY, DUODENOSCOPY (EGD);   ESOPHAGOSCOPY, GASTROSCOPY, DUODENOSCOPY (EGD) ;  Surgeon: Vishnu Lanier MD;  Location:   GI     ESOPHAGOSCOPY, GASTROSCOPY, DUODENOSCOPY (EGD), COMBINED Left 2020    Procedure: ESOPHAGOGASTRODUODENOSCOPY, WITH BIOPSIES USING BIOPSY FORCEPS;  Surgeon: Vishnu Hopkins MD;  Location:  GI     ESOPHAGOSCOPY, GASTROSCOPY, DUODENOSCOPY (EGD), COMBINED N/A 2021    Procedure: ESOPHAGOGASTRODUODENOSCOPY, WITH BIOPSY using cold forceps;  Surgeon: Vishnu Lanier MD;  Location:  GI     GYN SURGERY       c section      RW LASER WART  2011    Anal wart removal      ZZC NONSPECIFIC PROCEDURE  01    Primary LTCS       ALLERGIES:     Allergies   Allergen Reactions     Other Environmental Allergy      Patch Test Results 3-10-20    Very Strong (3+) or Strong (2+) reactions:  none     Mild (1+) reactions:  Fragrance mix I  Linalool  Oleamidopropyl dimethylamine     Borderline/questionable reactions:  Balsam of [place]  Diphenylguanidine     Venlafaxine Other (See Comments)     legs were cold/tingly from knees down and restless, increased anxiety, insomnia       FAMILY HISTORY:  Family History   Problem Relation Age of Onset     Colon Polyps Brother      Other - See Comments Brother         colon polyps     Hyperlipidemia Sister      Stomach Cancer Sister      Cancer Sister         stomach     C.A.D. No family hx of      Diabetes No family hx of      Breast Cancer No family hx of      Cancer - colorectal No family hx of      Colon Cancer No family hx of        SOCIAL HISTORY:  Social History     Tobacco Use     Smoking status: Former     Years: 7.00     Types: Cigarettes     Quit date:      Years since quittin.1     Smokeless tobacco: Never     Tobacco comments:     only if she drinks alcohol   Vaping Use     Vaping Use: Never used   Substance Use Topics     Alcohol use: Not Currently     Alcohol/week: 8.3 standard drinks     Drug use: No       ROS:   Constitutional: No fever, chills, or sweats. Weight stable.   ENT: No visual disturbance,  Cardiovascular: As per HPI.   Respiratory:  No cough, hemoptysis.    GI: No nausea, vomiting,  : No hematuria.   Integument: Negative.   Psychiatric: Negative.   Hematologic: no easy bleeding.  Neuro: Negative.   Endocrinology: No significant heat or cold intolerance   Musculoskeletal: No myalgia.    Exam:  There were no vitals taken for this visit.  GENERAL APPEARANCE:  alert and no distress  HEENT: no icterus,  no central cyanosis  NECK no asymmetry, JVP not elevated  RESPIRATORY:- no rales, rhonchi or wheezes, no use of accessory muscles, no retractions, respirations are unlabored, normal respiratory rate  NEURO: alert and oriented to person/place/time, normal speech, and affect  SKIN: no ecchymoses, no rashes    Labs:  CBC RESULTS:   Lab Results   Component Value Date    WBC 8.3 02/17/2023    WBC 9.2 05/01/2021    RBC 4.40 02/17/2023    RBC 4.49 05/01/2021    HGB 12.3 02/17/2023    HGB 12.7 05/01/2021    HCT 37.5 02/17/2023    HCT 38.4 05/01/2021    MCV 85 02/17/2023    MCV 86 05/01/2021    MCH 28.0 02/17/2023    MCH 28.3 05/01/2021    MCHC 32.8 02/17/2023    MCHC 33.1 05/01/2021    RDW 13.8 02/17/2023    RDW 13.2 05/01/2021     02/17/2023     05/01/2021       BMP RESULTS:  Lab Results   Component Value Date     02/17/2023     05/01/2021    POTASSIUM 4.3 02/17/2023    POTASSIUM 4.0 07/14/2022    POTASSIUM 4.0 05/01/2021    CHLORIDE 105 02/17/2023    CHLORIDE 109 07/14/2022    CHLORIDE 103 05/01/2021    CO2 23 02/17/2023    CO2 26 07/14/2022    CO2 25 05/01/2021    ANIONGAP 13 02/17/2023    ANIONGAP 5 07/14/2022    ANIONGAP 5 05/01/2021    GLC 91 02/17/2023    GLC 97 07/14/2022    GLC 87 05/01/2021    BUN 12.6 02/17/2023    BUN 9 07/14/2022    BUN 13 05/01/2021    CR 0.66 02/17/2023    CR 0.78 05/01/2021    GFRESTIMATED >90 02/17/2023    GFRESTIMATED 0.74 01/09/2023    GFRESTBLACK >90 05/01/2021    PREETI 9.1 02/17/2023    PREETI 8.6 05/01/2021        INR RESULTS:  Lab Results   Component Value Date    INR 0.99 07/14/2022        Procedures:  PULMONARY FUNCTION TESTS:   No flowsheet data found.      ECHOCARDIOGRAM:   No results found for this or any previous visit (from the past 8760 hour(s)).      Assessment and Plan:     1.  Sinus tachycardia likely secondary to underlying comorbidities-particularly inflammatory condition currently being treated with prednisone by rheumatology  2.  Some clinical improvement on prednisone  3.  Encouraged maintaining high salt diet and adding electrolyte fluids    Plan  1.  Dietary management as in #3 above-this will become more important when prednisone is titrated down  2.  Follow-up with Dr Bruce Can approximately 3 to 4 months  3.  Follow-up in this clinic 1 year    Total elapsed time today with chart review, video visit and documentation 30 minutes    I very much appreciated the opportunity to see and assess Unique Baker in the clinic today. Please do not hesitate to contact my office if you have any questions or concerns.      Stan Green MD  Cardiac Arrhythmia Service  [facility]  [phone]      CC  SELF, REFERRED

## 2023-03-01 ENCOUNTER — TELEPHONE (OUTPATIENT)
Dept: CARDIOLOGY | Facility: CLINIC | Age: 38
End: 2023-03-01
Payer: COMMERCIAL

## 2023-03-01 NOTE — TELEPHONE ENCOUNTER
Left Voicemail (2nd Attempt) and Sent Mychart (2nd Attempt) for the patient to call back and schedule the following:    Appointment type: Cardiology- Return General  Provider: Can  Return date: 05/23/23  Specialty phone number: [phone]  Additional appointment(s) needed: none  Additonal Notes: none    Hayder Tripp, Visit Facilitator/MA.

## 2023-03-06 ENCOUNTER — TRANSFERRED RECORDS (OUTPATIENT)
Dept: HEALTH INFORMATION MANAGEMENT | Facility: CLINIC | Age: 38
End: 2023-03-06
Payer: COMMERCIAL

## 2023-03-13 ENCOUNTER — OFFICE VISIT (OUTPATIENT)
Dept: INTERNAL MEDICINE | Facility: CLINIC | Age: 38
End: 2023-03-13
Payer: COMMERCIAL

## 2023-03-13 VITALS
DIASTOLIC BLOOD PRESSURE: 60 MMHG | TEMPERATURE: 97.7 F | BODY MASS INDEX: 23.19 KG/M2 | WEIGHT: 126 LBS | OXYGEN SATURATION: 98 % | HEART RATE: 68 BPM | RESPIRATION RATE: 18 BRPM | SYSTOLIC BLOOD PRESSURE: 104 MMHG | HEIGHT: 62 IN

## 2023-03-13 DIAGNOSIS — R10.11 ABDOMINAL PAIN, RIGHT UPPER QUADRANT: Primary | ICD-10-CM

## 2023-03-13 PROCEDURE — 99214 OFFICE O/P EST MOD 30 MIN: CPT | Performed by: INTERNAL MEDICINE

## 2023-03-13 ASSESSMENT — ENCOUNTER SYMPTOMS
PALPITATIONS: 1
FATIGUE: 1
ARTHRALGIAS: 1
DIZZINESS: 1
RESPIRATORY NEGATIVE: 1
WEAKNESS: 1
ABDOMINAL PAIN: 1

## 2023-03-13 NOTE — PROGRESS NOTES
Assessment & Plan     Abdominal pain, right upper quadrant  At this time, patient did have several concerns that she was trying to discuss, but she did not seem to know which issues were most pressing and that she wished to be evaluated further.  Very prolonged discussion of all of her concerns, she did ultimately like to proceed with an ultrasound of her abdomen for repeat assessment of her previously reported gallstones.  An order for the ultrasound has been placed.  I do have concerns about uncontrolled anxiety based on the multitude of symptoms that she was trying to discuss.  Patient did appear to be quite anxious throughout the course of her visit.  Anxiety medication was briefly discussed, but she does state that she sees a psychiatrist who is prescribing her lorazepam on an as-needed basis.  - US Abdomen Complete; Future    Ordering of each unique test  30 minutes spent on the date of the encounter doing chart review, history and exam, documentation and further activities per the note       See Patient Instructions    No follow-ups on file.    Darin Thorpe MD  [facility] VALORIE Calhoun is a 37 year old, presenting for the following health issues:  Follow Up      Patient is a 37-year-old female who presents to the clinic for a follow-up visit.  Patient did present a variety of symptoms and issues that she has been dealing with over the past several months.  She has had issues with tachycardia, she has been seen by cardiologist.  Review of her chart does show that there were no abnormalities noted during the course of her work-up.  Patient had briefly been placed on atenolol, but she reports that she did not tolerate that medication.  Patient goes on to have several questions about her vitamin K and vitamin D levels.  Patient does currently take a vitamin D supplement, but she is not certain of the dose.  She is also concerned as to where she should be taking B12  "shots versus oral supplementation.  Her last B12 level was checked approximately 3 weeks prior to presentation, and it was noted to be greater than 1800.  Patient would go on to report issues with fatigue that would occur after eating.  Initially, she states that it would occur almost immediately, but she would later state that it tends to occur 2 to 3 hours afterwards.  She had concerns that she may be having some issues with hypoglycemia.  Patient also expressed several concerns about abdominal discomfort in her right upper quadrant.  She had been seen at [facility] emergency department in October 2022 where an ultrasound was performed.  She reportedly had gallstones noted on the imaging, but no evidence of cholecystitis.  Patient reports that she was told that she should have a follow-up ultrasound for repeat assessment of her gallstones within the next few months.       Review of Systems   Constitutional: Positive for fatigue.   HENT: Negative.    Respiratory: Negative.    Cardiovascular: Positive for palpitations.   Gastrointestinal: Positive for abdominal pain.   Genitourinary: Negative.    Musculoskeletal: Positive for arthralgias.   Skin: Negative.    Neurological: Positive for dizziness and weakness.          Objective    /60   Pulse 68   Temp 97.7  F (36.5  C)   Resp 18   Ht 1.562 m (5' 1.5\")   Wt 57.2 kg (126 lb)   SpO2 98%   Breastfeeding No   BMI 23.42 kg/m    Body mass index is 23.42 kg/m .  Physical Exam  Vitals reviewed.   HENT:      Head: Normocephalic and atraumatic.      Mouth/Throat:      Mouth: Mucous membranes are moist.      Pharynx: Oropharynx is clear.   Eyes:      Extraocular Movements: Extraocular movements intact.      Conjunctiva/sclera: Conjunctivae normal.      Pupils: Pupils are equal, round, and reactive to light.   Cardiovascular:      Rate and Rhythm: Normal rate and regular rhythm.      Pulses: Normal pulses.      Heart sounds: Normal heart sounds.   Pulmonary:      " Effort: Pulmonary effort is normal.      Breath sounds: Normal breath sounds.   Abdominal:      General: Bowel sounds are normal. There is no distension.      Tenderness: There is no abdominal tenderness.   Skin:     General: Skin is warm and dry.      Capillary Refill: Capillary refill takes less than 2 seconds.   Neurological:      General: No focal deficit present.      Mental Status: She is alert and oriented to person, place, and time.        Diagnostic Test Results: Abdominal ultrasound is pending.

## 2023-03-17 ENCOUNTER — MYC MEDICAL ADVICE (OUTPATIENT)
Dept: INTERNAL MEDICINE | Facility: CLINIC | Age: 38
End: 2023-03-17
Payer: COMMERCIAL

## 2023-03-17 DIAGNOSIS — J31.0 CHRONIC RHINITIS: ICD-10-CM

## 2023-03-17 DIAGNOSIS — H93.13 TINNITUS, BILATERAL: ICD-10-CM

## 2023-03-20 ENCOUNTER — ANCILLARY PROCEDURE (OUTPATIENT)
Dept: ULTRASOUND IMAGING | Facility: CLINIC | Age: 38
End: 2023-03-20
Attending: INTERNAL MEDICINE
Payer: COMMERCIAL

## 2023-03-20 DIAGNOSIS — R10.11 ABDOMINAL PAIN, RIGHT UPPER QUADRANT: ICD-10-CM

## 2023-03-20 PROCEDURE — 76700 US EXAM ABDOM COMPLETE: CPT

## 2023-03-20 NOTE — TELEPHONE ENCOUNTER
Routing refill request to provider for review/approval because:  Last provider who signed is not associated with [facility].

## 2023-03-21 NOTE — TELEPHONE ENCOUNTER
Patient saw Dr. Thorpe 3/13/23 for abdominal pain  Saw Angeles Pierce 10/21/22 for dizziness  Saw Dr. Story 3/29/22 for px

## 2023-03-22 DIAGNOSIS — F41.9 ANXIETY: ICD-10-CM

## 2023-03-22 NOTE — TELEPHONE ENCOUNTER
Pending Prescriptions:                       Disp   Refills    LORazepam (ATIVAN) 0.5 MG tablet           10 tab*0        Sig: Take 1 tablet (0.5 mg) by mouth daily as needed for           anxiety 10 pills/month    Routing refill request to provider for review/approval because:  Drug not on the [facility] refill protocol

## 2023-03-22 NOTE — TELEPHONE ENCOUNTER
Pending Prescriptions:                       Disp   Refills    LORazepam (ATIVAN) 0.5 MG tablet          10 tab*0            Sig: Take 1 tablet (0.5 mg) by mouth daily as needed           for anxiety 10 pills/month    Last filled 12-12-22    Thank you,  Cindy Michel [facility]  [phone]

## 2023-03-23 RX ORDER — CETIRIZINE HYDROCHLORIDE 10 MG/1
TABLET ORAL
Qty: 30 TABLET | Refills: 1 | Status: ON HOLD | OUTPATIENT
Start: 2023-03-23 | End: 2023-10-16

## 2023-03-23 RX ORDER — LORAZEPAM 0.5 MG/1
0.5 TABLET ORAL DAILY PRN
Qty: 10 TABLET | Refills: 0 | OUTPATIENT
Start: 2023-03-23

## 2023-03-28 ENCOUNTER — MYC MEDICAL ADVICE (OUTPATIENT)
Dept: ENDOCRINOLOGY | Facility: CLINIC | Age: 38
End: 2023-03-28
Payer: COMMERCIAL

## 2023-03-29 ENCOUNTER — OFFICE VISIT (OUTPATIENT)
Dept: RHEUMATOLOGY | Facility: CLINIC | Age: 38
End: 2023-03-29
Attending: NURSE PRACTITIONER
Payer: COMMERCIAL

## 2023-03-29 ENCOUNTER — TRANSFERRED RECORDS (OUTPATIENT)
Dept: HEALTH INFORMATION MANAGEMENT | Facility: CLINIC | Age: 38
End: 2023-03-29

## 2023-03-29 VITALS
TEMPERATURE: 98 F | HEART RATE: 65 BPM | DIASTOLIC BLOOD PRESSURE: 78 MMHG | BODY MASS INDEX: 24.83 KG/M2 | OXYGEN SATURATION: 100 % | HEIGHT: 61 IN | WEIGHT: 131.5 LBS | SYSTOLIC BLOOD PRESSURE: 129 MMHG

## 2023-03-29 DIAGNOSIS — E04.9 ENLARGED THYROID: Primary | ICD-10-CM

## 2023-03-29 DIAGNOSIS — M25.50 POLYARTHRALGIA: ICD-10-CM

## 2023-03-29 DIAGNOSIS — R63.4 WEIGHT LOSS: ICD-10-CM

## 2023-03-29 DIAGNOSIS — R76.8 POSITIVE ANA (ANTINUCLEAR ANTIBODY): ICD-10-CM

## 2023-03-29 DIAGNOSIS — F41.9 ANXIETY: ICD-10-CM

## 2023-03-29 PROCEDURE — 99214 OFFICE O/P EST MOD 30 MIN: CPT | Performed by: NURSE PRACTITIONER

## 2023-03-29 PROCEDURE — G0463 HOSPITAL OUTPT CLINIC VISIT: HCPCS | Performed by: NURSE PRACTITIONER

## 2023-03-29 RX ORDER — PREDNISONE 1 MG/1
4 TABLET ORAL DAILY
Qty: 120 TABLET | Refills: 1 | Status: SHIPPED | OUTPATIENT
Start: 2023-03-29 | End: 2023-05-12

## 2023-03-29 ASSESSMENT — PAIN SCALES - GENERAL: PAINLEVEL: MODERATE PAIN (5)

## 2023-03-29 NOTE — NURSING NOTE
"Chief Complaint   Patient presents with     RECHECK     Follow-up     /78   Pulse 65   Temp 98  F (36.7  C) (Oral)   Ht 1.562 m (5' 1.5\")   Wt 59.6 kg (131 lb 8 oz)   SpO2 100%   BMI 24.45 kg/m      Naya Sanchez on 3/29/2023 at 10:29 AM    "

## 2023-03-29 NOTE — PROGRESS NOTES
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 Current treatment: Plaquenil 200 mg BID (8/2022).  Stopped prednisone.    Today:  Is here with concerns of acid boiling, does not have throat symptoms here's it in her ears. Has vibration in her ears. Gets really bad around her period, has vibration in her head. Has throbbing pain of her legs.  Missed prednisone one day and whole body felt beat up. However after she takes prednisone can not see clear for a number of hours.  It feels like she is in a fish bowl looking out, this started 1 month ago.   Did hold plaquenil for 4 days, by day 4 felt sick like she had a fever and sick but did not have a fever. When off of plaquenil she thought she had less anxiety and mentally felt better. Losing a lot of weight and doesn't know why, has lost 30 lbs.     Has seen a psychiatrist, reports she did not tolerate lexapro.      here, states pt has had drastic change in health since had covid. Is depressed, doesn't go out in public, panic attacks. In sept pt started locking herself in her bedroom for 2 weeks and she does this at times.       LOV 12/2022:  Hasn't had a mouth sore since starting plaquenil.   Joints: Joint pain not better. Off and on the same. Notices knee pain after walks awhile. Toes feels with weather changes. Toes and fingers get really cold but don't change colors.   Gi: has an hour of burning of stomach and feeling of anxiety and jittery for an hour of taking plaquenil. Takes iron in the am.   Skin: Continues with rashes      HPI 7/2022: Pt reports worsening  "of her arthralgias and was instructed by [facility] clinic and dermatology  to return to rheumatology.  Prior to covid/or vaccine was healthy and had none of these issues, now would define health as poor. Feels worse during and 1 week after menses.   Has been on 3 medrol dose packs, with significant response, every thing improved.   Feels like she has inflammation going through her body.   +Her reflux is \"boiling\", it's worsening,  can only eat white rice. Reflux does not worsen when she lays down. Woke the other night and feels like she had epigastric spasms.  She wasn't sure if it was reflex, this did make her anxious. Eating makes worse. Has failed 4 medications, now considering surgical options.  +Joint pain: Started post covid, most on legs points to spots on legs., more on L then R.  These spots are not visualized, no change in color, temp sensation, it is non TTP and helps to rub area.  Ankles swell, has an indent when wears socks. Rings are no longer fitting.   +Has gained 10 lbs.   +Is losing hair, diffusely, people she lives with notice hair all over now. Last child was 4 yrs ago.   +Has mouth sores. Most recent 3 weeks, L buccal mucous sore, lasted 3 days and was very painful.  Has had sores of palate. Denies vaginal sores.   +Diarrhea, worked up, dx as IBS.  +Rashes in past all over, none since covid vaccine.   +Feels SOB a lot, unsure if anxiety related.  Maybe some pain with breathing.   + Had L side HA's in beging of this prodrome but have improved.   +Dry mouth, really bad, wakes her up at night.  Dentist told her that she can see dry patches. This started 3 months ago. Eyes are fine. (not taking adderal currently,so not a contributing factor)   +Feels flu like.   + Difference in brain function, forgets things, struggling with short term memory. Speech is ok. No seizures.  ROS: Denies sun sensitivity, did have blotches on face one time after being in sun that lasted 3 days. " "Possible tubal pregnancy vs miscarriage, no blood clots. Np fevers. Denies psych hx. Has ADD, tx with adderal in past. Denies tri-color digit changes. Denies vaginal sores.    HPI at initial consult 10/2021: \"Unique Baker is a 36 year old female with pmhx GERD, seborrheic keratosis, is referred to rheumatology clinic for polyarthralgia. She reports tejinder COVID-19 beginning of March 2021. She received COVID-19 vaccine in April. 3 days after getting the vaccine, she developed a constellation of symptoms including chest tightness and abnormal sensation over her L arm. She went to the ED for chest tightness and had cardiac evaluation. She was started on reflux medication which she still takes. Has seen GI for acid reflux and loose stools. Had an upper GI in May which showed non-specific chronic inflammation in the stomach. She went to  for evaluation of L arm abnormal sensation. She was given a course of steroids (medrol dosepak) which helped her symptoms somewhat. Shortly thereafter, she started developing sensation of \"burning\" in her head and chest. She then started developing joint pain (around June or July) over her knees, ankles, and wrists. She presented to UC again for ongoing symptoms and received steroid burst which helped her symptoms (around September) but they have not subsided. She has since been getting \"flares\" of joint pain.      She describes her flares as abrupt onset mild pain over her knees and ankles when she is resting/watching TV. She takes advil for pain which helps resolve the pain. Pain can last for ~15-20 minutes. Pain episodes come on 4 times a week. However, she feels \"tightness\" over her knees and ankles mid-day everyday. She tries and stretch from time to time. Walking helps the stiffness but not the pain. Overall, the intensity of pain has improved since it started in July. Pain does not wake her up at night anymore but it used to back in July. She denies any AM pain or " "stiffness over her joints. She denies any joint swelling, erythema or warmth.    She denies hx of psoriasis. However, she reports she started having unexplained rash ~4 years ago. She has seen dermatology in the past and has been diagnosed with seborrheic keratosis. She denies photosensitivity. Rash can be over her back, legs, stomach, neck. Denies any facial rash. She has been given topical steroids by dermatology which helps but rash recurs. She describes the rash as \"bumpy\" and pruritic. She denies scalp flakiness.    Denies fatigue, fevers, chills, weight loss, night sweats, vision changes, eye pain/redness, inflammatory eye disease, dry eyes, dry mouth, +reports episode of painful oral ulcer over the roof of the mouth which has resolved, denies nasal ulcers, +chronic hair loss/thinning with hx of alopecia, denies raynaud's, cough, SOB, pleurisy, difficulty swallowing, abdominal pain, diarrhea, hematochezia, melena, dysuria, back pain, enthesitis, dactylitis, +numbness/tingling over hands and feet. No hx of IBD. No hx of blood clots. One ectopic pregnancy.   Pertinent labs and imaging: (per chart review in [facility] and care everywhere)   Labs:   10/22/21: negative CBC w diff, CMP, +FELICITY 1:80 homogeneous, no hypocomplementemia, negative dsDNA, SSA, SSB, Sm, RNP, Hep C, RF, CCP, Scl-70  8/16/21: +ESR 32, negative CRP  5/1/21: negative BMP, CRP, CBC w diff, negative LFTs      [facility]:  Unsure  Sister passed from stomach CA.   Dad gout    Social History      Active Problem list:  Patient Active Problem List    Diagnosis Date Noted     Breast pain 02/21/2022     Priority: Medium     Menorrhagia with regular cycle 02/21/2022     Priority: Medium     Dysmenorrhea 02/21/2022     Priority: Medium     Controlled substance agreement signed- checked 8/12/2020 - no concerns 08/09/2019     Priority: Medium     Patient is followed by KELTON JACOB for ongoing prescription of stimulants.  All refills should be approved by this " "provider, or covering partner.    Medication(s): adderall.   Maximum quantity per month: 30  Clinic visit frequency required: Q 6  months     Controlled substance agreement on file: Yes       Date(s): 8/9/2019  Re sign 2/3/2021  Neuropsych evaluation for ADD completed:  Yes, completed [place]alba , on file and diagnosis confirmed  Done 5/14/2019    Last [facility] website verification:  none  [email or web address]           Irritable bowel syndrome 07/15/2015     Priority: Medium     CARDIOVASCULAR SCREENING; LDL GOAL LESS THAN 160 10/31/2010     Priority: Medium     Esophageal reflux 06/21/2006     Priority: Medium            Objective:     Physical Exam  Blood Pressure 129/78   Pulse 65   Temperature 98  F (36.7  C) (Oral)   Height 1.562 m (5' 1.5\")   Weight 59.6 kg (131 lb 8 oz)   Oxygen Saturation 100%   Body Mass Index 24.45 kg/m    Body mass index is 24.45 kg/m .    Constitutional: Noticeable weight loss, Normal body habitus with out gross deformities.   Psych: tangible  ENT: Thyroid appears enlarged  Skin: No visible rashes.     MSK:  No visible swelling or decreased ROM    Labs:    Lab Results   Component Value Date    ALT 14 02/17/2023    AST 19 02/17/2023    CR 0.66 02/17/2023      Latest Reference Range & Units 07/14/22 14:34 07/14/22 14:38   Lyme Disease Antibodies Serum <0.90  0.21    EBV Capsid Blake IgM Instrument Value <36.0 U/mL 12.8    EBV Capsid Antibody IgM No detectable antibody.  No detectable antibody.    ANTI NUCLEAR BLAKE IGG BY IFA WITH REFLEX  Rpt    Beta 2 Glycoprotein 1 Antibody IgA <7.0 U/mL 9.3 (H)    Beta 2 Glycoprotein 1 Antibody IgG <7.0 U/mL 0.9    Beta 2 Glycoprotein 1 Antibody IgM <7.0 U/mL 29.0 (H)    Cardiolipin IgG Blkae Negative   Negative   Cardiolipin IgM Blake Negative   Negative   Complement C3 81 - 157 mg/dL 157    Complement C4 13 - 39 mg/dL 56 (H)    DNA-ds <10.0 IU/mL 2.9    Scleroderma Antibody Scl-70 RADHA IgG Negative  Negative    Thyroid Peroxidase Antibody <35 " IU/mL <10    (H): Data is abnormally high  Rpt: View report in Results Review for more information    Imaging: reviewed            Assessment and Plan:     #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 Pt with profound change in health status post covid infection now with repeat covid infection. Reports prior to covid vaccine and original infection was completely healthy. Overall Pt's symptoms dramatically improved when on prednisone   Pt has had a changing and often PAN positive ROS. Symptoms potentially related to a CTD include; dry mouth, hair loss, mouth sores, arthralgias, reflux, feeling of SOB/chest pain, change in cognitive function, past rash in sun. DOCUMENTED mouth sores on exam previously.  Pt has wax and weaned with symptoms, cognitive function and with mental health. Over all no improvement on plaquenil and pt thinks her blurry fishbowl vision, poor mental health and anxiety is caused by the plaquenil.     Given potential negative side effects of plaquenil we will discontinue this. Given repeat TRENT negative, question if original + trent being from post viral.  At this time do not feel further treatment warranted for UCTD.  If further question arises of UCTD would refer to one of my colleagues at that time for a second opinion. Will taper off of prednisone.     #2) low + beta 2 glycoprotein IgM and IgA: Found during work up for potential SLE. Pt has had 1 ectopic pg, otherwise no miscarriages or blood clots. No [facility] of blood clots. Pt would like to discuss results with hematology and have previously referred.     #3) 20 lb wgt loss: Will check TSH, and US. Pt to see PCP. Wgt loss possible mental health related vs related to physical health.     Pt instructions:    1)  Stop plaquenil.     2) Taper prednisone, 4 mg daily    3) See me back in April     4) See neurology for your vibration in your head and fish bowl vision    5)  Follow up with psychiatry     6) Ultra sound of your neck     7) Labs 1 week before you see me back in April 35 minute spent on the date of the encounter doing chart review, history and exam, documentation and further activities per the note      Erum Allen CNP  Rheumatology, [facility]

## 2023-03-29 NOTE — TELEPHONE ENCOUNTER
Pending Prescriptions:                       Disp   Refills    LORazepam (ATIVAN) 0.5 MG tablet          10 tab*0            Sig: Take 1 tablet (0.5 mg) by mouth daily as needed           for anxiety 10 pills/month    Last filled 11-7-22    Thank you,  Cindy Michel [facility]  [phone]

## 2023-03-29 NOTE — LETTER
3/29/2023       RE: Unique Baker  [address] [facility] [address]     Dear Colleague,    Thank you for referring your patient, Unique Baker, to the [facility] RHEUMATOLOGY at [facility]. Please see a copy of my visit note below.          Rheumatology Clinic Visit  Erum Allen CNP      Unique Baker  YOB: 1985    Age: 37 year old   MRN# [id number]       Date of Visit: 03/29/2023  Primary care provider: Celina Riggs              Subjective:     Unique is a 37 year old female presents today for Follow-up to work up for LOW +FELICITY 1:80 with arthralgias that developed post covid vaccine and had covid the month prior, suspicious for UCTD vs post viral syndrome. Current treatment: Plaquenil 200 mg BID (8/2022).  Stopped prednisone.    Today:  Is here with concerns of acid boiling, does not have throat symptoms here's it in her ears. Has vibration in her ears. Gets really bad around her period, has vibration in her head. Has throbbing pain of her legs.  Missed prednisone one day and whole body felt beat up. However after she takes prednisone can not see clear for a number of hours.  It feels like she is in a fish bowl looking out, this started 1 month ago.   Did hold plaquenil for 4 days, by day 4 felt sick like she had a fever and sick but did not have a fever. When off of plaquenil she thought she had less anxiety and mentally felt better. Losing a lot of weight and doesn't know why, has lost 30 lbs.     Has seen a psychiatrist, reports she did not tolerate lexapro.      here, states pt has had drastic change in health since had covid. Is depressed, doesn't go out in public, panic attacks. In sept pt started locking herself in her bedroom for 2 weeks and she does this at times.       LOV 12/2022:  Hasn't had a mouth sore since starting plaquenil.   Joints: Joint pain not better. Off and on the same. Notices knee pain  "after walks awhile. Toes feels with weather changes. Toes and fingers get really cold but don't change colors.   Gi: has an hour of burning of stomach and feeling of anxiety and jittery for an hour of taking plaquenil. Takes iron in the am.   Skin: Continues with rashes      HPI 7/2022: Pt reports worsening of her arthralgias and was instructed by [facility] clinic and dermatology  to return to rheumatology.  Prior to covid/or vaccine was healthy and had none of these issues, now would define health as poor. Feels worse during and 1 week after menses.   Has been on 3 medrol dose packs, with significant response, every thing improved.   Feels like she has inflammation going through her body.   +Her reflux is \"boiling\", it's worsening,  can only eat white rice. Reflux does not worsen when she lays down. Woke the other night and feels like she had epigastric spasms.  She wasn't sure if it was reflex, this did make her anxious. Eating makes worse. Has failed 4 medications, now considering surgical options.  +Joint pain: Started post covid, most on legs points to spots on legs., more on L then R.  These spots are not visualized, no change in color, temp sensation, it is non TTP and helps to rub area.  Ankles swell, has an indent when wears socks. Rings are no longer fitting.   +Has gained 10 lbs.   +Is losing hair, diffusely, people she lives with notice hair all over now. Last child was 4 yrs ago.   +Has mouth sores. Most recent 3 weeks, L buccal mucous sore, lasted 3 days and was very painful.  Has had sores of palate. Denies vaginal sores.   +Diarrhea, worked up, dx as IBS.  +Rashes in past all over, none since covid vaccine.   +Feels SOB a lot, unsure if anxiety related.  Maybe some pain with breathing.   + Had L side HA's in beging of this prodrome but have improved.   +Dry mouth, really bad, wakes her up at night.  Dentist told her that she can see dry patches. This started 3 months ago. Eyes " "are fine. (not taking adderal currently,so not a contributing factor)   +Feels flu like.   + Difference in brain function, forgets things, struggling with short term memory. Speech is ok. No seizures.  ROS: Denies sun sensitivity, did have blotches on face one time after being in sun that lasted 3 days. Possible tubal pregnancy vs miscarriage, no blood clots. Np fevers. Denies psych hx. Has ADD, tx with adderal in past. Denies tri-color digit changes. Denies vaginal sores.    HPI at initial consult 10/2021: \"Unique Baker is a 36 year old female with pmhx GERD, seborrheic keratosis, is referred to rheumatology clinic for polyarthralgia. She reports tejinder COVID-19 beginning of March 2021. She received COVID-19 vaccine in April. 3 days after getting the vaccine, she developed a constellation of symptoms including chest tightness and abnormal sensation over her L arm. She went to the ED for chest tightness and had cardiac evaluation. She was started on reflux medication which she still takes. Has seen GI for acid reflux and loose stools. Had an upper GI in May which showed non-specific chronic inflammation in the stomach. She went to  for evaluation of L arm abnormal sensation. She was given a course of steroids (medrol dosepak) which helped her symptoms somewhat. Shortly thereafter, she started developing sensation of \"burning\" in her head and chest. She then started developing joint pain (around June or July) over her knees, ankles, and wrists. She presented to  again for ongoing symptoms and received steroid burst which helped her symptoms (around September) but they have not subsided. She has since been getting \"flares\" of joint pain.      She describes her flares as abrupt onset mild pain over her knees and ankles when she is resting/watching TV. She takes advil for pain which helps resolve the pain. Pain can last for ~15-20 minutes. Pain episodes come on 4 times a week. However, she feels \"tightness\" " "over her knees and ankles mid-day everyday. She tries and stretch from time to time. Walking helps the stiffness but not the pain. Overall, the intensity of pain has improved since it started in July. Pain does not wake her up at night anymore but it used to back in July. She denies any AM pain or stiffness over her joints. She denies any joint swelling, erythema or warmth.    She denies hx of psoriasis. However, she reports she started having unexplained rash ~4 years ago. She has seen dermatology in the past and has been diagnosed with seborrheic keratosis. She denies photosensitivity. Rash can be over her back, legs, stomach, neck. Denies any facial rash. She has been given topical steroids by dermatology which helps but rash recurs. She describes the rash as \"bumpy\" and pruritic. She denies scalp flakiness.    Denies fatigue, fevers, chills, weight loss, night sweats, vision changes, eye pain/redness, inflammatory eye disease, dry eyes, dry mouth, +reports episode of painful oral ulcer over the roof of the mouth which has resolved, denies nasal ulcers, +chronic hair loss/thinning with hx of alopecia, denies raynaud's, cough, SOB, pleurisy, difficulty swallowing, abdominal pain, diarrhea, hematochezia, melena, dysuria, back pain, enthesitis, dactylitis, +numbness/tingling over hands and feet. No hx of IBD. No hx of blood clots. One ectopic pregnancy.   Pertinent labs and imaging: (per chart review in [facility] and care everywhere)   Labs:   10/22/21: negative CBC w diff, CMP, +FELICITY 1:80 homogeneous, no hypocomplementemia, negative dsDNA, SSA, SSB, Sm, RNP, Hep C, RF, CCP, Scl-70  8/16/21: +ESR 32, negative CRP  5/1/21: negative BMP, CRP, CBC w diff, negative LFTs      FMH:  Unsure  Sister passed from stomach CA.   Dad gout    Social History      Active Problem list:  Patient Active Problem List    Diagnosis Date Noted    Breast pain 02/21/2022     Priority: Medium    Menorrhagia with regular cycle 02/21/2022     " "Priority: Medium    Dysmenorrhea 02/21/2022     Priority: Medium    Controlled substance agreement signed- checked 8/12/2020 - no concerns 08/09/2019     Priority: Medium     Patient is followed by KELTON JACOB for ongoing prescription of stimulants.  All refills should be approved by this provider, or covering partner.    Medication(s): adderall.   Maximum quantity per month: 30  Clinic visit frequency required: Q 6  months     Controlled substance agreement on file: Yes       Date(s): 8/9/2019  Re sign 2/3/2021  Neuropsych evaluation for ADD completed:  Yes, completed Rosalba , on file and diagnosis confirmed  Done 5/14/2019    Last [facility] website verification:  none  [email or web address]          Irritable bowel syndrome 07/15/2015     Priority: Medium    CARDIOVASCULAR SCREENING; LDL GOAL LESS THAN 160 10/31/2010     Priority: Medium    Esophageal reflux 06/21/2006     Priority: Medium            Objective:     Physical Exam  Blood Pressure 129/78   Pulse 65   Temperature 98  F (36.7  C) (Oral)   Height 1.562 m (5' 1.5\")   Weight 59.6 kg (131 lb 8 oz)   Oxygen Saturation 100%   Body Mass Index 24.45 kg/m    Body mass index is 24.45 kg/m .    Constitutional: Noticeable weight loss, Normal body habitus with out gross deformities.   Psych: tangible  ENT: Thyroid appears enlarged  Skin: No visible rashes.     MSK:  No visible swelling or decreased ROM    Labs:    Lab Results   Component Value Date    ALT 14 02/17/2023    AST 19 02/17/2023    CR 0.66 02/17/2023      Latest Reference Range & Units 07/14/22 14:34 07/14/22 14:38   Lyme Disease Antibodies Serum <0.90  0.21    EBV Capsid Blake IgM Instrument Value <36.0 U/mL 12.8    EBV Capsid Antibody IgM No detectable antibody.  No detectable antibody.    ANTI NUCLEAR BLAKE IGG BY IFA WITH REFLEX  Rpt    Beta 2 Glycoprotein 1 Antibody IgA <7.0 U/mL 9.3 (H)    Beta 2 Glycoprotein 1 Antibody IgG <7.0 U/mL 0.9    Beta 2 Glycoprotein 1 Antibody " IgM <7.0 U/mL 29.0 (H)    Cardiolipin IgG Irene Negative   Negative   Cardiolipin IgM Irene Negative   Negative   Complement C3 81 - 157 mg/dL 157    Complement C4 13 - 39 mg/dL 56 (H)    DNA-ds <10.0 IU/mL 2.9    Scleroderma Antibody Scl-70 RADHA IgG Negative  Negative    Thyroid Peroxidase Antibody <35 IU/mL <10    (H): Data is abnormally high  Rpt: View report in Results Review for more information    Imaging: reviewed            Assessment and Plan:     #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 Pt with profound change in health status post covid infection now with repeat covid infection. Reports prior to covid vaccine and original infection was completely healthy. Overall Pt's symptoms dramatically improved when on prednisone   Pt has had a changing and often PAN positive ROS. Symptoms potentially related to a CTD include; dry mouth, hair loss, mouth sores, arthralgias, reflux, feeling of SOB/chest pain, change in cognitive function, past rash in sun. DOCUMENTED mouth sores on exam previously.  Pt has wax and weaned with symptoms, cognitive function and with mental health. Over all no improvement on plaquenil and pt thinks her blurry fishbowl vision, poor mental health and anxiety is caused by the plaquenil.     Given potential negative side effects of plaquenil we will discontinue this. Given repeat TRENT negative, question if original + trent being from post viral.  At this time do not feel further treatment warranted for UCTD.  If further question arises of UCTD would refer to one of my colleagues at that time for a second opinion. Will taper off of prednisone.     #2) low + beta 2 glycoprotein IgM and IgA: Found during work up for potential SLE. Pt has had 1 ectopic pg, otherwise no miscarriages or blood clots. No FMH of blood clots. Pt would like to discuss results with hematology and have previously referred.     #3) 20 lb wgt loss:  Will check TSH, and US. Pt to see PCP. Wgt loss possible mental health related vs related to physical health.     Pt instructions:    1)  Stop plaquenil.     2) Taper prednisone, 4 mg daily    3) See me back in April     4) See neurology for your vibration in your head and fish bowl vision    5) Follow up with psychiatry     6) Ultra sound of your neck     7) Labs 1 week before you see me back in April     35 minute spent on the date of the encounter doing chart review, history and exam, documentation and further activities per the note      Erum Allen CNP  Rheumatology, [facility]

## 2023-03-29 NOTE — PATIENT INSTRUCTIONS
1)  Stop plaquenil.     2) Taper prednisone, 4 mg daily    3) See me back in April     4) See neurology for your vibration in your head and fish bowl vision    5) Follow up with psychiatry     6) Ultra sound of your neck     7) Labs 1 week before you see me back in April

## 2023-03-30 ENCOUNTER — HOSPITAL ENCOUNTER (OUTPATIENT)
Dept: ULTRASOUND IMAGING | Facility: CLINIC | Age: 38
Discharge: HOME | End: 2023-03-30
Attending: NURSE PRACTITIONER | Admitting: NURSE PRACTITIONER
Payer: COMMERCIAL

## 2023-03-30 ENCOUNTER — LAB (OUTPATIENT)
Dept: LAB | Facility: CLINIC | Age: 38
End: 2023-03-30
Payer: COMMERCIAL

## 2023-03-30 ENCOUNTER — TELEPHONE (OUTPATIENT)
Dept: RHEUMATOLOGY | Facility: CLINIC | Age: 38
End: 2023-03-30

## 2023-03-30 DIAGNOSIS — E04.9 ENLARGED THYROID: ICD-10-CM

## 2023-03-30 DIAGNOSIS — R63.4 WEIGHT LOSS: ICD-10-CM

## 2023-03-30 DIAGNOSIS — R76.8 POSITIVE ANA (ANTINUCLEAR ANTIBODY): ICD-10-CM

## 2023-03-30 DIAGNOSIS — M25.50 POLYARTHRALGIA: ICD-10-CM

## 2023-03-30 PROCEDURE — 86800 THYROGLOBULIN ANTIBODY: CPT

## 2023-03-30 PROCEDURE — 86038 ANTINUCLEAR ANTIBODIES: CPT

## 2023-03-30 PROCEDURE — 36415 COLL VENOUS BLD VENIPUNCTURE: CPT

## 2023-03-30 PROCEDURE — 76536 US EXAM OF HEAD AND NECK: CPT

## 2023-03-30 PROCEDURE — 86036 ANCA SCREEN EACH ANTIBODY: CPT

## 2023-03-30 PROCEDURE — 86376 MICROSOMAL ANTIBODY EACH: CPT

## 2023-03-30 PROCEDURE — 86037 ANCA TITER EACH ANTIBODY: CPT

## 2023-03-30 NOTE — TELEPHONE ENCOUNTER
Per office visit yesterday with Erum Allen NP, Labs were to be done yesterday.      Called patient and left detailed message with that information.      Natividad Matthew RN

## 2023-03-30 NOTE — TELEPHONE ENCOUNTER
[facility] Call Center    Phone Message    May a detailed message be left on voicemail: yes     Reason for Call: Other: labs      Patient has questions about the lab orders. Are labs needed after the taper or 1 week before f/u appt?    Action Taken: Message routed to:  Adult Clinics: Rheumatology p [phone]    Travel Screening: Not Applicable

## 2023-03-31 LAB
ANA SER QL IF: NEGATIVE
ANCA AB PATTERN SER IF-IMP: NORMAL
C-ANCA TITR SER IF: NORMAL {TITER}
THYROGLOB AB SERPL IA-ACNC: <20 IU/ML
THYROPEROXIDASE AB SERPL-ACNC: <10 IU/ML

## 2023-04-04 ENCOUNTER — MYC MEDICAL ADVICE (OUTPATIENT)
Dept: RHEUMATOLOGY | Facility: CLINIC | Age: 38
End: 2023-04-04
Payer: COMMERCIAL

## 2023-04-07 ENCOUNTER — VIRTUAL VISIT (OUTPATIENT)
Dept: PSYCHIATRY | Facility: CLINIC | Age: 38
End: 2023-04-07
Payer: COMMERCIAL

## 2023-04-07 DIAGNOSIS — F41.9 ANXIETY: Primary | ICD-10-CM

## 2023-04-07 PROCEDURE — 99214 OFFICE O/P EST MOD 30 MIN: CPT | Mod: VID | Performed by: PSYCHIATRY & NEUROLOGY

## 2023-04-07 RX ORDER — LORAZEPAM 0.5 MG/1
0.5 TABLET ORAL DAILY PRN
Qty: 12 TABLET | Refills: 1 | Status: SHIPPED | OUTPATIENT
Start: 2023-04-07 | End: 2023-06-07

## 2023-04-07 NOTE — PROGRESS NOTES
Virtual Visit Details    Type of service:  Video Visit   Video Start Time: 9:11 AM  Video End Time:9:33 AM    Originating Location (pt. Location): Home  Distant Location (provider location):  Off-site  Platform used for Video Visit: [facility] Psychiatry Consult Note    IDENTIFICATION   Name: Unique Baker   : 1985/37 year old      Sex:    @ female          Telemedicine Visit: The patient's condition can be safely assessed and treated via synchronous audio and visual telemedicine encounter.        Face to Face/patient Contact total time: 22  minutes  Pre Charting time: 1 minutes; Post charting time, communication and other activities: 5 minutes; Total time 28 minutes  9:10 AM          SUBJECTIVE   Sx have been improving the last two weeks. Physically was declining with hydroxychlorquine. Anxiety was worse as well as having worsening weakness, twitching, vision change. With discontinuation anxiety and mental health is improving - 1 week fof vision is clear (no long fish bone).     Anxiety is present but not all day long. Has been taking lorazepam as needed, and usage decreasing. Anxiety overall is much better. Able to go out and do things.         PHQ-9 scores:      10/31/2022     9:47 AM 2022     7:24 AM 2022     9:04 AM   PHQ-9 SCORE   PHQ-9 Total Score MyChart 16 (Moderately severe depression) 8 (Mild depression) 16 (Moderately severe depression)   PHQ-9 Total Score 16 8    8 16    16       RENÉE-7 scores:      10/3/2022     6:48 PM 10/31/2022     9:48 AM 2022     9:05 AM   RENÉE-7 SCORE   Total Score 6 (mild anxiety) 18 (severe anxiety) 17 (severe anxiety)   Total Score 6 18 17    17       OBJECTIVE     Vital Signs:   There were no vitals taken for this visit.    Labs:  n/a    Current Medications:  Current Outpatient Medications   Medication     acetaminophen (TYLENOL) 500 MG tablet     cetirizine (ZYRTEC) 10 MG tablet     Cyanocobalamin (VITAMIN B12 TR PO)      cyclobenzaprine (FLEXERIL) 5 MG tablet     diclofenac (VOLTAREN) 1 % topical gel     famotidine (PEPCID) 20 MG tablet     FERROUS BISGLYCINATE CHELATE PO     fluocinonide emulsified base 0.05 % external cream     fluticasone (FLONASE) 50 MCG/ACT nasal spray     hydroxychloroquine (PLAQUENIL) 200 MG tablet     hydrOXYzine (ATARAX) 25 MG tablet     LORazepam (ATIVAN) 0.5 MG tablet     multivitamin (ONE-DAILY) tablet     predniSONE (DELTASONE) 1 MG tablet     predniSONE (DELTASONE) 5 MG tablet     sodium chloride 1 GM tablet     sucralfate (CARAFATE) 1 GM tablet     vitamin D2 (ERGOCALCIFEROL) 00759 units (1250 mcg) capsule     No current facility-administered medications for this visit.     Facility-Administered Medications Ordered in Other Visits   Medication     fentaNYL Citrate (PF) (SUBLIMAZE) injection        The [place] Prescription Monitoring Program has been reviewed and there are no concerns about diversionary activity for controlled substances at this time.        ADDED HISTORY   Started hydroxychloroquine in August, and had anxiety from this time.  Per chart start date was 8/22/2022.    MENTAL STATUS EXAMINATION:   Appearance: Good attention to grooming and hygiene, casual garb  Attitude: Cooperative  Eye Contact: Good  Gait and Station: Within normal limits  Psychomotor Behavior: Sitting, overall within normal limits  Oriented to: Grossly person place and time  Attention Span and Concentration: Grossly intact  Speech: Normal tone tone  Mood: Minimally anxious  Affect: Euthymic  Associations:  no loose associations  Thought Process:  logical, linear and goal oriented  Thought Content: No evidence of delusions or suicidal or homicidal ideation plan or intent  Memory: grossly intact  Fund of Knowledge: Good  Insight:  good  Judgment:  intact, adequate for safety  Impulse Control:  intact        DIAGNOSES:   Unspecified Anxiety Disorder  Medication-induced anxiety disorder  Rule Out Anxiety Disorder Due to  Another Medical Condition  Unspecified Depressive Disorder      ASSESSMENT:   Pt dealing with new onset clinically significant anxiety with avoidance behaviors and depressive sx in aftermath of 2nd COVID-19 infection, physical sx, and anxiety reaction to sx. has not tolerated med trials.  Symptoms considerably improving in context of hydroxychloroquine discontinuation.  Whether directly or indirectly through medication side effects, anxiety was triggered.    Today Unique Baker reports on suicidal ideaitons. In addition, she has notable risk factors for self-harm, including anxiety. However, risk is mitigated by commitment to family, [employer] beliefs and absence of past attempts. Therefore, based on all available evidence including the factors cited above, she does not appear to be at imminent risk for self-harm, does not meet criteria for a 72-hr hold, and therefore remains appropriate for ongoing outpatient level of care.       PLAN:       Patient advised of consultative model. Patient will continue to be seen for ongoing consultation and stabilization.    Does not meet criteria for involuntary treatment or hospitalization    Hydroxyzine 25 mg po qday prn anxiety -Risks, benefits and alternatives discussed.  Patient provides verbal consent to treatment.    Lorazepam 0.25 - 0.5 mg po qday prn anxiety 0.25 mg ineffective-Risks, benefits and alternatives discussed.  Patient provides verbal consent to treatment.    DC'd escitalopram (nausea, restlessness, insomnia), buspirone    pharmacogenomic testing - completed 11/17/22, pending [facility] assessment    Psychotherapy upcoming    Return in 4 weeks      Administrative Billing:   Time spent with patient was greater than 50% of time and/or significant time was spent in counseling and coordination of care regarding above diagnoses and treatment plan. Pre charting time and post charting time/documentation/coordination are done on date of service.      Signed:   Shade Strickland  M.D.  [facility] Psychiatry [facility]    Disclaimer: This note consists of symbols derived from keyboarding, dictation and/or voice recognition software. As a result, there may be errors in the script that have gone undetected. Please consider this when interpreting information found in this chart.

## 2023-04-07 NOTE — PATIENT INSTRUCTIONS
"Patient Education   Collaborative Care Psychiatry Service  What to Expect  Here's what to expect from your Collaborative Care Psychiatry Service (CCPS).   About CCPS  CCPS means 2 people work together to help you get better. You'll meet with a behavioral health clinician and a psychiatric doctor. A behavioral health clinician helps people with mental health problems by talking with them. A psychiatric doctor helps people by giving them medicine.  How it works  At every visit, you'll see the behavioral health clinician (BHC) first. They'll talk with you about how you're doing and teach you how to feel better.   Then you'll see the psychiatric doctor. This doctor can help you deal with troubling thoughts and feelings by giving you medicine. They'll make sure you know the plan for your care.   CCPS usually takes 3 to 6 visits. If you need more visits, we may have you start seeing a different psychiatric doctor for ongoing care.  If you have any questions or concerns, we'll be glad to talk with you.  About visits  Be open  At your visits, please talk openly about your problems. It may feel hard, but it's the best way for us to help you.  Cancelling visits  If you can't come to your visit, please call us right away at [phone]. If you don't cancel at least 24 hours (1 full day) before your visit, that's \"late cancellation.\"  Being late to visits  Being very late is the same as not showing up. You will be a \"no show\" if:  Your appointment starts with a BHC, and you're more than 15 minutes late for a 30-minute (half hour) visit. This will also cancel your appointment with the psychiatric doctor.  Your appointment is with a psychiatric doctor only, and you're more than 15 minutes late for a 30-minute (half hour) visit.  Your appointment is with a psychiatric doctor only, and you're more than 30 minutes late for a 60-minute (full hour) visit.  If you cancel late or don't show up 2 times within 6 months, we may end your " care.   Getting help between visits  If you need help between visits, you can call us Monday to Friday from 8 a.m. to 4:30 p.m. at [phone].  Emergency care  Call 911 or go to the nearest emergency department if your life or someone else's life is in danger.  Call 988 anytime to reach the national Suicide and Crisis hotline.  Medicine refills  To refill your medicine, call your pharmacy. You can also call [facility]'s Behavioral Access at [phone], Monday to Friday, 8 a.m. to 4:30 p.m. It can take 1 to 3 business days to get a refill.   Forms, letters, and tests  You may have papers to fill out, like FMLA, short-term disability, and workability. We can help you with these forms at your visits, but you must have an appointment. You may need more than 1 visit for this, to be in an intensive therapy program, or both.  Before we can give you medicine for ADHD, we may refer you to get tested for it or confirm it another way.  We may not be able to give you an emotional support animal letter.  We don't do mental health checks ordered by the court.   We don't do mental health testing, but we can refer you to get tested.   Thank you for choosing us for your care.  For informational purposes only. Not to replace the advice of your health care provider. Copyright   2022 [facility]. All rights reserved. Layar [id number] - 12/22.     Patient Education   Collaborative Care Psychiatry Service  What to Expect  Here's what to expect from your Collaborative Care Psychiatry Service (CCPS).   About CCPS  CCPS means 2 people work together to help you get better. You'll meet with a behavioral health clinician and a psychiatric doctor. A behavioral health clinician helps people with mental health problems by talking with them. A psychiatric doctor helps people by giving them medicine.  How it works  At every visit, you'll see the behavioral health clinician (BHC) first. They'll talk with you about how  "you're doing and teach you how to feel better.   Then you'll see the psychiatric doctor. This doctor can help you deal with troubling thoughts and feelings by giving you medicine. They'll make sure you know the plan for your care.   CCPS usually takes 3 to 6 visits. If you need more visits, we may have you start seeing a different psychiatric doctor for ongoing care.  If you have any questions or concerns, we'll be glad to talk with you.  About visits  Be open  At your visits, please talk openly about your problems. It may feel hard, but it's the best way for us to help you.  Cancelling visits  If you can't come to your visit, please call us right away at [phone]. If you don't cancel at least 24 hours (1 full day) before your visit, that's \"late cancellation.\"  Being late to visits  Being very late is the same as not showing up. You will be a \"no show\" if:  Your appointment starts with a [facility], and you're more than 15 minutes late for a 30-minute (half hour) visit. This will also cancel your appointment with the psychiatric doctor.  Your appointment is with a psychiatric doctor only, and you're more than 15 minutes late for a 30-minute (half hour) visit.  Your appointment is with a psychiatric doctor only, and you're more than 30 minutes late for a 60-minute (full hour) visit.  If you cancel late or don't show up 2 times within 6 months, we may end your care.   Getting help between visits  If you need help between visits, you can call us Monday to Friday from 8 a.m. to 4:30 p.m. at [phone].  Emergency care  Call 911 or go to the nearest emergency department if your life or someone else's life is in danger.  Call 428 anytime to reach the national Suicide and Crisis hotline.  Medicine refills  To refill your medicine, call your pharmacy. You can also call [facility]'s Behavioral Access at [phone], Monday to Friday, 8 a.m. to 4:30 p.m. It can take 1 to 3 business days to get a refill.   Forms, " letters, and tests  You may have papers to fill out, like FMLA, short-term disability, and workability. We can help you with these forms at your visits, but you must have an appointment. You may need more than 1 visit for this, to be in an intensive therapy program, or both.  Before we can give you medicine for ADHD, we may refer you to get tested for it or confirm it another way.  We may not be able to give you an emotional support animal letter.  We don't do mental health checks ordered by the court.   We don't do mental health testing, but we can refer you to get tested.   Thank you for choosing us for your care.  For informational purposes only. Not to replace the advice of your health care provider. Copyright   2022 [facility]. All rights reserved. Amber[employer]ds [id number] - 12/22.

## 2023-04-07 NOTE — NURSING NOTE
Is the patient currently in the state of [place]? YES    Visit mode:VIDEO    If the visit is dropped, the patient can be reconnected by: VIDEO VISIT: Text to cell phone: [phone]    Will anyone else be joining the visit? NO      How would you like to obtain your AVS? MyChart    Are changes needed to the allergy or medication list? NO    Reason for visit: Follow up

## 2023-04-07 NOTE — Clinical Note
Erum,  Anxiety is much improved with discontinuation of hydroxychloroquine.  Some of this may have been mediated indirectly by some of the side effects including visual.  Anxiety is not listed as a side effect associated with hydroxychloroquine, but in general with Dmards there is a higher incidence of mental health symptoms but unclear on correlation/cause given pre-existing disease.  She is doing much better at this point and other than lorazepam as needed deferring any psychotropic trials.  Sincerely, Shade Strickland M.D. Consultative Psychiatrist Program Medical Director, Lead Collaborative Care Psychiatry Service

## 2023-04-11 ENCOUNTER — MYC MEDICAL ADVICE (OUTPATIENT)
Dept: INTERNAL MEDICINE | Facility: CLINIC | Age: 38
End: 2023-04-11
Payer: COMMERCIAL

## 2023-04-11 NOTE — TELEPHONE ENCOUNTER
Please review Smart Mocha message and advise. Patient's 3/23/23 lab results from [facility] are available in Care Everywhere.     Kathi Chavez RN  [facility]

## 2023-04-12 NOTE — TELEPHONE ENCOUNTER
Called [facility] for Plaquenil eye report. They will fax over.   JSOE ANTONIO Trujillo   Email: [email or web address]  [facility] - Rheumatology  Phone: [phone]  Fax: [phone]

## 2023-04-12 NOTE — TELEPHONE ENCOUNTER
Plaquenil eye report received. Flowsheet completed and copy sent to abstract for scanning.     JOSE ANTONIO Trujillo   Email: [email or web address]  [facility] - Rheumatology  Phone: [phone]  Fax: [phone]

## 2023-04-20 ENCOUNTER — PATIENT OUTREACH (OUTPATIENT)
Dept: CARE COORDINATION | Facility: CLINIC | Age: 38
End: 2023-04-20
Payer: COMMERCIAL

## 2023-04-27 ENCOUNTER — OFFICE VISIT (OUTPATIENT)
Dept: RHEUMATOLOGY | Facility: CLINIC | Age: 38
End: 2023-04-27
Payer: COMMERCIAL

## 2023-04-27 VITALS
BODY MASS INDEX: 25.47 KG/M2 | HEART RATE: 78 BPM | DIASTOLIC BLOOD PRESSURE: 74 MMHG | SYSTOLIC BLOOD PRESSURE: 111 MMHG | WEIGHT: 137 LBS | OXYGEN SATURATION: 98 %

## 2023-04-27 DIAGNOSIS — M25.50 POLYARTHRALGIA: Primary | ICD-10-CM

## 2023-04-27 PROCEDURE — 99214 OFFICE O/P EST MOD 30 MIN: CPT | Performed by: NURSE PRACTITIONER

## 2023-04-27 ASSESSMENT — PAIN SCALES - GENERAL: PAINLEVEL: MODERATE PAIN (4)

## 2023-04-27 NOTE — PROGRESS NOTES
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 Current treatment: Plaquenil stopped after 6 months.  On prednisone taper 4 mg.    Today:  Since stopping plaquenil has less anxiety, less stomach upset and less muscle aches.  Is having some achiness in am. Per pt GI told pt he thinks she has autoimmune disease related to post covid/ vaccine, recommend she get a second rheumatology opinion. Pt reports she saw Dr. Barroso and was told to stay on prednisone. Reports her dermatologist also said her rashes are auto immune.     3/29/23:  Is here with concerns of acid boiling, does not have throat symptoms here's it in her ears. Has vibration in her ears. Gets really bad around her period, has vibration in her head. Has throbbing pain of her legs.  Missed prednisone one day and whole body felt beat up. However after she takes prednisone can not see clear for a number of hours.  It feels like she is in a fish bowl looking out, this started 1 month ago.   Did hold plaquenil for 4 days, by day 4 felt sick like she had a fever and sick but did not have a fever. When off of plaquenil she thought she had less anxiety and mentally felt better. Losing a lot of weight and doesn't know why, has lost 30 lbs.     Has seen a psychiatrist, reports she did not tolerate lexapro.      here, states pt has had drastic change in health since had covid. Is depressed, doesn't go out in public, panic attacks. In sept pt started locking herself in her bedroom for 2 weeks and she does this at times.       LOV 12/2022:  Hasn't had a mouth sore  "since starting plaquenil.   Joints: Joint pain not better. Off and on the same. Notices knee pain after walks awhile. Toes feels with weather changes. Toes and fingers get really cold but don't change colors.   Gi: has an hour of burning of stomach and feeling of anxiety and jittery for an hour of taking plaquenil. Takes iron in the am.   Skin: Continues with rashes      HPI 7/2022: Pt reports worsening of her arthralgias and was instructed by covid [facility] clinic and dermatology  to return to rheumatology.  Prior to covid/or vaccine was healthy and had none of these issues, now would define health as poor. Feels worse during and 1 week after menses.   Has been on 3 medrol dose packs, with significant response, every thing improved.   Feels like she has inflammation going through her body.   +Her reflux is \"boiling\", it's worsening,  can only eat white rice. Reflux does not worsen when she lays down. Woke the other night and feels like she had epigastric spasms.  She wasn't sure if it was reflex, this did make her anxious. Eating makes worse. Has failed 4 medications, now considering surgical options.  +Joint pain: Started post covid, most on legs points to spots on legs., more on L then R.  These spots are not visualized, no change in color, temp sensation, it is non TTP and helps to rub area.  Ankles swell, has an indent when wears socks. Rings are no longer fitting.   +Has gained 10 lbs.   +Is losing hair, diffusely, people she lives with notice hair all over now. Last child was 4 yrs ago.   +Has mouth sores. Most recent 3 weeks, L buccal mucous sore, lasted 3 days and was very painful.  Has had sores of palate. Denies vaginal sores.   +Diarrhea, worked up, dx as IBS.  +Rashes in past all over, none since covid vaccine.   +Feels SOB a lot, unsure if anxiety related.  Maybe some pain with breathing.   + Had L side HA's in beging of this prodrome but have improved.   +Dry mouth, really bad, wakes " "her up at night.  Dentist told her that she can see dry patches. This started 3 months ago. Eyes are fine. (not taking adderal currently,so not a contributing factor)   +Feels flu like.   + Difference in brain function, forgets things, struggling with short term memory. Speech is ok. No seizures.  ROS: Denies sun sensitivity, did have blotches on face one time after being in sun that lasted 3 days. Possible tubal pregnancy vs miscarriage, no blood clots. Np fevers. Denies psych hx. Has ADD, tx with adderal in past. Denies tri-color digit changes. Denies vaginal sores.    HPI at initial consult 10/2021: \"Unique Baker is a 36 year old female with pmhx GERD, seborrheic keratosis, is referred to rheumatology clinic for polyarthralgia. She reports tejinder COVID-19 beginning of March 2021. She received COVID-19 vaccine in April. 3 days after getting the vaccine, she developed a constellation of symptoms including chest tightness and abnormal sensation over her L arm. She went to the ED for chest tightness and had cardiac evaluation. She was started on reflux medication which she still takes. Has seen GI for acid reflux and loose stools. Had an upper GI in May which showed non-specific chronic inflammation in the stomach. She went to UC for evaluation of L arm abnormal sensation. She was given a course of steroids (medrol dosepak) which helped her symptoms somewhat. Shortly thereafter, she started developing sensation of \"burning\" in her head and chest. She then started developing joint pain (around June or July) over her knees, ankles, and wrists. She presented to UC again for ongoing symptoms and received steroid burst which helped her symptoms (around September) but they have not subsided. She has since been getting \"flares\" of joint pain.      She describes her flares as abrupt onset mild pain over her knees and ankles when she is resting/watching TV. She takes advil for pain which helps resolve the pain. Pain " "can last for ~15-20 minutes. Pain episodes come on 4 times a week. However, she feels \"tightness\" over her knees and ankles mid-day everyday. She tries and stretch from time to time. Walking helps the stiffness but not the pain. Overall, the intensity of pain has improved since it started in July. Pain does not wake her up at night anymore but it used to back in July. She denies any AM pain or stiffness over her joints. She denies any joint swelling, erythema or warmth.    She denies hx of psoriasis. However, she reports she started having unexplained rash ~4 years ago. She has seen dermatology in the past and has been diagnosed with seborrheic keratosis. She denies photosensitivity. Rash can be over her back, legs, stomach, neck. Denies any facial rash. She has been given topical steroids by dermatology which helps but rash recurs. She describes the rash as \"bumpy\" and pruritic. She denies scalp flakiness.    Denies fatigue, fevers, chills, weight loss, night sweats, vision changes, eye pain/redness, inflammatory eye disease, dry eyes, dry mouth, +reports episode of painful oral ulcer over the roof of the mouth which has resolved, denies nasal ulcers, +chronic hair loss/thinning with hx of alopecia, denies raynaud's, cough, SOB, pleurisy, difficulty swallowing, abdominal pain, diarrhea, hematochezia, melena, dysuria, back pain, enthesitis, dactylitis, +numbness/tingling over hands and feet. No hx of IBD. No hx of blood clots. One ectopic pregnancy.   Pertinent labs and imaging: (per chart review in [facility] and care everywhere)   Labs:   10/22/21: negative CBC w diff, CMP, +FELICITY 1:80 homogeneous, no hypocomplementemia, negative dsDNA, SSA, SSB, Sm, RNP, Hep C, RF, CCP, Scl-70  8/16/21: +ESR 32, negative CRP  5/1/21: negative BMP, CRP, CBC w diff, negative LFTs      FMH:  Unsure  Sister passed from stomach CA.   Dad gout    Social History      Active Problem list:  Patient Active Problem List    Diagnosis Date Noted     " Breast pain 02/21/2022     Priority: Medium     Menorrhagia with regular cycle 02/21/2022     Priority: Medium     Dysmenorrhea 02/21/2022     Priority: Medium     Controlled substance agreement signed- checked 8/12/2020 - no concerns 08/09/2019     Priority: Medium     Patient is followed by KELTON JACOB for ongoing prescription of stimulants.  All refills should be approved by this provider, or covering partner.    Medication(s): adderall.   Maximum quantity per month: 30  Clinic visit frequency required: Q 6  months     Controlled substance agreement on file: Yes       Date(s): 8/9/2019  Re sign 2/3/2021  Neuropsych evaluation for ADD completed:  Yes, completed [place] , on file and diagnosis confirmed  Done 5/14/2019    Last [facility] website verification:  none  [email or web address]           Irritable bowel syndrome 07/15/2015     Priority: Medium     CARDIOVASCULAR SCREENING; LDL GOAL LESS THAN 160 10/31/2010     Priority: Medium     Esophageal reflux 06/21/2006     Priority: Medium            Objective:     Physical Exam  Blood Pressure 111/74 (BP Location: Left arm, Patient Position: Sitting, Cuff Size: Adult Regular)   Pulse 78   Weight 62.1 kg (137 lb)   Oxygen Saturation 98%   Body Mass Index 25.47 kg/m    Body mass index is 25.47 kg/m .    Constitutional: Noticeable weight loss, Normal body habitus with out gross deformities.   Psych: tangible  ENT: Thyroid appears enlarged  Skin: No visible rashes.     MSK:  No visible swelling or decreased ROM    Labs:    Lab Results   Component Value Date    ALT 14 02/17/2023    AST 19 02/17/2023    CR 0.66 02/17/2023      Latest Reference Range & Units 07/14/22 14:34 07/14/22 14:38   Lyme Disease Antibodies Serum <0.90  0.21    EBV Capsid Blake IgM Instrument Value <36.0 U/mL 12.8    EBV Capsid Antibody IgM No detectable antibody.  No detectable antibody.    ANTI NUCLEAR BLAKE IGG BY IFA WITH REFLEX  Rpt    Beta 2 Glycoprotein 1 Antibody IgA  <7.0 U/mL 9.3 (H)    Beta 2 Glycoprotein 1 Antibody IgG <7.0 U/mL 0.9    Beta 2 Glycoprotein 1 Antibody IgM <7.0 U/mL 29.0 (H)    Cardiolipin IgG Irene Negative   Negative   Cardiolipin IgM Irene Negative   Negative   Complement C3 81 - 157 mg/dL 157    Complement C4 13 - 39 mg/dL 56 (H)    DNA-ds <10.0 IU/mL 2.9    Scleroderma Antibody Scl-70 RADHA IgG Negative  Negative    Thyroid Peroxidase Antibody <35 IU/mL <10        Imaging: reviewed            Assessment and Plan:     #1) Polyarthralgia's with mouth sores post covid: Polyarthralgia with +TRENT 1:80, with repeat negative x 2, low + beta 2 glycoprotein IgM and IgA. Pt with profound change in health status post covid infection now with repeat covid infection. Reports prior to covid vaccine and original infection was completely healthy. Overall Pt's symptoms dramatically improved when on prednisone   Pt has had a changing and often PAN positive ROS. Symptoms potentially related to a CTD include; dry mouth, hair loss, mouth sores, arthralgias, reflux, feeling of SOB/chest pain, change in cognitive function, past rash in sun. DOCUMENTED mouth sores on exam previously.  Pt has wax and weaned with symptoms, cognitive function and with mental health. Over all no improvement on plaquenil and pt thinks her blurry fishbowl vision, poor mental health and anxiety is caused by the plaquenil.     Given potential negative side effects of plaquenil we will discontinue this. Given repeat TRENT negative x 2, question if original + trent being from post viral.  At this time do not feel further treatment warranted for UCTD. Pt did have a second opinion [facility] Rheumatologist Dr. Barroso and no autoimmune etiology for symptoms found at that time per his documentation.   If further question arises of UCTD would refer to one of my colleagues at that time. Will taper off of prednisone and pt to follow with primary care.         Pt instructions:  You have had a negative TRENT twice now, this essentially  rules you out to any lupus like conditions.    At this time I do not see any evidence of an auto immune disease.     We will continue to taper you off of prednisone.     MAY go down to 3 mg daily   June go down to 2 mg daily  July go down to 1 mg daily  August off prednisone     You can follow up with primary care if symptoms return   33 minute spent on the date of the encounter doing chart review, history and exam, documentation and further activities per the note    Erum Allen CNP  Rheumatology, [facility]

## 2023-04-27 NOTE — PATIENT INSTRUCTIONS
You have had a negative FELICITY twice now, this essentially rules you out to any lupus like conditions.    At this time I do not see any evidence of an auto immune disease.     We will continue to taper you off of prednisone.     MAY go down to 3 mg daily   June go down to 2 mg daily  July go down to 1 mg daily  August off prednisone     You can follow up with primary care if symptoms return

## 2023-05-01 ENCOUNTER — VIRTUAL VISIT (OUTPATIENT)
Dept: CARDIOLOGY | Facility: CLINIC | Age: 38
End: 2023-05-01
Attending: INTERNAL MEDICINE
Payer: COMMERCIAL

## 2023-05-01 DIAGNOSIS — U09.9 POST-COVID SYNDROME: Primary | ICD-10-CM

## 2023-05-01 PROCEDURE — G0463 HOSPITAL OUTPT CLINIC VISIT: HCPCS | Mod: PN,GT | Performed by: INTERNAL MEDICINE

## 2023-05-01 PROCEDURE — 99215 OFFICE O/P EST HI 40 MIN: CPT | Mod: 95 | Performed by: INTERNAL MEDICINE

## 2023-05-01 RX ORDER — DEXLANSOPRAZOLE 30 MG/1
30 CAPSULE, DELAYED RELEASE ORAL DAILY
COMMUNITY
Start: 2023-04-05

## 2023-05-01 NOTE — LETTER
5/1/2023      RE: Unique Baker  [address] [facility] [address]       Dear Colleague,    Thank you for the opportunity to participate in the care of your patient, Unique Baker, at the [facility] [place] [facility]. Please see a copy of my visit note below.                                                                      [place] Cardiology [facility] [place]    Referring provider:Corina Aranda PA-C    HPI: Ms. Unique Baker is a 37 year old  female with PMH significant for    -Post COVID chronic joint pain/chronic muscle pain  -Post COVID chronic fatigue  -COVID infection March 2021, COVID infection 9/21/2022  -ADHD  -Esophageal reflux  -Iron deficiency anemia    Patient was seen in post-COVID clinic 10/4/2022 and reported palpitations, shortness of breath and chest tightness.  She is referred to cardiology for further evaluation.    Patient was in the ER on 10/21/2022 for palpitations, lightheadedness and general malaise after tejinder COVID-19 2 weeks ago.  Work-up in the ER was unremarkable with no signs of troponin change and no elevated D-dimer.  Given palpitations she was recommended Zio patch.  Patient is still wearing the Zio patch.  She was found to be anxious at the time of evaluation.    No known cardiac disease.  Patient tells me that she had palpitations associated with chest tightness and tingling in the whole body after she had her first COVID infection.  Overall she had 3 or 4 episodes but after the second COVID infection she is feeling palpitations 2 or 3 times a day.  It can happen at rest and with activity.  Now she tells me that even thinking about any kind of physical activity that she is supposed to do creates anxiety and palpitations. She was given SSRI venlafaxine for anxiety however she has not started that yet.    She had a stress echocardiogram a year ago which was normal.  EKG on 10/2022 shows sinus  tachycardia.      Patient has seen rheumatology and is on Plaquenil now. Not taking prednisone or ferrous sulfate.  Recent labs on 10/21 shows mild anemia at 11.5.  BMP is normal.  LDL is mildly elevated at 131.  Patient's LDL in March of this year was 199.  Currently not taking atorvastatin.  Medications, personal, family, and social history reviewed with patient and revised.    Interval history 12/12/2022:  Patient reports feeling exhausted from minimal activity.  She tells me that she feels tired even doing house chores.  When she goes out for walk which she does twice a day for 30 minutes, she feels chest and back pain.  This is new to her since she had COVID infection.  Reports frequent dizziness and presyncopal feelings which improve when she sits down.  She is drinking 100 ounces of water.  She has recently seen neurology who recommended tilt table test.  She is also telling me that she might have a flareup of her connective tissue disease.  She was recommended to start steroids however she is holding off on starting steroids until she completes the tilt table test.  She tells me that her palpitation symptoms has been improving and right now at rest her heart rate is around 65 bpm.  As you know she has iron deficiency anemia and she has been on iron over the last 1 month.    Interval history 5/1/2023:   Patient is being seen today for follow-up.  In the interim patient underwent tilt table testing which showed normal autonomic function with no sign of POTS syndrome.  Patient is currently on prednisone over the last few months for polyarthralgia.  Currently being tapered now on 3 mg.  Since being on prednisone patient tells me that all her symptoms resolved.  Denies palpitations, dizziness, syncope.  Currently feeling well.  She continues to drink plenty of water.  Could not tolerate salt tablets.  She is consuming higher salt in her food.  She is wondering if she has recurrent symptoms when she is off of  prednisone what would be her options for treatment.    PAST MEDICAL HISTORY:  Past Medical History:   Diagnosis Date    Diabetes (H)     GDM insulin    GERD (gastroesophageal reflux disease)     w/u otherwise neg     History of colposcopy with cervical biopsy 3/23/07    WNL    Papanicolaou smear of cervix with low grade squamous intraepithelial lesion (LGSIL) 07    PONV (postoperative nausea and vomiting)     S/P  10/13/2017       CURRENT MEDICATIONS:  Current Outpatient Medications   Medication Sig Dispense Refill    acetaminophen (TYLENOL) 500 MG tablet take 1 - 2 tablet by oral route  every 6 hours as needed as needed      cetirizine (ZYRTEC) 10 MG tablet TAKE ONE TABLET BY MOUTH ONCE DAILY FOR 2 WEEKS, THEN AS NEEDED FOR FUTURE NASAL ISSUES 30 tablet 1    Cyanocobalamin (VITAMIN B12 TR PO) 1 patch (500 mcg) daily      cyclobenzaprine (FLEXERIL) 5 MG tablet Take 1 tablet (5 mg) by mouth nightly as needed for muscle spasms (Patient not taking: Reported on 3/13/2023) 30 tablet 0    diclofenac (VOLTAREN) 1 % topical gel Apply 2 g topically 4 times daily To joints as needed for pain 100 g 3    famotidine (PEPCID) 20 MG tablet TAKE ONE TABLET BY MOUTH TWICE A DAY AS NEEDED FOR REFLUX 180 tablet 3    FERROUS BISGLYCINATE CHELATE PO Take 36 mg by mouth every other day      fluocinonide emulsified base 0.05 % external cream apply to rash as needed - sparingly 60 g 1    fluticasone (FLONASE) 50 MCG/ACT nasal spray Spray 1 spray into both nostrils 2 times daily as needed for rhinitis or allergies Take 1 spray twice daily x 1 week then 1 spray daily at night x 1 week then as needed 11.1 mL 1    hydroxychloroquine (PLAQUENIL) 200 MG tablet Take 1 tablet (200 mg) by mouth 2 times daily Annual Plaquenil toxicity eye screening required. (Patient not taking: Reported on 2023) 180 tablet 3    hydrOXYzine (ATARAX) 25 MG tablet Take 1 tablet (25 mg) by mouth 3 times daily as needed for anxiety 90 tablet 1     LORazepam (ATIVAN) 0.5 MG tablet Take 1 tablet (0.5 mg) by mouth daily as needed for anxiety 12 pills/month 12 tablet 1    multivitamin (ONE-DAILY) tablet Take 1 tablet by mouth daily      predniSONE (DELTASONE) 1 MG tablet Take 4 tablets (4 mg) by mouth daily 120 tablet 1    predniSONE (DELTASONE) 5 MG tablet take 1 tablet by oral route  every day as needed      sodium chloride 1 GM tablet Take 1 tablet (1 g) by mouth daily (Patient not taking: Reported on 2023) 30 tablet 0    sucralfate (CARAFATE) 1 GM tablet       vitamin B-Complex Take 1 tablet by mouth daily      vitamin D2 (ERGOCALCIFEROL) 08973 units (1250 mcg) capsule Take 50,000 Units by mouth once a week Patient taking Vitamin D3 21696 units         PAST SURGICAL HISTORY:  Past Surgical History:   Procedure Laterality Date    BREAST SURGERY      augmentation      SECTION N/A 2015    Procedure:  SECTION;  Surgeon: Tremaine Gaona MD;  Location: [facility] OR     SECTION, TUBAL LIGATION, COMBINED N/A 10/13/2017    Procedure: COMBINED  SECTION, TUBAL LIGATION;  Repeat  SECTION, TUBAL LIGATION ;  Surgeon: Sidra Salamanca DO;  Location:  OR    COLONOSCOPY N/A 2016    Procedure: COLONOSCOPY;  Surgeon: Lyudmila Pastor MD;  Location:  GI    DILATION AND CURETTAGE SUCTION      elective termination    EP STUDY TILT TABLE N/A 2022    Procedure: Tilt Table Study;  Surgeon: Stan Green MD;  Location:  HEART CARDIAC CATH LAB    ESOPHAGOSCOPY, GASTROSCOPY, DUODENOSCOPY (EGD), COMBINED  2014    Procedure: COMBINED ESOPHAGOSCOPY, GASTROSCOPY, DUODENOSCOPY (EGD);   ESOPHAGOSCOPY, GASTROSCOPY, DUODENOSCOPY (EGD) ;  Surgeon: Vishnu Lanier MD;  Location:  GI    ESOPHAGOSCOPY, GASTROSCOPY, DUODENOSCOPY (EGD), COMBINED Left 2020    Procedure: ESOPHAGOGASTRODUODENOSCOPY, WITH BIOPSIES USING BIOPSY FORCEPS;  Surgeon: Vishnu Hopkins MD;  Location:  GI    ESOPHAGOSCOPY,  GASTROSCOPY, DUODENOSCOPY (EGD), COMBINED N/A 2021    Procedure: ESOPHAGOGASTRODUODENOSCOPY, WITH BIOPSY using cold forceps;  Surgeon: Vishnu Lanier MD;  Location:  GI    GYN SURGERY  2001     c section     RW LASER WART      Anal wart removal     ZZC NONSPECIFIC PROCEDURE  01    Primary LTCS       ALLERGIES:     Allergies   Allergen Reactions    Other Environmental Allergy      Patch Test Results 3-10-20    Very Strong (3+) or Strong (2+) reactions:  none     Mild (1+) reactions:  Fragrance mix I  Linalool  Oleamidopropyl dimethylamine     Borderline/questionable reactions:  Balsam of [place]  Diphenylguanidine    Venlafaxine Other (See Comments)     legs were cold/tingly from knees down and restless, increased anxiety, insomnia       FAMILY HISTORY:  Family History   Problem Relation Age of Onset    Colon Polyps Brother     Other - See Comments Brother         colon polyps    Hyperlipidemia Sister     Stomach Cancer Sister     Cancer Sister         stomach    C.A.D. No family hx of     Diabetes No family hx of     Breast Cancer No family hx of     Cancer - colorectal No family hx of     Colon Cancer No family hx of          SOCIAL HISTORY:  Social History     Tobacco Use    Smoking status: Former     Years: 7.00     Types: Cigarettes     Quit date:      Years since quittin.3    Smokeless tobacco: Never    Tobacco comments:     only if she drinks alcohol   Vaping Use    Vaping status: Never Used     Passive vaping exposure: Yes   Substance Use Topics    Alcohol use: Not Currently     Alcohol/week: 8.3 standard drinks of alcohol    Drug use: No       ROS:   Constitutional: No fever, chills, or sweats. Weight stable.   Cardiovascular: As per HPI.     Exam:  Physical Exam Elements attainable via telehealth:     ? Constitutional - alert and no distress  ? Eyes - no redness, no discharge  ? Respiratory - no cough, no labored breathing  ? Skin - no discoloration or lesions on the face or the  arm.  ? Neurological -alert, normal speech,and affect, no tremor.     I have reviewed the labs and personally reviewed the imaging below and made my comment in the assessment and plan.    Labs:  CBC RESULTS:   Lab Results   Component Value Date    WBC 8.3 02/17/2023    WBC 9.2 05/01/2021    RBC 4.40 02/17/2023    RBC 4.49 05/01/2021    HGB 12.3 02/17/2023    HGB 12.7 05/01/2021    HCT 37.5 02/17/2023    HCT 38.4 05/01/2021    MCV 85 02/17/2023    MCV 86 05/01/2021    MCH 28.0 02/17/2023    MCH 28.3 05/01/2021    MCHC 32.8 02/17/2023    MCHC 33.1 05/01/2021    RDW 13.8 02/17/2023    RDW 13.2 05/01/2021     02/17/2023     05/01/2021       BMP RESULTS:  Lab Results   Component Value Date     02/17/2023     05/01/2021    POTASSIUM 4.3 02/17/2023    POTASSIUM 4.0 07/14/2022    POTASSIUM 4.0 05/01/2021    CHLORIDE 105 02/17/2023    CHLORIDE 109 07/14/2022    CHLORIDE 103 05/01/2021    CO2 23 02/17/2023    CO2 26 07/14/2022    CO2 25 05/01/2021    ANIONGAP 13 02/17/2023    ANIONGAP 5 07/14/2022    ANIONGAP 5 05/01/2021    GLC 91 02/17/2023    GLC 97 07/14/2022    GLC 87 05/01/2021    BUN 12.6 02/17/2023    BUN 9 07/14/2022    BUN 13 05/01/2021    CR 0.66 02/17/2023    CR 0.78 05/01/2021    GFRESTIMATED >90 02/17/2023    GFRESTIMATED 0.74 01/09/2023    GFRESTBLACK >90 05/01/2021    PREETI 9.1 02/17/2023    PREETI 8.6 05/01/2021      Tilt table study 12/26/2022  The following maneuvers were done sequentially:     1- Sitting for 5 minutes:    End of 5 min:  HR 73   , BP 90/60     2- Standing for 10 minutes:   Immediate Pascual HR76    BP 55/20     Time from standing to pascual 3 sec      Time from pascual to recovery 10 sec  BP 90/60  1 min: HR 71  , /70  2 min: HR 68 , /64  3 min: HR 73, /65  4 min: HR 71, /64  5 min: HR 73, /60  6 min: HR 75, /68  7 min: HR 74, /67  8 min: HR 75 , /61  9 min: HR 78, /65  10 min: HR 76, /67  Slight lightheadedness at  end of study.  Marked immediate orthostatic hypotension but patient has relatively low pressures throughout.  2- Maneuvers in seated position:  A. Carotid sinus massage:  Not done due to age and gender  B. Valsalva:    Baseline: HR 76 , /70  Phase 1: HR 76 , Max /105  Phase 2:  , Min /86  Phase 3: Present/  Phase 4 (Overshoot):  , Max /110     VR-=  110/91= 1.2 (borderline low)     Symptoms: None reported     C. RespirSinus Arrhyth  In 82  Out  66  Delta  12  In 80  Out 69  Delta 11  In 84  Out 68  Delta 16  In 77  Out 60  Delta  8     Avg Delta =   47/4= 11.75 (Normal)     3- Supine rest for 5 minutes:  HR 67 , Max /68     4- TILT at 70 degrees for 20 minutes:0     Immediate Pascual HR 82   BP 88/57     Time from standing to pascual 13 sec      Time from pascual to recovery 22 sec     30 seconds HR 77 BP 93/56  1 min: HR 86, /63  2 min: HR 87, /61  3 min: HR 77, /61  4 min: HR 74, /56  5 min: HR 71, /59  6 min: HR 74, /62  7 min: HR 74, /64  8 min: HR 79, /64  9 min: HR 72, /54  10 min: HR 74, /65  12 min: HR 87, /67  14 min: HR 82, /65  Portion of study terminated.  Patient complained of some dizziness at initiation     5-1 L saline initiated     5A  Repeat tilt  Pascual-moderate immediate drop still present     1 min HR 86   /73  2 min HR 69 /70  3 min HR 74   /76  4 min  HR 73 /65     Symptoms: Patient felt unwell after saline infusion with a transient period of increased heart rate-this may have been due to the saline being relatively cool.  Ultimately the heart rate dropped values noted above during repeat tilt     5B  Repeat stand  Baseline HR 95 /73  Pascual HR 89, BP 82/53     30 sec HR 69   /73  1 min HR 76   /72  2 min HR 72 /73  3 min HR 70   /80  4 min  HR 80 /79  Terminated  Symptoms: No cardiovascular symptoms    DISCUSSION:  Basic  autonomic studies were within normal limits.  Response to head up tilt and to active standing was not indicative of POTS.  Immediate orthostatic hypotension was noted.  Patient also showed low pressure phenotype characteristics.  Treatment with increased salt and electrolyte fluids may be helpful.  Fludrocortisone could be later a consideration in addition as dizziness may be related to low pressure phenotype.       Exercise stress echocardiogram 7/23/2021    The patient exercises for 10 min and 30 seconds to maximum of 12 METS. Above  average functional capacity.     The EKG portion of this stress test was negative for inducible ischemia.  This was a normal stress echocardiogram with no evidence of stress-induced  ischemia.     No prior study available for comparison.      EKG 10/21/2022: Sinus tachycardia heart rate 110 bpm.        Assessment and Plan:     #Frequent palpitations after second COVID infection, resolved   #Orthostatic dizziness/presyncopal feeling, resolved  -Patient is currently asymptomatic.  Recently evaluated in EP clinic by tilt table which did not show POTS syndrome.  She was told maybe she became asymptomatic since being on prednisone which is initiated by rheumatology for polyarthralgia post-COVID.  Now currently being tapered.  I recommended patient to continue her current hydration with salt and water.  We will consider initiation of fludrocortisone if she becomes symptomatic when she is off prednisone despite hydration and salt intake    #Iron deficiency anemia  -Follows with hematology at outside hospital.  On iron replacement.    #Connective tissue disease?  -She was on Plaquenil at some point which was discontinued.  Currently on prednisone which is being tapered by rheumatology.    Return to clinic as needed.    Total time spent today for this visit is 40 minutes including precharting, video visit, review of labs/imaging and medical documentation.    Please donot hesitate to contact me  if you have any questions or concerns. Again, thank you for allowing me to participate in the care of your patient.    Teo HARTLEY MD  [facility] Division of Cardiology  Pager [phone]          Please do not hesitate to contact me if you have any questions/concerns.

## 2023-05-01 NOTE — NURSING NOTE
Chief Complaint   Patient presents with     Follow Up     3 month follow up, no updates per pt. Medications/allergies reviewed with pt, no changes per pt.        Is the patient currently in the state of [place]? YES    Visit mode:VIDEO    If the visit is dropped, the patient can be reconnected by: VIDEO VISIT: Text to cell phone: [phone]    Will anyone else be joining the visit? NO      How would you like to obtain your AVS? MyChart    Are changes needed to the allergy or medication list? NO    Patient denies any changes since check-in regarding medication and allergies and states all information entered during check-in remains accurate.    Hayder Tripp, Visit Facilitator/MA.

## 2023-05-01 NOTE — PROGRESS NOTES
Virtual Visit Details    Type of service:  Video Visit   Video Start Time: 5:30 PM  Video End Time: 5:45 PM    Originating Location (pt. Location): Home    Distant Location (provider location):  On-site  Platform used for Video Visit: [facility] [place]    Referring provider:Corina Aranda PA-C    HPI: Ms. Unique Baker is a 37 year old  female with PMH significant for    -Post COVID chronic joint pain/chronic muscle pain  -Post COVID chronic fatigue  -COVID infection March 2021, COVID infection 9/21/2022  -ADHD  -Esophageal reflux  -Iron deficiency anemia    Patient was seen in post-COVID clinic 10/4/2022 and reported palpitations, shortness of breath and chest tightness.  She is referred to cardiology for further evaluation.    Patient was in the ER on 10/21/2022 for palpitations, lightheadedness and general malaise after tejinder COVID-19 2 weeks ago.  Work-up in the ER was unremarkable with no signs of troponin change and no elevated D-dimer.  Given palpitations she was recommended Zio patch.  Patient is still wearing the Zio patch.  She was found to be anxious at the time of evaluation.    No known cardiac disease.  Patient tells me that she had palpitations associated with chest tightness and tingling in the whole body after she had her first COVID infection.  Overall she had 3 or 4 episodes but after the second COVID infection she is feeling palpitations 2 or 3 times a day.  It can happen at rest and with activity.  Now she tells me that even thinking about any kind of physical activity that she is supposed to do creates anxiety and palpitations. She was given SSRI venlafaxine for anxiety however she has not started that yet.    She had a stress echocardiogram a year ago which was normal.  EKG on 10/2022 shows sinus tachycardia.      Patient has seen rheumatology and is on Plaquenil now. Not taking prednisone or  ferrous sulfate.  Recent labs on 10/21 shows mild anemia at 11.5.  BMP is normal.  LDL is mildly elevated at 131.  Patient's LDL in March of this year was 199.  Currently not taking atorvastatin.  Medications, personal, family, and social history reviewed with patient and revised.    Interval history 12/12/2022:  Patient reports feeling exhausted from minimal activity.  She tells me that she feels tired even doing house chores.  When she goes out for walk which she does twice a day for 30 minutes, she feels chest and back pain.  This is new to her since she had COVID infection.  Reports frequent dizziness and presyncopal feelings which improve when she sits down.  She is drinking 100 ounces of water.  She has recently seen neurology who recommended tilt table test.  She is also telling me that she might have a flareup of her connective tissue disease.  She was recommended to start steroids however she is holding off on starting steroids until she completes the tilt table test.  She tells me that her palpitation symptoms has been improving and right now at rest her heart rate is around 65 bpm.  As you know she has iron deficiency anemia and she has been on iron over the last 1 month.    Interval history 5/1/2023:   Patient is being seen today for follow-up.  In the interim patient underwent tilt table testing which showed normal autonomic function with no sign of POTS syndrome.  Patient is currently on prednisone over the last few months for polyarthralgia.  Currently being tapered now on 3 mg.  Since being on prednisone patient tells me that all her symptoms resolved.  Denies palpitations, dizziness, syncope.  Currently feeling well.  She continues to drink plenty of water.  Could not tolerate salt tablets.  She is consuming higher salt in her food.  She is wondering if she has recurrent symptoms when she is off of prednisone what would be her options for treatment.    PAST MEDICAL HISTORY:  Past Medical History:    Diagnosis Date     Diabetes (H)     GDM insulin     GERD (gastroesophageal reflux disease)     w/u otherwise neg      History of colposcopy with cervical biopsy 3/23/07    WNL     Papanicolaou smear of cervix with low grade squamous intraepithelial lesion (LGSIL) 07     PONV (postoperative nausea and vomiting)      S/P  10/13/2017       CURRENT MEDICATIONS:  Current Outpatient Medications   Medication Sig Dispense Refill     acetaminophen (TYLENOL) 500 MG tablet take 1 - 2 tablet by oral route  every 6 hours as needed as needed       cetirizine (ZYRTEC) 10 MG tablet TAKE ONE TABLET BY MOUTH ONCE DAILY FOR 2 WEEKS, THEN AS NEEDED FOR FUTURE NASAL ISSUES 30 tablet 1     Cyanocobalamin (VITAMIN B12 TR PO) 1 patch (500 mcg) daily       cyclobenzaprine (FLEXERIL) 5 MG tablet Take 1 tablet (5 mg) by mouth nightly as needed for muscle spasms (Patient not taking: Reported on 3/13/2023) 30 tablet 0     diclofenac (VOLTAREN) 1 % topical gel Apply 2 g topically 4 times daily To joints as needed for pain 100 g 3     famotidine (PEPCID) 20 MG tablet TAKE ONE TABLET BY MOUTH TWICE A DAY AS NEEDED FOR REFLUX 180 tablet 3     FERROUS BISGLYCINATE CHELATE PO Take 36 mg by mouth every other day       fluocinonide emulsified base 0.05 % external cream apply to rash as needed - sparingly 60 g 1     fluticasone (FLONASE) 50 MCG/ACT nasal spray Spray 1 spray into both nostrils 2 times daily as needed for rhinitis or allergies Take 1 spray twice daily x 1 week then 1 spray daily at night x 1 week then as needed 11.1 mL 1     hydroxychloroquine (PLAQUENIL) 200 MG tablet Take 1 tablet (200 mg) by mouth 2 times daily Annual Plaquenil toxicity eye screening required. (Patient not taking: Reported on 2023) 180 tablet 3     hydrOXYzine (ATARAX) 25 MG tablet Take 1 tablet (25 mg) by mouth 3 times daily as needed for anxiety 90 tablet 1     LORazepam (ATIVAN) 0.5 MG tablet Take 1 tablet (0.5 mg) by mouth daily as needed  for anxiety 12 pills/month 12 tablet 1     multivitamin (ONE-DAILY) tablet Take 1 tablet by mouth daily       predniSONE (DELTASONE) 1 MG tablet Take 4 tablets (4 mg) by mouth daily 120 tablet 1     predniSONE (DELTASONE) 5 MG tablet take 1 tablet by oral route  every day as needed       sodium chloride 1 GM tablet Take 1 tablet (1 g) by mouth daily (Patient not taking: Reported on 2023) 30 tablet 0     sucralfate (CARAFATE) 1 GM tablet        vitamin B-Complex Take 1 tablet by mouth daily       vitamin D2 (ERGOCALCIFEROL) 36063 units (1250 mcg) capsule Take 50,000 Units by mouth once a week Patient taking Vitamin D3 70116 units         PAST SURGICAL HISTORY:  Past Surgical History:   Procedure Laterality Date     BREAST SURGERY      augmentation       SECTION N/A 2015    Procedure:  SECTION;  Surgeon: Tremaine Gaona MD;  Location: [facility] OR      SECTION, TUBAL LIGATION, COMBINED N/A 10/13/2017    Procedure: COMBINED  SECTION, TUBAL LIGATION;  Repeat  SECTION, TUBAL LIGATION ;  Surgeon: Sidra Salamanca DO;  Location: [facility] OR     COLONOSCOPY N/A 2016    Procedure: COLONOSCOPY;  Surgeon: Lyudmila Pastor MD;  Location:  GI     DILATION AND CURETTAGE SUCTION      elective termination     EP STUDY TILT TABLE N/A 2022    Procedure: Tilt Table Study;  Surgeon: Stan Green MD;  Location: [facility] CARDIAC CATH LAB     ESOPHAGOSCOPY, GASTROSCOPY, DUODENOSCOPY (EGD), COMBINED  2014    Procedure: COMBINED ESOPHAGOSCOPY, GASTROSCOPY, DUODENOSCOPY (EGD);   ESOPHAGOSCOPY, GASTROSCOPY, DUODENOSCOPY (EGD) ;  Surgeon: Vishnu Lanier MD;  Location:  GI     ESOPHAGOSCOPY, GASTROSCOPY, DUODENOSCOPY (EGD), COMBINED Left 2020    Procedure: ESOPHAGOGASTRODUODENOSCOPY, WITH BIOPSIES USING BIOPSY FORCEPS;  Surgeon: Vishnu Hopkins MD;  Location:  GI     ESOPHAGOSCOPY, GASTROSCOPY, DUODENOSCOPY (EGD), COMBINED N/A 2021    Procedure:  ESOPHAGOGASTRODUODENOSCOPY, WITH BIOPSY using cold forceps;  Surgeon: Vishnu Lanier MD;  Location:  GI     GYN SURGERY       c section      RW LASER WART  2011    Anal wart removal      ZZC NONSPECIFIC PROCEDURE  01    Primary LTCS       ALLERGIES:     Allergies   Allergen Reactions     Other Environmental Allergy      Patch Test Results 3-10-20    Very Strong (3+) or Strong (2+) reactions:  none     Mild (1+) reactions:  Fragrance mix I  Linalool  Oleamidopropyl dimethylamine     Borderline/questionable reactions:  Balsam of [place]  Diphenylguanidine     Venlafaxine Other (See Comments)     legs were cold/tingly from knees down and restless, increased anxiety, insomnia       FAMILY HISTORY:  Family History   Problem Relation Age of Onset     Colon Polyps Brother      Other - See Comments Brother         colon polyps     Hyperlipidemia Sister      Stomach Cancer Sister      Cancer Sister         stomach     C.A.D. No family hx of      Diabetes No family hx of      Breast Cancer No family hx of      Cancer - colorectal No family hx of      Colon Cancer No family hx of          SOCIAL HISTORY:  Social History     Tobacco Use     Smoking status: Former     Years: 7.00     Types: Cigarettes     Quit date:      Years since quittin.3     Smokeless tobacco: Never     Tobacco comments:     only if she drinks alcohol   Vaping Use     Vaping status: Never Used     Passive vaping exposure: Yes   Substance Use Topics     Alcohol use: Not Currently     Alcohol/week: 8.3 standard drinks of alcohol     Drug use: No       ROS:   Constitutional: No fever, chills, or sweats. Weight stable.   Cardiovascular: As per HPI.     Exam:  Physical Exam Elements attainable via telehealth:       Constitutional - alert and no distress    Eyes - no redness, no discharge    Respiratory - no cough, no labored breathing    Skin - no discoloration or lesions on the face or the arm.    Neurological -alert, normal speech,and  affect, no tremor.     I have reviewed the labs and personally reviewed the imaging below and made my comment in the assessment and plan.    Labs:  CBC RESULTS:   Lab Results   Component Value Date    WBC 8.3 02/17/2023    WBC 9.2 05/01/2021    RBC 4.40 02/17/2023    RBC 4.49 05/01/2021    HGB 12.3 02/17/2023    HGB 12.7 05/01/2021    HCT 37.5 02/17/2023    HCT 38.4 05/01/2021    MCV 85 02/17/2023    MCV 86 05/01/2021    MCH 28.0 02/17/2023    MCH 28.3 05/01/2021    MCHC 32.8 02/17/2023    MCHC 33.1 05/01/2021    RDW 13.8 02/17/2023    RDW 13.2 05/01/2021     02/17/2023     05/01/2021       BMP RESULTS:  Lab Results   Component Value Date     02/17/2023     05/01/2021    POTASSIUM 4.3 02/17/2023    POTASSIUM 4.0 07/14/2022    POTASSIUM 4.0 05/01/2021    CHLORIDE 105 02/17/2023    CHLORIDE 109 07/14/2022    CHLORIDE 103 05/01/2021    CO2 23 02/17/2023    CO2 26 07/14/2022    CO2 25 05/01/2021    ANIONGAP 13 02/17/2023    ANIONGAP 5 07/14/2022    ANIONGAP 5 05/01/2021    GLC 91 02/17/2023    GLC 97 07/14/2022    GLC 87 05/01/2021    BUN 12.6 02/17/2023    BUN 9 07/14/2022    BUN 13 05/01/2021    CR 0.66 02/17/2023    CR 0.78 05/01/2021    GFRESTIMATED >90 02/17/2023    GFRESTIMATED 0.74 01/09/2023    GFRESTBLACK >90 05/01/2021    PREETI 9.1 02/17/2023    PREETI 8.6 05/01/2021      Tilt table study 12/26/2022  The following maneuvers were done sequentially:     1- Sitting for 5 minutes:    End of 5 min:  HR 73   , BP 90/60     2- Standing for 10 minutes:   Immediate Pascual HR76    BP 55/20     Time from standing to pascual 3 sec      Time from pascual to recovery 10 sec  BP 90/60  1 min: HR 71  , /70  2 min: HR 68 , /64  3 min: HR 73, /65  4 min: HR 71, /64  5 min: HR 73, /60  6 min: HR 75, /68  7 min: HR 74, /67  8 min: HR 75 , /61  9 min: HR 78, /65  10 min: HR 76, /67  Slight lightheadedness at end of study.  Marked immediate orthostatic  hypotension but patient has relatively low pressures throughout.  2- Maneuvers in seated position:  A. Carotid sinus massage:  Not done due to age and gender  B. Valsalva:    Baseline: HR 76 , /70  Phase 1: HR 76 , Max /105  Phase 2:  , Min /86  Phase 3: Present/  Phase 4 (Overshoot):  , Max /110     VR-=  110/91= 1.2 (borderline low)     Symptoms: None reported     C. RespirSinus Arrhyth  In 82  Out  66  Delta  12  In 80  Out 69  Delta 11  In 84  Out 68  Delta 16  In 77  Out 60  Delta  8     Avg Delta =   47/4= 11.75 (Normal)     3- Supine rest for 5 minutes:  HR 67 , Max /68     4- TILT at 70 degrees for 20 minutes:0     Immediate Pascual HR 82   BP 88/57     Time from standing to pascual 13 sec      Time from pascual to recovery 22 sec     30 seconds HR 77 BP 93/56  1 min: HR 86, /63  2 min: HR 87, /61  3 min: HR 77, /61  4 min: HR 74, /56  5 min: HR 71, /59  6 min: HR 74, /62  7 min: HR 74, /64  8 min: HR 79, /64  9 min: HR 72, /54  10 min: HR 74, /65  12 min: HR 87, /67  14 min: HR 82, /65  Portion of study terminated.  Patient complained of some dizziness at initiation     5-1 L saline initiated     5A  Repeat tilt  Pascual-moderate immediate drop still present     1 min HR 86   /73  2 min HR 69 /70  3 min HR 74   /76  4 min  HR 73 /65     Symptoms: Patient felt unwell after saline infusion with a transient period of increased heart rate-this may have been due to the saline being relatively cool.  Ultimately the heart rate dropped values noted above during repeat tilt     5B  Repeat stand  Baseline HR 95 /73  Pascual HR 89, BP 82/53     30 sec HR 69   /73  1 min HR 76   /72  2 min HR 72 /73  3 min HR 70   /80  4 min  HR 80 /79  Terminated  Symptoms: No cardiovascular symptoms    DISCUSSION:  Basic autonomic studies were within normal limits.   Response to head up tilt and to active standing was not indicative of POTS.  Immediate orthostatic hypotension was noted.  Patient also showed low pressure phenotype characteristics.  Treatment with increased salt and electrolyte fluids may be helpful.  Fludrocortisone could be later a consideration in addition as dizziness may be related to low pressure phenotype.       Exercise stress echocardiogram 7/23/2021    The patient exercises for 10 min and 30 seconds to maximum of 12 METS. Above  average functional capacity.     The EKG portion of this stress test was negative for inducible ischemia.  This was a normal stress echocardiogram with no evidence of stress-induced  ischemia.     No prior study available for comparison.      EKG 10/21/2022: Sinus tachycardia heart rate 110 bpm.        Assessment and Plan:     #Frequent palpitations after second COVID infection, resolved   #Orthostatic dizziness/presyncopal feeling, resolved  -Patient is currently asymptomatic.  Recently evaluated in EP clinic by tilt table which did not show POTS syndrome.  She was told maybe she became asymptomatic since being on prednisone which is initiated by rheumatology for polyarthralgia post-COVID.  Now currently being tapered.  I recommended patient to continue her current hydration with salt and water.  We will consider initiation of fludrocortisone if she becomes symptomatic when she is off prednisone despite hydration and salt intake    #Iron deficiency anemia  -Follows with hematology at outside hospital.  On iron replacement.    #Connective tissue disease?  -She was on Plaquenil at some point which was discontinued.  Currently on prednisone which is being tapered by rheumatology.    Return to clinic as needed.    Total time spent today for this visit is 40 minutes including precharting, video visit, review of labs/imaging and medical documentation.    Please donot hesitate to contact me if you have any questions or concerns. Again,  thank you for allowing me to participate in the care of your patient.    Teo HARTLEY MD  [facility] Division of Cardiology  Pager [phone]

## 2023-06-01 ENCOUNTER — HEALTH MAINTENANCE LETTER (OUTPATIENT)
Age: 38
End: 2023-06-01

## 2023-06-07 ENCOUNTER — VIRTUAL VISIT (OUTPATIENT)
Dept: PSYCHIATRY | Facility: CLINIC | Age: 38
End: 2023-06-07
Payer: COMMERCIAL

## 2023-06-07 DIAGNOSIS — F41.9 ANXIETY: ICD-10-CM

## 2023-06-07 PROCEDURE — 99213 OFFICE O/P EST LOW 20 MIN: CPT | Mod: VID | Performed by: PSYCHIATRY & NEUROLOGY

## 2023-06-07 RX ORDER — LORAZEPAM 0.5 MG/1
0.5 TABLET ORAL DAILY PRN
Qty: 12 TABLET | Refills: 2 | Status: SHIPPED | OUTPATIENT
Start: 2023-06-07 | End: 2024-06-06

## 2023-06-07 NOTE — PROGRESS NOTES
Virtual Visit Details    Type of service:  Video Visit   Video Start Time: 10:37 AM  Video End Time:10:50 AM    Originating Location (pt. Location): Home  Distant Location (provider location):  Off-site  Platform used for Video Visit: [facility] Psychiatry Consult Note    IDENTIFICATION   Name: Unique Baker   : 1985/38 year old      Sex:    @ female          Telemedicine Visit: The patient's condition can be safely assessed and treated via synchronous audio and visual telemedicine encounter.        Face to Face/patient Contact total time: 13 minutes  Pre Charting time: 2 minutes; Post charting time, communication and other activities: 1 minutes; Total time 16 minutes  10:27 AM          SUBJECTIVE   Unique reports doing better. This improvement remains consistently improved with discontinuation of plaquenil. Mental health better, muscle weakness resolved. Able to do more things.     Much less frequent use of lorazepam - last use two weeks ago. Has moments with anxiety and feeling panicky - getting better at acknowledging and letting it pass. Able to manage it better. No hydroxyzine in 3 months. It did help but was groggy all day and not herself; used a handful amount of times for sleep.         PHQ-9 scores:      10/31/2022     9:47 AM 2022     7:24 AM 2022     9:04 AM   PHQ-9 SCORE   PHQ-9 Total Score MyChart 16 (Moderately severe depression) 8 (Mild depression) 16 (Moderately severe depression)   PHQ-9 Total Score 16 8    8 16    16       RENÉE-7 scores:      10/3/2022     6:48 PM 10/31/2022     9:48 AM 2022     9:05 AM   RENÉE-7 SCORE   Total Score 6 (mild anxiety) 18 (severe anxiety) 17 (severe anxiety)   Total Score 6 18 17    17       OBJECTIVE     Vital Signs:   There were no vitals taken for this visit.    Labs:  N/A    Current Medications:  Current Outpatient Medications   Medication     acetaminophen (TYLENOL) 500 MG tablet     cetirizine (ZYRTEC) 10 MG tablet      Cyanocobalamin (VITAMIN B12 TR PO)     cyclobenzaprine (FLEXERIL) 5 MG tablet     dexlansoprazole (DEXILANT) 30 MG CPDR CR capsule     diclofenac (VOLTAREN) 1 % topical gel     famotidine (PEPCID) 20 MG tablet     fluocinonide emulsified base 0.05 % external cream     fluticasone (FLONASE) 50 MCG/ACT nasal spray     hydrOXYzine (ATARAX) 25 MG tablet     LORazepam (ATIVAN) 0.5 MG tablet     multivitamin (ONE-DAILY) tablet     predniSONE (DELTASONE) 1 MG tablet     sucralfate (CARAFATE) 1 GM tablet     vitamin B-Complex     vitamin D2 (ERGOCALCIFEROL) 27763 units (1250 mcg) capsule     FERROUS BISGLYCINATE CHELATE PO     hydroxychloroquine (PLAQUENIL) 200 MG tablet     predniSONE (DELTASONE) 5 MG tablet     sodium chloride 1 GM tablet     No current facility-administered medications for this visit.     Facility-Administered Medications Ordered in Other Visits   Medication     fentaNYL Citrate (PF) (SUBLIMAZE) injection        The [place] Prescription Monitoring Program has been reviewed and there are no concerns about diversionary activity for controlled substances at this time.        ADDED HISTORY   S/p Rheumatology consult and felt to be post viral given improvement and negative labs. On prednisone taper.     MENTAL STATUS EXAMINATION:   Appearance: Good attention to grooming and hygiene, casual garb  Attitude: Cooperative  Eye Contact: Good  Gait and Station: Within normal limits  Psychomotor Behavior: Sitting, overall within normal limits  Oriented to: Grossly person place and time  Attention Span and Concentration: Grossly intact  Speech: Normal tone  Mood: Good  Affect: Euthymic  Associations:  no loose associations  Thought Process:  logical, linear and goal oriented  Thought Content: No evidence of delusions or suicidal or homicidal ideation plan or intent  Memory: grossly intact  Fund of Knowledge: Good  Insight:  good  Judgment:  intact, adequate for safety  Impulse Control:  intact        DIAGNOSES:    Unspecified Anxiety Disorder  Medication-induced anxiety disorder  Rule Out Anxiety Disorder Due to Another Medical Condition  Unspecified Depressive Disorder      ASSESSMENT:   Pt dealing with new onset clinically significant anxiety with avoidance behaviors and depressive sx in aftermath of 2nd COVID-19 infection, physical sx, and anxiety reaction to sx. has not tolerated med trials.  Symptoms considerably improving in context of hydroxychloroquine discontinuation.  Whether directly or indirectly through medication side effects, anxiety was triggered.    Today Unique Baker reports on suicidal ideaitons. In addition, she has notable risk factors for self-harm, including anxiety. However, risk is mitigated by commitment to family, [employer] beliefs and absence of past attempts. Therefore, based on all available evidence including the factors cited above, she does not appear to be at imminent risk for self-harm, does not meet criteria for a 72-hr hold, and therefore remains appropriate for ongoing outpatient level of care.       PLAN:       Patient advised of consultative model. Patient will continue to be seen for ongoing consultation and stabilization.    Does not meet criteria for involuntary treatment or hospitalization    Hydroxyzine 25 mg po qday prn anxiety -Risks, benefits and alternatives discussed.  Patient provides verbal consent to treatment.    Lorazepam 0.5 mg po qday prn anxiety effective-Risks, benefits and alternatives discussed.  Patient provides verbal consent to treatment.    DC'd escitalopram (nausea, restlessness, insomnia), buspirone, hydroxyzine (efficacy with significant grogginess)    pharmacogenomic testing - completed 11/17/22, pending [facility] assessment    Psychotherapy upcoming    Return in mid to late August      Administrative Billing:   Time spent with patient was greater than 50% of time and/or significant time was spent in counseling and coordination of care regarding above  diagnoses and treatment plan. Pre charting time and post charting time/documentation/coordination are done on date of service.      Signed:   Shade Strickland M.D.  [facility] Psychiatry Service    Disclaimer: This note consists of symbols derived from keyboarding, dictation and/or voice recognition software. As a result, there may be errors in the script that have gone undetected. Please consider this when interpreting information found in this chart.

## 2023-06-07 NOTE — NURSING NOTE
Is the patient currently in the state of [place]? YES    Visit mode:VIDEO    If the visit is dropped, the patient can be reconnected by: VIDEO VISIT: Text to cell phone:   Telephone Information:   Mobile [phone]       Will anyone else be joining the visit? No  (If patient encounters technical issues they should call [phone])    How would you like to obtain your AVS? MyChart    Are changes needed to the allergy or medication list? yes. Taking Different Iron. Now taking Vetron-C     Rooming Documentation: Questionnaire(s) not assigned, not done per department protocol. and Attendance Guidelines - Care team has reviewed attendance agreement with patient. Patient advised that two failed appointments within 6 months may lead to termination of current episode of care.      Reason for visit: SLY Marks

## 2023-06-12 ENCOUNTER — OFFICE VISIT (OUTPATIENT)
Dept: INTERNAL MEDICINE | Facility: CLINIC | Age: 38
End: 2023-06-12
Payer: COMMERCIAL

## 2023-06-12 VITALS
OXYGEN SATURATION: 100 % | TEMPERATURE: 98.4 F | HEART RATE: 84 BPM | DIASTOLIC BLOOD PRESSURE: 68 MMHG | SYSTOLIC BLOOD PRESSURE: 115 MMHG | HEIGHT: 62 IN | BODY MASS INDEX: 26.5 KG/M2 | WEIGHT: 144 LBS

## 2023-06-12 DIAGNOSIS — Z00.00 PREVENTATIVE HEALTH CARE: Primary | ICD-10-CM

## 2023-06-12 PROCEDURE — 99396 PREV VISIT EST AGE 40-64: CPT | Performed by: INTERNAL MEDICINE

## 2023-06-12 ASSESSMENT — ENCOUNTER SYMPTOMS
BREAST MASS: 0
COUGH: 0
HEARTBURN: 0
HEMATURIA: 0
FREQUENCY: 0
CONSTIPATION: 0
CHILLS: 0
WEAKNESS: 0
PALPITATIONS: 0
ABDOMINAL PAIN: 0
DIARRHEA: 0
DIZZINESS: 0
FEVER: 0
MYALGIAS: 0
HEADACHES: 0
SHORTNESS OF BREATH: 0
SORE THROAT: 0
HEMATOCHEZIA: 0
NAUSEA: 0
NERVOUS/ANXIOUS: 0
EYE PAIN: 0
JOINT SWELLING: 0
PARESTHESIAS: 0
ARTHRALGIAS: 0
DYSURIA: 0

## 2023-06-12 ASSESSMENT — PATIENT HEALTH QUESTIONNAIRE - PHQ9
SUM OF ALL RESPONSES TO PHQ QUESTIONS 1-9: 1
SUM OF ALL RESPONSES TO PHQ QUESTIONS 1-9: 1

## 2023-06-12 NOTE — PROGRESS NOTES
SUBJECTIVE:   CC: Unique is an 38 year old who presents for preventive health visit.     Non-fasting.         2023    12:47 PM   Additional Questions   Roomed by SHAUNA Osorio LPN   Accompanied by none         2023    12:47 PM   Patient Reported Additional Medications   Patient reports taking the following new medications none     Healthy Habits:     Getting at least 3 servings of Calcium per day:  Yes    Bi-annual eye exam:  Yes    Dental care twice a year:  Yes    Sleep apnea or symptoms of sleep apnea:  Excessive snoring    Diet:  Regular (no restrictions)    Frequency of exercise:  2-3 days/week    Duration of exercise:  15-30 minutes    Taking medications regularly:  Yes    Medication side effects:  Muscle aches and Other    PHQ-2 Total Score: 0    Additional concerns today:  Yes            Social History     Tobacco Use     Smoking status: Former     Years: 7.00     Types: Cigarettes     Quit date:      Years since quittin.4     Smokeless tobacco: Never     Tobacco comments:     only if she drinks alcohol   Vaping Use     Vaping status: Never Used     Passive vaping exposure: Yes   Substance Use Topics     Alcohol use: Not Currently     Alcohol/week: 8.3 standard drinks of alcohol             2023    12:48 PM   Alcohol Use   Prescreen: >3 drinks/day or >7 drinks/week? No     Reviewed orders with patient.  Reviewed health maintenance and updated orders accordingly - Yes  BP Readings from Last 3 Encounters:   23 115/68   23 111/74   23 129/78    Wt Readings from Last 3 Encounters:   23 65.3 kg (144 lb)   23 62.1 kg (137 lb)   23 59.6 kg (131 lb 8 oz)                    Breast Cancer Screening:        3/29/2022    12:42 PM   Breast CA Risk Assessment (FHS-7)   Do you have a family history of breast, colon, or ovarian cancer? No / Unknown         Mammogram Screening - Offered annual screening and updated Health Maintenance for mutual plan based on risk  "factor consideration    Pertinent mammograms are reviewed under the imaging tab.    History of abnormal Pap smear: NO - age 30-65 PAP every 5 years with negative HPV co-testing recommended      Latest Ref Rng & Units 2/3/2021     3:55 PM 2/3/2021     3:43 PM 4/7/2017    10:15 AM   PAP / HPV   PAP (Historical)   NIL      HPV 16 DNA NEG^Negative Negative    Negative     HPV 18 DNA NEG^Negative Negative    Negative     Other HR HPV NEG^Negative Negative    Negative       Reviewed and updated as needed this visit by clinical staff   Tobacco  Allergies  Meds  Problems  Med Hx  Surg Hx  Fam Hx          Reviewed and updated as needed this visit by Provider                     Review of Systems   Constitutional: Negative for chills and fever.   HENT: Negative for congestion, ear pain, hearing loss and sore throat.    Eyes: Positive for visual disturbance. Negative for pain.   Respiratory: Negative for cough and shortness of breath.    Cardiovascular: Negative for chest pain, palpitations and peripheral edema.   Gastrointestinal: Negative for abdominal pain, constipation, diarrhea, heartburn, hematochezia and nausea.   Breasts:  Positive for tenderness. Negative for breast mass and discharge.   Genitourinary: Negative for dysuria, frequency, genital sores, hematuria, pelvic pain, urgency, vaginal bleeding and vaginal discharge.   Musculoskeletal: Negative for arthralgias, joint swelling and myalgias.   Skin: Negative for rash.   Neurological: Negative for dizziness, weakness, headaches and paresthesias.   Psychiatric/Behavioral: Negative for mood changes. The patient is not nervous/anxious.         OBJECTIVE:   /68   Pulse 84   Temp 98.4  F (36.9  C)   Ht 1.562 m (5' 1.5\")   Wt 65.3 kg (144 lb)   LMP 06/12/2023   SpO2 100%   Breastfeeding No   BMI 26.77 kg/m    Physical Exam  GENERAL: healthy, alert and no distress  EYES: Eyes grossly normal to inspection, PERRL and conjunctivae and sclerae normal  HENT: " "ear canals and TM's normal, nose and mouth without ulcers or lesions  NECK: no adenopathy, no asymmetry, masses, or scars and thyroid normal to palpation  RESP: lungs clear to auscultation - no rales, rhonchi or wheezes  BREAST: normal without masses, tenderness or nipple discharge and no palpable axillary masses or adenopathy  CV: regular rate and rhythm, normal S1 S2, no S3 or S4, no murmur, click or rub, no peripheral edema and peripheral pulses strong  ABDOMEN: soft, nontender, no hepatosplenomegaly, no masses and bowel sounds normal  MS: no gross musculoskeletal defects noted, no edema  SKIN: no suspicious lesions or rashes  NEURO: Normal strength and tone, mentation intact and speech normal  PSYCH: mentation appears normal, affect normal/bright      ASSESSMENT/PLAN:   (Z00.00) Preventative health care  (primary encounter diagnosis)  Comment:    Plan: Lipid Profile (Chol, Trig, HDL, LDL calc),         Vitamin D Deficiency, MA Screen Bilateral         w/Jesus, CANCELED: *MA Screening Digital         Bilateral               Patient has been advised of split billing requirements and indicates understanding: Yes      COUNSELING:  Reviewed preventive health counseling, as reflected in patient instructions       Regular exercise       Healthy diet/nutrition      BMI:   Estimated body mass index is 26.77 kg/m  as calculated from the following:    Height as of this encounter: 1.562 m (5' 1.5\").    Weight as of this encounter: 65.3 kg (144 lb).         She reports that she quit smoking about 8 years ago. Her smoking use included cigarettes. She has never used smokeless tobacco.          Celina Riggs MD  [facility]  Answers for HPI/ROS submitted by the patient on 6/12/2023  PHQ9 TOTAL SCORE: 1      "

## 2023-06-13 ENCOUNTER — MYC MEDICAL ADVICE (OUTPATIENT)
Dept: INTERNAL MEDICINE | Facility: CLINIC | Age: 38
End: 2023-06-13
Payer: COMMERCIAL

## 2023-06-13 DIAGNOSIS — H93.13 TINNITUS, BILATERAL: ICD-10-CM

## 2023-06-13 DIAGNOSIS — J31.0 CHRONIC RHINITIS: ICD-10-CM

## 2023-06-14 RX ORDER — FLUTICASONE PROPIONATE 50 MCG
1 SPRAY, SUSPENSION (ML) NASAL 2 TIMES DAILY PRN
Qty: 11.1 ML | Refills: 1 | Status: ON HOLD | OUTPATIENT
Start: 2023-06-14 | End: 2023-10-16

## 2023-06-14 NOTE — TELEPHONE ENCOUNTER
Pending Prescriptions:                       Disp   Refills    fluticasone (FLONASE) 50 MCG/ACT nasal spr*11.1 mL1        Sig: Spray 1 spray into both nostrils 2 times daily as           needed for rhinitis or allergies Take 1 spray           twice daily x 1 week then 1 spray daily at night           x 1 week then as needed    Routing refill request to provider for review/approval because:  A break in medication

## 2023-06-20 ENCOUNTER — HOSPITAL ENCOUNTER (OUTPATIENT)
Dept: MAMMOGRAPHY | Facility: CLINIC | Age: 38
Discharge: HOME | End: 2023-06-20
Attending: INTERNAL MEDICINE | Admitting: INTERNAL MEDICINE
Payer: COMMERCIAL

## 2023-06-20 DIAGNOSIS — Z00.00 PREVENTATIVE HEALTH CARE: ICD-10-CM

## 2023-06-20 PROCEDURE — 77063 BREAST TOMOSYNTHESIS BI: CPT

## 2023-07-07 ENCOUNTER — LAB REQUISITION (OUTPATIENT)
Dept: LAB | Facility: CLINIC | Age: 38
End: 2023-07-07
Payer: COMMERCIAL

## 2023-07-07 DIAGNOSIS — Z86.19 PERSONAL HISTORY OF OTHER INFECTIOUS AND PARASITIC DISEASES: ICD-10-CM

## 2023-07-07 PROCEDURE — 88112 CYTOPATH CELL ENHANCE TECH: CPT | Mod: TC,ORL

## 2023-07-07 PROCEDURE — 88112 CYTOPATH CELL ENHANCE TECH: CPT | Mod: 26 | Performed by: PATHOLOGY

## 2023-07-10 ENCOUNTER — LAB (OUTPATIENT)
Dept: LAB | Facility: CLINIC | Age: 38
End: 2023-07-10
Payer: COMMERCIAL

## 2023-07-10 DIAGNOSIS — Z00.00 PREVENTATIVE HEALTH CARE: ICD-10-CM

## 2023-07-10 LAB
CHOLEST SERPL-MCNC: 226 MG/DL
HDLC SERPL-MCNC: 49 MG/DL
LDLC SERPL CALC-MCNC: 152 MG/DL
NONHDLC SERPL-MCNC: 177 MG/DL
PATH REPORT.COMMENTS IMP SPEC: NORMAL
PATH REPORT.FINAL DX SPEC: NORMAL
PATH REPORT.GROSS SPEC: NORMAL
PATH REPORT.MICROSCOPIC SPEC OTHER STN: NORMAL
PATH REPORT.RELEVANT HX SPEC: NORMAL
TRIGL SERPL-MCNC: 124 MG/DL

## 2023-07-10 PROCEDURE — 82306 VITAMIN D 25 HYDROXY: CPT

## 2023-07-10 PROCEDURE — 36415 COLL VENOUS BLD VENIPUNCTURE: CPT

## 2023-07-10 PROCEDURE — 80061 LIPID PANEL: CPT

## 2023-07-11 LAB — DEPRECATED CALCIDIOL+CALCIFEROL SERPL-MC: 28 UG/L (ref 20–75)

## 2023-08-01 NOTE — TELEPHONE ENCOUNTER
Twice daily ok     Please clarify on fluocinonide, no frequency noted in directions, twice daily?  Thanks!    Hattie Villafana PharmD    [facility]  Phone:  [phone]  Fax:  [phone]    
Former smoker

## 2023-09-25 ENCOUNTER — APPOINTMENT (OUTPATIENT)
Dept: URBAN - METROPOLITAN AREA CLINIC 253 | Age: 38
Setting detail: DERMATOLOGY
End: 2023-09-28

## 2023-09-25 VITALS — WEIGHT: 149 LBS | HEIGHT: 62 IN | RESPIRATION RATE: 14 BRPM

## 2023-09-25 DIAGNOSIS — L81.4 OTHER MELANIN HYPERPIGMENTATION: ICD-10-CM

## 2023-09-25 DIAGNOSIS — L82.0 INFLAMED SEBORRHEIC KERATOSIS: ICD-10-CM

## 2023-09-25 PROCEDURE — 17110 DESTRUCT B9 LESION 1-14: CPT

## 2023-09-25 PROCEDURE — OTHER ADDITIONAL NOTES: OTHER

## 2023-09-25 PROCEDURE — 99213 OFFICE O/P EST LOW 20 MIN: CPT | Mod: 25

## 2023-09-25 PROCEDURE — OTHER LIQUID NITROGEN: OTHER

## 2023-09-25 PROCEDURE — OTHER COUNSELING: OTHER

## 2023-09-25 PROCEDURE — OTHER MIPS QUALITY: OTHER

## 2023-09-25 ASSESSMENT — LOCATION SIMPLE DESCRIPTION DERM
LOCATION SIMPLE: CHEST
LOCATION SIMPLE: RIGHT BREAST

## 2023-09-25 ASSESSMENT — LOCATION DETAILED DESCRIPTION DERM
LOCATION DETAILED: MIDDLE STERNUM
LOCATION DETAILED: RIGHT MEDIAL BREAST 3-4:00 REGION

## 2023-09-25 ASSESSMENT — LOCATION ZONE DERM: LOCATION ZONE: TRUNK

## 2023-09-25 NOTE — PROCEDURE: LIQUID NITROGEN
Include Z78.9 (Other Specified Conditions Influencing Health Status) As An Associated Diagnosis?: No
Show Applicator Variable?: Yes
Medical Necessity Clause: This procedure was medically necessary because the lesions that were treated were:
Post-Care Instructions: I reviewed with the patient in detail post-care instructions. Patient is to wear sunprotection, and avoid picking at any of the treated lesions. Pt may apply Vaseline to crusted or scabbing areas.
Spray Paint Text: The liquid nitrogen was applied to the skin utilizing a spray paint frosting technique.
Medical Necessity Information: It is in your best interest to select a reason for this procedure from the list below. All of these items fulfill various CMS LCD requirements except the new and changing color options.
Consent: The patient's consent was obtained including but not limited to risks of crusting, scabbing, blistering, scarring, darker or lighter pigmentary change, recurrence, incomplete removal and infection.
Detail Level: Detailed

## 2023-09-25 NOTE — PROCEDURE: ADDITIONAL NOTES
Render Risk Assessment In Note?: no
Additional Notes: PIH, secondary to biopsy. \\nPathology: Solar Lentigo
Detail Level: Simple

## 2023-09-25 NOTE — HPI: SKIN LESION
Is This A New Presentation, Or A Follow-Up?: Skin Lesion
Additional History: The patient has a light brown lesion with ridges on the chest that has been there for about 5 months.

## 2023-09-26 NOTE — ANESTHESIA PREPROCEDURE EVALUATION
PAC NOTE:       ANESTHESIA PRE EVALUATION:  Anesthesia Evaluation     . Pt has had prior anesthetic. Type: Regional           ROS/MED HX    ENT/Pulmonary:  - neg pulmonary ROS     Neurologic:  - neg neurologic ROS     Cardiovascular:  - neg cardiovascular ROS       METS/Exercise Tolerance:     Hematologic:  - neg hematologic  ROS       Musculoskeletal:  - neg musculoskeletal ROS       GI/Hepatic:     (+) GERD Asymptomatic on medication,       Renal/Genitourinary:  - ROS Renal section negative       Endo: Comment: Gestational Diabetes     (+) type II DM Not using insulin - not using insulin pump not previously admitted for DM/DKA .      Psychiatric:  - neg psychiatric ROS       Infectious Disease:  - neg infectious disease ROS       Malignancy:      - no malignancy   Other: Comment: Oligohydramnios   (+) Possibly pregnant C-spine cleared: N/A, no H/O Chronic Pain,no other significant disability   - neg other ROS                 Physical Exam  Normal systems: cardiovascular, pulmonary and dental    Airway   Mallampati: II  TM distance: >3 FB  Neck ROM: full    Dental     Cardiovascular       Pulmonary              Anesthesia Plan      History & Physical Review  History and physical reviewed and following examination; no interval change.    ASA Status:  3 .    NPO Status:  > 8 hours    Plan for Spinal with Intravenous induction. Maintenance will be Balanced.    PONV prophylaxis:  Ondansetron (or other 5HT-3) and Dexamethasone or Solumedrol       Postoperative Care  Postoperative pain management:  IV analgesics.      Consents  Anesthetic plan, risks, benefits and alternatives discussed with:  Patient.  Use of blood products discussed: Yes.   Use of blood products discussed with Patient.  Consented to blood products.  .                            .   Is This A New Presentation, Or A Follow-Up?: Skin Lesion Additional History: Follow up on mole\\nMonitoring

## 2023-10-16 ENCOUNTER — APPOINTMENT (OUTPATIENT)
Dept: ULTRASOUND IMAGING | Facility: CLINIC | Age: 38
End: 2023-10-16
Attending: EMERGENCY MEDICINE
Payer: COMMERCIAL

## 2023-10-16 ENCOUNTER — ANESTHESIA EVENT (OUTPATIENT)
Dept: SURGERY | Facility: CLINIC | Age: 38
End: 2023-10-16
Payer: COMMERCIAL

## 2023-10-16 ENCOUNTER — ANESTHESIA (OUTPATIENT)
Dept: SURGERY | Facility: CLINIC | Age: 38
End: 2023-10-16
Payer: COMMERCIAL

## 2023-10-16 ENCOUNTER — HOSPITAL ENCOUNTER (OUTPATIENT)
Facility: CLINIC | Age: 38
Setting detail: OBSERVATION
Discharge: HOME | End: 2023-10-16
Attending: EMERGENCY MEDICINE | Admitting: HOSPITALIST
Payer: COMMERCIAL

## 2023-10-16 VITALS
RESPIRATION RATE: 15 BRPM | TEMPERATURE: 97.2 F | OXYGEN SATURATION: 100 % | HEART RATE: 83 BPM | SYSTOLIC BLOOD PRESSURE: 131 MMHG | WEIGHT: 156.31 LBS | BODY MASS INDEX: 28.76 KG/M2 | DIASTOLIC BLOOD PRESSURE: 80 MMHG | HEIGHT: 62 IN

## 2023-10-16 DIAGNOSIS — K81.9 CHOLECYSTITIS: Primary | ICD-10-CM

## 2023-10-16 PROBLEM — K80.20 CHOLELITHIASES: Status: ACTIVE | Noted: 2023-10-16

## 2023-10-16 LAB
ALBUMIN SERPL BCG-MCNC: 4 G/DL (ref 3.5–5.2)
ALBUMIN SERPL BCG-MCNC: 4.4 G/DL (ref 3.5–5.2)
ALP SERPL-CCNC: 63 U/L (ref 35–104)
ALP SERPL-CCNC: 68 U/L (ref 35–104)
ALT SERPL W P-5'-P-CCNC: 10 U/L (ref 0–50)
ALT SERPL W P-5'-P-CCNC: 12 U/L (ref 0–50)
ANION GAP SERPL CALCULATED.3IONS-SCNC: 11 MMOL/L (ref 7–15)
AST SERPL W P-5'-P-CCNC: 11 U/L (ref 0–45)
AST SERPL W P-5'-P-CCNC: 13 U/L (ref 0–45)
ATRIAL RATE - MUSE: 62 BPM
BASO+EOS+MONOS # BLD AUTO: ABNORMAL 10*3/UL
BASO+EOS+MONOS NFR BLD AUTO: ABNORMAL %
BASOPHILS # BLD AUTO: 0.1 10E3/UL (ref 0–0.2)
BASOPHILS NFR BLD AUTO: 0 %
BILIRUB DIRECT SERPL-MCNC: <0.2 MG/DL (ref 0–0.3)
BILIRUB SERPL-MCNC: 0.2 MG/DL
BILIRUB SERPL-MCNC: 0.3 MG/DL
BUN SERPL-MCNC: 14.4 MG/DL (ref 6–20)
CALCIUM SERPL-MCNC: 9.5 MG/DL (ref 8.6–10)
CHLORIDE SERPL-SCNC: 101 MMOL/L (ref 98–107)
CREAT SERPL-MCNC: 0.64 MG/DL (ref 0.51–0.95)
DEPRECATED HCO3 PLAS-SCNC: 25 MMOL/L (ref 22–29)
DIASTOLIC BLOOD PRESSURE - MUSE: NORMAL MMHG
EGFRCR SERPLBLD CKD-EPI 2021: >90 ML/MIN/1.73M2
EOSINOPHIL # BLD AUTO: 0.1 10E3/UL (ref 0–0.7)
EOSINOPHIL NFR BLD AUTO: 1 %
ERYTHROCYTE [DISTWIDTH] IN BLOOD BY AUTOMATED COUNT: 12.3 % (ref 10–15)
FERRITIN SERPL-MCNC: 58 NG/ML (ref 6–175)
GLUCOSE SERPL-MCNC: 126 MG/DL (ref 70–99)
HCG SERPL QL: NEGATIVE
HCT VFR BLD AUTO: 40 % (ref 35–47)
HGB BLD-MCNC: 13.3 G/DL (ref 11.7–15.7)
HOLD SPECIMEN: NORMAL
IMM GRANULOCYTES # BLD: 0 10E3/UL
IMM GRANULOCYTES NFR BLD: 0 %
INTERPRETATION ECG - MUSE: NORMAL
IRON BINDING CAPACITY (ROCHE): 337 UG/DL (ref 240–430)
IRON SATN MFR SERPL: 12 % (ref 15–46)
IRON SERPL-MCNC: 41 UG/DL (ref 37–145)
LIPASE SERPL-CCNC: 45 U/L (ref 13–60)
LYMPHOCYTES # BLD AUTO: 2.3 10E3/UL (ref 0.8–5.3)
LYMPHOCYTES NFR BLD AUTO: 17 %
MCH RBC QN AUTO: 29.6 PG (ref 26.5–33)
MCHC RBC AUTO-ENTMCNC: 33.3 G/DL (ref 31.5–36.5)
MCV RBC AUTO: 89 FL (ref 78–100)
MONOCYTES # BLD AUTO: 0.7 10E3/UL (ref 0–1.3)
MONOCYTES NFR BLD AUTO: 6 %
NEUTROPHILS # BLD AUTO: 10.2 10E3/UL (ref 1.6–8.3)
NEUTROPHILS NFR BLD AUTO: 76 %
NRBC # BLD AUTO: 0 10E3/UL
NRBC BLD AUTO-RTO: 0 /100
P AXIS - MUSE: 63 DEGREES
PLATELET # BLD AUTO: 218 10E3/UL (ref 150–450)
POTASSIUM SERPL-SCNC: 3.9 MMOL/L (ref 3.4–5.3)
PR INTERVAL - MUSE: 132 MS
PROT SERPL-MCNC: 6.6 G/DL (ref 6.4–8.3)
PROT SERPL-MCNC: 7.5 G/DL (ref 6.4–8.3)
QRS DURATION - MUSE: 74 MS
QT - MUSE: 428 MS
QTC - MUSE: 434 MS
R AXIS - MUSE: 59 DEGREES
RBC # BLD AUTO: 4.5 10E6/UL (ref 3.8–5.2)
SODIUM SERPL-SCNC: 137 MMOL/L (ref 135–145)
SYSTOLIC BLOOD PRESSURE - MUSE: NORMAL MMHG
T AXIS - MUSE: 40 DEGREES
TRANSFERRIN SERPL-MCNC: 240 MG/DL (ref 200–360)
VENTRICULAR RATE- MUSE: 62 BPM
WBC # BLD AUTO: 13.5 10E3/UL (ref 4–11)

## 2023-10-16 PROCEDURE — 250N000011 HC RX IP 250 OP 636: Mod: JZ | Performed by: EMERGENCY MEDICINE

## 2023-10-16 PROCEDURE — 250N000009 HC RX 250: Performed by: NURSE ANESTHETIST, CERTIFIED REGISTERED

## 2023-10-16 PROCEDURE — 88304 TISSUE EXAM BY PATHOLOGIST: CPT | Mod: TC | Performed by: SURGERY

## 2023-10-16 PROCEDURE — 82248 BILIRUBIN DIRECT: CPT | Performed by: EMERGENCY MEDICINE

## 2023-10-16 PROCEDURE — 250N000011 HC RX IP 250 OP 636: Performed by: NURSE ANESTHETIST, CERTIFIED REGISTERED

## 2023-10-16 PROCEDURE — 272N000001 HC OR GENERAL SUPPLY STERILE: Performed by: SURGERY

## 2023-10-16 PROCEDURE — 99285 EMERGENCY DEPT VISIT HI MDM: CPT | Mod: 25

## 2023-10-16 PROCEDURE — G0378 HOSPITAL OBSERVATION PER HR: HCPCS

## 2023-10-16 PROCEDURE — 96375 TX/PRO/DX INJ NEW DRUG ADDON: CPT

## 2023-10-16 PROCEDURE — 250N000011 HC RX IP 250 OP 636: Performed by: SURGERY

## 2023-10-16 PROCEDURE — 710N000012 HC RECOVERY PHASE 2, PER MINUTE: Performed by: SURGERY

## 2023-10-16 PROCEDURE — 36415 COLL VENOUS BLD VENIPUNCTURE: CPT | Performed by: HOSPITALIST

## 2023-10-16 PROCEDURE — 84703 CHORIONIC GONADOTROPIN ASSAY: CPT | Performed by: EMERGENCY MEDICINE

## 2023-10-16 PROCEDURE — 83690 ASSAY OF LIPASE: CPT | Performed by: EMERGENCY MEDICINE

## 2023-10-16 PROCEDURE — 80053 COMPREHEN METABOLIC PANEL: CPT | Performed by: EMERGENCY MEDICINE

## 2023-10-16 PROCEDURE — 85004 AUTOMATED DIFF WBC COUNT: CPT | Performed by: EMERGENCY MEDICINE

## 2023-10-16 PROCEDURE — 99222 1ST HOSP IP/OBS MODERATE 55: CPT | Mod: 57 | Performed by: SURGERY

## 2023-10-16 PROCEDURE — 88304 TISSUE EXAM BY PATHOLOGIST: CPT | Mod: 26 | Performed by: PATHOLOGY

## 2023-10-16 PROCEDURE — 999N000141 HC STATISTIC PRE-PROCEDURE NURSING ASSESSMENT: Performed by: SURGERY

## 2023-10-16 PROCEDURE — 96376 TX/PRO/DX INJ SAME DRUG ADON: CPT | Mod: XU

## 2023-10-16 PROCEDURE — 93005 ELECTROCARDIOGRAM TRACING: CPT | Mod: XU

## 2023-10-16 PROCEDURE — 360N000080 HC SURGERY LEVEL 7, PER MIN: Performed by: SURGERY

## 2023-10-16 PROCEDURE — 82728 ASSAY OF FERRITIN: CPT | Performed by: STUDENT IN AN ORGANIZED HEALTH CARE EDUCATION/TRAINING PROGRAM

## 2023-10-16 PROCEDURE — 258N000003 HC RX IP 258 OP 636: Performed by: HOSPITALIST

## 2023-10-16 PROCEDURE — 250N000025 HC SEVOFLURANE, PER MIN: Performed by: SURGERY

## 2023-10-16 PROCEDURE — 370N000017 HC ANESTHESIA TECHNICAL FEE, PER MIN: Performed by: SURGERY

## 2023-10-16 PROCEDURE — C9113 INJ PANTOPRAZOLE SODIUM, VIA: HCPCS | Mod: JZ | Performed by: HOSPITALIST

## 2023-10-16 PROCEDURE — 47562 LAPAROSCOPIC CHOLECYSTECTOMY: CPT | Performed by: SURGERY

## 2023-10-16 PROCEDURE — 250N000009 HC RX 250: Performed by: SURGERY

## 2023-10-16 PROCEDURE — 250N000009 HC RX 250: Performed by: ANESTHESIOLOGY

## 2023-10-16 PROCEDURE — 83550 IRON BINDING TEST: CPT | Performed by: STUDENT IN AN ORGANIZED HEALTH CARE EDUCATION/TRAINING PROGRAM

## 2023-10-16 PROCEDURE — 84466 ASSAY OF TRANSFERRIN: CPT | Performed by: STUDENT IN AN ORGANIZED HEALTH CARE EDUCATION/TRAINING PROGRAM

## 2023-10-16 PROCEDURE — 76705 ECHO EXAM OF ABDOMEN: CPT

## 2023-10-16 PROCEDURE — 96361 HYDRATE IV INFUSION ADD-ON: CPT | Mod: XU

## 2023-10-16 PROCEDURE — 250N000011 HC RX IP 250 OP 636: Mod: JZ | Performed by: HOSPITALIST

## 2023-10-16 PROCEDURE — 250N000011 HC RX IP 250 OP 636: Mod: JZ | Performed by: ANESTHESIOLOGY

## 2023-10-16 PROCEDURE — 710N000009 HC RECOVERY PHASE 1, LEVEL 1, PER MIN: Performed by: SURGERY

## 2023-10-16 PROCEDURE — 250N000013 HC RX MED GY IP 250 OP 250 PS 637: Performed by: SURGERY

## 2023-10-16 PROCEDURE — 258N000003 HC RX IP 258 OP 636: Performed by: ANESTHESIOLOGY

## 2023-10-16 PROCEDURE — 99207 PR NO BILLABLE SERVICE THIS VISIT: CPT | Performed by: STUDENT IN AN ORGANIZED HEALTH CARE EDUCATION/TRAINING PROGRAM

## 2023-10-16 PROCEDURE — 96374 THER/PROPH/DIAG INJ IV PUSH: CPT | Mod: 59

## 2023-10-16 PROCEDURE — 36415 COLL VENOUS BLD VENIPUNCTURE: CPT | Performed by: EMERGENCY MEDICINE

## 2023-10-16 PROCEDURE — 99235 HOSP IP/OBS SAME DATE MOD 70: CPT | Performed by: HOSPITALIST

## 2023-10-16 RX ORDER — EPHEDRINE SULFATE 50 MG/ML
25 INJECTION, SOLUTION INTRAVENOUS ONCE
Status: COMPLETED | OUTPATIENT
Start: 2023-10-16 | End: 2023-10-16

## 2023-10-16 RX ORDER — OXYCODONE HYDROCHLORIDE 5 MG/1
5 TABLET ORAL
Status: COMPLETED | OUTPATIENT
Start: 2023-10-16 | End: 2023-10-16

## 2023-10-16 RX ORDER — AMOXICILLIN 250 MG
2 CAPSULE ORAL 2 TIMES DAILY PRN
Status: DISCONTINUED | OUTPATIENT
Start: 2023-10-16 | End: 2023-10-16 | Stop reason: HOSPADM

## 2023-10-16 RX ORDER — PROPOFOL 10 MG/ML
INJECTION, EMULSION INTRAVENOUS CONTINUOUS PRN
Status: DISCONTINUED | OUTPATIENT
Start: 2023-10-16 | End: 2023-10-16

## 2023-10-16 RX ORDER — CEFAZOLIN SODIUM/WATER 2 G/20 ML
2 SYRINGE (ML) INTRAVENOUS SEE ADMIN INSTRUCTIONS
Status: DISCONTINUED | OUTPATIENT
Start: 2023-10-16 | End: 2023-10-16 | Stop reason: HOSPADM

## 2023-10-16 RX ORDER — ONDANSETRON 2 MG/ML
4 INJECTION INTRAMUSCULAR; INTRAVENOUS ONCE
Status: COMPLETED | OUTPATIENT
Start: 2023-10-16 | End: 2023-10-16

## 2023-10-16 RX ORDER — SUCRALFATE ORAL 1 G/10ML
1 SUSPENSION ORAL DAILY PRN
COMMUNITY
End: 2023-11-30

## 2023-10-16 RX ORDER — HYDROMORPHONE HCL IN WATER/PF 6 MG/30 ML
0.4 PATIENT CONTROLLED ANALGESIA SYRINGE INTRAVENOUS
Status: DISCONTINUED | OUTPATIENT
Start: 2023-10-16 | End: 2023-10-16 | Stop reason: HOSPADM

## 2023-10-16 RX ORDER — POLYETHYLENE GLYCOL 3350 17 G/17G
17 POWDER, FOR SOLUTION ORAL DAILY PRN
Status: DISCONTINUED | OUTPATIENT
Start: 2023-10-16 | End: 2023-10-16 | Stop reason: HOSPADM

## 2023-10-16 RX ORDER — AMPICILLIN AND SULBACTAM 2; 1 G/1; G/1
3 INJECTION, POWDER, FOR SOLUTION INTRAMUSCULAR; INTRAVENOUS ONCE
Status: COMPLETED | OUTPATIENT
Start: 2023-10-16 | End: 2023-10-16

## 2023-10-16 RX ORDER — KETOROLAC TROMETHAMINE 15 MG/ML
15 INJECTION, SOLUTION INTRAMUSCULAR; INTRAVENOUS ONCE
Status: COMPLETED | OUTPATIENT
Start: 2023-10-16 | End: 2023-10-16

## 2023-10-16 RX ORDER — FLUTICASONE PROPIONATE 50 MCG
1-2 SPRAY, SUSPENSION (ML) NASAL DAILY PRN
COMMUNITY

## 2023-10-16 RX ORDER — NALOXONE HYDROCHLORIDE 0.4 MG/ML
0.4 INJECTION, SOLUTION INTRAMUSCULAR; INTRAVENOUS; SUBCUTANEOUS
Status: DISCONTINUED | OUTPATIENT
Start: 2023-10-16 | End: 2023-10-16 | Stop reason: HOSPADM

## 2023-10-16 RX ORDER — INDOCYANINE GREEN AND WATER 25 MG
1.25 KIT INJECTION ONCE
Status: COMPLETED | OUTPATIENT
Start: 2023-10-16 | End: 2023-10-16

## 2023-10-16 RX ORDER — BUPIVACAINE HYDROCHLORIDE AND EPINEPHRINE 5; 5 MG/ML; UG/ML
INJECTION, SOLUTION PERINEURAL PRN
Status: DISCONTINUED | OUTPATIENT
Start: 2023-10-16 | End: 2023-10-16 | Stop reason: HOSPADM

## 2023-10-16 RX ORDER — DEXAMETHASONE SODIUM PHOSPHATE 4 MG/ML
INJECTION, SOLUTION INTRA-ARTICULAR; INTRALESIONAL; INTRAMUSCULAR; INTRAVENOUS; SOFT TISSUE PRN
Status: DISCONTINUED | OUTPATIENT
Start: 2023-10-16 | End: 2023-10-16

## 2023-10-16 RX ORDER — FENTANYL CITRATE 50 UG/ML
25 INJECTION, SOLUTION INTRAMUSCULAR; INTRAVENOUS EVERY 5 MIN PRN
Status: DISCONTINUED | OUTPATIENT
Start: 2023-10-16 | End: 2023-10-16 | Stop reason: HOSPADM

## 2023-10-16 RX ORDER — NALOXONE HYDROCHLORIDE 0.4 MG/ML
0.2 INJECTION, SOLUTION INTRAMUSCULAR; INTRAVENOUS; SUBCUTANEOUS
Status: DISCONTINUED | OUTPATIENT
Start: 2023-10-16 | End: 2023-10-16 | Stop reason: HOSPADM

## 2023-10-16 RX ORDER — ONDANSETRON 4 MG/1
4 TABLET, ORALLY DISINTEGRATING ORAL EVERY 8 HOURS PRN
Qty: 4 TABLET | Refills: 0 | Status: SHIPPED | OUTPATIENT
Start: 2023-10-16 | End: 2023-11-30

## 2023-10-16 RX ORDER — GLYCOPYRROLATE 0.2 MG/ML
INJECTION, SOLUTION INTRAMUSCULAR; INTRAVENOUS PRN
Status: DISCONTINUED | OUTPATIENT
Start: 2023-10-16 | End: 2023-10-16

## 2023-10-16 RX ORDER — ONDANSETRON 4 MG/1
4 TABLET, ORALLY DISINTEGRATING ORAL EVERY 30 MIN PRN
Status: DISCONTINUED | OUTPATIENT
Start: 2023-10-16 | End: 2023-10-16 | Stop reason: HOSPADM

## 2023-10-16 RX ORDER — HYDROMORPHONE HCL IN WATER/PF 6 MG/30 ML
0.2 PATIENT CONTROLLED ANALGESIA SYRINGE INTRAVENOUS EVERY 5 MIN PRN
Status: DISCONTINUED | OUTPATIENT
Start: 2023-10-16 | End: 2023-10-16 | Stop reason: HOSPADM

## 2023-10-16 RX ORDER — ONDANSETRON 2 MG/ML
INJECTION INTRAMUSCULAR; INTRAVENOUS PRN
Status: DISCONTINUED | OUTPATIENT
Start: 2023-10-16 | End: 2023-10-16

## 2023-10-16 RX ORDER — OXYCODONE HYDROCHLORIDE 5 MG/1
5-10 TABLET ORAL EVERY 4 HOURS PRN
Qty: 10 TABLET | Refills: 0 | Status: SHIPPED | OUTPATIENT
Start: 2023-10-16 | End: 2023-11-30

## 2023-10-16 RX ORDER — AMPICILLIN AND SULBACTAM 2; 1 G/1; G/1
3 INJECTION, POWDER, FOR SOLUTION INTRAMUSCULAR; INTRAVENOUS EVERY 6 HOURS
Status: DISCONTINUED | OUTPATIENT
Start: 2023-10-16 | End: 2023-10-16 | Stop reason: HOSPADM

## 2023-10-16 RX ORDER — FENTANYL CITRATE 50 UG/ML
INJECTION, SOLUTION INTRAMUSCULAR; INTRAVENOUS PRN
Status: DISCONTINUED | OUTPATIENT
Start: 2023-10-16 | End: 2023-10-16

## 2023-10-16 RX ORDER — SODIUM CHLORIDE, SODIUM LACTATE, POTASSIUM CHLORIDE, CALCIUM CHLORIDE 600; 310; 30; 20 MG/100ML; MG/100ML; MG/100ML; MG/100ML
INJECTION, SOLUTION INTRAVENOUS CONTINUOUS
Status: DISCONTINUED | OUTPATIENT
Start: 2023-10-16 | End: 2023-10-16 | Stop reason: HOSPADM

## 2023-10-16 RX ORDER — OXYCODONE HYDROCHLORIDE 5 MG/1
5 TABLET ORAL EVERY 4 HOURS PRN
Status: DISCONTINUED | OUTPATIENT
Start: 2023-10-16 | End: 2023-10-16 | Stop reason: HOSPADM

## 2023-10-16 RX ORDER — SCOLOPAMINE TRANSDERMAL SYSTEM 1 MG/1
1 PATCH, EXTENDED RELEASE TRANSDERMAL
Status: DISCONTINUED | OUTPATIENT
Start: 2023-10-16 | End: 2023-10-16 | Stop reason: HOSPADM

## 2023-10-16 RX ORDER — LIDOCAINE 40 MG/G
CREAM TOPICAL
Status: DISCONTINUED | OUTPATIENT
Start: 2023-10-16 | End: 2023-10-16 | Stop reason: HOSPADM

## 2023-10-16 RX ORDER — AMOXICILLIN 250 MG
1 CAPSULE ORAL 2 TIMES DAILY PRN
Status: DISCONTINUED | OUTPATIENT
Start: 2023-10-16 | End: 2023-10-16 | Stop reason: HOSPADM

## 2023-10-16 RX ORDER — CEFAZOLIN SODIUM/WATER 2 G/20 ML
2 SYRINGE (ML) INTRAVENOUS
Status: COMPLETED | OUTPATIENT
Start: 2023-10-16 | End: 2023-10-16

## 2023-10-16 RX ORDER — BISACODYL 10 MG
10 SUPPOSITORY, RECTAL RECTAL DAILY PRN
Status: DISCONTINUED | OUTPATIENT
Start: 2023-10-16 | End: 2023-10-16 | Stop reason: HOSPADM

## 2023-10-16 RX ORDER — ONDANSETRON 2 MG/ML
4 INJECTION INTRAMUSCULAR; INTRAVENOUS EVERY 30 MIN PRN
Status: DISCONTINUED | OUTPATIENT
Start: 2023-10-16 | End: 2023-10-16 | Stop reason: HOSPADM

## 2023-10-16 RX ORDER — DEXMEDETOMIDINE HYDROCHLORIDE 4 UG/ML
INJECTION, SOLUTION INTRAVENOUS PRN
Status: DISCONTINUED | OUTPATIENT
Start: 2023-10-16 | End: 2023-10-16

## 2023-10-16 RX ORDER — DEXTROSE MONOHYDRATE, SODIUM CHLORIDE, AND POTASSIUM CHLORIDE 50; 1.49; 4.5 G/1000ML; G/1000ML; G/1000ML
INJECTION, SOLUTION INTRAVENOUS CONTINUOUS
Status: DISCONTINUED | OUTPATIENT
Start: 2023-10-16 | End: 2023-10-16 | Stop reason: HOSPADM

## 2023-10-16 RX ORDER — PROMETHAZINE HYDROCHLORIDE 25 MG/ML
25 INJECTION INTRAMUSCULAR; INTRAVENOUS ONCE
Status: COMPLETED | OUTPATIENT
Start: 2023-10-16 | End: 2023-10-16

## 2023-10-16 RX ORDER — CETIRIZINE HYDROCHLORIDE 10 MG/1
10 TABLET ORAL DAILY PRN
COMMUNITY

## 2023-10-16 RX ORDER — HYDROMORPHONE HCL IN WATER/PF 6 MG/30 ML
0.4 PATIENT CONTROLLED ANALGESIA SYRINGE INTRAVENOUS EVERY 5 MIN PRN
Status: DISCONTINUED | OUTPATIENT
Start: 2023-10-16 | End: 2023-10-16 | Stop reason: HOSPADM

## 2023-10-16 RX ORDER — PROPOFOL 10 MG/ML
INJECTION, EMULSION INTRAVENOUS PRN
Status: DISCONTINUED | OUTPATIENT
Start: 2023-10-16 | End: 2023-10-16

## 2023-10-16 RX ORDER — ACETAMINOPHEN 325 MG/1
975 TABLET ORAL ONCE
Status: COMPLETED | OUTPATIENT
Start: 2023-10-16 | End: 2023-10-16

## 2023-10-16 RX ORDER — EPHEDRINE SULFATE 50 MG/ML
INJECTION, SOLUTION INTRAMUSCULAR; INTRAVENOUS; SUBCUTANEOUS PRN
Status: DISCONTINUED | OUTPATIENT
Start: 2023-10-16 | End: 2023-10-16

## 2023-10-16 RX ORDER — ACETAMINOPHEN 325 MG/1
650 TABLET ORAL EVERY 6 HOURS PRN
Status: DISCONTINUED | OUTPATIENT
Start: 2023-10-16 | End: 2023-10-16 | Stop reason: HOSPADM

## 2023-10-16 RX ORDER — LIDOCAINE HYDROCHLORIDE 20 MG/ML
INJECTION, SOLUTION INFILTRATION; PERINEURAL PRN
Status: DISCONTINUED | OUTPATIENT
Start: 2023-10-16 | End: 2023-10-16

## 2023-10-16 RX ORDER — KETOROLAC TROMETHAMINE 30 MG/ML
INJECTION, SOLUTION INTRAMUSCULAR; INTRAVENOUS PRN
Status: DISCONTINUED | OUTPATIENT
Start: 2023-10-16 | End: 2023-10-16

## 2023-10-16 RX ORDER — MAGNESIUM HYDROXIDE 1200 MG/15ML
LIQUID ORAL PRN
Status: DISCONTINUED | OUTPATIENT
Start: 2023-10-16 | End: 2023-10-16 | Stop reason: HOSPADM

## 2023-10-16 RX ORDER — ONDANSETRON 2 MG/ML
4 INJECTION INTRAMUSCULAR; INTRAVENOUS EVERY 6 HOURS PRN
Status: DISCONTINUED | OUTPATIENT
Start: 2023-10-16 | End: 2023-10-16 | Stop reason: HOSPADM

## 2023-10-16 RX ORDER — FENTANYL CITRATE 50 UG/ML
50 INJECTION, SOLUTION INTRAMUSCULAR; INTRAVENOUS EVERY 5 MIN PRN
Status: DISCONTINUED | OUTPATIENT
Start: 2023-10-16 | End: 2023-10-16 | Stop reason: HOSPADM

## 2023-10-16 RX ORDER — HYDROMORPHONE HCL IN WATER/PF 6 MG/30 ML
0.2 PATIENT CONTROLLED ANALGESIA SYRINGE INTRAVENOUS
Status: DISCONTINUED | OUTPATIENT
Start: 2023-10-16 | End: 2023-10-16 | Stop reason: HOSPADM

## 2023-10-16 RX ORDER — ONDANSETRON 4 MG/1
4 TABLET, ORALLY DISINTEGRATING ORAL EVERY 6 HOURS PRN
Status: DISCONTINUED | OUTPATIENT
Start: 2023-10-16 | End: 2023-10-16 | Stop reason: HOSPADM

## 2023-10-16 RX ADMIN — POTASSIUM CHLORIDE, DEXTROSE MONOHYDRATE AND SODIUM CHLORIDE: 150; 5; 450 INJECTION, SOLUTION INTRAVENOUS at 05:44

## 2023-10-16 RX ADMIN — INDOCYANINE GREEN AND WATER 1.25 MG: KIT at 15:25

## 2023-10-16 RX ADMIN — AMPICILLIN SODIUM AND SULBACTAM SODIUM 3 G: 2; 1 INJECTION, POWDER, FOR SOLUTION INTRAMUSCULAR; INTRAVENOUS at 11:09

## 2023-10-16 RX ADMIN — FENTANYL CITRATE 100 MCG: 50 INJECTION INTRAMUSCULAR; INTRAVENOUS at 16:07

## 2023-10-16 RX ADMIN — PROPOFOL 200 MG: 10 INJECTION, EMULSION INTRAVENOUS at 16:07

## 2023-10-16 RX ADMIN — FENTANYL CITRATE 50 MCG: 50 INJECTION INTRAMUSCULAR; INTRAVENOUS at 16:42

## 2023-10-16 RX ADMIN — ONDANSETRON 4 MG: 2 INJECTION INTRAMUSCULAR; INTRAVENOUS at 17:16

## 2023-10-16 RX ADMIN — Medication 2 G: at 16:01

## 2023-10-16 RX ADMIN — AMPICILLIN SODIUM AND SULBACTAM SODIUM 3 G: 2; 1 INJECTION, POWDER, FOR SOLUTION INTRAMUSCULAR; INTRAVENOUS at 04:51

## 2023-10-16 RX ADMIN — OXYCODONE HYDROCHLORIDE 5 MG: 5 TABLET ORAL at 19:10

## 2023-10-16 RX ADMIN — EPHEDRINE SULFATE 25 MG: 50 INJECTION, SOLUTION INTRAVENOUS at 17:34

## 2023-10-16 RX ADMIN — SODIUM CHLORIDE, POTASSIUM CHLORIDE, SODIUM LACTATE AND CALCIUM CHLORIDE: 600; 310; 30; 20 INJECTION, SOLUTION INTRAVENOUS at 15:28

## 2023-10-16 RX ADMIN — DEXMEDETOMIDINE 20 MCG: 100 INJECTION, SOLUTION, CONCENTRATE INTRAVENOUS at 16:11

## 2023-10-16 RX ADMIN — SCOPALAMINE 1 PATCH: 1 PATCH, EXTENDED RELEASE TRANSDERMAL at 15:36

## 2023-10-16 RX ADMIN — LIDOCAINE HYDROCHLORIDE 30 MG: 20 INJECTION, SOLUTION INFILTRATION; PERINEURAL at 16:07

## 2023-10-16 RX ADMIN — Medication 10 MG: at 16:10

## 2023-10-16 RX ADMIN — SUGAMMADEX 200 MG: 100 INJECTION, SOLUTION INTRAVENOUS at 16:50

## 2023-10-16 RX ADMIN — GLYCOPYRROLATE 0.2 MG: 0.2 INJECTION, SOLUTION INTRAMUSCULAR; INTRAVENOUS at 16:13

## 2023-10-16 RX ADMIN — ACETAMINOPHEN 975 MG: 325 TABLET, FILM COATED ORAL at 15:25

## 2023-10-16 RX ADMIN — ONDANSETRON 4 MG: 2 INJECTION INTRAMUSCULAR; INTRAVENOUS at 04:51

## 2023-10-16 RX ADMIN — ONDANSETRON 4 MG: 2 INJECTION INTRAMUSCULAR; INTRAVENOUS at 02:02

## 2023-10-16 RX ADMIN — ROCURONIUM BROMIDE 50 MG: 50 INJECTION, SOLUTION INTRAVENOUS at 16:07

## 2023-10-16 RX ADMIN — Medication 10 MG: at 16:15

## 2023-10-16 RX ADMIN — DEXAMETHASONE SODIUM PHOSPHATE 8 MG: 4 INJECTION, SOLUTION INTRA-ARTICULAR; INTRALESIONAL; INTRAMUSCULAR; INTRAVENOUS; SOFT TISSUE at 16:07

## 2023-10-16 RX ADMIN — PROPOFOL 75 MCG/KG/MIN: 10 INJECTION, EMULSION INTRAVENOUS at 16:07

## 2023-10-16 RX ADMIN — DEXMEDETOMIDINE 12 MCG: 100 INJECTION, SOLUTION, CONCENTRATE INTRAVENOUS at 16:38

## 2023-10-16 RX ADMIN — Medication 5 MG: at 16:28

## 2023-10-16 RX ADMIN — KETOROLAC TROMETHAMINE 30 MG: 30 INJECTION, SOLUTION INTRAMUSCULAR at 16:43

## 2023-10-16 RX ADMIN — KETOROLAC TROMETHAMINE 15 MG: 15 INJECTION, SOLUTION INTRAMUSCULAR; INTRAVENOUS at 02:31

## 2023-10-16 RX ADMIN — MIDAZOLAM 2 MG: 1 INJECTION INTRAMUSCULAR; INTRAVENOUS at 16:01

## 2023-10-16 RX ADMIN — ONDANSETRON 4 MG: 2 INJECTION INTRAMUSCULAR; INTRAVENOUS at 16:42

## 2023-10-16 RX ADMIN — PROMETHAZINE HYDROCHLORIDE 25 MG: 25 INJECTION INTRAMUSCULAR; INTRAVENOUS at 17:34

## 2023-10-16 RX ADMIN — PANTOPRAZOLE SODIUM 40 MG: 40 INJECTION, POWDER, FOR SOLUTION INTRAVENOUS at 08:22

## 2023-10-16 ASSESSMENT — ACTIVITIES OF DAILY LIVING (ADL)
ADLS_ACUITY_SCORE: 37
ADLS_ACUITY_SCORE: 20

## 2023-10-16 NOTE — OP NOTE
[facility] General Surgery Operative Note    Pre-operative diagnosis: acute cholecystitis and symptomatic cholelithiasis   Post-operative diagnosis: same   Procedure: Robotic cholecystectomy and use of indocyanine green dye for bile duct identification   Surgeon: Leon Barakat MD   Assistant(s): Christiane Jarquin PA-C  The Physician Assistant was medically necessary for their expertise in prepping, camera management, suctioning, suturing and retraction.   Anesthesia: general   Estimated blood loss:  Specimen: 10 cc  gallbladder and contents               INDICATION FOR OPERATION: This is a 38 year old female who presented to ED with abdominal pain. Studies including ultrasound were consistent with gallstones. We discussed robotic and laparoscopic cholecystectomy and the patient agreed to proceed after hearing the risks and benefits.    DESCRIPTION OF PROCEDURE:  The patient was taken to the operating room and placed on the table in supine position.  General endotracheal anesthesia was induced and the abdomen was prepped and draped in standard sterile fashion.  A time out was performed.  An 11 blade scalpel was used to make a transverse periumbilical incision.  A eugene was used to dissect down to the fasica and this was grasped with a kocher and elevated. Next the Veress needle was placed into the abdomen. After passing a meniscus test the abdomen was then insufflated to 12 mmHg.  The Veress needle was then removed and the abdomen was entered with an 8mm trocar.  The abdomen was inspected and there were no injuries obviously noted from Veress or trocar placement.  An 8mm port was placed in the left abdomen.  Two additional 8 mm ports were placed in the right abdomen. The patient was placed in reverse Trendelenburg and right side up.  The gallbladder appeared moderately thickened.  The fundus of the gallbladder was grasped and retracted cephalad. Omental adhesions were taken down with cautery.  The  infundibulum was grasped and retracted laterally.  The peritoneum over the medial and lateral aspects of the triangle of Calot was taken down with hook cautery and blunt dissection. The cystic duct and artery were freed up from surrounding tissues.  The triangle of Calot was skeletonized revealing the critical view of safety.    As the patient had received ICG preoperatively, Intraoperative fluorescence was also used to visualize the course of the common bile duct and cystic duct, with no other ductal structures seen.  The cystic artery and duct were each clipped proximally and distally and transected with the hook cautery.  The gallbladder was then removed from the liver using the hook electrocautery. The liver bed was visualized with ICG fluorescence and no bile leakage was seen.   The gallbladder was passed into an Endocatch bag. We observed the right upper quadrant carefully for hemostasis.  Hemostasis was assured.  The gallbladder was removed from the umbilical port.  Each of the remaining ports were removed under direct visualization.  The CO2 was evacuated.  There was no bleeding from any of these sites. The umbilical port was closed with 0 Vicryl in figure-of-eight fashion. All incision sites were anesthetized with local anesthetic.  All of the incisions were closed with interrupted 4-0 Vicryl subcuticular sutures and sterile glue was placed as a dressing.  The patient tolerated the procedure well.  Sponge and instrument counts were correct.      FINDINGS: inflamed gallbladder    Leon Barakat MD

## 2023-10-16 NOTE — ANESTHESIA PREPROCEDURE EVALUATION
Anesthesia Pre-Procedure Evaluation    Patient: Unique Baker   MRN: [id number] : 1985        Procedure : Procedure(s):  CHOLECYSTECTOMY, ROBOT-ASSISTED, LAPAROSCOPIC, USING DA BARRINGTON XI          Past Medical History:   Diagnosis Date    Diabetes (H)     GDM insulin    GERD (gastroesophageal reflux disease)     w/u otherwise neg     History of colposcopy with cervical biopsy 3/23/07    WNL    Papanicolaou smear of cervix with low grade squamous intraepithelial lesion (LGSIL) 07    PONV (postoperative nausea and vomiting)     S/P  10/13/2017      Past Surgical History:   Procedure Laterality Date    BREAST SURGERY      augmentation      SECTION N/A 2015    Procedure:  SECTION;  Surgeon: Tremaine Gaona MD;  Location: [facility] OR     SECTION, TUBAL LIGATION, COMBINED N/A 10/13/2017    Procedure: COMBINED  SECTION, TUBAL LIGATION;  Repeat  SECTION, TUBAL LIGATION ;  Surgeon: Sidra Salamanca DO;  Location:  OR    COLONOSCOPY N/A 2016    Procedure: COLONOSCOPY;  Surgeon: Lyudmila Pastor MD;  Location:  GI    DILATION AND CURETTAGE SUCTION      elective termination    EP STUDY TILT TABLE N/A 2022    Procedure: Tilt Table Study;  Surgeon: Stan Green MD;  Location: [facility] CARDIAC CATH LAB    ESOPHAGOSCOPY, GASTROSCOPY, DUODENOSCOPY (EGD), COMBINED  2014    Procedure: COMBINED ESOPHAGOSCOPY, GASTROSCOPY, DUODENOSCOPY (EGD);   ESOPHAGOSCOPY, GASTROSCOPY, DUODENOSCOPY (EGD) ;  Surgeon: Vishnu Lanier MD;  Location:  GI    ESOPHAGOSCOPY, GASTROSCOPY, DUODENOSCOPY (EGD), COMBINED Left 2020    Procedure: ESOPHAGOGASTRODUODENOSCOPY, WITH BIOPSIES USING BIOPSY FORCEPS;  Surgeon: Vishnu Hopkins MD;  Location:  GI    ESOPHAGOSCOPY, GASTROSCOPY, DUODENOSCOPY (EGD), COMBINED N/A 2021    Procedure: ESOPHAGOGASTRODUODENOSCOPY, WITH BIOPSY using cold forceps;  Surgeon: Vishnu Lanier MD;  Location:  [facility] GI    GYN SURGERY  2001     c section     RW LASER WART  2011    Anal wart removal     ZZC NONSPECIFIC PROCEDURE  01    Primary LTCS      Allergies   Allergen Reactions    Lexapro [Escitalopram]     Nexium [Esomeprazole]     Other Environmental Allergy      Patch Test Results 3-10-20    Very Strong (3+) or Strong (2+) reactions:  none     Mild (1+) reactions:  Fragrance mix I  Linalool  Oleamidopropyl dimethylamine     Borderline/questionable reactions:  Balsam of [place]  Diphenylguanidine    Serotonin Reuptake Inhibitors (Ssris)     Venlafaxine Other (See Comments)     legs were cold/tingly from knees down and restless, increased anxiety, insomnia      Social History     Tobacco Use    Smoking status: Former     Years: 7     Types: Cigarettes     Quit date:      Years since quittin.7    Smokeless tobacco: Never    Tobacco comments:     only if she drinks alcohol   Substance Use Topics    Alcohol use: Not Currently     Alcohol/week: 8.3 standard drinks of alcohol      Wt Readings from Last 1 Encounters:   10/16/23 70.9 kg (156 lb 4.9 oz)        Anesthesia Evaluation   Pt has had prior anesthetic. Type: General.    History of anesthetic complications  - PONV.      ROS/MED HX  ENT/Pulmonary:  - neg pulmonary ROS     Neurologic:  - neg neurologic ROS     Cardiovascular:  - neg cardiovascular ROS     METS/Exercise Tolerance:     Hematologic:  - neg hematologic  ROS     Musculoskeletal:  - neg musculoskeletal ROS     GI/Hepatic:     (+) GERD, Asymptomatic on medication,                  Renal/Genitourinary:  - neg Renal ROS     Endo:     (+)  type II DM,                    Psychiatric/Substance Use:  - neg psychiatric ROS     Infectious Disease:  - neg infectious disease ROS     Malignancy:       Other:            Physical Exam    Airway        Mallampati: II   TM distance: > 3 FB   Neck ROM: full   Mouth opening: > 3 cm    Respiratory Devices and Support         Dental       (+) Minor Abnormalities - some  fillings, tiny chips      Cardiovascular   cardiovascular exam normal          Pulmonary   pulmonary exam normal                OUTSIDE LABS:  CBC:   Lab Results   Component Value Date    WBC 13.5 (H) 10/16/2023    WBC 8.3 02/17/2023    HGB 13.3 10/16/2023    HGB 12.3 02/17/2023    HCT 40.0 10/16/2023    HCT 37.5 02/17/2023     10/16/2023     02/17/2023     BMP:   Lab Results   Component Value Date     10/16/2023     02/17/2023    POTASSIUM 3.9 10/16/2023    POTASSIUM 4.3 02/17/2023    CHLORIDE 101 10/16/2023    CHLORIDE 105 02/17/2023    CO2 25 10/16/2023    CO2 23 02/17/2023    BUN 14.4 10/16/2023    BUN 12.6 02/17/2023    CR 0.64 10/16/2023    CR 0.66 02/17/2023     (H) 10/16/2023    GLC 91 02/17/2023     COAGS:   Lab Results   Component Value Date    INR 0.99 07/14/2022     POC:   Lab Results   Component Value Date    BGM 98 09/16/2020    HCG Negative 02/15/2012    HCGS Negative 10/16/2023     HEPATIC:   Lab Results   Component Value Date    ALBUMIN 4.0 10/16/2023    PROTTOTAL 6.6 10/16/2023    ALT 10 10/16/2023    AST 13 10/16/2023    ALKPHOS 63 10/16/2023    BILITOTAL 0.2 10/16/2023     OTHER:   Lab Results   Component Value Date    PH 6.0+ 03/02/2007    A1C 5.5 02/17/2023    PREETI 9.5 10/16/2023    PHOS 3.2 02/17/2023    MAG 2.5 (H) 02/17/2023    LIPASE 45 10/16/2023    AMYLASE 68 08/06/2006    TSH 1.38 10/21/2022    T4 1.00 05/01/2021    CRP <2.9 12/15/2022    SED 23 (H) 12/15/2022       Anesthesia Plan    ASA Status:  2       Anesthesia Type: General.     - Airway: ETT   Induction: Intravenous.   Maintenance: Balanced.        Consents    Anesthesia Plan(s) and associated risks, benefits, and realistic alternatives discussed. Questions answered and patient/representative(s) expressed understanding.     - Discussed:     - Discussed with:  Patient      - Extended Intubation/Ventilatory Support Discussed: No.      - Patient is DNR/DNI Status: No     Use of blood products  discussed: No .     Postoperative Care    Pain management: IV analgesics, Oral pain medications, Multi-modal analgesia.   PONV prophylaxis: Ondansetron (or other 5HT-3), Dexamethasone or Solumedrol     Comments:                Driss Toussaint MD

## 2023-10-16 NOTE — ED PROVIDER NOTES
"  History     Chief Complaint:  Abdominal Pain       HPI   Unique Baker is a 38 year old female with a history of GERD who presents with abdominal pain. The patient states that Saturday night she developed abdominal pain after eating some spicy foods that was relieved with Pepcid and Tums so she assumed it was acid reflux. Then at 2200 yesterday, the patient began to develop vomiting and abdominal pain that radiates into her back that has not been relieved by Pepcid and Tums.     Independent Historian:   None - Patient Only    Review of External Notes:   I reviewed an US abdomen on 3/20/23 which showed multiple gallstones. I reviewed her 8/10/23 telemedicine visit.    Medications:    Flexeril   Pepcid   Atarax   Plaquenil   Ativan   Carafate     Past Medical History:    Gestational Diabetes   Prediabetes   Gallstones   GERD  LGSIL  ADHD  IBS   Anxiety     Past Surgical History:     section   Breast augmentation   EGD x 3   Laser wart removal      Physical Exam   Patient Vitals for the past 24 hrs:   BP Temp Temp src Pulse Resp SpO2 Height Weight   10/16/23 0154 137/77 97  F (36.1  C) Temporal 67 16 99 % 1.575 m (5' 2\") 72.1 kg (158 lb 15.2 oz)        Physical Exam  Constitutional: Alert, attentive, moderate pain distress  HENT:    Nose: Nose normal.    Mouth/Throat: Oropharynx is clear, mucous membranes are moist   Eyes: EOM are normal.   CV: regular rate and rhythm; no murmurs, rubs or gallups  Chest: Effort normal and breath sounds normal.   GI:  There is RUQ tenderness. No distension. Normal bowel sounds  MSK: Normal range of motion.   Neurological: Alert, attentive  Skin: Skin is warm and dry.      Emergency Department Course     Imaging:  US Abdomen Limited   Final Result   IMPRESSION:      1.  Cholelithiasis and adenomyomatosis, without sonographic features of acute cholecystitis. However, early/acute cholecystitis cannot be entirely excluded if pain medication had been recently administered.       "           Laboratory:  Labs Ordered and Resulted from Time of ED Arrival to Time of ED Departure   COMPREHENSIVE METABOLIC PANEL - Abnormal       Result Value    Sodium 137      Potassium 3.9      Carbon Dioxide (CO2) 25      Anion Gap 11      Urea Nitrogen 14.4      Creatinine 0.64      GFR Estimate >90      Calcium 9.5      Chloride 101      Glucose 126 (*)     Alkaline Phosphatase 68      AST 11      ALT 12      Protein Total 7.5      Albumin 4.4      Bilirubin Total 0.3     CBC WITH PLATELETS AND DIFFERENTIAL - Abnormal    WBC Count 13.5 (*)     RBC Count 4.50      Hemoglobin 13.3      Hematocrit 40.0      MCV 89      MCH 29.6      MCHC 33.3      RDW 12.3      Platelet Count 218      % Neutrophils 76      % Lymphocytes 17      % Monocytes 6      Mids % (Monos, Eos, Basos)        % Eosinophils 1      % Basophils 0      % Immature Granulocytes 0      NRBCs per 100 WBC 0      Absolute Neutrophils 10.2 (*)     Absolute Lymphocytes 2.3      Absolute Monocytes 0.7      Mids Abs (Monos, Eos, Basos)        Absolute Eosinophils 0.1      Absolute Basophils 0.1      Absolute Immature Granulocytes 0.0      Absolute NRBCs 0.0     LIPASE - Normal    Lipase 45     HCG QUALITATIVE PREGNANCY - Normal    hCG Serum Qualitative Negative        Emergency Department Course & Assessments:       Interventions:  Medications   ampicillin-sulbactam (UNASYN) 3 g vial to attach to  mL bag (has no administration in time range)   ondansetron (ZOFRAN) injection 4 mg (4 mg Intravenous $Given 10/16/23 0202)   ketorolac (TORADOL) injection 15 mg (15 mg Intravenous $Given 10/16/23 0231)      Assessments:  0221 I consulted with the patient and obtained history as shown above  0336 I rechecked the patient and explained exam results   0410 I rechecked the patient         Consultations/Discussion of Management or Tests:  0409 I consulted with Dr. Sloan about the patient and plan of care        Social Determinants of Health affecting care:    None    Disposition:  The patient was admitted to the hospital under the care of Dr. Sloan.     Impression & Plan    CMS Diagnoses: None    Medical Decision Making:  This is a very pleasant 38 old female with symptoms consistent with gallstones and acute cholecystitis versus intractable biliary colic.  Ultrasound has confirmed the presence of gallstones.  There is no clinical, laboratory, or ultrasound evidence of choledocholithiasis, gallstone pancreatitis, or ascending cholangitis, but the patient's pain persists and she has a leukocytosis, concerning for possible evolving cholecystitis.  Given the latter possibility, the patient will be admitted for continued symptomatic care and surgical consult in the morning.  The patient has remained stable throughout her ED course, and has had improved pain in the department.  I believe the patient is safe for admission to the floor at this time.        Diagnosis:    ICD-10-CM    1. Cholecystitis  K81.9               Scribe Disclosure:  Milton HAMMOND, am serving as a scribe at 2:13 AM on 10/16/2023 to document services personally performed by Edwardo Mackay MD based on my observations and the provider's statements to me.     10/16/2023   Edwardo Mackay MD Houghland, John Eric, MD  10/16/23 0421

## 2023-10-16 NOTE — ANESTHESIA CARE TRANSFER NOTE
Patient: Unique Baker    Procedure: Procedure(s):  CHOLECYSTECTOMY, ROBOT-ASSISTED, LAPAROSCOPIC, USING DA BARRINGTON XI       Diagnosis: Cholecystitis [K81.9]  Diagnosis Additional Information: No value filed.    Anesthesia Type:   General     Note:    Oropharynx: oropharynx clear of all foreign objects and spontaneously breathing  Level of Consciousness: awake and drowsy  Oxygen Supplementation: face mask  Level of Supplemental Oxygen (L/min / FiO2): 8  Independent Airway: airway patency satisfactory and stable  Dentition: dentition unchanged  Vital Signs Stable: post-procedure vital signs reviewed and stable  Report to RN Given: handoff report given  Patient transferred to: PACU  Comments: Pt to recovery.  Spontaneous respirations and exchanging well.  Pt making needs known.  Report given to RN.      Handoff Report: Identifed the Patient, Identified the Reponsible Provider, Reviewed the pertinent medical history, Discussed the surgical course, Reviewed Intra-OP anesthesia mangement and issues during anesthesia, Set expectations for post-procedure period and Allowed opportunity for questions and acknowledgement of understanding    Vitals:  Vitals Value Taken Time   /77 10/16/23 1711   Temp 97.2  F (36.2  C) 10/16/23 1711   Pulse 84 10/16/23 1714   Resp 17 10/16/23 1714   SpO2 100 % 10/16/23 1714   Vitals shown include unfiled device data.    Electronically Signed By: CARRIE Turpin CRNA  October 16, 2023  5:15 PM

## 2023-10-16 NOTE — ANESTHESIA PROCEDURE NOTES
Airway       Patient location during procedure: OR  Staff -        CRNA: Viji Calvert APRN CRNA       Performed By: CRNA  Consent for Airway        Urgency: elective  Indications and Patient Condition       Indications for airway management: bishnu-procedural       Induction type:intravenous       Mask difficulty assessment: 1 - vent by mask    Final Airway Details       Final airway type: endotracheal airway       Successful airway: ETT - single  Endotracheal Airway Details        ETT size (mm): 7.0       Cuffed: yes       Successful intubation technique: video laryngoscopy       VL Blade Size: Glidescope 3       Grade View of Cords: 1       Adjucts: stylet       Position: Right       Measured from: gums/teeth       Secured at (cm): 22       Bite block used: None    Post intubation assessment        Placement verified by: capnometry, equal breath sounds and chest rise        Number of attempts at approach: 1       Number of other approaches attempted: 0       Secured with: plastic tape       Ease of procedure: easy       Dentition: Intact and Unchanged

## 2023-10-16 NOTE — DISCHARGE SUMMARY
"[facility]  Hospitalist Discharge Summary      Date of Admission:  10/16/2023  Date of Discharge:  10/16/2023  8:14 PM  Discharging Provider: Sushma Melgar MD  Discharge Service: Hospitalist Service    Discharge Diagnoses   Acute cholecystitis, symptomatic cholelithiasis    Clinically Significant Risk Factors     # Overweight: Estimated body mass index is 28.59 kg/m  as calculated from the following:    Height as of this encounter: 1.575 m (5' 2\").    Weight as of this encounter: 70.9 kg (156 lb 4.9 oz).       Follow-ups Needed After Discharge   Follow up as needed with pcp    Discharge Disposition   Discharged to home  Condition at discharge: Stable    Hospital Course   Presented with abdominal pain, imaging and lab studies consistent with symptomatic cholelithiasis. She underwent robotic cholecystectomy, with intraoperative findings consistent with acute cholecystitis. She was discharged from PACU.    Consultations This Hospital Stay   SURGERY GENERAL IP CONSULT    Code Status   Prior    Time Spent on this Encounter   I, uSshma Melgar MD, personally saw the patient today and spent greater than 30 minutes discharging this patient.       Sushma Melgar MD  [facility]  [address] [place] [address]  Phone: [phone]  Fax: [phone]  ______________________________________________________________________    Physical Exam   Vital Signs:                    Weight: 156 lbs 4.9 oz         Primary Care Physician   Celina Riggs    Discharge Orders      When to call - Contact Surgeon Team    You may experience symptoms that require follow-up before your scheduled appointment. Contact your Surgeon Team if you are concerned about pain control, large amount of bleeding, blood clots, constipation, or if you experience signs of infection (fever, growing tenderness at the surgery site, a large amount of drainage, severe pain, foul-smelling drainage, " redness or swelling.     When to call - Reach out to Urgent Care    If you are experiencing uncontrolled Nausea and Vomiting, uncontrolled pain, inability to urinate and uncomfortable, and in need of immediate care, and you are NOT able to reach your Surgeon Team, go to an Urgent Care clinic. Do NOT go to the Emergency Room unless you have shortness of breath, chest pain, or other signs of a medical emergency.     When to call - Reasons to Call 911    Call 911 immediately if you experience sudden-onset chest pain, arm weakness/numbness, slurred speech, or shortness of breath     Symptoms - Fever Management    A low grade fever can be expected after surgery. Your Provider many have prescribed an Opioid pain medication that also contains acetaminophen (TYLENOL) that may help with Fever management.  Do NOT take additional acetaminophen (TYLENOL) in combination with an Opioid/acetaminophen (TYLENOL) product. Read the labels on your Over The Counter (OTC) medications with care.     Symptoms - Reduced Urine Output    If it has been greater than 8 hours since you have urinated despite drinking plenty of water, call your Surgeon Team.     No driving or operating machinery    Do NOT drive any vehicle or operate mechanical equipment for 24 hours following the end of your surgery.  Even though you may feel normal, your reactions may be affected by Anesthesia medication you received.     No Alcohol    Do NOT drink alcoholic beverages for 24 hours following your surgery and while taking pain medications.     Diet Instructions    Follow your surgeon's orders for any diet restrictions.  If you did not receive any diet restrictions, you may drink clear liquids (apple juice, ginger ale, 7-up, broth, etc.), and progress to your regular diet as you feel able. It is important to stay well-hydrated after surgery and drink plenty of water.     Discharge Instructions - Comfort and Pain Management    Pain after surgery is normal and  expected. You will have some amount of pain after surgery. Your pain will improve with time. There are several things you can do to help reduce your pain including: rest, ice, and using pain medications as needed. Use pain interventions and don't wait until pain level is out of control. Contact your Surgeon Team if you have pain that persists or worsens after surgery despite rest, ice, and taking your medication(s) as prescribed. You may have a dry mouth, a sore throat, muscles aches or trouble sleeping, and these symptoms should go away after 24 hours.     Discharge Instructions - Rest    Rest and relax for the next 24 hours. Make arrangements to have someone stay with you overnight, and avoid hazardous and strenuous activities.  Do NOT make any important decisions for the next 24 hours.     Shower/Bathing - No restrictions, may shower after 24 hours    Shower/Bathing - No restrictions, may shower after 24 hours.     Incision Care    Keep dressing clean and dry, change as instructed by Provider or RN. Wash your hands with soap and warm water before you touch the site of surgery. If you have skin tapes (steri-strips) on your surgical site, leave them on the surgical site until they fall off on their own (typically 7-10 days). If you have stitches under the skin, they will dissolve, or your doctor will give you instructions on when to return to their office for removal. Unless directed otherwise, remove dressing, if present, the day after surgery and leave open to air. If Dermabond (a type of skin glue) is present, leave in place until it wears/flakes off (2-3 weeks).     Return to normal activity as tolerated    Return to normal activity as tolerated     May return to work (WITHOUT restrictions)    May return to work in 1 week(s) with NO restrictions.       Significant Results and Procedures   Most Recent 3 CBC's:  Recent Labs   Lab Test 10/16/23  0200 02/17/23  1328 12/15/22  1459   WBC 13.5* 8.3 5.5   HGB 13.3  12.3 12.2   MCV 89 85 82    213 235     Most Recent 3 BMP's:  Recent Labs   Lab Test 10/16/23  0200 02/17/23  1328 12/15/22  1459 10/21/22  1138    141  --  137   POTASSIUM 3.9 4.3  --  3.9   CHLORIDE 101 105  --  101   CO2 25 23  --  25   BUN 14.4 12.6  --  7.5   CR 0.64 0.66 0.63 0.59   ANIONGAP 11 13  --  11   PREETI 9.5 9.1  --  9.9   * 91  --  124*   ,   Results for orders placed or performed during the hospital encounter of 10/16/23   US Abdomen Limited    Narrative    EXAM: US ABDOMEN LIMITED  LOCATION: [facility]  DATE: 10/16/2023    INDICATION: RUQ pain  COMPARISON: 03/20/2023  TECHNIQUE: Limited abdominal ultrasound.    FINDINGS:    GALLBLADDER: Stones and sludge are present within the gallbladder, including the gallbladder neck. Foci of comet tail artifact are noted in the anterior bladder wall compatible with adenomyomatosis. No gallbladder wall thickening or pericholecystic   fluid. No sonographic Gates's sign was reported.    BILE DUCTS: No biliary dilatation. The common duct measures 5.9 mm.    LIVER: Normal parenchyma with smooth contour. No focal mass.    RIGHT KIDNEY: No hydronephrosis.    PANCREAS: The visualized portions are normal.    No ascites.        Impression    IMPRESSION:    1.  Cholelithiasis and adenomyomatosis, without sonographic features of acute cholecystitis. However, early/acute cholecystitis cannot be entirely excluded if pain medication had been recently administered.           Discharge Medications   Discharge Medication List as of 10/16/2023  6:03 PM        START taking these medications    Details   ondansetron (ZOFRAN ODT) 4 MG ODT tab Take 1 tablet (4 mg) by mouth every 8 hours as needed for nausea, Disp-4 tablet, R-0, E-Prescribe      oxyCODONE (ROXICODONE) 5 MG tablet Take 1-2 tablets (5-10 mg) by mouth every 4 hours as needed for moderate to severe pain, Disp-10 tablet, R-0, E-Prescribe           CONTINUE these medications which  have NOT CHANGED    Details   acetaminophen (TYLENOL) 500 MG tablet take 1 - 2 tablet by oral route  every 6 hours as needed as needed, Historical      cetirizine (ZYRTEC) 10 MG tablet Take 10 mg by mouth daily as needed for allergies, Historical      dexlansoprazole (DEXILANT) 30 MG CPDR CR capsule Take 30 mg by mouth daily, Historical      famotidine (PEPCID) 20 MG tablet TAKE ONE TABLET BY MOUTH TWICE A DAY AS NEEDED FOR REFLUX, Disp-180 tablet, R-3, E-Prescribe      ferrous fumarate 65 mg, [place]. FE,-Vitamin C 125 mg (VITRON C)  MG TABS tablet Take 1 tablet by mouth daily, Historical      fluticasone (FLONASE) 50 MCG/ACT nasal spray Spray 1-2 sprays into both nostrils daily as needed for rhinitis or allergies, Historical      multivitamin (ONE-DAILY) tablet Take 1 tablet by mouth daily, Historical      sucralfate (CARAFATE) 1 GM/10ML suspension Take 1 g by mouth daily as needed, Historical      vitamin B-Complex Take 1 tablet by mouth daily, Historical      Vitamin D3 (CHOLECALCIFEROL) 125 MCG (5000 UT) tablet Take 1 tablet by mouth daily, Historical      LORazepam (ATIVAN) 0.5 MG tablet Take 1 tablet (0.5 mg) by mouth daily as needed for anxiety 12 pills/month, Disp-12 tablet, R-2, E-Prescribe           Allergies   Allergies   Allergen Reactions    Lexapro [Escitalopram]     Nexium [Esomeprazole]     Other Environmental Allergy      Patch Test Results 3-10-20    Very Strong (3+) or Strong (2+) reactions:  none     Mild (1+) reactions:  Fragrance mix I  Linalool  Oleamidopropyl dimethylamine     Borderline/questionable reactions:  Balsam of [place]  Diphenylguanidine    Serotonin Reuptake Inhibitors (Ssris)     Venlafaxine Other (See Comments)     legs were cold/tingly from knees down and restless, increased anxiety, insomnia

## 2023-10-16 NOTE — ED TRIAGE NOTES
Pt has hx of gallstones. Pt reports upper abd pain that radiates into her back. Pt has been vomiting

## 2023-10-16 NOTE — ED NOTES
"[facility]  ED Nurse Handoff Report    ED Chief complaint: Abdominal Pain  . ED Diagnosis:   Final diagnoses:   Cholecystitis       Allergies:   Allergies   Allergen Reactions    Lexapro [Escitalopram]     Nexium [Esomeprazole]     Other Environmental Allergy      Patch Test Results 3-10-20    Very Strong (3+) or Strong (2+) reactions:  none     Mild (1+) reactions:  Fragrance mix I  Linalool  Oleamidopropyl dimethylamine     Borderline/questionable reactions:  Balsam of [place]  Diphenylguanidine    Serotonin Reuptake Inhibitors (Ssris)     Venlafaxine Other (See Comments)     legs were cold/tingly from knees down and restless, increased anxiety, insomnia       Code Status: Full Code    Activity level - Baseline/Home:  independent.  Activity Level - Current:   standby.   Lift room needed: No.   Bariatric: No   Needed: No   Isolation: No.   Infection: Not Applicable.     Respiratory status: Room air    Vital Signs (within 30 minutes):   Vitals:    10/16/23 0154   BP: 137/77   Pulse: 67   Resp: 16   Temp: 97  F (36.1  C)   TempSrc: Temporal   SpO2: 99%   Weight: 72.1 kg (158 lb 15.2 oz)   Height: 1.575 m (5' 2\")       Cardiac Rhythm:  ,      Pain level:    Patient confused: No.   Patient Falls Risk: nonskid shoes/slippers when out of bed, arm band in place, and patient and family education.   Elimination Status: Has voided     Patient Report - Initial Complaint: abdominal pain/nausea/vomiting  Focused Assessment: Pt has hx of gallstones. Pt reports upper abd pain that radiates into her back. Pt has been vomiting      Abnormal Results:   Labs Ordered and Resulted from Time of ED Arrival to Time of ED Departure   COMPREHENSIVE METABOLIC PANEL - Abnormal       Result Value    Sodium 137      Potassium 3.9      Carbon Dioxide (CO2) 25      Anion Gap 11      Urea Nitrogen 14.4      Creatinine 0.64      GFR Estimate >90      Calcium 9.5      Chloride 101      Glucose 126 (*)     Alkaline " Phosphatase 68      AST 11      ALT 12      Protein Total 7.5      Albumin 4.4      Bilirubin Total 0.3     CBC WITH PLATELETS AND DIFFERENTIAL - Abnormal    WBC Count 13.5 (*)     RBC Count 4.50      Hemoglobin 13.3      Hematocrit 40.0      MCV 89      MCH 29.6      MCHC 33.3      RDW 12.3      Platelet Count 218      % Neutrophils 76      % Lymphocytes 17      % Monocytes 6      Mids % (Monos, Eos, Basos)        % Eosinophils 1      % Basophils 0      % Immature Granulocytes 0      NRBCs per 100 WBC 0      Absolute Neutrophils 10.2 (*)     Absolute Lymphocytes 2.3      Absolute Monocytes 0.7      Mids Abs (Monos, Eos, Basos)        Absolute Eosinophils 0.1      Absolute Basophils 0.1      Absolute Immature Granulocytes 0.0      Absolute NRBCs 0.0     LIPASE - Normal    Lipase 45     HCG QUALITATIVE PREGNANCY - Normal    hCG Serum Qualitative Negative          US Abdomen Limited   Final Result   IMPRESSION:      1.  Cholelithiasis and adenomyomatosis, without sonographic features of acute cholecystitis. However, early/acute cholecystitis cannot be entirely excluded if pain medication had been recently administered.                Treatments provided: labs/imaging/pain control  Family Comments: no family present  OBS brochure/video discussed/provided to patient:  yes  ED Medications:   Medications   ampicillin-sulbactam (UNASYN) 3 g vial to attach to  mL bag (has no administration in time range)   acetaminophen (TYLENOL) tablet 650 mg (has no administration in time range)     Or   acetaminophen (TYLENOL) Suppository 650 mg (has no administration in time range)   melatonin tablet 1 mg (has no administration in time range)   senna-docusate (SENOKOT-S/PERICOLACE) 8.6-50 MG per tablet 1 tablet (has no administration in time range)     Or   senna-docusate (SENOKOT-S/PERICOLACE) 8.6-50 MG per tablet 2 tablet (has no administration in time range)   polyethylene glycol (MIRALAX) Packet 17 g (has no administration in  time range)   bisacodyl (DULCOLAX) suppository 10 mg (has no administration in time range)   ondansetron (ZOFRAN ODT) ODT tab 4 mg (has no administration in time range)     Or   ondansetron (ZOFRAN) injection 4 mg (has no administration in time range)   ampicillin-sulbactam (UNASYN) 3 g vial to attach to  mL bag (has no administration in time range)   dextrose 5% and 0.45% NaCl + KCl 20 mEq/L infusion (has no administration in time range)   oxyCODONE IR (ROXICODONE) half-tab 2.5 mg (has no administration in time range)   oxyCODONE (ROXICODONE) tablet 5 mg (has no administration in time range)   HYDROmorphone (DILAUDID) injection 0.2 mg (has no administration in time range)   HYDROmorphone (DILAUDID) injection 0.4 mg (has no administration in time range)   ondansetron (ZOFRAN) injection 4 mg (4 mg Intravenous $Given 10/16/23 0202)   ketorolac (TORADOL) injection 15 mg (15 mg Intravenous $Given 10/16/23 0231)       Drips infusing:  No  For the majority of the shift this patient was Green.   Interventions performed were none.    Sepsis treatment initiated: No    Cares/treatment/interventions/medications to be completed following ED care: see orders    ED Nurse Name: Lashell Farah RN  4:13 AM     RECEIVING UNIT ED HANDOFF REVIEW    Above ED Nurse Handoff Report was reviewed: Yes  Reviewed by: Alfreda Grimm RN on October 16, 2023 at 4:52 AM

## 2023-10-16 NOTE — H&P
"[facility]  Hospitalist H&P    Name: Unique Baker      MRN: [id number]  YOB: 1985    Age: 38 year old  Date of admission: 10/16/2023  Primary care provider: Celina Riggs            Assessment and Plan:     Unique Baker is a 38 year old female with a history of GERD, cholelithiasis, ADHD, IBS, anxiety, and \"long COVID symptoms\" who presents with abdominal pain with work-up suggestive of cholelithiasis and possible acute cholecystitis but other diagnoses not excluded.    Problem list:  Epigastric abdominal pain: Somewhat unclear etiology.  Liver function test and lipase are normal.  Abdominal ultrasound does show cholelithiasis and adenomyomatosis but no sonographic features of acute cholecystitis.  She does not really have any focal right upper quadrant pain and most of this is in the epigastric area.  She does have fairly frequent episodes of reflux related discomfort and she indicates that this feels quite different.  Somewhat unclear etiology of her pain but symptomatic cholelithiasis and possible acute and evolving acute cholecystitis is certainly in the differential diagnosis.  We will start IV Unasyn.  Otherwise the patient will be n.p.o. and on IV fluids.  Analgesics and antiemetics are available.  I would like to request general surgery consultation for consideration of laparoscopic cholecystectomy.  The patient has no history of coronary artery disease, CHF, chronic kidney disease, insulin-dependent diabetes mellitus, or cerebrovascular disease.  Per the revised cardiac risk index scale she has a less than 0.4% chance of major perioperative cardiac complications.  We will check an EKG but if that looks reassuring she will be medically optimized for surgery if it is offered.  GERD: She is followed by [facility].  Had an EGD performed in February of.  She is maintained on oral PPI and H2 blockade.  Long COVID symptoms: The patient reports having about a year of " "dizziness since she recovered from a COVID infection.  She has been seen in several clinics but her symptoms persist.  ADHD, anxiety, and IBS: Continue prior to admission medication regimen.    Clinically Significant Risk Factors Present on Admission                       # Overweight: Estimated body mass index is 29.07 kg/m  as calculated from the following:    Height as of this encounter: 1.575 m (5' 2\").    Weight as of this encounter: 72.1 kg (158 lb 15.2 oz).              Code status: Full.  Admit to observation status.  Prophylaxis: PCD's.  Disposition: Home in 1 to 2 days.    60 MINUTES SPENT BY ME on the date of service doing chart review, history, exam, documentation & further activities per the note.          Chief Complaint:     Abdominal pain.         History of Present Illness:   Unique Baker is a 38 year old female who presents with abdominal pain.  History was obtained from my discussion with the patient at the bedside.  I also discussed the case with the ED provider.  The electronic medical record was also reviewed.    The patient has had a history of intermittent abdominal discomfort going back many years.  She has been followed by [facility] and has had prior EGD studies.  She has attributed these symptoms largely to GERD and is maintained on H2 blockade and PPI therapy.  She also is aware of a history of cholelithiasis and has been offered follow-up with general surgery but has not pursued this.  On Saturday night she developed epigastric and somewhat diffuse abdominal pain after eating some spicy food.  This was relieved with taking famotidine and Tums.  Again she thought the symptoms were related to reflux.  Last night at about 10 PM she again developed epigastric abdominal pain and nausea with nonbloody vomiting.  She again attempted famotidine and Tums without relief so comes to the ED for evaluation.    Here she has a temperature of 97, heart rate 67, blood pressure 137/77, respirate 16 " and oxygen saturation 99% on room air.  Labs show normal basic metabolic panel, liver function test, urine pregnancy test, lipase, and CBC except white blood cells of 13.5.  Abdominal ultrasound shows cholelithiasis and adenomyomatosis without sonographic features of acute cholecystitis however early/acute cholecystitis cannot be entirely excluded.            Past Medical History:     Past Medical History:   Diagnosis Date    Diabetes (H)     GDM insulin    GERD (gastroesophageal reflux disease)     w/u otherwise neg     History of colposcopy with cervical biopsy 3/23/07    WNL    Papanicolaou smear of cervix with low grade squamous intraepithelial lesion (LGSIL) 07    PONV (postoperative nausea and vomiting)     S/P  10/13/2017             Past Surgical History:     Past Surgical History:   Procedure Laterality Date    BREAST SURGERY      augmentation      SECTION N/A 2015    Procedure:  SECTION;  Surgeon: Tremaine Gaona MD;  Location:  OR     SECTION, TUBAL LIGATION, COMBINED N/A 10/13/2017    Procedure: COMBINED  SECTION, TUBAL LIGATION;  Repeat  SECTION, TUBAL LIGATION ;  Surgeon: Sidra Salamanca DO;  Location:  OR    COLONOSCOPY N/A 2016    Procedure: COLONOSCOPY;  Surgeon: Lyudmila Pastor MD;  Location:  GI    DILATION AND CURETTAGE SUCTION      elective termination    EP STUDY TILT TABLE N/A 2022    Procedure: Tilt Table Study;  Surgeon: Stan Green MD;  Location:  [facility]ATH LAB    ESOPHAGOSCOPY, GASTROSCOPY, DUODENOSCOPY (EGD), COMBINED  2014    Procedure: COMBINED ESOPHAGOSCOPY, GASTROSCOPY, DUODENOSCOPY (EGD);   ESOPHAGOSCOPY, GASTROSCOPY, DUODENOSCOPY (EGD) ;  Surgeon: Vishnu Lanier MD;  Location:  GI    ESOPHAGOSCOPY, GASTROSCOPY, DUODENOSCOPY (EGD), COMBINED Left 2020    Procedure: ESOPHAGOGASTRODUODENOSCOPY, WITH BIOPSIES USING BIOPSY FORCEPS;  Surgeon: Vishnu Hopkins  MD Jacob;  Location:  GI    ESOPHAGOSCOPY, GASTROSCOPY, DUODENOSCOPY (EGD), COMBINED N/A 2021    Procedure: ESOPHAGOGASTRODUODENOSCOPY, WITH BIOPSY using cold forceps;  Surgeon: Vishnu Lanier MD;  Location:  GI    GYN SURGERY       c section     RW LASER WART      Anal wart removal     ZZC NONSPECIFIC PROCEDURE  01    Primary LTCS             Social History:     Social History     Tobacco Use    Smoking status: Former     Years: 7     Types: Cigarettes     Quit date:      Years since quittin.7    Smokeless tobacco: Never    Tobacco comments:     only if she drinks alcohol   Substance Use Topics    Alcohol use: Not Currently     Alcohol/week: 8.3 standard drinks of alcohol             Family History:   The family history was fully reviewed and non-contributory in this case.         Allergies:     Allergies   Allergen Reactions    Lexapro [Escitalopram]     Nexium [Esomeprazole]     Other Environmental Allergy      Patch Test Results 3-10-20    Very Strong (3+) or Strong (2+) reactions:  none     Mild (1+) reactions:  Fragrance mix I  Linalool  Oleamidopropyl dimethylamine     Borderline/questionable reactions:  Balsam of [place]  Diphenylguanidine    Serotonin Reuptake Inhibitors (Ssris)     Venlafaxine Other (See Comments)     legs were cold/tingly from knees down and restless, increased anxiety, insomnia             Medications:     Prior to Admission medications    Medication Sig Last Dose Taking? Auth Provider Long Term End Date   acetaminophen (TYLENOL) 500 MG tablet take 1 - 2 tablet by oral route  every 6 hours as needed as needed   Reported, Patient     cetirizine (ZYRTEC) 10 MG tablet TAKE ONE TABLET BY MOUTH ONCE DAILY FOR 2 WEEKS, THEN AS NEEDED FOR FUTURE NASAL ISSUES   Edil Gordon, NP     Cyanocobalamin (VITAMIN B12 TR PO) 1 patch (500 mcg) daily   Reported, Patient     cyclobenzaprine (FLEXERIL) 5 MG tablet Take 1 tablet (5 mg) by mouth nightly as needed for muscle  "spasms   Jovany Puente MD     dexlansoprazole (DEXILANT) 30 MG CPDR CR capsule    Reported, Patient     diclofenac (VOLTAREN) 1 % topical gel Apply 2 g topically 4 times daily To joints as needed for pain   Erum Allen NP     famotidine (PEPCID) 20 MG tablet TAKE ONE TABLET BY MOUTH TWICE A DAY AS NEEDED FOR REFLUX   Celina Riggs MD     FERROUS BISGLYCINATE CHELATE PO Take 36 mg by mouth every other day   Reported, Patient     fluocinonide emulsified base 0.05 % external cream apply to rash as needed - sparingly   No Small APRN CNP     fluticasone (FLONASE) 50 MCG/ACT nasal spray Spray 1 spray into both nostrils 2 times daily as needed for rhinitis or allergies Take 1 spray twice daily x 1 week then 1 spray daily at night x 1 week then as needed   Celina Riggs MD     hydroxychloroquine (PLAQUENIL) 200 MG tablet Take 1 tablet (200 mg) by mouth 2 times daily Annual Plaquenil toxicity eye screening required.  Patient not taking: Reported on 6/12/2023   Erum Allen NP     LORazepam (ATIVAN) 0.5 MG tablet Take 1 tablet (0.5 mg) by mouth daily as needed for anxiety 12 pills/month   Shade Strickland MD     multivitamin (ONE-DAILY) tablet Take 1 tablet by mouth daily   Celina Riggs MD     sucralfate (CARAFATE) 1 GM tablet    Reported, Patient     vitamin B-Complex Take 1 tablet by mouth daily   Reported, Patient     vitamin D2 (ERGOCALCIFEROL) 26912 units (1250 mcg) capsule Take 50,000 Units by mouth once a week Patient taking Vitamin D3 08992 units   Reported, Patient               Review of Systems:     A Comprehensive greater than 10 system review of systems was carried out.  Pertinent positives and negatives are noted above.  Otherwise negative for contributory information.           Physical Exam:   Blood pressure 137/77, pulse 67, temperature 97  F (36.1  C), temperature source Temporal, resp. rate 16, height 1.575 m (5' 2\"), weight 72.1 kg (158 lb 15.2 oz), last menstrual " "period 09/16/2023, SpO2 99%, not currently breastfeeding.  Wt Readings from Last 1 Encounters:   10/16/23 72.1 kg (158 lb 15.2 oz)     Exam:  GENERAL: No apparent distress. Awake, alert, and fully oriented.  HEENT: Normocephalic, atraumatic. Extraocular movements intact.  CARDIOVASCULAR: Regular rate and rhythm without murmurs or rubs. No S3.  PULMONARY: Clear to auscultation bilaterally.  ABDOMINAL: Soft, epigastric area tender, non-distended. Bowel sounds normoactive.   EXTREMITIES: No cyanosis or clubbing. No appreciable edema.  NEUROLOGICAL: CN 2-12 grossly intact, no focal neurological deficits.  DERMATOLOGICAL: No rash, ulcer, bruising, nor jaundice.          Data:   EKG:  Personally reviewed.     Laboratory:  Recent Labs   Lab 10/16/23  0200   WBC 13.5*   HGB 13.3   HCT 40.0   MCV 89        Recent Labs   Lab 10/16/23  0200      POTASSIUM 3.9   CHLORIDE 101   CO2 25   ANIONGAP 11   *   BUN 14.4   CR 0.64   GFRESTIMATED >90   PREETI 9.5     Recent Labs   Lab 10/16/23  0200   AST 11   ALT 12   ALKPHOS 68   BILITOTAL 0.3     Recent Labs   Lab 10/16/23  0200   LIPASE 45     No results for input(s): \"CULT\" in the last 168 hours.    Imaging:  Recent Results (from the past 24 hour(s))   US Abdomen Limited    Narrative    EXAM: US ABDOMEN LIMITED  LOCATION: [facility]  DATE: 10/16/2023    INDICATION: RUQ pain  COMPARISON: 03/20/2023  TECHNIQUE: Limited abdominal ultrasound.    FINDINGS:    GALLBLADDER: Stones and sludge are present within the gallbladder, including the gallbladder neck. Foci of comet tail artifact are noted in the anterior bladder wall compatible with adenomyomatosis. No gallbladder wall thickening or pericholecystic   fluid. No sonographic Gates's sign was reported.    BILE DUCTS: No biliary dilatation. The common duct measures 5.9 mm.    LIVER: Normal parenchyma with smooth contour. No focal mass.    RIGHT KIDNEY: No hydronephrosis.    PANCREAS: The " visualized portions are normal.    No ascites.        Impression    IMPRESSION:    1.  Cholelithiasis and adenomyomatosis, without sonographic features of acute cholecystitis. However, early/acute cholecystitis cannot be entirely excluded if pain medication had been recently administered.           Edwardo Sloan,  MPH  [facility] Hospitalist  [address] [place] [place] [address]  10/16/2023

## 2023-10-16 NOTE — PHARMACY-ADMISSION MEDICATION HISTORY
Pharmacist Admission Medication History    Admission medication history is complete. The information provided in this note is only as accurate as the sources available at the time of the update.    Information Source(s): Patient via in-person    Changes made to PTA medication list:  Added: vit b complex, vitron c, vit d 5000 units  Deleted: vit b12 ,flexeril, diclofenac, fluocinonide, plaquenil  Changed: dexilant    Medication Affordability:  Not including over the counter (OTC) medications, was there a time in the past 3 months when you did not take your medications as prescribed because of cost?: No    Allergies reviewed with patient and updates made in EHR:  spoke to pt, in addition to allergies listed, pt reports some sort of metabolism lab result through her [facility] pharmacist that has a list of medications  that could be affected by this - she did not have any paperwork on this and I did not see anything in the chart regarding this.    Medication History Completed By: Yohannes Thompson [facility] 10/16/2023 1:08 PM    PTA Med List   Medication Sig Last Dose    acetaminophen (TYLENOL) 500 MG tablet take 1 - 2 tablet by oral route  every 6 hours as needed as needed     cetirizine (ZYRTEC) 10 MG tablet Take 10 mg by mouth daily as needed for allergies     dexlansoprazole (DEXILANT) 30 MG CPDR CR capsule Take 30 mg by mouth daily 10/15/2023    famotidine (PEPCID) 20 MG tablet TAKE ONE TABLET BY MOUTH TWICE A DAY AS NEEDED FOR REFLUX     ferrous fumarate 65 mg, [place]. FE,-Vitamin C 125 mg (VITRON C)  MG TABS tablet Take 1 tablet by mouth daily 10/14/2023    fluticasone (FLONASE) 50 MCG/ACT nasal spray Spray 1-2 sprays into both nostrils daily as needed for rhinitis or allergies     LORazepam (ATIVAN) 0.5 MG tablet Take 1 tablet (0.5 mg) by mouth daily as needed for anxiety 12 pills/month     multivitamin (ONE-DAILY) tablet Take 1 tablet by mouth daily 10/14/2023    sucralfate (CARAFATE) 1 GM/10ML suspension Take 1 g by  mouth daily as needed     vitamin B-Complex Take 1 tablet by mouth daily 10/14/2023    Vitamin D3 (CHOLECALCIFEROL) 125 MCG (5000 UT) tablet Take 1 tablet by mouth daily 10/14/2023        Nasal Turnover Hinge Flap Text: The defect edges were debeveled with a #15 scalpel blade.  Given the size, depth, location of the defect and the defect being full thickness a nasal turnover hinge flap was deemed most appropriate.  Using a sterile surgical marker, an appropriate hinge flap was drawn incorporating the defect. The area thus outlined was incised with a #15 scalpel blade. The flap was designed to recreate the nasal mucosal lining and the alar rim. The skin margins were undermined to an appropriate distance in all directions utilizing iris scissors.

## 2023-10-16 NOTE — ANESTHESIA POSTPROCEDURE EVALUATION
Patient: Unique Baker    Procedure: Procedure(s):  CHOLECYSTECTOMY, ROBOT-ASSISTED, LAPAROSCOPIC, USING DA BARRINGTON XI       Anesthesia Type:  General    Note:  Disposition: Inpatient   Postop Pain Control: Uneventful            Sign Out: Well controlled pain   PONV: No   Neuro/Psych: Uneventful            Sign Out: Acceptable/Baseline neuro status   Airway/Respiratory: Uneventful            Sign Out: Acceptable/Baseline resp. status   CV/Hemodynamics: Uneventful            Sign Out: Acceptable CV status; No obvious hypovolemia; No obvious fluid overload   Other NRE: NONE   DID A NON-ROUTINE EVENT OCCUR? No           Last vitals:  Vitals Value Taken Time   /75 10/16/23 1725   Temp 97.2  F (36.2  C) 10/16/23 1711   Pulse 74 10/16/23 1726   Resp 10 10/16/23 1726   SpO2 100 % 10/16/23 1726   Vitals shown include unfiled device data.    Electronically Signed By: Driss Toussaint MD  October 16, 2023  5:27 PM

## 2023-10-16 NOTE — DISCHARGE INSTRUCTIONS
"Maximum acetaminophen (Tylenol) dose from all sources should not exceed 4 grams (4000 mg) per day.  You last had 975 mg at 3:25 PM; do not take Tylenol products again until after 9:25 PM if needed.    Maximum ibuprofen (Advil) dose from all sources should not exceed 2.5 grams (2,400 mg) per day. You received Toradol, an IV form of Ibuprofen (Motrin) at 4:45 PM.  Do not take any Ibuprofen products until 10:45 PM or after.     You received a medicine called IC-Green through your IV that helps \"light up\" the gallbladder for the surgeon. You may notice your pee a green to yellow color, this is normal. This will clear up in a day or so. Drink plenty of water to help flush this out of your system.      Remove Scope Patch: Tomorrow, Tuesday, October 17th at 7 PM    How to Remove Scope Patch:  When the scopolamine patch is no longer needed, remove the patch and fold it in half with the sticky side together and dispose of it.  Wash your hands and the area behind your ear thoroughly with soap and water to remove any traces of scopolamine from the area.  Make sure not to touch your eyes after handling the scope patch and wash your hands thoroughly.     HOME CARE FOLLOWING LAPAROSCOPIC CHOLECYSTECTOMY  TAVARES Jorgensen, ERNESTO Perdue, ANGÉLICA Barakat, BISMARK Romero    INCISIONAL CARE:  Replace the bandage over your incisions DAILY until all drainage stops, or if more comfortable to have in place.  If present, leave the steri-strips (white paper tapes) in place for 14 days after surgery.  If Dermabond (a type of skin glue) is present, leave in place until it wears/flakes off (2-3 weeks).     BATHING:  OK to shower 48 hours after surgery.  Avoid baths for 1 week after surgery.  You may wash your hair at any time.  Gently pat your incision dry after bathing.  Do not apply lotions, creams, or ointments to incisions.    ACTIVITY:  Light Activity -- you may immediately be up and about as tolerated.  Walking is encouraged, " increase as tolerated.  Driving/Light Work-- when comfortable and off narcotic pain medications.  Strenuous Work/Activity -- limit lifting to 20 pounds for 2 weeks.  Progressively increase with time.  Active Sports (running, biking, etc.) -- cautiously resume after 2 weeks.    DISCOMFORT:  Local anesthetic placed at surgery should provide relief for 4-8 hours.  Begin taking pain pills before discomfort is severe.  Take the pain medication with some food, when possible, to minimize side effects.  Intermittent use of ice packs may help during the first 1-3 weeks after surgery.  Expect gradual improvement.    Over-the-counter anti-inflammatory medications (i.e. Ibuprofen/Advil/Motrin or Naprosyn/Aleve) may be used per package instructions in addition to or while tapering off the narcotic pain medications to decrease swelling and sensitivity.  DO NOT TAKE these Anti-inflammatory medications if your primary physician has advised against doing so, or if you have acid reflux, ulcer, or bleeding disorder, or take blood-thinner medications.  Call your primary physician or the surgery office if you have medication questions.    After laparoscopic cholecystectomy, you may have shoulder or upper back discomfort due to the gas used during surgery.  This is temporary and should resolve within 2-3 days.  Frequent short walks may help with this.  You may have decreased energy level for 1-2 weeks after surgery related to your recovery.    DIET:  Start with liquids and gradually increase diet as tolerated.  Drink plenty of fluids.  While taking pain medications, consider use of a stool softener, increase your fiber in your diet, or add a fiber supplement (like Metamucil, Citrucel) to help prevent constipation - a possible side effect of pain medications.  It is not uncommon to experience some bowel changes (loose stools or constipation) after surgery.  Your body has to adapt to you no longer having a gall bladder.  To help minimize  this side effect, avoid fatty foods for 1-2 weeks after surgery.  You may then slowly increase the amount of fatty foods in your diet.      NAUSEA:  If nauseated from the anesthetic/pain meds; rest in bed, get up cautiously with assistance, and drink clear liquids (juice, tea, broth).    FOLLOW-UP AFTER SURGERY:  -Our office will contact you approximately 2-3 weeks after surgery to check on your progress and answer any questions you may have.  If you are doing well, you will not need to return for an office appointment.  If any concerns are identified over the phone, we will help you make an appointment to see a provider.    -If you have not received a phone call, have any questions or concerns, or would like to be seen, please call us at [phone].  We are located at: [address], [address]; [place] [address]    -CONTACT US IF THE FOLLOWING DEVELOPS:   1. A fever that is above 101     2. Increased redness, warmth, drainage, bleeding, or swelling.   3. Pain that is not relieved by rest/ice and your prescription.   4.  Increasing pain after 48 hours.   5. Drainage that is thick, cloudy, yellow, green or white.   6. Any other questions or concerns.  GENERAL ANESTHESIA OR SEDATION ADULT DISCHARGE INSTRUCTIONS   SPECIAL PRECAUTIONS FOR 24 HOURS AFTER SURGERY    IT IS NOT UNUSUAL TO FEEL LIGHT-HEADED OR FAINT, UP TO 24 HOURS AFTER SURGERY OR WHILE TAKING PAIN MEDICATION.  IF YOU HAVE THESE SYMPTOMS; SIT FOR A FEW MINUTES BEFORE STANDING AND HAVE SOMEONE ASSIST YOU WHEN YOU GET UP TO WALK OR USE THE BATHROOM.    YOU SHOULD REST AND RELAX FOR THE NEXT 24 HOURS AND YOU MUST MAKE ARRANGEMENTS TO HAVE SOMEONE STAY WITH YOU FOR AT LEAST 24 HOURS AFTER YOUR DISCHARGE.  AVOID HAZARDOUS AND STRENUOUS ACTIVITIES.  DO NOT MAKE IMPORTANT DECISIONS FOR 24 HOURS.    DO NOT DRIVE ANY VEHICLE OR OPERATE MECHANICAL EQUIPMENT FOR 24 HOURS FOLLOWING THE END OF YOUR SURGERY.  EVEN THOUGH YOU MAY FEEL NORMAL, YOUR REACTIONS MAY  BE AFFECTED BY THE MEDICATION YOU HAVE RECEIVED.    DO NOT DRINK ALCOHOLIC BEVERAGES FOR 24 HOURS FOLLOWING YOUR SURGERY.    DRINK CLEAR LIQUIDS (APPLE JUICE, GINGER ALE, 7-UP, BROTH, ETC.).  PROGRESS TO YOUR REGULAR DIET AS YOU FEEL ABLE.    YOU MAY HAVE A DRY MOUTH, A SORE THROAT, MUSCLES ACHES OR TROUBLE SLEEPING.  THESE SHOULD GO AWAY AFTER 24 HOURS.    CALL YOUR DOCTOR FOR ANY OF THE FOLLOWING:  SIGNS OF INFECTION (FEVER, GROWING TENDERNESS AT THE SURGERY SITE, A LARGE AMOUNT OF DRAINAGE OR BLEEDING, SEVERE PAIN, FOUL-SMELLING DRAINAGE, REDNESS OR SWELLING.    IT HAS BEEN OVER 8 TO 10 HOURS SINCE SURGERY AND YOU ARE STILL NOT ABLE TO URINATE (PASS WATER).

## 2023-10-16 NOTE — PLAN OF CARE
Goal Outcome Evaluation:    Orientation: Alert and oriented x4. Anxious.   VSS. 99% on RA. Afebrile.   LS: clear and equal bilaterally.   GI: Passing gas. no BM. Intermittent nausea, no vomiting. Zofran given just prior to arrival to unit.   : Adequate urine output.   Skin: intact  Activity: Independent to SBA. Pt encouraged to call if feeling lightheaded or dizzy. Pt endorses intermittent baseline dizziness x1 year. Pt slept comfortably throughout shift.   Pain: 0/10. Denies pain currently. Pt encouraged to call if pain develops.   Updates/Plan: pain control. Nausea control. IV antibiotics. Plan pending surgery consult later today. Pt remains NPO at this time. Continue with current cares.

## 2023-10-16 NOTE — CONSULTS
General Surgery Consultation    Unique Baker MRN# [id number]   Age: 38 year old YOB: 1985     Date of Admission:  10/16/2023    Reason for consult:            Abdominal pain, epigastric  Abdominal pain, right upper quadrant       Requesting physician:            Hospitalist                Assessment and Plan:   Assessment:   Unique Baker is a 38 year old female with symptomatic cholelithiasis, biliary dyskinesia, and adenomyomatosis.     Comorbidities:   has a past medical history of Diabetes (H), GERD (gastroesophageal reflux disease), History of colposcopy with cervical biopsy (3/23/07), Papanicolaou smear of cervix with low grade squamous intraepithelial lesion (LGSIL) (07), PONV (postoperative nausea and vomiting), and S/P  (10/13/2017).      Plan:   I have offered the patient a robotic/laparoscopic cholecystectomy, today.    We have discussed the indication, alternatives, risks and expected recovery.  Specifically we have discussed incisions, scarring, postoperative infections, anesthesia, bleeding, blood transfusion, open conversion, common bile duct injury, injury to intra-abdominal organs, adhesions leading to bowel obstruction, retained common bile duct stone, bile leak, DVT, PE, hernia, post cholecystectomy diarrhea, recovery, postoperative dietary restrictions and physical limitations.  We have discussed the recommended interventions and treatments for these complications.  All questions have been answered to the best of my ability.    She elects to proceed with surgery.   Will plan to discharge to home after surgery today unless complications arise.                 Chief Complaint:   Abdominal pain, epigastric  Abdominal pain, right upper quadrant     History is obtained from the patient.         History of Present Illness:   Unique Baker is a 38 year old  female who presents with epigastric region right upper quadrant abdominal pain for the past several months.  The  pain is intermittent.  She has had similar pain in the past.  There is an association with eating any type of food.  Positive for associated symptoms of nausea and bloating.  She  does not have a history of jaundice or dark urine.  She  has not had pancreatitis in the past.              Past Medical History:     Past Medical History:   Diagnosis Date    Diabetes (H)     GDM insulin    GERD (gastroesophageal reflux disease)     w/u otherwise neg     History of colposcopy with cervical biopsy 3/23/07    WNL    Papanicolaou smear of cervix with low grade squamous intraepithelial lesion (LGSIL) 07    PONV (postoperative nausea and vomiting)     S/P  10/13/2017             Past Surgical History:     Past Surgical History:   Procedure Laterality Date    BREAST SURGERY      augmentation      SECTION N/A 2015    Procedure:  SECTION;  Surgeon: Tremaine Gaona MD;  Location: [facility] OR     SECTION, TUBAL LIGATION, COMBINED N/A 10/13/2017    Procedure: COMBINED  SECTION, TUBAL LIGATION;  Repeat  SECTION, TUBAL LIGATION ;  Surgeon: Sidra Salamanca DO;  Location:  OR    COLONOSCOPY N/A 2016    Procedure: COLONOSCOPY;  Surgeon: Lyudmila Pastor MD;  Location:  GI    DILATION AND CURETTAGE SUCTION      elective termination    EP STUDY TILT TABLE N/A 2022    Procedure: Tilt Table Study;  Surgeon: Stan Green MD;  Location:  [facility]    ESOPHAGOSCOPY, GASTROSCOPY, DUODENOSCOPY (EGD), COMBINED  2014    Procedure: COMBINED ESOPHAGOSCOPY, GASTROSCOPY, DUODENOSCOPY (EGD);   ESOPHAGOSCOPY, GASTROSCOPY, DUODENOSCOPY (EGD) ;  Surgeon: Vishnu Lanier MD;  Location:  GI    ESOPHAGOSCOPY, GASTROSCOPY, DUODENOSCOPY (EGD), COMBINED Left 2020    Procedure: ESOPHAGOGASTRODUODENOSCOPY, WITH BIOPSIES USING BIOPSY FORCEPS;  Surgeon: Vishnu Hopkins MD;  Location:  GI    ESOPHAGOSCOPY, GASTROSCOPY, DUODENOSCOPY  (EGD), COMBINED N/A 2021    Procedure: ESOPHAGOGASTRODUODENOSCOPY, WITH BIOPSY using cold forceps;  Surgeon: Vishnu Lanier MD;  Location:  GI    GYN SURGERY       c section     RW LASER WART  2011    Anal wart removal     ZZC NONSPECIFIC PROCEDURE  01    Primary LT[facility]             Social History:     Social History     Tobacco Use    Smoking status: Former     Years: 7     Types: Cigarettes     Quit date:      Years since quittin.7    Smokeless tobacco: Never    Tobacco comments:     only if she drinks alcohol   Substance Use Topics    Alcohol use: Not Currently     Alcohol/week: 8.3 standard drinks of alcohol             Family History:     Family History   Problem Relation Age of Onset    Colon Polyps Brother     Other - See Comments Brother         colon polyps    Hyperlipidemia Sister     Stomach Cancer Sister     Cancer Sister         stomach    C.A.D. No family hx of     Diabetes No family hx of     Breast Cancer No family hx of     Cancer - colorectal No family hx of     Colon Cancer No family hx of      No family history of bleeding or clotting disorders.         Allergies:     Allergies   Allergen Reactions    Lexapro [Escitalopram]     Nexium [Esomeprazole]     Other Environmental Allergy      Patch Test Results 3-10-20    Very Strong (3+) or Strong (2+) reactions:  none     Mild (1+) reactions:  Fragrance mix I  Linalool  Oleamidopropyl dimethylamine     Borderline/questionable reactions:  Balsam of [place]  Diphenylguanidine    Serotonin Reuptake Inhibitors (Ssris)     Venlafaxine Other (See Comments)     legs were cold/tingly from knees down and restless, increased anxiety, insomnia             Medications:   fentaNYL Citrate (PF) (SUBLIMAZE) injection    acetaminophen (TYLENOL) 500 MG tablet, take 1 - 2 tablet by oral route  every 6 hours as needed as needed  cetirizine (ZYRTEC) 10 MG tablet, TAKE ONE TABLET BY MOUTH ONCE DAILY FOR 2 WEEKS, THEN AS NEEDED FOR FUTURE NASAL  "ISSUES  Cyanocobalamin (VITAMIN B12 TR PO), 1 patch (500 mcg) daily  cyclobenzaprine (FLEXERIL) 5 MG tablet, Take 1 tablet (5 mg) by mouth nightly as needed for muscle spasms  dexlansoprazole (DEXILANT) 30 MG CPDR CR capsule,   diclofenac (VOLTAREN) 1 % topical gel, Apply 2 g topically 4 times daily To joints as needed for pain  famotidine (PEPCID) 20 MG tablet, TAKE ONE TABLET BY MOUTH TWICE A DAY AS NEEDED FOR REFLUX  FERROUS BISGLYCINATE CHELATE PO, Take 36 mg by mouth every other day  fluocinonide emulsified base 0.05 % external cream, apply to rash as needed - sparingly  fluticasone (FLONASE) 50 MCG/ACT nasal spray, Spray 1 spray into both nostrils 2 times daily as needed for rhinitis or allergies Take 1 spray twice daily x 1 week then 1 spray daily at night x 1 week then as needed  hydroxychloroquine (PLAQUENIL) 200 MG tablet, Take 1 tablet (200 mg) by mouth 2 times daily Annual Plaquenil toxicity eye screening required. (Patient not taking: Reported on 6/12/2023)  LORazepam (ATIVAN) 0.5 MG tablet, Take 1 tablet (0.5 mg) by mouth daily as needed for anxiety 12 pills/month  multivitamin (ONE-DAILY) tablet, Take 1 tablet by mouth daily  sucralfate (CARAFATE) 1 GM tablet,   vitamin B-Complex, Take 1 tablet by mouth daily  vitamin D2 (ERGOCALCIFEROL) 94189 units (1250 mcg) capsule, Take 50,000 Units by mouth once a week Patient taking Vitamin D3 06592 units       ampicillin-sulbactam  3 g Intravenous Q6H    pantoprazole  40 mg Intravenous Daily with breakfast            Review of Systems:   The 10 point review of systems is negative other than noted in the HPI.          Physical Exam:   /68   Pulse 66   Temp 98.4  F (36.9  C) (Oral)   Resp 16   Ht 1.575 m (5' 2\")   Wt 70.9 kg (156 lb 4.9 oz)   LMP 09/16/2023 (Approximate)   SpO2 97%   BMI 28.59 kg/m    General - Well developed, well nourished female in no apparent distress  HEENT:  Head normocephalic and atraumatic, pupils equal and round, " conjunctivae clear, no scleral icterus, mucous membranes moist, external ears and nose normal  Abdomen: soft, rounded, distended with moderate tenderness noted in the epigastric region and in the right upper quadrant . no masses palpated  Extremities: Warm without edema  Neurologic: nonfocal  Psychiatric: Mood and affect appropriate  Skin: Without lesions, rashes, or jaundice         Data:     WBC -   Lab Results   Component Value Date    WBC 13.5 (H) 10/16/2023       HgB -   Lab Results   Component Value Date    HGB 13.3 10/16/2023       Plt-   Lab Results   Component Value Date     10/16/2023       Liver Function Studies -   Recent Labs   Lab Test 10/16/23  0602   PROTTOTAL 6.6   ALBUMIN 4.0   BILITOTAL 0.2   ALKPHOS 63   AST 13   ALT 10       Lipase-   Lab Results   Component Value Date    LIPASE 45 10/16/2023         Imaging:  All imaging studies reviewed by me.    Results for orders placed or performed during the hospital encounter of 10/16/23   US Abdomen Limited    Narrative    EXAM: US ABDOMEN LIMITED  LOCATION: [facility]  DATE: 10/16/2023    INDICATION: RUQ pain  COMPARISON: 03/20/2023  TECHNIQUE: Limited abdominal ultrasound.    FINDINGS:    GALLBLADDER: Stones and sludge are present within the gallbladder, including the gallbladder neck. Foci of comet tail artifact are noted in the anterior bladder wall compatible with adenomyomatosis. No gallbladder wall thickening or pericholecystic   fluid. No sonographic Gates's sign was reported.    BILE DUCTS: No biliary dilatation. The common duct measures 5.9 mm.    LIVER: Normal parenchyma with smooth contour. No focal mass.    RIGHT KIDNEY: No hydronephrosis.    PANCREAS: The visualized portions are normal.    No ascites.        Impression    IMPRESSION:    1.  Cholelithiasis and adenomyomatosis, without sonographic features of acute cholecystitis. However, early/acute cholecystitis cannot be entirely excluded if pain medication  had been recently administered.             Time spent with the patient, reviewing the EMR, reviewing laboratory and imaging studies, counseling and coordinating care:  85 minutes.     Leon Barakat MD

## 2023-10-18 LAB
PATH REPORT.COMMENTS IMP SPEC: NORMAL
PATH REPORT.COMMENTS IMP SPEC: NORMAL
PATH REPORT.FINAL DX SPEC: NORMAL
PATH REPORT.GROSS SPEC: NORMAL
PATH REPORT.MICROSCOPIC SPEC OTHER STN: NORMAL
PATH REPORT.RELEVANT HX SPEC: NORMAL
PHOTO IMAGE: NORMAL

## 2023-11-16 ENCOUNTER — OFFICE VISIT (OUTPATIENT)
Dept: SURGERY | Facility: CLINIC | Age: 38
End: 2023-11-16
Payer: COMMERCIAL

## 2023-11-16 VITALS
RESPIRATION RATE: 16 BRPM | BODY MASS INDEX: 28.71 KG/M2 | HEART RATE: 72 BPM | HEIGHT: 62 IN | DIASTOLIC BLOOD PRESSURE: 62 MMHG | OXYGEN SATURATION: 98 % | WEIGHT: 156 LBS | SYSTOLIC BLOOD PRESSURE: 98 MMHG

## 2023-11-16 DIAGNOSIS — Z09 FOLLOW-UP EXAMINATION FOLLOWING SURGERY: Primary | ICD-10-CM

## 2023-11-16 PROCEDURE — 99024 POSTOP FOLLOW-UP VISIT: CPT | Performed by: SURGERY

## 2023-11-16 NOTE — LETTER
December 18, 2023               RE:   Unique Baker 1985      Dear Colleague,    Thank you for referring your patient, Unique Baker, to [facility] at [facility]. Please see a copy of my visit note below.    S/p cholecystectomy    Continues to have frequent loose stools.  Discussed likely still adjusting to post op and body may need more time. If continues she will contact our office for trial of cholestyramine    Again, thank you for allowing me to participate in the care of your patient.      Sincerely,      MD SHABBIR Armstrong/jil      D: 12/18/2023  T: 8:49 am

## 2023-11-27 ENCOUNTER — NURSE TRIAGE (OUTPATIENT)
Dept: NURSING | Facility: CLINIC | Age: 38
End: 2023-11-27
Payer: COMMERCIAL

## 2023-11-27 NOTE — TELEPHONE ENCOUNTER
Nurse Triage SBAR    Is this a 2nd Level Triage? NO    Situation: Patient calling with ear pain and sore throat for the last 2-3 days.  Consent: not needed    Background: Patient has had ear pain for three days and now having throat pain on the right side for the last 2 days    Assessment:   Ear pain - right side. moderate  No fever  Sore throat - moderate    Protocol Recommended Disposition:   See in office today    Recommendation: Advised patient to go to urgent care. Care advice given including when to call back. Patient verbalized understanding and agreed with plan.       No Molina RN [facility] 11/27/2023 9:46 AM    Reason for Disposition   Earache also present    Additional Information   Negative: SEVERE difficulty breathing (e.g., struggling for each breath, speaks in single words)   Negative: Sounds like a life-threatening emergency to the triager   Negative: Throat culture results, call about   Negative: Productive cough is main symptom   Negative: Runny nose is main symptom   Negative: Drooling or spitting out saliva (because can't swallow)   Negative: Unable to open mouth completely   Negative: Drinking very little and has signs of dehydration (e.g., no urine > 12 hours, very dry mouth, very lightheaded)   Negative: Patient sounds very sick or weak to the triager   Negative: Difficulty breathing (per caller) but not severe   Negative: Fever > 103 F (39.4 C)   Negative: Refuses to drink anything for > 12 hours   Negative: SEVERE sore throat pain   Negative: Pus on tonsils (back of throat) and swollen neck lymph nodes ('glands')    Protocols used: Sore Throat-A-[place]

## 2023-11-30 ENCOUNTER — OFFICE VISIT (OUTPATIENT)
Dept: FAMILY MEDICINE | Facility: CLINIC | Age: 38
End: 2023-11-30
Payer: COMMERCIAL

## 2023-11-30 VITALS
RESPIRATION RATE: 12 BRPM | HEIGHT: 62 IN | SYSTOLIC BLOOD PRESSURE: 108 MMHG | BODY MASS INDEX: 28.52 KG/M2 | DIASTOLIC BLOOD PRESSURE: 70 MMHG | HEART RATE: 71 BPM | OXYGEN SATURATION: 100 % | WEIGHT: 155 LBS | TEMPERATURE: 98.6 F

## 2023-11-30 DIAGNOSIS — E61.1 IRON DEFICIENCY: ICD-10-CM

## 2023-11-30 DIAGNOSIS — K29.00 ACUTE GASTRITIS WITHOUT HEMORRHAGE, UNSPECIFIED GASTRITIS TYPE: ICD-10-CM

## 2023-11-30 DIAGNOSIS — R07.0 THROAT PAIN: Primary | ICD-10-CM

## 2023-11-30 LAB
BASOPHILS # BLD AUTO: 0 10E3/UL (ref 0–0.2)
BASOPHILS NFR BLD AUTO: 0 %
DEPRECATED S PYO AG THROAT QL EIA: NEGATIVE
EOSINOPHIL # BLD AUTO: 0.1 10E3/UL (ref 0–0.7)
EOSINOPHIL NFR BLD AUTO: 1 %
ERYTHROCYTE [DISTWIDTH] IN BLOOD BY AUTOMATED COUNT: 12.4 % (ref 10–15)
HCT VFR BLD AUTO: 40 % (ref 35–47)
HGB BLD-MCNC: 12.8 G/DL (ref 11.7–15.7)
IMM GRANULOCYTES # BLD: 0 10E3/UL
IMM GRANULOCYTES NFR BLD: 0 %
LYMPHOCYTES # BLD AUTO: 2.1 10E3/UL (ref 0.8–5.3)
LYMPHOCYTES NFR BLD AUTO: 28 %
MCH RBC QN AUTO: 28.6 PG (ref 26.5–33)
MCHC RBC AUTO-ENTMCNC: 32 G/DL (ref 31.5–36.5)
MCV RBC AUTO: 89 FL (ref 78–100)
MONOCYTES # BLD AUTO: 0.5 10E3/UL (ref 0–1.3)
MONOCYTES NFR BLD AUTO: 6 %
NEUTROPHILS # BLD AUTO: 4.9 10E3/UL (ref 1.6–8.3)
NEUTROPHILS NFR BLD AUTO: 64 %
PLATELET # BLD AUTO: 218 10E3/UL (ref 150–450)
RBC # BLD AUTO: 4.48 10E6/UL (ref 3.8–5.2)
WBC # BLD AUTO: 7.6 10E3/UL (ref 4–11)

## 2023-11-30 PROCEDURE — 85025 COMPLETE CBC W/AUTO DIFF WBC: CPT | Performed by: FAMILY MEDICINE

## 2023-11-30 PROCEDURE — 87651 STREP A DNA AMP PROBE: CPT | Performed by: FAMILY MEDICINE

## 2023-11-30 PROCEDURE — 36415 COLL VENOUS BLD VENIPUNCTURE: CPT | Performed by: FAMILY MEDICINE

## 2023-11-30 PROCEDURE — 83540 ASSAY OF IRON: CPT | Performed by: FAMILY MEDICINE

## 2023-11-30 PROCEDURE — 83550 IRON BINDING TEST: CPT | Performed by: FAMILY MEDICINE

## 2023-11-30 PROCEDURE — 99214 OFFICE O/P EST MOD 30 MIN: CPT | Mod: 24 | Performed by: FAMILY MEDICINE

## 2023-11-30 RX ORDER — SUCRALFATE ORAL 1 G/10ML
1 SUSPENSION ORAL 4 TIMES DAILY
Qty: 560 ML | Refills: 0 | Status: SHIPPED | OUTPATIENT
Start: 2023-11-30 | End: 2023-12-14

## 2023-11-30 ASSESSMENT — PATIENT HEALTH QUESTIONNAIRE - PHQ9
10. IF YOU CHECKED OFF ANY PROBLEMS, HOW DIFFICULT HAVE THESE PROBLEMS MADE IT FOR YOU TO DO YOUR WORK, TAKE CARE OF THINGS AT HOME, OR GET ALONG WITH OTHER PEOPLE: NOT DIFFICULT AT ALL
SUM OF ALL RESPONSES TO PHQ QUESTIONS 1-9: 3
SUM OF ALL RESPONSES TO PHQ QUESTIONS 1-9: 3

## 2023-11-30 NOTE — PROGRESS NOTES
Assessment & Plan     Throat pain - negative strep, negative CBC. Discussed most likely related to increase in GERD. Encouraged continued use of PPI and follow up with GI. Advised sudafed to help with middle ear effusion.   - CBC with platelets and differential; Future  - Streptococcus A Rapid Screen w/Reflex to PCR - Clinic Collect  - CBC with platelets and differential    Acute gastritis without hemorrhage, unspecified gastritis type - sent as she is having epigastric pain radiating to the back following meals.   - sucralfate (CARAFATE) 1 GM/10ML suspension; Take 10 mLs (1 g) by mouth 4 times daily for 14 days    Iron deficiency - requesting iron labs, no longer taking her supplement.   - Iron and iron binding capacity; Future  - Iron and iron binding capacity    Natividad Carrillo MD  [facility] RODRÍGUEZ Calhoun is a 38 year old, presenting for the following health issues:    Pharyngitis (4 days) and Ear Problem (5-6 days)        11/30/2023     4:46 PM   Additional Questions   Roomed by Chelsey BERNARDO       History of Present Illness       Reason for visit:  Throat pain and ear pain  Symptom onset:  3-7 days ago  Symptom intensity:  Moderate  Symptom progression:  Staying the same  Had these symptoms before:  No  What makes it worse:  Food,sleep  What makes it better:  No    She eats 0-1 servings of fruits and vegetables daily.She consumes 1 sweetened beverage(s) daily.She exercises with enough effort to increase her heart rate 20 to 29 minutes per day.  She exercises with enough effort to increase her heart rate 4 days per week.   She is taking medications regularly.     Right ear and right side of throat only.   Feels pressure in her head.     No sinus symptoms.   No nasal congestion, itchy eyes, scratchy throat.     Pain with swallowing.     Reports a several year history of GERD and what sounds like esophageal stricture, cannot take PPI as they cause her side effects. H2B are not  "strong enough to manage her symptoms.     Following with [facility].     Recent cholecystectomy.       Review of Systems   Constitutional, HEENT, cardiovascular, pulmonary, gi and gu systems are negative, except as otherwise noted.      Objective    /70 (Cuff Size: Adult Regular)   Pulse 71   Temp 98.6  F (37  C) (Oral)   Resp 12   Ht 1.575 m (5' 2\")   Wt 70.3 kg (155 lb)   LMP 11/16/2023 (Approximate)   SpO2 100%   BMI 28.35 kg/m    Body mass index is 28.35 kg/m .  Physical Exam   GENERAL: healthy, alert and no distress  EYES: Eyes grossly normal to inspection, PERRL and conjunctivae and sclerae normal  HENT: serous effusino bilateral middle ears, right>left, nose and mouth without ulcers or lesions  NECK: no adenopathy, no asymmetry, masses, or scars and thyroid normal to palpation  RESP: lungs clear to auscultation - no rales, rhonchi or wheezes  CV: regular rate and rhythm, normal S1 S2, no S3 or S4, no murmur, click or rub, no peripheral edema and peripheral pulses strong                "

## 2023-12-01 LAB
GROUP A STREP BY PCR: NOT DETECTED
IRON BINDING CAPACITY (ROCHE): 276 UG/DL (ref 240–430)
IRON SATN MFR SERPL: 17 % (ref 15–46)
IRON SERPL-MCNC: 48 UG/DL (ref 37–145)

## 2023-12-15 ENCOUNTER — TELEPHONE (OUTPATIENT)
Dept: SURGERY | Facility: CLINIC | Age: 38
End: 2023-12-15
Payer: COMMERCIAL

## 2023-12-15 DIAGNOSIS — Z90.49 S/P LAPAROSCOPIC CHOLECYSTECTOMY: Primary | ICD-10-CM

## 2023-12-15 DIAGNOSIS — R19.7 DIARRHEA: ICD-10-CM

## 2023-12-15 RX ORDER — CHOLESTYRAMINE 4 G/9G
1 POWDER, FOR SUSPENSION ORAL
Qty: 90 PACKET | Refills: 0 | Status: SHIPPED | OUTPATIENT
Start: 2023-12-15 | End: 2024-08-06

## 2023-12-15 NOTE — TELEPHONE ENCOUNTER
Name of caller: Patient    Reason for Call:  Pt states [facility] recommednded she could take a medication to slow down the bile. She states she should just call if she wanted it and it would be noted in her chart.    Surgeon:  Dr. Barakat    Recent Surgery:  Yes.    If yes, when & what type:  10/16, Robotic cholecystectomy and use of indocyanine green dye for bile duct identification       Best phone number to reach pt at is: [phone]  Ok to leave a message with medical info? Yes.    Pharmacy preferred (if calling for a refill): [facility] at [facility], [place]

## 2023-12-15 NOTE — TELEPHONE ENCOUNTER
S/p Robotic cholecystectomy and use of indocyanine green dye for bile duct identification   Procedure date: 10/16/23  Surgeon: Dr Barakat    Patient was last seen in clinic on 11/16 for a post-operative appointment with Dr. Barakat.    Today she is calling to request a medication that was discussed at her post-op visit with Dr. Barakat.    She reports that she has continued to have loose watery stool every morning. Dr. Barakat had instructed her to call the office if this continued to happen as he could prescribe a medicine for her to help with loose stools.  She can't remember the name of it.     We discussed that the medicine Dr. Barakat may have been discussing with her is called Questran or Cholestyramine.      She would like the rx sent to [facility] pharmacy.      Per Dr. Barakat - ok for Rx for questran.  If this medication helps with her loose stools she should discuss with her PCP to continue to prescribe for her.

## 2023-12-18 NOTE — PROGRESS NOTES
S/p cholecystectomy    Continues to have frequent loose stools.  Discussed likely still adjusting to post op and body may need more time. If continues she will contact our office for trial of cholestyramine

## 2024-01-16 ENCOUNTER — OFFICE VISIT (OUTPATIENT)
Dept: PEDIATRICS | Facility: CLINIC | Age: 39
End: 2024-01-16
Payer: COMMERCIAL

## 2024-01-16 VITALS
HEIGHT: 62 IN | RESPIRATION RATE: 16 BRPM | WEIGHT: 156.5 LBS | HEART RATE: 70 BPM | TEMPERATURE: 97.7 F | OXYGEN SATURATION: 100 % | BODY MASS INDEX: 28.8 KG/M2 | DIASTOLIC BLOOD PRESSURE: 78 MMHG | SYSTOLIC BLOOD PRESSURE: 116 MMHG

## 2024-01-16 DIAGNOSIS — Z01.818 PREOP GENERAL PHYSICAL EXAM: Primary | ICD-10-CM

## 2024-01-16 DIAGNOSIS — G47.8 UARS (UPPER AIRWAY RESISTANCE SYNDROME): ICD-10-CM

## 2024-01-16 DIAGNOSIS — K44.9 HIATAL HERNIA: ICD-10-CM

## 2024-01-16 DIAGNOSIS — N92.0 MENORRHAGIA WITH REGULAR CYCLE: ICD-10-CM

## 2024-01-16 LAB
BASOPHILS # BLD AUTO: 0 10E3/UL (ref 0–0.2)
BASOPHILS NFR BLD AUTO: 1 %
EOSINOPHIL # BLD AUTO: 0.1 10E3/UL (ref 0–0.7)
EOSINOPHIL NFR BLD AUTO: 1 %
ERYTHROCYTE [DISTWIDTH] IN BLOOD BY AUTOMATED COUNT: 13.1 % (ref 10–15)
FERRITIN SERPL-MCNC: 33 NG/ML (ref 6–175)
HCT VFR BLD AUTO: 39.9 % (ref 35–47)
HGB BLD-MCNC: 12.6 G/DL (ref 11.7–15.7)
IMM GRANULOCYTES # BLD: 0 10E3/UL
IMM GRANULOCYTES NFR BLD: 0 %
IRON BINDING CAPACITY (ROCHE): 296 UG/DL (ref 240–430)
IRON SATN MFR SERPL: 26 % (ref 15–46)
IRON SERPL-MCNC: 78 UG/DL (ref 37–145)
LYMPHOCYTES # BLD AUTO: 2 10E3/UL (ref 0.8–5.3)
LYMPHOCYTES NFR BLD AUTO: 33 %
MCH RBC QN AUTO: 27.6 PG (ref 26.5–33)
MCHC RBC AUTO-ENTMCNC: 31.6 G/DL (ref 31.5–36.5)
MCV RBC AUTO: 88 FL (ref 78–100)
MONOCYTES # BLD AUTO: 0.4 10E3/UL (ref 0–1.3)
MONOCYTES NFR BLD AUTO: 6 %
NEUTROPHILS # BLD AUTO: 3.5 10E3/UL (ref 1.6–8.3)
NEUTROPHILS NFR BLD AUTO: 58 %
PLATELET # BLD AUTO: 212 10E3/UL (ref 150–450)
RBC # BLD AUTO: 4.56 10E6/UL (ref 3.8–5.2)
WBC # BLD AUTO: 6 10E3/UL (ref 4–11)

## 2024-01-16 PROCEDURE — 83540 ASSAY OF IRON: CPT | Performed by: NURSE PRACTITIONER

## 2024-01-16 PROCEDURE — 85025 COMPLETE CBC W/AUTO DIFF WBC: CPT | Performed by: NURSE PRACTITIONER

## 2024-01-16 PROCEDURE — 99214 OFFICE O/P EST MOD 30 MIN: CPT | Performed by: NURSE PRACTITIONER

## 2024-01-16 PROCEDURE — 82728 ASSAY OF FERRITIN: CPT | Performed by: NURSE PRACTITIONER

## 2024-01-16 PROCEDURE — 36415 COLL VENOUS BLD VENIPUNCTURE: CPT | Performed by: NURSE PRACTITIONER

## 2024-01-16 PROCEDURE — 83550 IRON BINDING TEST: CPT | Performed by: NURSE PRACTITIONER

## 2024-01-16 ASSESSMENT — PAIN SCALES - GENERAL: PAINLEVEL: NO PAIN (0)

## 2024-01-16 NOTE — PROGRESS NOTES
[facility]  [address]  [address]  [place] [address]  Phone: [phone]  Fax: [phone]  Primary Provider: Celina Riggs  Pre-op Performing Provider: LOW DEGROOT      PREOPERATIVE EVALUATION:  Today's date: 1/16/2024    Unique is a 38 year old, presenting for the following:  Pre-Op Exam        1/16/2024     9:55 AM   Additional Questions   Roomed by stephany   Accompanied by na         1/16/2024     9:55 AM   Patient Reported Additional Medications   Patient reports taking the following new medications na       Surgical Information:  Surgery/Procedure: hernia repair   Surgery Location: [facility]  Surgeon:    Surgery Date: 1/30/2024  Time of Surgery: 6am   Where patient plans to recover: At home with family  Fax number for surgical facility: Note does not need to be faxed, will be available electronically in Epic.    Assessment & Plan     The proposed surgical procedure is considered INTERMEDIATE risk.    Preop general physical exam    - Iron and iron binding capacity  - CBC with platelets and differential  - Ferritin  - Iron and iron binding capacity  - CBC with platelets and differential  - Ferritin    Hiatal hernia    - Iron and iron binding capacity  - CBC with platelets and differential  - Ferritin  - Iron and iron binding capacity  - CBC with platelets and differential  - Ferritin    Menorrhagia with regular cycle    - Iron and iron binding capacity  - CBC with platelets and differential  - Ferritin  - Iron and iron binding capacity  - CBC with platelets and differential  - Ferritin    UARS (upper airway resistance syndrome)    - Iron and iron binding capacity  - CBC with platelets and differential  - Ferritin  - Iron and iron binding capacity  - CBC with platelets and differential  - Ferritin        Possible Sleep Apnea: Last sleep study and consult reviewed, encouraged to use mouth devise        No data to display                    Risks and Recommendations:  The patient has the following additional risks and recommendations for perioperative complications:   - No identified additional risk factors other than previously addressed    Antiplatelet or Anticoagulation Medication Instructions:   - Patient is on no antiplatelet or anticoagulation medications.    Additional Medication Instructions:  Patient is to take all scheduled medications on the day of surgery    RECOMMENDATION:  APPROVAL GIVEN to proceed with proposed procedure, without further diagnostic evaluation.            Subjective       HPI related to upcoming procedure: Hiatal Hernia Repair + Fundoplication (stomach wrap) Surgery         1/15/2024     9:29 PM   Preop Questions   1. Have you ever had a heart attack or stroke? No   2. Have you ever had surgery on your heart or blood vessels, such as a stent placement, a coronary artery bypass, or surgery on an artery in your head, neck, heart, or legs? No   3. Do you have chest pain with activity? No   4. Do you have a history of  heart failure? No   5. Do you currently have a cold, bronchitis or symptoms of other infection? No   6. Do you have a cough, shortness of breath, or wheezing? No   7. Do you or anyone in your family have previous history of blood clots? UNKNOWN - isn't aware of fam hx   8. Do you or does anyone in your family have a serious bleeding problem such as prolonged bleeding following surgeries or cuts? UNKNOWN -    9. Have you ever had problems with anemia or been told to take iron pills? YES - history of anemia   10. Have you had any abnormal blood loss such as black, tarry or bloody stools, or abnormal vaginal bleeding? YES - history of heavy menses   11. Have you ever had a blood transfusion? No   12. Are you willing to have a blood transfusion if it is medically needed before, during, or after your surgery? Yes   13. Have you or any of your relatives ever had problems with anesthesia? UNKNOWN - heavy menses,  Q28 d   14. Do you have sleep apnea, excessive snoring or daytime drowsiness? YES - had sleep study, dxed with mild LAMAR< uses a mouth guard   14a. Do you have a CPAP machine? No   15. Do you have any artifical heart valves or other implanted medical devices like a pacemaker, defibrillator, or continuous glucose monitor? No   16. Do you have artificial joints? No   17. Are you allergic to latex? No   18. Is there any chance that you may be pregnant? No       Health Care Directive:  Patient does not have a Health Care Directive or Living Will: Discussed advance care planning with patient; however, patient declined at this time.    Preoperative Review of :   reviewed - oxycodone 5 mg #10 received on 10/16/2023      Status of Chronic Conditions:  See problem list for active medical problems.  Problems all longstanding and stable, except as noted/documented.  See ROS for pertinent symptoms related to these conditions.    ANEMIA - Patient has a recent history of moderate anemia, which has not been symptomatic. Work up to date has revealed see attached. Treatment has beenoral iron supplements, stopped one month ago.     Review of Systems  CONSTITUTIONAL: NEGATIVE for fever, chills, change in weight  ENT/MOUTH: NEGATIVE for ear, mouth and throat problems  RESP: NEGATIVE for significant cough or SOB  CV: NEGATIVE for chest pain, palpitations or peripheral edema    Patient Active Problem List    Diagnosis Date Noted    UARS (upper airway resistance syndrome) 01/16/2024     Priority: Medium    Cholecystitis 10/16/2023     Priority: Medium    Cholelithiases 10/16/2023     Priority: Medium    Breast pain 02/21/2022     Priority: Medium    Menorrhagia with regular cycle 02/21/2022     Priority: Medium    Dysmenorrhea 02/21/2022     Priority: Medium    Controlled substance agreement signed- checked 8/12/2020 - no concerns 08/09/2019     Priority: Medium     Patient is followed by KELTON JACOB for ongoing  prescription of stimulants.  All refills should be approved by this provider, or covering partner.    Medication(s): adderall.   Maximum quantity per month: 30  Clinic visit frequency required: Q 6  months     Controlled substance agreement on file: Yes       Date(s): 2019  Re sign 2/3/2021  Neuropsych evaluation for ADD completed:  Yes, completed Rosalba , on file and diagnosis confirmed  Done 2019    [facility] website verification:  none  [email or web address]          Irritable bowel syndrome 07/15/2015     Priority: Medium    CARDIOVASCULAR SCREENING; LDL GOAL LESS THAN 160 10/31/2010     Priority: Medium    Esophageal reflux 2006     Priority: Medium      Past Medical History:   Diagnosis Date    Diabetes (H)     GDM insulin    GERD (gastroesophageal reflux disease)     w/u otherwise neg     History of colposcopy with cervical biopsy 3/23/07    WNL    Papanicolaou smear of cervix with low grade squamous intraepithelial lesion (LGSIL) 07    PONV (postoperative nausea and vomiting)     S/P  10/13/2017     Past Surgical History:   Procedure Laterality Date    BREAST SURGERY      augmentation      SECTION N/A 2015    Procedure:  SECTION;  Surgeon: Tremaine Gaona MD;  Location:  OR     SECTION, TUBAL LIGATION, COMBINED N/A 10/13/2017    Procedure: COMBINED  SECTION, TUBAL LIGATION;  Repeat  SECTION, TUBAL LIGATION ;  Surgeon: Sidra Salamanca DO;  Location:  OR    COLONOSCOPY N/A 2016    Procedure: COLONOSCOPY;  Surgeon: Lyudmila Pastor MD;  Location:  GI    DILATION AND CURETTAGE SUCTION      elective termination    EP STUDY TILT TABLE N/A 2022    Procedure: Tilt Table Study;  Surgeon: Stan Green MD;  Location:  HEART CARDIAC CATH LAB    ESOPHAGOSCOPY, GASTROSCOPY, DUODENOSCOPY (EGD), COMBINED  2014    Procedure: COMBINED ESOPHAGOSCOPY, GASTROSCOPY, DUODENOSCOPY (EGD);    ESOPHAGOSCOPY, GASTROSCOPY, DUODENOSCOPY (EGD) ;  Surgeon: Vishnu Lanier MD;  Location: [facility] GI    ESOPHAGOSCOPY, GASTROSCOPY, DUODENOSCOPY (EGD), COMBINED Left 9/16/2020    Procedure: ESOPHAGOGASTRODUODENOSCOPY, WITH BIOPSIES USING BIOPSY FORCEPS;  Surgeon: Vishnu Hopkins MD;  Location:  GI    ESOPHAGOSCOPY, GASTROSCOPY, DUODENOSCOPY (EGD), COMBINED N/A 5/26/2021    Procedure: ESOPHAGOGASTRODUODENOSCOPY, WITH BIOPSY using cold forceps;  Surgeon: Vishnu Lanier MD;  Location:  GI    GYN SURGERY  2001     c section     LAPAROSCOPIC CHOLECYSTECTOMY N/A 10/16/2023    Procedure: CHOLECYSTECTOMY, ROBOT-ASSISTED, LAPAROSCOPIC, USING DA BARRINGTON XI;  Surgeon: Leon Barakat MD;  Location: [facility] OR    RW LASER WART  2011    Anal wart removal     ZZC NONSPECIFIC PROCEDURE  09/27/01    [facility]     Current Outpatient Medications   Medication Sig Dispense Refill    acetaminophen (TYLENOL) 500 MG tablet take 1 - 2 tablet by oral route  every 6 hours as needed as needed      cetirizine (ZYRTEC) 10 MG tablet Take 10 mg by mouth daily as needed for allergies      dexlansoprazole (DEXILANT) 30 MG CPDR CR capsule Take 30 mg by mouth daily      famotidine (PEPCID) 20 MG tablet TAKE ONE TABLET BY MOUTH TWICE A DAY AS NEEDED FOR REFLUX 180 tablet 3    fluticasone (FLONASE) 50 MCG/ACT nasal spray Spray 1-2 sprays into both nostrils daily as needed for rhinitis or allergies      LORazepam (ATIVAN) 0.5 MG tablet Take 1 tablet (0.5 mg) by mouth daily as needed for anxiety 12 pills/month 12 tablet 2    vitamin B-Complex Take 1 tablet by mouth daily      Vitamin D3 (CHOLECALCIFEROL) 125 MCG (5000 UT) tablet Take 1 tablet by mouth daily      acetaminophen (TYLENOL) 160 MG/5ML solution Take 33 mLs (1,056 mg) by mouth every 6 hours as needed for fever or mild pain 2000 mL 1    cholestyramine (QUESTRAN) 4 g packet Take 1 packet (4 g) by mouth 3 times daily (with meals) (Patient not taking: Reported on 1/16/2024) 90 packet 0     "famotidine (PEPCID) 40 MG/5ML suspension Take 2.5 mLs (20 mg) by mouth 2 times daily 150 mL 1    ferrous fumarate 65 mg, [place]. FE,-Vitamin C 125 mg (VITRON C)  MG TABS tablet Take 1 tablet by mouth daily         Allergies   Allergen Reactions    Lexapro [Escitalopram]     Nexium [Esomeprazole]     Other Environmental Allergy      Patch Test Results 3-10-20    Very Strong (3+) or Strong (2+) reactions:  none     Mild (1+) reactions:  Fragrance mix I  Linalool  Oleamidopropyl dimethylamine     Borderline/questionable reactions:  Balsam of [place]  Diphenylguanidine    Serotonin Reuptake Inhibitors (Ssris)     Venlafaxine Other (See Comments)     legs were cold/tingly from knees down and restless, increased anxiety, insomnia        Social History     Tobacco Use    Smoking status: Former     Years: 7     Types: Cigarettes     Quit date:      Years since quittin.0    Smokeless tobacco: Never    Tobacco comments:     only if she drinks alcohol   Substance Use Topics    Alcohol use: Not Currently     Alcohol/week: 8.3 standard drinks of alcohol     Family History   Problem Relation Age of Onset    Colon Polyps Brother     Other - See Comments Brother         colon polyps    Hyperlipidemia Sister     Stomach Cancer Sister     Cancer Sister         stomach    C.A.D. No family hx of     Diabetes No family hx of     Breast Cancer No family hx of     Cancer - colorectal No family hx of     Colon Cancer No family hx of      History   Drug Use No         Objective     /78   Pulse 70   Temp 97.7  F (36.5  C) (Temporal)   Resp 16   Ht 1.575 m (5' 2\")   Wt 71 kg (156 lb 8 oz)   LMP 2023 (Approximate)   SpO2 100%   BMI 28.62 kg/m      Physical Exam  GENERAL APPEARANCE: healthy, alert and no distress  HENT: ear canals and TM's normal and nose and mouth without ulcers or lesions  RESP: lungs clear to auscultation - no rales, rhonchi or wheezes  CV: regular rate and rhythm, normal S1 S2, no S3 or S4 and " no murmur, click or rub   ABDOMEN: soft, nontender, no HSM or masses and bowel sounds normal  NEURO: Normal strength and tone, sensory exam grossly normal, mentation intact and speech normal    Recent Labs   Lab Test 11/30/23  1735 10/16/23  0200 02/17/23  1328 10/21/22  1138 10/21/22  0818 07/14/22  1434   HGB 12.8 13.3 12.3   < >  --  11.6*    218 213   < >  --  248   INR  --   --   --   --   --  0.99   NA  --  137 141   < >  --  140   POTASSIUM  --  3.9 4.3   < >  --  4.0   CR  --  0.64 0.66   < >  --  0.65   A1C  --   --  5.5  --  5.6  --     < > = values in this interval not displayed.        Diagnostics:  No results found for this or any previous visit (from the past 24 hour(s)).   No EKG required, no history of coronary heart disease, significant arrhythmia, peripheral arterial disease or other structural heart disease.    Revised Cardiac Risk Index (RCRI):  The patient has the following serious cardiovascular risks for perioperative complications:   - No serious cardiac risks = 0 points     RCRI Interpretation: 0 points: Class I (very low risk - 0.4% complication rate)         Signed Electronically by: CARRIE Espinal CNP  Copy of this evaluation report is provided to requesting physician.

## 2024-01-16 NOTE — PATIENT INSTRUCTIONS
Preparing for Your Surgery  Getting started  A nurse will call you to review your health history and instructions. They will give you an arrival time based on your scheduled surgery time. Please be ready to share:  Your doctor's clinic name and phone number  Your medical, surgical, and anesthesia history  A list of allergies and sensitivities  A list of medicines, including herbal treatments and over-the-counter drugs  Whether the patient has a legal guardian (ask how to send us the papers in advance)  Please tell us if you're pregnant--or if there's any chance you might be pregnant. Some surgeries may injure a fetus (unborn baby), so they require a pregnancy test. Surgeries that are safe for a fetus don't always need a test, and you can choose whether to have one.   If you have a child who's having surgery, please ask for a copy of Preparing for Your Child's Surgery.    Preparing for surgery  Within 10 to 30 days of surgery: Have a pre-op exam (sometimes called an H&P, or History and Physical). This can be done at a clinic or pre-operative center.  If you're having a , you may not need this exam. Talk to your care team.  At your pre-op exam, talk to your care team about all medicines you take. If you need to stop any medicines before surgery, ask when to start taking them again.  We do this for your safety. Many medicines can make you bleed too much during surgery. Some change how well surgery (anesthesia) drugs work.  Call your insurance company to let them know you're having surgery. (If you don't have insurance, call [phone].)  Call your clinic if there's any change in your health. This includes signs of a cold or flu (sore throat, runny nose, cough, rash, fever). It also includes a scrape or scratch near the surgery site.  If you have questions on the day of surgery, call your hospital or surgery center.  Eating and drinking guidelines  For your safety: Unless your surgeon tells you otherwise,  follow the guidelines below.  Eat and drink as usual until 8 hours before you arrive for surgery. After that, no food or milk.  Drink clear liquids until 2 hours before you arrive. These are liquids you can see through, like water, Gatorade, and Propel Water. They also include plain black coffee and tea (no cream or milk), candy, and breath mints. You can spit out gum when you arrive.  If you drink alcohol: Stop drinking it the night before surgery.  If your care team tells you to take medicine on the morning of surgery, it's okay to take it with a sip of water.  Preventing infection  Shower or bathe the night before and morning of your surgery. Follow the instructions your clinic gave you. (If no instructions, use regular soap.)  Don't shave or clip hair near your surgery site. We'll remove the hair if needed.  Don't smoke or vape the morning of surgery. You may chew nicotine gum up to 2 hours before surgery. A nicotine patch is okay.  Note: Some surgeries require you to completely quit smoking and nicotine. Check with your surgeon.  Your care team will make every effort to keep you safe from infection. We will:  Clean our hands often with soap and water (or an alcohol-based hand rub).  Clean the skin at your surgery site with a special soap that kills germs.  Give you a special gown to keep you warm. (Cold raises the risk of infection.)  Wear special hair covers, masks, gowns and gloves during surgery.  Give antibiotic medicine, if prescribed. Not all surgeries need antibiotics.  What to bring on the day of surgery  Photo ID and insurance card  Copy of your health care directive, if you have one  Glasses and hearing aids (bring cases)  You can't wear contacts during surgery  Inhaler and eye drops, if you use them (tell us about these when you arrive)  CPAP machine or breathing device, if you use them  A few personal items, if spending the night  If you have . . .  A pacemaker, ICD (cardiac defibrillator) or other  implant: Bring the ID card.  An implanted stimulator: Bring the remote control.  A legal guardian: Bring a copy of the certified (court-stamped) guardianship papers.  Please remove any jewelry, including body piercings. Leave jewelry and other valuables at home.  If you're going home the day of surgery  You must have a responsible adult drive you home. They should stay with you overnight as well.  If you don't have someone to stay with you, and you aren't safe to go home alone, we may keep you overnight. Insurance often won't pay for this.  After surgery  If it's hard to control your pain or you need more pain medicine, please call your surgeon's office.  Questions?   If you have any questions for your care team, list them here: _________________________________________________________________________________________________________________________________________________________________________ ____________________________________ ____________________________________ ____________________________________  For informational purposes only. Not to replace the advice of your health care provider. Copyright   2003, 2019 [facility]. All rights reserved. Clinically reviewed by Jeanette Nixon MD. [employer] 486455 - REV 12/22.    How to Take Your Medication Before Surgery  - Take all of your medications before surgery as usual

## 2024-01-17 ENCOUNTER — MYC MEDICAL ADVICE (OUTPATIENT)
Dept: PEDIATRICS | Facility: CLINIC | Age: 39
End: 2024-01-17
Payer: COMMERCIAL

## 2024-01-17 DIAGNOSIS — R52 PAIN: ICD-10-CM

## 2024-01-17 DIAGNOSIS — K21.00 GASTROESOPHAGEAL REFLUX DISEASE WITH ESOPHAGITIS WITHOUT HEMORRHAGE: Primary | ICD-10-CM

## 2024-01-17 NOTE — TELEPHONE ENCOUNTER
Routing to provider. Patient requesting liquid pepcid and tylenol for after her surgery. States will not be able to swallow pills. Pended potential prescriptions.  Thanks!  Alfreda CANTU RN, BSN

## 2024-01-18 RX ORDER — ACETAMINOPHEN 160 MG/5ML
15 LIQUID ORAL EVERY 6 HOURS PRN
Qty: 2000 ML | Refills: 1 | Status: SHIPPED | OUTPATIENT
Start: 2024-01-18 | End: 2024-08-06

## 2024-01-18 RX ORDER — FAMOTIDINE 40 MG/5ML
20 POWDER, FOR SUSPENSION ORAL 2 TIMES DAILY
Qty: 150 ML | Refills: 1 | Status: SHIPPED | OUTPATIENT
Start: 2024-01-18 | End: 2024-08-06

## 2024-01-24 RX ORDER — LORAZEPAM 0.5 MG/1
0.5 TABLET ORAL DAILY PRN
Qty: 10 TABLET | Refills: 0 | OUTPATIENT
Start: 2024-01-24

## 2024-05-13 ENCOUNTER — PATIENT OUTREACH (OUTPATIENT)
Dept: CARE COORDINATION | Facility: CLINIC | Age: 39
End: 2024-05-13
Payer: COMMERCIAL

## 2024-05-21 ENCOUNTER — TRANSFERRED RECORDS (OUTPATIENT)
Dept: HEALTH INFORMATION MANAGEMENT | Facility: CLINIC | Age: 39
End: 2024-05-21
Payer: COMMERCIAL

## 2024-05-27 ENCOUNTER — PATIENT OUTREACH (OUTPATIENT)
Dept: CARE COORDINATION | Facility: CLINIC | Age: 39
End: 2024-05-27
Payer: COMMERCIAL

## 2024-05-28 DIAGNOSIS — F41.9 ANXIETY: ICD-10-CM

## 2024-05-28 RX ORDER — LORAZEPAM 0.5 MG/1
0.5 TABLET ORAL DAILY PRN
Qty: 12 TABLET | Refills: 2 | OUTPATIENT
Start: 2024-05-28

## 2024-05-28 NOTE — TELEPHONE ENCOUNTER
Date of Last Office Visit: 04/07/2023  Date of Next Office Visit: 05/30/2024  No shows since last visit: 0  Cancellations since last visit: 2    Medication requested: LORazepam (ATIVAN) 0.5 MG tablet  Date last ordered: 06/07/2023 Qty: 12 Refills: 2     [place] of [place] ?: N/A  Medication last sold date: 06/12/2023 Qty filled: 12  Other controlled substance on [place] ?: NO  Lapse in medication adherence greater than 5 days?: yes  If yes, call patient and gather details: PRN  Medication refill request verified as identical to current order?: Yes  Result of Last DAM, VPA, Li+ Level, CBC, or Carbamazepine Level (at or since last visit): N/A    Last visit treatment plan:   Return in mid to late August  Hydroxyzine 25 mg po qday prn anxiety -Risks, benefits and alternatives discussed.  Patient provides verbal consent to treatment.  Lorazepam 0.5 mg po qday prn anxiety effective-Risks, benefits and alternatives discussed.  Patient provides verbal consent to treatment.  DC'd escitalopram (nausea, restlessness, insomnia), buspirone, hydroxyzine (efficacy with significant grogginess)  pharmacogenomic testing - completed 11/17/22, pending MTM assessment  Psychotherapy upcoming      []Medication refilled per  Medication Refill in Ambulatory Care  policy.  []Medication unable to be refilled by RN due to criteria not met as indicated below:    []Eligibility - not seen in the last year   []Supervision - no future appointment   []Compliance - no shows, cancellations or lapse in therapy   []Verification - order discrepancy   []Controlled medication   [x]Medication not included in policy   []90-day supply request   [x]Other

## 2024-06-05 NOTE — PROGRESS NOTES
[facility] [place]  June 6, 2024      Behavioral Health Clinician Progress Note    Patient Name: Unique Baker           Service Type:  Individual      Service Location:   MyChart / Email (patient reached)     Session Start Time: 237pm  Session End Time: 308pm      Session Length: 16 - 37      Attendees: Patient     Service Modality:  Video Visit:      Provider verified identity through the following two step process.  Patient provided:  Patient photo and Patient was verified at admission/transfer    Telemedicine Visit: The patient's condition can be safely assessed and treated via synchronous audio and visual telemedicine encounter.      Reason for Telemedicine Visit: Patient has requested telehealth visit    Originating Site (Patient Location): Patient's home    Distant Site (Provider Location): Provider Remote Setting- Home Office    Consent:  The patient/guardian has verbally consented to: the potential risks and benefits of telemedicine (video visit) versus in person care; bill my insurance or make self-payment for services provided; and responsibility for payment of non-covered services.     Patient would like the video invitation sent by:  My Chart    Mode of Communication:  Video Conference via [facility]    Distant Location (Provider):  Off-site    As the provider I attest to compliance with applicable laws and regulations related to telemedicine.    Visit Activities (Refresh list every visit): [facility] Only    Diagnostic Assessment Date: in progress  Treatment Plan Review Date: in progress  See Flowsheets for today's PHQ-9 and RENÉE-7 results  Previous PHQ-9:       12/7/2022     9:04 AM 6/12/2023    12:43 PM 11/30/2023     4:39 PM   PHQ-9 SCORE   PHQ-9 Total Score [facility] 16 (Moderately severe depression) 1 (Minimal depression) 3 (Minimal depression)   PHQ-9 Total Score 16    16 1 3     Previous RENÉE-7:       10/3/2022     6:48 PM 10/31/2022     9:48 AM 12/7/2022      9:05 AM   RENÉE-7 SCORE   Total Score 6 (mild anxiety) 18 (severe anxiety) 17 (severe anxiety)   Total Score 6 18 17    17       PADMINI LEVEL:      11/30/2023     4:41 PM   PADMINI Score (Last Two)   PADMINI Raw Score 30   Activation Score 56   PADMINI Level 3       DATA  Extended Session (60+ minutes): No  Interactive Complexity: No  Crisis: No  [facility] Patient: No    Treatment Objective(s) Addressed in This Session:  Target Behavior(s): disease management/lifestyle changes anxiety    Anxiety: will experience a reduction in anxiety and will increase ability to function adaptively    Current Stressors / Issues:  [facility] introduces self and role to pt.     Questions/Thoughts for Dr. Strickland:    Current Symptoms: Pt experienced sx afer COVID the 1st and 2nd time. She kept seeing many providers and she had anxiety and panic. She tried SSRIs. Pt recently started to take the Ativan that Dr. Strickland was prescribed and she only tried it a couple of times. The dose has been helpful. Her GI dr thought that pt had anxiety. She tried medications the GI dr recommended and this had many s/e. She tried D3 and this also caused more acid. At night anxiety is high and in the morning if she stays in bed for a bit longer. The skills don't work during the panic. While on vacation she was more at rest and relaxed and the sx are less. She is writing things down now to keep track of things. Short term memory is difficult.     Side Effects:    Suicidality: none and no SIB in life time    Therapist: meeting with therapist every 2 weeks, learning skills and uses self talk, PMR, tapping   [facility] recommendations: continue to use skills learned from therapist       Progress on Treatment Objective(s) / Homework:  New Objective established this session - ACTION (Actively working towards change); Intervened by reinforcing change plan / affirming steps taken    [facility] recommendations: CBT/DBT: continue to use skills learned from primary therapist     Motivational Interviewing    MI  Intervention: Expressed Empathy/Understanding, Supported Autonomy, Collaboration, Evocation, Permission to raise concern or advise, Open-ended questions, Reflections: simple and complex, Change talk (evoked), and Reframe     Change Talk Expressed by the Patient: Committment to change Activation Taking steps    Provider Response to Change Talk: E - Evoked more info from patient about behavior change, A - Affirmed patient's thoughts, decisions, or attempts at behavior change, R - Reflected patient's change talk, and S - Summarized patient's change talk statements    Also provided psychoeducation about behavioral health condition, symptoms, and treatment options    Assessments completed prior to visit:  The following assessments were completed by patient for this visit:  PHQ2:       5/1/2023     5:24 PM 2/23/2023     2:44 PM 12/15/2022     1:58 PM 10/31/2022     9:54 AM 10/3/2022     6:49 PM 8/22/2022    10:20 AM 8/3/2022     2:06 PM   PHQ-2 ( 1999 [employer])   Q1: Little interest or pleasure in doing things 0 1 0  1 1 0   Q2: Feeling down, depressed or hopeless 0 1 0  1 1 0   PHQ-2 Score 0 2 0  2 2 0   PHQ-2 Total Score (12-17 Years)- Positive if 3 or more points; Administer PHQ-A if positive   0   2 0   Q1: Little interest or pleasure in doing things  Several days   Several days     Q2: Feeling down, depressed or hopeless  Several days   Several days     PHQ-2 Score  2  Incomplete 2       GAD2:       11/2/2022     4:25 PM 4/7/2023     8:57 AM 5/23/2024     3:05 PM   RENÉE-2   Feeling nervous, anxious, or on edge 3 1 1   Not being able to stop or control worrying 3 1 1   RENÉE-2 Total Score 6 2 2    2    2    2     GAD7:       7/14/2021     7:19 AM 7/5/2022     6:58 AM 10/3/2022     6:48 PM 10/31/2022     9:48 AM 12/7/2022     9:05 AM   RENÉE-7 SCORE   Total Score  2 (minimal anxiety) 6 (mild anxiety) 18 (severe anxiety) 17 (severe anxiety)   Total Score 3 2 6 18 17    17     PROMIS 10-Global Health (only subscores and total  score):       8/30/2022     2:25 PM 11/11/2022     8:34 AM 4/7/2023     8:56 AM 5/23/2024     3:07 PM   PROMIS-10 Scores Only   Global Mental Health Score  8 10 11    11    11    11   Global Physical Health Score  11 15 14    14    14    14   PROMIS TOTAL - SUBSCORES  19 25 25    25    25    25       Information is confidential and restricted. Go to Review Flowsheets to unlock data.    Multiple values from one day are sorted in reverse-chronological order       Care Plan review completed: Yes    Medication Review:  No changes to current psychiatric medication(s)    Medication Compliance:  Yes    Changes in Health Issues:   None reported    Chemical Use Review:   Substance Use: Chemical use reviewed, no active concerns identified      Tobacco Use: No current tobacco use.      Assessment: Current Emotional / Mental Status (status of significant symptoms):  Risk status (Self / Other harm or suicidal ideation)  Patient denies a history of suicidal ideation, suicide attempts, self-injurious behavior, homicidal ideation, homicidal behavior, and and other safety concerns   Patient denies current fears or concerns for personal safety.  Patient denies current or recent suicidal ideation or behaviors.  Patient denies current or recent homicidal ideation or behaviors.  Patient denies current or recent self injurious behavior or ideation.  Patient denies other safety concerns.  A safety and risk management plan has not been developed at this time, however patient was encouraged to call [place] [phone] should there be a change in any of these risk factors.    Appearance:   Appropriate   Eye Contact:   Good   Psychomotor Behavior: Normal   Attitude:   Cooperative   Orientation:   All  Speech   Rate / Production: Normal    Volume:  Normal   Mood:    Anxious  Sad   Affect:    Appropriate   Thought Content:  Clear   Thought Form:  Coherent  Logical   Insight:    Good     Diagnoses:  1. Anxiety disorder, unspecified type         Collateral Reports Completed:  Communicated with: Shade Strickland M.D.     Plan: (Homework, other):  Patient was given information about behavioral services and encouraged to schedule a follow up appointment with the clinic [facility] in 1 month.  Patient was also given information about mental health symptoms and treatment options .  CD Recommendations: No indications of CD issues.     QUYNH Jack    ______________________________________________________________________    Integrated Primary Care Behavioral Health Treatment Plan    Patient's Name: Unique Baker  YOB: 1985    Date of Creation: in the next few visits  Date Treatment Plan Last Reviewed/Revised: in the next few visits    DSM5 Diagnoses:   Anxiety disorder, unspecified type        Psychosocial / Contextual Factors: , has anxiety, had COVID 2x and now has long COVID  PROMIS (reviewed every 90 days):    PROMIS-10 Scores      8/30/2022     2:25 PM 11/11/2022     8:34 AM 4/7/2023     8:56 AM 5/23/2024     3:07 PM   PROMIS-10 Scores Only   Global Mental Health Score  8 10 11    11    11    11   Global Physical Health Score  11 15 14    14    14    14   PROMIS TOTAL - SUBSCORES  19 25 25    25    25    25       Information is confidential and restricted. Go to Review Flowsheets to unlock data.    Multiple values from one day are sorted in reverse-chronological order         Referral / Collaboration:  Referral to another professional/service is not indicated at this time.    Anticipated number of session for this episode of care: 5-6  Anticipation frequency of session: Monthly  Anticipated Duration of each session: 16-37 minutes  Treatment plan will be reviewed in 90 days or when goals have been changed.         Patient has reviewed and agreed to the above plan.      QUYNH Jack  June 7, 2024

## 2024-06-05 NOTE — TELEPHONE ENCOUNTER
Received a 2nd notification form [facility] for a refill of lorazepam 0.5 mg tablets.     RN reviewed Dr. Strickland notes from 5/28/2024  Declining refill as patient has not been seen in a long time and PCP may not support. Will evaluate at upcoming visit. Thank you.      The patient cancelled the 5/30/2024 appt and now has another appt scheduled on 6/6/2024 with Dr. Strickland.      RN called [facility] pharmacy and spoke with No to inform he that Dr. Strickland will not authorize refills of the lorazepam 0.5 mg tablets until he sees her again as the last time she was seen was 4/7/2023.      Ary Patterson RN on 6/5/2024 at 3:26 PM

## 2024-06-06 ENCOUNTER — VIRTUAL VISIT (OUTPATIENT)
Dept: BEHAVIORAL HEALTH | Facility: CLINIC | Age: 39
End: 2024-06-06
Payer: COMMERCIAL

## 2024-06-06 ENCOUNTER — VIRTUAL VISIT (OUTPATIENT)
Dept: PSYCHIATRY | Facility: CLINIC | Age: 39
End: 2024-06-06
Payer: COMMERCIAL

## 2024-06-06 DIAGNOSIS — F41.9 ANXIETY DISORDER, UNSPECIFIED TYPE: Primary | ICD-10-CM

## 2024-06-06 DIAGNOSIS — F41.9 ANXIETY: ICD-10-CM

## 2024-06-06 PROCEDURE — 99214 OFFICE O/P EST MOD 30 MIN: CPT | Mod: 95 | Performed by: PSYCHIATRY & NEUROLOGY

## 2024-06-06 PROCEDURE — 90832 PSYTX W PT 30 MINUTES: CPT | Mod: 95 | Performed by: COUNSELOR

## 2024-06-06 RX ORDER — LORAZEPAM 0.5 MG/1
0.5 TABLET ORAL DAILY PRN
Qty: 10 TABLET | Refills: 2 | Status: SHIPPED | OUTPATIENT
Start: 2024-06-06

## 2024-06-06 NOTE — NURSING NOTE
Is the patient currently in the state of [place]? YES    Visit mode:VIDEO    If the visit is dropped, the patient can be reconnected by: VIDEO VISIT: Text to cell phone:   Telephone Information:   Mobile [phone]       Will anyone else be joining the visit? NO  (If patient encounters technical issues they should call [phone]    How would you like to obtain your AVS? MyChart    Are changes needed to the allergy or medication list? Pt stated no changes to allergies and Pt stated no med changes    Are refills needed on medications prescribed by this physician? YES    Reason for visit: [facility]

## 2024-06-06 NOTE — PATIENT INSTRUCTIONS
"Patient Education   Collaborative Care Psychiatry Service  What to Expect  Here's what to expect from your Collaborative Care Psychiatry Service (CCPS).   About CCPS  CCPS means 2 people work together to help you get better. You'll meet with a behavioral health clinician and a psychiatric doctor. A behavioral health clinician helps people with mental health problems by talking with them. A psychiatric doctor helps people by giving them medicine.  How it works  At every visit, you'll see the behavioral health clinician (BHC) first. They'll talk with you about how you're doing and teach you how to feel better.   Then you'll see the psychiatric doctor. This doctor can help you deal with troubling thoughts and feelings by giving you medicine. They'll make sure you know the plan for your care.   CCPS usually takes 3 to 6 visits. If you need more visits, we may have you start seeing a different psychiatric doctor for ongoing care.  If you have any questions or concerns, we'll be glad to talk with you.  About visits  Be open  At your visits, please talk openly about your problems. It may feel hard, but it's the best way for us to help you.  Cancelling visits  If you can't come to your visit, please call us right away at [phone]. If you don't cancel at least 24 hours (1 full day) before your visit, that's \"late cancellation.\"  Being late to visits  Being very late is the same as not showing up. You will be a \"no show\" if:  Your appointment starts with a BHC, and you're more than 15 minutes late for a 30-minute (half hour) visit. This will also cancel your appointment with the psychiatric doctor.  Your appointment is with a psychiatric doctor only, and you're more than 15 minutes late for a 30-minute (half hour) visit.  Your appointment is with a psychiatric doctor only, and you're more than 30 minutes late for a 60-minute (full hour) visit.  If you cancel late or don't show up 2 times within 6 months, we may end your " care.   Getting help between visits  If you need help between visits, you can call us Monday to Friday from 8 a.m. to 4:30 p.m. at [phone].  Emergency care  Call 911 or go to the nearest emergency department if your life or someone else's life is in danger.  Call 988 anytime to reach the national Suicide and Crisis hotline.  Medicine refills  To refill your medicine, call your pharmacy. You can also call [facility]'s [facility] at [phone], Monday to Friday, 8 a.m. to 4:30 p.m. It can take 1 to 3 business days to get a refill.   Forms, letters, and tests  You may have papers to fill out, like FMLA, short-term disability, and workability. We can help you with these forms at your visits, but you must have an appointment. You may need more than 1 visit for this, to be in an intensive therapy program, or both.  Before we can give you medicine for ADHD, we may refer you to get tested for it or confirm it another way.  We may not be able to give you an emotional support animal letter.  We don't do mental health checks ordered by the court.   We don't do mental health testing, but we can refer you to get tested.   Thank you for choosing us for your care.  For informational purposes only. Not to replace the advice of your health care provider. Copyright   2022 [facility]. All rights reserved. [employer] 355634 - 12/22.

## 2024-06-06 NOTE — PROGRESS NOTES
Virtual Visit Details    Type of service:  Video Visit   Video Start Time: 3:14 PM  Video End Time:3:36 PM    Originating Location (pt. Location): Home    Distant Location (provider location):  Off-site  Platform used for Video Visit: [facility] Psychiatry Consult Note    IDENTIFICATION   Name: Unique Baker   : 1985/39 year old      Sex:    @ female          Telemedicine Visit: The patient's condition can be safely assessed and treated via synchronous audio and visual telemedicine encounter.        Face to Face/patient Contact total time: 22 minutes  Pre Charting time: 1 minutes; Post charting time, communication and other activities: 1 minutes; Total time 24 minutes          SUBJECTIVE     History of Present Illness  The patient presents for evaluation of anxiety.    The patient reports experiencing nocturnal anxiety, particularly when in quiet environments. She has been previously diagnosed with acid reflux, which she attributes to her nerves. Despite trials of nortriptyline and duloxetine, she has been unable to tolerate these medications. Genetic testing has indicated a slow metabolism rate, prompting a recommendation from her gastroenterologist for duloxetine. However, due to an unknown gene test, the decision was made to trial duloxetine. The patient experienced side effects such as nausea, decreased appetite, skin rubber-like sensation, dry mouth, and acid reflux with Effexor, which subsided upon discontinuation of the medication. Wellbutrin was not trialed due to its potential to exacerbate side effects. Despite trying seven medications, the lorazepam 5 mg provided minimal side effects. The patient took lorazepam twice last month and has refrained from refilling the medication due to fear of side effects.     She plans to travel out of the country on 2024 and requires lorazepam during flights. Her primary care provider, Dr. Delatorre, is on leave, and she has been unable to consult  with her for over a year. She had an appointment with a pharmacist at the end of 06/2024. The patient reports feeling 75 percent better since discontinuing all medications. She no longer experiences shortness of breath, which was previously associated with her hernia and acid reflux. Certain foods, such as flying, are triggers for her anxiety. She believes her anxiety developed post-COVID-19 and has never experienced such severe anxiety in the past. She does not believe she has depression, as her condition has improved, but she continues to experience anxiety, albeit less severe than when she was ill. Panic attacks occur a few times a week and are manageable most of the time. She has previously tried sertraline.        The following assessments were completed by patient for this visit:  PROMIS 10-Global Health (only subscores and total score):       8/30/2022     2:25 PM 11/11/2022     8:34 AM 4/7/2023     8:56 AM 5/23/2024     3:07 PM   PROMIS-10 Scores Only   Global Mental Health Score 11 8 10 11    11    11    11   Global Physical Health Score 12 11 15 14    14    14    14   PROMIS TOTAL - SUBSCORES 23 19 25 25    25    25    25             12/7/2022     9:04 AM 6/12/2023    12:43 PM 11/30/2023     4:39 PM   PHQ   PHQ-9 Total Score 16    16 1 3   Q9: Thoughts of better off dead/self-harm past 2 weeks Not at all Not at all Not at all          10/3/2022     6:48 PM 10/31/2022     9:48 AM 12/7/2022     9:05 AM   RENÉE-7 SCORE   Total Score 6 (mild anxiety) 18 (severe anxiety) 17 (severe anxiety)   Total Score 6 18 17    17        OBJECTIVE     Vital Signs:   There were no vitals taken for this visit.    Labs:    Results  Testing  Genetic testing results pending.          Current Medications:  Current Outpatient Medications   Medication Sig Dispense Refill    LORazepam (ATIVAN) 0.5 MG tablet Take 1 tablet (0.5 mg) by mouth daily as needed for anxiety 12 pills/month 12 tablet 2    acetaminophen (TYLENOL) 160 MG/5ML  solution Take 33 mLs (1,056 mg) by mouth every 6 hours as needed for fever or mild pain 2000 mL 1    acetaminophen (TYLENOL) 500 MG tablet take 1 - 2 tablet by oral route  every 6 hours as needed as needed      cetirizine (ZYRTEC) 10 MG tablet Take 10 mg by mouth daily as needed for allergies      cholestyramine (QUESTRAN) 4 g packet Take 1 packet (4 g) by mouth 3 times daily (with meals) (Patient not taking: Reported on 1/16/2024) 90 packet 0    dexlansoprazole (DEXILANT) 30 MG CPDR CR capsule Take 30 mg by mouth daily      famotidine (PEPCID) 20 MG tablet TAKE ONE TABLET BY MOUTH TWICE A DAY AS NEEDED FOR REFLUX 180 tablet 3    famotidine (PEPCID) 40 MG/5ML suspension Take 2.5 mLs (20 mg) by mouth 2 times daily 150 mL 1    ferrous fumarate 65 mg, [place] FE,-Vitamin C 125 mg (VITRON C)  MG TABS tablet Take 1 tablet by mouth daily      fluticasone (FLONASE) 50 MCG/ACT nasal spray Spray 1-2 sprays into both nostrils daily as needed for rhinitis or allergies      vitamin B-Complex Take 1 tablet by mouth daily      Vitamin D3 (CHOLECALCIFEROL) 125 MCG (5000 UT) tablet Take 1 tablet by mouth daily       No current facility-administered medications for this visit.     Facility-Administered Medications Ordered in Other Visits   Medication Dose Route Frequency Provider Last Rate Last Admin    fentaNYL Citrate (PF) (SUBLIMAZE) injection    Lyudmila Poon MD   100 mcg at 07/19/16 1412            ADDED HISTORY   Duloxetine- not tolerated  Nortriptyline - not toelrated    Physical exam    Physical Exam          MENTAL STATUS EXAMINATION:   Appearance: Good attention to grooming and hygiene, casual garb  Attitude: Cooperative  Eye Contact: Good  Gait and Station: Within normal limits  Psychomotor Behavior: Sitting, overall within normal limits  Oriented to: Grossly person place and time  Attention Span and Concentration: Grossly intact  Speech: Normal tone  Mood: Fair  Affect: Mildly constricted  Associations:   no loose associations  Thought Process:  logical, linear and goal oriented  Thought Content: No evidence of delusions or suicidal or homicidal ideation plan or intent  Memory: grossly intact  Fund of Knowledge: Good  Insight:  good  Judgment:  intact, adequate for safety  Impulse Control:  intact        DIAGNOSES:   Anxiety Disorder due to medical condition  Medication-induced anxiety disorder  Rule Out Anxiety Disorder Due to Another Medical Condition  Unspecified Depressive Disorder      ASSESSMENT:   Pt dealing with new onset clinically significant anxiety with avoidance behaviors and depressive sx in aftermath of 2nd COVID-19 infection, physical sx, and anxiety reaction to sx. has not tolerated med trials.  Symptoms considerably improving in context of hydroxychloroquine discontinuation.  Whether directly or indirectly through medication side effects, anxiety was triggered.  Still having anxiety/panic attacks, however general anxiety is manageable.  Use of lorazepam as needed is indicated.     Today Unique Baker reports on suicidal ideaitons. In addition, she has notable risk factors for self-harm, including anxiety. However, risk is mitigated by commitment to family, [employer] beliefs and absence of past attempts. Therefore, based on all available evidence including the factors cited above, she does not appear to be at imminent risk for self-harm, does not meet criteria for a 72-hr hold, and therefore remains appropriate for ongoing outpatient level of care.       PLAN:     Patient advised of consultative model. Patient will continue to be seen for ongoing consultation and stabilization.  Does not meet criteria for involuntary treatment or hospitalization  Lorazepam 0.5 mg po qday prn anxiety effective-Risks, benefits and alternatives discussed.  Patient provides verbal consent to treatment.  DC'd escitalopram (nausea, restlessness, insomnia), buspirone, hydroxyzine (efficacy with significant  gustavo)  pharmacogenomic testing - completed 11/17/22, pending MTM assessment  Pgx testing in record lab results 11/17/2022  Psychotherapy upcoming  Return in mid to late August    Assessment & Plan  1. Anxiety.  The patient was informed about the potential for lorazepam to be used on an as-needed basis due to its potential to dependency and lose effectiveness with regular use. It was recommended that she contact the clinic to establish a new primary care provider. A refill of lorazepam 0.5 mg, to be taken once daily as needed, will be provided. Upon securing a primary care provider, a follow-up will be scheduled in a few months.    Follow-up  The patient is scheduled for a follow-up visit in 2 months, which will be conducted via a virtual visit.      Administrative Billing:   Time spent with patient was greater than 50% of time and/or significant time was spent in counseling and coordination of care regarding above diagnoses and treatment plan. Pre charting time and post charting time/documentation/coordination are done on date of service.      Signed:   Shade Strickland M.D.  [facility] Psychiatry Service    Disclaimer: This note consists of symbols derived from keyboarding, dictation and/or voice recognition software. As a result, there may be errors in the script that have gone undetected. Please consider this when interpreting information found in this chart.    Consent was obtained from the patient to use an AI documentation tool in the creation of this note.     eoc

## 2024-06-07 ASSESSMENT — COLUMBIA-SUICIDE SEVERITY RATING SCALE - C-SSRS
TOTAL  NUMBER OF ABORTED OR SELF INTERRUPTED ATTEMPTS LIFETIME: NO
6. HAVE YOU EVER DONE ANYTHING, STARTED TO DO ANYTHING, OR PREPARED TO DO ANYTHING TO END YOUR LIFE?: NO
TOTAL  NUMBER OF INTERRUPTED ATTEMPTS LIFETIME: NO
1. HAVE YOU WISHED YOU WERE DEAD OR WISHED YOU COULD GO TO SLEEP AND NOT WAKE UP?: NO
ATTEMPT LIFETIME: NO
2. HAVE YOU ACTUALLY HAD ANY THOUGHTS OF KILLING YOURSELF?: NO

## 2024-06-10 ENCOUNTER — LAB REQUISITION (OUTPATIENT)
Dept: LAB | Facility: CLINIC | Age: 39
End: 2024-06-10
Payer: COMMERCIAL

## 2024-06-10 DIAGNOSIS — Z00.00 ENCOUNTER FOR GENERAL ADULT MEDICAL EXAMINATION WITHOUT ABNORMAL FINDINGS: ICD-10-CM

## 2024-06-10 PROCEDURE — 88112 CYTOPATH CELL ENHANCE TECH: CPT | Mod: 26

## 2024-06-10 PROCEDURE — 88112 CYTOPATH CELL ENHANCE TECH: CPT | Mod: TC,ORL

## 2024-06-13 ENCOUNTER — TRANSFERRED RECORDS (OUTPATIENT)
Dept: HEALTH INFORMATION MANAGEMENT | Facility: CLINIC | Age: 39
End: 2024-06-13
Payer: COMMERCIAL

## 2024-06-13 LAB
PATH REPORT.COMMENTS IMP SPEC: NORMAL
PATH REPORT.FINAL DX SPEC: NORMAL
PATH REPORT.GROSS SPEC: NORMAL
PATH REPORT.MICROSCOPIC SPEC OTHER STN: NORMAL
PATH REPORT.RELEVANT HX SPEC: NORMAL

## 2024-07-17 ENCOUNTER — TRANSFERRED RECORDS (OUTPATIENT)
Dept: HEALTH INFORMATION MANAGEMENT | Facility: CLINIC | Age: 39
End: 2024-07-17
Payer: COMMERCIAL

## 2024-08-06 ENCOUNTER — VIRTUAL VISIT (OUTPATIENT)
Dept: BEHAVIORAL HEALTH | Facility: CLINIC | Age: 39
End: 2024-08-06
Payer: COMMERCIAL

## 2024-08-06 ENCOUNTER — VIRTUAL VISIT (OUTPATIENT)
Dept: PSYCHIATRY | Facility: CLINIC | Age: 39
End: 2024-08-06
Payer: COMMERCIAL

## 2024-08-06 DIAGNOSIS — F41.9 ANXIETY DISORDER, UNSPECIFIED TYPE: Primary | ICD-10-CM

## 2024-08-06 DIAGNOSIS — F41.9 ANXIETY: Primary | ICD-10-CM

## 2024-08-06 PROCEDURE — 99214 OFFICE O/P EST MOD 30 MIN: CPT | Mod: 95 | Performed by: PSYCHIATRY & NEUROLOGY

## 2024-08-06 PROCEDURE — 90832 PSYTX W PT 30 MINUTES: CPT | Mod: 95 | Performed by: COUNSELOR

## 2024-08-06 RX ORDER — BUSPIRONE HYDROCHLORIDE 5 MG/1
2.5 TABLET ORAL 2 TIMES DAILY
Qty: 30 TABLET | Refills: 3 | Status: SHIPPED | OUTPATIENT
Start: 2024-08-06

## 2024-08-06 NOTE — NURSING NOTE
Current patient location: [address] [place] [id number]    Is the patient currently in the state of [place]? YES    Visit mode:VIDEO    If the visit is dropped, the patient can be reconnected by: VIDEO VISIT: Text to cell phone:   Telephone Information:   Mobile [phone]       Will anyone else be joining the visit? NO  (If patient encounters technical issues they should call [phone]    How would you like to obtain your AVS? MyChart    Are changes needed to the allergy or medication list? Pt stated no changes to allergies and Pt stated no med changes    Are refills needed on medications prescribed by this physician? NO    Rooming Documentation:  Assigned questionnaire(s) completed      Reason for visit: RECHMARRY HEART

## 2024-08-06 NOTE — PROGRESS NOTES
Virtual Visit Details    Type of service:  Video Visit   Video Start Time: 2:11 PM  Video End Time:2:29 PM    Originating Location (pt. Location): Home    Distant Location (provider location):  On-site  Platform used for Video Visit: [facility] Psychiatry Consult Note    IDENTIFICATION   Name: Unique Baker   : 1985/39 year old      Sex:    @ female          Telemedicine Visit: The patient's condition can be safely assessed and treated via synchronous audio and visual telemedicine encounter.        Face to Face/patient Contact total time: 18 minutes  Pre Charting time: 1 minutes; Post charting time, communication and other activities: 0 minutes; Total time 19 minutes  2:03 PM -           SUBJECTIVE     History of Present Illness    Unique Baker, a 39-year-old female, presented with concerns about her mental health, particularly her anxiety and depression. She reported that her symptoms have been significantly affecting her sleep, mood, and overall well-being. She expressed that her father's recent passing has been a significant stressor, exacerbating her mental health issues.    Unique has a history of trying various medications for her mental health concerns, including SSRIs. She reported experiencing side effects from these medications, such as a sensation of coldness in her hands and a feeling of detachment from her skin. She expressed confusion and concern about why these medications affect her so differently, and why she experiences these side effects.    She mentioned that she had previously been prescribed Buspirone, an anxiety medication, but could not recall what happened with it. She also mentioned having tried Cymbalta (duloxetine), but it was unclear whether this was prescribed by her current provider or elsewhere.    Unique expressed concerns about starting a new medication, particularly about potential side effects and the process of discontinuing the medication if it does not  work for her.    However, she expressed a preference to try Buspirone first, starting at a low dose of 2.5 mg twice a day.    Unique agreed to try Buspirone for a week and see how she feels.             The following assessments were completed by patient for this visit:  PROMIS 10-Global Health (only subscores and total score):       8/30/2022     2:25 PM 11/11/2022     8:34 AM 4/7/2023     8:56 AM 5/23/2024     3:07 PM   PROMIS-10 Scores Only   Global Mental Health Score 11 8 10 11    11    11    11   Global Physical Health Score 12 11 15 14    14    14    14   PROMIS TOTAL - SUBSCORES 23 19 25 25    25    25    25             12/7/2022     9:04 AM 6/12/2023    12:43 PM 11/30/2023     4:39 PM   PHQ   PHQ-9 Total Score 16    16 1 3   Q9: Thoughts of better off dead/self-harm past 2 weeks Not at all Not at all Not at all          10/3/2022     6:48 PM 10/31/2022     9:48 AM 12/7/2022     9:05 AM   RENÉE-7 SCORE   Total Score 6 (mild anxiety) 18 (severe anxiety) 17 (severe anxiety)   Total Score 6 18 17    17        OBJECTIVE     Vital Signs:   There were no vitals taken for this visit.    Labs:    Results    Genetic testing has been conducted, but results do not fully explain the patient's severe side effects to medications               Current Medications:  Current Outpatient Medications   Medication Sig Dispense Refill    busPIRone (BUSPAR) 5 MG tablet Take 0.5 tablets (2.5 mg) by mouth 2 times daily 30 tablet 3    LORazepam (ATIVAN) 0.5 MG tablet Take 1 tablet (0.5 mg) by mouth daily as needed for anxiety 10 pills/month 10 tablet 2    acetaminophen (TYLENOL) 500 MG tablet take 1 - 2 tablet by oral route  every 6 hours as needed as needed      cetirizine (ZYRTEC) 10 MG tablet Take 10 mg by mouth daily as needed for allergies      dexlansoprazole (DEXILANT) 30 MG CPDR CR capsule Take 30 mg by mouth daily      famotidine (PEPCID) 20 MG tablet TAKE ONE TABLET BY MOUTH TWICE A DAY AS NEEDED FOR REFLUX 180 tablet 3     ferrous fumarate 65 mg, [place]. FE,-Vitamin C 125 mg (VITRON C)  MG TABS tablet Take 1 tablet by mouth daily      fluticasone (FLONASE) 50 MCG/ACT nasal spray Spray 1-2 sprays into both nostrils daily as needed for rhinitis or allergies      vitamin B-Complex Take 1 tablet by mouth daily      Vitamin D3 (CHOLECALCIFEROL) 125 MCG (5000 UT) tablet Take 1 tablet by mouth daily       No current facility-administered medications for this visit.     Facility-Administered Medications Ordered in Other Visits   Medication Dose Route Frequency Provider Last Rate Last Admin    fentaNYL Citrate (PF) (SUBLIMAZE) injection    PRLyudmila Baker MD   100 mcg at 07/19/16 1412            ADDED HISTORY   The patient is currently dealing with the grief and stress of her father's recent passing.    Physical exam    Physical Exam    - Anxiety, depression, affect:  - Speech and language:  - Insight and judgment:  - Alert and orientation to person, place and situation:  - SI: None  - HI: None  - AH: None  - VH: None           MENTAL STATUS EXAMINATION:   Appearance: Good attention to grooming and hygiene, casual garb  Attitude: Cooperative  Eye Contact: Good  Gait and Station: Within normal limits  Psychomotor Behavior: Sitting, overall within normal limits  Oriented to: Grossly person place and time  Attention Span and Concentration: Grossly intact  Speech: Normal tone  Mood: Fair  Affect: Mildly constricted  Associations:  no loose associations  Thought Process:  logical, linear and goal oriented  Thought Content: No evidence of delusions or suicidal or homicidal ideation plan or intent  Memory: grossly intact  Fund of Knowledge: Good  Insight:  good  Judgment:  intact, adequate for safety  Impulse Control:  intact        DIAGNOSES:   Anxiety Disorder due to medical condition  Medication-induced anxiety disorder  Rule Out Anxiety Disorder Due to Another Medical Condition  Unspecified Depressive Disorder      ASSESSMENT:   Pt  dealing with new onset clinically significant anxiety with avoidance behaviors and depressive sx in aftermath of 2nd COVID-19 infection, physical sx, and anxiety reaction to sx. has not tolerated med trials.  Symptoms considerably improving in context of hydroxychloroquine discontinuation.  Whether directly or indirectly through medication side effects, anxiety was triggered.  Still having anxiety/panic attacks, however general anxiety is manageable.  Use of lorazepam as needed is indicated.     Today Unique Baker reports on suicidal ideaitons. In addition, she has notable risk factors for self-harm, including anxiety. However, risk is mitigated by commitment to family, Baptism beliefs and absence of past attempts. Therefore, based on all available evidence including the factors cited above, she does not appear to be at imminent risk for self-harm, does not meet criteria for a 72-hr hold, and therefore remains appropriate for ongoing outpatient level of care.       PLAN:     Patient advised of consultative model. Patient will continue to be seen for ongoing consultation and stabilization.  Does not meet criteria for involuntary treatment or hospitalization  Add buspar 8/6/24 2.5 mg bid -Risks, benefits and alternatives discussed.  Patient provides verbal consent to treatment.  Lorazepam 0.5 mg po qday prn anxiety effective-Risks, benefits and alternatives discussed.  Patient provides verbal consent to treatment.  DC'd escitalopram (nausea, restlessness, insomnia), buspirone, hydroxyzine (efficacy with significant grogginess)  pharmacogenomic testing - completed 11/17/22, pending MTM assessment  Pgx testing in record lab results 11/17/2022        Assessment & Plan     Assessment  The patient is experiencing significant symptoms of anxiety and depression, likely exacerbated by the recent passing of her father. She has a history of severe side effects with various medications, including SSRIs and Cymbalta. Genetic  testing has been conducted, but the results do not fully explain her severe side effects. The patient is seeking a medication that can help manage her symptoms without causing severe side effects.    Plan  - Trial of Buspirone, starting at a low dose of 2.5mg twice a day  - Follow-up appointment with the pharmacist to discuss medication options  - Check-in in a couple of months    Prescription  Buspirone, 2.5mg twice a day    Appointments  - Follow-up appointment with the pharmacist  - Check-in in a couple of months           Administrative Billing:   Time spent with patient was greater than 50% of time and/or significant time was spent in counseling and coordination of care regarding above diagnoses and treatment plan. Pre charting time and post charting time/documentation/coordination are done on date of service.      Signed:   Shade Strickland M.D.  [facility] Psychiatry Service    Disclaimer: This note consists of symbols derived from keyboarding, dictation and/or voice recognition software. As a result, there may be errors in the script that have gone undetected. Please consider this when interpreting information found in this chart.    Consent was obtained from the patient to use an AI documentation tool in the creation of this note.     eoc

## 2024-08-06 NOTE — PROGRESS NOTES
[facility]  August 6, 2024      Behavioral Health Clinician Progress Note    Patient Name: Unique Baker           Service Type:  Individual      Service Location:   MyChart / Email (patient reached)     Session Start Time: 133pm  Session End Time: 207pm      Session Length: 16 - 37      Attendees: Patient     Service Modality:  Video Visit:      Provider verified identity through the following two step process.  Patient provided:  Patient photo and Patient was verified at admission/transfer    Telemedicine Visit: The patient's condition can be safely assessed and treated via synchronous audio and visual telemedicine encounter.      Reason for Telemedicine Visit: Patient has requested telehealth visit    Originating Site (Patient Location): Patient's home    Distant Site (Provider Location): Provider Remote Setting- Home Office    Consent:  The patient/guardian has verbally consented to: the potential risks and benefits of telemedicine (video visit) versus in person care; bill my insurance or make self-payment for services provided; and responsibility for payment of non-covered services.     Patient would like the video invitation sent by:  My Chart    Mode of Communication:  Video Conference via [facility]    Distant Location (Provider):  Off-site    As the provider I attest to compliance with applicable laws and regulations related to telemedicine.    Visit Activities (Refresh list every visit): [facility] Only    Diagnostic Assessment Date: in progress  Treatment Plan Review Date: in progress  See Flowsheets for today's PHQ-9 and RENÉE-7 results  Previous PHQ-9:       12/7/2022     9:04 AM 6/12/2023    12:43 PM 11/30/2023     4:39 PM   PHQ-9 SCORE   PHQ-9 Total Score [facility] 16 (Moderately severe depression) 1 (Minimal depression) 3 (Minimal depression)   PHQ-9 Total Score 16    16 1 3   See PHQ-2.   Previous RENÉE-7:       10/3/2022     6:48 PM 10/31/2022     9:48 AM  12/7/2022     9:05 AM   RENÉE-7 SCORE   Total Score 6 (mild anxiety) 18 (severe anxiety) 17 (severe anxiety)   Total Score 6 18 17    17   See RENÉE-2.     PADMINI LEVEL:      11/30/2023     4:41 PM   PADMINI Score (Last Two)   PADMINI Raw Score 30   Activation Score 56   PADMINI Level 3       DATA  Extended Session (60+ minutes): No  Interactive Complexity: No  Crisis: No  BH Patient: No    Treatment Objective(s) Addressed in This Session:  Target Behavior(s): disease management/lifestyle changes anxiety    Anxiety: will experience a reduction in anxiety and will increase ability to function adaptively    Current Stressors / Issues:  Questions/Thoughts for Dr. Strickland: she is open to try another medication to see and there are concerns of GI sx    Better/worse: at 5am will wake up with anxiety, depression creeps in after anxiety and panic attacks,     Current Symptoms: Pt says that her dad passed away last week. When she went to see him she had a couple panic attacks. With GI concerns it will cause her ears to ring and can feel it creep into their jaw and ear and tightness in their head. To manage stress and anxiety pt will use self-talk and have GI sx, reminding herself that it is temporary. Anxiety sx are in the middle of the night and will wake pt up and in the morning. Pt is waking up to anxiety and racing thoughts, from a sound sleep. Depression comes after anxiety when feeling exhausted and not feeling like you can do things and not much has helped.       Therapist: therapist canceled today since pt was seeing [facility] today, get tips here and there, did PMR before  [facility] recommendations: try to stay in the present, calming and coping skills bag with items to help when waking up feeling anxious and get out of bed and watch a funny video with the brightness dimmed of the light, TIPP skills, PMR, DBT skills website      Progress on Treatment Objective(s) / Homework:  Satisfactory progress - ACTION (Actively working towards change);  Intervened by reinforcing change plan / affirming steps taken    CBT/DBT:  try to stay in the present, calming and coping skills bag with items to help when waking up feeling anxious and get out of bed and watch a funny video with the brightness dimmed of the light, GALLO skills, PMR, DBT skills    Motivational Interviewing    MI Intervention: Expressed Empathy/Understanding, Supported Autonomy, Collaboration, Evocation, Permission to raise concern or advise, Open-ended questions, Reflections: simple and complex, Change talk (evoked), and Reframe     Change Talk Expressed by the Patient: Committment to change Activation Taking steps    Provider Response to Change Talk: E - Evoked more info from patient about behavior change, A - Affirmed patient's thoughts, decisions, or attempts at behavior change, R - Reflected patient's change talk, and S - Summarized patient's change talk statements    Also provided psychoeducation about behavioral health condition, symptoms, and treatment options    Assessments completed prior to visit:  The following assessments were completed by patient for this visit:  PHQ2:       8/6/2024    10:39 AM 5/1/2023     5:24 PM 2/23/2023     2:44 PM 12/15/2022     1:58 PM 10/31/2022     9:54 AM 10/3/2022     6:49 PM 8/22/2022    10:20 AM   PHQ-2 ( 1999 [employer])   Q1: Little interest or pleasure in doing things 0 0 1 0  1 1   Q2: Feeling down, depressed or hopeless 1 0 1 0  1 1   PHQ-2 Score 1    1 0 2 0  2 2   PHQ-2 Total Score (12-17 Years)- Positive if 3 or more points; Administer PHQ-A if positive    0   2   Q1: Little interest or pleasure in doing things Not at all  Several days   Several days    Q2: Feeling down, depressed or hopeless Several days  Several days   Several days    PHQ-2 Score 1  2  Incomplete 2      GAD2:       11/2/2022     4:25 PM 4/7/2023     8:57 AM 5/23/2024     3:05 PM   RENÉE-2   Feeling nervous, anxious, or on edge 3 1 1   Not being able to stop or control worrying 3 1 1    RENÉE-2 Total Score 6 2 2    2    2    2     GAD7:       7/14/2021     7:19 AM 7/5/2022     6:58 AM 10/3/2022     6:48 PM 10/31/2022     9:48 AM 12/7/2022     9:05 AM   RENÉE-7 SCORE   Total Score  2 (minimal anxiety) 6 (mild anxiety) 18 (severe anxiety) 17 (severe anxiety)   Total Score 3 2 6 18 17    17     PROMIS 10-Global Health (only subscores and total score):       8/30/2022     2:25 PM 11/11/2022     8:34 AM 4/7/2023     8:56 AM 5/23/2024     3:07 PM   PROMIS-10 Scores Only   Global Mental Health Score  8 10 11    11    11    11   Global Physical Health Score  11 15 14    14    14    14   PROMIS TOTAL - SUBSCORES  19 25 25    25    25    25       Information is confidential and restricted. Go to Review Flowsheets to unlock data.    Multiple values from one day are sorted in reverse-chronological order       Care Plan review completed: Yes    Medication Review:  No changes to current psychiatric medication(s)    Medication Compliance:  Yes    Changes in Health Issues:   None reported    Chemical Use Review:   Substance Use: Chemical use reviewed, no active concerns identified      Tobacco Use: No current tobacco use.      Assessment: Current Emotional / Mental Status (status of significant symptoms):  Risk status (Self / Other harm or suicidal ideation)  Patient denies a history of suicidal ideation, suicide attempts, self-injurious behavior, homicidal ideation, homicidal behavior, and and other safety concerns   Patient denies current fears or concerns for personal safety.  Patient denies current or recent suicidal ideation or behaviors.  Patient denies current or recent homicidal ideation or behaviors.  Patient denies current or recent self injurious behavior or ideation.  Patient denies other safety concerns.  A safety and risk management plan has not been developed at this time, however patient was encouraged to call [place] [phone] should there be a change in any of these risk  factors.    Appearance:   Appropriate , well groomed  Eye Contact:   Good   Psychomotor Behavior: Normal   Attitude:   Cooperative   Orientation:   All  Speech   Rate / Production: Normal    Volume:  Normal   Mood:    Anxious   Affect:    Appropriate   Thought Content:  Clear   Thought Form:  Coherent  Logical   Insight:    Good     Diagnoses:  1. Anxiety disorder, unspecified type          Collateral Reports Completed:  Communicated with: Shade Strickland M.D.     Plan: (Homework, other):  Patient was given information about behavioral services and encouraged to schedule a follow up appointment with the clinic [facility] in 1 month.  Patient was also given information about mental health symptoms and treatment options .  CD Recommendations: No indications of CD issues.     Maryjo Cornelius, LICSW    ______________________________________________________________________    Integrated Primary Care Behavioral Health Treatment Plan    Patient's Name: Unique Baker  YOB: 1985    Date of Creation: in the next few visits  Date Treatment Plan Last Reviewed/Revised: in the next few visits    DSM5 Diagnoses:   Anxiety disorder, unspecified type        Psychosocial / Contextual Factors: , has anxiety, had COVID 2x and now has long COVID  PROMIS (reviewed every 90 days):    PROMIS-10 Scores      8/30/2022     2:25 PM 11/11/2022     8:34 AM 4/7/2023     8:56 AM 5/23/2024     3:07 PM   PROMIS-10 Scores Only   Global Mental Health Score  8 10 11    11    11    11   Global Physical Health Score  11 15 14    14    14    14   PROMIS TOTAL - SUBSCORES  19 25 25    25    25    25       Information is confidential and restricted. Go to Review Flowsheets to unlock data.    Multiple values from one day are sorted in reverse-chronological order         Referral / Collaboration:  Referral to another professional/service is not indicated at this time.    Anticipated number of session for this episode of care: 5-6  Anticipation  frequency of session: Monthly  Anticipated Duration of each session: 16-37 minutes  Treatment plan will be reviewed in 90 days or when goals have been changed.         Patient has reviewed and agreed to the above plan.      QUYNH Jack  June 7, 2024

## 2024-08-25 ENCOUNTER — HEALTH MAINTENANCE LETTER (OUTPATIENT)
Age: 39
End: 2024-08-25

## 2024-09-12 ENCOUNTER — TRANSFERRED RECORDS (OUTPATIENT)
Dept: HEALTH INFORMATION MANAGEMENT | Facility: CLINIC | Age: 39
End: 2024-09-12
Payer: COMMERCIAL

## 2024-09-17 ENCOUNTER — TRANSFERRED RECORDS (OUTPATIENT)
Dept: HEALTH INFORMATION MANAGEMENT | Facility: CLINIC | Age: 39
End: 2024-09-17
Payer: COMMERCIAL

## 2024-10-15 NOTE — TELEPHONE ENCOUNTER
Informed patient of lab results.  Patient would like to discuss further with provider.  I moved patient's 4 week follow up visit to a 2 week follow up visit to address patient concerns.  Closing encounter.     Ilene Gottlieb on 7/21/2022 at 9:52 AM     .

## 2024-12-09 ENCOUNTER — PATIENT OUTREACH (OUTPATIENT)
Dept: CARE COORDINATION | Facility: CLINIC | Age: 39
End: 2024-12-09
Payer: COMMERCIAL

## 2024-12-12 ENCOUNTER — OFFICE VISIT (OUTPATIENT)
Dept: INTERNAL MEDICINE | Facility: CLINIC | Age: 39
End: 2024-12-12
Payer: COMMERCIAL

## 2024-12-12 VITALS
HEART RATE: 72 BPM | RESPIRATION RATE: 16 BRPM | TEMPERATURE: 98.2 F | HEIGHT: 62 IN | OXYGEN SATURATION: 98 % | BODY MASS INDEX: 27.97 KG/M2 | DIASTOLIC BLOOD PRESSURE: 74 MMHG | SYSTOLIC BLOOD PRESSURE: 110 MMHG | WEIGHT: 152 LBS

## 2024-12-12 DIAGNOSIS — K21.00 GASTROESOPHAGEAL REFLUX DISEASE WITH ESOPHAGITIS WITHOUT HEMORRHAGE: ICD-10-CM

## 2024-12-12 DIAGNOSIS — M79.662 PAIN OF LEFT LOWER LEG: ICD-10-CM

## 2024-12-12 DIAGNOSIS — Z12.31 ENCOUNTER FOR SCREENING MAMMOGRAM FOR BREAST CANCER: ICD-10-CM

## 2024-12-12 DIAGNOSIS — H53.9 VISION CHANGES: ICD-10-CM

## 2024-12-12 DIAGNOSIS — Z00.00 ROUTINE GENERAL MEDICAL EXAMINATION AT A HEALTH CARE FACILITY: Primary | ICD-10-CM

## 2024-12-12 DIAGNOSIS — H93.11 EAR RINGING SOUND, RIGHT: ICD-10-CM

## 2024-12-12 DIAGNOSIS — N92.6 MENSTRUAL CHANGES: ICD-10-CM

## 2024-12-12 DIAGNOSIS — H43.392 FLOATER, VITREOUS, LEFT: ICD-10-CM

## 2024-12-12 DIAGNOSIS — R73.09 ELEVATED GLUCOSE: ICD-10-CM

## 2024-12-12 DIAGNOSIS — R25.2 LEG CRAMPING: ICD-10-CM

## 2024-12-12 LAB
BASOPHILS # BLD AUTO: 0 10E3/UL (ref 0–0.2)
BASOPHILS NFR BLD AUTO: 1 %
EOSINOPHIL # BLD AUTO: 0.1 10E3/UL (ref 0–0.7)
EOSINOPHIL NFR BLD AUTO: 1 %
ERYTHROCYTE [DISTWIDTH] IN BLOOD BY AUTOMATED COUNT: 13.1 % (ref 10–15)
EST. AVERAGE GLUCOSE BLD GHB EST-MCNC: 114 MG/DL
HBA1C MFR BLD: 5.6 % (ref 0–5.6)
HCT VFR BLD AUTO: 35.8 % (ref 35–47)
HGB BLD-MCNC: 12 G/DL (ref 11.7–15.7)
IMM GRANULOCYTES # BLD: 0 10E3/UL
IMM GRANULOCYTES NFR BLD: 0 %
LYMPHOCYTES # BLD AUTO: 2.3 10E3/UL (ref 0.8–5.3)
LYMPHOCYTES NFR BLD AUTO: 29 %
MCH RBC QN AUTO: 27.9 PG (ref 26.5–33)
MCHC RBC AUTO-ENTMCNC: 33.5 G/DL (ref 31.5–36.5)
MCV RBC AUTO: 83 FL (ref 78–100)
MONOCYTES # BLD AUTO: 0.6 10E3/UL (ref 0–1.3)
MONOCYTES NFR BLD AUTO: 7 %
NEUTROPHILS # BLD AUTO: 5.1 10E3/UL (ref 1.6–8.3)
NEUTROPHILS NFR BLD AUTO: 63 %
PLATELET # BLD AUTO: 209 10E3/UL (ref 150–450)
RBC # BLD AUTO: 4.3 10E6/UL (ref 3.8–5.2)
WBC # BLD AUTO: 8.2 10E3/UL (ref 4–11)

## 2024-12-12 SDOH — HEALTH STABILITY: PHYSICAL HEALTH: ON AVERAGE, HOW MANY MINUTES DO YOU ENGAGE IN EXERCISE AT THIS LEVEL?: 30 MIN

## 2024-12-12 SDOH — HEALTH STABILITY: PHYSICAL HEALTH: ON AVERAGE, HOW MANY DAYS PER WEEK DO YOU ENGAGE IN MODERATE TO STRENUOUS EXERCISE (LIKE A BRISK WALK)?: 3 DAYS

## 2024-12-12 ASSESSMENT — PATIENT HEALTH QUESTIONNAIRE - PHQ9
SUM OF ALL RESPONSES TO PHQ QUESTIONS 1-9: 4
SUM OF ALL RESPONSES TO PHQ QUESTIONS 1-9: 4
10. IF YOU CHECKED OFF ANY PROBLEMS, HOW DIFFICULT HAVE THESE PROBLEMS MADE IT FOR YOU TO DO YOUR WORK, TAKE CARE OF THINGS AT HOME, OR GET ALONG WITH OTHER PEOPLE: SOMEWHAT DIFFICULT

## 2024-12-12 ASSESSMENT — SOCIAL DETERMINANTS OF HEALTH (SDOH): HOW OFTEN DO YOU GET TOGETHER WITH FRIENDS OR RELATIVES?: ONCE A WEEK

## 2024-12-12 NOTE — PATIENT INSTRUCTIONS
Lab in [address]    Pelvic ultrasound     To schedule your mammogram, you may call the breast center at [phone].     Eye referral     Ultrasound left leg     ENT referral

## 2024-12-12 NOTE — PROGRESS NOTES
Preventive Care Visit  [facility]  CARRIE Barahona CNP, Internal Medicine  Dec 12, 2024      Assessment & Plan     Routine general medical examination at a health care facility    - CBC with platelets and differential; Future  - Comprehensive metabolic panel (BMP + Alb, Alk Phos, ALT, AST, Total. Bili, TP); Future  - TSH with free T4 reflex; Future  - Lipid panel reflex to direct LDL Fasting; Future  - Vitamin D Deficiency; Future  - Vitamin B12; Future  - CBC with platelets and differential  - Comprehensive metabolic panel (BMP + Alb, Alk Phos, ALT, AST, Total. Bili, TP)  - TSH with free T4 reflex  - Lipid panel reflex to direct LDL Fasting  - Vitamin D Deficiency  - Vitamin B12    Gastroesophageal reflux disease with esophagitis without hemorrhage  She has nisseen and still has [problems with gerd   Seeing gi  - Comprehensive metabolic panel (BMP + Alb, Alk Phos, ALT, AST, Total. Bili, TP); Future  - TSH with free T4 reflex; Future  - Comprehensive metabolic panel (BMP + Alb, Alk Phos, ALT, AST, Total. Bili, TP)  - TSH with free T4 reflex    Floater, vitreous, left  Was seen at [facility] and they referred her on   Opthalmology referral   - Adult Eye  Referral; Future    Vision changes    - Adult Eye  Referral; Future    Menstrual changes  Period are changed and more symptoms during periods - ear ringing, mood swings gerd issues   - Iron and iron binding capacity; Future  - Ferritin; Future  - TSH with free T4 reflex; Future  - US Pelvic Complete with Transvaginal; Future  - Iron and iron binding capacity  - Ferritin  - TSH with free T4 reflex    Elevated glucose    - Comprehensive metabolic panel (BMP + Alb, Alk Phos, ALT, AST, Total. Bili, TP); Future  - TSH with free T4 reflex; Future  - Hemoglobin A1c; Future  - Lipid panel reflex to direct LDL Fasting; Future  - Comprehensive metabolic panel (BMP + Alb, Alk Phos, ALT, AST, Total. Bili, TP)  - TSH with free  "T4 reflex  - Hemoglobin A1c  - Lipid panel reflex to direct LDL Fasting    Leg cramping  Trying to keep her vit D up   - Vitamin D Deficiency; Future  - Vitamin B12; Future  - Magnesium; Future  - Vitamin D Deficiency  - Vitamin B12  - Magnesium    Ear ringing sound, right  Was bilaterally and now right - intermittent   Right ear drum dull today   - Adult ENT  Referral; Future    Encounter for screening mammogram for breast cancer    - MA Screen Bilateral w/Jesus; Future    Pain of left lower leg  Possibly bakers cyst   Rule out clot   - US Lower Extremity Venous Duplex Left; Future      48 minutes spent by me on the date of the encounter doing chart review, history and exam, documentation and further activities per the note        BMI  Estimated body mass index is 28.26 kg/m  as calculated from the following:    Height as of this encounter: 1.562 m (5' 1.5\").    Weight as of this encounter: 68.9 kg (152 lb).       Counseling  Appropriate preventive services were addressed with this patient via screening, questionnaire, or discussion as appropriate for fall prevention, nutrition, physical activity, Tobacco-use cessation, social engagement, weight loss and cognition.  Checklist reviewing preventive services available has been given to the patient.  Reviewed patient's diet, addressing concerns and/or questions.   She is at risk for lack of exercise and has been provided with information to increase physical activity for the benefit of her well-being.   She is at risk for psychosocial distress and has been provided with information to reduce risk.       Patient Instructions   Lab in [address]    Pelvic ultrasound     To schedule your mammogram, you may call the breast center at [phone].     Eye referral     Ultrasound left leg     ENT referral             Svetlana Calhoun is a 39 year old, presenting for the following:    Vision concerns (saw eye doctor), restless legs, period issues and anemia. "   Physical        12/12/2024     1:23 PM   Additional Questions   Roomed by SHAUNA Osorio LPN   Accompanied by self         12/12/2024     1:23 PM   Patient Reported Additional Medications   Patient reports taking the following new medications none          HPI  Had Nissen fundoplication and didnt help with GERD     Restless leg worse with dexilant     Light period and more cramping before and after   More mood swing   More cravings   Last period was weird   Periods are regular     Saw eye doctor - floater in left eye August started     Ear hums pulses right ear 2 weeks     One week between period feels normal   Ear ringing goes away- acid reflux decreases mood better     Colon 2022 - repeat 5 years     Stiff and sore behind left knee 5/10        Health Care Directive  Patient does not have a Health Care Directive: Discussed advance care planning with patient; however, patient declined at this time.      12/12/2024   General Health   How would you rate your overall physical health? (!) FAIR   Feel stress (tense, anxious, or unable to sleep) To some extent      (!) STRESS CONCERN      12/12/2024   Nutrition   Three or more servings of calcium each day? Yes   Diet: Regular (no restrictions)   How many servings of fruit and vegetables per day? (!) 2-3   How many sweetened beverages each day? 0-1            12/12/2024   Exercise   Days per week of moderate/strenous exercise 3 days   Average minutes spent exercising at this level 30 min            12/12/2024   Social Factors   Frequency of gathering with friends or relatives Once a week   Worry food won't last until get money to buy more No   Food not last or not have enough money for food? No   Do you have housing? (Housing is defined as stable permanent housing and does not include staying ouside in a car, in a tent, in an abandoned building, in an overnight shelter, or couch-surfing.) Yes   Are you worried about losing your housing? No   Lack of transportation? No    Unable to get utilities (heat,electricity)? No            2024   Dental   Dentist two times every year? Yes            2024   TB Screening   Were you born outside of the [place]? Yes          Today's PHQ-9 Score:       2024    12:14 PM   PHQ-9 SCORE   PHQ-9 Total Score MyChart 4 (Minimal depression)   PHQ-9 Total Score 4        Patient-reported         2024   Substance Use   Alcohol more than 3/day or more than 7/wk No   Do you use any other substances recreationally? No        Social History     Tobacco Use    Smoking status: Former     Current packs/day: 0.00     Types: Cigarettes     Start date:      Quit date:      Years since quittin.9    Smokeless tobacco: Never    Tobacco comments:     only if she drinks alcohol   Vaping Use    Vaping status: Never Used   Substance Use Topics    Alcohol use: Not Currently     Alcohol/week: 8.3 standard drinks of alcohol    Drug use: No           2023   LAST FHS-7 RESULTS   1st degree relative breast or ovarian cancer No   Any relative bilateral breast cancer No   Any male have breast cancer No   Any ONE woman have BOTH breast AND ovarian cancer No   Any woman with breast cancer before 50yrs No   2 or more relatives with breast AND/OR ovarian cancer No   2 or more relatives with breast AND/OR bowel cancer No                   2024   STI Screening   New sexual partner(s) since last STI/HIV test? (!) YES         History of abnormal Pap smear:         Latest Ref Rng & Units 2/3/2021     3:55 PM 2/3/2021     3:43 PM 2017    10:15 AM   PAP / HPV   PAP (Historical)   NIL     HPV 16 DNA NEG^Negative Negative   Negative    HPV 18 DNA NEG^Negative Negative   Negative    Other HR HPV NEG^Negative Negative   Negative      ASCVD Risk   The 10-year ASCVD risk score (Christian VELÁZQUEZ, et al., 2019) is: 0.7%    Values used to calculate the score:      Age: 39 years      Sex: Female      Is Non- : No      Diabetic: No    "   Tobacco smoker: No      Systolic Blood Pressure: 110 mmHg      Is BP treated: No      HDL Cholesterol: 49 mg/dL      Total Cholesterol: 226 mg/dL       Reviewed and updated as needed this visit by Provider                    Lab work is in process      Review of Systems  Constitutional, neuro, ENT, endocrine, pulmonary, cardiac, gastrointestinal, genitourinary, musculoskeletal, integument and psychiatric systems are negative, except as otherwise noted.     Objective    Exam  /74   Pulse 72   Temp 98.2  F (36.8  C) (Oral)   Resp 16   Ht 1.562 m (5' 1.5\")   Wt 68.9 kg (152 lb)   LMP 11/28/2024   SpO2 98%   Breastfeeding No   BMI 28.26 kg/m     Estimated body mass index is 28.26 kg/m  as calculated from the following:    Height as of this encounter: 1.562 m (5' 1.5\").    Weight as of this encounter: 68.9 kg (152 lb).    Physical Exam  GENERAL: alert and no distress  EYES: Eyes grossly normal to inspection, PERRL and conjunctivae and sclerae normal  HENT: ear canals and TM's normal, nose and mouth without ulcers or lesions  NECK: no adenopathy, no asymmetry, masses, or scars  RESP: lungs clear to auscultation - no rales, rhonchi or wheezes  BREAST: normal without masses, tenderness or nipple discharge and no palpable axillary masses or adenopathy  CV: regular rate and rhythm, normal S1 S2, no S3 or S4, no murmur, click or rub, no peripheral edema  ABDOMEN: soft, nontender, no hepatosplenomegaly, no masses and bowel sounds normal  MS: no gross musculoskeletal defects noted, no edema  SKIN: no suspicious lesions or rashes  NEURO: Normal strength and tone, mentation intact and speech normal  PSYCH: mentation appears normal, affect normal/bright        Signed Electronically by: CARRIE Baarhona CNP    Answers submitted by the patient for this visit:  Patient Health Questionnaire (Submitted on 12/12/2024)  If you checked off any problems, how difficult have these problems made it for you to do your " work, take care of things at home, or get along with other people?: Somewhat difficult  PHQ9 TOTAL SCORE: 4

## 2024-12-17 ENCOUNTER — ANCILLARY PROCEDURE (OUTPATIENT)
Dept: ULTRASOUND IMAGING | Facility: CLINIC | Age: 39
End: 2024-12-17
Attending: NURSE PRACTITIONER
Payer: COMMERCIAL

## 2024-12-17 DIAGNOSIS — N92.6 MENSTRUAL CHANGES: ICD-10-CM

## 2024-12-17 PROCEDURE — 76830 TRANSVAGINAL US NON-OB: CPT | Performed by: OBSTETRICS & GYNECOLOGY

## 2024-12-17 PROCEDURE — 76856 US EXAM PELVIC COMPLETE: CPT | Performed by: OBSTETRICS & GYNECOLOGY

## 2024-12-19 ENCOUNTER — APPOINTMENT (OUTPATIENT)
Dept: URBAN - METROPOLITAN AREA CLINIC 253 | Age: 39
Setting detail: DERMATOLOGY
End: 2024-12-23

## 2024-12-19 VITALS — HEIGHT: 62 IN | RESPIRATION RATE: 14 BRPM | WEIGHT: 148 LBS

## 2024-12-19 DIAGNOSIS — D49.2 NEOPLASM OF UNSPECIFIED BEHAVIOR OF BONE, SOFT TISSUE, AND SKIN: ICD-10-CM

## 2024-12-19 DIAGNOSIS — D22 MELANOCYTIC NEVI: ICD-10-CM

## 2024-12-19 DIAGNOSIS — L81.4 OTHER MELANIN HYPERPIGMENTATION: ICD-10-CM

## 2024-12-19 PROBLEM — D22.5 MELANOCYTIC NEVI OF TRUNK: Status: ACTIVE | Noted: 2024-12-19

## 2024-12-19 PROCEDURE — OTHER PHOTO-DOCUMENTATION: OTHER

## 2024-12-19 PROCEDURE — OTHER BIOPSY BY SHAVE METHOD: OTHER

## 2024-12-19 PROCEDURE — OTHER COUNSELING: OTHER

## 2024-12-19 PROCEDURE — 11102 TANGNTL BX SKIN SINGLE LES: CPT

## 2024-12-19 PROCEDURE — OTHER MIPS QUALITY: OTHER

## 2024-12-19 PROCEDURE — 99213 OFFICE O/P EST LOW 20 MIN: CPT | Mod: 25

## 2024-12-19 ASSESSMENT — LOCATION SIMPLE DESCRIPTION DERM
LOCATION SIMPLE: CHEST
LOCATION SIMPLE: RIGHT BREAST

## 2024-12-19 ASSESSMENT — LOCATION DETAILED DESCRIPTION DERM
LOCATION DETAILED: RIGHT MEDIAL BREAST 3-4:00 REGION
LOCATION DETAILED: MIDDLE STERNUM

## 2024-12-19 ASSESSMENT — LOCATION ZONE DERM: LOCATION ZONE: TRUNK

## 2024-12-19 NOTE — PROCEDURE: BIOPSY BY SHAVE METHOD
Detail Level: Detailed
Depth Of Biopsy: dermis
Was A Bandage Applied: Yes
Size Of Lesion In Cm: 0
Biopsy Type: H and E
Biopsy Method: Dermablade
Anesthesia Type: 2% lidocaine with epinephrine and a 1:10 solution of 8.4% sodium bicarbonate
Anesthesia Volume In Cc: 0.3
Hemostasis: Drysol
Wound Care: Petrolatum
Dressing: bandage
Destruction After The Procedure: No
Type Of Destruction Used: Curettage
Curettage Text: The wound bed was treated with curettage after the biopsy was performed.
Cryotherapy Text: The wound bed was treated with cryotherapy after the biopsy was performed.
Electrodesiccation Text: The wound bed was treated with electrodesiccation after the biopsy was performed.
Electrodesiccation And Curettage Text: The wound bed was treated with electrodesiccation and curettage after the biopsy was performed.
Silver Nitrate Text: The wound bed was treated with silver nitrate after the biopsy was performed.
Lab: -[id number]
Path Notes (To The Dermatopathologist): Please check margins
Medical Necessity Information: It is in your best interest to select a reason for this procedure from the list below. All of these items fulfill various CMS LCD requirements except the new and changing color options.
Consent: Written consent was obtained and risks were reviewed including but not limited to scarring, infection, bleeding, scabbing, incomplete removal, nerve damage and allergy to anesthesia.
Post-Care Instructions: I reviewed with the patient in detail post-care instructions. Patient is to keep the biopsy site dry overnight, and then apply bacitracin twice daily until healed. Patient may apply hydrogen peroxide soaks to remove any crusting.
Notification Instructions: Patient will be notified of biopsy results. However, patient instructed to call the office if not contacted within 2 weeks.
Billing Type: Third-Party Bill
Information: Selecting Yes will display possible errors in your note based on the variables you have selected. This validation is only offered as a suggestion for you. PLEASE NOTE THAT THE VALIDATION TEXT WILL BE REMOVED WHEN YOU FINALIZE YOUR NOTE. IF YOU WANT TO FAX A PRELIMINARY NOTE YOU WILL NEED TO TOGGLE THIS TO 'NO' IF YOU DO NOT WANT IT IN YOUR FAXED NOTE.

## 2025-01-02 ENCOUNTER — RX ONLY (RX ONLY)
Age: 40
End: 2025-01-02

## 2025-01-02 RX ORDER — KETOCONAZOLE 20 MG/G
CREAM TOPICAL
Qty: 30 | Refills: 0 | Status: ERX | COMMUNITY
Start: 2025-01-02

## 2025-01-03 NOTE — PROGRESS NOTES
Medication Therapy Management (MTM) Encounter    ASSESSMENT:                            Medication Adherence/Access: No issues identified.    GERD:   Discussed I would recommend she try taking the dexlansoprazole every other day and see if that is enough to control her reflux without leading to side effects. Discussed her [id number] *1/*2 does recommend starting at a 50% dose, which is what this would be. She has tried all other PPI's, unless she wants to re-try pantoprazole as she doesn't really remember that one.    Supplements:  Stable.    Allergies:  Stable.    Mental Health:   Based on below Pgx results, I would recommend she try fluoxetine, then paroxetine or Pristiq next for a maintenance anxiety medication. Discussed I would be happy to start this but she would need to have a PCP first. After visit she officially got set up with No Small as her PCP, will send a message to her to confirm she is okay with me starting this.    Genotype/Phenotype:  CYP2B6: intermediate metabolizer  OFM1Y35: intermediate metabolizer  CYP2C9: normal metabolizer  CYP2D6: unknown  CY: intermediate metabolizer (poor phenotype is most prevalent)    Aripiprazole: possible increased drug exposure leading to a potential increased risk for adverse effects based on CYP2D6 unknown metabolism, CY unknown metabolism, and CY intermediate (poor phenotype most prevalent in studies used to define standard dosing guidelines) metabolism. No CPIC guidelines to guide treatment.    Bupropion: possible increased drug exposure leading to a potential increased risk for adverse effects based on CYP2B6 intermediate metabolism. No CPIC guidelines to guide treatment.    Buspirone:  possible decreased drug exposure leading to a potential reduction in therapeutic effect based on CY unknown metabolism and CY intermediate (poor phenotype most prevalent in studies used to define standard dosing guidelines) metabolism. No CPIC guidelines to  guide treatment.    Citalopram: possible increased drug exposure leading to a potential increased risk for adverse effects based on ZCB8K42 intermediate metabolism. CPIC guidelines recommend initiating therapy with recommended starting dose. Consider a slower titration schedule and lower maintenance dose than normal metabolizers.    Escitalopram: possible increased drug exposure leading to a potential increased risk for adverse effects based on HYY9B11 intermediate metabolism. CPIC guidelines recommend initiating therapy with recommended starting dose. Consider a slower titration schedule and lower maintenance dose than normal metabolizers.    Duloxetine: Total drug exposure is difficult to estimate based on CY unknown metabolism (rapid phenotype most prevalent in studies used to define standard dosing guidelines), and CYP2D6 unknown metabolism. No CPIC guidelines to guide treatment.    Fluoxetine: Normal drug exposure is expected due to minimal gene-drug interaction based on CYP2C9 normal metabolism and CYP2D6 unknown metabolism. No CPIC guidelines to guide treatment.    Fluvoxamine: Total drug exposure is difficult to estimate based on CYP2D6 unknown metabolism. No CPIC guidelines to guide treatment.    Levomilnacipran: possible decreased drug exposure leading to a potential reduction in therapeutic effect based on CY unknown metabolism and CY intermediate (poor phenotype most prevalent in studies used to define standard dosing guidelines) metabolism. No CPIC guidelines to guide treatment.    Mirtazapine: Total drug exposure is difficult to estimate based on CY unknown metabolism (rapid phenotype most prevalent in studies used to define standard dosing guidelines), CYP2D6 unknown metabolism, CY unknown metabolism, and CY intermediate (poor phenotype most prevalent in studies used to define standard dosing guidelines) metabolism. No CPIC guidelines to guide treatment.    Paroxetine: Total  drug exposure is difficult to estimate based on CYP2D6 unknown metabolism. No CPIC guidelines to guide treatment.    Sertraline: possible increased drug exposure leading to a potential increased risk for adverse effects based on HAA1M55 intermediate metabolism and CYP2B6 unknown metabolism. CPIC guidelines recommend initiating therapy with recommended starting dose. Consider a slower titration schedule and lower maintenance dose than normal metabolizers.    Trazodone:  possible decreased drug exposure leading to a potential reduction in therapeutic effect based on CY unknown metabolism and CY intermediate (poor phenotype most prevalent in studies used to define standard dosing guidelines) metabolism. No CPIC guidelines to guide treatment.    Venlafaxine: possible decreased drug exposure leading to a potential reduction in therapeutic effect based on KAN3R79 intermediate metabolism and CYP2D6 unknown metabolism. No CPIC guidelines to guide treatment.    Vilazodone:  possible decreased drug exposure leading to a potential reduction in therapeutic effect based on CY unknown metabolism and CY intermediate (poor phenotype most prevalent in studies used to define standard dosing guidelines) metabolism. No CPIC guidelines to guide treatment.    Vortioxetine: Total drug exposure is difficult to estimate based on CYP2D6 unknown metabolism. No CPIC guidelines to guide treatment.      PLAN:                            Take the dexlansoprazole every other day and see if this controls your heartburn without side effects.  Options for your anxiety would be fluoxetine, or possibly paroxetine. Pristiq could also be a good option to consider. We can try one of these once you get assigned a primary provider.    Follow-up: follow up as needed    SUBJECTIVE/OBJECTIVE:                          Unique Baker is a 39 year old female seen for an initial visit. She was referred to me from self.      Reason for visit: med  review.    Allergies/ADRs: Reviewed in chart  Past Medical History: Reviewed in chart  Tobacco: She reports that she quit smoking about 10 years ago. Her smoking use included cigarettes. She started smoking about 17 years ago. She has never used smokeless tobacco.  Alcohol: not currently using    Medication Adherence/Access: no issues reported.    GERD    Famotidine 20 mg twice daily as needed - takes on and off when needed for flare ups  Dexlansoprazole 30 mg daily   Sucralfate as needed - for flares  Cholestyramine as needed - for flares  Completed a surgery for acid reflux. Reports she gets tinnitus from dexlansoprazole when she stops it it goes away, but does control her reflux. It used to be the only one that didn't give her side effects. Reports Nexium caused the same issues. Also tried omeprazole and pantoprazole.  Scarlet report heartburn is better since surgery.  Patient follows with [facility].       Mental Health   Anxiety  Lorazepam 0.5 mg as needed   Patient reports no current medication side effects.  Reports lorazepam is pretty well tolerated but would not like to be on this long term. Anxiety is not well controlled on its own.  Reports she is currently not seeing psych.  Med history:  Buspirone - reports it caused worse acid reflux  Multiple SSRIs tried - significant adverse effects (coldness, detachment from her skin) - can't remember exactly which ones she has been on, maybe fluoxetine  Duloxetine - unable to tolerate  Hydroxyzine - made her really drowsy        Supplements   Vitamin B complex daily  Ferrouse fumarate daily  Vitamin D 5000 units daily  No reported issues at this time.        Allergies:   Cetirizine 10 mg daily  Flonase nasal spray as needed   Reports her allergies have been well controlled. No current symptoms.      Today's Vitals: Wt 151 lb 9.6 oz (68.8 kg)   LMP 11/28/2024   BMI 28.18 kg/m    ----------------      I spent 40 minutes with this patient today. All changes were made via  collaborative practice agreement with Celina Riggs MD. A copy of the visit note was provided to the patient's provider(s).    A summary of these recommendations was sent via [facility].    Clare Wray, PharmD  Medication Therapy Management Pharmacist  Voicemail: [phone]           Medication Therapy Recommendations  Anxiety   1 Rationale: Untreated condition - Needs additional medication therapy - Indication   Recommendation: Start Medication   Status: Contact Provider - Awaiting Response   Identified Date: 1/6/2025         Esophageal reflux   1 Current Medication: dexlansoprazole (DEXILANT) 30 MG CPDR CR capsule   Current Medication Sig: Take 30 mg by mouth daily   Rationale: Dose too high - Dosage too high - Safety   Recommendation: Decrease Frequency   Status: Accepted per CPA   Identified Date: 1/6/2025 Completed Date: 1/6/2025

## 2025-01-06 ENCOUNTER — OFFICE VISIT (OUTPATIENT)
Dept: PHARMACY | Facility: CLINIC | Age: 40
End: 2025-01-06
Payer: COMMERCIAL

## 2025-01-06 VITALS — WEIGHT: 151.6 LBS | BODY MASS INDEX: 28.18 KG/M2

## 2025-01-06 DIAGNOSIS — E61.1 IRON DEFICIENCY: ICD-10-CM

## 2025-01-06 DIAGNOSIS — K21.00 GASTROESOPHAGEAL REFLUX DISEASE WITH ESOPHAGITIS WITHOUT HEMORRHAGE: Primary | ICD-10-CM

## 2025-01-06 DIAGNOSIS — F41.9 ANXIETY: ICD-10-CM

## 2025-01-06 DIAGNOSIS — J30.2 SEASONAL ALLERGIC RHINITIS, UNSPECIFIED TRIGGER: ICD-10-CM

## 2025-01-06 PROCEDURE — 99605 MTMS BY PHARM NP 15 MIN: CPT | Performed by: PHARMACIST

## 2025-01-06 PROCEDURE — 99607 MTMS BY PHARM ADDL 15 MIN: CPT | Performed by: PHARMACIST

## 2025-01-06 RX ORDER — CHOLESTYRAMINE 4 G/9G
POWDER, FOR SUSPENSION ORAL PRN
COMMUNITY

## 2025-01-06 RX ORDER — SUCRALFATE 1 G/1
1 TABLET ORAL 4 TIMES DAILY PRN
COMMUNITY

## 2025-01-06 NOTE — PATIENT INSTRUCTIONS
"Recommendations from today's MTM visit:                                                       Take the dexlansoprazole every other day and see if this controls your heartburn without side effects.  Options for your anxiety would be fluoxetine, or possibly paroxetine. Pristiq could also be a good option to consider. We can try one of these once you get assigned a primary provider.    Follow-up: follow up as needed    It was great speaking with you today.  I value your experience and would be very thankful for your time in providing feedback in our clinic survey. In the next few days, you may receive an email or text message from [employer] with a link to a survey related to your  clinical pharmacist.\"     To schedule another MTM appointment, please call the clinic directly or you may call the MTM scheduling line at [phone] or toll-free at [phone].     My Clinical Pharmacist's contact information:                                                      Please feel free to contact me with any questions or concerns you have.      Clare Wray, PharmD  Medication Therapy Management Pharmacist  Voicemail: [phone]     "

## 2025-01-06 NOTE — Clinical Note
Hello,  This is a new patient to me and I think you as well. She wants to start something for anxiety. Based on her Pgx data I would recommend fluoxetine, you can see my note for her full geneomic details. Any questions or concerns with this?   Thanks!  Clare Wray, PharmD Medication Therapy Management Pharmacist Voicemail: [phone]

## 2025-01-08 RX ORDER — FLUOXETINE 10 MG/1
10 CAPSULE ORAL DAILY
Qty: 30 CAPSULE | Refills: 1 | Status: SHIPPED | OUTPATIENT
Start: 2025-01-08

## 2025-02-03 ENCOUNTER — PATIENT OUTREACH (OUTPATIENT)
Dept: CARE COORDINATION | Facility: CLINIC | Age: 40
End: 2025-02-03
Payer: COMMERCIAL

## 2025-02-05 ENCOUNTER — PATIENT OUTREACH (OUTPATIENT)
Dept: CARE COORDINATION | Facility: CLINIC | Age: 40
End: 2025-02-05
Payer: COMMERCIAL

## 2025-02-21 ENCOUNTER — HOSPITAL ENCOUNTER (OUTPATIENT)
Dept: MAMMOGRAPHY | Facility: CLINIC | Age: 40
Discharge: HOME | End: 2025-02-21
Attending: NURSE PRACTITIONER | Admitting: NURSE PRACTITIONER
Payer: COMMERCIAL

## 2025-02-21 DIAGNOSIS — Z12.31 ENCOUNTER FOR SCREENING MAMMOGRAM FOR BREAST CANCER: ICD-10-CM

## 2025-02-21 PROCEDURE — 77063 BREAST TOMOSYNTHESIS BI: CPT

## 2025-03-15 DIAGNOSIS — J30.2 SEASONAL ALLERGIC RHINITIS, UNSPECIFIED TRIGGER: Primary | ICD-10-CM

## 2025-03-17 NOTE — TELEPHONE ENCOUNTER
Patient uses Flonase as needed, usually seasonally for allergies.  She requests rx be sent to pharmacy to see if insurance will cover it.  ANGÉLICA Gunter R.N.

## 2025-03-17 NOTE — TELEPHONE ENCOUNTER
Clinic RN: Please investigate patient's chart or contact patient if the information cannot be found because the medication is listed as historical or discontinued. Confirm patient is taking this medication. Document findings and route refill encounter to provider for approval or denial.    Jannette HOSKINS, RN, PHN

## 2025-03-18 RX ORDER — FLUTICASONE PROPIONATE 50 MCG
SPRAY, SUSPENSION (ML) NASAL
Qty: 16 G | Refills: 5 | Status: SHIPPED | OUTPATIENT
Start: 2025-03-18

## 2025-04-06 ENCOUNTER — OFFICE VISIT (OUTPATIENT)
Dept: URGENT CARE | Facility: URGENT CARE | Age: 40
End: 2025-04-06
Payer: COMMERCIAL

## 2025-04-06 VITALS
BODY MASS INDEX: 27.6 KG/M2 | HEIGHT: 62 IN | HEART RATE: 60 BPM | TEMPERATURE: 98.2 F | RESPIRATION RATE: 16 BRPM | SYSTOLIC BLOOD PRESSURE: 127 MMHG | OXYGEN SATURATION: 100 % | WEIGHT: 150 LBS | DIASTOLIC BLOOD PRESSURE: 85 MMHG

## 2025-04-06 DIAGNOSIS — R39.89 URINARY PROBLEM: ICD-10-CM

## 2025-04-06 DIAGNOSIS — N30.00 ACUTE CYSTITIS WITHOUT HEMATURIA: Primary | ICD-10-CM

## 2025-04-06 LAB
ALBUMIN UR-MCNC: NEGATIVE MG/DL
APPEARANCE UR: CLEAR
BACTERIA #/AREA URNS HPF: ABNORMAL /HPF
BILIRUB UR QL STRIP: NEGATIVE
CLUE CELLS: ABNORMAL
COLOR UR AUTO: YELLOW
GLUCOSE UR STRIP-MCNC: NEGATIVE MG/DL
HGB UR QL STRIP: ABNORMAL
KETONES UR STRIP-MCNC: NEGATIVE MG/DL
LEUKOCYTE ESTERASE UR QL STRIP: ABNORMAL
NITRATE UR QL: NEGATIVE
PH UR STRIP: 6.5 [PH] (ref 5–7)
RBC #/AREA URNS AUTO: ABNORMAL /HPF
SP GR UR STRIP: 1.01 (ref 1–1.03)
SQUAMOUS #/AREA URNS AUTO: ABNORMAL /LPF
TRICHOMONAS, WET PREP: ABNORMAL
UROBILINOGEN UR STRIP-ACNC: 0.2 E.U./DL
WBC #/AREA URNS AUTO: ABNORMAL /HPF
WBC CLUMPS #/AREA URNS HPF: PRESENT /HPF
WBC'S/HIGH POWER FIELD, WET PREP: ABNORMAL
YEAST, WET PREP: ABNORMAL

## 2025-04-06 PROCEDURE — 87086 URINE CULTURE/COLONY COUNT: CPT | Performed by: NURSE PRACTITIONER

## 2025-04-06 PROCEDURE — 87210 SMEAR WET MOUNT SALINE/INK: CPT | Performed by: NURSE PRACTITIONER

## 2025-04-06 PROCEDURE — 99214 OFFICE O/P EST MOD 30 MIN: CPT | Performed by: NURSE PRACTITIONER

## 2025-04-06 PROCEDURE — 81001 URINALYSIS AUTO W/SCOPE: CPT | Performed by: NURSE PRACTITIONER

## 2025-04-06 PROCEDURE — 3074F SYST BP LT 130 MM HG: CPT | Performed by: NURSE PRACTITIONER

## 2025-04-06 PROCEDURE — 3079F DIAST BP 80-89 MM HG: CPT | Performed by: NURSE PRACTITIONER

## 2025-04-06 RX ORDER — CEPHALEXIN 500 MG/1
CAPSULE ORAL
COMMUNITY
Start: 2025-03-14

## 2025-04-06 RX ORDER — NITROFURANTOIN 25; 75 MG/1; MG/1
100 CAPSULE ORAL 2 TIMES DAILY
Qty: 14 CAPSULE | Refills: 0 | Status: SHIPPED | OUTPATIENT
Start: 2025-04-06 | End: 2025-04-13

## 2025-04-06 NOTE — PROGRESS NOTES
"Assessment & Plan      Diagnosis Comments   1. Acute cystitis without hematuria  nitroFURantoin macrocrystal-monohydrate (MACROBID) 100 MG capsule Urine culture pending patient aware that this may change course of antibiotic.  Discussed completing full course of oral antibiotic to take this with food to avoid GI upset and to push fluids.        2. Urinary problem  UA Macroscopic with reflex to Microscopic and Culture - Lab Collect, Wet prep - lab collect, UA Microscopic with Reflex to Culture, Urine Culture           CARRIE Lacey [facility]    [place]     Unique is a 39 year old female who presents to clinic today for the following health issues:  Chief Complaint   Patient presents with    Urgent Care     Urinary problem; urine smells , burning with urination and frequency.            4/6/2025    12:08 PM   Additional Questions   Roomed by Nataliia COLLAZO   Accompanied by self     HPI      UTI    Onset of symptoms was 1day(s).  Course of illness is worsening  Severity moderate  Current and associated symptoms dysuria, frequency, urgency, and burning  Treatment and measures tried Increase fluid intake  Predisposing factors include none  Patient denies rigors, flank pain, temperature > 101 degrees F., vomiting, taking Coumadin, GFR less than 30 within the last year, vaginal discharge, vaginal odor, vaginal itching, and dyspareunia        Review of Systems  Constitutional, HEENT, cardiovascular, pulmonary, gi and gu systems are negative, except as otherwise noted.      Objective    /85   Pulse 60   Temp 98.2  F (36.8  C) (Oral)   Resp 16   Ht 1.562 m (5' 1.5\")   Wt 68 kg (150 lb)   LMP 04/01/2025   SpO2 100%   BMI 27.88 kg/m    Physical Exam   GENERAL: alert and no distress  EYES: Eyes grossly normal to inspection, PERRL and conjunctivae and sclerae normal  HENT: ear canals and TM's normal, nose and mouth without ulcers or lesions  NECK: no adenopathy, no " asymmetry, masses, or scars  RESP: lungs clear to auscultation - no rales, rhonchi or wheezes  CV: regular rate and rhythm, normal S1 S2, no S3 or S4, no murmur, click or rub, no peripheral edema  ABDOMEN: soft, nontender, no hepatosplenomegaly, no masses and bowel sounds normal  MS: no gross musculoskeletal defects noted, no edema  BACK: no CVA tenderness, no paralumbar tenderness    Results for orders placed or performed in visit on 04/06/25   UA Macroscopic with reflex to Microscopic and Culture - Lab Collect     Status: Abnormal    Specimen: Urine, Clean Catch   Result Value Ref Range    Color Urine Yellow Colorless, Straw, Light Yellow, Yellow    Appearance Urine Clear Clear    Glucose Urine Negative Negative mg/dL    Bilirubin Urine Negative Negative    Ketones Urine Negative Negative mg/dL    Specific Gravity Urine 1.010 1.003 - 1.035    Blood Urine Moderate (A) Negative    pH Urine 6.5 5.0 - 7.0    Protein Albumin Urine Negative Negative mg/dL    Urobilinogen Urine 0.2 0.2, 1.0 E.U./dL    Nitrite Urine Negative Negative    Leukocyte Esterase Urine Small (A) Negative   UA Microscopic with Reflex to Culture     Status: Abnormal   Result Value Ref Range    Bacteria Urine Moderate (A) None Seen /HPF    RBC Urine 10-25 (A) 0-2 /HPF /HPF    WBC Urine 10-25 (A) 0-5 /HPF /HPF    Squamous Epithelials Urine Few (A) None Seen /LPF    WBC Clumps Urine Present (A) None Seen /HPF   Wet prep - lab collect     Status: Abnormal    Specimen: Vagina; Swab   Result Value Ref Range    Trichomonas Absent Absent    Yeast Absent Absent    Clue Cells Absent Absent    WBCs/high power field 1+ (A) None

## 2025-04-07 LAB — BACTERIA UR CULT: NORMAL

## 2025-04-12 ENCOUNTER — MYC MEDICAL ADVICE (OUTPATIENT)
Dept: INTERNAL MEDICINE | Facility: CLINIC | Age: 40
End: 2025-04-12
Payer: COMMERCIAL

## 2025-04-12 DIAGNOSIS — R30.0 DYSURIA: Primary | ICD-10-CM

## 2025-04-14 NOTE — TELEPHONE ENCOUNTER
See mychart message. Pt went to [facility] on 4/6.   Took 7 days of Macrobid.   Also had Wet prep done.     Please advise.     10,000-50,000 CFU/mL Mixture of Urogenital Unique

## 2025-04-14 NOTE — TELEPHONE ENCOUNTER
Recommend to recheck UA, ordered, if infection , to treat, if not, to see gynecology for assessment of symptoms.

## 2025-04-14 NOTE — TELEPHONE ENCOUNTER
Sent [employer] message to patient.    Thank you,  Devonte, Triage RN Elin Bowles    1:21 PM 4/14/2025

## 2025-04-21 ENCOUNTER — VIRTUAL VISIT (OUTPATIENT)
Dept: FAMILY MEDICINE | Facility: CLINIC | Age: 40
End: 2025-04-21
Payer: COMMERCIAL

## 2025-04-21 DIAGNOSIS — J40 BRONCHITIS: Primary | ICD-10-CM

## 2025-04-21 DIAGNOSIS — K21.00 GASTROESOPHAGEAL REFLUX DISEASE WITH ESOPHAGITIS WITHOUT HEMORRHAGE: ICD-10-CM

## 2025-04-21 PROCEDURE — 98005 SYNCH AUDIO-VIDEO EST LOW 20: CPT | Performed by: INTERNAL MEDICINE

## 2025-04-21 RX ORDER — AZITHROMYCIN 250 MG/1
TABLET, FILM COATED ORAL
Qty: 6 TABLET | Refills: 0 | Status: SHIPPED | OUTPATIENT
Start: 2025-04-21 | End: 2025-04-26

## 2025-04-21 RX ORDER — BENZONATATE 200 MG/1
200 CAPSULE ORAL 3 TIMES DAILY PRN
Qty: 30 CAPSULE | Refills: 0 | Status: SHIPPED | OUTPATIENT
Start: 2025-04-21

## 2025-04-21 ASSESSMENT — ENCOUNTER SYMPTOMS: COUGH: 1

## 2025-04-21 NOTE — PROGRESS NOTES
"Unique is a 39 year old who is being evaluated via a billable video visit.    How would you like to obtain your AVS? MyChart  If the video visit is dropped, the invitation should be resent by: Text to cell phone: [phone]  Will anyone else be joining your video visit? No      Assessment & Plan     Bronchitis  On the 17th of this month she had a Bravo device placed for GERD  After that she had some tickly throat  That she started having some cough  The cough is nonproductive  No fever although she had some myalgias  No increasing shortness of breath  No runny nose  She did call the gastroenterology department and was advised to follow-up with PCP   She is concerned about pneumonia however she does not have increasing shortness of breath or malaise or fatigue  Most probably bronchitis  Advised to call back if he starts noting any fever or increasing shortness of breath or any lethargy  - azithromycin (ZITHROMAX) 250 MG tablet; Take 2 tablets (500 mg) by mouth daily for 1 day, THEN 1 tablet (250 mg) daily for 4 days.  - benzonatate (TESSALON) 200 MG capsule; Take 1 capsule (200 mg) by mouth 3 times daily as needed for cough.    Gastroesophageal reflux disease with esophagitis without hemorrhage  She had a Bravo device placed recently by gastroenterology          BMI  Estimated body mass index is 27.88 kg/m  as calculated from the following:    Height as of 4/6/25: 1.562 m (5' 1.5\").    Weight as of 4/6/25: 68 kg (150 lb).             Subjective   Unique is a 39 year old, presenting for the following health issues:  Cough ( cough since Friday - send link via text - [phone]/Dr Molina from [facility] requesting for Pt's lungs to be checked/per recent Bravo surgery that was completed/)      4/21/2025     4:12 PM   Additional Questions   Roomed by Vesta     Video Start Time: 430 pm    Cough    History of Present Illness       Reason for visit:  Cough   She is taking medications regularly.          How many servings " of fruits and vegetables do you eat daily?  2-3  On average, how many sweetened beverages do you drink each day (Examples: soda, juice, sweet tea, etc.  Do NOT count diet or artificially sweetened beverages)?   1  How many days per week do you exercise enough to make your heart beat faster? 3 or less  How many minutes a day do you exercise enough to make your heart beat faster? 30 - 60  How many days per week do you miss taking your medication? 0        Review of Systems  Constitutional, HEENT, cardiovascular, pulmonary, gi and gu systems are negative, except as otherwise noted.      Objective           Vitals:  No vitals were obtained today due to virtual visit.    Physical Exam   GENERAL: alert and no distress  EYES: Eyes grossly normal to inspection.  No discharge or erythema, or obvious scleral/conjunctival abnormalities.  RESP: No audible wheeze, cough, or visible cyanosis.    SKIN: Visible skin clear. No significant rash, abnormal pigmentation or lesions.  NEURO: Cranial nerves grossly intact.  Mentation and speech appropriate for age.  PSYCH: Appropriate affect, tone, and pace of words          Video-Visit Details    Type of service:  Video Visit   Video End Time: 445pm  Originating Location (pt. Location): Home    Distant Location (provider location):  Off-site  Platform used for Video Visit: Gabriel  Signed Electronically by: Manolo Jose MD

## 2025-04-23 ENCOUNTER — TRANSFERRED RECORDS (OUTPATIENT)
Dept: GASTROENTEROLOGY | Facility: CLINIC | Age: 40
End: 2025-04-23
Payer: COMMERCIAL

## 2025-05-02 NOTE — PROGRESS NOTES
05/01/2025        Unique Baker   [address] [place] [address]      I'm writing to let you know about the tests that were taken recently.   Thank you for allowing [facility] the opportunity to take part in your healthcare.  At [facility] we strive to provide each patient with the finest gastroenterology care available.  We hope your experience was pleasant and informative.    Hi Unique,  I have the results of your Bravo pH study, they are normal.  This means you have no evidence of acid reflux on the study, even while being off of your proton pump inhibitor.  The surgery appears to be doing its job.  On all 4 days, you had 0% acid exposure (normal is<5%).  Therefore, I do not recommend you go back on your proton pump inhibitor.  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  You could also follow-up further with our GI psychologist Dr. Edwards      Thank you.    Electronically signed by:  Dee Dee MESSER 05/01/2025 03:49 PM  Document generated by:  Dee Dee MESSER  05/01/2025  If your provider ordered multiple tests; the results may not become available at the same time.  If multiple test results are received within 14 days of one another, you may receive a duplicate.  cc:  Celina Riggs MD  cc:  Celina Riggs MD

## 2025-07-14 ENCOUNTER — LAB REQUISITION (OUTPATIENT)
Dept: LAB | Facility: CLINIC | Age: 40
End: 2025-07-14
Payer: COMMERCIAL

## 2025-07-14 DIAGNOSIS — Z86.19 PERSONAL HISTORY OF OTHER INFECTIOUS AND PARASITIC DISEASES: ICD-10-CM

## 2025-07-14 PROCEDURE — 88112 CYTOPATH CELL ENHANCE TECH: CPT | Mod: 26 | Performed by: PATHOLOGY

## 2025-07-14 PROCEDURE — 88112 CYTOPATH CELL ENHANCE TECH: CPT | Mod: TC,ORL

## 2025-07-17 LAB
PATH REPORT.COMMENTS IMP SPEC: NORMAL
PATH REPORT.FINAL DX SPEC: NORMAL
PATH REPORT.GROSS SPEC: NORMAL

## (undated) DEVICE — GLOVE PROTEXIS W/NEU-THERA 6.5  2D73TE65

## (undated) DEVICE — LINEN HALF SHEET 5512

## (undated) DEVICE — SU VICRYL 4-0 PS-2 18" UND J496H

## (undated) DEVICE — SOL WATER IRRIG 1000ML BOTTLE 2F7114

## (undated) DEVICE — ENDO BITE BLOCK ADULT OLYMPUS LATEX FREE MAJ-1632

## (undated) DEVICE — LINEN TOWEL PACK X10 5473

## (undated) DEVICE — GLOVE BIOGEL PI MICRO INDICATOR UNDERGLOVE SZ 7.5 48975

## (undated) DEVICE — BLADE KNIFE SURG 11 371111

## (undated) DEVICE — CUFF FINGER CLEARSIGHT MED CSCM

## (undated) DEVICE — ENDO FORCEP ENDOJAW BIOPSY 2.8MMX160CM FB-220K

## (undated) DEVICE — DAVINCI XI OBTURATOR BLADELESS 8MM 470359

## (undated) DEVICE — KIT ENDO TURNOVER/PROCEDURE W/CLEAN A SCOPE LINERS 103888

## (undated) DEVICE — SU VICRYL 0 CT-1 36" J346H

## (undated) DEVICE — SOL NACL 0.9% IRRIG 1000ML BOTTLE 2F7124

## (undated) DEVICE — SU VICRYL 2-0 CT-1 36" J345H

## (undated) DEVICE — NDL INSUFFLATION 13GA 120MM C2201

## (undated) DEVICE — ESU GROUND PAD UNIVERSAL W/O CORD

## (undated) DEVICE — SU DERMABOND ADVANCED .7ML DNX12

## (undated) DEVICE — TRANSFER DEVICE BLOOD NDL HOLDER 364880

## (undated) DEVICE — LUBRICANT INST ELECTROLUBE EL101

## (undated) DEVICE — DAVINCI XI DRAPE ARM 470015

## (undated) DEVICE — GLOVE PROTEXIS BLUE W/NEU-THERA 6.5  2D73EB65

## (undated) DEVICE — LINEN DRAPE 54X72" 5467

## (undated) DEVICE — GLOVE PROTEXIS POWDER FREE SMT 6.0  2D72PT60X

## (undated) DEVICE — SUCTION IRR STRYKERFLOW II W/TIP 250-070-520

## (undated) DEVICE — ENDO POUCH UNIVERSAL RETRIEVAL SYSTEM INZII 5MM CD003

## (undated) DEVICE — SYR 05ML LL W/O NDL

## (undated) DEVICE — SU VICRYL 0 UR-6 27" J603H

## (undated) DEVICE — GLOVE PROTEXIS BLUE W/NEU-THERA 7.0  2D73EB70

## (undated) DEVICE — SU MONOCRYL 4-0 PS-2 27" UND Y426H

## (undated) DEVICE — ESU PENCIL W/HOLSTER E2350H

## (undated) DEVICE — ESU ELEC BLADE 2.75" COATED/INSULATED E1455

## (undated) DEVICE — LINEN BABY BLANKET 5434

## (undated) DEVICE — PREP CHLORAPREP 26ML TINTED ORANGE  260815

## (undated) DEVICE — CLIP ENDO HEMO-LOC GREEN MED/LG 544230

## (undated) DEVICE — STOCKING SLEEVE VASOPRESS COMPRESSION CALF MED 18" VP501M

## (undated) DEVICE — Device

## (undated) DEVICE — LINEN FULL SHEET 5511

## (undated) DEVICE — SUCTION CANISTER MEDIVAC LINER 3000ML W/LID 65651-530

## (undated) DEVICE — CATH TRAY FOLEY SURESTEP 16FR DRAIN BAG STATOCK A899916

## (undated) DEVICE — SU MONOCRYL 2-0 CT-1 36" UND Y945H

## (undated) DEVICE — ESU GROUND PAD ADULT W/CORD E7507

## (undated) DEVICE — BAG CLEAR TRASH 1.3M 39X33" P4040C

## (undated) DEVICE — DAVINCI XI ESU FORCE BIPOLAR 8MM 471405

## (undated) DEVICE — DAVINCI XI SEAL UNIVERSAL 5-8MM 470361

## (undated) DEVICE — PACK C-SECTION LF PL15OTA83B

## (undated) RX ORDER — BUPIVACAINE HYDROCHLORIDE AND EPINEPHRINE 5; 5 MG/ML; UG/ML
INJECTION, SOLUTION EPIDURAL; INTRACAUDAL; PERINEURAL
Status: DISPENSED
Start: 2023-10-16

## (undated) RX ORDER — OXYCODONE HYDROCHLORIDE 5 MG/1
TABLET ORAL
Status: DISPENSED
Start: 2023-10-16

## (undated) RX ORDER — PROPOFOL 10 MG/ML
INJECTION, EMULSION INTRAVENOUS
Status: DISPENSED
Start: 2023-10-16

## (undated) RX ORDER — FENTANYL CITRATE 50 UG/ML
INJECTION, SOLUTION INTRAMUSCULAR; INTRAVENOUS
Status: DISPENSED
Start: 2017-10-13

## (undated) RX ORDER — ONDANSETRON 2 MG/ML
INJECTION INTRAMUSCULAR; INTRAVENOUS
Status: DISPENSED
Start: 2023-10-16

## (undated) RX ORDER — ACETAMINOPHEN 325 MG/1
TABLET ORAL
Status: DISPENSED
Start: 2023-10-16

## (undated) RX ORDER — EPHEDRINE SULFATE 50 MG/ML
INJECTION, SOLUTION INTRAVENOUS
Status: DISPENSED
Start: 2023-10-16

## (undated) RX ORDER — INDOCYANINE GREEN AND WATER 25 MG
KIT INJECTION
Status: DISPENSED
Start: 2023-10-16

## (undated) RX ORDER — ONDANSETRON 2 MG/ML
INJECTION INTRAMUSCULAR; INTRAVENOUS
Status: DISPENSED
Start: 2021-05-26

## (undated) RX ORDER — EPHEDRINE SULFATE 50 MG/ML
INJECTION, SOLUTION INTRAMUSCULAR; INTRAVENOUS; SUBCUTANEOUS
Status: DISPENSED
Start: 2023-10-16

## (undated) RX ORDER — FENTANYL CITRATE 50 UG/ML
INJECTION, SOLUTION INTRAMUSCULAR; INTRAVENOUS
Status: DISPENSED
Start: 2023-10-16

## (undated) RX ORDER — ONDANSETRON 2 MG/ML
INJECTION INTRAMUSCULAR; INTRAVENOUS
Status: DISPENSED
Start: 2020-09-16

## (undated) RX ORDER — FENTANYL CITRATE 50 UG/ML
INJECTION, SOLUTION INTRAMUSCULAR; INTRAVENOUS
Status: DISPENSED
Start: 2021-05-26

## (undated) RX ORDER — CEFAZOLIN SODIUM/WATER 2 G/20 ML
SYRINGE (ML) INTRAVENOUS
Status: DISPENSED
Start: 2023-10-16

## (undated) RX ORDER — LIDOCAINE HYDROCHLORIDE 10 MG/ML
INJECTION, SOLUTION EPIDURAL; INFILTRATION; INTRACAUDAL; PERINEURAL
Status: DISPENSED
Start: 2023-10-16

## (undated) RX ORDER — PROMETHAZINE HYDROCHLORIDE 25 MG/ML
INJECTION, SOLUTION INTRAMUSCULAR; INTRAVENOUS
Status: DISPENSED
Start: 2023-10-16

## (undated) RX ORDER — MORPHINE SULFATE 1 MG/ML
INJECTION, SOLUTION EPIDURAL; INTRATHECAL; INTRAVENOUS
Status: DISPENSED
Start: 2017-10-13

## (undated) RX ORDER — FENTANYL CITRATE 50 UG/ML
INJECTION, SOLUTION INTRAMUSCULAR; INTRAVENOUS
Status: DISPENSED
Start: 2020-09-16

## (undated) RX ORDER — SODIUM CHLORIDE 9 MG/ML
INJECTION, SOLUTION INTRAVENOUS
Status: DISPENSED
Start: 2022-12-26

## (undated) RX ORDER — DEXAMETHASONE SODIUM PHOSPHATE 4 MG/ML
INJECTION, SOLUTION INTRA-ARTICULAR; INTRALESIONAL; INTRAMUSCULAR; INTRAVENOUS; SOFT TISSUE
Status: DISPENSED
Start: 2023-10-16

## (undated) RX ORDER — KETOROLAC TROMETHAMINE 30 MG/ML
INJECTION, SOLUTION INTRAMUSCULAR; INTRAVENOUS
Status: DISPENSED
Start: 2023-10-16

## (undated) RX ORDER — BUPIVACAINE HYDROCHLORIDE AND EPINEPHRINE 2.5; 5 MG/ML; UG/ML
INJECTION, SOLUTION EPIDURAL; INFILTRATION; INTRACAUDAL; PERINEURAL
Status: DISPENSED
Start: 2023-10-16

## (undated) RX ORDER — SCOLOPAMINE TRANSDERMAL SYSTEM 1 MG/1
PATCH, EXTENDED RELEASE TRANSDERMAL
Status: DISPENSED
Start: 2023-10-16